# Patient Record
Sex: MALE | Race: WHITE | NOT HISPANIC OR LATINO | Employment: OTHER | URBAN - METROPOLITAN AREA
[De-identification: names, ages, dates, MRNs, and addresses within clinical notes are randomized per-mention and may not be internally consistent; named-entity substitution may affect disease eponyms.]

---

## 2023-12-07 ENCOUNTER — TRANSFERRED RECORDS (OUTPATIENT)
Dept: HEALTH INFORMATION MANAGEMENT | Facility: CLINIC | Age: 73
End: 2023-12-07

## 2024-04-24 ENCOUNTER — TELEPHONE (OUTPATIENT)
Dept: TRANSPLANT | Facility: CLINIC | Age: 74
End: 2024-04-24

## 2024-04-24 ENCOUNTER — TRANSCRIBE ORDERS (OUTPATIENT)
Dept: OTHER | Age: 74
End: 2024-04-24

## 2024-04-24 ENCOUNTER — PATIENT OUTREACH (OUTPATIENT)
Dept: ONCOLOGY | Facility: CLINIC | Age: 74
End: 2024-04-24
Payer: MEDICARE

## 2024-04-24 ENCOUNTER — MEDICAL CORRESPONDENCE (OUTPATIENT)
Dept: TRANSPLANT | Facility: CLINIC | Age: 74
End: 2024-04-24
Payer: MEDICARE

## 2024-04-24 ENCOUNTER — TELEPHONE (OUTPATIENT)
Dept: TRANSPLANT | Facility: CLINIC | Age: 74
End: 2024-04-24
Payer: MEDICARE

## 2024-04-24 DIAGNOSIS — D46.9 MDS (MYELODYSPLASTIC SYNDROME) (H): Primary | ICD-10-CM

## 2024-04-25 NOTE — PROGRESS NOTES
New Patient Oncology Nurse Navigator Note     Referring provider: Dr. Jean Claude Beckwith      Referring Clinic/Organization: [facility]      Referred to (specialty:) BMT      Requested provider (if applicable): NA     Date Referral Received: April 24, 2024     Evaluation for:  D46.9 (ICD-10-CM) - MDS (myelodysplastic syndrome) (H      April 25, 2024  Referral received and reviewed. Referral is for BMT. Message sent to BMT to schedule consultation.

## 2024-05-19 ENCOUNTER — HEALTH MAINTENANCE LETTER (OUTPATIENT)
Age: 74
End: 2024-05-19

## 2024-06-04 ENCOUNTER — CARE COORDINATION (OUTPATIENT)
Dept: TRANSPLANT | Facility: CLINIC | Age: 74
End: 2024-06-04
Payer: MEDICARE

## 2024-06-06 LAB
ABO/RH(D): NORMAL
ANTIBODY SCREEN: NEGATIVE
SPECIMEN EXPIRATION DATE: NORMAL

## 2024-06-06 NOTE — PROGRESS NOTES
BMT/Cell Therapy Note  Jun 7, 2024     Referring provider: Dr. 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  August 2023:  Collapsed in the bathroom while on a trip to [facility]. Had high fevers and rigors, treated with doxycycline.   11/30/2023: Seen by Rheumatology in the [place]. He had acute right sacroilitis. Ferritin was elevated to 1600, no hemachromatosis.   12/3/2023 to 12/9/2023: Hospitalized with high fevers and new onset mild pancytopenias. CT C/A/P did not show any lymphadenopathy or splenomegaly.  Infectious cultures were negative. 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 He was treated with anakinra, solumedrol and then started on oral prednisone.   12/7/2023: Bone marrow biopsy showed hypercellular marrow, no dysplasia, 34% ring sideroblasts, increased reticuloendothelial iron stores, no increase in blasts. Flow showed a CD5 positive B-cell infiltrate (5% of the marrow cellularity), consistent with monoclonal B-cell lymphocytosis. Cytogenetics showed a normal male karyotype. Myeloid malignancy panel did not show an SF3B1 mutation. However, there were mutations in SRSF2, DNMT3A, RUNX1, IDH2 p.[id number] , BCOR, BCORL1, and TET2. Diagnosed with CCUS.   1/31/2024 to 2/6/24: Hospitalized with fevers to 104 despite anti-IL1 therapy.  Anakinra was increased, then switched to canakinumab. PET-Ct on 2/9/24 did not show any malignancy. Diagnosed with subclinical hyperthyroidism treated with methimazole starting 2/14/2024.  3/15/2024 to 3/25/2024: Hospitalized for neutropenic fevers. He also had an episode of atrial fibrillation with rapid ventricular response placed on AC with LMWH. Methimazole was held due to risk of agranulocytosis.  3/18/2024: Bone marrow biopsy showed Myelodysplastic syndrome, with multilineage dysplasia. 2% bone marrow blasts, and 3% circulating blasts.18% ringed sideroblasts, slight reticulin fibrosis. Concurrent CBC showed WBC 1.7, ANC 0.5, Hb 8.7, platelet count 43. Normal cytogenetics. NGS could not be sent from the bone marrow. NGS peripheral blood (4/18/24) showed mutations in BCOR (56%), BCORL1 (11%), DNMT3A (34%), IDH2 (9%), SRSF2 (33%), STAG2 (9%), TET2 (2%), two RUNX1 mutations. IPSS-M: very high risk 1.98. Scattered non-caseating granulomas identified on the bone marrow biopsy and clot sections, negative for microorganisms. Also seen on flow was CD5-positive lambda monotypic B cells (4.3%).     3/21/2024: Cycle 1 decitabine 20 mg/m2 for 3 days only.  4/22/2024: Cycle 2 decitabine 20 mg/m2 for 5 days   5/21/2024: Cycle 3 decitabine   Will start cycle 4 on 6/19/24     History     History of Present Illness/ Summary  Arpit Forrest is a 73 year old male who presents for a new transplant evaluation. Here with his wife. He had an auto inflammatory syndrome around the time of diagnosis of MDS. He is currently on prednisone 8mg, with plan to taper by 1mg daily. No recurrent fever or rigors since starting decitabine.     Main complaint is feeling tired. Tolerating decitabine well except slight nausea, metallic taste, constipation. Goes to concerts, recitals. Cannot do the heavy yard work, gets winded after two flights of stairs. He is independent in ADLs and IADLs. Walks on the treadmill 20min, twice a week. Uses weights.  Feels better when counts are higher. He is very motivated to proceed with transplant.      Review of Systems: Negative except as mentioned above    Past Medical History    A.fib with RVR while admitted with febrile episode on 3/15/24 s/p cardioversion. He was on amiodarone and anticoagulation till 4/15/24, at which point it was discontinued.   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   Dyslipidemia  HTN  VINCENT  Gout   Exposure to agent orange     Past Surgical History    Hx of kidney donation to sister  Vasectomy     Family History    Mother  at age 99. She had a history of hypertension.    Father  at age 94, had some strokes after age 90.  He has 2 daughters, 1 who is 45 years old and 1 who is 47 years old.  His daughters are healthy.    He has 2 full sisters who were born in 9 and  who are currently doing well.  One of his sisters (Constance) history of complex kidney disease for many years and has undergone 2 kidney transplant.  No history of hematologic malignancies    Social History   He served actively for 12 years in the [employer], including deployments to [place] and [place] during the 1960s, where he reported extensive contact with Agent Orange due to the nature of his duties. Following this, he was stationed in [place] at a facility ([facility]) which was later closed due to the extensive dumping of hazardous substances by local industries      Medications   Current Outpatient Medications   Medication Sig Dispense Refill    allopurinol (ZYLOPRIM) 100 MG tablet Take 100 mg by mouth      amLODIPine (NORVASC) 10 MG tablet Take 1 tablet by mouth every morning      atorvastatin (LIPITOR) 10 MG tablet Take 1 tablet by mouth every morning      calcium carbonate (TUMS) 500 MG chewable tablet Take 1 chew tab by mouth 2 times daily 600 mg      coenzyme Q-10 capsule Take 1 capsule by mouth daily      fluticasone (FLONASE) 50 MCG/ACT nasal spray 1 spray       levofloxacin (LEVAQUIN) 500 MG tablet Take 500 mg by mouth      metFORMIN (GLUCOPHAGE XR) 500 MG 24 hr tablet Take 1,000 mg by mouth 2 times daily (with meals)      predniSONE (DELTASONE) 1 MG tablet Take 3 mg by mouth      RABEprazole (ACIPHEX) 20 MG EC tablet Take 1 tablet by mouth daily      Vitamin D3 (CHOLECALCIFEROL) 25 mcg (1000 units) tablet Take by mouth daily 3000 international unit(s)      acetaminophen (TYLENOL) 500 MG tablet Take 2 tablets by mouth      latanoprost (XALATAN) 0.005 % ophthalmic solution INSTILL 2 DROPS IN BOTH EYES AT BEDTIME      voriconazole (VFEND) 200 MG tablet Take 200 mg by mouth 2 times daily       Allergies    Allergies   Allergen Reactions    Gramineae Pollens Other (See Comments)     Seasonal sneezing, runny nose.          Assessment and Plan     MDS-IB1, very high risk   HCTCI score 1 (steroid induced hyperglycemia). Not frail.     We discussed the process of transplant in detail. We discussed that the first step in the transplant process is identification of a donor. We will type his daughters and proceed with an unrelated donor search. I outlined the steps of the transplant process including pre-transplant workup, conditioning therapy, engraftment, post-transplant monitoring. We discussed the risks including GVHD, relapse, infections, graft failure and severe organ toxicities. Also dicussed the requirements of staying close to hospital and having a dedicated caregiver for the first 100 days post transplant.     We discussed that allogenic stem cell transplant is the only modality that offers a chance at long-term survival and potential cure. The estimated one year overall survival according to the CIBMTR calculator is 67%. The risk of relapse is 40-50%, the incidence of grade III and IV acute GVHD and moderate/severe chronic GVHD is 10-15% and non relapse mortality is 10-20%. We discussed that we cannot individualize a statistic.      Recommendations  - Proceed with  "unrelated donor search. Also type daughters.   - Continue to taper steroids, would ideally like patient to be off steroids prior to transplant  - Cardiology evaluation during workup    Physical Exam     Vital Signs: /74 (BP Location: Right arm, Patient Position: Right side, Cuff Size: Adult Large)   Pulse 78   Temp 98.1  F (36.7  C) (Oral)   Resp 18   Ht 1.865 m (6' 1.43\")   Wt 114.1 kg (251 lb 8 oz)   SpO2 99%   BMI 32.80 kg/m    Wt Readings from Last 4 Encounters:   06/07/24 114.1 kg (251 lb 8 oz)     KPS: 80% Can perform normal activity with effort, some signs of disease    General: Alert, no distress  Eyes: Conjunctiva normal  Respiratory: Normal respiratory effort.  Cardiovascular: Normal perfusion, no significant edema.  Abdomen: No distention.    BMT Fried Frailty          6/7/2024    09:03   Fried Frailty   Lost>10 pounds unintenionally last year N   Exhaustion Score 0   Slowness Score 0   Weakness/ Strength Score 0   Low Activity Level Score 0   Final Score Not Frail   Final Score Number 0   Sit Stand Assessment   Patient able to perform 5 chair stands Y   Chair Stands in seconds 11   Patient is able to perform stand with Feet Side by Side? Y   First attempt (in seconds): 10   Patient is able to perform Semi-Tandem Stand? Y   First attemp (in seconds): 10   Patient is able to perform Tandem Stand? Y   First attemp (in seconds): 10        Labs, Pathology and Imaging     IMAGING  MRI Abdomen 11/30/2023  Normal hepatic morphology. Mild diffuse hepatic parenchymal signal loss on opposed phase images. No concerning focal hepatic lesion. No intra-extra hepatic biliary ductal dilatation.     Hepatic fat fraction measures 10.5% by DANTE and 12.0% by segmentation. This is consistent with mild hepatic steatosis.     R2* measures 50.0 1/s by DANTE and 60.4 1/s by segmentation. This is consistent with borderline iron deposition.   Left nephrectomy. Right renal cysts. No hydronephrosis. 2.2 cm left adrenal " nodule demonstrates homogeneous signal loss on opposed phase images and also contains internal macroscopic fat. Negative right adrenal gland. Normal appearance of the pancreas and gallbladder. Visualized bowel is normal in caliber. Colonic diverticulosis. No lymphadenopathy. No ascites. No concerning osseous lesion.     IMPRESSION:    1. Mild hepatic steatosis and borderline abnormal iron deposition. No concerning focal liver lesion.   2. Left adrenal myelolipoma.       I spent 90 minutes in the care of this patient today, which included time necessary for preparation for the visit, obtaining history, ordering medications/tests/procedures as medically indicated, review of pertinent medical literature, counseling of the patient, communication of recommendations to the care team, and documentation time.

## 2024-06-07 ENCOUNTER — LAB (OUTPATIENT)
Dept: LAB | Facility: CLINIC | Age: 74
End: 2024-06-07
Attending: STUDENT IN AN ORGANIZED HEALTH CARE EDUCATION/TRAINING PROGRAM
Payer: MEDICARE

## 2024-06-07 ENCOUNTER — ALLIED HEALTH/NURSE VISIT (OUTPATIENT)
Dept: TRANSPLANT | Facility: CLINIC | Age: 74
End: 2024-06-07
Attending: STUDENT IN AN ORGANIZED HEALTH CARE EDUCATION/TRAINING PROGRAM
Payer: MEDICARE

## 2024-06-07 ENCOUNTER — MEDICAL CORRESPONDENCE (OUTPATIENT)
Dept: TRANSPLANT | Facility: CLINIC | Age: 74
End: 2024-06-07

## 2024-06-07 VITALS
DIASTOLIC BLOOD PRESSURE: 74 MMHG | OXYGEN SATURATION: 99 % | TEMPERATURE: 98.1 F | SYSTOLIC BLOOD PRESSURE: 125 MMHG | HEIGHT: 73 IN | HEART RATE: 78 BPM | RESPIRATION RATE: 18 BRPM | WEIGHT: 251.5 LBS | BODY MASS INDEX: 33.33 KG/M2

## 2024-06-07 DIAGNOSIS — D46.9 MDS (MYELODYSPLASTIC SYNDROME) (H): ICD-10-CM

## 2024-06-07 DIAGNOSIS — D46.9 MDS (MYELODYSPLASTIC SYNDROME) (H): Primary | ICD-10-CM

## 2024-06-07 DIAGNOSIS — Z71.9 VISIT FOR COUNSELING: Primary | ICD-10-CM

## 2024-06-07 PROBLEM — Z90.5 HISTORY OF KIDNEY DONATION: Status: ACTIVE | Noted: 2023-12-03

## 2024-06-07 PROBLEM — M04.9 AUTOINFLAMMATORY SYNDROME (H): Status: ACTIVE | Noted: 2024-03-20

## 2024-06-07 PROBLEM — Z77.098 HISTORY OF AGENT ORANGE EXPOSURE: Status: ACTIVE | Noted: 2023-12-04

## 2024-06-07 PROBLEM — E11.9 TYPE 2 DIABETES MELLITUS WITHOUT COMPLICATION, WITHOUT LONG-TERM CURRENT USE OF INSULIN (H): Status: ACTIVE | Noted: 2023-10-16

## 2024-06-07 PROBLEM — E78.5 HYPERLIPIDEMIA: Status: ACTIVE | Noted: 2022-10-10

## 2024-06-07 PROBLEM — M46.1 BILATERAL SACROILIITIS (H): Status: ACTIVE | Noted: 2023-12-05

## 2024-06-07 PROBLEM — E66.9 OBESITY (BMI 30-39.9): Status: ACTIVE | Noted: 2024-02-01

## 2024-06-07 PROBLEM — G47.33 OBSTRUCTIVE SLEEP APNEA SYNDROME: Status: ACTIVE | Noted: 2022-10-10

## 2024-06-07 PROBLEM — K22.70 BARRETT'S ESOPHAGUS WITHOUT DYSPLASIA: Status: ACTIVE | Noted: 2022-10-10

## 2024-06-07 PROBLEM — Z87.39 H/O: GOUT: Status: ACTIVE | Noted: 2024-06-07

## 2024-06-07 PROBLEM — I10 ESSENTIAL HYPERTENSION: Status: ACTIVE | Noted: 2022-10-10

## 2024-06-07 PROBLEM — M45.6 ANKYLOSING SPONDYLITIS LUMBAR REGION (H): Status: ACTIVE | Noted: 2022-10-10

## 2024-06-07 LAB
CMV IGG SERPL IA-ACNC: <0.2 U/ML
CMV IGG SERPL IA-ACNC: NORMAL

## 2024-06-07 PROCEDURE — 36415 COLL VENOUS BLD VENIPUNCTURE: CPT

## 2024-06-07 PROCEDURE — 81378 HLA I & II TYPING HR: CPT

## 2024-06-07 PROCEDURE — 86828 HLA CLASS I&II ANTIBODY QUAL: CPT

## 2024-06-07 PROCEDURE — 86644 CMV ANTIBODY: CPT

## 2024-06-07 PROCEDURE — G0463 HOSPITAL OUTPT CLINIC VISIT: HCPCS | Performed by: STUDENT IN AN ORGANIZED HEALTH CARE EDUCATION/TRAINING PROGRAM

## 2024-06-07 PROCEDURE — 99205 OFFICE O/P NEW HI 60 MIN: CPT | Performed by: STUDENT IN AN ORGANIZED HEALTH CARE EDUCATION/TRAINING PROGRAM

## 2024-06-07 PROCEDURE — 99417 PROLNG OP E/M EACH 15 MIN: CPT | Performed by: STUDENT IN AN ORGANIZED HEALTH CARE EDUCATION/TRAINING PROGRAM

## 2024-06-07 PROCEDURE — 86900 BLOOD TYPING SEROLOGIC ABO: CPT

## 2024-06-07 RX ORDER — VORICONAZOLE 200 MG/1
200 TABLET, FILM COATED ORAL 2 TIMES DAILY
Status: ON HOLD | COMMUNITY
End: 2024-07-31

## 2024-06-07 RX ORDER — UBIDECARENONE 30 MG
1 CAPSULE ORAL DAILY
Status: ON HOLD | COMMUNITY
End: 2024-08-25

## 2024-06-07 RX ORDER — RABEPRAZOLE SODIUM 20 MG/1
1 TABLET, DELAYED RELEASE ORAL DAILY
Status: ON HOLD | COMMUNITY
Start: 2023-09-21 | End: 2024-08-24

## 2024-06-07 RX ORDER — PREDNISONE 1 MG/1
3 TABLET ORAL
COMMUNITY
Start: 2024-05-28 | End: 2024-07-23

## 2024-06-07 RX ORDER — VITAMIN B COMPLEX
3 TABLET ORAL DAILY
Status: ON HOLD | COMMUNITY
End: 2024-08-24

## 2024-06-07 RX ORDER — ACETAMINOPHEN 500 MG
1000 TABLET ORAL 2 TIMES DAILY
Status: ON HOLD | COMMUNITY
End: 2024-08-24

## 2024-06-07 RX ORDER — LATANOPROST 50 UG/ML
SOLUTION/ DROPS OPHTHALMIC
COMMUNITY

## 2024-06-07 RX ORDER — ATORVASTATIN CALCIUM 10 MG/1
1 TABLET, FILM COATED ORAL EVERY MORNING
Status: ON HOLD | COMMUNITY
Start: 2023-10-24 | End: 2024-08-24

## 2024-06-07 RX ORDER — ALLOPURINOL 100 MG/1
300 TABLET ORAL
Status: ON HOLD | COMMUNITY
Start: 2023-10-24 | End: 2024-08-24

## 2024-06-07 RX ORDER — FLUTICASONE PROPIONATE 50 MCG
1 SPRAY, SUSPENSION (ML) NASAL 2 TIMES DAILY
COMMUNITY
Start: 2023-12-15

## 2024-06-07 RX ORDER — LEVOFLOXACIN 500 MG/1
500 TABLET, FILM COATED ORAL DAILY
Status: ON HOLD | COMMUNITY
Start: 2024-03-26 | End: 2024-08-24

## 2024-06-07 RX ORDER — AMLODIPINE BESYLATE 10 MG/1
1 TABLET ORAL EVERY MORNING
Status: ON HOLD | COMMUNITY
Start: 2023-10-24 | End: 2024-08-24

## 2024-06-07 ASSESSMENT — PAIN SCALES - GENERAL: PAINLEVEL: NO PAIN (0)

## 2024-06-07 NOTE — PROGRESS NOTES
Blood and Marrow Transplant   New Transplant Visit with   Clinical     Assessment completed on 6/7/2024 in the BMT clinic. Information for this assessment was provided by pt and pt's spouse report, consultation with medical team, and medical chart review.     Present:  Patient: Arpit Forrest  Spouse: Meghna Forrest  : SHUN Almendarez, [facility]    Medical Team   Nurse Coordinator: Blanca Sanchez RN  BMT Physician: Saw Fowler MD  Referring Physician: Jean Claude Beckwith MD    Diagnosis: MDS  Diagnosis Date: 3/2024    Presenting Information:  Pt is a 73 year old male diagnosed with MDS. Pt was diagnosed on 3/2024. Pt presents for allogeneic stem cell transplant discussion.    Contact Information:  Cell Phone: [phone]  Home Phone: [phone]  Email: [email or web address]  Spouse's phone: [phone]    Special Needs:   No needs identified at this time.     Relocation Requirement:   Pt lives in [place] ([place] from [facility] which is within the required distance of the hospital. Pt does not need to relocate.     Living Situation:   At home with spouse    Family Information:   Spouse: Meghna  Siblings: two sisters: Casi ([place]), Constance ([place])  Children: two daughters, Anel (47), Cathy (45); 5 grandchildren     Education/Employment:  Currently employed: retired  Employer/Occupation: most recent employer  [employer] ([employer]), before that had various other professions.    Spouse/Partner Employed: retired  Occupation: previously worked as a professor and a school principle.     Insurance:   Pt has Medicare/BCBS. No insurance concerns identified at this time. SIXTO provided information regarding the insurance authorization process and the role of the [employer] . SW provided contact info for the [employer]  and referred pt to them for future insurance questions.     Finances:   Pt's source of income is social security  Contacted Esperanza Jimenez at [employer]  and informed that intake states he was told patient was already transferred, however patient is still awaiting transport to [facility]. Esperanza Jimenez states they were told the patient was at [facility]. Esperanza Jimenez states he has a crew that will be on the way from [facility] now.       Kathrin Barrera RN  09/09/23 [address]. No financial concerns identified at this time.    Caregiver:   SW discussed with the pt and spouse the caregiver role and expectation at length. Pt is agreeable to having a full time caregiver for the minimum of 100 days until cleared by the BMT Physician. Pt's identified caregivers are wife, daughter lives next door. Caregiver education and information provided. No caregiver concerns identified.     Healthcare Directive:  No. SW provided education and forms. SW encouraged pt to have discussions with their family regarding their health care wishes.  In the absence of a healthcare document, SW discussed the [place] Policy on who would make decisions on his behalf if he did not have the capacity to make healthcare decisions.    Resources Provided:  -BMT Information Book  -BMT Resources Packet  -Healthcare Directive  -Honoring Choices - Your Rights: Making Your Own Health Care Treatment Decisions  -Caregiver Contract/Description  -Transplant Unit Description and Information     Identified Concerns:  No concerns identified at this time.     Summary:  Pt presents to [facility] regarding an allogeneic stem cell transplant. Pt and pt's spouse asked good/appropriate questions regarding psychosocial factors related to BMT; all questions were addressed. Pt presented as calm and engaged. Pt's affect was appropriate. Patient indicated they are currently feeling positive. Family's affect was appropriate.     Plan:   SW provided contact information and encouraged pt to contact SW with any additional questions, concerns, resources and/or for support. SW will continue to follow pt to provide support and guidance with resources as needed.     Rosario HOFFMANN, [facility]  Specialty Float    Phone: [phone]

## 2024-06-07 NOTE — NURSING NOTE
Chief Complaint   Patient presents with    Labs Only     Labs collected from venipuncture by RN.      Labs collected from venipuncture by RN.     Areli Phillips RN

## 2024-06-07 NOTE — PROGRESS NOTES
Blood and Marrow Transplant - New Evaluation Appointment    Spoke with Arpit maylin Meghna, patient's spouse, following visit with Dr. Fowler. I explained the role of the nurse coordinator throughout the process, as well as general time line and expectations for next steps. We discussed the necessity of a caregiver and the program's proximity requirements. All questions were answered.     Plan: Allogeneic Transplant, pending Completion of Decitabine cycle.     Timeline Notes:PT will have next cycle of decitabine 6/19, plan to bring to work up 4 weeks after. Dr Fowler recommends that pt get BMBX prior to start of last cycle. We can do BMBX post this last cycle during the work up.     Contact information provided for :  yes    Allo:  HLA typing drawn: Yes    PRA typing drawn:  Yes    CMV-IgG and ABO-Rh drawn or in record: Yes    Contact information provided for : Yes    Will sibling typing kits need to be sent? No    Financial Release for URD search obtained:  Yes    Phase Status updated: yes

## 2024-06-07 NOTE — NURSING NOTE
"Oncology Rooming Note    June 7, 2024 7:26 AM   Arpit Forrest is a 73 year old male who presents for:    Chief Complaint   Patient presents with    Oncology Clinic Visit     New Eval for MDS     Initial Vitals: /74 (BP Location: Right arm, Patient Position: Right side, Cuff Size: Adult Large)   Pulse 78   Temp 98.1  F (36.7  C) (Oral)   Resp 18   Ht 1.865 m (6' 1.43\")   Wt 114.1 kg (251 lb 8 oz)   SpO2 99%   BMI 32.80 kg/m   Estimated body mass index is 32.8 kg/m  as calculated from the following:    Height as of this encounter: 1.865 m (6' 1.43\").    Weight as of this encounter: 114.1 kg (251 lb 8 oz). Body surface area is 2.43 meters squared.  No Pain (0) Comment: Data Unavailable   No LMP for male patient.  Allergies reviewed: Yes  Medications reviewed: Yes    Medications: Medication refills not needed today.  Pharmacy name entered into [employer]: [id number] - [place], [place] - [address] AT [facility] [address] [place] [address]    Frailty Screening:   Is the patient here for a new oncology consult visit in cancer care? 2. No      Clinical concerns: none       Lindsey Arriola MA             "

## 2024-06-07 NOTE — NURSING NOTE
[facility] GISSELL Frailty assessment completed with patient in clinic. Patient had no questions and showed understanding of what assessment was being done.     Maggy Franklin on 6/7/2024 at 9:13 AM

## 2024-06-07 NOTE — LETTER
6/7/2024      Arpit Forrest  [address] [place] [address]      Dear Colleague,    Thank you for referring your patient, Arpit Forrest, to the [facility] BLOOD AND MARROW TRANSPLANT PROGRAM [place]. Please see a copy of my visit note below.    BMT/Cell Therapy Note  Jun 7, 2024     Referring provider: Dr. 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  August 2023:  Collapsed in the bathroom while on a trip to [facility]. Had high fevers and rigors, treated with doxycycline.   11/30/2023: Seen by Rheumatology in the [facility]. He had acute right sacroilitis. Ferritin was elevated to 1600, no hemachromatosis.   12/3/2023 to 12/9/2023: Hospitalized with high fevers and new onset mild pancytopenias. CT C/A/P did not show any lymphadenopathy or splenomegaly.  Infectious cultures were negative. 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 He was treated with anakinra, solumedrol and then started on oral prednisone.   12/7/2023: Bone marrow biopsy showed hypercellular marrow, no dysplasia, 34% ring sideroblasts, increased reticuloendothelial iron stores, no increase in blasts. Flow showed a CD5 positive B-cell infiltrate (5% of the marrow cellularity), consistent with monoclonal B-cell lymphocytosis. Cytogenetics showed a normal male  karyotype. Myeloid malignancy panel did not show an SF3B1 mutation. However, there were mutations in SRSF2, DNMT3A, RUNX1, IDH2 p.[id number] , BCOR, BCORL1, and TET2. Diagnosed with CCUS.   1/31/2024 to 2/6/24: Hospitalized with fevers to 104 despite anti-IL1 therapy. Anakinra was increased, then switched to canakinumab. PET-Ct on 2/9/24 did not show any malignancy. Diagnosed with subclinical hyperthyroidism treated with methimazole starting 2/14/2024.  3/15/2024 to 3/25/2024: Hospitalized for neutropenic fevers. He also had an episode of atrial fibrillation with rapid ventricular response placed on AC with LMWH. Methimazole was held due to risk of agranulocytosis.  3/18/2024: Bone marrow biopsy showed Myelodysplastic syndrome, with multilineage dysplasia. 2% bone marrow blasts, and 3% circulating blasts.18% ringed sideroblasts, slight reticulin fibrosis. Concurrent CBC showed WBC 1.7, ANC 0.5, Hb 8.7, platelet count 43. Normal cytogenetics. NGS could not be sent from the bone marrow. NGS peripheral blood (4/18/24) showed mutations in BCOR (56%), BCORL1 (11%), DNMT3A (34%), IDH2 (9%), SRSF2 (33%), STAG2 (9%), TET2 (2%), two RUNX1 mutations. IPSS-M: very high risk 1.98. Scattered non-caseating granulomas identified on the bone marrow biopsy and clot sections, negative for microorganisms. Also seen on flow was CD5-positive lambda monotypic B cells (4.3%).     3/21/2024: Cycle 1 decitabine 20 mg/m2 for 3 days only.  4/22/2024: Cycle 2 decitabine 20 mg/m2 for 5 days   5/21/2024: Cycle 3 decitabine   Will start cycle 4 on 6/19/24     History     History of Present Illness/ Summary  Arpit Forrest is a 73 year old male who presents for a new transplant evaluation. Here with his wife. He had an auto inflammatory syndrome around the time of diagnosis of MDS. He is currently on prednisone 8mg, with plan to taper by 1mg daily. No recurrent fever or rigors since starting decitabine.     Main complaint is feeling tired.  Tolerating decitabine well except slight nausea, metallic taste, constipation. Goes to [employer], recitals. Cannot do the heavy yard work, gets winded after two flights of stairs. He is independent in ADLs and IADLs. Walks on the treadmill 20min, twice a week. Uses weights. Feels better when counts are higher. He is very motivated to proceed with transplant.      Review of Systems: Negative except as mentioned above    Past Medical History   A.fib with RVR while admitted with febrile episode on 3/15/24 s/p cardioversion. He was on amiodarone and anticoagulation till 4/15/24, at which point it was discontinued.   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   Dyslipidemia  HTN  VINCENT  Gout   Exposure to agent orange     Past Surgical History   Hx of kidney donation to sister  Vasectomy     Family History   Mother  at age 99. She had a history of hypertension.    Father  at age 94, had some strokes after age 90.  He has 2 daughters, 1 who is 45 years old and 1 who is 47 years old.  His daughters are healthy.    He has 2 full sisters who were born in 9 and  who are currently doing well.  One of his sisters (Constance) history of complex kidney disease for many years and has undergone 2 kidney transplant.  No history of hematologic malignancies    Social History  He served actively for 12 years in the [employer], including deployments to [place] and [place] during the 1960s, where he reported extensive contact with Agent Orange due to the nature of his duties. Following this, he was stationed in [place] at a facility ([facility]) which was later closed due to the extensive dumping of hazardous substances by local industries      Medications  Current Outpatient Medications   Medication Sig Dispense Refill    allopurinol (ZYLOPRIM) 100 MG tablet Take 100 mg by mouth      amLODIPine (NORVASC) 10 MG tablet Take 1 tablet by mouth every morning      atorvastatin (LIPITOR)  10 MG tablet Take 1 tablet by mouth every morning      calcium carbonate (TUMS) 500 MG chewable tablet Take 1 chew tab by mouth 2 times daily 600 mg      coenzyme Q-10 capsule Take 1 capsule by mouth daily      fluticasone (FLONASE) 50 MCG/ACT nasal spray 1 spray      levofloxacin (LEVAQUIN) 500 MG tablet Take 500 mg by mouth      metFORMIN (GLUCOPHAGE XR) 500 MG 24 hr tablet Take 1,000 mg by mouth 2 times daily (with meals)      predniSONE (DELTASONE) 1 MG tablet Take 3 mg by mouth      RABEprazole (ACIPHEX) 20 MG EC tablet Take 1 tablet by mouth daily      Vitamin D3 (CHOLECALCIFEROL) 25 mcg (1000 units) tablet Take by mouth daily 3000 international unit(s)      acetaminophen (TYLENOL) 500 MG tablet Take 2 tablets by mouth      latanoprost (XALATAN) 0.005 % ophthalmic solution INSTILL 2 DROPS IN BOTH EYES AT BEDTIME      voriconazole (VFEND) 200 MG tablet Take 200 mg by mouth 2 times daily       Allergies   Allergies   Allergen Reactions    Gramineae Pollens Other (See Comments)     Seasonal sneezing, runny nose.          Assessment and Plan     MDS-IB1, very high risk   HCTCI score 1 (steroid induced hyperglycemia). Not frail.     We discussed the process of transplant in detail. We discussed that the first step in the transplant process is identification of a donor. We will type his daughters and proceed with an unrelated donor search. I outlined the steps of the transplant process including pre-transplant workup, conditioning therapy, engraftment, post-transplant monitoring. We discussed the risks including GVHD, relapse, infections, graft failure and severe organ toxicities. Also dicussed the requirements of staying close to hospital and having a dedicated caregiver for the first 100 days post transplant.     We discussed that allogenic stem cell transplant is the only modality that offers a chance at long-term survival and potential cure. The estimated one year overall survival according to the CIBMTR  "calculator is 67%. The risk of relapse is 40-50%, the incidence of grade III and IV acute GVHD and moderate/severe chronic GVHD is 10-15% and non relapse mortality is 10-20%. We discussed that we cannot individualize a statistic.      Recommendations  - Proceed with unrelated donor search. Also type daughters.   - Continue to taper steroids, would ideally like patient to be off steroids prior to transplant  - Cardiology evaluation during workup    Physical Exam     Vital Signs: /74 (BP Location: Right arm, Patient Position: Right side, Cuff Size: Adult Large)   Pulse 78   Temp 98.1  F (36.7  C) (Oral)   Resp 18   Ht 1.865 m (6' 1.43\")   Wt 114.1 kg (251 lb 8 oz)   SpO2 99%   BMI 32.80 kg/m    Wt Readings from Last 4 Encounters:   06/07/24 114.1 kg (251 lb 8 oz)     KPS: 80% Can perform normal activity with effort, some signs of disease    General: Alert, no distress  Eyes: Conjunctiva normal  Respiratory: Normal respiratory effort.  Cardiovascular: Normal perfusion, no significant edema.  Abdomen: No distention.    BMT Fried Frailty          6/7/2024    09:03   Fried Frailty   Lost>10 pounds unintenionally last year N   Exhaustion Score 0   Slowness Score 0   Weakness/ Strength Score 0   Low Activity Level Score 0   Final Score Not Frail   Final Score Number 0   Sit Stand Assessment   Patient able to perform 5 chair stands Y   Chair Stands in seconds 11   Patient is able to perform stand with Feet Side by Side? Y   First attempt (in seconds): 10   Patient is able to perform Semi-Tandem Stand? Y   First attemp (in seconds): 10   Patient is able to perform Tandem Stand? Y   First attemp (in seconds): 10        Labs, Pathology and Imaging     IMAGING  MRI Abdomen 11/30/2023  Normal hepatic morphology. Mild diffuse hepatic parenchymal signal loss on opposed phase images. No concerning focal hepatic lesion. No intra-extra hepatic biliary ductal dilatation.     Hepatic fat fraction measures 10.5% by DANTE " and 12.0% by segmentation. This is consistent with mild hepatic steatosis.     R2* measures 50.0 1/s by DANTE and 60.4 1/s by segmentation. This is consistent with borderline iron deposition.   Left nephrectomy. Right renal cysts. No hydronephrosis. 2.2 cm left adrenal nodule demonstrates homogeneous signal loss on opposed phase images and also contains internal macroscopic fat. Negative right adrenal gland. Normal appearance of the pancreas and gallbladder. Visualized bowel is normal in caliber. Colonic diverticulosis. No lymphadenopathy. No ascites. No concerning osseous lesion.     IMPRESSION:    1. Mild hepatic steatosis and borderline abnormal iron deposition. No concerning focal liver lesion.   2. Left adrenal myelolipoma.       I spent 90 minutes in the care of this patient today, which included time necessary for preparation for the visit, obtaining history, ordering medications/tests/procedures as medically indicated, review of pertinent medical literature, counseling of the patient, communication of recommendations to the care team, and documentation time.     Sincerely,        GASPER Rios

## 2024-06-10 LAB
SCR 1 TEST METHOD: NORMAL
SCR1 CELL: NORMAL
SCR1 COMMENTS: NORMAL
SCR1 RESULT: NORMAL
SCR2 CELL: NORMAL
SCR2 COMMENTS: NORMAL
SCR2 RESULT: NORMAL
SCR2 TEST METHOD: NORMAL

## 2024-06-10 RX ORDER — METFORMIN HCL 500 MG
TABLET, EXTENDED RELEASE 24 HR ORAL
Status: ON HOLD | COMMUNITY
End: 2024-08-24

## 2024-06-12 NOTE — PROGRESS NOTES
[facility] BMT and Cell Therapy Program  RN Coordinator Pre-Visit Documentation      Arpit Forrest is a 73 year old male who has been referred to the [facility] BMT and Cell Therapy Program for hematopoietic cell transplant or immune effector cell therapy.      Referring MD Name: Dr Beckwith,         Reason for referral: MDS    Link to BMT & CT Program Algorithms      For allos only:    Previous HLA typing? Yes    Previous formal donor search? Yes    PRA needed? Yes    CMV IGG needed? Yes    and ABO needed? Yes    Potential family donors to type? No    Need URD consents? Yes      All relevant clinical notes, labs, imaging, and pathology may be reviewed in Epic Bookmarks under name: Blanca      Patient Care Team         Relationship Specialty Notifications Start End    Tanvir Max MD PCP - General Family Medicine  6/7/24     Phone: [phone] Fax: [id number] [place] [address]    Blanca Sanchez, RN BMT Nurse Coordinator   6/12/24               Blanca Sanchez RN

## 2024-06-13 LAB
A*LOCUS SEROLOGIC EQUIVALENT: 2
A*LOCUS: NORMAL
ABTEST METHOD: NORMAL
B*: NORMAL
B*LOCUS SEROLOGIC EQUIVALENT: 7
B*LOCUS: NORMAL
B*SEROLOGIC EQUIVALENT: 44
BW-1: NORMAL
BW-2: NORMAL
C*: NORMAL
C*LOCUS SEROLOGIC EQUIVALENT: 5
C*LOCUS: NORMAL
C*SEROLOGIC EQUIVALENT: 7
DPA1*: NORMAL
DPA1*LOCUS: NORMAL
DPB1*: NORMAL
DPB1*LOCUS NMDP: NORMAL
DPB1*LOCUS: NORMAL
DPB1*NMDP: NORMAL
DQA1*: NORMAL
DQA1*LOCUS: NORMAL
DQB1*: NORMAL
DQB1*LOCUS SEROLOGIC EQUIVALENT: 7
DQB1*LOCUS: NORMAL
DQB1*SEROLOGIC EQUIVALENT: 6
DRB1*: NORMAL
DRB1*LOCUS SEROLOGIC EQUIVALENT: 4
DRB1*LOCUS: NORMAL
DRB1*SEROLOGIC EQUIVALENT: 15
DRB4*LOCUS SEROLOGIC EQUIVALENT: 53
DRB4*LOCUS: NORMAL
DRB5*: NORMAL
DRB5*SEROLOGIC EQUIVALENT: 51
DRSSO TEST METHOD: NORMAL

## 2024-07-12 DIAGNOSIS — Z01.818 PREOP EXAMINATION: ICD-10-CM

## 2024-07-12 DIAGNOSIS — Z11.4 ENCOUNTER FOR SCREENING FOR HUMAN IMMUNODEFICIENCY VIRUS (HIV): ICD-10-CM

## 2024-07-12 DIAGNOSIS — Z86.2 PERSONAL HISTORY OF DISEASES OF BLOOD AND BLOOD-FORMING ORGANS: ICD-10-CM

## 2024-07-12 DIAGNOSIS — D46.9 MDS (MYELODYSPLASTIC SYNDROME) (H): Primary | ICD-10-CM

## 2024-07-12 DIAGNOSIS — Z11.59 SCREENING FOR VIRAL DISEASE: ICD-10-CM

## 2024-07-12 DIAGNOSIS — D64.9 ANEMIA, UNSPECIFIED: ICD-10-CM

## 2024-07-15 ENCOUNTER — TELEPHONE (OUTPATIENT)
Dept: CARDIOLOGY | Facility: CLINIC | Age: 74
End: 2024-07-15
Payer: MEDICARE

## 2024-07-15 ENCOUNTER — MEDICAL CORRESPONDENCE (OUTPATIENT)
Dept: TRANSPLANT | Facility: CLINIC | Age: 74
End: 2024-07-15
Payer: MEDICARE

## 2024-07-15 NOTE — TELEPHONE ENCOUNTER
7/15 Patient confirmed scheduled appointment:  Date: 7/29/2024  Time: 8:30 am   Visit type: New Cardiology   Provider: Makenzie  Location: [facility]  Testing/imaging: PFT PRIOR 7/23 AT 10:00 AM , LABS-10:00am ,EKG-10:30 am and ECHO -11:00 am AT [facility] 7/22 )  Additional notes: cardiac clearance

## 2024-07-17 ENCOUNTER — TELEPHONE (OUTPATIENT)
Dept: TRANSPLANT | Facility: CLINIC | Age: 74
End: 2024-07-17
Payer: MEDICARE

## 2024-07-17 ENCOUNTER — TELEPHONE (OUTPATIENT)
Dept: CARDIOLOGY | Facility: CLINIC | Age: 74
End: 2024-07-17
Payer: MEDICARE

## 2024-07-17 NOTE — TELEPHONE ENCOUNTER
7/17 Patient confirmed scheduled appointment:  Date: 7/22/2024  Time: 1:30 pm  Visit type: New Cardiology  Provider: Makenzie  [place] [facility]  Testing/imaging: N/A  Additional notes: pt will have his ecg done when he comes in clinic

## 2024-07-18 DIAGNOSIS — E78.5 HYPERLIPIDEMIA: Primary | ICD-10-CM

## 2024-07-18 PROBLEM — H40.9 GLAUCOMA: Status: ACTIVE | Noted: 2024-07-18

## 2024-07-21 LAB
ABO/RH(D): NORMAL
ANTIBODY SCREEN: NEGATIVE
SPECIMEN EXPIRATION DATE: NORMAL

## 2024-07-22 ENCOUNTER — OFFICE VISIT (OUTPATIENT)
Dept: CARDIOLOGY | Facility: CLINIC | Age: 74
End: 2024-07-22
Attending: STUDENT IN AN ORGANIZED HEALTH CARE EDUCATION/TRAINING PROGRAM
Payer: MEDICARE

## 2024-07-22 ENCOUNTER — LAB (OUTPATIENT)
Dept: LAB | Facility: CLINIC | Age: 74
End: 2024-07-22
Payer: MEDICARE

## 2024-07-22 ENCOUNTER — LAB (OUTPATIENT)
Dept: LAB | Facility: CLINIC | Age: 74
End: 2024-07-22
Attending: STUDENT IN AN ORGANIZED HEALTH CARE EDUCATION/TRAINING PROGRAM
Payer: MEDICARE

## 2024-07-22 ENCOUNTER — PRE VISIT (OUTPATIENT)
Dept: CARDIOLOGY | Facility: CLINIC | Age: 74
End: 2024-07-22

## 2024-07-22 ENCOUNTER — HOSPITAL ENCOUNTER (OUTPATIENT)
Dept: CARDIOLOGY | Facility: CLINIC | Age: 74
Discharge: HOME | End: 2024-07-22
Attending: STUDENT IN AN ORGANIZED HEALTH CARE EDUCATION/TRAINING PROGRAM
Payer: MEDICARE

## 2024-07-22 ENCOUNTER — OFFICE VISIT (OUTPATIENT)
Dept: TRANSPLANT | Facility: CLINIC | Age: 74
End: 2024-07-22
Attending: STUDENT IN AN ORGANIZED HEALTH CARE EDUCATION/TRAINING PROGRAM
Payer: MEDICARE

## 2024-07-22 VITALS
SYSTOLIC BLOOD PRESSURE: 137 MMHG | BODY MASS INDEX: 33.52 KG/M2 | WEIGHT: 257 LBS | OXYGEN SATURATION: 97 % | HEART RATE: 75 BPM | DIASTOLIC BLOOD PRESSURE: 77 MMHG

## 2024-07-22 DIAGNOSIS — Z86.2 PERSONAL HISTORY OF DISEASES OF BLOOD AND BLOOD-FORMING ORGANS: ICD-10-CM

## 2024-07-22 DIAGNOSIS — Z11.59 SCREENING FOR VIRAL DISEASE: ICD-10-CM

## 2024-07-22 DIAGNOSIS — Z01.818 PREOP EXAMINATION: ICD-10-CM

## 2024-07-22 DIAGNOSIS — I10 ESSENTIAL HYPERTENSION: Primary | ICD-10-CM

## 2024-07-22 DIAGNOSIS — D46.9 MDS (MYELODYSPLASTIC SYNDROME) (H): ICD-10-CM

## 2024-07-22 DIAGNOSIS — D46.9 MDS (MYELODYSPLASTIC SYNDROME) (H): Primary | ICD-10-CM

## 2024-07-22 DIAGNOSIS — Z11.4 ENCOUNTER FOR SCREENING FOR HUMAN IMMUNODEFICIENCY VIRUS (HIV): ICD-10-CM

## 2024-07-22 DIAGNOSIS — D64.9 ANEMIA, UNSPECIFIED: ICD-10-CM

## 2024-07-22 DIAGNOSIS — E78.5 HYPERLIPIDEMIA: ICD-10-CM

## 2024-07-22 DIAGNOSIS — E78.2 MIXED HYPERLIPIDEMIA: ICD-10-CM

## 2024-07-22 LAB
ALBUMIN SERPL BCG-MCNC: 4.5 G/DL (ref 3.5–5.2)
ALBUMIN UR-MCNC: 20 MG/DL
ALP SERPL-CCNC: 89 U/L (ref 40–150)
ALT SERPL W P-5'-P-CCNC: 14 U/L (ref 0–70)
ANION GAP SERPL CALCULATED.3IONS-SCNC: 12 MMOL/L (ref 7–15)
APPEARANCE UR: CLEAR
APTT PPP: 33 SECONDS (ref 22–38)
AST SERPL W P-5'-P-CCNC: 19 U/L (ref 0–45)
BASOPHILS # BLD AUTO: ABNORMAL 10*3/UL
BASOPHILS # BLD MANUAL: 0.3 10E3/UL (ref 0–0.2)
BASOPHILS NFR BLD AUTO: ABNORMAL %
BASOPHILS NFR BLD MANUAL: 7 %
BILIRUB SERPL-MCNC: <0.2 MG/DL
BILIRUB UR QL STRIP: NEGATIVE
BUN SERPL-MCNC: 22.7 MG/DL (ref 8–23)
CALCIUM SERPL-MCNC: 9.9 MG/DL (ref 8.8–10.4)
CHLORIDE SERPL-SCNC: 108 MMOL/L (ref 98–107)
CHOLEST SERPL-MCNC: 152 MG/DL
CMV IGG SERPL IA-ACNC: 0.27 U/ML
CMV IGG SERPL IA-ACNC: NORMAL
COLOR UR AUTO: YELLOW
CREAT SERPL-MCNC: 1.27 MG/DL (ref 0.67–1.17)
EBV VCA IGG SER IA-ACNC: >750 U/ML
EBV VCA IGG SER IA-ACNC: POSITIVE
EGFRCR SERPLBLD CKD-EPI 2021: 59 ML/MIN/1.73M2
EOSINOPHIL # BLD AUTO: ABNORMAL 10*3/UL
EOSINOPHIL # BLD MANUAL: 0.2 10E3/UL (ref 0–0.7)
EOSINOPHIL NFR BLD AUTO: ABNORMAL %
EOSINOPHIL NFR BLD MANUAL: 4 %
ERYTHROCYTE [DISTWIDTH] IN BLOOD BY AUTOMATED COUNT: 16.9 % (ref 10–15)
FASTING STATUS PATIENT QL REPORTED: NO
FASTING STATUS PATIENT QL REPORTED: NO
FERRITIN SERPL-MCNC: 295 NG/ML (ref 31–409)
GLUCOSE SERPL-MCNC: 127 MG/DL (ref 70–99)
GLUCOSE UR STRIP-MCNC: NEGATIVE MG/DL
HBV CORE AB SERPL QL IA: NONREACTIVE
HBV SURFACE AB SERPL IA-ACNC: <3.5 M[IU]/ML
HBV SURFACE AB SERPL IA-ACNC: NONREACTIVE M[IU]/ML
HBV SURFACE AG SERPL QL IA: NONREACTIVE
HCO3 SERPL-SCNC: 22 MMOL/L (ref 22–29)
HCT VFR BLD AUTO: 38.2 % (ref 40–53)
HCV AB SERPL QL IA: NONREACTIVE
HDLC SERPL-MCNC: 36 MG/DL
HGB BLD-MCNC: 11.9 G/DL (ref 13.3–17.7)
HGB UR QL STRIP: NEGATIVE
HIV 1+2 AB+HIV1 P24 AG SERPL QL IA: NONREACTIVE
HSV1 IGG SERPL QL IA: 0.14 INDEX
HSV1 IGG SERPL QL IA: NORMAL
HSV2 IGG SERPL QL IA: 0.1 INDEX
HSV2 IGG SERPL QL IA: NORMAL
IMM GRANULOCYTES # BLD: ABNORMAL 10*3/UL
IMM GRANULOCYTES NFR BLD: ABNORMAL %
INR PPP: 1.13 (ref 0.85–1.15)
KETONES UR STRIP-MCNC: ABNORMAL MG/DL
LAB DIRECTOR DISCLAIMER: NORMAL
LAB DIRECTOR METHODOLOGY: NORMAL
LAB DIRECTOR RESULTS: NORMAL
LDLC SERPL CALC-MCNC: 57 MG/DL
LEUKOCYTE ESTERASE UR QL STRIP: NEGATIVE
LVEF ECHO: NORMAL
LYMPHOCYTES # BLD AUTO: ABNORMAL 10*3/UL
LYMPHOCYTES # BLD MANUAL: 1 10E3/UL (ref 0.8–5.3)
LYMPHOCYTES NFR BLD AUTO: ABNORMAL %
LYMPHOCYTES NFR BLD MANUAL: 23 %
MCH RBC QN AUTO: 30.1 PG (ref 26.5–33)
MCHC RBC AUTO-ENTMCNC: 31.2 G/DL (ref 31.5–36.5)
MCV RBC AUTO: 97 FL (ref 78–100)
MONOCYTES # BLD AUTO: ABNORMAL 10*3/UL
MONOCYTES # BLD MANUAL: 0.2 10E3/UL (ref 0–1.3)
MONOCYTES NFR BLD AUTO: ABNORMAL %
MONOCYTES NFR BLD MANUAL: 5 %
MUCOUS THREADS #/AREA URNS LPF: PRESENT /LPF
NEUTROPHILS # BLD AUTO: ABNORMAL 10*3/UL
NEUTROPHILS # BLD MANUAL: 2.6 10E3/UL (ref 1.6–8.3)
NEUTROPHILS NFR BLD AUTO: ABNORMAL %
NEUTROPHILS NFR BLD MANUAL: 61 %
NITRATE UR QL: NEGATIVE
NONHDLC SERPL-MCNC: 116 MG/DL
NRBC # BLD AUTO: 0 10E3/UL
NRBC BLD AUTO-RTO: 0 /100
PH UR STRIP: 5 [PH] (ref 5–7)
PLAT MORPH BLD: ABNORMAL
PLATELET # BLD AUTO: 413 10E3/UL (ref 150–450)
POTASSIUM SERPL-SCNC: 4.1 MMOL/L (ref 3.4–5.3)
PROT SERPL-MCNC: 6.9 G/DL (ref 6.4–8.3)
RBC # BLD AUTO: 3.95 10E6/UL (ref 4.4–5.9)
RBC MORPH BLD: ABNORMAL
RBC URINE: 2 /HPF
SODIUM SERPL-SCNC: 142 MMOL/L (ref 135–145)
SP GR UR STRIP: 1.03 (ref 1–1.03)
SPECIMEN DESCRIPTION: NORMAL
T PALLIDUM AB SER QL: NONREACTIVE
TRIGL SERPL-MCNC: 293 MG/DL
URATE SERPL-MCNC: 5.3 MG/DL (ref 3.4–7)
UROBILINOGEN UR STRIP-MCNC: NORMAL MG/DL
WBC # BLD AUTO: 4.3 10E3/UL (ref 4–11)
WBC URINE: 2 /HPF

## 2024-07-22 PROCEDURE — 87389 HIV-1 AG W/HIV-1&-2 AB AG IA: CPT

## 2024-07-22 PROCEDURE — 82728 ASSAY OF FERRITIN: CPT

## 2024-07-22 PROCEDURE — 81375 HLA II TYPING AG EQUIV LR: CPT

## 2024-07-22 PROCEDURE — 86753 PROTOZOA ANTIBODY NOS: CPT

## 2024-07-22 PROCEDURE — 86696 HERPES SIMPLEX TYPE 2 TEST: CPT

## 2024-07-22 PROCEDURE — 84550 ASSAY OF BLOOD/URIC ACID: CPT

## 2024-07-22 PROCEDURE — 85048 AUTOMATED LEUKOCYTE COUNT: CPT

## 2024-07-22 PROCEDURE — 85610 PROTHROMBIN TIME: CPT

## 2024-07-22 PROCEDURE — 86704 HEP B CORE ANTIBODY TOTAL: CPT

## 2024-07-22 PROCEDURE — 81265 STR MARKERS SPECIMEN ANAL: CPT

## 2024-07-22 PROCEDURE — 87340 HEPATITIS B SURFACE AG IA: CPT

## 2024-07-22 PROCEDURE — 80061 LIPID PANEL: CPT

## 2024-07-22 PROCEDURE — 93005 ELECTROCARDIOGRAM TRACING: CPT

## 2024-07-22 PROCEDURE — 86644 CMV ANTIBODY: CPT

## 2024-07-22 PROCEDURE — 85730 THROMBOPLASTIN TIME PARTIAL: CPT

## 2024-07-22 PROCEDURE — 93010 ELECTROCARDIOGRAM REPORT: CPT | Performed by: INTERNAL MEDICINE

## 2024-07-22 PROCEDURE — 86828 HLA CLASS I&II ANTIBODY QUAL: CPT

## 2024-07-22 PROCEDURE — 85007 BL SMEAR W/DIFF WBC COUNT: CPT

## 2024-07-22 PROCEDURE — 81382 HLA II TYPING 1 LOC HR: CPT

## 2024-07-22 PROCEDURE — 86780 TREPONEMA PALLIDUM: CPT

## 2024-07-22 PROCEDURE — 86900 BLOOD TYPING SEROLOGIC ABO: CPT

## 2024-07-22 PROCEDURE — 87516 HEPATITIS B DNA AMP PROBE: CPT | Mod: XU

## 2024-07-22 PROCEDURE — 93306 TTE W/DOPPLER COMPLETE: CPT | Mod: 26 | Performed by: INTERNAL MEDICINE

## 2024-07-22 PROCEDURE — 86790 VIRUS ANTIBODY NOS: CPT

## 2024-07-22 PROCEDURE — 93306 TTE W/DOPPLER COMPLETE: CPT

## 2024-07-22 PROCEDURE — 99204 OFFICE O/P NEW MOD 45 MIN: CPT | Mod: 25 | Performed by: INTERNAL MEDICINE

## 2024-07-22 PROCEDURE — 86665 EPSTEIN-BARR CAPSID VCA: CPT

## 2024-07-22 PROCEDURE — 81001 URINALYSIS AUTO W/SCOPE: CPT

## 2024-07-22 PROCEDURE — G0463 HOSPITAL OUTPT CLINIC VISIT: HCPCS | Mod: 25 | Performed by: INTERNAL MEDICINE

## 2024-07-22 PROCEDURE — 86828 HLA CLASS I&II ANTIBODY QUAL: CPT | Mod: XU

## 2024-07-22 PROCEDURE — 86803 HEPATITIS C AB TEST: CPT

## 2024-07-22 PROCEDURE — 86777 TOXOPLASMA ANTIBODY: CPT

## 2024-07-22 PROCEDURE — 86706 HEP B SURFACE ANTIBODY: CPT

## 2024-07-22 PROCEDURE — 81372 HLA I TYPING COMPLETE LR: CPT

## 2024-07-22 PROCEDURE — 86695 HERPES SIMPLEX TYPE 1 TEST: CPT

## 2024-07-22 PROCEDURE — 80053 COMPREHEN METABOLIC PANEL: CPT

## 2024-07-22 PROCEDURE — 36415 COLL VENOUS BLD VENIPUNCTURE: CPT

## 2024-07-22 RX ORDER — ACYCLOVIR 400 MG/1
TABLET ORAL
Status: ON HOLD | COMMUNITY
Start: 2024-03-25 | End: 2024-08-24

## 2024-07-22 ASSESSMENT — PAIN SCALES - GENERAL: PAINLEVEL: NO PAIN (0)

## 2024-07-22 NOTE — NURSING NOTE
Chief Complaint   Patient presents with    Blood Draw     Labs drawn via VPT by lab RN     Venipuncture labs drawn by lab RN,    Tiffany Salgado RN

## 2024-07-22 NOTE — LETTER
7/22/2024      RE: Arpit Forrest  [address]  [place] [address]       Dear Colleague,    Thank you for the opportunity to participate in the care of your patient, Arpit Forrest, at the [facility] HEART CLINIC [place] [facility]. Please see a copy of my visit note below.    History:    Arpit Forrest is a very pleasant 73 year old man with MDS who is referred for cardiac evaluation.    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 He has coronary artery calcification He has no known MI or CHF. He is able to walk on treadmill around 3 mph for 30 minutes 5 times a week without any shortness of breath, lightheadedness or chest pain.  Can climb 2 flights of stairs without any difficulty.  Denies PND or orthopnea. He says his priority is his bone marrow transplant, and he does not want to proceed with any further testing that would possibly delay his bone marrow transplant.  BP controlled with amlodipine 10 mg daily, takes atorvastatin 10 mg daily for hyperlipidemia and metformin for diabetes.  His diabetes is well-controlled for the last 2 years per care everywhere    He was admitted in March 2024 due to neutropenic fever. He was noted to have atrial fibrillation with RVR he was started on Lovenox and amiodarone. Sinus rhythm was restored. He has not had anymore recurrence of the arrhythmia. He has stopped anticoagulation and amiodarone.       Current Outpatient Medications   Medication Sig Dispense Refill     acetaminophen (TYLENOL) 500 MG tablet Take 2 tablets by mouth       acyclovir (ZOVIRAX) 400 MG tablet TAKE 1 TABLET(400 MG) BY MOUTH TWICE DAILY FOR PROPHYLAXIS       allopurinol (ZYLOPRIM) 100 MG tablet Take 100 mg by mouth        amLODIPine (NORVASC) 10 MG tablet Take 1 tablet by mouth every morning       atorvastatin (LIPITOR) 10 MG tablet Take 1 tablet by mouth every morning       calcium carbonate (TUMS) 500 MG chewable tablet Take 1 chew tab by mouth 2 times daily 600 mg       coenzyme Q-10 capsule Take 1 capsule by mouth daily       fluticasone (FLONASE) 50 MCG/ACT nasal spray 1 spray       latanoprost (XALATAN) 0.005 % ophthalmic solution INSTILL 2 DROPS IN BOTH EYES AT BEDTIME       levofloxacin (LEVAQUIN) 500 MG tablet Take 500 mg by mouth       metFORMIN (GLUCOPHAGE XR) 500 MG 24 hr tablet Take 1,500 mg by mouth daily (with dinner)       RABEprazole (ACIPHEX) 20 MG EC tablet Take 1 tablet by mouth daily       Vitamin D3 (CHOLECALCIFEROL) 25 mcg (1000 units) tablet Take by mouth daily 3000 international unit(s)       voriconazole (VFEND) 200 MG tablet Take 200 mg by mouth 2 times daily       predniSONE (DELTASONE) 1 MG tablet Take 3 mg by mouth         Allergies - reviewed     Allergies   Allergen Reactions     Gramineae Pollens Other (See Comments)     Seasonal sneezing, runny nose.       Past history -reviewed  As above    Social history - reviewed  Social History     Tobacco Use     Smoking status: Former     Current packs/day: 0.00     Average packs/day: 0.5 packs/day for 4.0 years (2.0 ttl pk-yrs)     Types: Cigarettes     Start date:      Quit date:      Years since quittin.5     Smokeless tobacco: Never       Family history -reviewed  No premature CAD or SCD    ROS: non contributory on the 10-point review of system    Exam:     /77 (BP Location: Right arm, Patient Position: Chair, Cuff Size: Adult Regular)   Pulse 75   Wt 116.6 kg (257 lb)   SpO2 97%   BMI 33.52 kg/m    In general, the patient is in no apparent distress.      HEENT: Sclerae white, not injected.    Neck: No JVD. No thyromegaly  Heart: RRR. Normal S1, S2. 3/6 systolic murmur radiating to carotids to radiating to carotids  Lungs: Clear  bilaterally.  No rhonchi, wheezes, rales.   Extremities: Trace bilateral pretibial edema.  The pulses are 2+at the radial bilaterally.      I have independently reviewed this patient's relevant laboratory and cardiac data :    Recent Labs   Lab Test 07/22/24  0957   CHOL 152   HDL 36*   LDL 57   TRIG 293*      Recent Labs   Lab Test 07/22/24  0957   AST 19     Recent Labs   Lab Test 07/22/24  0957   ALT 14     Recent Labs   Lab Test 07/22/24  0957      POTASSIUM 4.1   CHLORIDE 108*   CO2 22   ANIONGAP 12   BUN 22.7   CR 1.27*     Recent Labs   Lab Test 07/22/24  0957   WBC 4.3   RBC 3.95*   HGB 11.9*   MCV 97          ECG 7/22/24 NSR, normal axis, q waves in III and AvF    7/22/2024 Echo  - normal biventricular function, Moderate aortic stenosis (PV 3.2m/s, MG 21mmHg). BRENT 1.5    CT Chest 2/2/2024: Mild to moderate CAC, three-vessel disease.     Assessment and Plan:  Arpit Forrest is a very pleasant 73 year old man with MDS who is referred for cardiac evaluation.    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 He does not endorse any angina symptoms at rest or on exertion when he exercises on treadmill with 3 mph for 30 minutes.  We discussed further testing for ischemia with him and I explained that if we find anything concerning for CAD he may need to undergo diagnostic coronary angiography +-PCI, which may require DAPT.  He has a planned admission to the hospital within the next 10 days for bone marrow transplant and his platelet counts will drop, due to which he will not be able to receive DAPT. Furthermore he does not want to do any further testing that but possibly delay the bone marrow transplant.     He has a history of paroxysmal atrial fibrillation,  and could have recurrence of the arrhythmia during bone marrow transplant. We may have to address this with a rate control strategy.     He has asymptomatic moderate aortic stenosis which does not need any interventions at this point.     Follow up advised after SCT.     Recommendations:   Continue current medications.  2.   Follow up in      Rafat Sarah MD  Cardiovascular Disease Fellow    ATTENDING ATTESTATION:  This patient has been seen and examined by me July 22, 2024 with Dr. Sarah, cardiology fellow. I have reviewed the vitals, laboratory and imaging data relevant to this patient's care. I have edited this note to reflect our joint assessment and plan, and discussed the plan with the patient.    With the anticipated SCT, we can expect to see some fluid shifts and changes to his medical regimen. Given patient's CV risk factor burden and coronary atherosclerosis, patient is at a higher risk for developing heart failure, arrhythmias, myocardial ischemia in the pato transplant phase. Would continue to hold antiplatelet agents and anticoagulation for platelet count <30K. Careful monitoring for hypervolemia and using diuretics as needed with maintaining SBP <140 mmHg and HR <100 would be advised during the hospitalization for SCT. Cardiology consult service can assist if acute issues arise during the hospitalization. Overall, I do not see any major contraindications from a cardiac standpoint that would preclude this patient from proceeding with SCT. I have not recommended any changes to this patient's current medical regimen.    Gem Banegas MD, MS  Professor of Medicine  Cardiovascular Medicine        Please do not hesitate to contact me if you have any questions/concerns.     Sincerely,     Gem Banegas MD

## 2024-07-22 NOTE — LETTER
7/22/2024      Arpit Forrest  [address] [facility]  [place] [address]      Dear Colleague,    Thank you for referring your patient, Arpit Forrest, to the [facility] BLOOD AND MARROW TRANSPLANT PROGRAM [place]. Please see a copy of my visit note below.    Reviewed BMT Work up calendar with patient.   Explained the location and instructions for scheduled BMT transplant work up.       Again, thank you for allowing me to participate in the care of your patient.        Sincerely,        Blanca Sanchez RN

## 2024-07-22 NOTE — PROGRESS NOTES
History:    Arpit Forrest is a very pleasant 73 year old man with MDS who is referred for cardiac evaluation.    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 He has coronary artery calcification He has no known MI or CHF. He is able to walk on treadmill around 3 mph for 30 minutes 5 times a week without any shortness of breath, lightheadedness or chest pain.  Can climb 2 flights of stairs without any difficulty.  Denies PND or orthopnea. He says his priority is his bone marrow transplant, and he does not want to proceed with any further testing that would possibly delay his bone marrow transplant.  BP controlled with amlodipine 10 mg daily, takes atorvastatin 10 mg daily for hyperlipidemia and metformin for diabetes.  His diabetes is well-controlled for the last 2 years per care everywhere    He was admitted in March 2024 due to neutropenic fever. He was noted to have atrial fibrillation with RVR he was started on Lovenox and amiodarone. Sinus rhythm was restored. He has not had anymore recurrence of the arrhythmia. He has stopped anticoagulation and amiodarone.       Current Outpatient Medications   Medication Sig Dispense Refill    acetaminophen (TYLENOL) 500 MG tablet Take 2 tablets by mouth      acyclovir (ZOVIRAX) 400 MG tablet TAKE 1 TABLET(400 MG) BY MOUTH TWICE DAILY FOR PROPHYLAXIS      allopurinol (ZYLOPRIM) 100 MG tablet Take 100 mg by mouth      amLODIPine (NORVASC) 10 MG tablet Take 1 tablet by mouth every morning      atorvastatin (LIPITOR) 10 MG tablet Take 1 tablet by mouth every morning      calcium carbonate (TUMS) 500 MG chewable tablet Take 1 chew tab by mouth 2 times daily 600 mg      coenzyme Q-10 capsule Take 1 capsule by mouth daily      fluticasone (FLONASE) 50 MCG/ACT  nasal spray 1 spray      latanoprost (XALATAN) 0.005 % ophthalmic solution INSTILL 2 DROPS IN BOTH EYES AT BEDTIME      levofloxacin (LEVAQUIN) 500 MG tablet Take 500 mg by mouth      metFORMIN (GLUCOPHAGE XR) 500 MG 24 hr tablet Take 1,500 mg by mouth daily (with dinner)      RABEprazole (ACIPHEX) 20 MG EC tablet Take 1 tablet by mouth daily      Vitamin D3 (CHOLECALCIFEROL) 25 mcg (1000 units) tablet Take by mouth daily 3000 international unit(s)      voriconazole (VFEND) 200 MG tablet Take 200 mg by mouth 2 times daily      predniSONE (DELTASONE) 1 MG tablet Take 3 mg by mouth         Allergies - reviewed     Allergies   Allergen Reactions    Gramineae Pollens Other (See Comments)     Seasonal sneezing, runny nose.       Past history -reviewed  As above    Social history - reviewed  Social History     Tobacco Use    Smoking status: Former     Current packs/day: 0.00     Average packs/day: 0.5 packs/day for 4.0 years (2.0 ttl pk-yrs)     Types: Cigarettes     Start date:      Quit date:      Years since quittin.5    Smokeless tobacco: Never       Family history -reviewed  No premature CAD or SCD    ROS: non contributory on the 10-point review of system    Exam:     /77 (BP Location: Right arm, Patient Position: Chair, Cuff Size: Adult Regular)   Pulse 75   Wt 116.6 kg (257 lb)   SpO2 97%   BMI 33.52 kg/m    In general, the patient is in no apparent distress.      HEENT: Sclerae white, not injected.    Neck: No JVD. No thyromegaly  Heart: RRR. Normal S1, S2. 3/6 systolic murmur radiating to carotids to radiating to carotids  Lungs: Clear bilaterally.  No rhonchi, wheezes, rales.   Extremities: Trace bilateral pretibial edema.  The pulses are 2+at the radial bilaterally.      I have independently reviewed this patient's relevant laboratory and cardiac data :    Recent Labs   Lab Test 24  0957   CHOL 152   HDL 36*   LDL 57   TRIG 293*      Recent Labs   Lab Test 24  0957   AST 19      Recent Labs   Lab Test 07/22/24  0957   ALT 14     Recent Labs   Lab Test 07/22/24  0957      POTASSIUM 4.1   CHLORIDE 108*   CO2 22   ANIONGAP 12   BUN 22.7   CR 1.27*     Recent Labs   Lab Test 07/22/24  0957   WBC 4.3   RBC 3.95*   HGB 11.9*   MCV 97          ECG 7/22/24 NSR, normal axis, q waves in III and AvF    7/22/2024 Echo  - normal biventricular function, Moderate aortic stenosis (PV 3.2m/s, MG 21mmHg). BRENT 1.5    CT Chest 2/2/2024: Mild to moderate CAC, three-vessel disease.     Assessment and Plan:  Arpit Forrest is a very pleasant 73 year old man with MDS who is referred for cardiac evaluation.    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 He does not endorse any angina symptoms at rest or on exertion when he exercises on treadmill with 3 mph for 30 minutes.  We discussed further testing for ischemia with him and I explained that if we find anything concerning for CAD he may need to undergo diagnostic coronary angiography +-PCI, which may require DAPT.  He has a planned admission to the hospital within the next 10 days for bone marrow transplant and his platelet counts will drop, due to which he will not be able to receive DAPT. Furthermore he does not want to do any further testing that but possibly delay the bone marrow transplant.     He has a history of paroxysmal atrial fibrillation, and could have recurrence of the arrhythmia during bone marrow transplant. We may have to address this with a rate control strategy.     He has asymptomatic moderate aortic stenosis which does not need any interventions at this point.     Follow up advised after SCT.     Recommendations:   Continue current medications.  2.   Follow up in 6m     Dor  MD Keara  Cardiovascular Disease Fellow    ATTENDING ATTESTATION:  This patient has been seen and examined by me July 22, 2024 with Dr. Sarah, cardiology fellow. I have reviewed the vitals, laboratory and imaging data relevant to this patient's care. I have edited this note to reflect our joint assessment and plan, and discussed the plan with the patient.    With the anticipated SCT, we can expect to see some fluid shifts and changes to his medical regimen. Given patient's CV risk factor burden and coronary atherosclerosis, patient is at a higher risk for developing heart failure, arrhythmias, myocardial ischemia in the pato transplant phase. Would continue to hold antiplatelet agents and anticoagulation for platelet count <30K. Careful monitoring for hypervolemia and using diuretics as needed with maintaining SBP <140 mmHg and HR <100 would be advised during the hospitalization for SCT. Cardiology consult service can assist if acute issues arise during the hospitalization. Overall, I do not see any major contraindications from a cardiac standpoint that would preclude this patient from proceeding with SCT. I have not recommended any changes to this patient's current medical regimen.    Gem Banegas MD, MS  Professor of Medicine  Cardiovascular Medicine

## 2024-07-22 NOTE — NURSING NOTE
Chief Complaint   Patient presents with    New Patient     Dr. Banegas: NEW cardio/onc referral, needs clearance for BMT         Vitals were taken, medications reconciled and EKG performed.    Luis Colin, Visit Facilitator  1:37 PM

## 2024-07-22 NOTE — PATIENT INSTRUCTIONS
You were seen in the Cardiology Clinic today by: Dr. Banegas    Plan:   Medication Changes:   None.     Follow up Visit:  With Dr. Banegas in 6 months or sooner if needed.       If you have further questions, please utilize [employer] to contact us.     Your Care Team:    Cardiology   Telephone Number     Nurse Line  Perry Gold RN    [phone]     For scheduling appointments:  [phone]           On-call cardiologist for after hours or on weekends:   [phone], option #4, and ask to speak to the on-call cardiologist.     Cardiovascular Clinic:   [address] [place] [address]. [place] [address]      As always, Thank you for trusting us with your health care needs!

## 2024-07-23 ENCOUNTER — OFFICE VISIT (OUTPATIENT)
Dept: TRANSPLANT | Facility: CLINIC | Age: 74
End: 2024-07-23
Attending: NURSE PRACTITIONER
Payer: MEDICARE

## 2024-07-23 ENCOUNTER — APPOINTMENT (OUTPATIENT)
Dept: LAB | Facility: CLINIC | Age: 74
End: 2024-07-23
Attending: NURSE PRACTITIONER
Payer: MEDICARE

## 2024-07-23 ENCOUNTER — OFFICE VISIT (OUTPATIENT)
Dept: PULMONOLOGY | Facility: CLINIC | Age: 74
End: 2024-07-23
Attending: STUDENT IN AN ORGANIZED HEALTH CARE EDUCATION/TRAINING PROGRAM
Payer: MEDICARE

## 2024-07-23 VITALS
BODY MASS INDEX: 33.14 KG/M2 | WEIGHT: 254.1 LBS | OXYGEN SATURATION: 99 % | TEMPERATURE: 97.8 F | SYSTOLIC BLOOD PRESSURE: 128 MMHG | HEART RATE: 90 BPM | RESPIRATION RATE: 17 BRPM | DIASTOLIC BLOOD PRESSURE: 74 MMHG

## 2024-07-23 DIAGNOSIS — D46.9 MDS (MYELODYSPLASTIC SYNDROME) (H): ICD-10-CM

## 2024-07-23 DIAGNOSIS — D46.9 MDS (MYELODYSPLASTIC SYNDROME) (H): Primary | ICD-10-CM

## 2024-07-23 DIAGNOSIS — Z11.59 SCREENING FOR VIRAL DISEASE: ICD-10-CM

## 2024-07-23 DIAGNOSIS — Z86.2 PERSONAL HISTORY OF DISEASES OF BLOOD AND BLOOD-FORMING ORGANS: ICD-10-CM

## 2024-07-23 DIAGNOSIS — Z01.818 PREOP EXAMINATION: ICD-10-CM

## 2024-07-23 LAB
ABSSP COMMENTS: NORMAL
ABSSPTEST METHOD: NORMAL
ATRIAL RATE - MUSE: 75 BPM
BASOPHILS # BLD AUTO: ABNORMAL 10*3/UL
BASOPHILS # BLD MANUAL: 0.1 10E3/UL (ref 0–0.2)
BASOPHILS NFR BLD AUTO: ABNORMAL %
BASOPHILS NFR BLD MANUAL: 3 %
BMT WORKUP IRRADIATED BLOOD REQUIRED: NORMAL
CREAT SERPL-MCNC: 1.19 MG/DL (ref 0.67–1.17)
CREAT SERPL-MCNC: 1.21 MG/DL (ref 0.67–1.17)
DIASTOLIC BLOOD PRESSURE - MUSE: NORMAL MMHG
DRSSP COMMENTS: NORMAL
DRSSPDPA1*: NORMAL
DRSSPDPA1*LOCUS: NORMAL
DRSSPDPB1*2 NMDP: NORMAL
DRSSPDPB1*2: NORMAL
DRSSPDPB1*LOCUS: NORMAL
DRSSPDPB1*LOCUSNMDP: NORMAL
DRSSPTEST METHOD: NORMAL
EGFRCR SERPLBLD CKD-EPI 2021: 64 ML/MIN/1.73M2
EOSINOPHIL # BLD AUTO: ABNORMAL 10*3/UL
EOSINOPHIL # BLD MANUAL: 0.2 10E3/UL (ref 0–0.7)
EOSINOPHIL NFR BLD AUTO: ABNORMAL %
EOSINOPHIL NFR BLD MANUAL: 4 %
ERYTHROCYTE [DISTWIDTH] IN BLOOD BY AUTOMATED COUNT: 16.7 % (ref 10–15)
FLOWPRA1 CELL: NORMAL
FLOWPRA1 COMMENTS: NORMAL
FLOWPRA1 RESULT: NORMAL
FLOWPRA1 TEST METHOD: NORMAL
FLOWPRA2 CELL: NORMAL
FLOWPRA2 COMMENTS: NORMAL
FLOWPRA2 RESULT: NORMAL
FLOWPRA2 TEST METHOD: NORMAL
HBV DNA SERPL QL NAA+PROBE: NORMAL
HCT VFR BLD AUTO: 38.9 % (ref 40–53)
HCV RNA SERPL QL NAA+PROBE: NORMAL
HGB BLD-MCNC: 12.2 G/DL (ref 13.3–17.7)
HIV1+2 RNA SERPL QL NAA+PROBE: NORMAL
HTLV I+II AB SER QL IA: NEGATIVE
IMM GRANULOCYTES # BLD: ABNORMAL 10*3/UL
IMM GRANULOCYTES NFR BLD: ABNORMAL %
INTERPRETATION ECG - MUSE: NORMAL
INTERPRETATION: NORMAL
LYMPHOCYTES # BLD AUTO: ABNORMAL 10*3/UL
LYMPHOCYTES # BLD MANUAL: 1.2 10E3/UL (ref 0.8–5.3)
LYMPHOCYTES NFR BLD AUTO: ABNORMAL %
LYMPHOCYTES NFR BLD MANUAL: 30 %
MCH RBC QN AUTO: 30.1 PG (ref 26.5–33)
MCHC RBC AUTO-ENTMCNC: 31.4 G/DL (ref 31.5–36.5)
MCV RBC AUTO: 96 FL (ref 78–100)
MONOCYTES # BLD AUTO: ABNORMAL 10*3/UL
MONOCYTES # BLD MANUAL: 0.1 10E3/UL (ref 0–1.3)
MONOCYTES NFR BLD AUTO: ABNORMAL %
MONOCYTES NFR BLD MANUAL: 2 %
NEUTROPHILS # BLD AUTO: ABNORMAL 10*3/UL
NEUTROPHILS # BLD MANUAL: 2.4 10E3/UL (ref 1.6–8.3)
NEUTROPHILS NFR BLD AUTO: ABNORMAL %
NEUTROPHILS NFR BLD MANUAL: 61 %
NRBC # BLD AUTO: 0 10E3/UL
NRBC BLD AUTO-RTO: 0 /100
P AXIS - MUSE: 3 DEGREES
PLAT MORPH BLD: NORMAL
PLATELET # BLD AUTO: 456 10E3/UL (ref 150–450)
PR INTERVAL - MUSE: 152 MS
QRS DURATION - MUSE: 100 MS
QT - MUSE: 412 MS
QTC - MUSE: 460 MS
R AXIS - MUSE: -18 DEGREES
RBC # BLD AUTO: 4.05 10E6/UL (ref 4.4–5.9)
RBC MORPH BLD: NORMAL
SIGNIFICANT RESULTS: NORMAL
SPECIMEN DESCRIPTION: NORMAL
SPECIMEN EXPIRATION DATE: NORMAL
SSP COMMENTS: NORMAL
SSPA* LOCUS: NORMAL
SSPB* LOCUS: NORMAL
SSPB*: NORMAL
SSPBW-1: NORMAL
SSPBW-2: NORMAL
SSPC* LOCUS: NORMAL
SSPC*: NORMAL
SSPDQA1*: NORMAL
SSPDQA1*LOCUS: NORMAL
SSPDQB1*: NORMAL
SSPDQB1*LOCUS: NORMAL
SSPDRB1* LOCUS: NORMAL
SSPDRB1*: NORMAL
SSPDRB4* LOCUS: NORMAL
SSPDRB5*: NORMAL
SSPTEST METHOD: NORMAL
SYSTOLIC BLOOD PRESSURE - MUSE: NORMAL MMHG
T AXIS - MUSE: 37 DEGREES
T GONDII IGG SER-ACNC: <3 IU/ML
T GONDII IGM SER-ACNC: <3 AU/ML
TEST DETAILS, MDL: NORMAL
TRYPANOSOMA CRUZI: NORMAL
VENTRICULAR RATE- MUSE: 75 BPM
WBC # BLD AUTO: 4 10E3/UL (ref 4–11)
WNV RNA SERPL DONR QL NAA+PROBE: NORMAL

## 2024-07-23 PROCEDURE — 88305 TISSUE EXAM BY PATHOLOGIST: CPT | Mod: 26 | Performed by: STUDENT IN AN ORGANIZED HEALTH CARE EDUCATION/TRAINING PROGRAM

## 2024-07-23 PROCEDURE — 88311 DECALCIFY TISSUE: CPT | Mod: TC

## 2024-07-23 PROCEDURE — 85007 BL SMEAR W/DIFF WBC COUNT: CPT | Performed by: NURSE PRACTITIONER

## 2024-07-23 PROCEDURE — G0463 HOSPITAL OUTPT CLINIC VISIT: HCPCS

## 2024-07-23 PROCEDURE — 88313 SPECIAL STAINS GROUP 2: CPT | Mod: 26 | Performed by: STUDENT IN AN ORGANIZED HEALTH CARE EDUCATION/TRAINING PROGRAM

## 2024-07-23 PROCEDURE — 36415 COLL VENOUS BLD VENIPUNCTURE: CPT | Performed by: PATHOLOGY

## 2024-07-23 PROCEDURE — 88291 CYTO/MOLECULAR REPORT: CPT | Performed by: MEDICAL GENETICS

## 2024-07-23 PROCEDURE — 81450 HL NEO GSAP 5-50DNA/DNA&RNA: CPT

## 2024-07-23 PROCEDURE — 999N001093 HLA VIRTUAL CROSSMATCH (VXM), LIVING DONOR

## 2024-07-23 PROCEDURE — G0452 MOLECULAR PATHOLOGY INTERPR: HCPCS | Mod: 26 | Performed by: PATHOLOGY

## 2024-07-23 PROCEDURE — 38222 DX BONE MARROW BX & ASPIR: CPT | Performed by: NURSE PRACTITIONER

## 2024-07-23 PROCEDURE — 88237 TISSUE CULTURE BONE MARROW: CPT

## 2024-07-23 PROCEDURE — 85027 COMPLETE CBC AUTOMATED: CPT | Performed by: NURSE PRACTITIONER

## 2024-07-23 PROCEDURE — 99215 OFFICE O/P EST HI 40 MIN: CPT | Mod: 25

## 2024-07-23 PROCEDURE — 85097 BONE MARROW INTERPRETATION: CPT | Performed by: STUDENT IN AN ORGANIZED HEALTH CARE EDUCATION/TRAINING PROGRAM

## 2024-07-23 PROCEDURE — 38222 DX BONE MARROW BX & ASPIR: CPT | Mod: LT | Performed by: NURSE PRACTITIONER

## 2024-07-23 PROCEDURE — 88311 DECALCIFY TISSUE: CPT | Mod: 26 | Performed by: STUDENT IN AN ORGANIZED HEALTH CARE EDUCATION/TRAINING PROGRAM

## 2024-07-23 PROCEDURE — 88342 IMHCHEM/IMCYTCHM 1ST ANTB: CPT | Mod: 26 | Performed by: STUDENT IN AN ORGANIZED HEALTH CARE EDUCATION/TRAINING PROGRAM

## 2024-07-23 PROCEDURE — 88341 IMHCHEM/IMCYTCHM EA ADD ANTB: CPT | Mod: 26 | Performed by: STUDENT IN AN ORGANIZED HEALTH CARE EDUCATION/TRAINING PROGRAM

## 2024-07-23 PROCEDURE — 96374 THER/PROPH/DIAG INJ IV PUSH: CPT

## 2024-07-23 PROCEDURE — 88189 FLOWCYTOMETRY/READ 16 & >: CPT | Mod: GC | Performed by: PATHOLOGY

## 2024-07-23 PROCEDURE — 88342 IMHCHEM/IMCYTCHM 1ST ANTB: CPT | Mod: TC

## 2024-07-23 PROCEDURE — 82565 ASSAY OF CREATININE: CPT | Performed by: PATHOLOGY

## 2024-07-23 PROCEDURE — 88185 FLOWCYTOMETRY/TC ADD-ON: CPT

## 2024-07-23 PROCEDURE — 88275 CYTOGENETICS 100-300: CPT | Mod: XU

## 2024-07-23 PROCEDURE — 94726 PLETHYSMOGRAPHY LUNG VOLUMES: CPT | Performed by: INTERNAL MEDICINE

## 2024-07-23 PROCEDURE — 82565 ASSAY OF CREATININE: CPT

## 2024-07-23 PROCEDURE — 94729 DIFFUSING CAPACITY: CPT | Performed by: INTERNAL MEDICINE

## 2024-07-23 PROCEDURE — 88271 CYTOGENETICS DNA PROBE: CPT

## 2024-07-23 PROCEDURE — 250N000011 HC RX IP 250 OP 636: Performed by: NURSE PRACTITIONER

## 2024-07-23 PROCEDURE — 94375 RESPIRATORY FLOW VOLUME LOOP: CPT | Performed by: INTERNAL MEDICINE

## 2024-07-23 RX ORDER — BRIMONIDINE TARTRATE 2 MG/ML
1 SOLUTION/ DROPS OPHTHALMIC 2 TIMES DAILY
COMMUNITY
Start: 2023-10-24

## 2024-07-23 RX ORDER — GLUCOSAMINE/CHONDR SU A SOD 750-600 MG
1 TABLET ORAL DAILY
COMMUNITY

## 2024-07-23 RX ADMIN — MIDAZOLAM 1 MG: 1 INJECTION INTRAMUSCULAR; INTRAVENOUS at 11:46

## 2024-07-23 ASSESSMENT — PAIN SCALES - GENERAL: PAINLEVEL: NO PAIN (0)

## 2024-07-23 NOTE — NURSING NOTE
BMBX Teaching and Assessment       Teaching concerns addressed: Bone marrow biopsy and infection prevention.     Person(s) involved in teaching: Patient  Motivation Level  Asks Questions: Yes  Eager to Learn: Yes  Cooperative: Yes  Receptive (willing/able to accept information): Yes    Patient demonstrates understanding of the following:     Reason for the appointment, diagnosis and treatment plan: Yes  Knowledge of proper use of medications and conditions for which they are ordered (with special attention to potential side effects or drug interactions): Yes  Which situations necessitate calling provider and whom to contact: Yes    Teaching concerns addressed:   Reviewed activity restrictions if received premeds, potential for bleeding and actions to take if develops any of the issues below    Pain management techniques: Yes  Patient instructed on hand hygiene: Yes  How and/when to access community resources: Yes    Infection Control:  Patient demonstrates understanding of the following:   Bone marrow procedure site care taught: Yes  Signs and symptoms of infection taught: Yes       Instructional Materials Used/Given: Pt instructed to keep bmbx site clean and dry for 24hrs. Pt educated to monitor site for signs of infection such as redness, rash, oozing, puss, bleeding, pain, and elevated temp. Pt instructed to go to call the [facility] [place] or go to the ER if any signs of infection should occur. Pt educated to not operate machinery if receiving versed. Pt and spouse verbalize understanding.     Pre-procedure labs drawn via piv in lab. Post procedure: Patient vital signs stable, ambulating, site is clean, dry and intact prior to discharge and line removed. Pt discharged with spouse as .

## 2024-07-23 NOTE — LETTER
2024      Arpit Forrest  [address]  [place] [address]      Dear Colleague,    Thank you for referring your patient, Arpit Forrest, to the [facility] BLOOD AND MARROW TRANSPLANT PROGRAM [place]. Please see a copy of my visit note below.      [facility]  BMTCT OPEN VISIT    2024      Arpit Forrest is a 73 year old male undergoing evaluation prior to hematopoietic cell transplant or immune effector cell therapy.    Reason for BMTCT: mds    Recent chemotherapy: decitabine, last in 2024    Recent infections: no    Blood thinner use? If yes, why? No    Treatment for diabetes? Yes    Today, the patient notes the following symptoms:  Review Of Systems  10pt ROS neg    Arpit Forrest's History    No past medical history on file.    Past Surgical History:   Procedure Laterality Date    IR LUMBAR PUNCTURE  2024   Nephrectomy:     No family history on file.    Social History     Tobacco Use    Smoking status: Former     Current packs/day: 0.00     Average packs/day: 0.5 packs/day for 4.0 years (2.0 ttl pk-yrs)     Types: Cigarettes     Start date:      Quit date: 1970     Years since quittin.5    Smokeless tobacco: Never   Substance Use Topics    Alcohol use: Not on file     No alcohol in 12mo, previously a recreational drinker  2 daughters, age 45 and 48, 5 grandchildren  Retired , ,       Arpit Forrest's Medications and Allergies    Current Outpatient Medications   Medication Sig Dispense Refill    acetaminophen (TYLENOL) 500 MG tablet Take 2 tablets by mouth      acyclovir (ZOVIRAX) 400 MG tablet TAKE 1 TABLET(400 MG) BY MOUTH TWICE DAILY FOR PROPHYLAXIS      allopurinol (ZYLOPRIM) 100 MG tablet Take 100 mg by mouth      amLODIPine (NORVASC) 10 MG tablet Take 1 tablet by mouth every morning      atorvastatin (LIPITOR) 10 MG tablet Take 1 tablet by mouth every morning      calcium carbonate (TUMS) 500 MG chewable tablet  Take 1 chew tab by mouth 2 times daily 600 mg      coenzyme Q-10 capsule Take 1 capsule by mouth daily      fluticasone (FLONASE) 50 MCG/ACT nasal spray 1 spray      latanoprost (XALATAN) 0.005 % ophthalmic solution INSTILL 2 DROPS IN BOTH EYES AT BEDTIME      levofloxacin (LEVAQUIN) 500 MG tablet Take 500 mg by mouth      metFORMIN (GLUCOPHAGE XR) 500 MG 24 hr tablet Take 1,500 mg by mouth daily (with dinner)      predniSONE (DELTASONE) 1 MG tablet Take 3 mg by mouth      RABEprazole (ACIPHEX) 20 MG EC tablet Take 1 tablet by mouth daily      Vitamin D3 (CHOLECALCIFEROL) 25 mcg (1000 units) tablet Take by mouth daily 3000 international unit(s)      voriconazole (VFEND) 200 MG tablet Take 200 mg by mouth 2 times daily       No current facility-administered medications for this visit.          Allergies   Allergen Reactions    Gramineae Pollens Other (See Comments)     Seasonal sneezing, runny nose.       Physical Examination    There were no vitals taken for this visit.    Exam:  Constitutional: healthy, alert, and no distress  Head: Normocephalic. No masses, lesions, tenderness or abnormalities  ENT: ENT exam normal, no neck nodes or sinus tenderness and neck without nodes  Cardiovascular: positive findings: murmur  Respiratory: negative  Gastrointestinal: Abdomen soft, non-tender. BS normal. No masses, organomegaly  : Deferred  Musculoskeletal: extremities normal- no gross deformities noted, gait normal, and normal muscle tone  Skin: no suspicious lesions or rashes  Neurologic: negative  Psychiatric: mentation appears normal and affect normal/bright  Hematologic/Lymphatic/Immunologic:        Frailty Screening  BMT Fried Frailty          7/23/2024    10:50 6/7/2024    09:03   Fried Frailty   Lost>10 pounds unintenionally last year N N   Exhaustion Score 0 0   Slowness Score 0 0   Weakness/ Strength Score 0 0   Low Activity Level Score 0 0   Final Score Not Frail Not Frail   Final Score Number 0 0   Sit Stand  Assessment   Patient able to perform 5 chair stands Y Y   Chair Stands in seconds 11 11   Patient is able to perform stand with Feet Side by Side? Y Y   First attempt (in seconds): 10 10   Patient is able to perform Semi-Tandem Stand? Y Y   First attemp (in seconds): 10 10   Patient is able to perform Tandem Stand? Y Y   First attemp (in seconds): 10 10             Overall Assessment      I have reviewed the diagnostic data, medications, frailty screening, and general processes prior to BMTCT.  I have notified the Primary BMT Physician/and or Attending Physician in the clinic of any issues. We also discussed in detail the database and biorepository research for which Arpit Forrest is eligible. We discussed the potential risks and potential benefits of each of these protocols individually. We explained potential alternatives to the protocols discussed. We explained to the patient that participation is voluntary and that consent may be withdrawn at any time.       Consents Signed:   Blood transfusion consent form  Ethnicity form  [facility] database  [facility]    [facility] BMT[facility] Database    Present during the discussion was pt. Copies of the signed consent forms will be provided to the patient on admission. No procedures specific to any studies were performed prior to the patient signing the consent form.    Arpit Forrest had the opportunity to ask questions, and I answered all of the questions to the best of my ability.    I spent 40 minutes in the care of this patient today, which included time necessary for preparation for the visit, obtaining history, ordering medications/tests/procedures as medically indicated, review of pertinent medical literature, counseling of the patient, communication of recommendations to the care team, and documentation time.        AURELIA Ferrer CNP

## 2024-07-23 NOTE — PROGRESS NOTES
BMT ONC Adult Bone Marrow Biopsy Procedure Note  July 23, 2024  BP (!) 149/79   Pulse 75   Temp 97.8  F (36.6  C) (Oral)   Resp 17   Wt 115.3 kg (254 lb 1.6 oz)   SpO2 99%   BMI 33.14 kg/m       Learning needs assessment complete within 12 months? NO    DIAGNOSIS: MDS     PROCEDURE: Unilateral Bone Marrow Biopsy and Unilateral Aspirate    [place] [facility] [place]    Patient s identification was positively verified by verbal identification and invasive procedure safety checklist was completed. Informed consent was obtained. Following the administration of Midazolam as pre-medication, patient was placed in the prone position and prepped and draped in a sterile manner. Approximately 10 cc of 1% Lidocaine was used over the left posterior iliac spine. Following this a 3 mm incision was made. Trephine bone marrow core(s) was (were) obtained from the [facility]. Bone marrow aspirates were obtained from the [facility]. Aspirates were sent for morphology, immunophenotyping, cytogenetics, and molecular diagnostics. A total of approximately 20 ml of marrow was aspirated. Following this procedure a sterile dressing was applied to the bone marrow biopsy site(s). The patient was placed in the supine position to maintain pressure on the biopsy site. Post-procedure wound care instructions were given.     Complications: NO       Interventions: NO    Length of procedure:21 minutes to 45 minutes    Procedure performed by: Mirna Johnston NP

## 2024-07-23 NOTE — PROGRESS NOTES
Reviewed BMT Work up calendar with patient.   Explained the location and instructions for scheduled BMT transplant work up.

## 2024-07-23 NOTE — NURSING NOTE
"Oncology Rooming Note    July 23, 2024 10:53 AM   Arpit Forrest is a 73 year old male who presents for:    Chief Complaint   Patient presents with    Oncology Clinic Visit     Rtn frailty hx MDS     Initial Vitals: There were no vitals taken for this visit. Estimated body mass index is 33.14 kg/m  as calculated from the following:    Height as of 6/7/24: 1.865 m (6' 1.43\").    Weight as of an earlier encounter on 7/23/24: 115.3 kg (254 lb 1.6 oz). There is no height or weight on file to calculate BSA.  Data Unavailable Comment: Data Unavailable   No LMP for male patient.  Allergies reviewed: Yes  Medications reviewed: Yes    Medications: Medication refills not needed today.  Pharmacy name entered into [employer]: [id number] - [place], [place] - [address] AT [facility] [place] [address]    Frailty Screening:   Is the patient here for a new oncology consult visit in cancer care? 2. No      Clinical concerns: none       Josey Dang RN             "

## 2024-07-23 NOTE — NURSING NOTE
Chief Complaint   Patient presents with    Blood Draw     Labs drawn with PIV start by RN. Pt tolerated well. Vitals taken. Patient checked into next appointment.       Lorna Lira RN

## 2024-07-23 NOTE — LETTER
7/23/2024      Arpit Forrest  [address] [place] [address]      Dear Colleague,    Thank you for referring your patient, Arpit Forrest, to the [facility] BLOOD AND MARROW TRANSPLANT PROGRAM [place]. Please see a copy of my visit note below.    BMT ONC Adult Bone Marrow Biopsy Procedure Note  July 23, 2024  BP (!) 149/79   Pulse 75   Temp 97.8  F (36.6  C) (Oral)   Resp 17   Wt 115.3 kg (254 lb 1.6 oz)   SpO2 99%   BMI 33.14 kg/m       Learning needs assessment complete within 12 months? NO    DIAGNOSIS: MDS     PROCEDURE: Unilateral Bone Marrow Biopsy and Unilateral Aspirate    [place] [facility] [place] [facility]    Patient s identification was positively verified by verbal identification and invasive procedure safety checklist was completed. Informed consent was obtained. Following the administration of Midazolam as pre-medication, patient was placed in the prone position and prepped and draped in a sterile manner. Approximately 10 cc of 1% Lidocaine was used over the left posterior iliac spine. Following this a 3 mm incision was made. Trephine bone marrow core(s) was (were) obtained from the [facility]. Bone marrow aspirates were obtained from the [facility]. Aspirates were sent for morphology, immunophenotyping, cytogenetics, and molecular diagnostics. A total of approximately 20 ml of marrow was aspirated. Following this procedure a sterile dressing was applied to the bone marrow biopsy site(s). The patient was placed in the supine position to maintain pressure on the biopsy site. Post-procedure wound care instructions were given.     Complications: NO       Interventions: NO    Length of procedure:21 minutes to 45 minutes    Procedure performed by: Mirna Johnston NP

## 2024-07-23 NOTE — PROGRESS NOTES
[facility]  BMTCT OPEN VISIT    2024      Arpit Forrest is a 73 year old male undergoing evaluation prior to hematopoietic cell transplant or immune effector cell therapy.    Reason for BMTCT: mds    Recent chemotherapy: decitabine, last in 2024    Recent infections: no    Blood thinner use? If yes, why? No    Treatment for diabetes? Yes    Today, the patient notes the following symptoms:  Review Of Systems  10pt ROS neg    Arpit Forrest's History    No past medical history on file.    Past Surgical History:   Procedure Laterality Date    IR LUMBAR PUNCTURE  2024   Nephrectomy:     No family history on file.    Social History     Tobacco Use    Smoking status: Former     Current packs/day: 0.00     Average packs/day: 0.5 packs/day for 4.0 years (2.0 ttl pk-yrs)     Types: Cigarettes     Start date:      Quit date: 1970     Years since quittin.5    Smokeless tobacco: Never   Substance Use Topics    Alcohol use: Not on file     No alcohol in 12mo, previously a recreational drinker  2 daughters, age 45 and 48, 5 grandchildren  Retired , ,       Arpit Forrest's Medications and Allergies    Current Outpatient Medications   Medication Sig Dispense Refill    acetaminophen (TYLENOL) 500 MG tablet Take 2 tablets by mouth      acyclovir (ZOVIRAX) 400 MG tablet TAKE 1 TABLET(400 MG) BY MOUTH TWICE DAILY FOR PROPHYLAXIS      allopurinol (ZYLOPRIM) 100 MG tablet Take 100 mg by mouth      amLODIPine (NORVASC) 10 MG tablet Take 1 tablet by mouth every morning      atorvastatin (LIPITOR) 10 MG tablet Take 1 tablet by mouth every morning      calcium carbonate (TUMS) 500 MG chewable tablet Take 1 chew tab by mouth 2 times daily 600 mg      coenzyme Q-10 capsule Take 1 capsule by mouth daily      fluticasone (FLONASE) 50 MCG/ACT nasal spray 1 spray      latanoprost (XALATAN) 0.005 % ophthalmic solution INSTILL 2 DROPS IN BOTH EYES AT BEDTIME       levofloxacin (LEVAQUIN) 500 MG tablet Take 500 mg by mouth      metFORMIN (GLUCOPHAGE XR) 500 MG 24 hr tablet Take 1,500 mg by mouth daily (with dinner)      predniSONE (DELTASONE) 1 MG tablet Take 3 mg by mouth      RABEprazole (ACIPHEX) 20 MG EC tablet Take 1 tablet by mouth daily      Vitamin D3 (CHOLECALCIFEROL) 25 mcg (1000 units) tablet Take by mouth daily 3000 international unit(s)      voriconazole (VFEND) 200 MG tablet Take 200 mg by mouth 2 times daily       No current facility-administered medications for this visit.          Allergies   Allergen Reactions    Gramineae Pollens Other (See Comments)     Seasonal sneezing, runny nose.       Physical Examination    There were no vitals taken for this visit.    Exam:  Constitutional: healthy, alert, and no distress  Head: Normocephalic. No masses, lesions, tenderness or abnormalities  ENT: ENT exam normal, no neck nodes or sinus tenderness and neck without nodes  Cardiovascular: positive findings: murmur  Respiratory: negative  Gastrointestinal: Abdomen soft, non-tender. BS normal. No masses, organomegaly  : Deferred  Musculoskeletal: extremities normal- no gross deformities noted, gait normal, and normal muscle tone  Skin: no suspicious lesions or rashes  Neurologic: negative  Psychiatric: mentation appears normal and affect normal/bright  Hematologic/Lymphatic/Immunologic:        Frailty Screening  BMT Fried Frailty          7/23/2024    10:50 6/7/2024    09:03   Fried Frailty   Lost>10 pounds unintenionally last year N N   Exhaustion Score 0 0   Slowness Score 0 0   Weakness/ Strength Score 0 0   Low Activity Level Score 0 0   Final Score Not Frail Not Frail   Final Score Number 0 0   Sit Stand Assessment   Patient able to perform 5 chair stands Y Y   Chair Stands in seconds 11 11   Patient is able to perform stand with Feet Side by Side? Y Y   First attempt (in seconds): 10 10   Patient is able to perform Semi-Tandem Stand? Y Y   First attemp (in  seconds): 10 10   Patient is able to perform Tandem Stand? Y Y   First attemp (in seconds): 10 10             Overall Assessment      I have reviewed the diagnostic data, medications, frailty screening, and general processes prior to BMTCT.  I have notified the Primary BMT Physician/and or Attending Physician in the clinic of any issues. We also discussed in detail the database and biorepository research for which Arpit Forrest is eligible. We discussed the potential risks and potential benefits of each of these protocols individually. We explained potential alternatives to the protocols discussed. We explained to the patient that participation is voluntary and that consent may be withdrawn at any time.       Consents Signed:   Blood transfusion consent form  Ethnicity form  CIBMTR database  NMDP biorepository    [facility] BMTCT Database    Present during the discussion was pt. Copies of the signed consent forms will be provided to the patient on admission. No procedures specific to any studies were performed prior to the patient signing the consent form.    Arpit Forrest had the opportunity to ask questions, and I answered all of the questions to the best of my ability.    I spent 40 minutes in the care of this patient today, which included time necessary for preparation for the visit, obtaining history, ordering medications/tests/procedures as medically indicated, review of pertinent medical literature, counseling of the patient, communication of recommendations to the care team, and documentation time.        AURELIA Ferrer CNP

## 2024-07-24 LAB
COMMENT VXMB1: NORMAL
COMMENT VXMT1: NORMAL
CROSSMATCHDATEVXM: NORMAL
DONOR VXM: NORMAL
PATH REPORT.COMMENTS IMP SPEC: ABNORMAL
PATH REPORT.COMMENTS IMP SPEC: YES
PATH REPORT.FINAL DX SPEC: ABNORMAL
PATH REPORT.MICROSCOPIC SPEC OTHER STN: ABNORMAL
PATH REPORT.RELEVANT HX SPEC: ABNORMAL
RESULT VXM B1: NORMAL
RESULT VXM T1: NORMAL
SERUM DATE VXM B1: NORMAL
SERUM DATE VXM T1: NORMAL

## 2024-07-25 ENCOUNTER — ALLIED HEALTH/NURSE VISIT (OUTPATIENT)
Dept: TRANSPLANT | Facility: CLINIC | Age: 74
End: 2024-07-25
Attending: STUDENT IN AN ORGANIZED HEALTH CARE EDUCATION/TRAINING PROGRAM
Payer: MEDICARE

## 2024-07-25 ENCOUNTER — HOSPITAL ENCOUNTER (OUTPATIENT)
Dept: CT IMAGING | Facility: CLINIC | Age: 74
Discharge: HOME | End: 2024-07-25
Attending: STUDENT IN AN ORGANIZED HEALTH CARE EDUCATION/TRAINING PROGRAM
Payer: MEDICARE

## 2024-07-25 ENCOUNTER — LAB (OUTPATIENT)
Dept: LAB | Facility: CLINIC | Age: 74
End: 2024-07-25
Attending: STUDENT IN AN ORGANIZED HEALTH CARE EDUCATION/TRAINING PROGRAM
Payer: MEDICARE

## 2024-07-25 ENCOUNTER — HOSPITAL ENCOUNTER (OUTPATIENT)
Dept: GENERAL RADIOLOGY | Facility: CLINIC | Age: 74
Discharge: HOME | End: 2024-07-25
Attending: STUDENT IN AN ORGANIZED HEALTH CARE EDUCATION/TRAINING PROGRAM
Payer: MEDICARE

## 2024-07-25 ENCOUNTER — LAB (OUTPATIENT)
Dept: LAB | Facility: CLINIC | Age: 74
End: 2024-07-25
Payer: MEDICARE

## 2024-07-25 DIAGNOSIS — D46.9 MDS (MYELODYSPLASTIC SYNDROME) (H): Primary | ICD-10-CM

## 2024-07-25 DIAGNOSIS — Z86.2 PERSONAL HISTORY OF DISEASES OF BLOOD AND BLOOD-FORMING ORGANS: ICD-10-CM

## 2024-07-25 DIAGNOSIS — Z11.59 SCREENING FOR VIRAL DISEASE: ICD-10-CM

## 2024-07-25 DIAGNOSIS — D46.9 MDS (MYELODYSPLASTIC SYNDROME) (H): ICD-10-CM

## 2024-07-25 DIAGNOSIS — Z01.818 PREOP EXAMINATION: ICD-10-CM

## 2024-07-25 DIAGNOSIS — Z71.9 VISIT FOR COUNSELING: Primary | ICD-10-CM

## 2024-07-25 DIAGNOSIS — C94.6 MYELODYSPLASTIC DISEASE (H): Primary | ICD-10-CM

## 2024-07-25 LAB
COLLECT DURATION TIME UR: 24 H
CREAT 24H UR-MRATE: 1.98 G/SPEC (ref 0.98–2.2)
CREAT CL 24H UR+SERPL-VRATE: 114 ML/MIN (ref 66–143)
CREAT CL/1.73 SQ M 24H UR+SERPL-ARVRAT: 80 ML/MIN/1.7M2 (ref 110–180)
CREAT SERPL-MCNC: 1.16 MG/DL (ref 0.67–1.17)
CREAT SERPL-MCNC: 1.21 MG/DL (ref 0.67–1.17)
CREAT UR-MCNC: 88.7 MG/DL
DLCOCOR-%PRED-PRE: 110 %
DLCOCOR-PRE: 30.86 ML/MIN/MMHG
DLCOUNC-%PRED-PRE: 101 %
DLCOUNC-PRE: 28.23 ML/MIN/MMHG
DLCOUNC-PRED: 27.9 ML/MIN/MMHG
EGFRCR SERPLBLD CKD-EPI 2021: 66 ML/MIN/1.73M2
ERV-%PRED-PRE: 55 %
ERV-PRE: 0.89 L
ERV-PRED: 1.61 L
EXPTIME-PRE: 6.6 SEC
FEF2575-%PRED-PRE: 152 %
FEF2575-PRE: 3.5 L/SEC
FEF2575-PRED: 2.29 L/SEC
FEFMAX-%PRED-PRE: 100 %
FEFMAX-PRE: 8.74 L/SEC
FEFMAX-PRED: 8.66 L/SEC
FEV1-%PRED-PRE: 115 %
FEV1-PRE: 3.67 L
FEV1FEV6-PRE: 80 %
FEV1FEV6-PRED: 77 %
FEV1FVC-PRE: 80 %
FEV1FVC-PRED: 75 %
FEV1SVC-PRE: 78 %
FEV1SVC-PRED: 71 %
FIFMAX-PRE: 7.6 L/SEC
FRCPLETH-%PRED-PRE: 89 %
FRCPLETH-PRE: 3.58 L
FRCPLETH-PRED: 3.98 L
FVC-%PRED-PRE: 108 %
FVC-PRE: 4.59 L
FVC-PRED: 4.24 L
IC-%PRED-PRE: 118 %
IC-PRE: 3.82 L
IC-PRED: 3.22 L
RVPLETH-%PRED-PRE: 92 %
RVPLETH-PRE: 2.68 L
RVPLETH-PRED: 2.89 L
SPECIMEN VOL UR: 2233 ML
TLCPLETH-%PRED-PRE: 93 %
TLCPLETH-PRE: 7.4 L
TLCPLETH-PRED: 7.92 L
VA-%PRED-PRE: 100 %
VA-PRE: 7.27 L
VC-%PRED-PRE: 105 %
VC-PRE: 4.72 L
VC-PRED: 4.46 L

## 2024-07-25 PROCEDURE — 36415 COLL VENOUS BLD VENIPUNCTURE: CPT | Performed by: PATHOLOGY

## 2024-07-25 PROCEDURE — G1010 CDSM STANSON: HCPCS | Performed by: RADIOLOGY

## 2024-07-25 PROCEDURE — 88321 CONSLTJ&REPRT SLD PREP ELSWR: CPT | Performed by: STUDENT IN AN ORGANIZED HEALTH CARE EDUCATION/TRAINING PROGRAM

## 2024-07-25 PROCEDURE — 82575 CREATININE CLEARANCE TEST: CPT | Performed by: PATHOLOGY

## 2024-07-25 PROCEDURE — 71046 X-RAY EXAM CHEST 2 VIEWS: CPT | Mod: 26 | Performed by: RADIOLOGY

## 2024-07-25 PROCEDURE — 71250 CT THORAX DX C-: CPT | Mod: MG

## 2024-07-25 PROCEDURE — 71046 X-RAY EXAM CHEST 2 VIEWS: CPT

## 2024-07-25 PROCEDURE — 82565 ASSAY OF CREATININE: CPT | Mod: 91 | Performed by: PATHOLOGY

## 2024-07-25 PROCEDURE — 71250 CT THORAX DX C-: CPT | Mod: 26 | Performed by: RADIOLOGY

## 2024-07-25 ASSESSMENT — ANXIETY QUESTIONNAIRES
2. NOT BEING ABLE TO STOP OR CONTROL WORRYING: NOT AT ALL
1. FEELING NERVOUS, ANXIOUS, OR ON EDGE: SEVERAL DAYS
6. BECOMING EASILY ANNOYED OR IRRITABLE: SEVERAL DAYS
3. WORRYING TOO MUCH ABOUT DIFFERENT THINGS: NOT AT ALL
7. FEELING AFRAID AS IF SOMETHING AWFUL MIGHT HAPPEN: NOT AT ALL
5. BEING SO RESTLESS THAT IT IS HARD TO SIT STILL: NOT AT ALL
GAD7 TOTAL SCORE: 2
GAD7 TOTAL SCORE: 2

## 2024-07-25 ASSESSMENT — PATIENT HEALTH QUESTIONNAIRE - PHQ9
5. POOR APPETITE OR OVEREATING: NOT AT ALL
SUM OF ALL RESPONSES TO PHQ QUESTIONS 1-9: 3

## 2024-07-25 NOTE — LETTER
"7/25/2024      Arpit Forrest  [id number] [facility] [address]      Dear Colleague,    Thank you for referring your patient, Arpit Forrest, to the [facility] BLOOD AND MARROW TRANSPLANT PROGRAM [place]. Please see a copy of my visit note below.    Pharmacy Assessment - Pre-Stem Cell Transplant    Assessments & Recommendations:  1) Hold voriconazole for 2 days prior to admission.  2) History of steroid induced hyperglycemia. Hold metformin on admission and start sliding scale insulin. Consider endocrine consult for insulin plan with steroids.  3) Completed 24 hour urine creatinine. Patient only has one kidney. Review results to determine fludarabine dosing.  4) Allopurinol for gout. Continue indefinitely.    If this patient is admitted under observation, the patient may bring in their own supply of the following medication for use in the hospital:  1) None  -Per \"Medications Not Supplied by Pharmacy\" policy (available on [employer])    History of Present Illness:  Arpit Forrest is a 73 year old year old male diagnosed with MDS.  he has been treated with decitabine.  he is now being work up for Allogeneic Stem Cell Transplant on protocol 2022-52, which utilizes fludarabine/cyclophosphamide/TBI as a conditioning regimen.    Pertinent labs/tests:  Viral Testing:  CMV(-) / HSV(-) / EBV(+)  Ejection Fraction: 60-65% (7/22/24)  QTc: 460 msec (7/22/24)    Weights:   Wt Readings from Last 3 Encounters:   07/23/24 115.3 kg (254 lb 1.6 oz)   07/22/24 116.6 kg (257 lb)   06/07/24 114.1 kg (251 lb 8 oz)   Ideal body weight: 80.9 kg (178 lb 4.8 oz)  Adjusted ideal body weight: 94.6 kg (208 lb 9.9 oz)  % IBW:  143  There is no height or weight on file to calculate BMI.    Primary BMT Physician: Dr. Fowler  BMT RN Coordinator:  Blanca Sanchez    Past Medical History:  No past medical history on file.    Medication Allergies:  Allergies   Allergen Reactions    Gramineae Pollens Other (See Comments)     Seasonal " sneezing, runny nose.       Current Medications (pre-admit):  Current Outpatient Medications   Medication Sig Dispense Refill    acetaminophen (TYLENOL) 500 MG tablet Take 1,000 mg by mouth 2 times daily      acyclovir (ZOVIRAX) 400 MG tablet TAKE 1 TABLET(400 MG) BY MOUTH TWICE DAILY FOR PROPHYLAXIS      allopurinol (ZYLOPRIM) 100 MG tablet Take 300 mg by mouth      amLODIPine (NORVASC) 10 MG tablet Take 1 tablet by mouth every morning      atorvastatin (LIPITOR) 10 MG tablet Take 1 tablet by mouth every morning      brimonidine (ALPHAGAN) 0.2 % ophthalmic solution Place 1 drop into both eyes 2 times daily      Calcium Carbonate Antacid 600 MG CHEW Take 1 chew tab by mouth 2 times daily 600 mg      coenzyme Q-10 capsule Take 1 capsule by mouth daily      fluticasone (FLONASE) 50 MCG/ACT nasal spray Spray 1 spray into both nostrils 2 times daily      latanoprost (XALATAN) 0.005 % ophthalmic solution INSTILL 2 DROPS IN BOTH EYES AT BEDTIME      levofloxacin (LEVAQUIN) 500 MG tablet Take 500 mg by mouth daily      Lutein-Zeaxanthin 25-5 MG CAPS Take 1 capsule by mouth daily      metFORMIN (GLUCOPHAGE XR) 500 MG 24 hr tablet Take 500 mg every morning and 1,000 mg every evening      RABEprazole (ACIPHEX) 20 MG EC tablet Take 1 tablet by mouth daily      Vitamin D3 (CHOLECALCIFEROL) 25 mcg (1000 units) tablet Take 3 tablets by mouth daily 3000 international unit(s)      voriconazole (VFEND) 200 MG tablet Take 200 mg by mouth 2 times daily         Herbal Medication/Nutritional Supplements:  Vitamin D, Co-Q10, Calcium, lutein-zeaxanthin    Smoking/Past Drug Use:  Former tobacco use - quit 1970    Nausea/Vomiting, Pain, or other issues:   Reports no issues with nausea with past chemotherapy.      Summary:  I met with Arpit Forrest for approximately 60 minutes.  We discussed allergies, home medications, preparative regimen (fludarabine, cyclophosphamide, TBI), GVHD prophylaxis (post tx cyclophosphamide, sirolimus,  mycophenolate), anti-infective prophylaxis (acyclovir, levofloxacin, micafungin, Bactrim), supportive medications (allopurinol, ursodiol, mucositis management, antiemetics, filgrastim), vaccines, med box/pharmacy expectations. I reviewed and updated home medication list and drug allergies.    SELVIN MENDOZA, [facility]

## 2024-07-25 NOTE — PROGRESS NOTES
"Pharmacy Assessment - Pre-Stem Cell Transplant    Assessments & Recommendations:  1) Hold voriconazole for 2 days prior to admission.  2) History of steroid induced hyperglycemia. Hold metformin on admission and start sliding scale insulin. Consider endocrine consult for insulin plan with steroids.  3) Completed 24 hour urine creatinine. Patient only has one kidney. Review results to determine fludarabine dosing.  4) Allopurinol for gout. Continue indefinitely.    If this patient is admitted under observation, the patient may bring in their own supply of the following medication for use in the hospital:  1) None  -Per \"Medications Not Supplied by Pharmacy\" policy (available on [employer])    History of Present Illness:  Arpit Forrest is a 73 year old year old male diagnosed with MDS.  he has been treated with decitabine.  he is now being work up for Allogeneic Stem Cell Transplant on protocol 2022-52, which utilizes fludarabine/cyclophosphamide/TBI as a conditioning regimen.    Pertinent labs/tests:  Viral Testing:  CMV(-) / HSV(-) / EBV(+)  Ejection Fraction: 60-65% (7/22/24)  QTc: 460 msec (7/22/24)    Weights:   Wt Readings from Last 3 Encounters:   07/23/24 115.3 kg (254 lb 1.6 oz)   07/22/24 116.6 kg (257 lb)   06/07/24 114.1 kg (251 lb 8 oz)   Ideal body weight: 80.9 kg (178 lb 4.8 oz)  Adjusted ideal body weight: 94.6 kg (208 lb 9.9 oz)  % IBW:  143  There is no height or weight on file to calculate BMI.    Primary BMT Physician: Dr. Fowler  BMT RN Coordinator:  Blanca Sanchez    Past Medical History:  No past medical history on file.    Medication Allergies:  Allergies   Allergen Reactions    Gramineae Pollens Other (See Comments)     Seasonal sneezing, runny nose.       Current Medications (pre-admit):  Current Outpatient Medications   Medication Sig Dispense Refill    acetaminophen (TYLENOL) 500 MG tablet Take 1,000 mg by mouth 2 times daily      acyclovir (ZOVIRAX) 400 MG tablet TAKE 1 TABLET(400 MG) BY " MOUTH TWICE DAILY FOR PROPHYLAXIS      allopurinol (ZYLOPRIM) 100 MG tablet Take 300 mg by mouth      amLODIPine (NORVASC) 10 MG tablet Take 1 tablet by mouth every morning      atorvastatin (LIPITOR) 10 MG tablet Take 1 tablet by mouth every morning      brimonidine (ALPHAGAN) 0.2 % ophthalmic solution Place 1 drop into both eyes 2 times daily      Calcium Carbonate Antacid 600 MG CHEW Take 1 chew tab by mouth 2 times daily 600 mg      coenzyme Q-10 capsule Take 1 capsule by mouth daily      fluticasone (FLONASE) 50 MCG/ACT nasal spray Spray 1 spray into both nostrils 2 times daily      latanoprost (XALATAN) 0.005 % ophthalmic solution INSTILL 2 DROPS IN BOTH EYES AT BEDTIME      levofloxacin (LEVAQUIN) 500 MG tablet Take 500 mg by mouth daily      Lutein-Zeaxanthin 25-5 MG CAPS Take 1 capsule by mouth daily      metFORMIN (GLUCOPHAGE XR) 500 MG 24 hr tablet Take 500 mg every morning and 1,000 mg every evening      RABEprazole (ACIPHEX) 20 MG EC tablet Take 1 tablet by mouth daily      Vitamin D3 (CHOLECALCIFEROL) 25 mcg (1000 units) tablet Take 3 tablets by mouth daily 3000 international unit(s)      voriconazole (VFEND) 200 MG tablet Take 200 mg by mouth 2 times daily         Herbal Medication/Nutritional Supplements:  Vitamin D, Co-Q10, Calcium, lutein-zeaxanthin    Smoking/Past Drug Use:  Former tobacco use - quit 1970    Nausea/Vomiting, Pain, or other issues:   Reports no issues with nausea with past chemotherapy.      Summary:  I met with Arpit Forrest for approximately 60 minutes.  We discussed allergies, home medications, preparative regimen (fludarabine, cyclophosphamide, TBI), GVHD prophylaxis (post tx cyclophosphamide, sirolimus, mycophenolate), anti-infective prophylaxis (acyclovir, levofloxacin, micafungin, Bactrim), supportive medications (allopurinol, ursodiol, mucositis management, antiemetics, filgrastim), vaccines, med box/pharmacy expectations. I reviewed and updated home medication list and  drug allergies.    SELVIN MENDOZA, [facility]

## 2024-07-25 NOTE — PROGRESS NOTES
CLINICAL SOCIAL WORK   PSYCHOSOCIAL ASSESSMENT  BLOOD AND MARROW TRANSPLANT SERVICE      Assessment completed on July 25, 2024 of living situation, support system, financial status, functional status, coping, stressors, need for resources and social work intervention provided as needed.  Information for this assessment was provided by Pt and spouse report in addition to medical chart review and consultation with medical team.     Present at assessment: Patient, Arpit Forrest and Meghna Forrest were present for this assessment conducted by LINDA Valdez .     Diagnosis: Myelodysplastic Syndrome (MDS)    Date of Diagnosis: 3/2024    Transplant type: Allogeneic    Donor: Unrelated  allogeneic donor stem cell transplant    Physician: Nena Fowler MD    Nurse Coordinator: Moreno Perrin RN    Work-up Nurse Coordinator: Blanca Sanchez RN    : SHUN Valdez, [facility]     Permanent Address:   [id number] [place] [id number]    Contact Information:  Pt Home Phone: [phone]  Pt Cell Phone: [phone]  Pt Email: [email or web address]  Pt's wife Meghna Phone: [phone]    Presenting Information:  Arpit is a 73 year old male diagnosed with MDS who presents for evaluation for an allogeneic transplant at the [facility] ([facility]  Pt was accompanied to today's visit by his wife Meghna.     Decision Making:   Self     Health Care Directive:   Provided education Arpit is uncertain if he has a HCD, but does desire to complete a new one. Agreed to bring the pt a blank document once admitted to be complete.    Relationship Status:    to his wife Meghna for 51 years. Both indicate their relationship as stable and supportive.    Special Lodging Needs: None identified at this time. Arpit lives in [place], [place] with his wife. They shared that their daughter, son-in-law and 5 grandchildren all live next door.    Family/Support System: Pt endorsed  a good support system including family and close friends who will be available to support Pt throughout transplant process.     Spouse: Meghna Forrest    Children: Anel and Cathy    Grandchildren: 5    Parents:     Siblings: 2- Casi and Constance    Friends: many close friends back in [place]    Caregiver: SIXTO discussed with pt the caregiver role and expectation at length. Pt is agreeable to having a full time caregiver for a minimum of 100 days until cleared by the BMT physician. Pt's identified caregivers are his wife and children. Pt signed the caregiver contract which will be scanned into the EMR. Caregiver education and resources provided. No caregiver concerns identified. Pt and Pt's wife confirmed understanding caregiving requirement, including driving restrictions, as discussed during psychosocial assessment.     Name & Numbers  Meghna Forrest  [phone]  Cathy Monsalve  [phone]  [facility] [phone]      Transportation Mode:  Private Car . Pt is aware of driving restrictions post-BMT and the need for the caregiver is to drive until cleared to drive by the  BMT physician. SW provided information on parking info and monthly parking pass options. Pt will utilize the [employer] for transportation to and from the [facility] and BMT Clinic/Hospital.    Insurance:  No Insurance issues identified.  Pt denied specific insurance concerns at this time. SW reiterated information about the BMT Financial  should specific insurance questions arise as Pt moves through transplant process.     Sources of Income:  No income concerns identified  Arpit is collecting residential and savings. He is not also getting a check from the [facility] for his Agent [place] exposure. Pt denied anticipation of financial hardship related to BMT at this time.  SW encouraged Pt to contact this  for additional potential resources should financial situation change.     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 He is 50% service connected with the [facility], but is currently getting evaluated for [place] exposure due to his MDS dx. If approved he noted he would likely be 100% service connected.    Mental Health: No mental health issues identified       PHQ-9 assessment, score was 3 ,which indicates no current signs of depression.  GAD7 assessment, score was 2, which indicates no current signs of anxiety.    Arpit shared he has not history of anxiety or depression. He shared that overall he has been able to keep a good mindset. He shared that getting to his diagnosis was much harder as it took doctors many months to figure out what his diagnosis was and after many wrong ideas and treatments, he eventually got his MDS diagnosis. He shared having to go in and out of the hospital was hard during this time, but better now that he has an official diagnosis. He shared he has not concerns with being admitted again for a longer period as he is prepared for it.      We talked about how some patients may see an increase in feelings of anxiety or depression while hospitalized for extended periods along with isolation. Encouraged Arpit and Meghna to let us know if they are noticing an increase in symptoms. We talked about the variety of modalities available to use as coping mechanisms (including but not limited to guided imagery, relaxation techniques, progressive muscle relaxation, counseling/talk therapy and medication).    Chemical Use: No issues identified.  Arpit noted no current use of tobacco, alcohol, marijuana or other drugs. Based on the information provided, there appear to be no specific risks or concerns identified at this time.     Trauma/Loss/Abuse History: Multiple losses  "associated with cancer diagnosis and treatment, including health, employment, changes to physical appearance, etc.     Spirituality:  Patient identifies with evelin community. Arpit shared that he is Orthodoxy and has a strong evelin.     Coping: Pt noted that he is currently feeling \"positive, hopeful, prepared, excited and ready to begin\".  Pt shared that his main coping mechanisms are talking with friends and family and prayer/spiritual practice.  Pt noted that he also kalyn by positive self-talk and taking naps. SW and Pt discussed additional positive coping mechanisms that Pt can utilize while in the hospital.     Caregiver Coping: Pt's wife Meghna noted that she is feeling \"positive, hopeful, prepared and ready to begin\" at this time.  Meghna noted that she kalyn by talking with friends and family, prayer/spiritual practice, reading books, and exercise.     Education Provided: Transplant process expectations, Caregiver requirements, Caregiver self-care, Financial issues related to transplant, Financial resources/grants available, Common psychosocial stressors pre/post transplant, Support group(s) available, Tour/layout of the inpatient unit/non-use of cell phones, Hospital resources available, Web site information, Resources for transplant patients and their families as well as the Clinical Social Work role.     Interventions Provided: Supportive counseling and education     Recreation/Leisure Activities:  Arpit shared he likes to read, stay active, and be with his grandchildren    Plans for Hospital Stay Leisure:  During his IP stay he plans to walk, watch TV, bike, and read    Assessment and Recommendations for Team:  Pt is a 73 year old male diagnosed with MDS who is here undergoing preparation for a planned allogeneic transplant.     Pt is a pleasant and articulate male who feels comfortable communicating with the medical team. Pt is pleasant, calm and able to articulate concerns/coping mechanisms in an " appropriate manner. Arpit was alert, interactive, and affect was full, they displayed appropriate eye contact, memory and thought processes. Pt has a strong supportive network of family and friends who are involved.     Pt will benefit from ongoing psychosocial support in regards to coping with the adjustment to the BMT process. CSW has discussed  psychosocial support options in regards to coping with the adjustment to the BMT process and support groups opportunities.      Pt has a good support system and a good caregiver plan. Pt verbalizes understanding of the transplant process and wanting to proceed. SW provided contact information and encouraged Pt to contact SW with questions, concerns, resources and for support. Per this assessment, I did not identify any barriers to this patient moving forward with transplant.        Important Information:   - Arpit would like to complete a HCD when admitted.   -Arpit would like a treadmill in his room.    Follow up Planned:   Psychosocial support  Spiritual Health referral    SHUN Valdez, [facility]  Phone: [phone]  Vocera- BMT SW 3

## 2024-07-25 NOTE — LETTER
"7/25/2024      Arpit Forrest  [address]  [place] [address]      Dear Colleague,    Thank you for referring your patient, Arpit Forrest, to the [facility] [place]. Please see a copy of my visit note below.    BMT Teaching Flowsheet    Arpit Forrest is a 73 year old male  There were no encounter diagnoses.    Teaching Topic: 2022-52      Person(s) involved in teaching: Patient and Spouse    Motivation Level  Asks Questions: Yes  Eager to Learn: Yes  Cooperative: Yes  Receptive (willing/able to accept information): Yes  Any cultural factors/Oriental orthodox beliefs that may influence understanding or compliance? No    Patient and Family demonstrates understanding of the following:   Reason for the appointment, diagnosis and treatment plan: Yes  Knowledge of proper use of medications and conditions for which they are ordered (with special attention to potential side effects or drug interactions): Yes  Which situations necessitate calling provider and whom to contact: Yes  Proper use and care of (medical equipment, care aids, etc.) Yes  Pain management techniques: Yes  How and/when to access community resources: Yes    Teaching/ learning concerns addressed: Discussed plan for patient admission, post transplant follow up and recovery.     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  Signs, symptoms, and treatment of GVHD was reviewed specifically.  Patient was encouraged to call with any additional " questions.    Patient was provided with handouts for caring for their central venous catheter (proper hand hygiene, changing end caps, flushing line, changing CVC dressing). Yes    Patient was encouraged to view step-by-step instructional videos for central venous catheter care and report any questions or concerns. Yes  .  Time spent with patient: 90 minutes.  Specific Concerns: NA      Again, thank you for allowing me to participate in the care of your patient.        Sincerely,        Blanca Sanchez RN

## 2024-07-26 ENCOUNTER — OFFICE VISIT (OUTPATIENT)
Dept: TRANSPLANT | Facility: CLINIC | Age: 74
End: 2024-07-26
Attending: STUDENT IN AN ORGANIZED HEALTH CARE EDUCATION/TRAINING PROGRAM
Payer: MEDICARE

## 2024-07-26 VITALS
RESPIRATION RATE: 14 BRPM | TEMPERATURE: 97.9 F | HEART RATE: 74 BPM | DIASTOLIC BLOOD PRESSURE: 86 MMHG | SYSTOLIC BLOOD PRESSURE: 136 MMHG | WEIGHT: 258 LBS | OXYGEN SATURATION: 99 % | BODY MASS INDEX: 33.65 KG/M2

## 2024-07-26 DIAGNOSIS — D46.9 MDS (MYELODYSPLASTIC SYNDROME) (H): Primary | ICD-10-CM

## 2024-07-26 LAB
PATH REPORT.COMMENTS IMP SPEC: ABNORMAL
PATH REPORT.COMMENTS IMP SPEC: ABNORMAL
PATH REPORT.COMMENTS IMP SPEC: YES
PATH REPORT.FINAL DX SPEC: ABNORMAL
PATH REPORT.GROSS SPEC: ABNORMAL
PATH REPORT.MICROSCOPIC SPEC OTHER STN: ABNORMAL
PATH REPORT.MICROSCOPIC SPEC OTHER STN: ABNORMAL
PATH REPORT.RELEVANT HX SPEC: ABNORMAL

## 2024-07-26 PROCEDURE — 99215 OFFICE O/P EST HI 40 MIN: CPT | Performed by: STUDENT IN AN ORGANIZED HEALTH CARE EDUCATION/TRAINING PROGRAM

## 2024-07-26 PROCEDURE — 99417 PROLNG OP E/M EACH 15 MIN: CPT | Performed by: STUDENT IN AN ORGANIZED HEALTH CARE EDUCATION/TRAINING PROGRAM

## 2024-07-26 PROCEDURE — G0463 HOSPITAL OUTPT CLINIC VISIT: HCPCS | Performed by: STUDENT IN AN ORGANIZED HEALTH CARE EDUCATION/TRAINING PROGRAM

## 2024-07-26 ASSESSMENT — PAIN SCALES - GENERAL: PAINLEVEL: NO PAIN (0)

## 2024-07-26 NOTE — PROGRESS NOTES
"BMT Teaching Flowsheet    Arpit Forrest is a 73 year old male  There were no encounter diagnoses.    Teaching Topic: 2022-52      Person(s) involved in teaching: Patient and Spouse    Motivation Level  Asks Questions: Yes  Eager to Learn: Yes  Cooperative: Yes  Receptive (willing/able to accept information): Yes  Any cultural factors/[employer]ybeterian beliefs that may influence understanding or compliance? No    Patient and Family demonstrates understanding of the following:   Reason for the appointment, diagnosis and treatment plan: Yes  Knowledge of proper use of medications and conditions for which they are ordered (with special attention to potential side effects or drug interactions): Yes  Which situations necessitate calling provider and whom to contact: Yes  Proper use and care of (medical equipment, care aids, etc.) Yes  Pain management techniques: Yes  How and/when to access community resources: Yes    Teaching/ learning concerns addressed: Discussed plan for patient admission, post transplant follow up and recovery.     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  Signs, symptoms, and treatment of GVHD was reviewed specifically.  Patient was encouraged to call with any additional questions.    Patient was provided with handouts for caring for their central venous catheter (proper hand hygiene, changing end caps, flushing line, changing CVC dressing). Yes    Patient was encouraged to view step-by-step instructional videos for central venous catheter care and " report any questions or concerns. Yes  .  Time spent with patient: 90 minutes.  Specific Concerns: NA

## 2024-07-26 NOTE — LETTER
7/26/2024      Arpit Forrest  [address]  [place] [address]      Dear Colleague,    Thank you for referring your patient, Arpit Forrest, to the [facility] BLOOD AND MARROW TRANSPLANT PROGRAM [place]. Please see a copy of my visit note below.    BMT/Cell Therapy Note  Jul 26, 2024     Referring provider: Dr. 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  August 2023:  Collapsed in the bathroom while on a trip to [facility]. Had high fevers and rigors, treated with doxycycline.   11/30/2023: Seen by Rheumatology in the [place]. He had acute right sacroilitis. Ferritin was elevated to 1600, no hemachromatosis.   12/3/2023 to 12/9/2023: Hospitalized with high fevers and new onset mild pancytopenias. CT C/A/P did not show any lymphadenopathy or splenomegaly.  Infectious cultures were negative. 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 He was treated with anakinra, solumedrol and then started on oral prednisone.   12/7/2023: Bone marrow biopsy showed hypercellular marrow, no dysplasia, 34% ring sideroblasts, increased reticuloendothelial iron stores, no increase in blasts. Flow showed a CD5 positive B-cell infiltrate (5% of the marrow cellularity), consistent with monoclonal B-cell lymphocytosis.  Cytogenetics showed a normal male karyotype. Myeloid malignancy panel did not show an SF3B1 mutation. However, there were mutations in SRSF2, DNMT3A, RUNX1, IDH2 p.[id number] , BCOR, BCORL1, and TET2. Diagnosed with CCUS.   1/31/2024 to 2/6/24: Hospitalized with fevers to 104 despite anti-IL1 therapy. Anakinra was increased, then switched to canakinumab. PET-Ct on 2/9/24 did not show any malignancy. Diagnosed with subclinical hyperthyroidism treated with methimazole starting 2/14/2024.  3/15/2024 to 3/25/2024: Hospitalized for neutropenic fevers. He also had an episode of atrial fibrillation with rapid ventricular response placed on AC with LMWH. Methimazole was held due to risk of agranulocytosis.  3/18/2024: Bone marrow biopsy showed Myelodysplastic syndrome, with multilineage dysplasia. 2% bone marrow blasts, and 3% circulating blasts.18% ringed sideroblasts, slight reticulin fibrosis. Concurrent CBC showed WBC 1.7, ANC 0.5, Hb 8.7, platelet count 43. Normal cytogenetics. NGS could not be sent from the bone marrow. NGS peripheral blood (4/18/24) showed mutations in BCOR (56%), BCORL1 (11%), DNMT3A (34%), IDH2 (9%), SRSF2 (33%), STAG2 (9%), TET2 (2%), two RUNX1 mutations. IPSS-M: very high risk 1.98. Scattered non-caseating granulomas identified on the bone marrow biopsy and clot sections, negative for microorganisms. Also seen on flow was CD5-positive lambda monotypic B cells (4.3%).     3/21/2024: Cycle 1 decitabine 20 mg/m2 for 3 days only.  4/22/2024: Cycle 2 decitabine 20 mg/m2 for 5 days   5/21/2024: Cycle 3 decitabine   6/19/24: Cycle 4    7/23/24: Pre transplant BMBx showed persistent MDS. Normocellular marrow (20-30%) with increase dysplastic megakaryocytes, no bone marrow blasts, and also rare circulating blasts. Compared to the previous bone marrow, this bone marrow is less cellular, only 1% ring sideroblasts and less circulating blasts (1% current vs 3% previous). Flow did not show any myeloid blasts,  CD5 positive lambda monotypic cells (10%). Cytogenetics and NGS pending.        History     History of Present Illness/ Summary  Arpit Forrest is a 73 year old male. Here with his wife. He has completed 4 cycles of decitabine and overall feels well. No recent infectious symptoms. He has been walking on the treadmill and will go to his grandson's recital today.     Review of Systems: Negative except as mentioned above    Past Medical History   A.fib with RVR while admitted with febrile episode on 3/15/24 s/p cardioversion. He was on amiodarone and anticoagulation till 4/15/24, at which point it was discontinued.   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   Dyslipidemia  HTN  VINCENT  Gout   Exposure to agent orange     Past Surgical History   Hx of kidney donation to sister  Vasectomy     Family History   Mother  at age 99. She had a history of hypertension.    Father  at age 94, had some strokes after age 90.  He has 2 daughters, 1 who is 45 years old and 1 who is 47 years old.  His daughters are healthy.    He has 2 full sisters who were born in  and  who are currently doing well.  One of his sisters (Constance) history of complex kidney disease for many years and has undergone 2 kidney transplant.  No history of hematologic malignancies    Social History  He served actively for 12 years in the [employer], including deployments to [place] and [place] during the , where he reported extensive contact with Agent Orange due to the nature of his duties. Following this, he was stationed in [place] at a facility ([facility]) which was later closed due to the extensive dumping of hazardous substances by local industries   Social History     Tobacco Use    Smoking status: Former     Current packs/day: 0.00     Average packs/day: 0.5 packs/day for 4.0 years (2.0 ttl pk-yrs)     Types: Cigarettes     Start date:      Quit  date:      Years since quittin.6    Smokeless tobacco: Never     Medications  Current Outpatient Medications   Medication Sig Dispense Refill    acetaminophen (TYLENOL) 500 MG tablet Take 1,000 mg by mouth 2 times daily      acyclovir (ZOVIRAX) 400 MG tablet TAKE 1 TABLET(400 MG) BY MOUTH TWICE DAILY FOR PROPHYLAXIS      allopurinol (ZYLOPRIM) 100 MG tablet Take 300 mg by mouth      amLODIPine (NORVASC) 10 MG tablet Take 1 tablet by mouth every morning      atorvastatin (LIPITOR) 10 MG tablet Take 1 tablet by mouth every morning      brimonidine (ALPHAGAN) 0.2 % ophthalmic solution Place 1 drop into both eyes 2 times daily      Calcium Carbonate Antacid 600 MG CHEW Take 1 chew tab by mouth 2 times daily 600 mg      coenzyme Q-10 capsule Take 1 capsule by mouth daily      fluticasone (FLONASE) 50 MCG/ACT nasal spray Spray 1 spray into both nostrils 2 times daily      latanoprost (XALATAN) 0.005 % ophthalmic solution INSTILL 2 DROPS IN BOTH EYES AT BEDTIME      levofloxacin (LEVAQUIN) 500 MG tablet Take 500 mg by mouth daily      Lutein-Zeaxanthin 25-5 MG CAPS Take 1 capsule by mouth daily      metFORMIN (GLUCOPHAGE XR) 500 MG 24 hr tablet Take 500 mg every morning and 1,000 mg every evening      RABEprazole (ACIPHEX) 20 MG EC tablet Take 1 tablet by mouth daily      Vitamin D3 (CHOLECALCIFEROL) 25 mcg (1000 units) tablet Take 3 tablets by mouth daily 3000 international unit(s)      voriconazole (VFEND) 200 MG tablet Take 200 mg by mouth 2 times daily       Allergies   Allergies   Allergen Reactions    Gramineae Pollens Other (See Comments)     Seasonal sneezing, runny nose.         Physical Exam     Vital Signs: /86 (BP Location: Right arm, Patient Position: Sitting, Cuff Size: Adult Large)   Pulse 74   Temp 97.9  F (36.6  C) (Oral)   Resp 14   Wt 117 kg (258 lb)   SpO2 99%   BMI 33.65 kg/m    Wt Readings from Last 4 Encounters:   24 117 kg (258 lb)   24 115.3 kg (254 lb 1.6 oz)    07/22/24 116.6 kg (257 lb)   06/07/24 114.1 kg (251 lb 8 oz)     KPS: 80% Can perform normal activity with effort, some signs of disease    General: Alert, no distress, obese   Eyes: Conjunctiva normal  Respiratory: Normal respiratory effort.  Cardiovascular: Normal perfusion, no significant edema.  Abdomen: No distention.    BMT Fried Frailty          7/23/2024    10:50 6/7/2024    09:03   Fried Frailty   Lost>10 pounds unintenionally last year N N   Exhaustion Score 0 0   Slowness Score 0 0   Weakness/ Strength Score 0 0   Low Activity Level Score 0 0   Final Score Not Frail Not Frail   Final Score Number 0 0   Sit Stand Assessment   Patient able to perform 5 chair stands Y Y   Chair Stands in seconds 11 11   Patient is able to perform stand with Feet Side by Side? Y Y   First attempt (in seconds): 10 10   Patient is able to perform Semi-Tandem Stand? Y Y   First attemp (in seconds): 10 10   Patient is able to perform Tandem Stand? Y Y   First attemp (in seconds): 10 10        Labs, Pathology and Imaging     IMAGING  MRI Abdomen 11/30/2023  Normal hepatic morphology. Mild diffuse hepatic parenchymal signal loss on opposed phase images. No concerning focal hepatic lesion. No intra-extra hepatic biliary ductal dilatation.     Hepatic fat fraction measures 10.5% by DANTE and 12.0% by segmentation. This is consistent with mild hepatic steatosis.     R2* measures 50.0 1/s by DANTE and 60.4 1/s by segmentation. This is consistent with borderline iron deposition.   Left nephrectomy. Right renal cysts. No hydronephrosis. 2.2 cm left adrenal nodule demonstrates homogeneous signal loss on opposed phase images and also contains internal macroscopic fat. Negative right adrenal gland. Normal appearance of the pancreas and gallbladder. Visualized bowel is normal in caliber. Colonic diverticulosis. No lymphadenopathy. No ascites. No concerning osseous lesion.     IMPRESSION:    1. Mild hepatic steatosis and borderline abnormal  iron deposition. No concerning focal liver lesion.   2. Left adrenal myelolipoma.     Assessment and Plan     Arpit Forrest is a 73 year old male with high risk MDS , currently -8     BMT/IEC PROTOCOL for MDS  - Chemo protocol: [id number]  D-6: Flu 30mg/m2, Cy 50mg/kg  D-5 to D-2: Flu  D-1: TBI  D0: stem cell infusion  - Peripheral blood stem cell graft from 8/8 donor, ABO matched, Cell dose: TBD  - Engraftment monitoring: Peripheral blood and bone marrow chimerisms on D28, D100, 6 months, 1 and 2 year  - Restaging plan: BMBx with  NGS on D28, D100, 6 months, 1 and 2 years    HEME/COAG  - GCSF starts day +5, continue until ANC > 1500 for 3 consecutive days.   - Transfusion parameters: hemoglobin <7, platelets <10    RISK OF GVHD  - Prophylaxis: PT Cy 50mg/kg on D+3 and D+4; MMF (D+5 to 35); Sirolimus (starting D+5, target level 5-10).    - Acute GVHD Treatment: None to date  - Chronic GVHD Treatment: None to date    ID  Multiple granulomas in th lung and bone marrow biopsy  - Please send fungal studies (fungitell, aspergillus galactomanan, urine histoplasma and fungal antibodies) on admission, ordered  - ID consult on admission to determine anti-fungal ppx during transplant (azole vs micafungin) given hx of travel and exposure to endemic fungi    Prophylaxis plan:   - Bacterial: Levaquin 250mg from D-1 till engraftment (cefpodoxime if allergic)   - Fungal: as above   - PJP: Bactrim or Atovaquone or Pentamidine from D28. Toxoplasma igG negative   - Viral: Acyclovir     Monitoring:   - CMV every week    - EBV every 2 weeks from D30 to D180    GI/NUTRITION  # Hepatic steatosis and boderline iron deposition in liver  - LFTs and synthetic function normal   - Monitor for VOD  - VOD prophy: Ursodiol 300mg TID     # Supportive   - Anti-emetics:  per protocol   - Ulcer prophy: PPI daily   - Dietician support to prevent malnutrition.    CARDIOVASCULAR  # Hx of A.fib with RVR   - A.fib with RVR while admitted with febrile  episode on 3/15/24. Noted on his fitbit, not very symptomatic. Cardioversion planned for new onset A.fib. Not achieved with amiodarone, DCCV on 3/21/24 restored normal sinus rhythm. Amiodarone and anticoagulation was stopped in April 2024 without any recurrence.   - He could have a recurrence during transplant which would need to be addressed with rate control strategy     # Asymptomatic moderate aortic stenosis     # CAD  - Chest CT in February 2024 with mild to moderate CAC, three-vessel disease. No anginal symptoms   - Continue atorvastatin 10mg     # HTN  - Continue amlodipine     - Risk of cardiomyopathy:  Baseline EF normal   - Risk of arrhythmia: Baseline EKG showed NSR, Qtc 460    RESPIRATORY  # VINCENT : CPAP  - Baseline PFTs: normal   - Risk of respiratory complications: Frequent ambulation and incentive spirometer.    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 Start sliding scale     MUSCULOSKELETAL/FRAILTY  # Gout: Continue allopurinol indefinitely     - Baseline Frailty Score: Not frail   - Daily PT/OT as needed while inpatient    SYMPTOM MANAGEMENT  - Pain management: none    SOCIAL DETERMINANTS  - Caregiver: Wife, Juduth, children   - Financial/insurance concerns: none    BMT and Cell Therapy Informed Consent Discussion     In today's visit, we discussed in detail the research for which Arpit Forrest is eligible. We discussed the potential risks and potential benefits of each protocol individually. We explained potential alternatives to the protocols discussed. We explained to the patient that participation is voluntary and that consent may be withdrawn at any time.     The patient completed the last round of treatment on 6/23/24.    HCT-CI score: 5 Arrhythmia:  Afib/flutter, sick sinus syndrome, or ventricular arrhythmias requiring treatment (+1), Valvular disease (except mitral valve prolapse) (+3), and Treated w/insulin or oral hypoglycemics (+1). We counseled the patient about the impact of this on the risk of treatment related and overall mortality. The score fit within treatment protocol eligibility criteria.    KPS: 80% Can perform normal activity with effort, some signs of disease  ECOG (required for CAR-T, see KPS to ECOG conversion chart):     Active infections:  none .    Prior infections that require additional special prophylaxis considerations: see above, granulomas in lung.  I reviewed and discussed infectious disease evaluation with the patient and the management plan during treatment.    Reproductive status: What methods of birth control does the patient plan to use during the treatment period beginning with conditioning and ending with the discontinuation of immune suppression (indicate with an X all that apply):  __ The patient is confirmed to be sterile or post-menopausal  _x_ Sexual abstinence  _x_ Condoms  __ Implants  __ Injectables  __ Oral contraceptives  __ Intrauterine devices (IUD)  __ Other (describe)    The patient received appropriate reproductive counseling and agreed with the need for effective contraception during the treatment procedures.    Dental health suitable to proceed: Yes    After our detailed discussion above, the patient signed the following consents for treatment and protocols:  MT 2022-52      Known issues that I take into account for medical decisions, with salient changes to the plan considering these complexities noted above.    Patient Active Problem List   Diagnosis    Ankylosing spondylitis lumbar region (H)    Autoinflammatory syndrome (H)    Macdonald's esophagus without dysplasia    Bilateral sacroiliitis (H24)    Essential hypertension    H/O: gout    History of agent Orange exposure    History of kidney donation     Hyperlipidemia    Myelodysplastic syndrome (H)    Obstructive sleep apnea syndrome    Obesity (BMI 30-39.9)    Type 2 diabetes mellitus without complication, without long-term current use of insulin (H)    Glaucoma         I spent 90 minutes in the care of this patient today, which included time necessary for preparation for the visit, obtaining history, ordering medications/tests/procedures as medically indicated, review of pertinent medical literature, counseling of the patient, communication of recommendations to the care team, and documentation time.      ____________________________________________________________________    BMT/Cell Therapy Workup Summary    Workup Nurse Coordinator: Blanca Sanchez   Primary BMT Physician: Nena Fowler  [facility] BMT Physician (if different):   Date of Summary:  07/26/2024    Patient Demographics     Patient ID:  Arpit BETH White   Age:  73 year old   Sex:  male  Reason for Transplant: MDS  Protocol: MT 2022-52    Donor Characteristics     Self or Related or Unrelated Donor: URD  Donor Match: 10/10, DP non permissive  Donor Age: 20  Donor Sex: M  Donor ABO/Rh: O- (matched)  Donor CMV Serostatus: Negative (matched)    Donor-Specific Antibodies:  No lab results found.    Virtual Crossmatch:    Recent Labs   Lab Test 07/23/24  0955   RESVXMT1 No Interp   RESVXMB1 No Interp       Blood Counts     Recent Labs   Lab Test 07/23/24  0955 07/22/24  0957   WBC 4.0 4.3   HGB 12.2* 11.9*   HCT 38.9* 38.2*   * 413   NRBCMAN 0.0 0.0       Recent Labs   Lab Test 07/22/24  0957   ABORH O NEG       Chemistries     Basic Panel  Recent Labs   Lab Test 07/25/24  1309 07/25/24  0715 07/23/24  0955 07/22/24  0957   NA  --   --   --  142   POTASSIUM  --   --   --  4.1   CHLORIDE  --   --   --  108*   CO2  --   --   --  22   BUN  --   --   --  22.7   CR 1.16 1.21* 1.21*  1.19* 1.27*   GLC  --   --   --  127*        Calcium, Magnesium, Phosphorus  Recent Labs   Lab Test 07/22/24  0957   MC 9.9         LFTs  Recent Labs   Lab Test 07/22/24  0957   BILITOTAL <0.2   ALKPHOS 89   AST 19   ALT 14   ALBUMIN 4.5     Urine Studies     Recent Labs   Lab Test 07/22/24  0957   COLOR Yellow   APPEARANCE Clear   URINEGLC Negative   URINEBILI Negative   URINEKETONE Trace*   SG 1.028   UBLD Negative   URINEPH 5.0   PROTEIN 20*   UUROI Normal   NITRITE Negative   LEUKEST Negative   MUCUS Present*   RBCU 2   WBCU 2       Creatinine Clearance    Recent Labs   Lab Test 07/25/24  0715      .0      STD 80*   VOL 2,233   DUR 24.0   UCRR 88.7   UCR24 1.98       Infectious Disease Markers     [facility] IDM  Recent Labs   Lab Test 07/22/24  0957   TCRUZI Non-reactive       CMV  Recent Labs   Lab Test 07/22/24  0957   CMVIGG No detectable antibody.       EBV  Recent Labs   Lab Test 07/22/24  0957   EBVCAG Positive*       HSV 1/2  Recent Labs   Lab Test 07/22/24  0957   M1XYCZV 0.14   H1IGG No HSV-1 IgG antibodies detected.   L2FHHLQ 0.10   H2IGG No HSV-2 IgG antibodies detected.       VZV  No lab results found.    HTLV  No lab results found.    Toxoplasma  Recent Labs   Lab Test 07/22/24  0957   TOXAM <3.0   TOXGONGIAB <3.0     Bone Marrow Biopsy     Results for orders placed or performed in visit on 07/23/24 (from the past 8760 hour(s))   Bone marrow biopsy   Result Value    Final Diagnosis      Bone marrow, posterior iliac crest, left decalcified trephine biopsy and touch imprint; left direct aspirate smear, and concentrated aspirate smear; and peripheral smear:  - Persistent myeloid neoplasm with the following features:  - Normocellular marrow (20-30%) with trilineage hematopoiesis, dysmegakaryopoiesis, slight dysgranulopoiesis, and no increase in blasts  - CD5-positive monoclonal B-cell population identified by flow cytometry, and morphology  - Peripheral blood showing slight normochromic, normocytic anemia; slight thrombocytosis, rare circulating blasts (1%)  - See comment      Comment       Concurrent flow cytometry ([id number]) shows CD5-positive lambda monotypic B cells (10%) and no definitive increase in myeloid blasts and no definitive abnormal myeloid blast population.  The bone marrow picture shows a normocellular marrow with increased dysplastic megakaryocytes and also rare circulating blasts which is consistent with persistent myeloid neoplasm. Compared to the previous bone marrow, this bone marrow is less cellular, only 1% ring sideroblasts and less circulating blasts (1% current vs 3% previous). Concurrent ancillary studies are in progress and will be reported separately. Correlation with the results of ancillary tests and clinical findings is recommended.          Clinical Information      From Epic electronic medical record; 73-year-old male has MDS status post 4 cycles of decitabine and CD5 positive monoclonal B lymphocytosis (MBL), CLL type.  He is being considered for transplant.      Peripheral Hematologic Data      CBC WITH MANUAL DIFFERENTIAL (07/23/2024 10:12 AM CDT):     RESULT VALUE REF. RANGE UNITS   WBC Count   Hemoglobin    Hematocrit    Platelet Count    RBC Count   MCV  MCH   MCHC    RDW  4.0 (NORMAL)     12.2  ( L )      38.9  ( L )   456  ( H )   4.05  ( L )       96 (NORMAL)     30.1 (NORMAL)      31.4  ( L )      16.7  ( H ) 4.0-11.0  13.3-17.7  40.0-53.0  150-450  4.40-5.90    26.5-33.0  31.5-36.5  10.0-15.0 10e3/uL  g/dL  %  10e3/uL  10e6/uL  fL  pg  g/dL  %   % Neutrophils  % Lymphocytes  % Monocytes  % Eosinophils  % Basophils  % Metamyelocytes  % Myelocytes  % Promyelocytes  % Blasts  % Plasma Cells  % Other Cells  Absolute Neutrophils  Absolute Lymphocytes  Absolute Monocytes  Absolute Eosinophils  Absolute Basophils  Absolute Metamyelocytes  Absolute Myelocytes  Absolute Promyelocytes  Absolute Blasts  Absolute Plasma Cells  Absolute Other Cells  NRBCs per 100 WBC  Absolute NRBCs 61  30  2  4  3               2.4 (NORMAL)     1.2 (NORMAL)     0.1 (NORMAL)      0.2 (NORMAL)     0.1 (NORMAL)   ()   ()   ()       ()   ()   ()   ()      () N/A  N/A  N/A  N/A  N/A  N/A  N/A  N/A  N/A  N/A  N/A  1.6-8.3  0.8-5.3  0.0-1.3  0.0-0.7  0.0-0.2  <=0.0  <=0.0  <=0.0  <=0.0  <=0.0  <=0.0  <=0  <=0.0 %  %  %  %  %  %  %  %  %  %  %  10e3/uL  10e3/uL  10e3/uL  10e3/uL  10e3/uL  10e3/uL  10e3/uL  10e3/uL  10e3/uL  10e3/uL  10e3/uL  %  10e3/uL         Microscopic Description      PERIPHERAL BLOOD SMEAR MORPHOLOGY:  The red blood cells appear normochromic.  Poikilocytosis is minimal.  Polychromasia is not increased.  Rouleaux formation is not increased. The morphology of the platelets is normal. Many platelet clumps are seen. Rare blasts are seen on scanning (1%).    Bone marrow aspirates and trephine core biopsy touch imprints are reviewed.    BONE MARROW DIFFERENTIAL (500 cells on touch imprints)  Percent (%) Cell Population Reference Range (%)   3.0 Blasts  (0 - 1)   0.2 Neutrophil promyelocytes (2 - 4)   32.0 Neutrophils and precursors (54 - 63)   39.6 Erythroid precursors (18 - 24)   1.0 Monocytes (1 - 1.5)   3.6 Eosinophils (1 - 3))   0.2 Basophils (0 - 1))   19.8 Lymphocytes (8 - 12)   0.6 Plasma cells (0 - 1.5)     The aspirate smears are possibly hemodilute.    Neutrophil maturation is complete, with a decreased myeloid to erythroid ratio. There is a small subset of neutrophils with hypogranular cytoplasm. Erythroid maturation is complete, with a relative erythroid hyperplasia. Megakaryocytes are present.  A subset of lymphocytes have a morphology consistent with hematogones.    IRON STAINS:   Dacie iron stain on concentrate smears:    Iron stains show 3% sideroblasts.  Ringed sideroblasts number 1% of erythroblasts.    Prussian blue stain on particle crush:    Marrow particles are rare to absent.    TREPHINE SECTIONS:  Hematoxylin and eosin stains are reviewed. The trephine core biopsy is subcortical, but evaluable.. The quality of the trephine core biopsy is adequate. The  marrow cellularity is estimated at 20-30%. The cellular composition reflects the touch imprints differential. The number of megakaryocytes appears markedly increased for the degree of marrow cellularity, with many dysplastic forms including small forms. There are no aggregates or sheets of blasts identified.    IMMUNOHISTOCHEMISTRY:  Immunohistochemical stains are performed on the paraffin-embedded trephine core with appropriate controls.  CD3 highlights T cells  CD20 highlights B-cells including aggregate of atypica  CD5 highlights T cells and aberrant staining of neoplastic B-cells  CD61 highlights increased megakaryocytes including many dysplastic forms  CD34 highlight delicate vasculature and scattered blasts.    Note: These immunohistochemical stains are deemed medically necessary. Some of the antigens may also be evaluated by flow cytometry. Concurrent evaluation by immunohistochemistry on clot and/or trephine sections is indicated in this case in order to correlate immunophenotype with cell morphology and determine extent of involvement, spatial pattern, and focality of potential disease distribution.         Gross Description      Procedure/Gross Description   Aspirate(s) and trephine(s) procured by PILRA Johnston NP    Specimen sent for Special Studies:         Flow Cytometry: left aspirate        Cytogenetics: left aspirate        Molecular Diagnostics: left aspirate        Biopsy aspiration site: left posterior iliac crest                                                      (Reference Range)          Amount of aspirate           3.4   mL        Fat and P.V. cell layer        2    %               (1 - 3)        Particles                             trace   %        Myeloid-erythroid layer    3    %               (5 - 8)          Clot Section: no    Trephine biopsy site: left posterior iliac crest    Designated left posterior iliac crest is 1 cylinder of gritty tissue, labeled with the patient's name and  hospital number, obtained with 11 gauge needle and a length of 16 mm; entirely submitted in 1 cassette; acetic zinc formalin fixed, decalcified, processed, and stained for hematoxylin and eosin per laboratory protocol.        MCRS Yes (A)    Performing Labs      The technical component of this testing was completed at [facility].     Stain controls for all stains resulted within this report have been reviewed and show appropriate reactivity.          Chest X-Ray - 2 view     Results for orders placed during the hospital encounter of 07/25/24    XR CHEST 2 VIEWS    Status: Normal 7/25/2024    Narrative  EXAM: XR CHEST 2 VIEWS  7/25/2024 2:02 PM    HISTORY: Preop examination; Personal history of diseases of blood and  blood-forming organs; Screening for viral disease; MDS  (myelodysplastic syndrome) (H)    COMPARISON: Chest radiograph 3/15/2024    FINDINGS: Two views of the chest. Trachea is midline. Cardiac  silhouette is not enlarged. The vasculature is within normal limits.  No focal airspace consolidations bilaterally. Costophrenic angles are  clear. No appreciable pneumothorax. Mild degenerative changes with  vertebral endplate spurring in the spine.    Impression  IMPRESSION: Negative chest.    I have personally reviewed the examination and initial interpretation  and I agree with the findings.    HALLIE PEREZ MD      SYSTEM ID:  [id number]      Chest CT without Contrast     Results for orders placed during the hospital encounter of 07/25/24    CT CHEST W/O CONTRAST    Status: Normal 7/25/2024    Narrative  CT chest without contrast    INDICATION: Preoperative. Myelodysplastic syndrome.    COMPARISON: PET/CT to/9/24    FINDINGS: No contrast. Calcified nodularity in both thyroid lobes.  Left common carotid artery originates off the base of the right  brachiocephalic artery as an anatomical variant. Heart size normal.  Thoracic aorta and  main pulmonary arteries are normal in caliber.  Coronary calcifications. Aortic valve leaflet calcifications, please  correlate for aortic valve stenosis, consider echocardiogram if not  recently performed. Mild mitral annular calcifications as well.  Diverticular disease of the large bowel in the upper abdomen. Mild  fatty atrophy of the pancreatic parenchyma. Left adrenal rim calcified  hypodense nodularity near the posterior margin of the adrenal (2/69)  unchanged from previous PET CT. This appears remote from the tortuous  splenic artery. Apparent left nephrectomy.  No enlarged lymph nodes in the chest.    Bone detail shows degenerative spurring throughout the thoracic spine.  No suspicious sclerotic or lytic/destructive lesion.    Detail of the lungs shows 2 mm right middle lobe medial noncalcified  nodule (4/177). Calcified right middle lobe granuloma and lower lobe  granuloma. Calcified left upper lobe granuloma. Major airways appear  patent. No consolidation.    IMPRESSION granulomatous disease. Incidental 2 mm right middle lobe  noncalcified nodule almost certainly benign. Follow-up in one year  could be considered if the patient's high risk for lung cancer.  Thyroid partially calcified nodules. Coronary artery calcifications  from atherosclerosis. Aortic valve leaflet calcifications and mitral  annular calcifications. Please correlate for respective valvular  disorders, consider echocardiogram. Diverticulosis coli. Rim calcified  mass in the region of the posterior aspect of the left adrenal gland  unchanged in patient with left nephrectomy, this likely represents  some dystrophic calcification.    HALLIE PEREZ MD      SYSTEM ID:  [id number]      PFTs     FVC%  Recent Labs   Lab Test 07/23/24  1001   [id number] 108       FEV1%  Recent Labs   Lab Test 07/23/24  1001   [id number] 115       DLCO%  Recent Labs   Lab Test 07/23/24  1001   [id number] 110       EKG     ECG results from 07/22/24   EKG 12-lead, tracing only  (Same Day)     Value    Systolic Blood Pressure     Diastolic Blood Pressure     Ventricular Rate 75    Atrial Rate 75    LA Interval 152    QRS Duration 100        QTc 460    P Axis 3    R AXIS -18    T Axis 37    Interpretation ECG      Sinus rhythm  Inferior infarct , age undetermined  Abnormal ECG  No previous ECGs available  Confirmed by MD VEDA, MARCOS ([id number]) on 2024 6:48:34 PM         ECHOCARDIOGRAM     Results for orders placed during the hospital encounter of 24    ECHOCARDIOGRAM COMPLETE    Status: Normal 2024    [facility]  [id number]  [id number]  [id number]  [id number]^[email or web address]^ELIESER    [facility],[place]  Echocardiography Laboratory  [address] [place] [address]    Name: VAMSI SOLANO  MRN: [id number]  : 1950  Study Date: 2024 10:49 AM  Age: 73 yrs  Gender: Male  Patient Location: [facility]  Reason For Study: Preop examination  Ordering Physician: ELIESER MACKENZIE  Referring Physician: ELIESER MACKENZIE  Performed By: Tito Ndiaye RDCS    BSA: 2.4 m2  Height: 73 in  Weight: 251 lb  HR: 70  ______________________________________________________________________________  Procedure  Echocardiogram with two-dimensional, color and spectral Doppler performed.  ______________________________________________________________________________  Interpretation Summary  Left ventricular size, wall motion and function are normal. The ejection  fraction is 60-65%.  Right ventricular function, chamber size, wall motion, and thickness are  normal.  Moderate aortic stenosis (PV 3.2m/s, MG 21mmHg).  The inferior vena cava is normal.  No pericardial effusion.  There is no prior study for direct comparison.  ______________________________________________________________________________  Left Ventricle  Left ventricular size, wall motion and function are normal. The ejection  fraction is 60-65%. Left ventricular diastolic function is indeterminate.  Diastolic  Doppler findings (E/E' ratio and/or other parameters) suggest left  ventricular filling pressures are normal.    Right Ventricle  Right ventricular function, chamber size, wall motion, and thickness are  normal.    Atria  The right atria appears normal. Moderate left atrial enlargement is present.    Mitral Valve  The mitral valve is normal.    Aortic Valve  Moderate aortic stenosis is present. The mean gradient across the aortic valve  is 21 mmHg. The peak aortic velocity is 3.2 m/sec. The aortic valve area is  1.5 cm^2, by the continuity equation.    Tricuspid Valve  The tricuspid valve is normal. The peak velocity of the tricuspid regurgitant  jet is not obtainable. Pulmonary artery systolic pressure cannot be assessed.    Pulmonic Valve  The pulmonic valve is normal.    Vessels  Normal diameter aortic root and proximal ascending aorta. The inferior vena  cava is normal.    Pericardium  No pericardial effusion is present.    Compared to Previous Study  There is no prior study for direct comparison.  ______________________________________________________________________________  MMode/2D Measurements & Calculations    IVSd: 1.1 cm  LVIDd: 5.1 cm  LVIDs: 3.2 cm  LVPWd: 0.96 cm  FS: 37.3 %  LV mass(C)d: 191.4 grams  LV mass(C)dI: 80.8 grams/m2  Ao root diam: 3.6 cm  asc Aorta Diam: 3.7 cm  LVOT diam: 2.4 cm  LVOT area: 4.6 cm2  Ao root diam index Ht(cm/m): 1.9  Ao root diam index BSA (cm/m2): 1.5  Asc Ao diam index BSA (cm/m2): 1.6  Asc Ao diam index Ht(cm/m): 2.0  LA Volume (BP): 101.0 ml    LA Volume Index (BP): 42.6 ml/m2  RWT: 0.38    Doppler Measurements & Calculations  MV E max harpreet: 90.1 cm/sec  MV A max harpreet: 127.3 cm/sec  MV E/A: 0.71  MV dec slope: 376.5 cm/sec2  MV dec time: 0.24 sec  Ao V2 max: 323.7 cm/sec  Ao max P.9 mmHg  Ao V2 mean: 214.1 cm/sec  Ao mean P.0 mmHg  Ao V2 VTI: 67.1 cm  BRENT(I,D): 1.5 cm2  BRENT(V,D): 1.4 cm2  LV V1 max PG: 3.8 mmHg  LV V1 max: 96.9 cm/sec  LV V1 VTI: 21.9  cm  SV(LVOT): 101.9 ml  SI(LVOT): 43.0 ml/m2  PA acc time: 0.12 sec  AV Lauri Ratio (DI): 0.30  BRENT Index (cm2/m2): 0.64  E/E' av.5  Lateral E/e': 11.8    Medial E/e': 13.1    ______________________________________________________________________________  Report approved by: Shanell Rosado 2024 01:32 PM

## 2024-07-26 NOTE — NURSING NOTE
"Oncology Rooming Note    July 26, 2024 3:10 PM   Arpit Forrest is a 73 year old male who presents for:    Chief Complaint   Patient presents with    Oncology Clinic Visit     Myelodysplastic syndrome       Initial Vitals: /86 (BP Location: Right arm, Patient Position: Sitting, Cuff Size: Adult Large)   Pulse 74   Temp 97.9  F (36.6  C) (Oral)   Resp 14   Wt 117 kg (258 lb)   SpO2 99%   BMI 33.65 kg/m   Estimated body mass index is 33.65 kg/m  as calculated from the following:    Height as of 6/7/24: 1.865 m (6' 1.43\").    Weight as of this encounter: 117 kg (258 lb). Body surface area is 2.46 meters squared.  No Pain (0) Comment: Data Unavailable   No LMP for male patient.  Allergies reviewed: Yes  Medications reviewed: Yes    Medications: Medication refills not needed today.  Pharmacy name entered into [employer]: [id number] [place] [facility] [address] AT [facility] [address]    Frailty Screening:   Is the patient here for a new oncology consult visit in cancer care? 2. No      Clinical concerns: Patient states no new concerns to discuss with provider.  Dr. Fowler was NOT notified.      Donald Quintero EMT            "

## 2024-07-26 NOTE — PROGRESS NOTES
BMT/Cell Therapy Note  Jul 26, 2024     Referring provider: Dr. 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  August 2023:  Collapsed in the bathroom while on a trip to [facility]. Had high fevers and rigors, treated with doxycycline.   11/30/2023: Seen by Rheumatology in the [place]. He had acute right sacroilitis. Ferritin was elevated to 1600, no hemachromatosis.   12/3/2023 to 12/9/2023: Hospitalized with high fevers and new onset mild pancytopenias. CT C/A/P did not show any lymphadenopathy or splenomegaly.  Infectious cultures were negative. 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 He was treated with anakinra, solumedrol and then started on oral prednisone.   12/7/2023: Bone marrow biopsy showed hypercellular marrow, no dysplasia, 34% ring sideroblasts, increased reticuloendothelial iron stores, no increase in blasts. Flow showed a CD5 positive B-cell infiltrate (5% of the marrow cellularity), consistent with monoclonal B-cell lymphocytosis. Cytogenetics showed a normal male karyotype. Myeloid malignancy panel did not show an SF3B1 mutation. However, there were mutations in SRSF2, DNMT3A, RUNX1, IDH2 p.[id number] , BCOR, BCORL1, and TET2. Diagnosed with CCUS.   1/31/2024 to 2/6/24: Hospitalized with fevers to  104 despite anti-IL1 therapy. Anakinra was increased, then switched to canakinumab. PET-Ct on 2/9/24 did not show any malignancy. Diagnosed with subclinical hyperthyroidism treated with methimazole starting 2/14/2024.  3/15/2024 to 3/25/2024: Hospitalized for neutropenic fevers. He also had an episode of atrial fibrillation with rapid ventricular response placed on AC with LMWH. Methimazole was held due to risk of agranulocytosis.  3/18/2024: Bone marrow biopsy showed Myelodysplastic syndrome, with multilineage dysplasia. 2% bone marrow blasts, and 3% circulating blasts.18% ringed sideroblasts, slight reticulin fibrosis. Concurrent CBC showed WBC 1.7, ANC 0.5, Hb 8.7, platelet count 43. Normal cytogenetics. NGS could not be sent from the bone marrow. NGS peripheral blood (4/18/24) showed mutations in BCOR (56%), BCORL1 (11%), DNMT3A (34%), IDH2 (9%), SRSF2 (33%), STAG2 (9%), TET2 (2%), two RUNX1 mutations. IPSS-M: very high risk 1.98. Scattered non-caseating granulomas identified on the bone marrow biopsy and clot sections, negative for microorganisms. Also seen on flow was CD5-positive lambda monotypic B cells (4.3%).     3/21/2024: Cycle 1 decitabine 20 mg/m2 for 3 days only.  4/22/2024: Cycle 2 decitabine 20 mg/m2 for 5 days   5/21/2024: Cycle 3 decitabine   6/19/24: Cycle 4    7/23/24: Pre transplant BMBx showed persistent MDS. Normocellular marrow (20-30%) with increase dysplastic megakaryocytes, no bone marrow blasts, and also rare circulating blasts. Compared to the previous bone marrow, this bone marrow is less cellular, only 1% ring sideroblasts and less circulating blasts (1% current vs 3% previous). Flow did not show any myeloid blasts, CD5 positive lambda monotypic cells (10%). Cytogenetics and NGS pending.        History     History of Present Illness/ Summary  Arpit Forrest is a 73 year old male. Here with his wife. He has completed 4 cycles of decitabine and overall feels well. No recent infectious  symptoms. He has been walking on the treadmill and will go to his grandson's recital today.     Review of Systems: Negative except as mentioned above    Past Medical History    A.fib with RVR while admitted with febrile episode on 3/15/24 s/p cardioversion. He was on amiodarone and anticoagulation till 4/15/24, at which point it was discontinued.   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   Dyslipidemia  HTN  VINCENT  Gout   Exposure to agent orange     Past Surgical History    Hx of kidney donation to sister  Vasectomy     Family History    Mother  at age 99. She had a history of hypertension.    Father  at age 94, had some strokes after age 90.  He has 2 daughters, 1 who is 45 years old and 1 who is 47 years old.  His daughters are healthy.    He has 2 full sisters who were born in  and  who are currently doing well.  One of his sisters (Constance) history of complex kidney disease for many years and has undergone 2 kidney transplant.  No history of hematologic malignancies    Social History   He served actively for 12 years in the [employer], including deployments to [place] and [place] during the , where he reported extensive contact with Agent Orange due to the nature of his duties. Following this, he was stationed in [place] at a facility ([facility]) which was later closed due to the extensive dumping of hazardous substances by local industries   Social History     Tobacco Use    Smoking status: Former     Current packs/day: 0.00     Average packs/day: 0.5 packs/day for 4.0 years (2.0 ttl pk-yrs)     Types: Cigarettes     Start date:      Quit date: 1970     Years since quittin.6    Smokeless tobacco: Never     Medications   Current Outpatient Medications   Medication Sig Dispense Refill    acetaminophen (TYLENOL) 500 MG tablet Take 1,000 mg by mouth 2 times daily      acyclovir (ZOVIRAX) 400 MG tablet  TAKE 1 TABLET(400 MG) BY MOUTH TWICE DAILY FOR PROPHYLAXIS      allopurinol (ZYLOPRIM) 100 MG tablet Take 300 mg by mouth      amLODIPine (NORVASC) 10 MG tablet Take 1 tablet by mouth every morning      atorvastatin (LIPITOR) 10 MG tablet Take 1 tablet by mouth every morning      brimonidine (ALPHAGAN) 0.2 % ophthalmic solution Place 1 drop into both eyes 2 times daily      Calcium Carbonate Antacid 600 MG CHEW Take 1 chew tab by mouth 2 times daily 600 mg      coenzyme Q-10 capsule Take 1 capsule by mouth daily      fluticasone (FLONASE) 50 MCG/ACT nasal spray Spray 1 spray into both nostrils 2 times daily      latanoprost (XALATAN) 0.005 % ophthalmic solution INSTILL 2 DROPS IN BOTH EYES AT BEDTIME      levofloxacin (LEVAQUIN) 500 MG tablet Take 500 mg by mouth daily      Lutein-Zeaxanthin 25-5 MG CAPS Take 1 capsule by mouth daily      metFORMIN (GLUCOPHAGE XR) 500 MG 24 hr tablet Take 500 mg every morning and 1,000 mg every evening      RABEprazole (ACIPHEX) 20 MG EC tablet Take 1 tablet by mouth daily      Vitamin D3 (CHOLECALCIFEROL) 25 mcg (1000 units) tablet Take 3 tablets by mouth daily 3000 international unit(s)      voriconazole (VFEND) 200 MG tablet Take 200 mg by mouth 2 times daily       Allergies    Allergies   Allergen Reactions    Gramineae Pollens Other (See Comments)     Seasonal sneezing, runny nose.         Physical Exam     Vital Signs: /86 (BP Location: Right arm, Patient Position: Sitting, Cuff Size: Adult Large)   Pulse 74   Temp 97.9  F (36.6  C) (Oral)   Resp 14   Wt 117 kg (258 lb)   SpO2 99%   BMI 33.65 kg/m    Wt Readings from Last 4 Encounters:   07/26/24 117 kg (258 lb)   07/23/24 115.3 kg (254 lb 1.6 oz)   07/22/24 116.6 kg (257 lb)   06/07/24 114.1 kg (251 lb 8 oz)     KPS: 80% Can perform normal activity with effort, some signs of disease    General: Alert, no distress, obese   Eyes: Conjunctiva normal  Respiratory: Normal respiratory effort.  Cardiovascular: Normal  perfusion, no significant edema.  Abdomen: No distention.    BMT Fried Frailty          7/23/2024    10:50 6/7/2024    09:03   Fried Frailty   Lost>10 pounds unintenionally last year N N   Exhaustion Score 0 0   Slowness Score 0 0   Weakness/ Strength Score 0 0   Low Activity Level Score 0 0   Final Score Not Frail Not Frail   Final Score Number 0 0   Sit Stand Assessment   Patient able to perform 5 chair stands Y Y   Chair Stands in seconds 11 11   Patient is able to perform stand with Feet Side by Side? Y Y   First attempt (in seconds): 10 10   Patient is able to perform Semi-Tandem Stand? Y Y   First attemp (in seconds): 10 10   Patient is able to perform Tandem Stand? Y Y   First attemp (in seconds): 10 10        Labs, Pathology and Imaging     IMAGING  MRI Abdomen 11/30/2023  Normal hepatic morphology. Mild diffuse hepatic parenchymal signal loss on opposed phase images. No concerning focal hepatic lesion. No intra-extra hepatic biliary ductal dilatation.     Hepatic fat fraction measures 10.5% by DANTE and 12.0% by segmentation. This is consistent with mild hepatic steatosis.     R2* measures 50.0 1/s by DANTE and 60.4 1/s by segmentation. This is consistent with borderline iron deposition.   Left nephrectomy. Right renal cysts. No hydronephrosis. 2.2 cm left adrenal nodule demonstrates homogeneous signal loss on opposed phase images and also contains internal macroscopic fat. Negative right adrenal gland. Normal appearance of the pancreas and gallbladder. Visualized bowel is normal in caliber. Colonic diverticulosis. No lymphadenopathy. No ascites. No concerning osseous lesion.     IMPRESSION:    1. Mild hepatic steatosis and borderline abnormal iron deposition. No concerning focal liver lesion.   2. Left adrenal myelolipoma.     Assessment and Plan     Arpit Forrest is a 73 year old male with high risk MDS , currently -8     BMT/IEC PROTOCOL for MDS  - Chemo protocol: [id number]  D-6: Flu 30mg/m2, Cy  50mg/kg  D-5 to D-2: Flu  D-1: TBI  D0: stem cell infusion  - Peripheral blood stem cell graft from 8/8 donor, ABO matched, Cell dose: TBD  - Engraftment monitoring: Peripheral blood and bone marrow chimerisms on D28, D100, 6 months, 1 and 2 year  - Restaging plan: BMBx with  NGS on D28, D100, 6 months, 1 and 2 years    HEME/COAG  - GCSF starts day +5, continue until ANC > 1500 for 3 consecutive days.   - Transfusion parameters: hemoglobin <7, platelets <10    RISK OF GVHD  - Prophylaxis: PT Cy 50mg/kg on D+3 and D+4; MMF (D+5 to 35); Sirolimus (starting D+5, target level 5-10).    - Acute GVHD Treatment: None to date  - Chronic GVHD Treatment: None to date    ID  Multiple granulomas in th lung and bone marrow biopsy  - Please send fungal studies (fungitell, aspergillus galactomanan, urine histoplasma and fungal antibodies) on admission, ordered  - ID consult on admission to determine anti-fungal ppx during transplant (azole vs micafungin) given hx of travel and exposure to endemic fungi    Prophylaxis plan:   - Bacterial: Levaquin 250mg from D-1 till engraftment (cefpodoxime if allergic)   - Fungal: as above   - PJP: Bactrim or Atovaquone or Pentamidine from D28. Toxoplasma igG negative   - Viral: Acyclovir     Monitoring:   - CMV every week    - EBV every 2 weeks from D30 to D180    GI/NUTRITION  # Hepatic steatosis and boderline iron deposition in liver  - LFTs and synthetic function normal   - Monitor for VOD  - VOD prophy: Ursodiol 300mg TID     # Supportive   - Anti-emetics:  per protocol   - Ulcer prophy: PPI daily   - Dietician support to prevent malnutrition.    CARDIOVASCULAR  # Hx of A.fib with RVR   - A.fib with RVR while admitted with febrile episode on 3/15/24. Noted on his fitbit, not very symptomatic. Cardioversion planned for new onset A.fib. Not achieved with amiodarone, DCCV on 3/21/24 restored normal sinus rhythm. Amiodarone and anticoagulation was stopped in April 2024 without any recurrence.    - He could have a recurrence during transplant which would need to be addressed with rate control strategy     # Asymptomatic moderate aortic stenosis     # CAD  - Chest CT in February 2024 with mild to moderate CAC, three-vessel disease. No anginal symptoms   - Continue atorvastatin 10mg     # HTN  - Continue amlodipine     - Risk of cardiomyopathy:  Baseline EF normal   - Risk of arrhythmia: Baseline EKG showed NSR, Qtc 460    RESPIRATORY  # VINCENT : CPAP  - Baseline PFTs: normal   - Risk of respiratory complications: Frequent ambulation and incentive spirometer.    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 Start sliding scale     MUSCULOSKELETAL/FRAILTY  # Gout: Continue allopurinol indefinitely     - Baseline Frailty Score: Not frail   - Daily PT/OT as needed while inpatient    SYMPTOM MANAGEMENT  - Pain management: none    SOCIAL DETERMINANTS  - Caregiver: Wife, Juduth, children   - Financial/insurance concerns: none    BMT and Cell Therapy Informed Consent Discussion     In today's visit, we discussed in detail the research for which Arpit Forrest is eligible. We discussed the potential risks and potential benefits of each protocol individually. We explained potential alternatives to the protocols discussed. We explained to the patient that participation is voluntary and that consent may be withdrawn at any time.     The patient completed the last round of treatment on 6/23/24.    HCT-CI score: 5 Arrhythmia: Afib/flutter, sick sinus syndrome, or ventricular arrhythmias requiring treatment (+1), Valvular disease (except mitral valve prolapse) (+3), and Treated w/insulin or oral hypoglycemics (+1). We counseled the patient about the impact of this on the risk of  treatment related and overall mortality. The score fit within treatment protocol eligibility criteria.    KPS: 80% Can perform normal activity with effort, some signs of disease  ECOG (required for CAR-T, see KPS to ECOG conversion chart):     Active infections:  none .    Prior infections that require additional special prophylaxis considerations: see above, granulomas in lung.  I reviewed and discussed infectious disease evaluation with the patient and the management plan during treatment.    Reproductive status: What methods of birth control does the patient plan to use during the treatment period beginning with conditioning and ending with the discontinuation of immune suppression (indicate with an X all that apply):  __ The patient is confirmed to be sterile or post-menopausal  _x_ Sexual abstinence  _x_ Condoms  __ Implants  __ Injectables  __ Oral contraceptives  __ Intrauterine devices (IUD)  __ Other (describe)    The patient received appropriate reproductive counseling and agreed with the need for effective contraception during the treatment procedures.    Dental health suitable to proceed: Yes    After our detailed discussion above, the patient signed the following consents for treatment and protocols:  MT 2022-52      Known issues that I take into account for medical decisions, with salient changes to the plan considering these complexities noted above.    Patient Active Problem List   Diagnosis    Ankylosing spondylitis lumbar region (H)    Autoinflammatory syndrome (H)    Macdonald's esophagus without dysplasia    Bilateral sacroiliitis (H24)    Essential hypertension    H/O: gout    History of agent Orange exposure    History of kidney donation    Hyperlipidemia    Myelodysplastic syndrome (H)    Obstructive sleep apnea syndrome    Obesity (BMI 30-39.9)    Type 2 diabetes mellitus without complication, without long-term current use of insulin (H)    Glaucoma         I spent 90 minutes in the care of  this patient today, which included time necessary for preparation for the visit, obtaining history, ordering medications/tests/procedures as medically indicated, review of pertinent medical literature, counseling of the patient, communication of recommendations to the care team, and documentation time.      ____________________________________________________________________    BMT/Cell Therapy Workup Summary    Workup Nurse Coordinator: Blanca Sanchez   Primary BMT Physician: Nena Fowler  Closing BMT Physician (if different):   Date of Summary:  07/26/2024    Patient Demographics     Patient ID:  Arpit BETH White   Age:  73 year old   Sex:  male  Reason for Transplant: MDS  Protocol: MT 2022-52    Donor Characteristics     Self or Related or Unrelated Donor: URD  Donor Match: 10/10, DP non permissive  Donor Age: 20  Donor Sex: M  Donor ABO/Rh: O- (matched)  Donor CMV Serostatus: Negative (matched)    Donor-Specific Antibodies:  No lab results found.    Virtual Crossmatch:    Recent Labs   Lab Test 07/23/24  0955   RESVXMT1 No Interp   RESVXMB1 No Interp       Blood Counts     Recent Labs   Lab Test 07/23/24  0955 07/22/24  0957   WBC 4.0 4.3   HGB 12.2* 11.9*   HCT 38.9* 38.2*   * 413   NRBCMAN 0.0 0.0       Recent Labs   Lab Test 07/22/24  0957   ABORH O NEG       Chemistries     Basic Panel  Recent Labs   Lab Test 07/25/24  1309 07/25/24  0715 07/23/24  0955 07/22/24  0957   NA  --   --   --  142   POTASSIUM  --   --   --  4.1   CHLORIDE  --   --   --  108*   CO2  --   --   --  22   BUN  --   --   --  22.7   CR 1.16 1.21* 1.21*  1.19* 1.27*   GLC  --   --   --  127*        Calcium, Magnesium, Phosphorus  Recent Labs   Lab Test 07/22/24  0957   MC 9.9        LFTs  Recent Labs   Lab Test 07/22/24 0957   BILITOTAL <0.2   ALKPHOS 89   AST 19   ALT 14   ALBUMIN 4.5     Urine Studies     Recent Labs   Lab Test 07/22/24  0957   COLOR Yellow   APPEARANCE Clear   URINEGLC Negative   URINEBILI Negative    URINEKETONE Trace*   SG 1.028   UBLD Negative   URINEPH 5.0   PROTEIN 20*   UUROI Normal   NITRITE Negative   LEUKEST Negative   MUCUS Present*   RBCU 2   WBCU 2       Creatinine Clearance    Recent Labs   Lab Test 07/25/24  0715      .0      STD 80*   VOL 2,233   DUR 24.0   UCRR 88.7   UCR24 1.98       Infectious Disease Markers     [facility] IDM  Recent Labs   Lab Test 07/22/24  0957   TCRUZI Non-reactive       CMV  Recent Labs   Lab Test 07/22/24  0957   CMVIGG No detectable antibody.       EBV  Recent Labs   Lab Test 07/22/24  0957   EBVCAG Positive*       HSV 1/2  Recent Labs   Lab Test 07/22/24  0957   R4OEIDP 0.14   H1IGG No HSV-1 IgG antibodies detected.   N3EBTKT 0.10   H2IGG No HSV-2 IgG antibodies detected.       VZV  No lab results found.    HTLV  No lab results found.    Toxoplasma  Recent Labs   Lab Test 07/22/24  0957   TOXAM <3.0   TOXGONGIAB <3.0     Bone Marrow Biopsy     Results for orders placed or performed in visit on 07/23/24 (from the past 8760 hour(s))   Bone marrow biopsy   Result Value    Final Diagnosis      Bone marrow, posterior iliac crest, left decalcified trephine biopsy and touch imprint; left direct aspirate smear, and concentrated aspirate smear; and peripheral smear:  - Persistent myeloid neoplasm with the following features:  - Normocellular marrow (20-30%) with trilineage hematopoiesis, dysmegakaryopoiesis, slight dysgranulopoiesis, and no increase in blasts  - CD5-positive monoclonal B-cell population identified by flow cytometry, and morphology  - Peripheral blood showing slight normochromic, normocytic anemia; slight thrombocytosis, rare circulating blasts (1%)  - See comment      Comment      Concurrent flow cytometry ([id number]) shows CD5-positive lambda monotypic B cells (10%) and no definitive increase in myeloid blasts and no definitive abnormal myeloid blast population.  The bone marrow picture shows a normocellular marrow with  increased dysplastic megakaryocytes and also rare circulating blasts which is consistent with persistent myeloid neoplasm. Compared to the previous bone marrow, this bone marrow is less cellular, only 1% ring sideroblasts and less circulating blasts (1% current vs 3% previous). Concurrent ancillary studies are in progress and will be reported separately. Correlation with the results of ancillary tests and clinical findings is recommended.          Clinical Information      From Epic electronic medical record; 73-year-old male has MDS status post 4 cycles of decitabine and CD5 positive monoclonal B lymphocytosis (MBL), CLL type.  He is being considered for transplant.      Peripheral Hematologic Data      CBC WITH MANUAL DIFFERENTIAL (07/23/2024 10:12 AM CDT):     RESULT VALUE REF. RANGE UNITS   WBC Count   Hemoglobin    Hematocrit    Platelet Count    RBC Count   MCV  MCH   MCHC    RDW  4.0 (NORMAL)     12.2  ( L )      38.9  ( L )   456  ( H )   4.05  ( L )       96 (NORMAL)     30.1 (NORMAL)      31.4  ( L )      16.7  ( H ) 4.0-11.0  13.3-17.7  40.0-53.0  150-450  4.40-5.90    26.5-33.0  31.5-36.5  10.0-15.0 10e3/uL  g/dL  %  10e3/uL  10e6/uL  fL  pg  g/dL  %   % Neutrophils  % Lymphocytes  % Monocytes  % Eosinophils  % Basophils  % Metamyelocytes  % Myelocytes  % Promyelocytes  % Blasts  % Plasma Cells  % Other Cells  Absolute Neutrophils  Absolute Lymphocytes  Absolute Monocytes  Absolute Eosinophils  Absolute Basophils  Absolute Metamyelocytes  Absolute Myelocytes  Absolute Promyelocytes  Absolute Blasts  Absolute Plasma Cells  Absolute Other Cells  NRBCs per 100 WBC  Absolute NRBCs 61  30  2  4  3               2.4 (NORMAL)     1.2 (NORMAL)     0.1 (NORMAL)     0.2 (NORMAL)     0.1 (NORMAL)   ()   ()   ()       ()   ()   ()   ()      () N/A  N/A  N/A  N/A  N/A  N/A  N/A  N/A  N/A  N/A  N/A  1.6-8.3  0.8-5.3  0.0-1.3  0.0-0.7  0.0-0.2  <=0.0  <=0.0  <=0.0  <=0.0  <=0.0  <=0.0  <=0  <=0.0  %  %  %  %  %  %  %  %  %  %  %  10e3/uL  10e3/uL  10e3/uL  10e3/uL  10e3/uL  10e3/uL  10e3/uL  10e3/uL  10e3/uL  10e3/uL  10e3/uL  %  10e3/uL         Microscopic Description      PERIPHERAL BLOOD SMEAR MORPHOLOGY:  The red blood cells appear normochromic.  Poikilocytosis is minimal.  Polychromasia is not increased.  Rouleaux formation is not increased. The morphology of the platelets is normal. Many platelet clumps are seen. Rare blasts are seen on scanning (1%).    Bone marrow aspirates and trephine core biopsy touch imprints are reviewed.    BONE MARROW DIFFERENTIAL (500 cells on touch imprints)  Percent (%) Cell Population Reference Range (%)   3.0 Blasts  (0 - 1)   0.2 Neutrophil promyelocytes (2 - 4)   32.0 Neutrophils and precursors (54 - 63)   39.6 Erythroid precursors (18 - 24)   1.0 Monocytes (1 - 1.5)   3.6 Eosinophils (1 - 3))   0.2 Basophils (0 - 1))   19.8 Lymphocytes (8 - 12)   0.6 Plasma cells (0 - 1.5)     The aspirate smears are possibly hemodilute.    Neutrophil maturation is complete, with a decreased myeloid to erythroid ratio. There is a small subset of neutrophils with hypogranular cytoplasm. Erythroid maturation is complete, with a relative erythroid hyperplasia. Megakaryocytes are present.  A subset of lymphocytes have a morphology consistent with hematogones.    IRON STAINS:   Dacie iron stain on concentrate smears:    Iron stains show 3% sideroblasts.  Ringed sideroblasts number 1% of erythroblasts.    Prussian blue stain on particle crush:    Marrow particles are rare to absent.    TREPHINE SECTIONS:  Hematoxylin and eosin stains are reviewed. The trephine core biopsy is subcortical, but evaluable.. The quality of the trephine core biopsy is adequate. The marrow cellularity is estimated at 20-30%. The cellular composition reflects the touch imprints differential. The number of megakaryocytes appears markedly increased for the degree of marrow cellularity, with many dysplastic forms  including small forms. There are no aggregates or sheets of blasts identified.    IMMUNOHISTOCHEMISTRY:  Immunohistochemical stains are performed on the paraffin-embedded trephine core with appropriate controls.  CD3 highlights T cells  CD20 highlights B-cells including aggregate of atypica  CD5 highlights T cells and aberrant staining of neoplastic B-cells  CD61 highlights increased megakaryocytes including many dysplastic forms  CD34 highlight delicate vasculature and scattered blasts.    Note: These immunohistochemical stains are deemed medically necessary. Some of the antigens may also be evaluated by flow cytometry. Concurrent evaluation by immunohistochemistry on clot and/or trephine sections is indicated in this case in order to correlate immunophenotype with cell morphology and determine extent of involvement, spatial pattern, and focality of potential disease distribution.         Gross Description      Procedure/Gross Description   Aspirate(s) and trephine(s) procured by PILAR Johnston NP    Specimen sent for Special Studies:         Flow Cytometry: left aspirate        Cytogenetics: left aspirate        Molecular Diagnostics: left aspirate        Biopsy aspiration site: left posterior iliac crest                                                      (Reference Range)          Amount of aspirate           3.4   mL        Fat and P.V. cell layer        2    %               (1 - 3)        Particles                             trace   %        Myeloid-erythroid layer    3    %               (5 - 8)          Clot Section: no    Trephine biopsy site: left posterior iliac crest    Designated left posterior iliac crest is 1 cylinder of gritty tissue, labeled with the patient's name and hospital number, obtained with 11 gauge needle and a length of 16 mm; entirely submitted in 1 cassette; acetic zinc formalin fixed, decalcified, processed, and stained for hematoxylin and eosin per laboratory protocol.        MCRS Yes  (A)    Performing Labs      The technical component of this testing was completed at [facility].     Stain controls for all stains resulted within this report have been reviewed and show appropriate reactivity.          Chest X-Ray - 2 view     Results for orders placed during the hospital encounter of 07/25/24    XR CHEST 2 VIEWS    Status: Normal 7/25/2024    Narrative  EXAM: XR CHEST 2 VIEWS  7/25/2024 2:02 PM    HISTORY: Preop examination; Personal history of diseases of blood and  blood-forming organs; Screening for viral disease; MDS  (myelodysplastic syndrome) (H)    COMPARISON: Chest radiograph 3/15/2024    FINDINGS: Two views of the chest. Trachea is midline. Cardiac  silhouette is not enlarged. The vasculature is within normal limits.  No focal airspace consolidations bilaterally. Costophrenic angles are  clear. No appreciable pneumothorax. Mild degenerative changes with  vertebral endplate spurring in the spine.    Impression  IMPRESSION: Negative chest.    I have personally reviewed the examination and initial interpretation  and I agree with the findings.    HALLIE PEREZ MD      SYSTEM ID:  [id number]      Chest CT without Contrast     Results for orders placed during the hospital encounter of 07/25/24    CT CHEST W/O CONTRAST    Status: Normal 7/25/2024    Narrative  CT chest without contrast    INDICATION: Preoperative. Myelodysplastic syndrome.    COMPARISON: PET/CT to/9/24    FINDINGS: No contrast. Calcified nodularity in both thyroid lobes.  Left common carotid artery originates off the base of the right  brachiocephalic artery as an anatomical variant. Heart size normal.  Thoracic aorta and main pulmonary arteries are normal in caliber.  Coronary calcifications. Aortic valve leaflet calcifications, please  correlate for aortic valve stenosis, consider echocardiogram if not  recently performed. Mild mitral annular  calcifications as well.  Diverticular disease of the large bowel in the upper abdomen. Mild  fatty atrophy of the pancreatic parenchyma. Left adrenal rim calcified  hypodense nodularity near the posterior margin of the adrenal (2/69)  unchanged from previous PET CT. This appears remote from the tortuous  splenic artery. Apparent left nephrectomy.  No enlarged lymph nodes in the chest.    Bone detail shows degenerative spurring throughout the thoracic spine.  No suspicious sclerotic or lytic/destructive lesion.    Detail of the lungs shows 2 mm right middle lobe medial noncalcified  nodule (4/177). Calcified right middle lobe granuloma and lower lobe  granuloma. Calcified left upper lobe granuloma. Major airways appear  patent. No consolidation.    IMPRESSION granulomatous disease. Incidental 2 mm right middle lobe  noncalcified nodule almost certainly benign. Follow-up in one year  could be considered if the patient's high risk for lung cancer.  Thyroid partially calcified nodules. Coronary artery calcifications  from atherosclerosis. Aortic valve leaflet calcifications and mitral  annular calcifications. Please correlate for respective valvular  disorders, consider echocardiogram. Diverticulosis coli. Rim calcified  mass in the region of the posterior aspect of the left adrenal gland  unchanged in patient with left nephrectomy, this likely represents  some dystrophic calcification.    HALLIE PEREZ MD      SYSTEM ID:  [id number]      PFTs     FVC%  Recent Labs   Lab Test 07/23/24  1001   20003 108       FEV1%  Recent Labs   Lab Test 07/23/24  1001   20016 115       DLCO%  Recent Labs   Lab Test 07/23/24  1001   63677 110       EKG     ECG results from 07/22/24   EKG 12-lead, tracing only (Same Day)     Value    Systolic Blood Pressure     Diastolic Blood Pressure     Ventricular Rate 75    Atrial Rate 75    NJ Interval 152    QRS Duration 100        QTc 460    P Axis 3    R AXIS -18    T Axis 37     Interpretation ECG      Sinus rhythm  Inferior infarct , age undetermined  Abnormal ECG  No previous ECGs available  Confirmed by MD VEDA, MARCOS ([id number]) on 2024 6:48:34 PM         ECHOCARDIOGRAM     Results for orders placed during the hospital encounter of 24    ECHOCARDIOGRAM COMPLETE    Status: Normal 2024    [facility]  [id number]  [id number]  [id number]  [id number]^[email or web address]^ELIESER    [facility],[place]  Echocardiography Laboratory  [address] [place] [id number]    Name: VAMSI SOLANO  MRN: [id number]  : 1950  Study Date: 2024 10:49 AM  Age: 73 yrs  Gender: Male  Patient Location: [facility]  Reason For Study: Preop examination  Ordering Physician: ELIESER MACKENZIE  Referring Physician: ELIESER MACKENZIE  Performed By: Tito Ndiaye RDCS    BSA: 2.4 m2  Height: 73 in  Weight: 251 lb  HR: 70  ______________________________________________________________________________  Procedure  Echocardiogram with two-dimensional, color and spectral Doppler performed.  ______________________________________________________________________________  Interpretation Summary  Left ventricular size, wall motion and function are normal. The ejection  fraction is 60-65%.  Right ventricular function, chamber size, wall motion, and thickness are  normal.  Moderate aortic stenosis (PV 3.2m/s, MG 21mmHg).  The inferior vena cava is normal.  No pericardial effusion.  There is no prior study for direct comparison.  ______________________________________________________________________________  Left Ventricle  Left ventricular size, wall motion and function are normal. The ejection  fraction is 60-65%. Left ventricular diastolic function is indeterminate.  Diastolic Doppler findings (E/E' ratio and/or other parameters) suggest left  ventricular filling pressures are normal.    Right Ventricle  Right ventricular function, chamber size, wall motion, and thickness  are  normal.    Atria  The right atria appears normal. Moderate left atrial enlargement is present.    Mitral Valve  The mitral valve is normal.    Aortic Valve  Moderate aortic stenosis is present. The mean gradient across the aortic valve  is 21 mmHg. The peak aortic velocity is 3.2 m/sec. The aortic valve area is  1.5 cm^2, by the continuity equation.    Tricuspid Valve  The tricuspid valve is normal. The peak velocity of the tricuspid regurgitant  jet is not obtainable. Pulmonary artery systolic pressure cannot be assessed.    Pulmonic Valve  The pulmonic valve is normal.    Vessels  Normal diameter aortic root and proximal ascending aorta. The inferior vena  cava is normal.    Pericardium  No pericardial effusion is present.    Compared to Previous Study  There is no prior study for direct comparison.  ______________________________________________________________________________  MMode/2D Measurements & Calculations    IVSd: 1.1 cm  LVIDd: 5.1 cm  LVIDs: 3.2 cm  LVPWd: 0.96 cm  FS: 37.3 %  LV mass(C)d: 191.4 grams  LV mass(C)dI: 80.8 grams/m2  Ao root diam: 3.6 cm  asc Aorta Diam: 3.7 cm  LVOT diam: 2.4 cm  LVOT area: 4.6 cm2  Ao root diam index Ht(cm/m): 1.9  Ao root diam index BSA (cm/m2): 1.5  Asc Ao diam index BSA (cm/m2): 1.6  Asc Ao diam index Ht(cm/m): 2.0  LA Volume (BP): 101.0 ml    LA Volume Index (BP): 42.6 ml/m2  RWT: 0.38    Doppler Measurements & Calculations  MV E max lauri: 90.1 cm/sec  MV A max lauri: 127.3 cm/sec  MV E/A: 0.71  MV dec slope: 376.5 cm/sec2  MV dec time: 0.24 sec  Ao V2 max: 323.7 cm/sec  Ao max P.9 mmHg  Ao V2 mean: 214.1 cm/sec  Ao mean P.0 mmHg  Ao V2 VTI: 67.1 cm  BRENT(I,D): 1.5 cm2  BRENT(V,D): 1.4 cm2  LV V1 max PG: 3.8 mmHg  LV V1 max: 96.9 cm/sec  LV V1 VTI: 21.9 cm  SV(LVOT): 101.9 ml  SI(LVOT): 43.0 ml/m2  PA acc time: 0.12 sec  AV Lauri Ratio (DI): 0.30  BRENT Index (cm2/m2): 0.64  E/E' av.5  Lateral E/e': 11.8    Medial E/e':  13.1    ______________________________________________________________________________  Report approved by: Shanell Rosado 07/22/2024 01:32 PM

## 2024-07-28 ENCOUNTER — HEALTH MAINTENANCE LETTER (OUTPATIENT)
Age: 74
End: 2024-07-28

## 2024-07-29 ENCOUNTER — MEDICAL CORRESPONDENCE (OUTPATIENT)
Dept: TRANSPLANT | Facility: CLINIC | Age: 74
End: 2024-07-29
Payer: MEDICARE

## 2024-07-29 DIAGNOSIS — D46.9 MDS (MYELODYSPLASTIC SYNDROME) (H): Primary | ICD-10-CM

## 2024-07-29 NOTE — PROGRESS NOTES
Department of Radiation Oncology  [place] [address]  [address] [place] [address]  Office: [phone]  Fax: [phone]    Radiation Oncology Clinic  [address] [place] [address]  Phone: [phone]  Fax: [phone]    Patient:  Arpit Forrest  MRN:   [id number]  :  1950    Radiation Oncology Consult Note  Date:  2024    Chief Complaint:    TBI for MDS    History of Present Illness:    Mr. Forrest is a 74yo man with history of gout who we area seeing for MDS in preparation for TBI.    His oncologic diagnosis came to light in  when he developed generalized malaise and gout. He was started on steroids. A month later while he was vacationing in [facility], he was acutely ill with fever up to 106, rigors and weakness. He was treated empirically for bacterial infection with doxycycline.    He was diagnosed with adult onset Still's disease. He followed up with Rheumatology () and was diagnosed with acute right sacroiliitis.     He was found to be pancytopenic and went on to develop recurrent fevers. Thus he was admitted (12-3-23). His cultures were negative and CT showed no abnormality. He was started on Prednisone and anti-IL1 therapy for autoimmune disease. Bone marrow biopsy (23) was significant for hypercellular marrow, no blasts, no dysplasia. Flow cytometry was significant for a monoclonal B cell infiltrate. He was able to be discharged home on 23.    He again developed fevers, so Anakinra (anti-IL1) was increased. He was subsequently switched to Canakinumab. He required multiple hospital admissions.    PET (24) did not show malignancy.    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 CBC showed persistent pancytopenia. 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 Patient has attributed his MDS to Agent Orange Exposure.    She was started on Decitabine (3-21-24). She has received 4 cycles, most recently in .  He has had some hair loss with chemotherapy.    He met with Dr. Fowler (7-26-24) who recommended allogenic stem cell transplant for long term survival benefit.     Today he reports that he is doing well overall. He naps about 45 min to an hour a day. He walks the treadmill 2-3 days per week.    REVIEW OF SYSTEMS:    General:  No fever/chills, no weight loss, + fatigue, no anorexia  HEENT:  No sore throat, no earache, no rhinitis, no vision changes  Cardiac:  No chest pain, no edema  Pulmonary:  No shortness of breath, no cough, no hemoptysis  Gastrointestinal:  No nausea, no vomiting, no diarrhea, no constipation, no melena, no dysphagia, no odynophagia, no fecal retention or incontinence  Genitourinary:  No hematuria, no dysuria, no urinary retention or incontinence  Skin:  No rash, no itch, no jaundice  Hematologic:  No palpable lymph nodes, no bruising or bleeding tendencies  Neurologic:  No headache, no dizziness, no numbness or tingling, no weakness, no saddle anesthesia  Musculoskeletal:  No arthralgias, no myalgias  Psychiatric:  No anxiety, no depression      Past Medical History:    A.fib with RVR while admitted with febrile episode on 3/15/24 s/p cardioversion. He was on amiodarone and anticoagulation till 4/15/24, at which point it was discontinued.   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  Vasectomy      History of Radiation: no      ICD: no  Crohn's disease or ulcerative colitis:no  Scleroderma:no        Medications:    Current Outpatient Medications   Medication Sig Dispense Refill    acetaminophen (TYLENOL) 500 MG tablet Take 1,000 mg by mouth 2 times daily      acyclovir  (ZOVIRAX) 400 MG tablet TAKE 1 TABLET(400 MG) BY MOUTH TWICE DAILY FOR PROPHYLAXIS      allopurinol (ZYLOPRIM) 100 MG tablet Take 300 mg by mouth      amLODIPine (NORVASC) 10 MG tablet Take 1 tablet by mouth every morning      atorvastatin (LIPITOR) 10 MG tablet Take 1 tablet by mouth every morning      brimonidine (ALPHAGAN) 0.2 % ophthalmic solution Place 1 drop into both eyes 2 times daily      Calcium Carbonate Antacid 600 MG CHEW Take 1 chew tab by mouth 2 times daily 600 mg      coenzyme Q-10 capsule Take 1 capsule by mouth daily      fluticasone (FLONASE) 50 MCG/ACT nasal spray Spray 1 spray into both nostrils 2 times daily      latanoprost (XALATAN) 0.005 % ophthalmic solution INSTILL 2 DROPS IN BOTH EYES AT BEDTIME      levofloxacin (LEVAQUIN) 500 MG tablet Take 500 mg by mouth daily      Lutein-Zeaxanthin 25-5 MG CAPS Take 1 capsule by mouth daily      metFORMIN (GLUCOPHAGE XR) 500 MG 24 hr tablet Take 500 mg every morning and 1,000 mg every evening      RABEprazole (ACIPHEX) 20 MG EC tablet Take 1 tablet by mouth daily      Vitamin D3 (CHOLECALCIFEROL) 25 mcg (1000 units) tablet Take 3 tablets by mouth daily 3000 international unit(s)      voriconazole (VFEND) 200 MG tablet Take 200 mg by mouth 2 times daily           Allergies:    Allergies   Allergen Reactions    Gramineae Pollens Other (See Comments)     Seasonal sneezing, runny nose.         Family History:    No malignancies      Social History:    Social History     Tobacco Use    Smoking status: Former     Current packs/day: 0.00     Average packs/day: 0.5 packs/day for 4.0 years (2.0 ttl pk-yrs)     Types: Cigarettes     Start date:      Quit date: 1970     Years since quittin.6    Smokeless tobacco: Never   Substance Use Topics    Alcohol use: Not Currently     Comment: 24 Quit 1 year ago   [employer] , was active in [place] and [place]      ECOG Performance Status: 1    PHYSICAL EXAMINATION:    BP (!) 148/72   Pulse 73   Wt 117.5  kg (259 lb)   SpO2 96%   BMI 33.78 kg/m    General:  Well-developed, well-appearing, NAD  HEENT:  NCAT, anicteric sclera, moist oral mucosa  Cardiac:  Strong peripheral pulses, no JVD, no peripheral edema  Pulmonary:  Breathing comfortably on room air, no stridor, no audible wheeze  Abdomen:  Nontender, nondistended, no palpable hepatosplenomegaly  Genitourinary:  No CVA tenderness, no hernandez catheter, KELTON/speculum exam deferred  Skin:  Pink, warm, no rash  Lymph Nodes:  No palpable lymphadenopathy  Neurologic:    CN:     grossly intact  Motor: Strength 5/5 in upper and lower extremities  Alert and oriented x 3  MSK:  normal muscular bulk and tone, no tender bones or joints  Psychiatric:  normal affect, normal attention      DATA REVIEWED:  IMAGING:          IMPRESSION:  Mr. Forrest is a 74yo [employer] who presents for discussion of TBI prior to BMT for treatment of very high risk MDS.     RECOMMENDATIONS:  Protocol MT 2022-52 was recommended by the BMT team. This regimen calls for Fludarabine Days -6 through -2 and TBI 200cGy in one fraction on day -1. The dose rate is 10-19-cGy/minute prescribed to the midplane of the patient at the level of the umbilicus via right and let lateral fields in the semirecumbent, semi-fetal position with arms at sides.    Aluminum compensators will be used to ensure the dose homogeneity across the fields is withi 10% of the prescribed dose. Usually, head, neck, leg and lung compensators are used. The lowest energy that gives >90% prescription to the isocenter will be used. A beam spoiler will be used to ensure full skin dose.    We discussed acute and late effects of radiation. Acute effects include dermatitis, hair loss, nausea, vomiting, low blood counts and fatigue. Late effects can include cataracts and secondary malignancy. Mr. Forrest understands the risks and would like to proceed.     Thank you for allowing us to participate in this patient's care.  Please feel free to call with  any questions or concerns.    The patient was seen by and discussed with attending physician Dr. Edwards who agrees with the above history, physical exam, assessment and plan.      Raine Figueroa MD PGY-5  Radiation Oncology, [facility]  [phone] [facility]    Physician Attestation   I, Belle Edwards, saw this patient and agree with the findings and plan of care as documented in the note.   I was present for key portions of the history and physical exam. Briefly, this is a 73-year-old man seen in consultation regarding total body irradiation as part of planned bone marrow transplant for high risk MDS.  We described to the 200 cGy x 1 regimen as part of protocol 2022-52.  Informed consent was obtained.  He proceeded with simulation for radiation planning.    We appreciate the opportunity to participate in Mr. Forrest's care. He was encouraged to contact me with questions or concerns should they arise.    Laboratory data and pathology as noted above was reviewed. I reviewed previous medical records, which are summarized in the HPI. A total of 60 minutes were spent (including face to face time) during this visit.     Belle Edwards MD MPH PhD    Department of Radiation Oncology

## 2024-07-30 ENCOUNTER — OFFICE VISIT (OUTPATIENT)
Dept: RADIATION ONCOLOGY | Facility: CLINIC | Age: 74
End: 2024-07-30
Attending: RADIOLOGY
Payer: MEDICARE

## 2024-07-30 ENCOUNTER — APPOINTMENT (OUTPATIENT)
Dept: RADIATION ONCOLOGY | Facility: CLINIC | Age: 74
DRG: 014 | End: 2024-07-30
Attending: RADIOLOGY
Payer: MEDICARE

## 2024-07-30 VITALS
DIASTOLIC BLOOD PRESSURE: 72 MMHG | OXYGEN SATURATION: 96 % | BODY MASS INDEX: 33.78 KG/M2 | HEART RATE: 73 BPM | WEIGHT: 259 LBS | SYSTOLIC BLOOD PRESSURE: 148 MMHG

## 2024-07-30 DIAGNOSIS — Z86.2 PERSONAL HISTORY OF DISEASES OF BLOOD AND BLOOD-FORMING ORGANS: ICD-10-CM

## 2024-07-30 DIAGNOSIS — D46.9 MDS (MYELODYSPLASTIC SYNDROME) (H): ICD-10-CM

## 2024-07-30 DIAGNOSIS — Z11.59 SCREENING FOR VIRAL DISEASE: ICD-10-CM

## 2024-07-30 DIAGNOSIS — Z01.818 PREOP EXAMINATION: ICD-10-CM

## 2024-07-30 PROCEDURE — 77290 THER RAD SIMULAJ FIELD CPLX: CPT | Mod: 26 | Performed by: RADIOLOGY

## 2024-07-30 PROCEDURE — 99205 OFFICE O/P NEW HI 60 MIN: CPT | Mod: 25 | Performed by: RADIOLOGY

## 2024-07-30 PROCEDURE — 77263 THER RADIOLOGY TX PLNG CPLX: CPT | Performed by: RADIOLOGY

## 2024-07-30 PROCEDURE — 77334 RADIATION TREATMENT AID(S): CPT | Mod: 26 | Performed by: RADIOLOGY

## 2024-07-30 PROCEDURE — 77290 THER RAD SIMULAJ FIELD CPLX: CPT | Performed by: RADIOLOGY

## 2024-07-30 PROCEDURE — 77321 SPECIAL TELETX PORT PLAN: CPT | Performed by: RADIOLOGY

## 2024-07-30 PROCEDURE — 77321 SPECIAL TELETX PORT PLAN: CPT | Mod: 26 | Performed by: RADIOLOGY

## 2024-07-30 PROCEDURE — 77334 RADIATION TREATMENT AID(S): CPT | Performed by: RADIOLOGY

## 2024-07-30 PROCEDURE — G0463 HOSPITAL OUTPT CLINIC VISIT: HCPCS | Mod: 25 | Performed by: RADIOLOGY

## 2024-07-30 NOTE — LETTER
2024         RE: Arpit Forrest  [address] [facility]  [place] [address]      Department of Radiation Oncology  [address] [place] [address]  Office: [phone]  Fax: [phone]    Radiation Oncology Clinic  [address] [place] [address]  Phone: [phone]  Fax: [phone]    Patient:  Arpit Forrest  MRN:   [id number]  :  1950    Radiation Oncology Consult Note  Date:  2024    Chief Complaint:    TBI for MDS    History of Present Illness:    Mr. Forrest is a 72yo man with history of gout who we area seeing for MDS in preparation for TBI.    His oncologic diagnosis came to light in  when he developed generalized malaise and gout. He was started on steroids. A month later while he was vacationing in [facility], he was acutely ill with fever up to 106, rigors and weakness. He was treated empirically for bacterial infection with doxycycline.    He was diagnosed with adult onset Still's disease. He followed up with Rheumatology () and was diagnosed with acute right sacroiliitis.     He was found to be pancytopenic and went on to develop recurrent fevers. Thus he was admitted (12-3-23). His cultures were negative and CT showed no abnormality. He was started on Prednisone and anti-IL1 therapy for autoimmune disease. Bone marrow biopsy (23) was significant for hypercellular marrow, no blasts, no dysplasia. Flow cytometry was significant for a monoclonal B cell infiltrate. He was able to be discharged home on 23.    He again developed fevers, so Anakinra (anti-IL1) was increased. He was subsequently switched to Canakinumab. He required multiple hospital admissions.    PET (24) did not show malignancy.    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 CBC showed persistent pancytopenia. 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 Patient has attributed his MDS to Agent Orange Exposure.    She  was started on Decitabine (3-21-24). She has received 4 cycles, most recently in June. He has had some hair loss with chemotherapy.    He met with Dr. Fowler (7-26-24) who recommended allogenic stem cell transplant for long term survival benefit.     Today he reports that he is doing well overall. He naps about 45 min to an hour a day. He walks the treadmill 2-3 days per week.    REVIEW OF SYSTEMS:    General:  No fever/chills, no weight loss, + fatigue, no anorexia  HEENT:  No sore throat, no earache, no rhinitis, no vision changes  Cardiac:  No chest pain, no edema  Pulmonary:  No shortness of breath, no cough, no hemoptysis  Gastrointestinal:  No nausea, no vomiting, no diarrhea, no constipation, no melena, no dysphagia, no odynophagia, no fecal retention or incontinence  Genitourinary:  No hematuria, no dysuria, no urinary retention or incontinence  Skin:  No rash, no itch, no jaundice  Hematologic:  No palpable lymph nodes, no bruising or bleeding tendencies  Neurologic:  No headache, no dizziness, no numbness or tingling, no weakness, no saddle anesthesia  Musculoskeletal:  No arthralgias, no myalgias  Psychiatric:  No anxiety, no depression      Past Medical History:    A.fib with RVR while admitted with febrile episode on 3/15/24 s/p cardioversion. He was on amiodarone and anticoagulation till 4/15/24, at which point it was discontinued.   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  Vasectomy      History of Radiation: no      ICD: no  Crohn's disease or ulcerative colitis:no  Scleroderma:no        Medications:    Current Outpatient Medications   Medication Sig Dispense Refill      acetaminophen (TYLENOL) 500 MG tablet Take 1,000 mg by mouth 2 times daily       acyclovir (ZOVIRAX) 400 MG tablet TAKE 1 TABLET(400 MG) BY MOUTH TWICE DAILY FOR PROPHYLAXIS       allopurinol (ZYLOPRIM) 100 MG tablet Take 300 mg by mouth       amLODIPine (NORVASC) 10 MG tablet Take 1 tablet by mouth every morning       atorvastatin (LIPITOR) 10 MG tablet Take 1 tablet by mouth every morning       brimonidine (ALPHAGAN) 0.2 % ophthalmic solution Place 1 drop into both eyes 2 times daily       Calcium Carbonate Antacid 600 MG CHEW Take 1 chew tab by mouth 2 times daily 600 mg       coenzyme Q-10 capsule Take 1 capsule by mouth daily       fluticasone (FLONASE) 50 MCG/ACT nasal spray Spray 1 spray into both nostrils 2 times daily       latanoprost (XALATAN) 0.005 % ophthalmic solution INSTILL 2 DROPS IN BOTH EYES AT BEDTIME       levofloxacin (LEVAQUIN) 500 MG tablet Take 500 mg by mouth daily       Lutein-Zeaxanthin 25-5 MG CAPS Take 1 capsule by mouth daily       metFORMIN (GLUCOPHAGE XR) 500 MG 24 hr tablet Take 500 mg every morning and 1,000 mg every evening       RABEprazole (ACIPHEX) 20 MG EC tablet Take 1 tablet by mouth daily       Vitamin D3 (CHOLECALCIFEROL) 25 mcg (1000 units) tablet Take 3 tablets by mouth daily 3000 international unit(s)       voriconazole (VFEND) 200 MG tablet Take 200 mg by mouth 2 times daily           Allergies:    Allergies   Allergen Reactions     Gramineae Pollens Other (See Comments)     Seasonal sneezing, runny nose.         Family History:    No malignancies      Social History:    Social History     Tobacco Use     Smoking status: Former     Current packs/day: 0.00     Average packs/day: 0.5 packs/day for 4.0 years (2.0 ttl pk-yrs)     Types: Cigarettes     Start date:      Quit date: 1970     Years since quittin.6     Smokeless tobacco: Never   Substance Use Topics     Alcohol use: Not Currently     Comment: 24 Quit 1 year ago   [employer] , was active in [place]  and [place]      ECOG Performance Status: 1    PHYSICAL EXAMINATION:    BP (!) 148/72   Pulse 73   Wt 117.5 kg (259 lb)   SpO2 96%   BMI 33.78 kg/m    General:  Well-developed, well-appearing, NAD  HEENT:  NCAT, anicteric sclera, moist oral mucosa  Cardiac:  Strong peripheral pulses, no JVD, no peripheral edema  Pulmonary:  Breathing comfortably on room air, no stridor, no audible wheeze  Abdomen:  Nontender, nondistended, no palpable hepatosplenomegaly  Genitourinary:  No CVA tenderness, no hernandez catheter, KELTON/speculum exam deferred  Skin:  Pink, warm, no rash  Lymph Nodes:  No palpable lymphadenopathy  Neurologic:    CN:     grossly intact  Motor: Strength 5/5 in upper and lower extremities  Alert and oriented x 3  MSK:  normal muscular bulk and tone, no tender bones or joints  Psychiatric:  normal affect, normal attention      DATA REVIEWED:  IMAGING:          IMPRESSION:  Mr. Forrest is a 72yo  who presents for discussion of TBI prior to BMT for treatment of very high risk MDS.     RECOMMENDATIONS:  Protocol MT 2022-52 was recommended by the BMT team. This regimen calls for Fludarabine Days -6 through -2 and TBI 200cGy in one fraction on day -1. The dose rate is 10-19-cGy/minute prescribed to the midplane of the patient at the level of the umbilicus via right and let lateral fields in the semirecumbent, semi-fetal position with arms at sides.    Aluminum compensators will be used to ensure the dose homogeneity across the fields is withi 10% of the prescribed dose. Usually, head, neck, leg and lung compensators are used. The lowest energy that gives >90% prescription to the isocenter will be used. A beam spoiler will be used to ensure full skin dose.    We discussed acute and late effects of radiation. Acute effects include dermatitis, hair loss, nausea, vomiting, low blood counts and fatigue. Late effects can include cataracts and secondary malignancy. Mr. Forrest understands the risks and would like to  "proceed.     Thank you for allowing us to participate in this patient's care.  Please feel free to call with any questions or concerns.    The patient was seen by and discussed with attending physician Dr. Edwards who agrees with the above history, physical exam, assessment and plan.      Raine Figueroa MD PGY-5  Radiation Oncology, [facility]  [phone] clinic    Physician Attestation   I, Belle Edwards, saw this patient and agree with the findings and plan of care as documented in the note.   I was present for key portions of the history and physical exam. Briefly, this is a 73-year-old man seen in consultation regarding total body irradiation as part of planned bone marrow transplant for high risk MDS.  We described to the 200 cGy x 1 regimen as part of protocol 2022-52.  Informed consent was obtained.  He proceeded with simulation for radiation planning.    We appreciate the opportunity to participate in Mr. Forrest's care. He was encouraged to contact me with questions or concerns should they arise.    Laboratory data and pathology as noted above was reviewed. I reviewed previous medical records, which are summarized in the HPI. A total of 60 minutes were spent (including face to face time) during this visit.     Belle Edawrds MD MPH PhD    Department of Radiation Oncology       Oncology Rooming Note    July 30, 2024 8:50 AM   Arpit Forrest is a 73 year old male who presents for:    Chief Complaint   Patient presents with     Cancer     Radiation consult     Initial Vitals: BP (!) 148/72   Pulse 73   Wt 117.5 kg (259 lb)   SpO2 96%   BMI 33.78 kg/m   Estimated body mass index is 33.78 kg/m  as calculated from the following:    Height as of 6/7/24: 1.865 m (6' 1.43\").    Weight as of this encounter: 117.5 kg (259 lb). Body surface area is 2.47 meters squared.  No Pain (0) Comment: Data Unavailable   No LMP for male patient.  Allergies reviewed: Yes  Medications " reviewed: Yes    Medications: Medication refills not needed today.  Pharmacy name entered into [employer]: [facility] #[id number] - [place], [place] - [address] AT [facility] OF [place] [address]    Frailty Screening:   Is the patient here for a new oncology consult visit in cancer care? 2. No      Clinical concerns:   Here for consultation  was notified.    Considerations for radiation treatment   Pregnancy status: Male   Implanted Cardiac Devices: No   Any previous radiation therapy: No      Elvira Koch RN                  Belle Edwards

## 2024-07-30 NOTE — PROGRESS NOTES
"Oncology Rooming Note    July 30, 2024 8:50 AM   Arpit Forrest is a 73 year old male who presents for:    Chief Complaint   Patient presents with    Cancer     Radiation consult     Initial Vitals: BP (!) 148/72   Pulse 73   Wt 117.5 kg (259 lb)   SpO2 96%   BMI 33.78 kg/m   Estimated body mass index is 33.78 kg/m  as calculated from the following:    Height as of 6/7/24: 1.865 m (6' 1.43\").    Weight as of this encounter: 117.5 kg (259 lb). Body surface area is 2.47 meters squared.  No Pain (0) Comment: Data Unavailable   No LMP for male patient.  Allergies reviewed: Yes  Medications reviewed: Yes    Medications: Medication refills not needed today.  Pharmacy name entered into [employer]: [id number] - [place] [facility], [place] - [address] AT [facility] [address] [place] [address]    Frailty Screening:   Is the patient here for a new oncology consult visit in cancer care? 2. No      Clinical concerns:   Here for consultation  was notified.    Considerations for radiation treatment   Pregnancy status: Male   Implanted Cardiac Devices: No   Any previous radiation therapy: No      Elvira Koch RN              "

## 2024-07-30 NOTE — LETTER
2024      Arpit Forrest  [address] [address]      Dear Colleague,    Thank you for referring your patient, Arpit Forrest, to the [facility] RADIATION ONCOLOGY. Please see a copy of my visit note below.    Department of Radiation Oncology  [address]  [address] [place] [address]  Office: [phone]  Fax: [phone]    Radiation Oncology Clinic  [address] [place] [address]  Phone: [phone]  Fax: [phone]    Patient:  Arpit Forrest  MRN:   [id number]  :  1950    Radiation Oncology Consult Note  Date:  2024    Chief Complaint:    TBI for MDS    History of Present Illness:    Mr. Forrest is a 74yo man with history of gout who we area seeing for MDS in preparation for TBI.    His oncologic diagnosis came to light in  when he developed generalized malaise and gout. He was started on steroids. A month later while he was vacationing in [facility], he was acutely ill with fever up to 106, rigors and weakness. He was treated empirically for bacterial infection with doxycycline.    He was diagnosed with adult onset Still's disease. He followed up with Rheumatology () and was diagnosed with acute right sacroiliitis.     He was found to be pancytopenic and went on to develop recurrent fevers. Thus he was admitted (12-3-23). His cultures were negative and CT showed no abnormality. He was started on Prednisone and anti-IL1 therapy for autoimmune disease. Bone marrow biopsy (23) was significant for hypercellular marrow, no blasts, no dysplasia. Flow cytometry was significant for a monoclonal B cell infiltrate. He was able to be discharged home on 23.    He again developed fevers, so Anakinra (anti-IL1) was increased. He was subsequently switched to Canakinumab. He required multiple hospital admissions.    PET (24) did not show malignancy.    BMB (3-18-24) showed MDS with multilineage dysplasia and 2%  blasts. CBC showed persistent pancytopenia. 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 Patient has attributed his MDS to Agent Orange Exposure.    She was started on Decitabine (3-21-24). She has received 4 cycles, most recently in June. He has had some hair loss with chemotherapy.    He met with Dr. Fowler (7-26-24) who recommended allogenic stem cell transplant for long term survival benefit.     Today he reports that he is doing well overall. He naps about 45 min to an hour a day. He walks the treadmill 2-3 days per week.    REVIEW OF SYSTEMS:    General:  No fever/chills, no weight loss, + fatigue, no anorexia  HEENT:  No sore throat, no earache, no rhinitis, no vision changes  Cardiac:  No chest pain, no edema  Pulmonary:  No shortness of breath, no cough, no hemoptysis  Gastrointestinal:  No nausea, no vomiting, no diarrhea, no constipation, no melena, no dysphagia, no odynophagia, no fecal retention or incontinence  Genitourinary:  No hematuria, no dysuria, no urinary retention or incontinence  Skin:  No rash, no itch, no jaundice  Hematologic:  No palpable lymph nodes, no bruising or bleeding tendencies  Neurologic:  No headache, no dizziness, no numbness or tingling, no weakness, no saddle anesthesia  Musculoskeletal:  No arthralgias, no myalgias  Psychiatric:  No anxiety, no depression      Past Medical History:    A.fib with RVR while admitted with febrile episode on 3/15/24 s/p cardioversion. He was on amiodarone and anticoagulation till 4/15/24, at which point it was discontinued.   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  Vasectomy      History of Radiation: no      ICD:  no  Crohn's disease or ulcerative colitis:no  Scleroderma:no        Medications:    Current Outpatient Medications   Medication Sig Dispense Refill     acetaminophen (TYLENOL) 500 MG tablet Take 1,000 mg by mouth 2 times daily       acyclovir (ZOVIRAX) 400 MG tablet TAKE 1 TABLET(400 MG) BY MOUTH TWICE DAILY FOR PROPHYLAXIS       allopurinol (ZYLOPRIM) 100 MG tablet Take 300 mg by mouth       amLODIPine (NORVASC) 10 MG tablet Take 1 tablet by mouth every morning       atorvastatin (LIPITOR) 10 MG tablet Take 1 tablet by mouth every morning       brimonidine (ALPHAGAN) 0.2 % ophthalmic solution Place 1 drop into both eyes 2 times daily       Calcium Carbonate Antacid 600 MG CHEW Take 1 chew tab by mouth 2 times daily 600 mg       coenzyme Q-10 capsule Take 1 capsule by mouth daily       fluticasone (FLONASE) 50 MCG/ACT nasal spray Spray 1 spray into both nostrils 2 times daily       latanoprost (XALATAN) 0.005 % ophthalmic solution INSTILL 2 DROPS IN BOTH EYES AT BEDTIME       levofloxacin (LEVAQUIN) 500 MG tablet Take 500 mg by mouth daily       Lutein-Zeaxanthin 25-5 MG CAPS Take 1 capsule by mouth daily       metFORMIN (GLUCOPHAGE XR) 500 MG 24 hr tablet Take 500 mg every morning and 1,000 mg every evening       RABEprazole (ACIPHEX) 20 MG EC tablet Take 1 tablet by mouth daily       Vitamin D3 (CHOLECALCIFEROL) 25 mcg (1000 units) tablet Take 3 tablets by mouth daily 3000 international unit(s)       voriconazole (VFEND) 200 MG tablet Take 200 mg by mouth 2 times daily           Allergies:    Allergies   Allergen Reactions     Gramineae Pollens Other (See Comments)     Seasonal sneezing, runny nose.         Family History:    No malignancies      Social History:    Social History     Tobacco Use     Smoking status: Former     Current packs/day: 0.00     Average packs/day: 0.5 packs/day for 4.0 years (2.0 ttl pk-yrs)     Types: Cigarettes     Start date:      Quit date: 1970     Years since quittin.6      Smokeless tobacco: Never   Substance Use Topics     Alcohol use: Not Currently     Comment: 7/30/24 Quit 1 year ago   [employer] , was active in [place]      ECOG Performance Status: 1    PHYSICAL EXAMINATION:    BP (!) 148/72   Pulse 73   Wt 117.5 kg (259 lb)   SpO2 96%   BMI 33.78 kg/m    General:  Well-developed, well-appearing, NAD  HEENT:  NCAT, anicteric sclera, moist oral mucosa  Cardiac:  Strong peripheral pulses, no JVD, no peripheral edema  Pulmonary:  Breathing comfortably on room air, no stridor, no audible wheeze  Abdomen:  Nontender, nondistended, no palpable hepatosplenomegaly  Genitourinary:  No CVA tenderness, no hernandez catheter, KELTON/speculum exam deferred  Skin:  Pink, warm, no rash  Lymph Nodes:  No palpable lymphadenopathy  Neurologic:    CN:     grossly intact  Motor: Strength 5/5 in upper and lower extremities  Alert and oriented x 3  MSK:  normal muscular bulk and tone, no tender bones or joints  Psychiatric:  normal affect, normal attention      DATA REVIEWED:  IMAGING:          IMPRESSION:  Mr. Forrest is a 72yo  who presents for discussion of TBI prior to BMT for treatment of very high risk MDS.     RECOMMENDATIONS:  Protocol MT 2022-52 was recommended by the BMT team. This regimen calls for Fludarabine Days -6 through -2 and TBI 200cGy in one fraction on day -1. The dose rate is 10-19-cGy/minute prescribed to the midplane of the patient at the level of the umbilicus via right and let lateral fields in the semirecumbent, semi-fetal position with arms at sides.    Aluminum compensators will be used to ensure the dose homogeneity across the fields is withi 10% of the prescribed dose. Usually, head, neck, leg and lung compensators are used. The lowest energy that gives >90% prescription to the isocenter will be used. A beam spoiler will be used to ensure full skin dose.    We discussed acute and late effects of radiation. Acute effects include dermatitis, hair loss, nausea,  "vomiting, low blood counts and fatigue. Late effects can include cataracts and secondary malignancy. Mr. Forrest understands the risks and would like to proceed.     Thank you for allowing us to participate in this patient's care.  Please feel free to call with any questions or concerns.    The patient was seen by and discussed with attending physician Dr. Edwards who agrees with the above history, physical exam, assessment and plan.      Raine Figueroa MD PGY-5  Radiation Oncology, [facility]  [phone] clinic    Physician Attestation   I, Belle Edwards, saw this patient and agree with the findings and plan of care as documented in the note.   I was present for key portions of the history and physical exam. Briefly, this is a 73-year-old man seen in consultation regarding total body irradiation as part of planned bone marrow transplant for high risk MDS.  We described to the 200 cGy x 1 regimen as part of protocol 2022-52.  Informed consent was obtained.  He proceeded with simulation for radiation planning.    We appreciate the opportunity to participate in Mr. Forrest's care. He was encouraged to contact me with questions or concerns should they arise.    Laboratory data and pathology as noted above was reviewed. I reviewed previous medical records, which are summarized in the HPI. A total of 60 minutes were spent (including face to face time) during this visit.     Belle Edwards MD MPH PhD    Department of Radiation Oncology       Oncology Rooming Note    July 30, 2024 8:50 AM   Arpit Forrest is a 73 year old male who presents for:    Chief Complaint   Patient presents with     Cancer     Radiation consult     Initial Vitals: BP (!) 148/72   Pulse 73   Wt 117.5 kg (259 lb)   SpO2 96%   BMI 33.78 kg/m   Estimated body mass index is 33.78 kg/m  as calculated from the following:    Height as of 6/7/24: 1.865 m (6' 1.43\").    Weight as of this encounter: 117.5 kg (259 " lb). Body surface area is 2.47 meters squared.  No Pain (0) Comment: Data Unavailable   No LMP for male patient.  Allergies reviewed: Yes  Medications reviewed: Yes    Medications: Medication refills not needed today.  Pharmacy name entered into [employer]: [id number] - [place], [place] - [address] AT [facility] OF [place] [address]    Frailty Screening:   Is the patient here for a new oncology consult visit in cancer care? 2. No      Clinical concerns:   Here for consultation  was notified.    Considerations for radiation treatment   Pregnancy status: Male   Implanted Cardiac Devices: No   Any previous radiation therapy: No      Elivra Koch RN                Again, thank you for allowing me to participate in the care of your patient.        Sincerely,        Belle Edwards

## 2024-07-31 ENCOUNTER — HOSPITAL ENCOUNTER (INPATIENT)
Facility: CLINIC | Age: 74
LOS: 25 days | Discharge: HOME | DRG: 014 | End: 2024-08-25
Attending: INTERNAL MEDICINE | Admitting: INTERNAL MEDICINE
Payer: MEDICARE

## 2024-07-31 ENCOUNTER — CARE COORDINATION (OUTPATIENT)
Dept: TRANSPLANT | Facility: CLINIC | Age: 74
End: 2024-07-31

## 2024-07-31 ENCOUNTER — APPOINTMENT (OUTPATIENT)
Dept: MEDSURG UNIT | Facility: CLINIC | Age: 74
DRG: 014 | End: 2024-07-31
Attending: INTERNAL MEDICINE
Payer: MEDICARE

## 2024-07-31 ENCOUNTER — APPOINTMENT (OUTPATIENT)
Dept: INTERVENTIONAL RADIOLOGY/VASCULAR | Facility: CLINIC | Age: 74
DRG: 014 | End: 2024-07-31
Attending: STUDENT IN AN ORGANIZED HEALTH CARE EDUCATION/TRAINING PROGRAM
Payer: MEDICARE

## 2024-07-31 DIAGNOSIS — T45.1X5A CINV (CHEMOTHERAPY-INDUCED NAUSEA AND VOMITING): ICD-10-CM

## 2024-07-31 DIAGNOSIS — I48.91 ATRIAL FIBRILLATION, UNSPECIFIED TYPE (H): ICD-10-CM

## 2024-07-31 DIAGNOSIS — E11.9 TYPE 2 DIABETES MELLITUS WITHOUT COMPLICATION, WITHOUT LONG-TERM CURRENT USE OF INSULIN (H): ICD-10-CM

## 2024-07-31 DIAGNOSIS — D46.9 MDS (MYELODYSPLASTIC SYNDROME) (H): ICD-10-CM

## 2024-07-31 DIAGNOSIS — I10 HYPERTENSION, UNSPECIFIED TYPE: ICD-10-CM

## 2024-07-31 DIAGNOSIS — R11.2 CINV (CHEMOTHERAPY-INDUCED NAUSEA AND VOMITING): ICD-10-CM

## 2024-07-31 DIAGNOSIS — D46.9 MYELODYSPLASTIC SYNDROME (H): Primary | ICD-10-CM

## 2024-07-31 LAB
ABO/RH(D): NORMAL
ALBUMIN SERPL BCG-MCNC: 4.4 G/DL (ref 3.5–5.2)
ALP SERPL-CCNC: 94 U/L (ref 40–150)
ALT SERPL W P-5'-P-CCNC: 25 U/L (ref 0–70)
ANION GAP SERPL CALCULATED.3IONS-SCNC: 12 MMOL/L (ref 7–15)
ANTIBODY SCREEN: NEGATIVE
APTT PPP: 34 SECONDS (ref 22–38)
AST SERPL W P-5'-P-CCNC: 32 U/L (ref 0–45)
BASOPHILS # BLD AUTO: 0.1 10E3/UL (ref 0–0.2)
BASOPHILS NFR BLD AUTO: 2 %
BILIRUB SERPL-MCNC: <0.2 MG/DL
BUN SERPL-MCNC: 18.9 MG/DL (ref 8–23)
CALCIUM SERPL-MCNC: 9.9 MG/DL (ref 8.8–10.4)
CHLORIDE SERPL-SCNC: 105 MMOL/L (ref 98–107)
CREAT SERPL-MCNC: 1.11 MG/DL (ref 0.67–1.17)
EGFRCR SERPLBLD CKD-EPI 2021: 69 ML/MIN/1.73M2
EOSINOPHIL # BLD AUTO: 0.4 10E3/UL (ref 0–0.7)
EOSINOPHIL NFR BLD AUTO: 7 %
ERYTHROCYTE [DISTWIDTH] IN BLOOD BY AUTOMATED COUNT: 15.9 % (ref 10–15)
GLUCOSE BLDC GLUCOMTR-MCNC: 101 MG/DL (ref 70–99)
GLUCOSE BLDC GLUCOMTR-MCNC: 114 MG/DL (ref 70–99)
GLUCOSE BLDC GLUCOMTR-MCNC: 94 MG/DL (ref 70–99)
GLUCOSE SERPL-MCNC: 111 MG/DL (ref 70–99)
HBA1C MFR BLD: 5 %
HCO3 SERPL-SCNC: 24 MMOL/L (ref 22–29)
HCT VFR BLD AUTO: 40.4 % (ref 40–53)
HGB BLD-MCNC: 12.9 G/DL (ref 13.3–17.7)
IMM GRANULOCYTES # BLD: 0.1 10E3/UL
IMM GRANULOCYTES NFR BLD: 1 %
INR PPP: 1.11 (ref 0.85–1.15)
LYMPHOCYTES # BLD AUTO: 1.6 10E3/UL (ref 0.8–5.3)
LYMPHOCYTES NFR BLD AUTO: 27 %
MAGNESIUM SERPL-MCNC: 1.9 MG/DL (ref 1.7–2.3)
MCH RBC QN AUTO: 30.7 PG (ref 26.5–33)
MCHC RBC AUTO-ENTMCNC: 31.9 G/DL (ref 31.5–36.5)
MCV RBC AUTO: 96 FL (ref 78–100)
MONOCYTES # BLD AUTO: 0.6 10E3/UL (ref 0–1.3)
MONOCYTES NFR BLD AUTO: 10 %
NEUTROPHILS # BLD AUTO: 3.1 10E3/UL (ref 1.6–8.3)
NEUTROPHILS NFR BLD AUTO: 53 %
NRBC # BLD AUTO: 0 10E3/UL
NRBC BLD AUTO-RTO: 0 /100
PLATELET # BLD AUTO: 402 10E3/UL (ref 150–450)
POTASSIUM SERPL-SCNC: 4 MMOL/L (ref 3.4–5.3)
PROT SERPL-MCNC: 6.8 G/DL (ref 6.4–8.3)
RBC # BLD AUTO: 4.2 10E6/UL (ref 4.4–5.9)
SODIUM SERPL-SCNC: 141 MMOL/L (ref 135–145)
SPECIMEN EXPIRATION DATE: NORMAL
SPECIMEN STATUS: NORMAL
URATE SERPL-MCNC: 4.6 MG/DL (ref 3.4–7)
WBC # BLD AUTO: 6 10E3/UL (ref 4–11)

## 2024-07-31 PROCEDURE — 250N000009 HC RX 250: Performed by: STUDENT IN AN ORGANIZED HEALTH CARE EDUCATION/TRAINING PROGRAM

## 2024-07-31 PROCEDURE — 02H633Z INSERTION OF INFUSION DEVICE INTO RIGHT ATRIUM, PERCUTANEOUS APPROACH: ICD-10-PCS | Performed by: PHYSICIAN ASSISTANT

## 2024-07-31 PROCEDURE — 99223 1ST HOSP IP/OBS HIGH 75: CPT | Performed by: STUDENT IN AN ORGANIZED HEALTH CARE EDUCATION/TRAINING PROGRAM

## 2024-07-31 PROCEDURE — 99223 1ST HOSP IP/OBS HIGH 75: CPT | Mod: 25 | Performed by: PHYSICIAN ASSISTANT

## 2024-07-31 PROCEDURE — 83036 HEMOGLOBIN GLYCOSYLATED A1C: CPT | Performed by: STUDENT IN AN ORGANIZED HEALTH CARE EDUCATION/TRAINING PROGRAM

## 2024-07-31 PROCEDURE — 250N000011 HC RX IP 250 OP 636: Performed by: PHYSICIAN ASSISTANT

## 2024-07-31 PROCEDURE — 999N000142 HC STATISTIC PROCEDURE PREP ONLY

## 2024-07-31 PROCEDURE — 87385 HISTOPLASMA CAPSUL AG IA: CPT | Performed by: STUDENT IN AN ORGANIZED HEALTH CARE EDUCATION/TRAINING PROGRAM

## 2024-07-31 PROCEDURE — 76937 US GUIDE VASCULAR ACCESS: CPT

## 2024-07-31 PROCEDURE — 87305 ASPERGILLUS AG IA: CPT | Performed by: STUDENT IN AN ORGANIZED HEALTH CARE EDUCATION/TRAINING PROGRAM

## 2024-07-31 PROCEDURE — 83735 ASSAY OF MAGNESIUM: CPT | Performed by: STUDENT IN AN ORGANIZED HEALTH CARE EDUCATION/TRAINING PROGRAM

## 2024-07-31 PROCEDURE — 250N000011 HC RX IP 250 OP 636: Mod: JZ | Performed by: NURSE PRACTITIONER

## 2024-07-31 PROCEDURE — 84550 ASSAY OF BLOOD/URIC ACID: CPT | Performed by: STUDENT IN AN ORGANIZED HEALTH CARE EDUCATION/TRAINING PROGRAM

## 2024-07-31 PROCEDURE — 258N000003 HC RX IP 258 OP 636: Performed by: NURSE PRACTITIONER

## 2024-07-31 PROCEDURE — 999N000128 HC STATISTIC PERIPHERAL IV START W/O US GUIDANCE

## 2024-07-31 PROCEDURE — 36558 INSERT TUNNELED CV CATH: CPT | Mod: RT | Performed by: PHYSICIAN ASSISTANT

## 2024-07-31 PROCEDURE — C1751 CATH, INF, PER/CENT/MIDLINE: HCPCS

## 2024-07-31 PROCEDURE — 86612 BLASTOMYCES ANTIBODY: CPT | Performed by: STUDENT IN AN ORGANIZED HEALTH CARE EDUCATION/TRAINING PROGRAM

## 2024-07-31 PROCEDURE — 85610 PROTHROMBIN TIME: CPT | Performed by: STUDENT IN AN ORGANIZED HEALTH CARE EDUCATION/TRAINING PROGRAM

## 2024-07-31 PROCEDURE — 3E04305 INTRODUCTION OF OTHER ANTINEOPLASTIC INTO CENTRAL VEIN, PERCUTANEOUS APPROACH: ICD-10-PCS | Performed by: INTERNAL MEDICINE

## 2024-07-31 PROCEDURE — 206N000001 HC R&B BMT UMMC

## 2024-07-31 PROCEDURE — 0JH63XZ INSERTION OF TUNNELED VASCULAR ACCESS DEVICE INTO CHEST SUBCUTANEOUS TISSUE AND FASCIA, PERCUTANEOUS APPROACH: ICD-10-PCS | Performed by: PHYSICIAN ASSISTANT

## 2024-07-31 PROCEDURE — 99152 MOD SED SAME PHYS/QHP 5/>YRS: CPT | Performed by: PHYSICIAN ASSISTANT

## 2024-07-31 PROCEDURE — C1769 GUIDE WIRE: HCPCS

## 2024-07-31 PROCEDURE — 99223 1ST HOSP IP/OBS HIGH 75: CPT | Mod: 25 | Performed by: STUDENT IN AN ORGANIZED HEALTH CARE EDUCATION/TRAINING PROGRAM

## 2024-07-31 PROCEDURE — 250N000011 HC RX IP 250 OP 636: Performed by: STUDENT IN AN ORGANIZED HEALTH CARE EDUCATION/TRAINING PROGRAM

## 2024-07-31 PROCEDURE — 87449 NOS EACH ORGANISM AG IA: CPT | Performed by: STUDENT IN AN ORGANIZED HEALTH CARE EDUCATION/TRAINING PROGRAM

## 2024-07-31 PROCEDURE — 85730 THROMBOPLASTIN TIME PARTIAL: CPT | Performed by: STUDENT IN AN ORGANIZED HEALTH CARE EDUCATION/TRAINING PROGRAM

## 2024-07-31 PROCEDURE — 272N000504 HC NEEDLE CR4

## 2024-07-31 PROCEDURE — 76937 US GUIDE VASCULAR ACCESS: CPT | Mod: 26 | Performed by: PHYSICIAN ASSISTANT

## 2024-07-31 PROCEDURE — 77001 FLUOROGUIDE FOR VEIN DEVICE: CPT | Mod: 26 | Performed by: PHYSICIAN ASSISTANT

## 2024-07-31 PROCEDURE — 85025 COMPLETE CBC W/AUTO DIFF WBC: CPT | Performed by: STUDENT IN AN ORGANIZED HEALTH CARE EDUCATION/TRAINING PROGRAM

## 2024-07-31 PROCEDURE — 99418 PROLNG IP/OBS E/M EA 15 MIN: CPT | Performed by: PHYSICIAN ASSISTANT

## 2024-07-31 PROCEDURE — 87081 CULTURE SCREEN ONLY: CPT | Performed by: STUDENT IN AN ORGANIZED HEALTH CARE EDUCATION/TRAINING PROGRAM

## 2024-07-31 PROCEDURE — 80053 COMPREHEN METABOLIC PANEL: CPT | Performed by: STUDENT IN AN ORGANIZED HEALTH CARE EDUCATION/TRAINING PROGRAM

## 2024-07-31 PROCEDURE — 86901 BLOOD TYPING SEROLOGIC RH(D): CPT | Performed by: STUDENT IN AN ORGANIZED HEALTH CARE EDUCATION/TRAINING PROGRAM

## 2024-07-31 PROCEDURE — 272N000192 HC ACCESSORY CR2

## 2024-07-31 PROCEDURE — 250N000013 HC RX MED GY IP 250 OP 250 PS 637: Performed by: STUDENT IN AN ORGANIZED HEALTH CARE EDUCATION/TRAINING PROGRAM

## 2024-07-31 RX ORDER — ALLOPURINOL 300 MG/1
300 TABLET ORAL DAILY
Status: DISCONTINUED | OUTPATIENT
Start: 2024-07-31 | End: 2024-07-31

## 2024-07-31 RX ORDER — POLYETHYLENE GLYCOL 3350 17 G/17G
17 POWDER, FOR SOLUTION ORAL DAILY PRN
Status: DISCONTINUED | OUTPATIENT
Start: 2024-07-31 | End: 2024-08-25 | Stop reason: HOSPADM

## 2024-07-31 RX ORDER — ACETAMINOPHEN 325 MG/1
650 TABLET ORAL ONCE
Status: COMPLETED | OUTPATIENT
Start: 2024-08-07 | End: 2024-08-07

## 2024-07-31 RX ORDER — NALOXONE HYDROCHLORIDE 0.4 MG/ML
0.4 INJECTION, SOLUTION INTRAMUSCULAR; INTRAVENOUS; SUBCUTANEOUS
Status: DISCONTINUED | OUTPATIENT
Start: 2024-07-31 | End: 2024-07-31 | Stop reason: HOSPADM

## 2024-07-31 RX ORDER — SULFAMETHOXAZOLE/TRIMETHOPRIM 800-160 MG
1 TABLET ORAL
Status: DISCONTINUED | OUTPATIENT
Start: 2024-09-09 | End: 2024-08-25 | Stop reason: HOSPADM

## 2024-07-31 RX ORDER — SIROLIMUS 2 MG/1
5 TABLET, FILM COATED ORAL ONCE
Status: COMPLETED | OUTPATIENT
Start: 2024-08-12 | End: 2024-08-12

## 2024-07-31 RX ORDER — PROCHLORPERAZINE MALEATE 5 MG
5 TABLET ORAL EVERY 6 HOURS PRN
Status: DISCONTINUED | OUTPATIENT
Start: 2024-07-31 | End: 2024-08-25 | Stop reason: HOSPADM

## 2024-07-31 RX ORDER — FUROSEMIDE 10 MG/ML
10 INJECTION INTRAMUSCULAR; INTRAVENOUS
Status: DISCONTINUED | OUTPATIENT
Start: 2024-08-10 | End: 2024-08-08

## 2024-07-31 RX ORDER — BRIMONIDINE TARTRATE 2 MG/ML
1 SOLUTION/ DROPS OPHTHALMIC 2 TIMES DAILY
Status: DISCONTINUED | OUTPATIENT
Start: 2024-07-31 | End: 2024-08-25 | Stop reason: HOSPADM

## 2024-07-31 RX ORDER — HEPARIN SODIUM,PORCINE 10 UNIT/ML
5 VIAL (ML) INTRAVENOUS EVERY 24 HOURS
Status: CANCELLED | OUTPATIENT
Start: 2024-07-31

## 2024-07-31 RX ORDER — ALLOPURINOL 300 MG/1
300 TABLET ORAL DAILY
Status: DISCONTINUED | OUTPATIENT
Start: 2024-07-31 | End: 2024-08-25 | Stop reason: HOSPADM

## 2024-07-31 RX ORDER — LIDOCAINE 40 MG/G
CREAM TOPICAL
Status: DISCONTINUED | OUTPATIENT
Start: 2024-07-31 | End: 2024-07-31 | Stop reason: HOSPADM

## 2024-07-31 RX ORDER — ONDANSETRON 8 MG/1
8 TABLET, FILM COATED ORAL EVERY 8 HOURS
Status: COMPLETED | OUTPATIENT
Start: 2024-08-01 | End: 2024-08-08

## 2024-07-31 RX ORDER — ACYCLOVIR 800 MG/1
800 TABLET ORAL 2 TIMES DAILY
Status: DISCONTINUED | OUTPATIENT
Start: 2024-08-03 | End: 2024-08-19

## 2024-07-31 RX ORDER — DEXAMETHASONE 4 MG/1
8 TABLET ORAL ONCE
Status: COMPLETED | OUTPATIENT
Start: 2024-08-05 | End: 2024-08-05

## 2024-07-31 RX ORDER — ATORVASTATIN CALCIUM 10 MG/1
10 TABLET, FILM COATED ORAL EVERY EVENING
Status: DISCONTINUED | OUTPATIENT
Start: 2024-07-31 | End: 2024-08-25 | Stop reason: HOSPADM

## 2024-07-31 RX ORDER — LORAZEPAM 2 MG/ML
.5-1 INJECTION INTRAMUSCULAR EVERY 4 HOURS PRN
Status: DISCONTINUED | OUTPATIENT
Start: 2024-07-31 | End: 2024-08-25 | Stop reason: HOSPADM

## 2024-07-31 RX ORDER — SODIUM CHLORIDE AND POTASSIUM CHLORIDE 150; 450 MG/100ML; MG/100ML
INJECTION, SOLUTION INTRAVENOUS CONTINUOUS
Status: DISPENSED | OUTPATIENT
Start: 2024-07-31 | End: 2024-08-02

## 2024-07-31 RX ORDER — LATANOPROST 50 UG/ML
2 SOLUTION/ DROPS OPHTHALMIC AT BEDTIME
Status: DISCONTINUED | OUTPATIENT
Start: 2024-07-31 | End: 2024-08-25 | Stop reason: HOSPADM

## 2024-07-31 RX ORDER — DEXTROSE MONOHYDRATE 25 G/50ML
25-50 INJECTION, SOLUTION INTRAVENOUS
Status: DISCONTINUED | OUTPATIENT
Start: 2024-07-31 | End: 2024-07-31 | Stop reason: HOSPADM

## 2024-07-31 RX ORDER — NALOXONE HYDROCHLORIDE 0.4 MG/ML
0.2 INJECTION, SOLUTION INTRAMUSCULAR; INTRAVENOUS; SUBCUTANEOUS
Status: DISCONTINUED | OUTPATIENT
Start: 2024-07-31 | End: 2024-07-31 | Stop reason: HOSPADM

## 2024-07-31 RX ORDER — SODIUM CHLORIDE 9 MG/ML
INJECTION, SOLUTION INTRAVENOUS CONTINUOUS
Status: DISCONTINUED | OUTPATIENT
Start: 2024-07-31 | End: 2024-07-31 | Stop reason: HOSPADM

## 2024-07-31 RX ORDER — NICOTINE POLACRILEX 4 MG
15-30 LOZENGE BUCCAL
Status: DISCONTINUED | OUTPATIENT
Start: 2024-07-31 | End: 2024-07-31 | Stop reason: HOSPADM

## 2024-07-31 RX ORDER — LEVOFLOXACIN 250 MG/1
250 TABLET, FILM COATED ORAL
Status: DISCONTINUED | OUTPATIENT
Start: 2024-08-06 | End: 2024-08-22

## 2024-07-31 RX ORDER — HEPARIN SODIUM,PORCINE 10 UNIT/ML
5 VIAL (ML) INTRAVENOUS
Status: COMPLETED | OUTPATIENT
Start: 2024-07-31 | End: 2024-07-31

## 2024-07-31 RX ORDER — AMOXICILLIN 250 MG
1 CAPSULE ORAL 2 TIMES DAILY PRN
Status: DISCONTINUED | OUTPATIENT
Start: 2024-07-31 | End: 2024-08-25 | Stop reason: HOSPADM

## 2024-07-31 RX ORDER — SIROLIMUS 2 MG/1
2.5 TABLET, FILM COATED ORAL DAILY
Status: CANCELLED | OUTPATIENT
Start: 2024-08-13

## 2024-07-31 RX ORDER — URSODIOL 300 MG/1
300 CAPSULE ORAL 3 TIMES DAILY
Status: DISCONTINUED | OUTPATIENT
Start: 2024-07-31 | End: 2024-08-19

## 2024-07-31 RX ORDER — MEPERIDINE HYDROCHLORIDE 25 MG/ML
25-50 INJECTION INTRAMUSCULAR; INTRAVENOUS; SUBCUTANEOUS
Status: ACTIVE | OUTPATIENT
Start: 2024-08-07 | End: 2024-08-08

## 2024-07-31 RX ORDER — HEPARIN SODIUM,PORCINE 10 UNIT/ML
5 VIAL (ML) INTRAVENOUS
Status: CANCELLED | OUTPATIENT
Start: 2024-07-31

## 2024-07-31 RX ORDER — FENTANYL CITRATE 50 UG/ML
25-50 INJECTION, SOLUTION INTRAMUSCULAR; INTRAVENOUS EVERY 5 MIN PRN
Status: DISCONTINUED | OUTPATIENT
Start: 2024-07-31 | End: 2024-07-31 | Stop reason: HOSPADM

## 2024-07-31 RX ORDER — FUROSEMIDE 10 MG/ML
20 INJECTION INTRAMUSCULAR; INTRAVENOUS EVERY 8 HOURS PRN
Status: DISCONTINUED | OUTPATIENT
Start: 2024-08-10 | End: 2024-08-08

## 2024-07-31 RX ORDER — FUROSEMIDE 10 MG/ML
20 INJECTION INTRAMUSCULAR; INTRAVENOUS EVERY 8 HOURS PRN
Status: DISPENSED | OUTPATIENT
Start: 2024-08-01 | End: 2024-08-02

## 2024-07-31 RX ORDER — SIROLIMUS 2 MG/1
4 TABLET, FILM COATED ORAL DAILY
Status: DISCONTINUED | OUTPATIENT
Start: 2024-08-13 | End: 2024-08-25 | Stop reason: HOSPADM

## 2024-07-31 RX ORDER — CALCIUM CARBONATE/VITAMIN D3 600 MG-10
1 TABLET ORAL DAILY
Status: DISCONTINUED | OUTPATIENT
Start: 2024-08-01 | End: 2024-08-25 | Stop reason: HOSPADM

## 2024-07-31 RX ORDER — AMLODIPINE BESYLATE 10 MG/1
10 TABLET ORAL EVERY MORNING
Status: DISCONTINUED | OUTPATIENT
Start: 2024-07-31 | End: 2024-08-25 | Stop reason: HOSPADM

## 2024-07-31 RX ORDER — CEFAZOLIN SODIUM 2 G/100ML
2 INJECTION, SOLUTION INTRAVENOUS
Status: COMPLETED | OUTPATIENT
Start: 2024-07-31 | End: 2024-07-31

## 2024-07-31 RX ORDER — NICOTINE POLACRILEX 4 MG
15-30 LOZENGE BUCCAL
Status: DISCONTINUED | OUTPATIENT
Start: 2024-07-31 | End: 2024-08-25 | Stop reason: HOSPADM

## 2024-07-31 RX ORDER — DIPHENHYDRAMINE HCL 25 MG
25 CAPSULE ORAL ONCE
Status: COMPLETED | OUTPATIENT
Start: 2024-08-07 | End: 2024-08-07

## 2024-07-31 RX ORDER — FUROSEMIDE 10 MG/ML
10 INJECTION INTRAMUSCULAR; INTRAVENOUS
Status: DISPENSED | OUTPATIENT
Start: 2024-08-01 | End: 2024-08-02

## 2024-07-31 RX ORDER — SODIUM CHLORIDE AND POTASSIUM CHLORIDE 150; 450 MG/100ML; MG/100ML
INJECTION, SOLUTION INTRAVENOUS CONTINUOUS
Status: DISCONTINUED | OUTPATIENT
Start: 2024-08-10 | End: 2024-08-09

## 2024-07-31 RX ORDER — ACETAMINOPHEN 325 MG/1
325-650 TABLET ORAL EVERY 4 HOURS PRN
Status: DISCONTINUED | OUTPATIENT
Start: 2024-07-31 | End: 2024-08-25 | Stop reason: HOSPADM

## 2024-07-31 RX ORDER — PANTOPRAZOLE SODIUM 40 MG/1
40 TABLET, DELAYED RELEASE ORAL DAILY
Status: DISCONTINUED | OUTPATIENT
Start: 2024-07-31 | End: 2024-07-31

## 2024-07-31 RX ORDER — ONDANSETRON 8 MG/1
8 TABLET, FILM COATED ORAL EVERY 8 HOURS
Status: COMPLETED | OUTPATIENT
Start: 2024-08-10 | End: 2024-08-13

## 2024-07-31 RX ORDER — CEFEPIME HYDROCHLORIDE 2 G/1
2 INJECTION, POWDER, FOR SOLUTION INTRAVENOUS
Status: COMPLETED | OUTPATIENT
Start: 2024-07-31 | End: 2024-08-10

## 2024-07-31 RX ORDER — DEXTROSE MONOHYDRATE 25 G/50ML
25-50 INJECTION, SOLUTION INTRAVENOUS
Status: DISCONTINUED | OUTPATIENT
Start: 2024-07-31 | End: 2024-08-25 | Stop reason: HOSPADM

## 2024-07-31 RX ORDER — PANTOPRAZOLE SODIUM 40 MG/1
40 TABLET, DELAYED RELEASE ORAL DAILY
Status: DISCONTINUED | OUTPATIENT
Start: 2024-07-31 | End: 2024-08-19

## 2024-07-31 RX ORDER — FLUTICASONE PROPIONATE 50 MCG
1 SPRAY, SUSPENSION (ML) NASAL 2 TIMES DAILY
Status: DISCONTINUED | OUTPATIENT
Start: 2024-07-31 | End: 2024-08-25 | Stop reason: HOSPADM

## 2024-07-31 RX ORDER — FLUMAZENIL 0.1 MG/ML
0.2 INJECTION, SOLUTION INTRAVENOUS
Status: DISCONTINUED | OUTPATIENT
Start: 2024-07-31 | End: 2024-07-31 | Stop reason: HOSPADM

## 2024-07-31 RX ORDER — LORAZEPAM 0.5 MG/1
.5-1 TABLET ORAL EVERY 4 HOURS PRN
Status: DISCONTINUED | OUTPATIENT
Start: 2024-07-31 | End: 2024-08-25 | Stop reason: HOSPADM

## 2024-07-31 RX ORDER — ALLOPURINOL 300 MG/1
300 TABLET ORAL DAILY
Status: CANCELLED
Start: 2024-07-31

## 2024-07-31 RX ADMIN — BRIMONIDINE TARTRATE 1 DROP: 2 SOLUTION/ DROPS OPHTHALMIC at 20:04

## 2024-07-31 RX ADMIN — URSODIOL 300 MG: 300 CAPSULE ORAL at 14:23

## 2024-07-31 RX ADMIN — FENTANYL CITRATE 50 MCG: 50 INJECTION, SOLUTION INTRAMUSCULAR; INTRAVENOUS at 08:36

## 2024-07-31 RX ADMIN — Medication 2 ML: at 08:54

## 2024-07-31 RX ADMIN — Medication 2 ML: at 08:53

## 2024-07-31 RX ADMIN — SODIUM CHLORIDE: 9 INJECTION, SOLUTION INTRAVENOUS at 08:04

## 2024-07-31 RX ADMIN — CEFAZOLIN SODIUM 2 G: 2 INJECTION, SOLUTION INTRAVENOUS at 08:04

## 2024-07-31 RX ADMIN — FENTANYL CITRATE 50 MCG: 50 INJECTION, SOLUTION INTRAMUSCULAR; INTRAVENOUS at 08:46

## 2024-07-31 RX ADMIN — LATANOPROST 2 DROP: 50 SOLUTION OPHTHALMIC at 21:58

## 2024-07-31 RX ADMIN — MIDAZOLAM 1 MG: 1 INJECTION INTRAMUSCULAR; INTRAVENOUS at 08:42

## 2024-07-31 RX ADMIN — POTASSIUM CHLORIDE AND SODIUM CHLORIDE: 450; 150 INJECTION, SOLUTION INTRAVENOUS at 21:57

## 2024-07-31 RX ADMIN — URSODIOL 300 MG: 300 CAPSULE ORAL at 20:03

## 2024-07-31 RX ADMIN — ATORVASTATIN CALCIUM 10 MG: 10 TABLET, FILM COATED ORAL at 20:04

## 2024-07-31 RX ADMIN — MIDAZOLAM 1 MG: 1 INJECTION INTRAMUSCULAR; INTRAVENOUS at 08:36

## 2024-07-31 RX ADMIN — AMLODIPINE BESYLATE 10 MG: 10 TABLET ORAL at 20:03

## 2024-07-31 RX ADMIN — FLUTICASONE PROPIONATE 1 SPRAY: 50 SPRAY, METERED NASAL at 20:04

## 2024-07-31 RX ADMIN — LIDOCAINE HYDROCHLORIDE 5 ML: 10 INJECTION, SOLUTION EPIDURAL; INFILTRATION; INTRACAUDAL; PERINEURAL at 08:55

## 2024-07-31 ASSESSMENT — ACTIVITIES OF DAILY LIVING (ADL)
ADLS_ACUITY_SCORE: 20
WALKING_OR_CLIMBING_STAIRS_DIFFICULTY: NO
DRESSING/BATHING_DIFFICULTY: NO
ADLS_ACUITY_SCORE: 35
ADLS_ACUITY_SCORE: 20
DIFFICULTY_COMMUNICATING: NO
CONCENTRATING,_REMEMBERING_OR_MAKING_DECISIONS_DIFFICULTY: NO
ADLS_ACUITY_SCORE: 20
WEAR_GLASSES_OR_BLIND: YES
ADLS_ACUITY_SCORE: 35
ADLS_ACUITY_SCORE: 20
CHANGE_IN_FUNCTIONAL_STATUS_SINCE_ONSET_OF_CURRENT_ILLNESS/INJURY: NO
ADLS_ACUITY_SCORE: 20
ADLS_ACUITY_SCORE: 20
ADLS_ACUITY_SCORE: 35
DIFFICULTY_EATING/SWALLOWING: NO
TOILETING_ISSUES: NO
ADLS_ACUITY_SCORE: 20
HEARING_DIFFICULTY_OR_DEAF: NO
FALL_HISTORY_WITHIN_LAST_SIX_MONTHS: NO
ADLS_ACUITY_SCORE: 20
DOING_ERRANDS_INDEPENDENTLY_DIFFICULTY: NO

## 2024-07-31 NOTE — PROGRESS NOTES
Inpatient Admission Information:      Admit Date:  7/31   Diagnosis:  MDS   Transplant Type: URD ALLO   Protocol:  [id number]   Sedated bmbx needed?  No   NMDP lab (lab 7033) needed?  Yes  **If YES, VINNY to please order with admission labs.  **If YES, this lab MUST BE DRAWN PRIOR TO ANY BMT PREP (chemo/TBI).   Notes:  Pt only has one kidney because he donated his other to his sister.        New Eval Work-Up   MD Malcolm Rios         Consult Type Date   1 Cards 7/22   2     3           Long Term Follow-Up   MD Malcolm Mayer      EOC updated? Yes  Care team updated? Yes

## 2024-07-31 NOTE — PROGRESS NOTES
"Pre-admission Nursing Assessment     Patient is contacted via telephone to review health status in preparation for planned admission on: 7/31.    Does patient endorse any of the following (If yes to any question MUST provide characteristics including: location, description, duration, onset as appropriate):    New or worsening pain? No  Recent fever or other infectious symptoms, including but not limited to: runny nose, cough, localized swelling, redness, abnormal discharge, swollen or tender lymph nodes, increased fatigue. No  New rash? No  New onset N/V/D? No  New bleeding from any site? No  New injury? No  Has the patient had any known contact in the past 3 days with a sick contact? No    Has your health changed in any ways since you were last seen in our facility by BMT provider/VINNY? No    Patient is reminded to contact the BMT office with ANY changes in their health, prior to their planned admission.    Patient has had or will have CBC/CMP draw within 7 days of admission on date: 7/23.     If \"YES\" to any of the above questions please contact provider and document in addendum.    Blanca Sanchez RN, July 31, 2024    " Patient notified that we were unable to locar=te form that she had dropped off. Informed that we did go to the DMV web site and we were able to print the form and Dr. Muse did complete. Informed that form would be upfront for her to . Questioned if she wanted us to fax and patient stated she will complete form and fax it to the DMV. Form was also scanned into patients chart for future reference. Stephanie Guallpa LPN

## 2024-07-31 NOTE — CONSULTS
Transplant & Immunocompromised   Infectious Disease     New Inpatient Consult   Patient: Arpit Forrest, Date of birth 1950, Medical record number [id number].     Assessment and Recommendations     Impression: Inactive granulomas on PET scan 2/9/2024    Recommendations  No need to modify the standard operating protocol for fungal prophylaxis [okay for micafungin]  Ordered screening strongyloidiasis and Schistosoma antibodies, will treat if positive  Ordered screening fungal antibodies, will help interpretation if lung granulomas expand or patient develops pneumonia.    ID will continue to follow, please contact (page if time sensitive, vocera message if non urgent) with questions or if any situation arises where we may be of addional assistance.    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  The nodules prompted ID,      ID Problem List and Discussion:     # Immunocompromised  # Allogeneic HSCT recipietn (planned)  # Pulmonary granulomas- Longstanding per patient.    Seen on cross-sectional imaging at [facility] 12/2023.    PET scan 2/9/2024 without activity.      No Coccidioides exposure.  TB QuantiFERON negative.  Had agent orange and other exposure in [place].    Likely dormant/sterile granuloma secondary to remote injury.  Lack of activity on PET or any history of pneumonia very reassuring.  Low risk.    To better understand these we can complete a workup with fungal antibodies.  Coccidioides would be the only organism that may necessitate some alterations to treatment although doubtful if that even may be required.    #History of OM of Knee in  2008 , Bacterial  Per patient a rare organism that occurs mainly in [place].  Treated with numerous antibiotics including Levaquin by ID in [facility].  Perhaps melioidosis ?  No hardware in the knee, remote history and complete treatment- so low risk of recurrence.    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  Ordered screening strongyloidiasis and Schistosoma antibodies, will treat if positive  TB Quantiferon is negative    Transplant Checklist  - Bacterial prophylaxis: None, see elsewhere for treatment Abx  - Pneumocystis prophylaxis:  start day +28 with bactrim or equivalent  - Viral serostatus & prophylaxis: CMV D-/R-, EBV + , HSV 1-2- Acyclovir  - Fungal prophylaxis:  Micafungin then Azole  - Additional endemic disease testing/Serology:  Toxo IgG-ve, Schisto Strongy ordered     - Immunization status: to be addressed once current illness resolved & in the outpatient setting   - Gamma globulin status:No lab results found.  - Isolation status: Good hand hygiene.  - Antibiotic Allergy - None      History of Presenting Illness:     Arpit Forrest is a 73 year old patient who presented on 7/31/2024 for allogenic HSCT.    Patient was in the [employer] in [place] in [facility], he dug bunkers in the soil.  He was exposed to agent orange.  Previously healthy then began to develop low energy and numerous symptoms.  On a trip to [facility] in late 2023 had fevers up to 106 and was diagnosed with adult onset stills disease.  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    He has had several episodes of neutropenic fever for which the microbiologic workup has reportedly been entirely negative.  I was able to review the records from [facility].  He had a PET scan in February that did not show any activity of the lung granulomas.    Of note:  Patient had osteomyelitis of the knee that  occurred spontaneously and with an organism that is apparently common in [place] and treated with Levaquin in 2008 by an ID doctor in [facility].    Review of Symptoms:  A comprehensive 14 system review of symptoms was conducted and was otherwise negative (unless mentioned above)       Other Medical History:     An attempt was made to collect past medical surgical, family and social history during this encounter,  this information was reviewed with the patient and updated, documented where most relevant in the HPI       Physical Exam:     Temp: 97.6  F (36.4  C) Temp src: Oral BP: (!) 140/78 Pulse: 66   Resp: 16 SpO2: 100 % O2 Device: None (Room air) Oxygen Delivery: 4 LPM     GENERAL APPEARANCE: Not in acute distress    PHYSICAL EXAM:  Eyes:     Extraocular eye movements grossly intact, no ptosis, no discharge, no scleral icterus.  Mouth, Throat:     Mucous membranes moist  Cardiovascular:    S1, S2 normal, regular rate and rhythm.  Respiratory:     Inspection: Not in respiratory distress, chest expansion symmetrical.   Auscultation: 4 point auscultation done clear to auscultation bilaterally  Gastrointestinal:  Soft, non-tender; bowel sounds normal; no palpable masses.  Musculoskeletal:     No major deformity  Neurologic:     Higher Mental Function: Conversant, AOx4   Motor: Moving all 4 limbs  Psychiatric: Appropriate  Skin:  Anicteric      MDM   I reviewed recent lab data, notes from referring physicians, as well as other available imaging and diagnostic testing and incorporated these into my discussion as above.  The specific values when most notable are documented above but are otherwise available in the medical record for review.      Medical Decision Making  I saw and evaluated this patient on todays date as part of an E&M Encounter  I spent a total of > 65 minutes of time on the date of service face-to-face or coordinating care of Arpit Forrest. Over 50% of my time was spent with the patient evaluating  and managing issues described above in my note

## 2024-07-31 NOTE — PLAN OF CARE
Patient arrived to floor this morning after line placement. Patient and wife oriented to the floor and routines. Patient offers no complaints.  Line care kit given to wife to start to study. Patient will start Cytoxan flush tonight at 10 pm. C Diff culture still needs collection.  Problem: Adult Inpatient Plan of Care  Goal: Plan of Care Review  Description: The Plan of Care Review/Shift note should be completed every shift.  The Outcome Evaluation is a brief statement about your assessment that the patient is improving, declining, or no change.  This information will be displayed automatically on your shift  note.  Outcome: Progressing   Goal Outcome Evaluation:

## 2024-07-31 NOTE — PROGRESS NOTES
Pt arrived for tunneled line. VSS on RA, denies pain. Vascular for PIV access. No labs ordered. Last CBC resulted on 7/23/24. Consent signed. H&P current. Wife Meghna at the bedside. Plan to be admitted to .

## 2024-07-31 NOTE — PHARMACY-ADMISSION MEDICATION HISTORY
Pharmacist Admission Medication History    Admission medication history is complete. The information provided in this note is only as accurate as the sources available at the time of the update.    Information Source(s): Patient, CareEverywhere/SureScripts, and pharmacist-completed medication history in clinic 7/25/24 and by nursing on admission  via phone    Pertinent Information: Patient last took voriconazole 2 days ago. Patient completed course of steroids about a week ago. Medications has already been removed from [id number] medication list.    Changes made to PTA medication list:  Added: None  Deleted: None  Changed: None    Allergies reviewed with patient and updates made in EHR: yes    Medication History Completed By: Phil Ramos, [place] 7/31/2024 5:17 PM    PTA Med List   Medication Sig Last Dose    acetaminophen (TYLENOL) 500 MG tablet Take 1,000 mg by mouth 2 times daily 7/31/2024 at 0345    acyclovir (ZOVIRAX) 400 MG tablet TAKE 1 TABLET(400 MG) BY MOUTH TWICE DAILY FOR PROPHYLAXIS 7/31/2024 at 0345    allopurinol (ZYLOPRIM) 100 MG tablet Take 300 mg by mouth 7/31/2024 at 0345    amLODIPine (NORVASC) 10 MG tablet Take 1 tablet by mouth every morning 7/30/2024 at 1900    atorvastatin (LIPITOR) 10 MG tablet Take 1 tablet by mouth every morning 7/30/2024 at 1900    brimonidine (ALPHAGAN) 0.2 % ophthalmic solution Place 1 drop into both eyes 2 times daily 7/31/2024 at 0345    Calcium Carbonate Antacid 600 MG CHEW Take 1 chew tab by mouth 2 times daily 600 mg 7/31/2024 at 0345    coenzyme Q-10 capsule Take 1 capsule by mouth daily 7/31/2024 at 0345    fluticasone (FLONASE) 50 MCG/ACT nasal spray Spray 1 spray into both nostrils 2 times daily 7/31/2024 at 0345    latanoprost (XALATAN) 0.005 % ophthalmic solution INSTILL 2 DROPS IN BOTH EYES AT BEDTIME 7/31/2024 at 0345    levofloxacin (LEVAQUIN) 500 MG tablet Take 500 mg by mouth daily 7/31/2024 at 0345    Lutein-Zeaxanthin 25-5 MG CAPS Take 1  capsule by mouth daily 7/31/2024 at 0345    metFORMIN (GLUCOPHAGE XR) 500 MG 24 hr tablet Take 500 mg every morning and 1,000 mg every evening 7/30/2024 at 1900    RABEprazole (ACIPHEX) 20 MG EC tablet Take 1 tablet by mouth daily 7/31/2024 at 0345    Vitamin D3 (CHOLECALCIFEROL) 25 mcg (1000 units) tablet Take 3 tablets by mouth daily 3000 international unit(s) 7/30/2024 at 1900

## 2024-07-31 NOTE — IR NOTE
Patient Name: Arpit Forrest  Medical Record Number: [id number]  Today's Date: 7/31/2024    Procedure: Tunneled central venous catheter placement  Proceduralist: Nicolle LEVY.  Pathology present: n/a    Procedure Start: 0837  Procedure end: 0857  Sedation medications administered: 100 mcg fentanyl 2 mg versed         Report given to:  5C RN  : n/a    Other Notes: Pt arrived to IR room 2 from 2A. Consent reviewed. Pt denies any questions or concerns regarding procedure. Pt positioned supine and monitored per protocol. Pt tolerated procedure without any noted complications. Pt transferred back to .

## 2024-07-31 NOTE — PRE-PROCEDURE
GENERAL PRE-PROCEDURE:   Procedure:  Tunneled central venous catheter placement    Written consent obtained?: Yes    Risks and benefits: Risks, benefits and alternatives were discussed    Consent given by:  Patient  Patient states understanding of procedure being performed: Yes    Patient's understanding of procedure matches consent: Yes    Procedure consent matches procedure scheduled: Yes    Expected level of sedation:  Moderate  Appropriately NPO:  Yes  ASA Class:  2  Mallampati  :  Grade 1- soft palate, uvula, tonsillar pillars, and posterior pharyngeal wall visible  Lungs:  Lungs clear with good breath sounds bilaterally  Heart:  Normal heart sounds and rate  History & Physical reviewed:  History and physical reviewed and no updates needed  Statement of review:  I have reviewed the lab findings, diagnostic data, medications, and the plan for sedation

## 2024-07-31 NOTE — H&P
BMT History & Physical       Patient Demographics   Patient ID:  Arpit Forrest   Age:  73 year old   Sex:  male  Reason for Admission/CC: MDS  Date:  7/31/2024  Service: BMT   Informant:  Patient and Chart  Resuscitation Status: Full Code    Patient ID:  Arpit Forrest is a 73 year old male, currently day -7 prep for ELIEZER MUD PBSCT for MDS.    Transplant Essential Data:   Diagnosis MDS    BMTCT Type allo    Prep Regimen ELIEZER,     Donor Match and  Source Unrelated 17 yo male  8/8 DP Non permissive    GVHD Prophylaxis Siro/MMF/Post transplant cytoxan    Primary BMT MD Dr Nena Fowler           HPI: Mr. Forrest is admitting today to begin conditioning for reduced intensity matched unrelated donor PBSCT for MDS. He is feeling well, no recent fevers or infectious symptoms.       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  August 2023:  Collapsed in the bathroom while on a trip to [facility]. Had high fevers and rigors, treated with doxycycline.   11/30/2023: Seen by Rheumatology in the [place]. He had acute right sacroilitis. Ferritin was elevated to 1600, no hemachromatosis.   12/3/2023 to 12/9/2023: Hospitalized with high fevers and new onset mild pancytopenias. CT C/A/P did not show any lymphadenopathy or splenomegaly.  Infectious cultures were negative. 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 He was treated with anakinra, solumedrol and then started on oral prednisone.   12/7/2023: Bone marrow biopsy showed hypercellular marrow, no dysplasia, 34% ring sideroblasts, increased reticuloendothelial iron stores, no increase in blasts. Flow showed a CD5 positive B-cell infiltrate (5% of the marrow cellularity), consistent with monoclonal B-cell lymphocytosis. Cytogenetics showed a normal male karyotype. Myeloid malignancy panel did not show an SF3B1 mutation. However, there were mutations in SRSF2, DNMT3A, RUNX1, IDH2 [id number] , BCOR, BCORL1, and TET2. Diagnosed with CCUS.   1/31/2024 to 2/6/24: Hospitalized with fevers to 104 despite anti-IL1 therapy. Anakinra was increased, then switched to canakinumab. PET-Ct on 2/9/24 did not show any malignancy. Diagnosed with subclinical hyperthyroidism treated with methimazole starting 2/14/2024.  3/15/2024 to 3/25/2024: Hospitalized for neutropenic fevers. He also had an episode of atrial fibrillation with rapid ventricular response placed on AC with LMWH. Methimazole was held due to risk of agranulocytosis.  3/18/2024: Bone marrow biopsy showed Myelodysplastic syndrome, with multilineage dysplasia. 2% bone marrow blasts, and 3% circulating blasts.18% ringed sideroblasts, slight reticulin fibrosis. Concurrent CBC showed WBC 1.7, ANC 0.5, Hb 8.7, platelet count 43. Normal cytogenetics. NGS could not be sent from the bone marrow. NGS peripheral blood (4/18/24) showed mutations in BCOR (56%), BCORL1 (11%), DNMT3A (34%), IDH2 (9%), SRSF2 (33%), STAG2 (9%), TET2 (2%), two RUNX1 mutations. IPSS-M: very high risk 1.98. Scattered non-caseating granulomas identified on the bone marrow biopsy and clot sections, negative for microorganisms. Also seen on flow was CD5-positive lambda monotypic B cells (4.3%).      3/21/2024: Cycle 1 decitabine 20 mg/m2 for 3 days only.  4/22/2024: Cycle 2 decitabine 20 mg/m2  for 5 days   5/21/2024: Cycle 3 decitabine   6/19/24: Cycle 4     7/23/24: Pre transplant BMBx showed persistent MDS. Normocellular marrow (20-30%) with increase dysplastic megakaryocytes, no bone marrow blasts, and also rare circulating blasts. Compared to the previous bone marrow, this bone marrow is less cellular, only 1% ring sideroblasts and less circulating blasts (1% current vs 3% previous). Flow did not show any myeloid blasts, CD5 positive lambda monotypic cells (10%). Cytogenetics and NGS pending.         Donor Characteristics       Self or Related or Unrelated Donor: unrelated  Donor Match: 8/8  Donor Age: 18  Donor Sex: male  Donor ABO/Rh: O negative (recipient O negative)  Donor CMV Serostatus: negative CMV    Donor-Specific Antibodies:  No lab results found.      Virtual Crossmatch:    Recent Labs   Lab Test 07/23/24  0955   RESVXMT1 No Interp   RESVXMB1 No Interp         Blood Counts       Recent Labs   Lab Test 07/23/24  0955 07/22/24  0957   HGB 12.2* 11.9*   HCT 38.9* 38.2*   WBC 4.0 4.3   NRBCMAN 0.0 0.0   * 413         Recent Labs   Lab Test 07/22/24  0957   ABORH O NEG         No lab results found.      Chemistries     Basic Panel  Recent Labs   Lab Test 07/25/24  1309 07/25/24  0715 07/23/24  0955 07/22/24  0957   NA  --   --   --  142   POTASSIUM  --   --   --  4.1   CHLORIDE  --   --   --  108*   CO2  --   --   --  22   BUN  --   --   --  22.7   CR 1.16 1.21* 1.21*  1.19* 1.27*   GLC  --   --   --  127*        Calcium, Magnesium, Phosphorus  Recent Labs   Lab Test 07/22/24  0957   MC 9.9        LFTs  Recent Labs   Lab Test 07/22/24  0957   BILITOTAL <0.2   ALKPHOS 89   AST 19   ALT 14   ALBUMIN 4.5       Urine Studies       Recent Labs   Lab Test 07/22/24  0957   COLOR Yellow   APPEARANCE Clear   URINEGLC Negative   URINEBILI Negative   URINEKETONE Trace*   SG 1.028   UBLD Negative   URINEPH 5.0   PROTEIN 20*   UUROI Normal   NITRITE Negative   LEUKEST Negative   MUCUS Present*    RBCU 2   WBCU 2       Creatinine Clearance    Recent Labs   Lab Test 07/25/24  0715      .0      STD 80*   VOL 2,233   DUR 24.0   UCRR 88.7   UCR24 1.98         Infectious Disease Markers     [facility] Blood Royal IDM    Recent Labs   Lab Test 07/22/24  0957   TCRUZI Non-reactive       CMV  Recent Labs   Lab Test 07/22/24  0957   CMVIGG No detectable antibody.         EBV    Recent Labs   Lab Test 07/22/24  0957   EBVCAG Positive*       HSV 1/2    Recent Labs   Lab Test 07/22/24  0957   H4FSFXC 0.14   H1IGG No HSV-1 IgG antibodies detected.   G8ELBBZ 0.10   H2IGG No HSV-2 IgG antibodies detected.             Bone Marrow Biopsy     Bone marrow, posterior iliac crest, left decalcified trephine biopsy and touch imprint; left direct aspirate smear, and concentrated aspirate smear; and peripheral smear:  - Persistent myeloid neoplasm with the following features:  - Normocellular marrow (20-30%) with trilineage hematopoiesis, dysmegakaryopoiesis, slight dysgranulopoiesis, and no increase in blasts  - CD5-positive monoclonal B-cell population identified by flow cytometry, and morphology  - Peripheral blood showing slight normochromic, normocytic anemia; slight thrombocytosis, rare circulating blasts (1%)  - See comment      Flow Interpretation   A. Iliac Crest, Bone Marrow Aspirate, Left:  -CD5-positive lambda monotypic B cells (10%)  -No definitive increase in myeloid blasts and no definitive abnormal myeloid blast population  See comment               Chest X-Ray - 2 view     Results for orders placed during the hospital encounter of 07/25/24    XR CHEST 2 VIEWS    Status: Normal 7/25/2024    Narrative  EXAM: XR CHEST 2 VIEWS  7/25/2024 2:02 PM    HISTORY: Preop examination; Personal history of diseases of blood and  blood-forming organs; Screening for viral disease; MDS  (myelodysplastic syndrome) (H)    COMPARISON: Chest radiograph 3/15/2024    FINDINGS: Two views of the chest. Trachea is midline.  Cardiac  silhouette is not enlarged. The vasculature is within normal limits.  No focal airspace consolidations bilaterally. Costophrenic angles are  clear. No appreciable pneumothorax. Mild degenerative changes with  vertebral endplate spurring in the spine.    Impression  IMPRESSION: Negative chest.    I have personally reviewed the examination and initial interpretation  and I agree with the findings.    HALLIE PEREZ MD      SYSTEM ID:  [id number]        Chest CT without Contrast       Results for orders placed during the hospital encounter of 07/25/24    CT CHEST W/O CONTRAST    Status: Normal 7/25/2024    Narrative  CT chest without contrast    INDICATION: Preoperative. Myelodysplastic syndrome.    COMPARISON: PET/CT to/9/24    FINDINGS: No contrast. Calcified nodularity in both thyroid lobes.  Left common carotid artery originates off the base of the right  brachiocephalic artery as an anatomical variant. Heart size normal.  Thoracic aorta and main pulmonary arteries are normal in caliber.  Coronary calcifications. Aortic valve leaflet calcifications, please  correlate for aortic valve stenosis, consider echocardiogram if not  recently performed. Mild mitral annular calcifications as well.  Diverticular disease of the large bowel in the upper abdomen. Mild  fatty atrophy of the pancreatic parenchyma. Left adrenal rim calcified  hypodense nodularity near the posterior margin of the adrenal (2/69)  unchanged from previous PET CT. This appears remote from the tortuous  splenic artery. Apparent left nephrectomy.  No enlarged lymph nodes in the chest.    Bone detail shows degenerative spurring throughout the thoracic spine.  No suspicious sclerotic or lytic/destructive lesion.    Detail of the lungs shows 2 mm right middle lobe medial noncalcified  nodule (4/177). Calcified right middle lobe granuloma and lower lobe  granuloma. Calcified left upper lobe granuloma. Major airways appear  patent. No  consolidation.    IMPRESSION granulomatous disease. Incidental 2 mm right middle lobe  noncalcified nodule almost certainly benign. Follow-up in one year  could be considered if the patient's high risk for lung cancer.  Thyroid partially calcified nodules. Coronary artery calcifications  from atherosclerosis. Aortic valve leaflet calcifications and mitral  annular calcifications. Please correlate for respective valvular  disorders, consider echocardiogram. Diverticulosis coli. Rim calcified  mass in the region of the posterior aspect of the left adrenal gland  unchanged in patient with left nephrectomy, this likely represents  some dystrophic calcification.    HALLIE PEREZ MD      SYSTEM ID:  [id number]        PFTs     FVC%  Recent Labs   Lab Test 24  1001   [id number]       FEV1%  Recent Labs   Lab Test 24  1001    115       DLCO%  Recent Labs   Lab Test 24  1001    110         EKG       ECG results from 24   EKG 12-lead, tracing only (Same Day)     Value    Systolic Blood Pressure     Diastolic Blood Pressure     Ventricular Rate 75    Atrial Rate 75    TX Interval 152    QRS Duration 100        QTc 460    P Axis 3    R AXIS -18    T Axis 37    Interpretation ECG      Sinus rhythm  Inferior infarct , age undetermined  Abnormal ECG  No previous ECGs available  Confirmed by MD VEDA, MARCOS ([id number]) on 2024 6:48:34 PM           ECHOCARDIOGRAM       Results for orders placed during the hospital encounter of 24    ECHOCARDIOGRAM COMPLETE    Status: Normal 2024    Narrative  [id number]  [id number]  [id number]  [id number]^AVRIL^ELIESER    [facility],[place]  Echocardiography Laboratory  [address] [place] [id number]    Name: VAMSI SOLANO  MRN: [id number]  : 1950  Study Date: 2024 10:49 AM  Age: 73 yrs  Gender: Male  Patient Location: [facility]  Reason For Study: Preop examination  Ordering Physician: AVRIL  ELIESER  Referring Physician: ELIESER MACKENZIE  Performed By: Tito Ndiaye RDCS    BSA: 2.4 m2  Height: 73 in  Weight: 251 lb  HR: 70  ______________________________________________________________________________  Procedure  Echocardiogram with two-dimensional, color and spectral Doppler performed.  ______________________________________________________________________________  Interpretation Summary  Left ventricular size, wall motion and function are normal. The ejection  fraction is 60-65%.  Right ventricular function, chamber size, wall motion, and thickness are  normal.  Moderate aortic stenosis (PV 3.2m/s, MG 21mmHg).  The inferior vena cava is normal.  No pericardial effusion.  There is no prior study for direct comparison.  ______________________________________________________________________________  Left Ventricle  Left ventricular size, wall motion and function are normal. The ejection  fraction is 60-65%. Left ventricular diastolic function is indeterminate.  Diastolic Doppler findings (E/E' ratio and/or other parameters) suggest left  ventricular filling pressures are normal.    Right Ventricle  Right ventricular function, chamber size, wall motion, and thickness are  normal.    Atria  The right atria appears normal. Moderate left atrial enlargement is present.    Mitral Valve  The mitral valve is normal.    Aortic Valve  Moderate aortic stenosis is present. The mean gradient across the aortic valve  is 21 mmHg. The peak aortic velocity is 3.2 m/sec. The aortic valve area is  1.5 cm^2, by the continuity equation.    Tricuspid Valve  The tricuspid valve is normal. The peak velocity of the tricuspid regurgitant  jet is not obtainable. Pulmonary artery systolic pressure cannot be assessed.    Pulmonic Valve  The pulmonic valve is normal.    Vessels  Normal diameter aortic root and proximal ascending aorta. The inferior vena  cava is normal.    Pericardium  No pericardial effusion is  present.    Compared to Previous Study  There is no prior study for direct comparison.  ______________________________________________________________________________  MMode/2D Measurements & Calculations    IVSd: 1.1 cm  LVIDd: 5.1 cm  LVIDs: 3.2 cm  LVPWd: 0.96 cm  FS: 37.3 %  LV mass(C)d: 191.4 grams  LV mass(C)dI: 80.8 grams/m2  Ao root diam: 3.6 cm  asc Aorta Diam: 3.7 cm  LVOT diam: 2.4 cm  LVOT area: 4.6 cm2  Ao root diam index Ht(cm/m): 1.9  Ao root diam index BSA (cm/m2): 1.5  Asc Ao diam index BSA (cm/m2): 1.6  Asc Ao diam index Ht(cm/m): 2.0  LA Volume (BP): 101.0 ml    LA Volume Index (BP): 42.6 ml/m2  RWT: 0.38    Doppler Measurements & Calculations  MV E max lauri: 90.1 cm/sec  MV A max lauri: 127.3 cm/sec  MV E/A: 0.71  MV dec slope: 376.5 cm/sec2  MV dec time: 0.24 sec  Ao V2 max: 323.7 cm/sec  Ao max P.9 mmHg  Ao V2 mean: 214.1 cm/sec  Ao mean P.0 mmHg  Ao V2 VTI: 67.1 cm  BRENT(I,D): 1.5 cm2  BRENT(V,D): 1.4 cm2  LV V1 max PG: 3.8 mmHg  LV V1 max: 96.9 cm/sec  LV V1 VTI: 21.9 cm  SV(LVOT): 101.9 ml  SI(LVOT): 43.0 ml/m2  PA acc time: 0.12 sec  AV Lauri Ratio (DI): 0.30  BRENT Index (cm2/m2): 0.64  E/E' av.5  Lateral E/e': 11.8    Medial E/e': 13.1    ______________________________________________________________________________  Report approved by: Shanell Rosado 2024 01:32 PM        I have assessed all abnormal lab values for their clinical significance and any values considered clinically significant have been addressed in the assessment and plan    Family History:   Mother  at age 99. She had a history of hypertension.    Father  at age 94, had some strokes after age 90.  He has 2 daughters, 1 who is 45 years old and 1 who is 47 years old.  His daughters are healthy.    He has 2 full sisters who were born in 1959 and  who are currently doing well.  One of his sisters (Constance) history of complex kidney disease for many years and has undergone 2 kidney transplant.  No  history of hematologic malignancies       Social History:   Social History     Socioeconomic History    Marital status:      Spouse name: Meghna    Number of children: 2    Years of education: Not on file    Highest education level: Not on file   Occupational History    Not on file   Tobacco Use    Smoking status: Former     Current packs/day: 0.00     Average packs/day: 0.5 packs/day for 4.0 years (2.0 ttl pk-yrs)     Types: Cigarettes     Start date:      Quit date: 1970     Years since quittin.6    Smokeless tobacco: Never   Vaping Use    Vaping status: Never Used   Substance and Sexual Activity    Alcohol use: Not Currently     Comment: 24 Quit 1 year ago    Drug use: Never    Sexual activity: Not on file   Other Topics Concern    Not on file   Social History Narrative    Not on file     Social Determinants of Health     Financial Resource Strain: Not on file   Food Insecurity: No Food Insecurity (2024)    Received from [facility]    Hunger Vital Sign     Worried About Running Out of Food in the Last Year: Never true     Ran Out of Food in the Last Year: Never true   Transportation Needs: No Transportation Needs (2024)    Received from [facility]    PRAPARE - Transportation     Lack of Transportation (Medical): No     Lack of Transportation (Non-Medical): No   Physical Activity: Insufficiently Active (4/3/2024)    Received from [facility]    Exercise Vital Sign     Days of Exercise per Week: 3 days     Minutes of Exercise per Session: 20 min   Stress: Not on file   Social Connections: Not on file   Interpersonal Safety: Unknown (3/15/2024)    Received from [facility]    Humiliation, Afraid, Rape, and Kick questionnaire     Fear of Current or Ex-Partner: Not on file     Emotionally Abused: Not on file     Physically Abused: No     Sexually Abused: No   Housing Stability: Low Risk  (2024)    Received from [facility]    Housing Stability     What is your living situation  today?: I have a steady place to live       Past Medical History: History reviewed. No pertinent past medical history.     Past Surgical History:   Past Surgical History:   Procedure Laterality Date    ARTHROSCOPY KNEE RT/LT Left     IR CVC TUNNEL PLACEMENT > 5 YRS OF AGE  7/31/2024    IR LUMBAR PUNCTURE  01/02/2024    NEPHRECTOMY      donated a kidney   A.fib with RVR while admitted with febrile episode on 3/15/24 s/p cardioversion. He was on amiodarone and anticoagulation till 4/15/24, at which point it was discontinued.   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   Dyslipidemia  HTN  VINCENT  Gout   Exposure to agent orange            Past Surgical History  Hx of kidney donation to sister  Vasectomy        Allergies:   Allergies   Allergen Reactions    Gramineae Pollens Other (See Comments)     Seasonal sneezing, runny nose.       Home Medications      Prior to Admission medications    Medication Sig Start Date End Date Taking? Authorizing Provider   acetaminophen (TYLENOL) 500 MG tablet Take 1,000 mg by mouth 2 times daily   Yes Reported, Patient   acyclovir (ZOVIRAX) 400 MG tablet TAKE 1 TABLET(400 MG) BY MOUTH TWICE DAILY FOR PROPHYLAXIS 3/25/24  Yes Reported, Patient   allopurinol (ZYLOPRIM) 100 MG tablet Take 300 mg by mouth 10/24/23  Yes Reported, Patient   amLODIPine (NORVASC) 10 MG tablet Take 1 tablet by mouth every morning 10/24/23  Yes Reported, Patient   atorvastatin (LIPITOR) 10 MG tablet Take 1 tablet by mouth every morning 10/24/23  Yes Reported, Patient   brimonidine (ALPHAGAN) 0.2 % ophthalmic solution Place 1 drop into both eyes 2 times daily 10/24/23  Yes Reported, Patient   Calcium Carbonate Antacid 600 MG CHEW Take 1 chew tab by mouth 2 times daily 600 mg   Yes Reported, Patient   coenzyme Q-10 capsule Take 1 capsule by mouth daily   Yes Reported, Patient   fluticasone (FLONASE) 50 MCG/ACT nasal spray Spray 1 spray into both nostrils 2 times  "daily 12/15/23  Yes Reported, Patient   latanoprost (XALATAN) 0.005 % ophthalmic solution INSTILL 2 DROPS IN BOTH EYES AT BEDTIME   Yes Reported, Patient   levofloxacin (LEVAQUIN) 500 MG tablet Take 500 mg by mouth daily 3/26/24  Yes Reported, Patient   Lutein-Zeaxanthin 25-5 MG CAPS Take 1 capsule by mouth daily   Yes Reported, Patient   metFORMIN (GLUCOPHAGE XR) 500 MG 24 hr tablet Take 500 mg every morning and 1,000 mg every evening   Yes Reported, Patient   RABEprazole (ACIPHEX) 20 MG EC tablet Take 1 tablet by mouth daily 9/21/23  Yes Reported, Patient   Vitamin D3 (CHOLECALCIFEROL) 25 mcg (1000 units) tablet Take 3 tablets by mouth daily 3000 international unit(s)   Yes Reported, Patient       Review of Systems    10 point ROS neg other than the symptoms noted above in the HPI.    PHYSICAL EXAM      Weight     Wt Readings from Last 3 Encounters:   07/31/24 117.3 kg (258 lb 11.2 oz)   07/30/24 117.5 kg (259 lb)   07/26/24 117 kg (258 lb)        KPS: 80    /73   Pulse 66   Temp 97.7  F (36.5  C) (Oral)   Resp 14   Ht 1.854 m (6' 1\")   Wt 117.3 kg (258 lb 11.2 oz)   SpO2 95%   BMI 34.13 kg/m       General: NAD   Eyes: MICHAEL, sclera anicteric   Nose/Mouth/Throat: OP clear, buccal mucosa moist, no ulcerations   Lungs: CTA bilaterally  Cardiovascular: RRR, no M/R/G   Abdominal/Rectal: +BS, soft, NT, ND, No HSM   Lymphatics: trace -1+ LE edema bilaterally  Skin: No rashes or petechaie  Neuro: A&O   Access: Andrews site NT, no drainage.    Current aGVHD staging:  start after engraftment      LABS AND IMAGING: I have assessed all abnormal lab values for their clinical significance and any values considered clinically significant have been addressed in the assessment and plan.      Lab Results   Component Value Date    WBC 6.0 07/31/2024    ANEU 2.4 07/23/2024    HGB 12.9 (L) 07/31/2024    HCT 40.4 07/31/2024     07/31/2024     07/31/2024    POTASSIUM 4.0 07/31/2024    CHLORIDE 105 07/31/2024    " CO2 24 07/31/2024    GLC 94 07/31/2024    BUN 18.9 07/31/2024    CR 1.11 07/31/2024    MAG 1.9 07/31/2024    INR 1.11 07/31/2024    BILITOTAL <0.2 07/31/2024    AST 32 07/31/2024    ALT 25 07/31/2024    ALKPHOS 94 07/31/2024    PROTTOTAL 6.8 07/31/2024    ALBUMIN 4.4 07/31/2024         ASSESSMENT AND PLAN      Arpit Forrest is a 73 year old male, currently day -7 prep for ELIEZER MUD PBSCT for MDS.    BMT/IEC PROTOCOL for MDS  - Chemo protocol: [id number]  D-6: Flu 30mg/m2, Cy 50mg/kg  D-5 to D-2: Flu  D-1: TBI  D0: stem cell infusion  - Peripheral blood stem cell graft from 8/8 donor, ABO matched (Oneg), Cell dose: TBD  - Engraftment monitoring: Peripheral blood and bone marrow chimerisms on D28, D100, 6 months, 1 and 2 year  - Restaging plan: BMBx with NGS on D28, D100, 6 months, 1 and 2 years    HEME/COAG  - GCSF starts day +5, continue until ANC > 1500 for 3 consecutive days.   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      ID  Multiple granulomas in th lung and bone marrow biopsy  - Per MD, send fungal studies (fungitell, aspergillus galactomanan, urine histoplasma and fungal antibodies) on admission, ordered  - ID consult on admission to determine anti-fungal ppx during transplant (azole vs micafungin) given hx of travel and exposure to endemic fungi     Prophylaxis plan:   - Bacterial: Levaquin 250mg from D-1 till engraftment    - Fungal: as above   - PJP: Bactrim or Atovaquone or Pentamidine from D28. Toxoplasma IgG negative   - Viral: Acyclovir 800mg bid (Pt/donor CMV IgG neg)     Monitoring:   - CMV every week    - EBV every 2 weeks from D30 to D180     GI/NUTRITION  # Hepatic steatosis and boderline iron deposition in liver  - LFTs and synthetic function normal ; Check LFTs qM/Th  - Monitor for VOD  - VOD prophy: Ursodiol 300mg TID      # Supportive   - Anti-emetics:  per  protocol   - Ulcer prophy: PPI daily   - Dietician support to prevent malnutrition.     CARDIOVASCULAR  # Hx of A.fib with RVR   - A.fib with RVR while admitted with febrile episode on 3/15/24. Noted on his fitbit, not very symptomatic. Cardioversion planned for new onset A.fib. Not achieved with amiodarone, DCCV on 3/21/24 restored normal sinus rhythm. Amiodarone and anticoagulation was stopped in April 2024 without any recurrence.   - He could have a recurrence during transplant which would need to be addressed with rate control strategy      # Asymptomatic moderate aortic stenosis      # CAD  - Chest CT in February 2024 with mild to moderate CAC, three-vessel disease. No anginal symptoms   - Continue atorvastatin 10mg   - EKG Sinus rhythm Inferior infarct , age undetermined. Abnormal ECG (known cardiovascular disease). QTc =460     # HTN  - Continue amlodipine   - Risk of cardiomyopathy:  Baseline EF normal 60-65&  - Risk of arrhythmia: Baseline EKG showed NSR, Qtc 460     RESPIRATORY  # VINCENT : CPAP  - Baseline PFTs: normal   - Risk of respiratory complications: Frequent ambulation and incentive spirometer.     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 Consult endocrine.     MUSCULOSKELETAL/FRAILTY  # Gout: Continue allopurinol indefinitely   - Baseline Frailty Score: Not frail   - Daily PT/OT as needed while inpatient     SOCIAL DETERMINANTS  - Caregiver: Wife, Meghna, children   - Financial/insurance concerns: no    Known issues that I take into account for medical decisions, with salient changes to the plan considering these complexities noted above.    Patient Active Problem List   Diagnosis     "Ankylosing spondylitis lumbar region (H)    Autoinflammatory syndrome (H)    Macdonald's esophagus without dysplasia    Bilateral sacroiliitis (H24)    Essential hypertension    H/O: gout    History of agent Orange exposure    History of kidney donation    Hyperlipidemia    Myelodysplastic syndrome (H)    Obstructive sleep apnea syndrome    Obesity (BMI 30-39.9)    Type 2 diabetes mellitus without complication, without long-term current use of insulin (H)    Glaucoma    MDS (myelodysplastic syndrome) (H)     Clinically Significant Risk Factors Present on Admission                  # Hypertension: Noted on problem list         # Obesity: Estimated body mass index is 34.13 kg/m  as calculated from the following:    Height as of this encounter: 1.854 m (6' 1\").    Weight as of this encounter: 117.3 kg (258 lb 11.2 oz).               Medically Ready for Discharge: Anticipated in 5+ Days         I spent 50 minutes in the care of this patient today, which included time necessary for preparation for the visit, obtaining history, ordering medications/tests/procedures as medically indicated, review of pertinent medical literature, counseling of the patient, communication of recommendations to the care team, and documentation time.      ELLE Sanders PA-C      "

## 2024-07-31 NOTE — PROCEDURES
[facility]    Procedure: IR Procedure Note    Date/Time: 7/31/2024 9:02 AM    Performed by: Joel Knott Chi, PA-C  Authorized by: Joel Knott Chi, PA-C      UNIVERSAL PROTOCOL   Site Marked: NA  Prior Images Obtained and Reviewed:  Yes  Required items: Required blood products, implants, devices and special equipment available    Patient identity confirmed:  Verbally with patient, arm band, provided demographic data and hospital-assigned identification number  Patient was reevaluated immediately before administering moderate or deep sedation or anesthesia  Confirmation Checklist:  Patient's identity using two indicators, relevant allergies, procedure was appropriate and matched the consent or emergent situation and correct equipment/implants were available  Time out: Immediately prior to the procedure a time out was called    Universal Protocol: the Joint Commission Universal Protocol was followed    Preparation: Patient was prepped and draped in usual sterile fashion       ANESTHESIA    Anesthesia:  Local infiltration  Local Anesthetic:  Lidocaine 1% without epinephrine      SEDATION  Patient Sedated: Yes    Sedation:  Fentanyl and midazolam  Vital signs: Vital signs monitored during sedation    See dictated procedure note for full details.  Findings: Procedure Start: 0837  Procedure end: 0857  Sedation medications administered: 100 mcg fentanyl 2 mg versed           Specimens: none    Complications: None    Condition: Stable    Plan: Transport to inpatient bed for recovery       PROCEDURE  Describe Procedure:  Completed placement of 9.5 Tajik, 24 cm double lumen tunneled central venous catheter via right internal jugular vein with tip in high RA. Aspirates and flushes freely, heparin locked and ready for immediate use.    Patient Tolerance:  Patient tolerated the procedure well with no immediate complications  Length of time physician/provider present for 1:1  monitoring during sedation: 20

## 2024-07-31 NOTE — CONSULTS
Below is the pt's psychosocial assessment for the staff to review as needed.    CLINICAL SOCIAL WORK   PSYCHOSOCIAL ASSESSMENT  BLOOD AND MARROW TRANSPLANT SERVICE        Assessment completed on July 25, 2024 of living situation, support system, financial status, functional status, coping, stressors, need for resources and social work intervention provided as needed.  Information for this assessment was provided by Pt and spouse report in addition to medical chart review and consultation with medical team.      Present at assessment: Patient, Arpit Forrest and Meghna Forrest were present for this assessment conducted by LINDA Valdez .      Diagnosis: Myelodysplastic Syndrome (MDS)     Date of Diagnosis: 3/2024     Transplant type: Allogeneic     Donor: Unrelated  allogeneic donor stem cell transplant     Physician: Nena Fowler MD     Nurse Coordinator: Moreno Perrin RN     Work-up Nurse Coordinator: Blanca Sanchez RN     : SHUN Valdez, [facility]      Permanent Address:   [address] [place] [id number]     Contact Information:  Pt Home Phone: [phone]  Pt Cell Phone: [phone]  Pt Email: [email or web address]  Pt's wife Meghna Phone: [phone]     Presenting Information:  Arpit is a 73 year old male diagnosed with MDS who presents for evaluation for an allogeneic transplant at the [facility] ([facility]).  Pt was accompanied to today's visit by his wife Meghna.      Decision Making:   Self      Health Care Directive:   Provided education Arpit is uncertain if he has a HCD, but does desire to complete a new one. Agreed to bring the pt a blank document once admitted to be complete.     Relationship Status:    to his wife Meghna for 51 years. Both indicate their relationship as stable and supportive.     Special Lodging Needs: None identified at this time. Arpit lives in [place], [place] with his wife. They shared that their  daughter, son-in-law and 5 grandchildren all live next door.     Family/Support System: Pt endorsed a good support system including family and close friends who will be available to support Pt throughout transplant process.      Spouse: Meghna Forrest     Children: Enmanuel     Grandchildren: 5     Parents:      Siblings: 2- Casi and Constance     Friends: many close friends back in [place]     Caregiver: SIXTO discussed with pt the caregiver role and expectation at length. Pt is agreeable to having a full time caregiver for a minimum of 100 days until cleared by the BMT physician. Pt's identified caregivers are his wife and children. Pt signed the caregiver contract which will be scanned into the EMR. Caregiver education and resources provided. No caregiver concerns identified. Pt and Pt's wife confirmed understanding caregiving requirement, including driving restrictions, as discussed during psychosocial assessment.      Name & Numbers  Meghna Forrest  [phone]  Cathy Finchapple  [phone]  Frandy Jackson [facility] [phone]        Transportation Mode:  Private Car . Pt is aware of driving restrictions post-BMT and the need for the caregiver is to drive until cleared to drive by the  BMT physician. SW provided information on parking info and monthly parking pass options. Pt will utilize the hospital security shuttle for transportation to and from the [facility] and BMT Clinic/Hospital.     Insurance:  No Insurance issues identified.  Pt denied specific insurance concerns at this time. SIXTO reiterated information about the BMT Financial  should specific insurance questions arise as Pt moves through transplant process.      Sources of Income:  No income concerns identified  Arpit is collecting snf and savings. He is not also getting a check from the [facility] for his [place] exposure. Pt denied anticipation of financial hardship related to BMT at this time.  SIXTO encouraged  Pt to contact this SW for additional potential resources should financial situation change.      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 He is 50% service connected with the [facility], but is currently getting evaluated for Agent Orange exposure due to his MDS dx. If approved he noted he would likely be 100% service connected.     Mental Health: No mental health issues identified        PHQ-9 assessment, score was 3 ,which indicates no current signs of depression.  GAD7 assessment, score was 2, which indicates no current signs of anxiety.     Arpit shared he has not history of anxiety or depression. He shared that overall he has been able to keep a good mindset. He shared that getting to his diagnosis was much harder as it took doctors many months to figure out what his diagnosis was and after many wrong ideas and treatments, he eventually got his MDS diagnosis. He shared having to go in and out of the hospital was hard during this time, but better now that he has an official diagnosis. He shared he has not concerns with being admitted again for a longer period as he is prepared for it.       We talked about how some patients may see an increase in feelings of anxiety or depression while hospitalized for extended periods along with isolation. Encouraged Arpit and Meghna to let us know if they are noticing an increase in symptoms. We talked about the variety of modalities available to use as coping mechanisms (including but not limited to guided imagery, relaxation techniques, progressive muscle relaxation, counseling/talk therapy and medication).     Chemical Use: No issues identified.  Arpit noted no current use of tobacco, alcohol, marijuana or other drugs. Based on the information provided, there  "appear to be no specific risks or concerns identified at this time.      Trauma/Loss/Abuse History: Multiple losses associated with cancer diagnosis and treatment, including health, employment, changes to physical appearance, etc.      Spirituality:  Patient identifies with evelin community. Arpit shared that he is Anabaptism and has a strong evelin.      Coping: Pt noted that he is currently feeling \"positive, hopeful, prepared, excited and ready to begin\".  Pt shared that his main coping mechanisms are talking with friends and family and prayer/spiritual practice.  Pt noted that he also kalyn by positive self-talk and taking naps. SW and Pt discussed additional positive coping mechanisms that Pt can utilize while in the hospital.      Caregiver Coping: Pt's wife Meghna noted that she is feeling \"positive, hopeful, prepared and ready to begin\" at this time.  Meghna noted that she kalyn by talking with friends and family, prayer/spiritual practice, reading books, and exercise.      Education Provided: Transplant process expectations, Caregiver requirements, Caregiver self-care, Financial issues related to transplant, Financial resources/grants available, Common psychosocial stressors pre/post transplant, Support group(s) available, Tour/layout of the inpatient unit/non-use of cell phones, Hospital resources available, Web site information, Resources for transplant patients and their families as well as the Clinical Social Work role.      Interventions Provided: Supportive counseling and education      Recreation/Leisure Activities:  Arpit shared he likes to read, stay active, and be with his grandchildren     Plans for Hospital Stay Leisure:  During his IP stay he plans to walk, watch TV, bike, and read     Assessment and Recommendations for Team:  Pt is a 73 year old male diagnosed with MDS who is here undergoing preparation for a planned allogeneic transplant.      Pt is a pleasant and articulate male who feels " comfortable communicating with the medical team. Pt is pleasant, calm and able to articulate concerns/coping mechanisms in an appropriate manner. Arpit was alert, interactive, and affect was full, they displayed appropriate eye contact, memory and thought processes. Pt has a strong supportive network of family and friends who are involved.      Pt will benefit from ongoing psychosocial support in regards to coping with the adjustment to the BMT process. CSW has discussed  psychosocial support options in regards to coping with the adjustment to the BMT process and support groups opportunities.       Pt has a good support system and a good caregiver plan. Pt verbalizes understanding of the transplant process and wanting to proceed. SW provided contact information and encouraged Pt to contact SW with questions, concerns, resources and for support. Per this assessment, I did not identify any barriers to this patient moving forward with transplant.          Important Information:   - Arpit would like to complete a HCD when admitted.   -Arpit would like a treadmill in his room.     Follow up Planned:   Psychosocial support  Spiritual Health referral     SHUN Valdez, [facility]  [facility]  Phone: [phone]  Vocera- BMT  3

## 2024-07-31 NOTE — CONSULTS
"  Inpatient Diabetes Management Service : New Consult Note     Patient: Arpit Forrest   YOB: 1950    MRN: [id number]     Date of Consult : 07/31/2024   Date of Admission: 7/31/2024     Reason for Consult: \"dmII, holding metformin, giving dex. need insulin plan for next 7-21 days\"   Consult Requestor: Yi Martinez PA-C            History of Present Illness:   Arpit Forrest is a 73 year old with Type 2 Diabetes Mellitus and complicated by peripheral neuropathy and MDS, who was admitted on 7/31/2024 for [facility]CT for myelodysplastic syndrome.     History obtained via the patient, chart review and discussion with primary team.       Additional Historian:  patient's wife    Patient is not known to the Inpatient Diabetes Service from past admission(s).    Last dose insulin PTA: N/A  Current inpatient regimen:  none    BG at time of consult: 111mg/dL  (lab)    Other Active/Contributory Medical Problems: Was taking daily prednisone for the past year up until 1 week PTA. Was taking prednisone for presumed rheumatologic disorder prior to finding diagnosis of MDS.     GAD65 antibody, zinc transporter 8 antibody, islet antibody, insulin antibody, and c-peptide  not available on epic search     Inpatient Glucose Trends:  Lab 111 mg/dL this morning and prior to lunch 94 mg/dL         Diabetes History:   Diabetes Type and Duration: Per patient, had \"borderline diabetes for many years\". Per chart review, dx with prediabetes in May 2023 with A1C 6.2. A1C 6.5 noted in August 2023  (T2DM), shortly after starting prednisone for gout and has remained on prednisone daily up until 1 week ago when he was tapered off (highest dose of prednisone 60 mg, lowest dose when ending taper was 1mg)    PTA Medication Regimen:    Metformin  500mg AM, 1000 mg PM --> increased 7/12/24 from 1,000 mg daily based on fasting glucoses in the 120-130s per patient.    Historical Diabetes Medications: none. Has only ever used insulin " during previous admissions at OSH over the past year,    Glucose monitoring device and frequency: glucometer checks daily fasting AM and BG always  mg/dL    Outpatient Diabetes Provider: PCP - [facility] (last  saw 7/12/2024)     Formal Diabetes Education/Educator: yes     ------------------------  Complications:  + peripheral neuropathy, no retinopathy (last eye exam: within the past few months, recently treated for cataracts), no nephropathy, no gastroparesis, no macrovascular disease    Last A1c:  5.6% (3/16/2024) - at [facility]   Hemoglobin at time of last A1c: unknown  RBC transfusion in past 3 months:  none      History of DKA: no    Hypoglycemia PTA: no  Safety Kit:   - Glucagon: no   - Ketone Strips: no    Diet/Lifestyle:  3 meals a day plus snacks. Walks on treadmill for exercise.   Ability to [place] Prescribed Regimen:  good   Food/Housing Insecurity: no         Medications Impacting Glycemia:    Steroids:    8/1 - 8/4: Dexamethasone 10 mg daily   8/5: Dexamethasone 8  mg x 1 dose   8/6:  Dexamethasone  6 mg x 1 dose  D5W containing solutions/medications: none  Other medications impacting glucose: none         Diet/Nutrition:    Orders Placed This Encounter      High Kcal/High Protein Diet, ADULT  Supplements:  prn between  meals  TF: none  TPN: none          Review of Systems:    Constitutional:  No fever or chills. Weight stable.  Eyes: No vision changes.   Cardiac:  No chest pain or palpitations.   Respiratory:  No shortness of breath or cough.     GI:  No abdominal pain, nausea, emesis, diarrhea, constipation.  Neuro: + bilateral numbness, tingling, or burning pain in feet.  MSK/Integumentary: Mild bilateral lower extremity swelling/edema. No rash, non-healing sores.  Endocrine: No polyuria or polydipsia.          Past Medical History:     History reviewed. No pertinent past medical history.          Past Surgical History:      Past Surgical History:  "  Procedure Laterality Date    ARTHROSCOPY KNEE RT/LT Left     IR CVC TUNNEL PLACEMENT > 5 YRS OF AGE  2024    IR LUMBAR PUNCTURE  2024    NEPHRECTOMY      donated a kidney             Social History:      Social History     Tobacco Use    Smoking status: Former     Current packs/day: 0.00     Average packs/day: 0.5 packs/day for 4.0 years (2.0 ttl pk-yrs)     Types: Cigarettes     Start date:      Quit date: 1970     Years since quittin.6    Smokeless tobacco: Never   Substance Use Topics    Alcohol use: Not Currently     Comment: 24 Quit 1 year ago        History   Sexual Activity    Sexual activity: Not on file        Tobacco: denies    Etoh: denies    Marital Status: , lives with wife.            Family History:    Family History of Diabetes: None    History reviewed. No pertinent family history.          Physical Exam:    BP (!) 140/78 (BP Location: Right arm)   Pulse 66   Temp 97.6  F (36.4  C) (Oral)   Resp 16   Ht 1.825 m (5' 11.85\")   Wt 115.6 kg (254 lb 12.8 oz)   SpO2 100%   BMI 34.70 kg/m     General:  well appearing, NAD, resting comfortably in bed.Wife at bedside.  Lungs: unlabored breathing on RA.  ABD:  rounded, soft, non-tender  Skin:  warm and dry, no obvious lesions  MSK:   moving all extremities  Lymp:   mild bilateral LE edema  Feet: warm and dry. no open sores/wounds. normal gross sensation.   Mental Status:  Alert and oriented x3  Psych:   Cooperative, good eye contact, full/appropriate affect         Laboratory Data:    No results found for: \"A1C\"  Recent Labs   Lab Test 24  0934   WBC 6.0   RBC 4.20*   HGB 12.9*   HCT 40.4   MCV 96   MCH 30.7   MCHC 31.9   RDW 15.9*        Recent Labs   Lab Test 24  0934 24  1309 24  0955 24  0957     --   --  142   POTASSIUM 4.0  --   --  4.1   CHLORIDE 105  --   --  108*   CO2 24  --   --  22   ANIONGAP 12  --   --  12   *  --   --  127*   BUN 18.9  --   --  22.7   CR " 1.11 1.16   < > 1.27*   MC 9.9  --   --  9.9    < > = values in this interval not displayed.     Recent Labs   Lab Test 07/31/24  0934   PROTTOTAL 6.8   ALBUMIN 4.4   BILITOTAL <0.2   ALKPHOS 94   AST 32   ALT 25            Assessment and Recommendations:       Assessment:   Type 2 Diabetes Mellitus, complicated by peripheral neuropathy. Good control, 5.6% (3/16/2024) - updated A1c pending.   MDS  Anemia    Plan/Recommendations:    - No basal insulin at this time, fasting glucose at goal.   - Start 8/1 (with start of Dexamethasone): Novolog Meal Coverage: 1 units per 15 g CHO, TID AC and PRN with snacks/supplements --> primary team to intensify/reduce as needed pending glucose trends.  - Start Novolog Correction Scale: medium resistance 1/50 >140 TID AC and 1/50 >200 HS.   - BG monitoring: TID AC, HS  - Ordered A1c 7/31/24, results pending   - Hypoglycemia protocol    - Carb counting protocol     Discussion:   T2DM managed outpatient on metformin only. Has been on daily prednisone for the past year for presumed rheumatoid arthritis, highest dose taken was 60mg and tapered down to 1mg until discontinued 1 week PTA. Patient has never needed insulin while taking prednisone PTA. Last A1c outdated and suspect patient had anemia at the time, updated A1c pending. Would recommend starting Novolog correction scale and light carbohydrate coverage which can be increased/decreased as needed while patient taking steroids over the next 6 days. Spoke with primary team, we will sign off on patient and can be re-consulted if needed.     Inpatient Diabetes Service Signing off 07/31/2024  Please call back with any questions, labile blood glucose readings, changes to steroid plan, changes to nutrition plan, or need up updated glycemic control recommendations (please allow for 24-48 hours for this).     Discharge Planning: (tentative)    Medications: TBD. Likely resume PTA metformin.   Test Claims: none needed.   Education: Needs to be  assessed closer to discharge.    Outpatient Follow-up: likely PCP     Thank you for this consult. IDS will continue to follow.      Please notify Inpatient Diabetes Service if changes are planned to steroids, nutrition, TPN/TF and anticipated procedures requiring prolonged NPO status.     Susan Yan PA-C  Inpatient Diabetes Service  Pager: [phone]  Available on [employer]    To contact Inpatient Diabetes Service:     7 AM - 5 PM: Page the IDS VINNY following the patient that day (see filed or incomplete progress notes/consult notes under Endocrinology)    OR if uncertain of provider assignment: page job code [phone]    5 PM - 7 AM: First call after hours is to primary service.    For urgent after-hours questions, page job code for on call fellow: [phone]     I spent a total of 75 minutes on the date of the encounter doing prep/post-work, chart review, history and exam, documentation and further activities per the note including lab review, multidisciplinary communication, counseling the patient and/or coordinating care regarding acute hyper/hypoglycemic management, as well as discharge management and planning/communication.  See note for details.

## 2024-07-31 NOTE — PLAN OF CARE
Goal Outcome Evaluation:      Plan of Care Reviewed With: patient    Overall Patient Progress: no changeOverall Patient Progress: no change    Outcome Evaluation: IDT will continue to follow to support for d/c planning and emotional support.

## 2024-08-01 ENCOUNTER — APPOINTMENT (OUTPATIENT)
Dept: PHYSICAL THERAPY | Facility: CLINIC | Age: 74
DRG: 014 | End: 2024-08-01
Attending: STUDENT IN AN ORGANIZED HEALTH CARE EDUCATION/TRAINING PROGRAM
Payer: MEDICARE

## 2024-08-01 LAB
1,3 BETA GLUCAN SER-MCNC: <31 PG/ML
ANION GAP SERPL CALCULATED.3IONS-SCNC: 11 MMOL/L (ref 7–15)
BASOPHILS # BLD AUTO: 0.1 10E3/UL (ref 0–0.2)
BASOPHILS NFR BLD AUTO: 2 %
BUN SERPL-MCNC: 17.2 MG/DL (ref 8–23)
C DIFF TOX B STL QL: NEGATIVE
CALCIUM SERPL-MCNC: 9.3 MG/DL (ref 8.8–10.4)
CHLORIDE SERPL-SCNC: 105 MMOL/L (ref 98–107)
CMV DNA SPEC NAA+PROBE-ACNC: NOT DETECTED IU/ML
CREAT SERPL-MCNC: 1.04 MG/DL (ref 0.67–1.17)
EGFRCR SERPLBLD CKD-EPI 2021: 75 ML/MIN/1.73M2
EOSINOPHIL # BLD AUTO: 0.5 10E3/UL (ref 0–0.7)
EOSINOPHIL NFR BLD AUTO: 7 %
ERYTHROCYTE [DISTWIDTH] IN BLOOD BY AUTOMATED COUNT: 15.9 % (ref 10–15)
GLUCOSE BLDC GLUCOMTR-MCNC: 123 MG/DL (ref 70–99)
GLUCOSE BLDC GLUCOMTR-MCNC: 169 MG/DL (ref 70–99)
GLUCOSE BLDC GLUCOMTR-MCNC: 180 MG/DL (ref 70–99)
GLUCOSE BLDC GLUCOMTR-MCNC: 233 MG/DL (ref 70–99)
GLUCOSE SERPL-MCNC: 112 MG/DL (ref 70–99)
HCO3 SERPL-SCNC: 24 MMOL/L (ref 22–29)
HCT VFR BLD AUTO: 36.9 % (ref 40–53)
HGB BLD-MCNC: 11.5 G/DL (ref 13.3–17.7)
IMM GRANULOCYTES # BLD: 0.1 10E3/UL
IMM GRANULOCYTES NFR BLD: 1 %
LYMPHOCYTES # BLD AUTO: 1.7 10E3/UL (ref 0.8–5.3)
LYMPHOCYTES NFR BLD AUTO: 27 %
MCH RBC QN AUTO: 30 PG (ref 26.5–33)
MCHC RBC AUTO-ENTMCNC: 31.2 G/DL (ref 31.5–36.5)
MCV RBC AUTO: 96 FL (ref 78–100)
MONOCYTES # BLD AUTO: 0.7 10E3/UL (ref 0–1.3)
MONOCYTES NFR BLD AUTO: 11 %
NEUTROPHILS # BLD AUTO: 3.2 10E3/UL (ref 1.6–8.3)
NEUTROPHILS NFR BLD AUTO: 52 %
NRBC # BLD AUTO: 0 10E3/UL
NRBC BLD AUTO-RTO: 0 /100
OBSERVATION IMP: NEGATIVE
PATH REPORT.COMMENTS IMP SPEC: ABNORMAL
PATH REPORT.COMMENTS IMP SPEC: YES
PATH REPORT.FINAL DX SPEC: ABNORMAL
PATH REPORT.GROSS SPEC: ABNORMAL
PATH REPORT.MICROSCOPIC SPEC OTHER STN: ABNORMAL
PATH REPORT.RELEVANT HX SPEC: ABNORMAL
PATH REPORT.RELEVANT HX SPEC: ABNORMAL
PATH REPORT.SITE OF ORIGIN SPEC: ABNORMAL
PHOSPHATE SERPL-MCNC: 3.9 MG/DL (ref 2.5–4.5)
PLATELET # BLD AUTO: 372 10E3/UL (ref 150–450)
POTASSIUM SERPL-SCNC: 4 MMOL/L (ref 3.4–5.3)
POTASSIUM SERPL-SCNC: 4.3 MMOL/L (ref 3.4–5.3)
RBC # BLD AUTO: 3.83 10E6/UL (ref 4.4–5.9)
SODIUM SERPL-SCNC: 136 MMOL/L (ref 135–145)
SODIUM SERPL-SCNC: 140 MMOL/L (ref 135–145)
WBC # BLD AUTO: 6.2 10E3/UL (ref 4–11)

## 2024-08-01 PROCEDURE — 250N000011 HC RX IP 250 OP 636: Performed by: PHYSICIAN ASSISTANT

## 2024-08-01 PROCEDURE — 258N000003 HC RX IP 258 OP 636: Performed by: PHYSICIAN ASSISTANT

## 2024-08-01 PROCEDURE — 84295 ASSAY OF SERUM SODIUM: CPT | Performed by: STUDENT IN AN ORGANIZED HEALTH CARE EDUCATION/TRAINING PROGRAM

## 2024-08-01 PROCEDURE — 97530 THERAPEUTIC ACTIVITIES: CPT | Mod: GP

## 2024-08-01 PROCEDURE — 87493 C DIFF AMPLIFIED PROBE: CPT | Performed by: STUDENT IN AN ORGANIZED HEALTH CARE EDUCATION/TRAINING PROGRAM

## 2024-08-01 PROCEDURE — 99418 PROLNG IP/OBS E/M EA 15 MIN: CPT | Performed by: PHYSICIAN ASSISTANT

## 2024-08-01 PROCEDURE — 206N000001 HC R&B BMT UMMC

## 2024-08-01 PROCEDURE — 250N000012 HC RX MED GY IP 250 OP 636 PS 637: Performed by: STUDENT IN AN ORGANIZED HEALTH CARE EDUCATION/TRAINING PROGRAM

## 2024-08-01 PROCEDURE — 85025 COMPLETE CBC W/AUTO DIFF WBC: CPT | Performed by: STUDENT IN AN ORGANIZED HEALTH CARE EDUCATION/TRAINING PROGRAM

## 2024-08-01 PROCEDURE — 80048 BASIC METABOLIC PNL TOTAL CA: CPT | Performed by: STUDENT IN AN ORGANIZED HEALTH CARE EDUCATION/TRAINING PROGRAM

## 2024-08-01 PROCEDURE — 84132 ASSAY OF SERUM POTASSIUM: CPT | Performed by: STUDENT IN AN ORGANIZED HEALTH CARE EDUCATION/TRAINING PROGRAM

## 2024-08-01 PROCEDURE — 250N000011 HC RX IP 250 OP 636: Performed by: INTERNAL MEDICINE

## 2024-08-01 PROCEDURE — 97116 GAIT TRAINING THERAPY: CPT | Mod: GP

## 2024-08-01 PROCEDURE — 86682 HELMINTH ANTIBODY: CPT | Performed by: STUDENT IN AN ORGANIZED HEALTH CARE EDUCATION/TRAINING PROGRAM

## 2024-08-01 PROCEDURE — 250N000013 HC RX MED GY IP 250 OP 250 PS 637: Performed by: STUDENT IN AN ORGANIZED HEALTH CARE EDUCATION/TRAINING PROGRAM

## 2024-08-01 PROCEDURE — 250N000013 HC RX MED GY IP 250 OP 250 PS 637: Performed by: PHYSICIAN ASSISTANT

## 2024-08-01 PROCEDURE — 97161 PT EVAL LOW COMPLEX 20 MIN: CPT | Mod: GP

## 2024-08-01 PROCEDURE — 84100 ASSAY OF PHOSPHORUS: CPT | Performed by: INTERNAL MEDICINE

## 2024-08-01 PROCEDURE — 250N000011 HC RX IP 250 OP 636: Performed by: STUDENT IN AN ORGANIZED HEALTH CARE EDUCATION/TRAINING PROGRAM

## 2024-08-01 PROCEDURE — 258N000003 HC RX IP 258 OP 636: Performed by: INTERNAL MEDICINE

## 2024-08-01 PROCEDURE — 99233 SBSQ HOSP IP/OBS HIGH 50: CPT | Mod: 24 | Performed by: PHYSICIAN ASSISTANT

## 2024-08-01 RX ORDER — HEPARIN SODIUM,PORCINE 10 UNIT/ML
5-20 VIAL (ML) INTRAVENOUS
Status: DISCONTINUED | OUTPATIENT
Start: 2024-08-01 | End: 2024-08-25 | Stop reason: HOSPADM

## 2024-08-01 RX ORDER — HEPARIN SODIUM,PORCINE 10 UNIT/ML
5-20 VIAL (ML) INTRAVENOUS EVERY 24 HOURS
Status: DISCONTINUED | OUTPATIENT
Start: 2024-08-01 | End: 2024-08-25 | Stop reason: HOSPADM

## 2024-08-01 RX ADMIN — ATORVASTATIN CALCIUM 10 MG: 10 TABLET, FILM COATED ORAL at 19:52

## 2024-08-01 RX ADMIN — FLUTICASONE PROPIONATE 1 SPRAY: 50 SPRAY, METERED NASAL at 07:45

## 2024-08-01 RX ADMIN — FUROSEMIDE 20 MG: 10 INJECTION, SOLUTION INTRAMUSCULAR; INTRAVENOUS at 14:19

## 2024-08-01 RX ADMIN — URSODIOL 300 MG: 300 CAPSULE ORAL at 14:19

## 2024-08-01 RX ADMIN — URSODIOL 300 MG: 300 CAPSULE ORAL at 07:31

## 2024-08-01 RX ADMIN — CYCLOPHOSPHAMIDE 4825 MG: 2 INJECTION, POWDER, FOR SOLUTION INTRAVENOUS; ORAL at 09:54

## 2024-08-01 RX ADMIN — POTASSIUM CHLORIDE AND SODIUM CHLORIDE: 450; 150 INJECTION, SOLUTION INTRAVENOUS at 02:41

## 2024-08-01 RX ADMIN — AMLODIPINE BESYLATE 10 MG: 10 TABLET ORAL at 07:31

## 2024-08-01 RX ADMIN — INSULIN ASPART 1 UNITS: 100 INJECTION, SOLUTION INTRAVENOUS; SUBCUTANEOUS at 12:15

## 2024-08-01 RX ADMIN — ONDANSETRON HYDROCHLORIDE 8 MG: 8 TABLET, FILM COATED ORAL at 15:50

## 2024-08-01 RX ADMIN — ONDANSETRON HYDROCHLORIDE 8 MG: 8 TABLET, FILM COATED ORAL at 07:31

## 2024-08-01 RX ADMIN — POTASSIUM CHLORIDE AND SODIUM CHLORIDE: 450; 150 INJECTION, SOLUTION INTRAVENOUS at 06:39

## 2024-08-01 RX ADMIN — POTASSIUM CHLORIDE AND SODIUM CHLORIDE: 450; 150 INJECTION, SOLUTION INTRAVENOUS at 22:56

## 2024-08-01 RX ADMIN — DEXAMETHASONE 10 MG: 2 TABLET ORAL at 07:31

## 2024-08-01 RX ADMIN — LATANOPROST 2 DROP: 50 SOLUTION OPHTHALMIC at 22:05

## 2024-08-01 RX ADMIN — URSODIOL 300 MG: 300 CAPSULE ORAL at 19:52

## 2024-08-01 RX ADMIN — FUROSEMIDE 20 MG: 10 INJECTION, SOLUTION INTRAVENOUS at 14:20

## 2024-08-01 RX ADMIN — ONDANSETRON HYDROCHLORIDE 8 MG: 8 TABLET, FILM COATED ORAL at 23:35

## 2024-08-01 RX ADMIN — MESNA 4830 MG: 100 INJECTION, SOLUTION INTRAVENOUS at 07:34

## 2024-08-01 RX ADMIN — ALLOPURINOL 300 MG: 300 TABLET ORAL at 07:30

## 2024-08-01 RX ADMIN — MICAFUNGIN SODIUM 150 MG: 50 INJECTION, POWDER, LYOPHILIZED, FOR SOLUTION INTRAVENOUS at 07:32

## 2024-08-01 RX ADMIN — FLUDARABINE PHOSPHATE 73 MG: 25 INJECTION, SOLUTION INTRAVENOUS at 08:42

## 2024-08-01 RX ADMIN — PANTOPRAZOLE SODIUM 40 MG: 40 TABLET, DELAYED RELEASE ORAL at 07:30

## 2024-08-01 RX ADMIN — FLUTICASONE PROPIONATE 1 SPRAY: 50 SPRAY, METERED NASAL at 19:52

## 2024-08-01 RX ADMIN — POTASSIUM CHLORIDE AND SODIUM CHLORIDE: 450; 150 INJECTION, SOLUTION INTRAVENOUS at 17:05

## 2024-08-01 RX ADMIN — FUROSEMIDE 10 MG: 10 INJECTION, SOLUTION INTRAMUSCULAR; INTRAVENOUS at 22:57

## 2024-08-01 RX ADMIN — CALCIUM CARBONATE 600 MG (1,500 MG)-VITAMIN D3 400 UNIT TABLET 1 TABLET: at 07:31

## 2024-08-01 RX ADMIN — BRIMONIDINE TARTRATE 1 DROP: 2 SOLUTION/ DROPS OPHTHALMIC at 07:43

## 2024-08-01 RX ADMIN — INSULIN ASPART 2 UNITS: 100 INJECTION, SOLUTION INTRAVENOUS; SUBCUTANEOUS at 17:02

## 2024-08-01 RX ADMIN — BRIMONIDINE TARTRATE 1 DROP: 2 SOLUTION/ DROPS OPHTHALMIC at 19:52

## 2024-08-01 ASSESSMENT — ACTIVITIES OF DAILY LIVING (ADL)
ADLS_ACUITY_SCORE: 20

## 2024-08-01 NOTE — PROGRESS NOTES
08/01/24 1100   Appointment Info   Signing Clinician's Name / Credentials (PT) ZAIDA Mann   Student Supervision On-site supervision provided;Therapy services provided with the co-signing licensed therapist guiding and directing the services, and providing the skilled judgement and assessment throughout the session   Living Environment   People in Home spouse   Current Living Arrangements house   Home Accessibility stairs within home   Number of Stairs, Within Home, Primary greater than 10 stairs   Stair Railings, Within Home, Primary railing on right side (ascending)   Transportation Anticipated family or friend will provide   Living Environment Comments Pt lives in a 3 story house with his wife with no stairs to enter. pt has bedroom and bathroom on second floor up 12 steps to a landing and then up an additional 3. pt spends most of his timein basement down 6 steps to a landing and then down an additonal 8 steps with a railing   Self-Care   Usual Activity Tolerance excellent   Current Activity Tolerance good   Regular Exercise Yes   Activity/Exercise Type walking   Exercise Amount/Frequency 3-5 times/wk;30 mins   Equipment Currently Used at Home none   Fall history within last six months no   Activity/Exercise/Self-Care Comment Pt walks 2-3 times per week for 30 minutes prior to his admission to the hospital.   General Information   Onset of Illness/Injury or Date of Surgery 07/31/24   Referring Physician Yi Martinez, ELLE   Patient/Family Therapy Goals Statement (PT) Return home IND   Pertinent History of Current Problem (include personal factors and/or comorbidities that impact the POC) Per chart: Arpit Forrest is a 73 year old with Type 2 Diabetes Mellitus and complicated by peripheral neuropathy and MDS, who was admitted on 7/31/2024 for ELIEZER MUD PBSCT for myelodysplastic syndrome.   Cognition   Affect/Mental Status (Cognition) WFL   Orientation Status (Cognition) oriented x 4   Follows  Commands (Cognition) WFL   Pain Assessment   Patient Currently in Pain No   Integumentary/Edema   Integumentary/Edema no deficits were identifed   Posture    Posture Forward head position   Range of Motion (ROM)   Range of Motion ROM is WFL   Strength (Manual Muscle Testing)   Strength (Manual Muscle Testing) strength is WFL   Bed Mobility   Bed Mobility no deficits identified   Transfers   Transfers no deficits identified   Gait/Stairs (Locomotion)   [place] Level (Gait) independent   Distance in Feet (Gait) 350 feet   Negotiation (Stairs) stairs independence   Comment, (Gait/Stairs) Pt was IND with gait and stairs will be evaluated at later date but it is assumed he will be IND based on functional mobility.   Balance   Balance no deficits were identified   Sensory Examination   Sensory Perception WFL   Coordination   Coordination no deficits were identified   Muscle Tone   Muscle Tone no deficits were identified   Clinical Impression   Criteria for Skilled Therapeutic Intervention Yes, treatment indicated   PT Diagnosis (PT) Risk for deconditioning due to prolonged hospital stay.   Influenced by the following impairments Weakness   Functional limitations due to impairments activity tolerance   Clinical Presentation (PT Evaluation Complexity) stable   Clinical Presentation Rationale Clinical judgement, patient presentation   Clinical Decision Making (Complexity) low complexity   Planned Therapy Interventions (PT) gait training;home exercise program;stair training;strengthening;progressive activity/exercise   Risk & Benefits of therapy have been explained evaluation/treatment results reviewed;care plan/treatment goals reviewed;risks/benefits reviewed;current/potential barriers reviewed;participants voiced agreement with care plan;participants included;patient;spouse/significant other   PT Total Evaluation Time   PT Eval, Low Complexity Minutes (75751) 8   Physical Therapy Goals   PT Frequency 3x/week   PT  Predicted Duration/Target Date for Goal Attainment 08/31/24   PT Goals Gait;Stairs;Aerobic Activity   PT: Gait Independent;Greater than 200 feet;Goal Met   PT: Stairs Independent;Greater than 10 stairs;Rail on right   PT: Perform aerobic activity with stable cardiovascular response continuous activity;25 minutes;ambulation;NuStep   PT Discharge Planning   PT Plan Gait, stairs, nustep, LE exercises, UE exercises   PT Discharge Recommendation (DC Rec) home with assist   PT Rationale for DC Rec Pt presents with IND in bed mobility, transfers, and gait. pt is at risk for deconditioning due to a prolonged stay in the hospital combined with the BMT treatment. pt lives with wife who is able to support him as needed. Pt will be able to manage activity upon discharge in order to stay functional when at home with assist.   PT Brief overview of current status IND   Total Session Time   Total Session Time (sum of timed and untimed services) 8

## 2024-08-01 NOTE — PROGRESS NOTES
"  BMT Progress note  Chief complaint:  Arpit Forrest is a 73 year old male, currently day -6  prep for ELIEZER MUD PBSCT for MDS.  INTERIM HISTORY: He is doing well.  He is up and has finished breakfast.  He has no new medical complaints.  REVIEW OF SYSTEMS: Otherwise unremarkable other than what is noted in the Interim History.   PHYSICAL EXAM:   Vitals:  /83 (BP Location: Right arm)   Pulse 66   Temp 97.8  F (36.6  C) (Oral)   Resp 16   Ht 1.825 m (5' 11.85\")   Wt 117.8 kg (259 lb 12.8 oz)   SpO2 96%   BMI 35.38 kg/m    Wt Readings from Last 4 Encounters:   08/01/24 117.8 kg (259 lb 12.8 oz)   07/30/24 117.5 kg (259 lb)   07/26/24 117 kg (258 lb)   07/23/24 115.3 kg (254 lb 1.6 oz)     General Appearance: NAD; face has maye appearance--stable per pt  HEENT: sclera anicteric. Moist mucus membranes, no ulcerations.  CV: RRR. no murmur or rub.   RESP: CTA bilaterally; no rales or wheezes.   GI: +BS, soft, nontender, nondistended.   EXT: No edema. No cyanosis/clubbing.   SKIN: no lesions or rash  NEURO: A&O x3   PSYCH: Appropriate affect   VASCULAR ACCESS: dressing CDI.     ROUTINE LABS:    Lab Results   Component Value Date    WBC 6.2 08/01/2024    ANEU 2.4 07/23/2024    HGB 11.5 (L) 08/01/2024    HCT 36.9 (L) 08/01/2024     08/01/2024     08/01/2024    POTASSIUM 4.0 08/01/2024    CHLORIDE 105 08/01/2024    CO2 24 08/01/2024     (H) 08/01/2024    BUN 17.2 08/01/2024    CR 1.04 08/01/2024    MAG 1.9 07/31/2024    INR 1.11 07/31/2024          ASSESSMENT AND PLAN:    Arpit Forrest is a 73 year old male, currently day -6 prep for ELIEZER MUD PBSCT for MDS.     BMT/IEC PROTOCOL for MDS  - Chemo protocol: [id number]  D-6: Flu 30mg/m2, Cy 50mg/kg  D-5 to D-2: Flu  D-1: TBI  D0: stem cell infusion  - Peripheral blood stem cell graft from 8/8 donor, ABO matched (Oneg), Cell dose: TBD  - Engraftment monitoring: Peripheral blood and bone marrow chimerisms on D28, D100, 6 months, 1 and 2 year  - " Restaging plan: BMBx with NGS on D28, D100, 6 months, 1 and 2 years     HEME/COAG  - GCSF starts day +5, continue until ANC > 1500 for 3 consecutive days.   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      ID  Multiple granulomas in th lung and bone marrow biopsy  - fungal studies (fungitell, aspergillus galactomanan, urine histoplasma and fungal antibodies), strongyloides, schistosoma Pending  - ID consult on admission--see note; ok for aftab; labs as above     Prophylaxis plan:   - Bacterial: Levaquin 250mg from D-1 till engraftment    - Fungal: Aftab  - PJP: Bactrim or Atovaquone or Pentamidine from D28. Toxoplasma IgG negative   - Viral: Acyclovir 800mg bid (Pt/donor CMV IgG neg)     Monitoring:   - CMV every week    - EBV every 2 weeks from D30 to D180     GI/NUTRITION  # Hepatic steatosis and boderline iron deposition in liver  - LFTs and synthetic function normal on admission; Check LFTs qM/Th  - Monitor for VOD  - VOD prophy: Ursodiol 300mg TID      # Supportive   - Anti-emetics:  per protocol   - Ulcer prophy: PPI daily   - Dietician support to prevent malnutrition.     CARDIOVASCULAR  # Hx of A.fib with RVR   - A.fib with RVR while admitted with febrile episode on 3/15/24. Noted on his fitbit, not very symptomatic. Cardioversion planned for new onset A.fib. Not achieved with amiodarone, DCCV on 3/21/24 restored normal sinus rhythm. Amiodarone and anticoagulation was stopped in April 2024 without any recurrence.   - He could have a recurrence during transplant which would need to be addressed with rate control strategy      # Asymptomatic moderate aortic stenosis      # CAD  - Chest CT in February 2024 with mild to moderate CAC, three-vessel disease. No anginal symptoms   - Continue atorvastatin 10mg   - EKG Sinus rhythm Inferior infarct , age undetermined. Abnormal ECG  "(known cardiovascular disease). QTc =460     # HTN  - Continue amlodipine   - Risk of cardiomyopathy:  Baseline EF normal 60-65&  - Risk of arrhythmia: Baseline EKG showed NSR, Qtc 460     RESPIRATORY  # VINCENT : CPAP  - Baseline PFTs: normal   - Risk of respiratory complications: Frequent ambulation and incentive spirometer.     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 Consult endocrine.     MUSCULOSKELETAL/FRAILTY  # Gout: Continue allopurinol indefinitely   - Baseline Frailty Score: Not frail   - Daily PT/OT as needed while inpatient     SOCIAL DETERMINANTS  - Caregiver: Wife, Meghna, children   - Financial/insurance concerns: no    Medically Ready for Discharge: Anticipated in 5+ Days  Clinically Significant Risk Factors                  # Hypertension: Noted on problem list            # Obesity: Estimated body mass index is 35.38 kg/m  as calculated from the following:    Height as of this encounter: 1.825 m (5' 11.85\").    Weight as of this encounter: 117.8 kg (259 lb 12.8 oz)., PRESENT ON ADMISSION          I spent 35 minutes in the care of this patient today, which included time necessary for preparation for the visit, obtaining history, ordering medications/tests/procedures as medically indicated, review of pertinent medical literature, counseling of the patient, communication of recommendations to the care team, and documentation time.      Divine Goldberg PA-c  Pager [phone]  8/1/2024  7:20 AM    "

## 2024-08-01 NOTE — PLAN OF CARE
"Afebrile, OVSS. Pt denies pain, nausea and vomiting. No PRNs given. No replacements needed. Pt up independently, no BMs during shift , CPAP on while sleeping. Pt has been flushing since 2200, [place] scheduled at 0800, Fludarabine scheduled at 0900, and Cytoxan to start at 1000.      Problem: Adult Inpatient Plan of Care  Goal: Plan of Care Review  Description: The Plan of Care Review/Shift note should be completed every shift.  The Outcome Evaluation is a brief statement about your assessment that the patient is improving, declining, or no change.  This information will be displayed automatically on your shift  note.  Outcome: Progressing  Goal: Patient-Specific Goal (Individualized)  Description: You can add care plan individualizations to a care plan. Examples of Individualization might be:  \"Parent requests to be called daily at 9am for status\", \"I have a hard time hearing out of my right ear\", or \"Do not touch me to wake me up as it startles  me\".  Outcome: Progressing  Goal: Absence of Hospital-Acquired Illness or Injury  Outcome: Progressing  Intervention: Identify and Manage Fall Risk  Recent Flowsheet Documentation  Taken 8/1/2024 0228 by Marika Jean, RN  Safety Promotion/Fall Prevention:   safety round/check completed   activity supervised   clutter free environment maintained   lighting adjusted   nonskid shoes/slippers when out of bed  Taken 8/1/2024 0008 by Marika Jean, RN  Safety Promotion/Fall Prevention:   safety round/check completed   activity supervised   clutter free environment maintained   lighting adjusted   nonskid shoes/slippers when out of bed  Taken 7/31/2024 2201 by Marika Jean, RN  Safety Promotion/Fall Prevention:   safety round/check completed   activity supervised   clutter free environment maintained   lighting adjusted   nonskid shoes/slippers when out of bed  Taken 7/31/2024 2006 by Marika Jean, RN  Safety Promotion/Fall Prevention:   safety round/check " completed   activity supervised   clutter free environment maintained   lighting adjusted   nonskid shoes/slippers when out of bed  Intervention: Prevent Skin Injury  Recent Flowsheet Documentation  Taken 8/1/2024 0008 by Marika Jean RN  Body Position:   position changed independently   supine  Intervention: Prevent and Manage VTE (Venous Thromboembolism) Risk  Recent Flowsheet Documentation  Taken 7/31/2024 2006 by Marika Jean RN  VTE Prevention/Management: compression stockings off  Intervention: Prevent Infection  Recent Flowsheet Documentation  Taken 8/1/2024 0228 by Marika Jean RN  Infection Prevention:   cohorting utilized   environmental surveillance performed   equipment surfaces disinfected   hand hygiene promoted   personal protective equipment utilized   rest/sleep promoted  Taken 8/1/2024 0008 by Marika Jean RN  Infection Prevention:   cohorting utilized   environmental surveillance performed   equipment surfaces disinfected   hand hygiene promoted   personal protective equipment utilized   rest/sleep promoted  Taken 7/31/2024 2006 by Marika Jean RN  Infection Prevention:   cohorting utilized   environmental surveillance performed   equipment surfaces disinfected   hand hygiene promoted   personal protective equipment utilized   rest/sleep promoted  Goal: Optimal Comfort and Wellbeing  Outcome: Progressing  Goal: Readiness for Transition of Care  Outcome: Progressing     Problem: Risk for Delirium  Goal: Optimal Coping  Outcome: Progressing  Goal: Improved Behavioral Control  Outcome: Progressing  Intervention: Minimize Safety Risk  Recent Flowsheet Documentation  Taken 8/1/2024 0228 by Marika Jean RN  Enhanced Safety Measures: review medications for side effects with activity  Taken 8/1/2024 0008 by Marika Jean RN  Enhanced Safety Measures: review medications for side effects with activity  Taken 7/31/2024 2006 by Marika Jean RN  Enhanced Safety  Measures: review medications for side effects with activity  Goal: Improved Attention and Thought Clarity  Outcome: Progressing  Goal: Improved Sleep  Outcome: Progressing     Problem: Stem Cell/Bone Marrow Transplant  Goal: Optimal Coping with Transplant  Outcome: Progressing  Goal: Symptom-Free Urinary Elimination  Outcome: Progressing  Goal: Diarrhea Symptom Control  Outcome: Progressing  Goal: Improved Activity Tolerance  Outcome: Progressing  Intervention: Promote Improved Energy  Recent Flowsheet Documentation  Taken 7/31/2024 2001 by Markia Jean RN  Activity Management: up ad soo  Goal: Blood Counts Within Acceptable Range  Outcome: Progressing  Intervention: Monitor and Manage Hematologic Symptoms  Recent Flowsheet Documentation  Taken 8/1/2024 0228 by Marika Jean RN  Bleeding Precautions: blood pressure closely monitored  Medication Review/Management: medications reviewed  Taken 8/1/2024 0008 by Marika Jean RN  Bleeding Precautions: blood pressure closely monitored  Medication Review/Management: medications reviewed  Taken 7/31/2024 2006 by Marika Jean RN  Bleeding Precautions: blood pressure closely monitored  Medication Review/Management: medications reviewed  Goal: Absence of Hypersensitivity Reaction  Outcome: Progressing  Goal: Absence of Infection  Outcome: Progressing  Intervention: Prevent and Manage Infection  Recent Flowsheet Documentation  Taken 8/1/2024 0228 by Marika Jean RN  Infection Prevention:   cohorting utilized   environmental surveillance performed   equipment surfaces disinfected   hand hygiene promoted   personal protective equipment utilized   rest/sleep promoted  Infection Management: aseptic technique maintained  Isolation Precautions:   enteric precautions initiated   enteric precautions maintained  Taken 8/1/2024 0008 by Marika Jean RN  Infection Prevention:   cohorting utilized   environmental surveillance performed   equipment surfaces  disinfected   hand hygiene promoted   personal protective equipment utilized   rest/sleep promoted  Infection Management: aseptic technique maintained  Isolation Precautions:   enteric precautions initiated   enteric precautions maintained  Taken 7/31/2024 2006 by Marika Jean RN  Infection Prevention:   cohorting utilized   environmental surveillance performed   equipment surfaces disinfected   hand hygiene promoted   personal protective equipment utilized   rest/sleep promoted  Infection Management: aseptic technique maintained  Isolation Precautions:   enteric precautions initiated   enteric precautions maintained  Goal: Improved Oral Mucous Membrane Health and Integrity  Outcome: Progressing  Goal: Nausea and Vomiting Symptom Relief  Outcome: Progressing  Goal: Optimal Nutrition Intake  Outcome: Progressing     Problem: Infection  Goal: Absence of Infection Signs and Symptoms  Outcome: Progressing  Intervention: Prevent or Manage Infection  Recent Flowsheet Documentation  Taken 8/1/2024 0228 by Marika Jean RN  Infection Management: aseptic technique maintained  Isolation Precautions:   enteric precautions initiated   enteric precautions maintained  Taken 8/1/2024 0008 by Marika Jean RN  Infection Management: aseptic technique maintained  Isolation Precautions:   enteric precautions initiated   enteric precautions maintained  Taken 7/31/2024 2006 by Marika Jean, RN  Infection Management: aseptic technique maintained  Isolation Precautions:   enteric precautions initiated   enteric precautions maintained

## 2024-08-01 NOTE — PROGRESS NOTES
BMT 5C Admission Care Coordination Note    Arpit Forrest is a 73 year old male being admitted today for 2022-52.     History reviewed. No pertinent past medical history..    Last bmbx in clinic    Diagnosis: MDS    Protocol: 2022-52    Donor Source: Allogeneic - Unrelated Donor     Clinical Notes:   Pt only has one kidney because he donated his other to his sister.     Caregiver:   Meghna Forrest  [phone]  Cathy Finchorestes  [phone]  Frandy Gricel [phone]    Long-term BMT MD/NC/SW: Malcolm/Moreno/She    Discharge location: Home    [  ] Home Infusion Company: will send I referral    [  ] Pharmacy needs: [facility] (home vs clinic)     Sirolimus: $0     Tacrolimus: $0     MMF: $0     Prevymis: CMV neg    [ x ] Can fill meds at [facility] pharmacy (BMT benefits soft check): Yes   If not, preferred pharmacy at discharge: n/a    [  ] PT/OT recommendations: PT/OT consult placed    [ x ] 1st time discharge? Yes    [  ] Discharge teach    [  ] [facility] teach    [  ] Weekly Lab Day Preference?    [  ] D0 BAN scheduling notification    [ x ] clonoSEQ testing needed? no    [ x ] [employer] wallet card- n/a    I will continue to follow patient for discharge planning.    Aranza Escoto  BMT Nurse Coordinator  Phone: [phone]  Pager: [phone]

## 2024-08-01 NOTE — PROGRESS NOTES
"CLINICAL NUTRITION SERVICES - ASSESSMENT NOTE     Nutrition Prescription    RECOMMENDATIONS FOR MDs/PROVIDERS TO ORDER:  None at this time     Malnutrition Status:    Patient does not meet two of the established criteria necessary for diagnosing malnutrition    Recommendations already ordered by Registered Dietitian (RD):  None at this time     Future/Additional Recommendations:  -Monitor PO intake  -Monitor wt trends  -Monitor labs     REASON FOR ASSESSMENT  Arpit Forrest is a/an 73 year old male assessed by the dietitian for Nutrition Risk Monitoring        CLINICAL HISTORY  Per H&P, \"73 year old male, currently day -7 prep for ELIEZER MUD PBSCT for MDS.\" PMH also significant for: Macdonald's esophagus, gout, Hx kidney donation, hyperlipidemia, T2DM.       NUTRITION HISTORY  Please see 7/26 outpatient BMT RD note for additional information. Met with pt and his wife at bedside. Pt reports appetite continues to be good (he has 3 meals + snacks daily). Pt notes he has been having [employer] protein powder daily PTA to increase his protein (goal of 135 g per his tracker, pt notes he often had difficulty reaching this goal). Pt denies any nausea or taste changes at this time.       CURRENT NUTRITION ORDERS  Diet: High Kcal/High Protein, PRN snacks/supplements   Intake/Tolerance: 100% at dinner last night and at breakfast this morning.     LABS  Labs reviewed. Notable for:  Glucose 112   (7/22)    MEDICATIONS  Medications reviewed. Notable for:  Acyclovir  Caltrate  Chemo-[place]  Cytoxan  Insulin- Endo is following pt   Zofran  Sirolimus  Ursodiol  0.45% NaCl and Kcl @ 225 mL/hr  PRN Lasix, Miralax, Compazine, Senokot    ANTHROPOMETRICS  Height: 182.5 cm (5' 11.85\")  Most Recent Weight: 115.6 kg (254 lb 12.8 oz)- pt is currently 142.9% of IBW  IBW: 80.9 kg  BMI: Obesity Grade II BMI 35-39.9  Weight History: From 7/26 outpatient BMT RD note, \"Pt reports UBW of 240-250#, no recent changes to wt noted.\"  Dosing Weight: " 89.5 kg (adjusted wt based off of current wt and IBW)    ASSESSED NUTRITION NEEDS  Estimated Energy Needs: 8146-4991 kcals/day (25 - 30 kcals/kg)  Justification: Increased needs  Estimated Protein Needs: 107-134 grams protein/day (1.2 - 1.5 grams of pro/kg)  Justification: Increased needs  Estimated Fluid Needs: 0547-5673 mL/day (25 - 30 mL/kg)   Justification: Maintenance    PHYSICAL FINDINGS  See malnutrition section below.       MALNUTRITION  % Intake: No decreased intake noted  % Weight Loss: None noted  Subcutaneous Fat Loss: None observed  Muscle Loss: None observed  Fluid Accumulation/Edema: Moderate (+3 edema noted on B/L legs and feet per RN flowsheets)  Malnutrition Diagnosis: Patient does not meet two of the established criteria necessary for diagnosing malnutrition    NUTRITION DIAGNOSIS  Predicted inadequate nutrient intake (kcal/protein) related to upcoming BMT with potential for side effects to affect ability to take adequate PO.       INTERVENTIONS  Implementation  Nutrition Education: Provided education on BMT nutrition, food safety, menu hacks, ONS options at [facility]   Medical food supplement therapy- continue PRN orders     Goals  Patient to consume % of nutritionally adequate meal trays TID, or the equivalent with supplements/snacks.     Monitoring/Evaluation  Progress toward goals will be monitored and evaluated per protocol.  Susan Rogers RD (Maggie), LD- 5C Clinical Dietitian   Available on [employer]  No longer available by paging

## 2024-08-01 NOTE — PLAN OF CARE
Patient vital signs stable, no complaints offered today. He tolerated his first dose of Cytoxan and Fludarabine this morning. His weight was up this morning by 5 pounds from admission and remains the same this evening he was given Lasix for low urine output and the weight gain his response > liter, 16:00 NAK good. He will flush until noon tomorrow. Continue to monitor  Problem: Adult Inpatient Plan of Care  Goal: Plan of Care Review  Description: The Plan of Care Review/Shift note should be completed every shift.  The Outcome Evaluation is a brief statement about your assessment that the patient is improving, declining, or no change.  This information will be displayed automatically on your shift  note.  Outcome: Progressing   Goal Outcome Evaluation:

## 2024-08-02 ENCOUNTER — APPOINTMENT (OUTPATIENT)
Dept: PHYSICAL THERAPY | Facility: CLINIC | Age: 74
DRG: 014 | End: 2024-08-02
Attending: INTERNAL MEDICINE
Payer: MEDICARE

## 2024-08-02 LAB
ANION GAP SERPL CALCULATED.3IONS-SCNC: 13 MMOL/L (ref 7–15)
BACTERIA SPEC CULT: NORMAL
BASOPHILS # BLD AUTO: 0 10E3/UL (ref 0–0.2)
BASOPHILS NFR BLD AUTO: 0 %
BUN SERPL-MCNC: 19.5 MG/DL (ref 8–23)
CALCIUM SERPL-MCNC: 9.4 MG/DL (ref 8.8–10.4)
CHLORIDE SERPL-SCNC: 102 MMOL/L (ref 98–107)
CREAT SERPL-MCNC: 1.04 MG/DL (ref 0.67–1.17)
EGFRCR SERPLBLD CKD-EPI 2021: 75 ML/MIN/1.73M2
EOSINOPHIL # BLD AUTO: 0 10E3/UL (ref 0–0.7)
EOSINOPHIL NFR BLD AUTO: 0 %
ERYTHROCYTE [DISTWIDTH] IN BLOOD BY AUTOMATED COUNT: 15.6 % (ref 10–15)
GALACTOMANNAN AG SERPL QL IA: NEGATIVE
GALACTOMANNAN AG SPEC IA-ACNC: 0.04
GLUCOSE BLDC GLUCOMTR-MCNC: 141 MG/DL (ref 70–99)
GLUCOSE BLDC GLUCOMTR-MCNC: 148 MG/DL (ref 70–99)
GLUCOSE BLDC GLUCOMTR-MCNC: 170 MG/DL (ref 70–99)
GLUCOSE BLDC GLUCOMTR-MCNC: 187 MG/DL (ref 70–99)
GLUCOSE SERPL-MCNC: 182 MG/DL (ref 70–99)
HCO3 SERPL-SCNC: 20 MMOL/L (ref 22–29)
HCT VFR BLD AUTO: 36.6 % (ref 40–53)
HGB BLD-MCNC: 11.5 G/DL (ref 13.3–17.7)
IMM GRANULOCYTES # BLD: 0.1 10E3/UL
IMM GRANULOCYTES NFR BLD: 1 %
LYMPHOCYTES # BLD AUTO: 0.8 10E3/UL (ref 0.8–5.3)
LYMPHOCYTES NFR BLD AUTO: 6 %
MAGNESIUM SERPL-MCNC: 1.7 MG/DL (ref 1.7–2.3)
MCH RBC QN AUTO: 29.7 PG (ref 26.5–33)
MCHC RBC AUTO-ENTMCNC: 31.4 G/DL (ref 31.5–36.5)
MCV RBC AUTO: 95 FL (ref 78–100)
MONOCYTES # BLD AUTO: 0.6 10E3/UL (ref 0–1.3)
MONOCYTES NFR BLD AUTO: 4 %
NEUTROPHILS # BLD AUTO: 11.6 10E3/UL (ref 1.6–8.3)
NEUTROPHILS NFR BLD AUTO: 89 %
NRBC # BLD AUTO: 0 10E3/UL
NRBC BLD AUTO-RTO: 0 /100
PHOSPHATE SERPL-MCNC: 3.8 MG/DL (ref 2.5–4.5)
PLATELET # BLD AUTO: 385 10E3/UL (ref 150–450)
POTASSIUM SERPL-SCNC: 4.2 MMOL/L (ref 3.4–5.3)
POTASSIUM SERPL-SCNC: 4.2 MMOL/L (ref 3.4–5.3)
POTASSIUM SERPL-SCNC: 4.5 MMOL/L (ref 3.4–5.3)
RBC # BLD AUTO: 3.87 10E6/UL (ref 4.4–5.9)
SODIUM SERPL-SCNC: 135 MMOL/L (ref 135–145)
SODIUM SERPL-SCNC: 135 MMOL/L (ref 135–145)
SODIUM SERPL-SCNC: 136 MMOL/L (ref 135–145)
WBC # BLD AUTO: 13 10E3/UL (ref 4–11)

## 2024-08-02 PROCEDURE — 84100 ASSAY OF PHOSPHORUS: CPT | Performed by: INTERNAL MEDICINE

## 2024-08-02 PROCEDURE — 83735 ASSAY OF MAGNESIUM: CPT | Performed by: INTERNAL MEDICINE

## 2024-08-02 PROCEDURE — 99418 PROLNG IP/OBS E/M EA 15 MIN: CPT | Performed by: PHYSICIAN ASSISTANT

## 2024-08-02 PROCEDURE — 82565 ASSAY OF CREATININE: CPT | Performed by: STUDENT IN AN ORGANIZED HEALTH CARE EDUCATION/TRAINING PROGRAM

## 2024-08-02 PROCEDURE — 250N000011 HC RX IP 250 OP 636: Performed by: STUDENT IN AN ORGANIZED HEALTH CARE EDUCATION/TRAINING PROGRAM

## 2024-08-02 PROCEDURE — 86635 COCCIDIOIDES ANTIBODY: CPT | Performed by: STUDENT IN AN ORGANIZED HEALTH CARE EDUCATION/TRAINING PROGRAM

## 2024-08-02 PROCEDURE — 250N000013 HC RX MED GY IP 250 OP 250 PS 637: Performed by: PHYSICIAN ASSISTANT

## 2024-08-02 PROCEDURE — 250N000011 HC RX IP 250 OP 636: Performed by: PHYSICIAN ASSISTANT

## 2024-08-02 PROCEDURE — 250N000013 HC RX MED GY IP 250 OP 250 PS 637: Performed by: STUDENT IN AN ORGANIZED HEALTH CARE EDUCATION/TRAINING PROGRAM

## 2024-08-02 PROCEDURE — 85025 COMPLETE CBC W/AUTO DIFF WBC: CPT | Performed by: STUDENT IN AN ORGANIZED HEALTH CARE EDUCATION/TRAINING PROGRAM

## 2024-08-02 PROCEDURE — 250N000012 HC RX MED GY IP 250 OP 636 PS 637: Performed by: STUDENT IN AN ORGANIZED HEALTH CARE EDUCATION/TRAINING PROGRAM

## 2024-08-02 PROCEDURE — 84295 ASSAY OF SERUM SODIUM: CPT | Performed by: STUDENT IN AN ORGANIZED HEALTH CARE EDUCATION/TRAINING PROGRAM

## 2024-08-02 PROCEDURE — 99233 SBSQ HOSP IP/OBS HIGH 50: CPT | Mod: 24 | Performed by: PHYSICIAN ASSISTANT

## 2024-08-02 PROCEDURE — 206N000001 HC R&B BMT UMMC

## 2024-08-02 PROCEDURE — 250N000011 HC RX IP 250 OP 636: Performed by: INTERNAL MEDICINE

## 2024-08-02 PROCEDURE — 84132 ASSAY OF SERUM POTASSIUM: CPT | Performed by: STUDENT IN AN ORGANIZED HEALTH CARE EDUCATION/TRAINING PROGRAM

## 2024-08-02 PROCEDURE — 258N000003 HC RX IP 258 OP 636: Performed by: PHYSICIAN ASSISTANT

## 2024-08-02 PROCEDURE — 97530 THERAPEUTIC ACTIVITIES: CPT | Mod: GP

## 2024-08-02 PROCEDURE — 97110 THERAPEUTIC EXERCISES: CPT | Mod: GP

## 2024-08-02 PROCEDURE — 258N000003 HC RX IP 258 OP 636: Performed by: INTERNAL MEDICINE

## 2024-08-02 PROCEDURE — 99232 SBSQ HOSP IP/OBS MODERATE 35: CPT | Mod: 24 | Performed by: STUDENT IN AN ORGANIZED HEALTH CARE EDUCATION/TRAINING PROGRAM

## 2024-08-02 RX ADMIN — Medication 5 ML: at 12:18

## 2024-08-02 RX ADMIN — POTASSIUM CHLORIDE AND SODIUM CHLORIDE: 450; 150 INJECTION, SOLUTION INTRAVENOUS at 05:11

## 2024-08-02 RX ADMIN — POLYETHYLENE GLYCOL 3350 17 G: 17 POWDER, FOR SOLUTION ORAL at 08:41

## 2024-08-02 RX ADMIN — SENNOSIDES AND DOCUSATE SODIUM 1 TABLET: 8.6; 5 TABLET ORAL at 08:41

## 2024-08-02 RX ADMIN — ONDANSETRON HYDROCHLORIDE 8 MG: 8 TABLET, FILM COATED ORAL at 16:24

## 2024-08-02 RX ADMIN — URSODIOL 300 MG: 300 CAPSULE ORAL at 14:56

## 2024-08-02 RX ADMIN — POTASSIUM CHLORIDE AND SODIUM CHLORIDE: 450; 150 INJECTION, SOLUTION INTRAVENOUS at 08:43

## 2024-08-02 RX ADMIN — INSULIN ASPART 1 UNITS: 100 INJECTION, SOLUTION INTRAVENOUS; SUBCUTANEOUS at 08:20

## 2024-08-02 RX ADMIN — PANTOPRAZOLE SODIUM 40 MG: 40 TABLET, DELAYED RELEASE ORAL at 08:22

## 2024-08-02 RX ADMIN — BRIMONIDINE TARTRATE 1 DROP: 2 SOLUTION/ DROPS OPHTHALMIC at 20:22

## 2024-08-02 RX ADMIN — FLUTICASONE PROPIONATE 1 SPRAY: 50 SPRAY, METERED NASAL at 20:23

## 2024-08-02 RX ADMIN — INSULIN ASPART 1 UNITS: 100 INJECTION, SOLUTION INTRAVENOUS; SUBCUTANEOUS at 17:27

## 2024-08-02 RX ADMIN — FLUDARABINE PHOSPHATE 73 MG: 25 INJECTION, SOLUTION INTRAVENOUS at 09:47

## 2024-08-02 RX ADMIN — ONDANSETRON HYDROCHLORIDE 8 MG: 8 TABLET, FILM COATED ORAL at 08:22

## 2024-08-02 RX ADMIN — CALCIUM CARBONATE 600 MG (1,500 MG)-VITAMIN D3 400 UNIT TABLET 1 TABLET: at 08:22

## 2024-08-02 RX ADMIN — DEXAMETHASONE 10 MG: 2 TABLET ORAL at 08:22

## 2024-08-02 RX ADMIN — AMLODIPINE BESYLATE 10 MG: 10 TABLET ORAL at 08:22

## 2024-08-02 RX ADMIN — URSODIOL 300 MG: 300 CAPSULE ORAL at 20:22

## 2024-08-02 RX ADMIN — ATORVASTATIN CALCIUM 10 MG: 10 TABLET, FILM COATED ORAL at 20:22

## 2024-08-02 RX ADMIN — URSODIOL 300 MG: 300 CAPSULE ORAL at 08:22

## 2024-08-02 RX ADMIN — ALLOPURINOL 300 MG: 300 TABLET ORAL at 08:22

## 2024-08-02 RX ADMIN — ONDANSETRON HYDROCHLORIDE 8 MG: 8 TABLET, FILM COATED ORAL at 23:31

## 2024-08-02 RX ADMIN — BRIMONIDINE TARTRATE 1 DROP: 2 SOLUTION/ DROPS OPHTHALMIC at 08:23

## 2024-08-02 RX ADMIN — MICAFUNGIN SODIUM 100 MG: 50 INJECTION, POWDER, LYOPHILIZED, FOR SOLUTION INTRAVENOUS at 10:10

## 2024-08-02 RX ADMIN — Medication 5 ML: at 16:24

## 2024-08-02 RX ADMIN — INSULIN ASPART 1 UNITS: 100 INJECTION, SOLUTION INTRAVENOUS; SUBCUTANEOUS at 12:14

## 2024-08-02 RX ADMIN — FLUTICASONE PROPIONATE 1 SPRAY: 50 SPRAY, METERED NASAL at 08:20

## 2024-08-02 ASSESSMENT — ACTIVITIES OF DAILY LIVING (ADL)
ADLS_ACUITY_SCORE: 20

## 2024-08-02 NOTE — PROGRESS NOTES
"  BMT Progress note  Chief complaint:  Arpit Forrest is a 73 year old male, currently day -5  prep for ELIEZER MUD PBSCT for MDS.  INTERIM HISTORY: He is doing well.  He remains active and PO intake is good.  He is tolerating chemo well.  He was up much of the night urinating w/ cytoxan flush.  No other issues.  REVIEW OF SYSTEMS: Otherwise unremarkable other than what is noted in the Interim History.   PHYSICAL EXAM:   Vitals:  /87 (BP Location: Right arm)   Pulse 69   Temp 97.6  F (36.4  C) (Oral)   Resp 16   Ht 1.825 m (5' 11.85\")   Wt 119.7 kg (264 lb)   SpO2 100%   BMI 35.95 kg/m    Wt Readings from Last 4 Encounters:   08/02/24 119.7 kg (264 lb)   07/30/24 117.5 kg (259 lb)   07/26/24 117 kg (258 lb)   07/23/24 115.3 kg (254 lb 1.6 oz)     General Appearance: NAD; face has maye appearance--stable per pt  HEENT: sclera anicteric. Moist mucus membranes, no ulcerations.  CV: RRR. no murmur or rub.   RESP: CTA bilaterally; no rales or wheezes.   GI: +BS, soft, nontender, nondistended.   EXT: No edema. No cyanosis/clubbing.   SKIN: no lesions or rash  NEURO: A&O x3   PSYCH: Appropriate affect   VASCULAR ACCESS: dressing CDI.     ROUTINE LABS:    Lab Results   Component Value Date    WBC 13.0 (H) 08/02/2024    ANEU 2.4 07/23/2024    HGB 11.5 (L) 08/02/2024    HCT 36.6 (L) 08/02/2024     08/02/2024     08/02/2024     08/02/2024    POTASSIUM 4.2 08/02/2024    POTASSIUM 4.2 08/02/2024    CHLORIDE 102 08/02/2024    CO2 20 (L) 08/02/2024     (H) 08/02/2024    BUN 19.5 08/02/2024    CR 1.04 08/02/2024    MAG 1.7 08/02/2024    INR 1.11 07/31/2024          ASSESSMENT AND PLAN:    Arpit Forrest is a 73 year old male, currently day -5 prep for ELIEZER MUD PBSCT for MDS.     BMT/IEC PROTOCOL for MDS  - Chemo protocol: [id number]  D-6: Flu 30mg/m2, Cy 50mg/kg  D-5 to D-2: Flu  D-1: TBI  D0: stem cell infusion  - Peripheral blood stem cell graft from 8/8 donor, ABO matched (Oneg), Cell dose: " TBD  - Engraftment monitoring: Peripheral blood and bone marrow chimerisms on D28, D100, 6 months, 1 and 2 year  - Restaging plan: BMBx with NGS on D28, D100, 6 months, 1 and 2 years     HEME/COAG  - GCSF starts day +5, continue until ANC > 1500 for 3 consecutive days.   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      ID  Multiple granulomas in th lung and bone marrow biopsy  - fungitell, aspergillus galactomanan neg  - urine histoplasma and fungal antibodies, strongyloides, schistosoma Pending  - ID consult on admission--see note; ok for aftab; labs as above     Prophylaxis plan:   - Bacterial: Levaquin 250mg from D-1 till engraftment    - Fungal: Aftab  - PJP: Bactrim or Atovaquone or Pentamidine from D28. Toxoplasma IgG negative   - Viral: Acyclovir 800mg bid (Pt/donor CMV IgG neg)     Monitoring:   - CMV every week    - EBV every 2 weeks from D30 to D180     GI/NUTRITION  # Hepatic steatosis and boderline iron deposition in liver  - LFTs and synthetic function normal on admission; Check LFTs qM/Th  - Monitor for VOD  - VOD prophy: Ursodiol 300mg TID      # Supportive   - Anti-emetics:  per protocol   - Ulcer prophy: PPI daily   - Dietician support to prevent malnutrition.     CARDIOVASCULAR  # Hx of A.fib with RVR   - A.fib with RVR while admitted with febrile episode on 3/15/24. Noted on his fitbit, not very symptomatic. Cardioversion planned for new onset A.fib. Not achieved with amiodarone, DCCV on 3/21/24 restored normal sinus rhythm. Amiodarone and anticoagulation was stopped in April 2024 without any recurrence.   - He could have a recurrence during transplant which would need to be addressed with rate control strategy      # Asymptomatic moderate aortic stenosis      # CAD  - Chest CT in February 2024 with mild to moderate CAC, three-vessel disease. No anginal symptoms   -  "Continue atorvastatin 10mg   - EKG Sinus rhythm Inferior infarct , age undetermined. Abnormal ECG (known cardiovascular disease). QTc =460     # HTN  - Continue amlodipine   - Risk of cardiomyopathy:  Baseline EF normal 60-65&  - Risk of arrhythmia: Baseline EKG showed NSR, Qtc 460     RESPIRATORY  # VINCENT : CPAP  - Baseline PFTs: normal   - Risk of respiratory complications: Frequent ambulation and incentive spirometer.     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 Consult endocrine.     MUSCULOSKELETAL/FRAILTY  # Gout: Continue allopurinol indefinitely   - Baseline Frailty Score: Not frail   - Daily PT/OT as needed while inpatient     SOCIAL DETERMINANTS  - Caregiver: Wife, Meghna, children     Medically Ready for Discharge: Anticipated in 5+ Days  Clinically Significant Risk Factors                  # Hypertension: Noted on problem list            # Obesity: Estimated body mass index is 35.95 kg/m  as calculated from the following:    Height as of this encounter: 1.825 m (5' 11.85\").    Weight as of this encounter: 119.7 kg (264 lb)., PRESENT ON ADMISSION          I spent 35 minutes in the care of this patient today, which included time necessary for preparation for the visit, obtaining history, ordering medications/tests/procedures as medically indicated, review of pertinent medical literature, counseling of the patient, communication of recommendations to the care team, and documentation time.      Divine Goldberg PA-c  Pager [phone]  8/1/2024  7:20 AM    "

## 2024-08-02 NOTE — PROGRESS NOTES
..Stop time on MAR & chart I & O  Chemo drug fludarabine   Tolerated well   Intervention decadron and zofran premeds. Positive blood return.  Response no n/v.  Plan check wt at 4 pm and labs   Lab: Na, K, UpH drug level  Wt/intervention 119.7 kg   Shower/drsg/linen    Cytoxan Primer  Cytoxan infused at  [place] infusing at __ and ends__  Flush infusing at __ and ends __

## 2024-08-02 NOTE — PROGRESS NOTES
Transplant & Immunocompromised   Infectious Disease     Follow up and Sign off Note   Patient: Arpit Forrest, Date of birth 1950, Medical record number [id number].     Assessment and Recommendations     Impression: Inactive granulomas on PET scan 2/9/2024    Recommendations  No need to modify the standard operating protocol for fungal prophylaxis [okay for micafungin]  Await screening strongyloidiasis and Schistosoma antibodies, if strongyloides is positive give ivermetcin, if schistosoma then praziquantel  Await screening fungal antibodies- Positive test will NOT require treatment unless concern for expanding pulmonary lesions or new infection.    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  The nodules prompted ID,      ID Problem List and Discussion:     # Immunocompromised  # Allogeneic HSCT recipietn (planned)  # Pulmonary granulomas- Longstanding per patient.    Seen on cross-sectional imaging at [facility] 12/2023.    PET scan 2/9/2024 without activity.      No Coccidioides exposure.  TB QuantiFERON negative.  Had agent orange and other exposure in [place].    Likely dormant/sterile granuloma secondary to remote injury.  Lack of activity on PET or any history of pneumonia very reassuring.  Low risk.    To better understand these we can complete a workup with fungal antibodies.  Coccidioides would be the only organism that may necessitate some alterations to treatment although doubtful if  that even may be required.    #History of OM of Knee in 2008 , Bacterial  Per patient a rare organism that occurs mainly in [place].  Treated with numerous antibiotics including Levaquin by ID in [facility].  Perhaps melioidosis ?  No hardware in the knee, remote history and complete treatment- so low risk of recurrence.    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  Ordered screening strongyloidiasis and Schistosoma antibodies, will treat if positive  TB Quantiferon is negative    # History of Malaria 1970s [facility]    Transplant Checklist  - Bacterial prophylaxis: None, see elsewhere for treatment Abx  - Pneumocystis prophylaxis:  start day +28 with bactrim or equivalent  - Viral serostatus & prophylaxis: CMV D-/R-, EBV + , HSV 1-2- Acyclovir  - Fungal prophylaxis:  Micafungin then Azole  - Additional endemic disease testing/Serology:  Toxo IgG-ve, Schisto Strongy ordered     - Immunization status: to be addressed once current illness resolved & in the outpatient setting   - Gamma globulin status:No lab results found.  - Isolation status: Good hand hygiene.  - Antibiotic Allergy - None    Interval/Subjective     Doing well, afebrile, on cytoxan flush. WBC up today but no change to symptoms. Reviewed testing in progress and implications of testing.    Walking on treadmill, made it 1/2 a mile slightly winded but not too bad. Wants to get marrow-thon t shirt, yery motivated.     Discussed that febrile neutropenia is relatively common, many cases are non infectious (T cell expansion and engraftment). Our service may see him again if febrile.    Review of Symptoms.  A comprehensive 14 system review of symptoms was conducted and was otherwise negative (unless mentioned above)       History of Presenting Illness 7/31/24     Arpit Forrest is a 73 year old patient who presented on 7/31/2024 for allogenic HSCT.    Patient was in the [employer] in [place], he dug bunkers in the soil.  He  was exposed to agent orange.  Previously healthy then began to develop low energy and numerous symptoms.  On a trip to [facility] in late 2023 had fevers up to 106 and was diagnosed with adult onset stills disease.  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    He has had several episodes of neutropenic fever for which the microbiologic workup has reportedly been entirely negative.  I was able to review the records from [facility].  He had a PET scan in February that did not show any activity of the lung granulomas.    Of note:  Patient had osteomyelitis of the knee that occurred spontaneously and with an organism that is apparently common in [place] and treated with Levaquin in 2008 by an ID doctor in [facility].    Review of Symptoms:  A comprehensive 14 system review of symptoms was conducted and was otherwise negative (unless mentioned above)       Other Medical History:     An attempt was made to collect past medical surgical, family and social history during this encounter,  this information was reviewed with the patient and updated, documented where most relevant in the HPI       Physical Exam:     Temp: 97.6  F (36.4  C) Temp src: Oral BP: 137/87 Pulse: 69   Resp: 16 SpO2: 100 % O2 Device: None (Room air)       GENERAL APPEARANCE: Not in acute distress    PHYSICAL EXAM:  Eyes:     Extraocular eye movements grossly intact, no ptosis, no discharge, no scleral icterus.  Mouth, Throat:     Mucous membranes moist  Cardiovascular:    Good cardiopulmonary fitness, seen on treadmill  Respiratory:     Inspection: Not in respiratory distress, chest expansion symmetrical.  Musculoskeletal:     No major deformity  Neurologic:     Higher Mental Function: Conversant, AOx4   Motor: Moving all 4 limbs, ambulatory  Psychiatric: Appropriate  Skin:  Anicteric      MDM   I reviewed recent lab data, notes from  referring physicians, as well as other available imaging and diagnostic testing and incorporated these into my discussion as above.  The specific values when most notable are documented above but are otherwise available in the medical record for review.      Medical Decision Making  I saw and evaluated this patient on todays date as part of an E&M Encounter    1- Number and Complexity of Problems Addressed as part the Encounter Moderate    I addressed 1 or more chronic illnesses with exacerbation, progression, or side effects of treatment immunocompromised due to chemo- worsening   2- Amount and/or Complexity of Data to Be Reviewed and Analyzed Moderate   I reviewed the medical records for notes from other providers, and recent lab and imaging results   3- Risk of Complications and/or Morbidity or Mortality of Patient Management:  was moderate due to prescription drug management

## 2024-08-02 NOTE — PLAN OF CARE
"  Problem: Adult Inpatient Plan of Care  Goal: Patient-Specific Goal (Individualized)  Description: You can add care plan individualizations to a care plan. Examples of Individualization might be:  \"Parent requests to be called daily at 9am for status\", \"I have a hard time hearing out of my right ear\", or \"Do not touch me to wake me up as it startles  me\".  Outcome: Progressing   Goal Outcome Evaluation:  Vss. No pain. Cvc site is tender. No nausea. Received fludarabine chemotherapy. Cytoxan flush ended at noon. Heparin locked. Ate well. 71 and 85 grams of carbs. Blood sugar 148 and 141. Constipated and received miralax and senna. One bm. Independent.                       "

## 2024-08-02 NOTE — PLAN OF CARE
"Afebrile, slight HTN (140-150s/80s), OVSS. Pt denies pain, nausea and vomiting. PRN lasix 10mg given x1 for unmet UOP parameters. Flush to be completed at 1200 today. No replacements needed. Fludarabine scheduled for 0900. Continue with plan of care.     Problem: Adult Inpatient Plan of Care  Goal: Plan of Care Review  Description: The Plan of Care Review/Shift note should be completed every shift.  The Outcome Evaluation is a brief statement about your assessment that the patient is improving, declining, or no change.  This information will be displayed automatically on your shift  note.  Outcome: Progressing  Goal: Patient-Specific Goal (Individualized)  Description: You can add care plan individualizations to a care plan. Examples of Individualization might be:  \"Parent requests to be called daily at 9am for status\", \"I have a hard time hearing out of my right ear\", or \"Do not touch me to wake me up as it startles  me\".  Outcome: Progressing  Goal: Absence of Hospital-Acquired Illness or Injury  Outcome: Progressing  Intervention: Identify and Manage Fall Risk  Recent Flowsheet Documentation  Taken 8/2/2024 0233 by Marika Jean, RN  Safety Promotion/Fall Prevention:   safety round/check completed   activity supervised   clutter free environment maintained   assistive device/personal items within reach   nonskid shoes/slippers when out of bed   room organization consistent  Taken 8/2/2024 0153 by Marika Jean, RN  Safety Promotion/Fall Prevention: safety round/check completed  Taken 8/1/2024 2336 by Marika Jean, RN  Safety Promotion/Fall Prevention:   safety round/check completed   activity supervised   clutter free environment maintained   assistive device/personal items within reach   nonskid shoes/slippers when out of bed   room organization consistent  Taken 8/1/2024 2206 by Marika Jean, RN  Safety Promotion/Fall Prevention: safety round/check completed  Taken 8/1/2024 2138 by Miranda" Marika, RN  Safety Promotion/Fall Prevention: safety round/check completed  Taken 8/1/2024 1948 by Marika Jean, RN  Safety Promotion/Fall Prevention:   safety round/check completed   activity supervised   clutter free environment maintained   assistive device/personal items within reach   nonskid shoes/slippers when out of bed   room organization consistent  Intervention: Prevent and Manage VTE (Venous Thromboembolism) Risk  Recent Flowsheet Documentation  Taken 8/1/2024 1948 by Marika Jean, RN  VTE Prevention/Management: compression stockings off  Intervention: Prevent Infection  Recent Flowsheet Documentation  Taken 8/2/2024 0233 by Marika Jean, RN  Infection Prevention:   cohorting utilized   environmental surveillance performed   equipment surfaces disinfected   hand hygiene promoted   personal protective equipment utilized   rest/sleep promoted  Taken 8/1/2024 2336 by Marika Jean, RN  Infection Prevention:   cohorting utilized   environmental surveillance performed   equipment surfaces disinfected   hand hygiene promoted   personal protective equipment utilized   rest/sleep promoted  Taken 8/1/2024 1948 by Marika Jean RN  Infection Prevention:   cohorting utilized   environmental surveillance performed   equipment surfaces disinfected   hand hygiene promoted   personal protective equipment utilized   rest/sleep promoted  Goal: Optimal Comfort and Wellbeing  Outcome: Progressing  Goal: Readiness for Transition of Care  Outcome: Progressing     Problem: Risk for Delirium  Goal: Optimal Coping  Outcome: Progressing  Goal: Improved Behavioral Control  Outcome: Progressing  Intervention: Minimize Safety Risk  Recent Flowsheet Documentation  Taken 8/2/2024 0233 by Marika Jean, RN  Enhanced Safety Measures: review medications for side effects with activity  Taken 8/1/2024 2336 by Marika Jean, RN  Enhanced Safety Measures: review medications for side effects with  activity  Taken 8/1/2024 1948 by Marika Jean RN  Enhanced Safety Measures: review medications for side effects with activity  Goal: Improved Attention and Thought Clarity  Outcome: Progressing  Goal: Improved Sleep  Outcome: Progressing     Problem: Stem Cell/Bone Marrow Transplant  Goal: Optimal Coping with Transplant  Outcome: Progressing  Goal: Symptom-Free Urinary Elimination  Outcome: Progressing  Goal: Diarrhea Symptom Control  Outcome: Progressing  Goal: Improved Activity Tolerance  Outcome: Progressing  Intervention: Promote Improved Energy  Recent Flowsheet Documentation  Taken 8/1/2024 1948 by Marika Jean RN  Activity Management: up ad soo  Goal: Blood Counts Within Acceptable Range  Outcome: Progressing  Intervention: Monitor and Manage Hematologic Symptoms  Recent Flowsheet Documentation  Taken 8/2/2024 0233 by Marika Jean RN  Bleeding Precautions: blood pressure closely monitored  Medication Review/Management: medications reviewed  Taken 8/1/2024 2336 by Marika Jean RN  Bleeding Precautions: blood pressure closely monitored  Medication Review/Management: medications reviewed  Taken 8/1/2024 1948 by Marika Jean RN  Bleeding Precautions: blood pressure closely monitored  Medication Review/Management: medications reviewed  Goal: Absence of Hypersensitivity Reaction  Outcome: Progressing  Goal: Absence of Infection  Outcome: Progressing  Intervention: Prevent and Manage Infection  Recent Flowsheet Documentation  Taken 8/2/2024 0233 by Marika Jean RN  Infection Prevention:   cohorting utilized   environmental surveillance performed   equipment surfaces disinfected   hand hygiene promoted   personal protective equipment utilized   rest/sleep promoted  Infection Management: aseptic technique maintained  Isolation Precautions:   enteric precautions discontinued   protective environment maintained   cytotoxic precautions maintained  Taken 8/1/2024 2336 by Miranda  Marika, RN  Infection Prevention:   cohorting utilized   environmental surveillance performed   equipment surfaces disinfected   hand hygiene promoted   personal protective equipment utilized   rest/sleep promoted  Infection Management: aseptic technique maintained  Isolation Precautions:   enteric precautions discontinued   protective environment maintained   cytotoxic precautions maintained  Taken 8/1/2024 1948 by Marika Jean, RN  Infection Prevention:   cohorting utilized   environmental surveillance performed   equipment surfaces disinfected   hand hygiene promoted   personal protective equipment utilized   rest/sleep promoted  Infection Management: aseptic technique maintained  Isolation Precautions:   enteric precautions discontinued   protective environment maintained   cytotoxic precautions maintained  Goal: Improved Oral Mucous Membrane Health and Integrity  Outcome: Progressing  Goal: Nausea and Vomiting Symptom Relief  Outcome: Progressing  Goal: Optimal Nutrition Intake  Outcome: Progressing     Problem: Infection  Goal: Absence of Infection Signs and Symptoms  Outcome: Progressing  Intervention: Prevent or Manage Infection  Recent Flowsheet Documentation  Taken 8/2/2024 0233 by Marika Jean RN  Infection Management: aseptic technique maintained  Isolation Precautions:   enteric precautions discontinued   protective environment maintained   cytotoxic precautions maintained  Taken 8/1/2024 2336 by Marika Jean, RN  Infection Management: aseptic technique maintained  Isolation Precautions:   enteric precautions discontinued   protective environment maintained   cytotoxic precautions maintained  Taken 8/1/2024 1948 by Marika Jean RN  Infection Management: aseptic technique maintained  Isolation Precautions:   enteric precautions discontinued   protective environment maintained   cytotoxic precautions maintained     Problem: Comorbidity Management  Goal: Blood Glucose Levels Within  Targeted Range  Outcome: Progressing  Intervention: Monitor and Manage Glycemia  Recent Flowsheet Documentation  Taken 8/2/2024 0233 by Marika Jean, RN  Medication Review/Management: medications reviewed  Taken 8/1/2024 2336 by Marika Jean RN  Medication Review/Management: medications reviewed  Taken 8/1/2024 1948 by Marika Jean, RN  Medication Review/Management: medications reviewed     Problem: Obstructive Sleep Apnea Risk or Actual  Goal: Unobstructed Breathing During Sleep  Outcome: Progressing  Intervention: Monitor and Manage Obstructive Sleep Apnea  Recent Flowsheet Documentation  Taken 8/2/2024 0233 by Marika Jean, RN  Medication Review/Management: medications reviewed  Taken 8/1/2024 2336 by Marika Jean, RN  Medication Review/Management: medications reviewed  Taken 8/1/2024 1948 by Marika Jean, RN  Medication Review/Management: medications reviewed

## 2024-08-03 LAB
ABO/RH(D): NORMAL
ANION GAP SERPL CALCULATED.3IONS-SCNC: 11 MMOL/L (ref 7–15)
ANTIBODY SCREEN: NEGATIVE
BASOPHILS # BLD AUTO: 0 10E3/UL (ref 0–0.2)
BASOPHILS NFR BLD AUTO: 0 %
BUN SERPL-MCNC: 22 MG/DL (ref 8–23)
CALCIUM SERPL-MCNC: 9.7 MG/DL (ref 8.8–10.4)
CHLORIDE SERPL-SCNC: 103 MMOL/L (ref 98–107)
CREAT SERPL-MCNC: 1 MG/DL (ref 0.67–1.17)
EGFRCR SERPLBLD CKD-EPI 2021: 78 ML/MIN/1.73M2
EOSINOPHIL # BLD AUTO: 0 10E3/UL (ref 0–0.7)
EOSINOPHIL NFR BLD AUTO: 0 %
ERYTHROCYTE [DISTWIDTH] IN BLOOD BY AUTOMATED COUNT: 15.9 % (ref 10–15)
GLUCOSE BLDC GLUCOMTR-MCNC: 128 MG/DL (ref 70–99)
GLUCOSE BLDC GLUCOMTR-MCNC: 133 MG/DL (ref 70–99)
GLUCOSE BLDC GLUCOMTR-MCNC: 178 MG/DL (ref 70–99)
GLUCOSE BLDC GLUCOMTR-MCNC: 186 MG/DL (ref 70–99)
GLUCOSE SERPL-MCNC: 146 MG/DL (ref 70–99)
H CAPSUL AG UR QL IA: NOT DETECTED
H CAPSUL AG UR-MCNC: NOT DETECTED NG/ML
HCO3 SERPL-SCNC: 24 MMOL/L (ref 22–29)
HCT VFR BLD AUTO: 36 % (ref 40–53)
HGB BLD-MCNC: 11.4 G/DL (ref 13.3–17.7)
IMM GRANULOCYTES # BLD: 0.1 10E3/UL
IMM GRANULOCYTES NFR BLD: 1 %
LYMPHOCYTES # BLD AUTO: 0.2 10E3/UL (ref 0.8–5.3)
LYMPHOCYTES NFR BLD AUTO: 2 %
MAGNESIUM SERPL-MCNC: 2.2 MG/DL (ref 1.7–2.3)
MCH RBC QN AUTO: 30.4 PG (ref 26.5–33)
MCHC RBC AUTO-ENTMCNC: 31.7 G/DL (ref 31.5–36.5)
MCV RBC AUTO: 96 FL (ref 78–100)
MONOCYTES # BLD AUTO: 0.2 10E3/UL (ref 0–1.3)
MONOCYTES NFR BLD AUTO: 2 %
NEUTROPHILS # BLD AUTO: 8.2 10E3/UL (ref 1.6–8.3)
NEUTROPHILS NFR BLD AUTO: 95 %
NRBC # BLD AUTO: 0 10E3/UL
NRBC BLD AUTO-RTO: 0 /100
PHOSPHATE SERPL-MCNC: 4.6 MG/DL (ref 2.5–4.5)
PLATELET # BLD AUTO: 343 10E3/UL (ref 150–450)
POTASSIUM SERPL-SCNC: 4.4 MMOL/L (ref 3.4–5.3)
POTASSIUM SERPL-SCNC: 4.4 MMOL/L (ref 3.4–5.3)
RBC # BLD AUTO: 3.75 10E6/UL (ref 4.4–5.9)
SODIUM SERPL-SCNC: 138 MMOL/L (ref 135–145)
SODIUM SERPL-SCNC: 138 MMOL/L (ref 135–145)
SPECIMEN EXPIRATION DATE: NORMAL
STRONGYLOIDES IGG SER IA-ACNC: 0.6 IV
WBC # BLD AUTO: 8.6 10E3/UL (ref 4–11)

## 2024-08-03 PROCEDURE — 82435 ASSAY OF BLOOD CHLORIDE: CPT | Performed by: STUDENT IN AN ORGANIZED HEALTH CARE EDUCATION/TRAINING PROGRAM

## 2024-08-03 PROCEDURE — 250N000011 HC RX IP 250 OP 636: Performed by: INTERNAL MEDICINE

## 2024-08-03 PROCEDURE — 258N000003 HC RX IP 258 OP 636: Performed by: PHYSICIAN ASSISTANT

## 2024-08-03 PROCEDURE — 250N000013 HC RX MED GY IP 250 OP 250 PS 637: Performed by: STUDENT IN AN ORGANIZED HEALTH CARE EDUCATION/TRAINING PROGRAM

## 2024-08-03 PROCEDURE — 258N000003 HC RX IP 258 OP 636: Performed by: INTERNAL MEDICINE

## 2024-08-03 PROCEDURE — 83735 ASSAY OF MAGNESIUM: CPT | Performed by: INTERNAL MEDICINE

## 2024-08-03 PROCEDURE — 85025 COMPLETE CBC W/AUTO DIFF WBC: CPT | Performed by: STUDENT IN AN ORGANIZED HEALTH CARE EDUCATION/TRAINING PROGRAM

## 2024-08-03 PROCEDURE — 250N000012 HC RX MED GY IP 250 OP 636 PS 637: Performed by: STUDENT IN AN ORGANIZED HEALTH CARE EDUCATION/TRAINING PROGRAM

## 2024-08-03 PROCEDURE — 84295 ASSAY OF SERUM SODIUM: CPT | Performed by: STUDENT IN AN ORGANIZED HEALTH CARE EDUCATION/TRAINING PROGRAM

## 2024-08-03 PROCEDURE — 84132 ASSAY OF SERUM POTASSIUM: CPT | Performed by: STUDENT IN AN ORGANIZED HEALTH CARE EDUCATION/TRAINING PROGRAM

## 2024-08-03 PROCEDURE — 99418 PROLNG IP/OBS E/M EA 15 MIN: CPT | Performed by: STUDENT IN AN ORGANIZED HEALTH CARE EDUCATION/TRAINING PROGRAM

## 2024-08-03 PROCEDURE — 99233 SBSQ HOSP IP/OBS HIGH 50: CPT | Mod: 24 | Performed by: STUDENT IN AN ORGANIZED HEALTH CARE EDUCATION/TRAINING PROGRAM

## 2024-08-03 PROCEDURE — 86901 BLOOD TYPING SEROLOGIC RH(D): CPT | Performed by: STUDENT IN AN ORGANIZED HEALTH CARE EDUCATION/TRAINING PROGRAM

## 2024-08-03 PROCEDURE — 206N000001 HC R&B BMT UMMC

## 2024-08-03 PROCEDURE — 250N000013 HC RX MED GY IP 250 OP 250 PS 637: Performed by: PHYSICIAN ASSISTANT

## 2024-08-03 PROCEDURE — 250N000011 HC RX IP 250 OP 636: Performed by: STUDENT IN AN ORGANIZED HEALTH CARE EDUCATION/TRAINING PROGRAM

## 2024-08-03 PROCEDURE — 84100 ASSAY OF PHOSPHORUS: CPT | Performed by: INTERNAL MEDICINE

## 2024-08-03 PROCEDURE — 250N000011 HC RX IP 250 OP 636: Performed by: PHYSICIAN ASSISTANT

## 2024-08-03 RX ORDER — FUROSEMIDE 10 MG/ML
20 INJECTION INTRAMUSCULAR; INTRAVENOUS ONCE
Status: COMPLETED | OUTPATIENT
Start: 2024-08-03 | End: 2024-08-03

## 2024-08-03 RX ADMIN — BRIMONIDINE TARTRATE 1 DROP: 2 SOLUTION/ DROPS OPHTHALMIC at 08:13

## 2024-08-03 RX ADMIN — MICAFUNGIN SODIUM 100 MG: 50 INJECTION, POWDER, LYOPHILIZED, FOR SOLUTION INTRAVENOUS at 09:52

## 2024-08-03 RX ADMIN — ONDANSETRON HYDROCHLORIDE 8 MG: 8 TABLET, FILM COATED ORAL at 16:49

## 2024-08-03 RX ADMIN — ONDANSETRON HYDROCHLORIDE 8 MG: 8 TABLET, FILM COATED ORAL at 08:13

## 2024-08-03 RX ADMIN — Medication 5 ML: at 04:26

## 2024-08-03 RX ADMIN — BRIMONIDINE TARTRATE 1 DROP: 2 SOLUTION/ DROPS OPHTHALMIC at 20:11

## 2024-08-03 RX ADMIN — URSODIOL 300 MG: 300 CAPSULE ORAL at 13:48

## 2024-08-03 RX ADMIN — ONDANSETRON HYDROCHLORIDE 8 MG: 8 TABLET, FILM COATED ORAL at 23:49

## 2024-08-03 RX ADMIN — LATANOPROST 2 DROP: 50 SOLUTION OPHTHALMIC at 23:19

## 2024-08-03 RX ADMIN — PANTOPRAZOLE SODIUM 40 MG: 40 TABLET, DELAYED RELEASE ORAL at 08:12

## 2024-08-03 RX ADMIN — FLUTICASONE PROPIONATE 1 SPRAY: 50 SPRAY, METERED NASAL at 08:13

## 2024-08-03 RX ADMIN — DEXAMETHASONE 10 MG: 2 TABLET ORAL at 08:13

## 2024-08-03 RX ADMIN — ACYCLOVIR 800 MG: 800 TABLET ORAL at 20:11

## 2024-08-03 RX ADMIN — FLUDARABINE PHOSPHATE 73 MG: 25 INJECTION, SOLUTION INTRAVENOUS at 09:32

## 2024-08-03 RX ADMIN — CALCIUM CARBONATE 600 MG (1,500 MG)-VITAMIN D3 400 UNIT TABLET 1 TABLET: at 08:12

## 2024-08-03 RX ADMIN — Medication 10 ML: at 11:17

## 2024-08-03 RX ADMIN — ATORVASTATIN CALCIUM 10 MG: 10 TABLET, FILM COATED ORAL at 20:11

## 2024-08-03 RX ADMIN — ALLOPURINOL 300 MG: 300 TABLET ORAL at 08:12

## 2024-08-03 RX ADMIN — INSULIN ASPART 1 UNITS: 100 INJECTION, SOLUTION INTRAVENOUS; SUBCUTANEOUS at 16:49

## 2024-08-03 RX ADMIN — URSODIOL 300 MG: 300 CAPSULE ORAL at 08:13

## 2024-08-03 RX ADMIN — ACYCLOVIR 800 MG: 800 TABLET ORAL at 08:12

## 2024-08-03 RX ADMIN — FUROSEMIDE 20 MG: 10 INJECTION, SOLUTION INTRAMUSCULAR; INTRAVENOUS at 10:44

## 2024-08-03 RX ADMIN — FLUTICASONE PROPIONATE 1 SPRAY: 50 SPRAY, METERED NASAL at 20:11

## 2024-08-03 RX ADMIN — URSODIOL 300 MG: 300 CAPSULE ORAL at 20:11

## 2024-08-03 RX ADMIN — SENNOSIDES AND DOCUSATE SODIUM 1 TABLET: 8.6; 5 TABLET ORAL at 17:33

## 2024-08-03 RX ADMIN — AMLODIPINE BESYLATE 10 MG: 10 TABLET ORAL at 08:12

## 2024-08-03 ASSESSMENT — ACTIVITIES OF DAILY LIVING (ADL)
ADLS_ACUITY_SCORE: 20

## 2024-08-03 NOTE — PLAN OF CARE
[id number]: Hypertensive within parameters. OVSS on RA. Denies pain, nausea, and SOB.  and 146. Face red/ flushed. Pt states this is not new but he is unsure if the team knows about it. No lab replacements needed. Voiding well. LBM 8/2. UAL.

## 2024-08-03 NOTE — PROGRESS NOTES
"  BMT Progress note  Chief complaint:  Arpit Forrest is a 73 year old male, currently day -4  prep for ELIEZER MUD PBSCT for MDS.  INTERIM HISTORY: He is doing well.  He remains active and PO intake is good.  He is tolerating chemo well.  He was up much of the night urinating w/ cytoxan flush.  No other issues.  REVIEW OF SYSTEMS: Otherwise unremarkable other than what is noted in the Interim History.   PHYSICAL EXAM:   Vitals:  BP (!) 151/77 (BP Location: Right arm)   Pulse 72   Temp 97.7  F (36.5  C) (Oral)   Resp 12   Ht 1.825 m (5' 11.85\")   Wt 118.6 kg (261 lb 6.4 oz)   SpO2 97%   BMI 35.60 kg/m    Wt Readings from Last 4 Encounters:   08/03/24 118.6 kg (261 lb 6.4 oz)   07/30/24 117.5 kg (259 lb)   07/26/24 117 kg (258 lb)   07/23/24 115.3 kg (254 lb 1.6 oz)     General Appearance: NAD; face has maye appearance--stable per pt  HEENT: sclera anicteric. Moist mucus membranes, no ulcerations.  CV: RRR. no murmur or rub.   RESP: CTA bilaterally; no rales or wheezes.   GI: +BS, soft, nontender, nondistended.   EXT: No edema. No cyanosis/clubbing.   SKIN: no lesions or rash  NEURO: A&O x3   PSYCH: Appropriate affect   VASCULAR ACCESS: dressing CDI.     ROUTINE LABS:    Lab Results   Component Value Date    WBC 8.6 08/03/2024    ANEU 2.4 07/23/2024    HGB 11.4 (L) 08/03/2024    HCT 36.0 (L) 08/03/2024     08/03/2024     08/03/2024     08/03/2024    POTASSIUM 4.4 08/03/2024    POTASSIUM 4.4 08/03/2024    CHLORIDE 103 08/03/2024    CO2 24 08/03/2024     (H) 08/03/2024    BUN 22.0 08/03/2024    CR 1.00 08/03/2024    MAG 2.2 08/03/2024    INR 1.11 07/31/2024          ASSESSMENT AND PLAN:    Arpit Forrest is a 73 year old male, currently day -4 prep for ELIEZER MUD PBSCT for MDS.     BMT/IEC PROTOCOL for MDS  - Chemo protocol: [id number]  D-6: Flu 30mg/m2, Cy 50mg/kg  D-5 to D-2: Flu  D-1: TBI  D0: stem cell infusion  - Peripheral blood stem cell graft from 8/8 donor, ABO matched (Oneg), Cell " dose: TBD  - Engraftment monitoring: Peripheral blood and bone marrow chimerisms on D28, D100, 6 months, 1 and 2 year  - Restaging plan: BMBx with NGS on D28, D100, 6 months, 1 and 2 years     HEME/COAG  - GCSF starts day +5, continue until ANC > 1500 for 3 consecutive days.   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      ID  Multiple granulomas in th lung and bone marrow biopsy  - fungitell, aspergillus galactomanan neg  - Urine histoplasma and fungal antibodies, strongyloides, schistosoma NEG  - ID consult on admission--see note; ok for aftab; labs as above     Prophylaxis plan:   - Bacterial: Levaquin 250mg from D-1 till engraftment    - Fungal: Aftab  - PJP: Bactrim or Atovaquone or Pentamidine from D28. Toxoplasma IgG negative   - Viral: Acyclovir 800mg bid (Pt/donor CMV IgG neg)     Monitoring:   - CMV every week    - EBV every 2 weeks from D30 to D180     GI/NUTRITION  # Hepatic steatosis and boderline iron deposition in liver  - LFTs and synthetic function normal on admission; Check LFTs qM/Th  - Monitor for VOD  - VOD prophy: Ursodiol 300mg TID      # Supportive   - Anti-emetics:  per protocol   - Ulcer prophy: PPI daily   - Dietician support to prevent malnutrition.     CARDIOVASCULAR  # Hx of A.fib with RVR   - A.fib with RVR while admitted with febrile episode on 3/15/24. Noted on his fitbit, not very symptomatic. Cardioversion planned for new onset A.fib. Not achieved with amiodarone, DCCV on 3/21/24 restored normal sinus rhythm. Amiodarone and anticoagulation was stopped in April 2024 without any recurrence.   - He could have a recurrence during transplant which would need to be addressed with rate control strategy      # Asymptomatic moderate aortic stenosis      # CAD  - Chest CT in February 2024 with mild to moderate CAC, three-vessel disease. No anginal symptoms  "  - Continue atorvastatin 10mg   - EKG Sinus rhythm Inferior infarct , age undetermined. Abnormal ECG (known cardiovascular disease). QTc =460     # HTN  - Continue amlodipine   - Risk of cardiomyopathy:  Baseline EF normal 60-65&  - Risk of arrhythmia: Baseline EKG showed NSR, Qtc 460     RESPIRATORY  # VINCENT : CPAP  - Baseline PFTs: normal   - Risk of respiratory complications: Frequent ambulation and incentive spirometer.     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 Consult endocrine.     MUSCULOSKELETAL/FRAILTY  # Gout: Continue allopurinol indefinitely   - Baseline Frailty Score: Not frail   - Daily PT/OT as needed while inpatient     SOCIAL DETERMINANTS  - Caregiver: Wife, Meghna, children     Medically Ready for Discharge: Anticipated in 5+ Days  Clinically Significant Risk Factors                  # Hypertension: Noted on problem list            # Obesity: Estimated body mass index is 35.6 kg/m  as calculated from the following:    Height as of this encounter: 1.825 m (5' 11.85\").    Weight as of this encounter: 118.6 kg (261 lb 6.4 oz)., PRESENT ON ADMISSION          I spent 40 minutes in the care of this patient today, which included time necessary for preparation for the visit, obtaining history, ordering medications/tests/procedures as medically indicated, review of pertinent medical literature, counseling of the patient, communication of recommendations to the care team, and documentation time.    Yi Martinez PA-C on 8/3/2024 at 10:22 AM      "

## 2024-08-03 NOTE — PLAN OF CARE
"  Problem: Adult Inpatient Plan of Care  Goal: Patient-Specific Goal (Individualized)  Description: You can add care plan individualizations to a care plan. Examples of Individualization might be:  \"Parent requests to be called daily at 9am for status\", \"I have a hard time hearing out of my right ear\", or \"Do not touch me to wake me up as it startles  me\".  Outcome: Progressing   Goal Outcome Evaluation:  Blood pressure 151/77 and recheck 127/73. Heart rate 56. No pain. No nausea. Received fludarabine. Ate well. Blood sugar 128,133 and 186. 65,76 and 153 grams of carbs. Weight 118.6 kg. Walked two laps around the unit and walked 1/2 mile on the treadmill. Received 20 mg of lasix and voided 2745 ml. Used chg wipes. No stool and received a senna. Independent.                       "

## 2024-08-03 NOTE — PROGRESS NOTES
..Stop time on MAR & chart I & O  Chemo drug fludarabine   Tolerated well   Intervention received premeds   Response no n/v   Plan continue with the poc   Lab: Na, K, UpH drug level  Wt/intervention  Shower/drsg/linen    Cytoxan Primer  Cytoxan infused at  [place] infusing at __ and ends__  Flush infusing at __ and ends __

## 2024-08-04 LAB
ANION GAP SERPL CALCULATED.3IONS-SCNC: 12 MMOL/L (ref 7–15)
ASPERGILLUS AB TITR SER CF: NORMAL {TITER}
B DERMAT AB SER-ACNC: 0.5 IV
BASOPHILS # BLD AUTO: 0 10E3/UL (ref 0–0.2)
BASOPHILS NFR BLD AUTO: 0 %
BUN SERPL-MCNC: 26.6 MG/DL (ref 8–23)
CALCIUM SERPL-MCNC: 9.6 MG/DL (ref 8.8–10.4)
CHLORIDE SERPL-SCNC: 102 MMOL/L (ref 98–107)
COCCIDIOIDES AB TITR SER CF: NORMAL {TITER}
COCCIDIOIDES AB TITR SER CF: NORMAL {TITER}
CREAT SERPL-MCNC: 0.98 MG/DL (ref 0.67–1.17)
EGFRCR SERPLBLD CKD-EPI 2021: 80 ML/MIN/1.73M2
EOSINOPHIL # BLD AUTO: 0 10E3/UL (ref 0–0.7)
EOSINOPHIL NFR BLD AUTO: 0 %
ERYTHROCYTE [DISTWIDTH] IN BLOOD BY AUTOMATED COUNT: 16 % (ref 10–15)
GLUCOSE BLDC GLUCOMTR-MCNC: 124 MG/DL (ref 70–99)
GLUCOSE BLDC GLUCOMTR-MCNC: 138 MG/DL (ref 70–99)
GLUCOSE BLDC GLUCOMTR-MCNC: 188 MG/DL (ref 70–99)
GLUCOSE BLDC GLUCOMTR-MCNC: 197 MG/DL (ref 70–99)
GLUCOSE SERPL-MCNC: 142 MG/DL (ref 70–99)
H CAPSUL MYC AB TITR SER CF: NORMAL {TITER}
H CAPSUL YST AB TITR SER CF: NORMAL {TITER}
HCO3 SERPL-SCNC: 25 MMOL/L (ref 22–29)
HCT VFR BLD AUTO: 35.8 % (ref 40–53)
HGB BLD-MCNC: 11.3 G/DL (ref 13.3–17.7)
IMM GRANULOCYTES # BLD: 0 10E3/UL
IMM GRANULOCYTES NFR BLD: 1 %
LYMPHOCYTES # BLD AUTO: 0.1 10E3/UL (ref 0.8–5.3)
LYMPHOCYTES NFR BLD AUTO: 1 %
MAGNESIUM SERPL-MCNC: 2.2 MG/DL (ref 1.7–2.3)
MCH RBC QN AUTO: 30.1 PG (ref 26.5–33)
MCHC RBC AUTO-ENTMCNC: 31.6 G/DL (ref 31.5–36.5)
MCV RBC AUTO: 95 FL (ref 78–100)
MONOCYTES # BLD AUTO: 0.1 10E3/UL (ref 0–1.3)
MONOCYTES NFR BLD AUTO: 2 %
NEUTROPHILS # BLD AUTO: 5.9 10E3/UL (ref 1.6–8.3)
NEUTROPHILS NFR BLD AUTO: 96 %
NRBC # BLD AUTO: 0 10E3/UL
NRBC BLD AUTO-RTO: 0 /100
PHOSPHATE SERPL-MCNC: 4.2 MG/DL (ref 2.5–4.5)
PLATELET # BLD AUTO: 274 10E3/UL (ref 150–450)
POTASSIUM SERPL-SCNC: 4.2 MMOL/L (ref 3.4–5.3)
RBC # BLD AUTO: 3.76 10E6/UL (ref 4.4–5.9)
SODIUM SERPL-SCNC: 139 MMOL/L (ref 135–145)
WBC # BLD AUTO: 6.1 10E3/UL (ref 4–11)

## 2024-08-04 PROCEDURE — 83735 ASSAY OF MAGNESIUM: CPT | Performed by: STUDENT IN AN ORGANIZED HEALTH CARE EDUCATION/TRAINING PROGRAM

## 2024-08-04 PROCEDURE — 250N000011 HC RX IP 250 OP 636: Performed by: PHYSICIAN ASSISTANT

## 2024-08-04 PROCEDURE — 80048 BASIC METABOLIC PNL TOTAL CA: CPT | Performed by: STUDENT IN AN ORGANIZED HEALTH CARE EDUCATION/TRAINING PROGRAM

## 2024-08-04 PROCEDURE — 84100 ASSAY OF PHOSPHORUS: CPT | Performed by: STUDENT IN AN ORGANIZED HEALTH CARE EDUCATION/TRAINING PROGRAM

## 2024-08-04 PROCEDURE — 258N000003 HC RX IP 258 OP 636: Performed by: PHYSICIAN ASSISTANT

## 2024-08-04 PROCEDURE — 250N000012 HC RX MED GY IP 250 OP 636 PS 637: Performed by: STUDENT IN AN ORGANIZED HEALTH CARE EDUCATION/TRAINING PROGRAM

## 2024-08-04 PROCEDURE — 250N000013 HC RX MED GY IP 250 OP 250 PS 637: Performed by: PHYSICIAN ASSISTANT

## 2024-08-04 PROCEDURE — 250N000011 HC RX IP 250 OP 636: Performed by: STUDENT IN AN ORGANIZED HEALTH CARE EDUCATION/TRAINING PROGRAM

## 2024-08-04 PROCEDURE — 258N000003 HC RX IP 258 OP 636: Performed by: INTERNAL MEDICINE

## 2024-08-04 PROCEDURE — 250N000013 HC RX MED GY IP 250 OP 250 PS 637: Performed by: STUDENT IN AN ORGANIZED HEALTH CARE EDUCATION/TRAINING PROGRAM

## 2024-08-04 PROCEDURE — 85025 COMPLETE CBC W/AUTO DIFF WBC: CPT | Performed by: STUDENT IN AN ORGANIZED HEALTH CARE EDUCATION/TRAINING PROGRAM

## 2024-08-04 PROCEDURE — 206N000001 HC R&B BMT UMMC

## 2024-08-04 PROCEDURE — 250N000011 HC RX IP 250 OP 636: Performed by: INTERNAL MEDICINE

## 2024-08-04 RX ORDER — FUROSEMIDE 10 MG/ML
40 INJECTION INTRAMUSCULAR; INTRAVENOUS ONCE
Status: COMPLETED | OUTPATIENT
Start: 2024-08-04 | End: 2024-08-04

## 2024-08-04 RX ORDER — LISINOPRIL 5 MG/1
5 TABLET ORAL DAILY
Status: DISCONTINUED | OUTPATIENT
Start: 2024-08-04 | End: 2024-08-25 | Stop reason: HOSPADM

## 2024-08-04 RX ADMIN — BRIMONIDINE TARTRATE 1 DROP: 2 SOLUTION/ DROPS OPHTHALMIC at 08:02

## 2024-08-04 RX ADMIN — INSULIN ASPART 1 UNITS: 100 INJECTION, SOLUTION INTRAVENOUS; SUBCUTANEOUS at 17:21

## 2024-08-04 RX ADMIN — ATORVASTATIN CALCIUM 10 MG: 10 TABLET, FILM COATED ORAL at 19:42

## 2024-08-04 RX ADMIN — ACYCLOVIR 800 MG: 800 TABLET ORAL at 19:41

## 2024-08-04 RX ADMIN — Medication 5 ML: at 03:59

## 2024-08-04 RX ADMIN — FLUTICASONE PROPIONATE 1 SPRAY: 50 SPRAY, METERED NASAL at 19:42

## 2024-08-04 RX ADMIN — LATANOPROST 2 DROP: 50 SOLUTION OPHTHALMIC at 22:30

## 2024-08-04 RX ADMIN — FUROSEMIDE 40 MG: 10 INJECTION, SOLUTION INTRAVENOUS at 10:40

## 2024-08-04 RX ADMIN — CALCIUM CARBONATE 600 MG (1,500 MG)-VITAMIN D3 400 UNIT TABLET 1 TABLET: at 07:55

## 2024-08-04 RX ADMIN — AMLODIPINE BESYLATE 10 MG: 10 TABLET ORAL at 07:54

## 2024-08-04 RX ADMIN — MICAFUNGIN SODIUM 100 MG: 50 INJECTION, POWDER, LYOPHILIZED, FOR SOLUTION INTRAVENOUS at 07:55

## 2024-08-04 RX ADMIN — ACYCLOVIR 800 MG: 800 TABLET ORAL at 07:55

## 2024-08-04 RX ADMIN — Medication 5 ML: at 11:01

## 2024-08-04 RX ADMIN — DEXAMETHASONE 10 MG: 2 TABLET ORAL at 07:55

## 2024-08-04 RX ADMIN — FLUDARABINE PHOSPHATE 73 MG: 25 INJECTION, SOLUTION INTRAVENOUS at 09:17

## 2024-08-04 RX ADMIN — BRIMONIDINE TARTRATE 1 DROP: 2 SOLUTION/ DROPS OPHTHALMIC at 19:42

## 2024-08-04 RX ADMIN — FLUTICASONE PROPIONATE 1 SPRAY: 50 SPRAY, METERED NASAL at 08:02

## 2024-08-04 RX ADMIN — ONDANSETRON HYDROCHLORIDE 8 MG: 8 TABLET, FILM COATED ORAL at 07:54

## 2024-08-04 RX ADMIN — URSODIOL 300 MG: 300 CAPSULE ORAL at 19:41

## 2024-08-04 RX ADMIN — ONDANSETRON HYDROCHLORIDE 8 MG: 8 TABLET, FILM COATED ORAL at 16:29

## 2024-08-04 RX ADMIN — URSODIOL 300 MG: 300 CAPSULE ORAL at 07:54

## 2024-08-04 RX ADMIN — PANTOPRAZOLE SODIUM 40 MG: 40 TABLET, DELAYED RELEASE ORAL at 07:55

## 2024-08-04 RX ADMIN — URSODIOL 300 MG: 300 CAPSULE ORAL at 14:41

## 2024-08-04 RX ADMIN — LISINOPRIL 5 MG: 5 TABLET ORAL at 10:39

## 2024-08-04 RX ADMIN — ALLOPURINOL 300 MG: 300 TABLET ORAL at 07:55

## 2024-08-04 ASSESSMENT — ACTIVITIES OF DAILY LIVING (ADL)
ADLS_ACUITY_SCORE: 20

## 2024-08-04 NOTE — PROGRESS NOTES
..Stop time on MAR & chart I & O  Chemo drug fludarabine   Tolerated well  Intervention zofran and decadron premeds. Positive blood return   Response no n/v  Plan continue with the poc and Fludarabine tomorrow morning   Lab: Na, K, UpH drug level  Wt/intervention  Shower/drsg/linen showered and chg wipes done.     Cytoxan Primer  Cytoxan infused at  [place] infusing at __ and ends__  Flush infusing at __ and ends __

## 2024-08-04 NOTE — PLAN OF CARE
1930-0730: HR 59. /81. Provider notified. No new orders. OVSS on RA. Denies pain and nausea.  and 142. No lab replacements this morning. LBM 8/3.

## 2024-08-04 NOTE — PROGRESS NOTES
"BMT Progress Note  Chief complaint:  Arpit Forrest is a 73 year old male, currently day -3  prep for ELIEZER MUD PBSCT for MDS.    INTERIM HISTORY: Mr Forrest is feeling well, up early to eat breakfast. Walking the halls without issue. No n/v/d. No cough or congestion. Notes his left ankle is swollen almost like when he has gout flares, however no pain whatsoever.  REVIEW OF SYSTEMS: Otherwise unremarkable other than what is noted in the Interim History.   PHYSICAL EXAM:   Vitals:  BP (!) 166/83 (BP Location: Left arm)   Pulse 62   Temp 97.8  F (36.6  C) (Oral)   Resp 16   Ht 1.825 m (5' 11.85\")   Wt 118.6 kg (261 lb 6.4 oz)   SpO2 97%   BMI 35.60 kg/m    Wt Readings from Last 4 Encounters:   08/03/24 118.6 kg (261 lb 6.4 oz)   07/30/24 117.5 kg (259 lb)   07/26/24 117 kg (258 lb)   07/23/24 115.3 kg (254 lb 1.6 oz)     General Appearance: NAD; face has maye appearance--stable per pt  HEENT: sclera anicteric. Moist mucus membranes, no ulcerations.  CV: RRR. Pan systolic murmur (known to pt)    RESP: CTA bilaterally; no rales or wheezes.   GI: +BS, soft, nontender, nondistended.   EXT: Trace to 1+ left ankle edema. No cyanosis/clubbing.   SKIN: no lesions or rash. Left ankle without erythema, non tender.  NEURO: A&O x3   PSYCH: Appropriate affect   VASCULAR ACCESS: dressing CDI.     ROUTINE LABS:    Lab Results   Component Value Date    WBC 6.1 08/04/2024    ANEU 2.4 07/23/2024    HGB 11.3 (L) 08/04/2024    HCT 35.8 (L) 08/04/2024     08/04/2024     08/04/2024    POTASSIUM 4.2 08/04/2024    CHLORIDE 102 08/04/2024    CO2 25 08/04/2024     (H) 08/04/2024    BUN 26.6 (H) 08/04/2024    CR 0.98 08/04/2024    MAG 2.2 08/04/2024    INR 1.11 07/31/2024          ASSESSMENT AND PLAN:    Arpit Forrest is a 73 year old male, currently day -3 prep for ELIEZER MUD PBSCT for MDS.     BMT/IEC PROTOCOL for MDS  - Chemo protocol: [id number]  D-6: Flu 30mg/m2, Cy 50mg/kg  D-5 to D-2: Flu  D-1: TBI  D0: stem cell " infusion  - Peripheral blood stem cell graft from 8/8 donor, ABO matched (Oneg), Cell dose: TBD  - Engraftment monitoring: Peripheral blood and bone marrow chimerisms on D28, D100, 6 months, 1 and 2 year  - Restaging plan: BMBx with NGS on D28, D100, 6 months, 1 and 2 years     HEME/COAG  - GCSF starts day +5, continue until ANC > 1500 for 3 consecutive days.   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      ID  Multiple granulomas in the lung and bone marrow biopsy  - fungitell, aspergillus galactomanan neg. 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 Toxoplasma IgG negative   - Viral: Acyclovir 800mg bid (Pt/donor CMV IgG neg)  Monitoring:   - CMV every week    - EBV every 2 weeks from D30 to D180     GI/NUTRITION  # Hepatic steatosis and boderline iron deposition in liver  - LFTs and synthetic function normal on admission; Check LFTs qM/Th  - VOD prophy: Ursodiol 300mg TID    # Supportive   - Anti-emetics:  per protocol   - Ulcer prophy: PPI daily   - Dietician support to prevent malnutrition.     CARDIOVASCULAR  # Hx of A.fib with RVR   - A.fib with RVR while admitted with febrile episode on 3/15/24. Noted on his fitbit, not very symptomatic. Cardioversion planned for new onset A.fib. Not achieved with amiodarone, DCCV on 3/21/24 restored normal sinus rhythm. Amiodarone and anticoagulation was stopped in April 2024 without any recurrence.   - He could have a recurrence during transplant which would need to be addressed with rate control strategy   # Asymptomatic moderate aortic stenosis   # CAD  - Chest CT in February 2024 with mild to  "moderate CAC, three-vessel disease. No anginal symptoms   - Continue atorvastatin 10mg   - EKG Sinus rhythm Inferior infarct , age undetermined. Abnormal ECG (known cardiovascular disease). QTc =460  # HTN  - Continue amlodipine 10mg, adding lisinopril 5mg 8/4.   - Risk of cardiomyopathy:  Baseline EF normal 60-65&  - Risk of arrhythmia: Baseline EKG showed NSR, Qtc 460     RESPIRATORY  # VINCENT : CPAP  - Baseline PFTs: normal   - Risk of respiratory complications: Frequent ambulation and incentive spirometer.     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 Trying 40mg lasix 8/4 and compression stockings.  # Electrolyte management: replace per sliding scale  - cont Ca w/vit D  - hold eye vitamin and Coq10 peritransplant     DIABETES/ENDOCRINE  - hx steroid-induced hyperglycemia  - holding PTA metformin on admission. Consult endocrine.     MUSCULOSKELETAL/FRAILTY  # Gout: Continue allopurinol indefinitely   - Baseline Frailty Score: Not frail   - Daily PT/OT as needed while inpatient     SOCIAL DETERMINANTS  - Caregiver: Wife, Meghna, children     Medically Ready for Discharge: Anticipated in 5+ Days  Clinically Significant Risk Factors                  # Hypertension: Noted on problem list            # Obesity: Estimated body mass index is 35.6 kg/m  as calculated from the following:    Height as of this encounter: 1.825 m (5' 11.85\").    Weight as of this encounter: 118.6 kg (261 lb 6.4 oz)., PRESENT ON ADMISSION          I spent 40 minutes in the care of this patient today, which included time necessary for preparation for the visit, obtaining history, ordering medications/tests/procedures as medically indicated, review of pertinent medical literature, counseling of the patient, communication of recommendations to " the care team, and documentation time.    Yi Martinez PA-C on 8/4/2024 at 9:40 AM  Bone Marrow Transplant (5C) Attending Progress Note      I saw and evaluated this patient as part of a shared APRN/PA visit.      I spent 75 minutes in the care of this patient today, which included time necessary for preparation for the visit, obtaining history, ordering medications/tests/procedures as medically indicated, reviewing vital signs and flow charts, review of pertinent medical literature, counseling of the patient, communication of recommendations to the care team, and documentation time. Counseling and/or coordination of care were performed by me today including a face-to-face patient visit and exam.     The patient was discussed on morning rounds with the nurses, and mid-level provider. All labs and imaging were reviewed. I reviewed events over the last 24 hours including vitals and flow sheets. I agree with the VINNY note from today and have been responsible for the care plan and interpretation of progress. Overall, the patient is a 73-year-old man with high risk MDS and increased percentage of CD5 positive B cells who is admitted for a matched unrelated donor transplant.  He is now on day -3 prior to transplant.  He has a history of hypertension, gout, atrial fibrillation and sleep apnea.  He has otherwise been fairly healthy.  He denies any shortness of breath or cough.  He does have a history of donating a kidney to his sister.  He has no GI or  symptoms.  He has no headache, visual disturbance or other neurologic symptoms.  The rest a 10 point review of systems is unremarkable.  He will receive micafungin prophylaxis for his pulmonary nodules.  He continues to tolerate his chemotherapy well.  He will get a dose of Lasix today for ankle swelling and we we will restart lisinopril for his elevated blood pressures..     Physical Exam and Lab Summary: There is no evidence of mucositis. There is no adenopathy on  exam and lungs are clear. There is 1+ LE edema (mainly at the ankles) and no rash. Labs show a creatinine of 0.98, hemoglobin of 11.3, platelet count of 274,000 and a white count of 6100 with 5900 absolute neutrophils.      It is my overall impression that Arpit tolerating his conditioning without complications.  He has no signs of active infection. We will get better control of his blood pressure.  The patient has been informed on progress and all questions have been answered. We discussed expectations for the next several days.     Josh Vaca MD  Date of Service (when I saw the patient and coordinated care):  August 4, 2024

## 2024-08-04 NOTE — PLAN OF CARE
"  Problem: Adult Inpatient Plan of Care  Goal: Patient-Specific Goal (Individualized)  Description: You can add care plan individualizations to a care plan. Examples of Individualization might be:  \"Parent requests to be called daily at 9am for status\", \"I have a hard time hearing out of my right ear\", or \"Do not touch me to wake me up as it startles  me\".  Outcome: Progressing   Goal Outcome Evaluation:  Temp max 98.9. Blood pressures 120's-140's/70's and started on lisinopril. Received fludarabine. No pain. No nausea. Blood sugar 124,138 and 188. 93,75 and 92 grams of carbs. Worked on the treadmill. Weight 117.5 kg and received 40 mg of lasix. Voided 2850 ml. Stool x 1. Left ankle swelling. Showered. Independent. Spouse was here and is very supportive.                       "

## 2024-08-05 ENCOUNTER — APPOINTMENT (OUTPATIENT)
Dept: PHYSICAL THERAPY | Facility: CLINIC | Age: 74
DRG: 014 | End: 2024-08-05
Attending: INTERNAL MEDICINE
Payer: MEDICARE

## 2024-08-05 LAB
ALBUMIN SERPL BCG-MCNC: 4.2 G/DL (ref 3.5–5.2)
ALP SERPL-CCNC: 79 U/L (ref 40–150)
ALT SERPL W P-5'-P-CCNC: 28 U/L (ref 0–70)
ANION GAP SERPL CALCULATED.3IONS-SCNC: 13 MMOL/L (ref 7–15)
AST SERPL W P-5'-P-CCNC: 18 U/L (ref 0–45)
BASOPHILS # BLD AUTO: 0 10E3/UL (ref 0–0.2)
BASOPHILS NFR BLD AUTO: 0 %
BILIRUB DIRECT SERPL-MCNC: <0.2 MG/DL (ref 0–0.3)
BILIRUB SERPL-MCNC: 0.3 MG/DL
BUN SERPL-MCNC: 27.5 MG/DL (ref 8–23)
CALCIUM SERPL-MCNC: 9.5 MG/DL (ref 8.8–10.4)
CHLORIDE SERPL-SCNC: 100 MMOL/L (ref 98–107)
CREAT SERPL-MCNC: 1.02 MG/DL (ref 0.67–1.17)
EGFRCR SERPLBLD CKD-EPI 2021: 77 ML/MIN/1.73M2
EOSINOPHIL # BLD AUTO: 0 10E3/UL (ref 0–0.7)
EOSINOPHIL NFR BLD AUTO: 0 %
ERYTHROCYTE [DISTWIDTH] IN BLOOD BY AUTOMATED COUNT: 15.9 % (ref 10–15)
GLUCOSE BLDC GLUCOMTR-MCNC: 137 MG/DL (ref 70–99)
GLUCOSE BLDC GLUCOMTR-MCNC: 144 MG/DL (ref 70–99)
GLUCOSE BLDC GLUCOMTR-MCNC: 150 MG/DL (ref 70–99)
GLUCOSE BLDC GLUCOMTR-MCNC: 180 MG/DL (ref 70–99)
GLUCOSE SERPL-MCNC: 175 MG/DL (ref 70–99)
HCO3 SERPL-SCNC: 25 MMOL/L (ref 22–29)
HCT VFR BLD AUTO: 35.2 % (ref 40–53)
HGB BLD-MCNC: 11 G/DL (ref 13.3–17.7)
IMM GRANULOCYTES # BLD: 0.1 10E3/UL
IMM GRANULOCYTES NFR BLD: 1 %
INR PPP: 1.19 (ref 0.85–1.15)
LYMPHOCYTES # BLD AUTO: 0 10E3/UL (ref 0.8–5.3)
LYMPHOCYTES NFR BLD AUTO: 0 %
MAGNESIUM SERPL-MCNC: 2.2 MG/DL (ref 1.7–2.3)
MCH RBC QN AUTO: 29.6 PG (ref 26.5–33)
MCHC RBC AUTO-ENTMCNC: 31.3 G/DL (ref 31.5–36.5)
MCV RBC AUTO: 95 FL (ref 78–100)
MONOCYTES # BLD AUTO: 0 10E3/UL (ref 0–1.3)
MONOCYTES NFR BLD AUTO: 0 %
NEUTROPHILS # BLD AUTO: 4.4 10E3/UL (ref 1.6–8.3)
NEUTROPHILS NFR BLD AUTO: 99 %
NRBC # BLD AUTO: 0 10E3/UL
NRBC BLD AUTO-RTO: 0 /100
PHOSPHATE SERPL-MCNC: 4 MG/DL (ref 2.5–4.5)
PLATELET # BLD AUTO: 241 10E3/UL (ref 150–450)
POTASSIUM SERPL-SCNC: 4 MMOL/L (ref 3.4–5.3)
PROT SERPL-MCNC: 6.2 G/DL (ref 6.4–8.3)
RBC # BLD AUTO: 3.71 10E6/UL (ref 4.4–5.9)
SODIUM SERPL-SCNC: 138 MMOL/L (ref 135–145)
WBC # BLD AUTO: 4.5 10E3/UL (ref 4–11)

## 2024-08-05 PROCEDURE — 99418 PROLNG IP/OBS E/M EA 15 MIN: CPT | Performed by: PHYSICIAN ASSISTANT

## 2024-08-05 PROCEDURE — 97530 THERAPEUTIC ACTIVITIES: CPT | Mod: GP

## 2024-08-05 PROCEDURE — 250N000012 HC RX MED GY IP 250 OP 636 PS 637: Performed by: STUDENT IN AN ORGANIZED HEALTH CARE EDUCATION/TRAINING PROGRAM

## 2024-08-05 PROCEDURE — 85025 COMPLETE CBC W/AUTO DIFF WBC: CPT | Performed by: STUDENT IN AN ORGANIZED HEALTH CARE EDUCATION/TRAINING PROGRAM

## 2024-08-05 PROCEDURE — 250N000011 HC RX IP 250 OP 636: Performed by: STUDENT IN AN ORGANIZED HEALTH CARE EDUCATION/TRAINING PROGRAM

## 2024-08-05 PROCEDURE — 83735 ASSAY OF MAGNESIUM: CPT | Performed by: STUDENT IN AN ORGANIZED HEALTH CARE EDUCATION/TRAINING PROGRAM

## 2024-08-05 PROCEDURE — 250N000013 HC RX MED GY IP 250 OP 250 PS 637: Performed by: STUDENT IN AN ORGANIZED HEALTH CARE EDUCATION/TRAINING PROGRAM

## 2024-08-05 PROCEDURE — 99233 SBSQ HOSP IP/OBS HIGH 50: CPT | Mod: 24 | Performed by: PHYSICIAN ASSISTANT

## 2024-08-05 PROCEDURE — 250N000013 HC RX MED GY IP 250 OP 250 PS 637: Performed by: PHYSICIAN ASSISTANT

## 2024-08-05 PROCEDURE — 97110 THERAPEUTIC EXERCISES: CPT | Mod: GP

## 2024-08-05 PROCEDURE — 258N000003 HC RX IP 258 OP 636: Performed by: PHYSICIAN ASSISTANT

## 2024-08-05 PROCEDURE — 258N000003 HC RX IP 258 OP 636: Performed by: INTERNAL MEDICINE

## 2024-08-05 PROCEDURE — 80069 RENAL FUNCTION PANEL: CPT | Performed by: STUDENT IN AN ORGANIZED HEALTH CARE EDUCATION/TRAINING PROGRAM

## 2024-08-05 PROCEDURE — 250N000011 HC RX IP 250 OP 636: Performed by: INTERNAL MEDICINE

## 2024-08-05 PROCEDURE — 206N000001 HC R&B BMT UMMC

## 2024-08-05 PROCEDURE — 250N000011 HC RX IP 250 OP 636: Performed by: PHYSICIAN ASSISTANT

## 2024-08-05 PROCEDURE — 85610 PROTHROMBIN TIME: CPT | Performed by: STUDENT IN AN ORGANIZED HEALTH CARE EDUCATION/TRAINING PROGRAM

## 2024-08-05 PROCEDURE — 84100 ASSAY OF PHOSPHORUS: CPT | Performed by: STUDENT IN AN ORGANIZED HEALTH CARE EDUCATION/TRAINING PROGRAM

## 2024-08-05 RX ADMIN — DEXAMETHASONE 8 MG: 4 TABLET ORAL at 07:46

## 2024-08-05 RX ADMIN — URSODIOL 300 MG: 300 CAPSULE ORAL at 14:37

## 2024-08-05 RX ADMIN — ACYCLOVIR 800 MG: 800 TABLET ORAL at 07:46

## 2024-08-05 RX ADMIN — MICAFUNGIN SODIUM 100 MG: 50 INJECTION, POWDER, LYOPHILIZED, FOR SOLUTION INTRAVENOUS at 07:46

## 2024-08-05 RX ADMIN — URSODIOL 300 MG: 300 CAPSULE ORAL at 20:20

## 2024-08-05 RX ADMIN — FLUDARABINE PHOSPHATE 73 MG: 25 INJECTION, SOLUTION INTRAVENOUS at 09:15

## 2024-08-05 RX ADMIN — INSULIN ASPART 1 UNITS: 100 INJECTION, SOLUTION INTRAVENOUS; SUBCUTANEOUS at 17:17

## 2024-08-05 RX ADMIN — Medication 5 ML: at 10:55

## 2024-08-05 RX ADMIN — BRIMONIDINE TARTRATE 1 DROP: 2 SOLUTION/ DROPS OPHTHALMIC at 20:21

## 2024-08-05 RX ADMIN — ACYCLOVIR 800 MG: 800 TABLET ORAL at 20:20

## 2024-08-05 RX ADMIN — FLUTICASONE PROPIONATE 1 SPRAY: 50 SPRAY, METERED NASAL at 07:47

## 2024-08-05 RX ADMIN — LISINOPRIL 5 MG: 5 TABLET ORAL at 07:46

## 2024-08-05 RX ADMIN — Medication 5 ML: at 02:16

## 2024-08-05 RX ADMIN — INSULIN ASPART 1 UNITS: 100 INJECTION, SOLUTION INTRAVENOUS; SUBCUTANEOUS at 07:47

## 2024-08-05 RX ADMIN — FLUTICASONE PROPIONATE 1 SPRAY: 50 SPRAY, METERED NASAL at 20:21

## 2024-08-05 RX ADMIN — PANTOPRAZOLE SODIUM 40 MG: 40 TABLET, DELAYED RELEASE ORAL at 07:46

## 2024-08-05 RX ADMIN — ATORVASTATIN CALCIUM 10 MG: 10 TABLET, FILM COATED ORAL at 20:20

## 2024-08-05 RX ADMIN — ONDANSETRON HYDROCHLORIDE 8 MG: 8 TABLET, FILM COATED ORAL at 10:16

## 2024-08-05 RX ADMIN — AMLODIPINE BESYLATE 10 MG: 10 TABLET ORAL at 07:46

## 2024-08-05 RX ADMIN — ONDANSETRON HYDROCHLORIDE 8 MG: 8 TABLET, FILM COATED ORAL at 02:15

## 2024-08-05 RX ADMIN — ONDANSETRON HYDROCHLORIDE 8 MG: 8 TABLET, FILM COATED ORAL at 18:41

## 2024-08-05 RX ADMIN — URSODIOL 300 MG: 300 CAPSULE ORAL at 07:46

## 2024-08-05 RX ADMIN — BRIMONIDINE TARTRATE 1 DROP: 2 SOLUTION/ DROPS OPHTHALMIC at 07:47

## 2024-08-05 RX ADMIN — CALCIUM CARBONATE 600 MG (1,500 MG)-VITAMIN D3 400 UNIT TABLET 1 TABLET: at 07:46

## 2024-08-05 RX ADMIN — LATANOPROST 2 DROP: 50 SOLUTION OPHTHALMIC at 23:16

## 2024-08-05 RX ADMIN — ALLOPURINOL 300 MG: 300 TABLET ORAL at 07:46

## 2024-08-05 ASSESSMENT — ACTIVITIES OF DAILY LIVING (ADL)
ADLS_ACUITY_SCORE: 20

## 2024-08-05 NOTE — PLAN OF CARE
"  Problem: Adult Inpatient Plan of Care  Goal: Patient-Specific Goal (Individualized)  Description: You can add care plan individualizations to a care plan. Examples of Individualization might be:  \"Parent requests to be called daily at 9am for status\", \"I have a hard time hearing out of my right ear\", or \"Do not touch me to wake me up as it startles  me\".  Outcome: Progressing   Goal Outcome Evaluation:  Temp max 98.8. Heart rate 51,59. Received fludarabine. No nausea or vomiting. Ate well. Blood sugar 144,137 and 180. 100,72 and 116 grams of carbs. Heparin locked. Worked with pt. Walked one mile. .25 miles on the treadmill and .75 miles on the bike. Chg wipes done. Had one stool. Independent. Plan is TBI tomorrow.                          "

## 2024-08-05 NOTE — PLAN OF CARE
1930-0730: HR 54. Hypertensive within parameters. OVSS on RA. Denies pain and nausea.  and 175. Slept with legs elevated on pillows to help with swelling in legs. Still needs compression stockings. Had a small BM. UAL.

## 2024-08-05 NOTE — PROGRESS NOTES
"BMT Progress Note  Chief complaint:  Arpit Forrest is a 73 year old male, currently day -2  prep for ELIEZER MUD PBSCT for MDS.    INTERIM HISTORY: Mr Forrest feels good today.  He think his ankle swelling is better and there is no pain.  He is eating well w/ no n/v/d. No fever or bleeding.  REVIEW OF SYSTEMS: Otherwise unremarkable other than what is noted in the Interim History.   PHYSICAL EXAM:   Vitals:  /81 (BP Location: Right arm)   Pulse 50   Temp 98.4  F (36.9  C) (Oral)   Resp 16   Ht 1.825 m (5' 11.85\")   Wt 115.6 kg (254 lb 14.4 oz)   SpO2 99%   BMI 34.72 kg/m    Wt Readings from Last 4 Encounters:   08/05/24 115.6 kg (254 lb 14.4 oz)   07/30/24 117.5 kg (259 lb)   07/26/24 117 kg (258 lb)   07/23/24 115.3 kg (254 lb 1.6 oz)     General Appearance: NAD; face has maye appearance--stable per pt  HEENT: sclera anicteric. Moist mucus membranes, no ulcerations.  CV: RRR. Pan systolic murmur (known to pt)    RESP: CTA bilaterally; no rales or wheezes.   GI: +BS, soft, nontender, nondistended.   EXT: Trace ankle edema, R>L. No cyanosis/clubbing.  Non-tender distal extremities b/l  SKIN: no lesions or rash.   NEURO: A&O x3   PSYCH: Appropriate affect   VASCULAR ACCESS: dressing CDI.     ROUTINE LABS:    Lab Results   Component Value Date    WBC 4.5 08/05/2024    ANEU 2.4 07/23/2024    HGB 11.0 (L) 08/05/2024    HCT 35.2 (L) 08/05/2024     08/05/2024     08/05/2024    POTASSIUM 4.0 08/05/2024    CHLORIDE 100 08/05/2024    CO2 25 08/05/2024     (H) 08/05/2024    BUN 27.5 (H) 08/05/2024    CR 1.02 08/05/2024    MAG 2.2 08/05/2024    INR 1.19 (H) 08/05/2024          ASSESSMENT AND PLAN:    Arpit Forrest is a 73 year old male, currently day -2 prep for ELIEZER MUD PBSCT for MDS.     BMT/IEC PROTOCOL for MDS  - Chemo protocol: [id number]  D-6: Flu 30mg/m2, Cy 50mg/kg  D-5 to D-2: Flu  D-1: TBI  D0: stem cell infusion  - Peripheral blood stem cell graft from 8/8 donor, ABO matched (Oneg), Cell " dose: TBD  - continue allopurinol (hx of gout)  - Engraftment monitoring: Peripheral blood and bone marrow chimerisms on D28, D100, 6 months, 1 and 2 year  - Restaging plan: BMBx with NGS on D28, D100, 6 months, 1 and 2 years     HEME/COAG  - GCSF starts day +5, continue until ANC > 1500 for 3 consecutive days.   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      ID  Multiple granulomas in the lung and bone marrow biopsy  - fungitell, aspergillus galactomanan neg. 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 Toxoplasma IgG negative   - Viral: Acyclovir 800mg bid (Pt/donor CMV IgG neg)  Monitoring:   - CMV every week    - EBV every 2 weeks from D30 to D180     GI/NUTRITION  # Hepatic steatosis and boderline iron deposition in liver  - LFTs and synthetic function normal on admission; Check LFTs qM/Th  - VOD prophy: Ursodiol 300mg TID    # Supportive   - Anti-emetics:  per protocol   - Ulcer prophy: PPI daily   - Dietician support to prevent malnutrition.     CARDIOVASCULAR  # Hx of A.fib with RVR   - A.fib with RVR while admitted with febrile episode on 3/15/24. Noted on his fitbit, not very symptomatic. Cardioversion planned for new onset A.fib. Not achieved with amiodarone, DCCV on 3/21/24 restored normal sinus rhythm. Amiodarone and anticoagulation was stopped in April 2024 without any recurrence.   - He could have a recurrence during transplant which would need to be addressed with rate control strategy   # Asymptomatic moderate aortic stenosis   # CAD  - Chest CT in February 2024 with mild to moderate CAC, three-vessel disease. No anginal  "symptoms   - Continue atorvastatin 10mg   - EKG Sinus rhythm Inferior infarct , age undetermined. Abnormal ECG (known cardiovascular disease). QTc =460  # HTN  - Continue amlodipine 10mg, adding lisinopril 5mg 8/4.   - Risk of cardiomyopathy:  Baseline EF normal 60-65&  - Risk of arrhythmia: Baseline EKG showed NSR, Qtc 460     RESPIRATORY  # VINCENT : CPAP  - Baseline PFTs: normal   - Risk of respiratory complications: Frequent ambulation and incentive spirometer.     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  Trial compression stockings today.  # Electrolyte management: replace per sliding scale  - cont Ca w/vit D  - hold eye vitamin and Coq10 peritransplant     DIABETES/ENDOCRINE  - hx steroid-induced hyperglycemia  - holding PTA metformin on admission. Consult endocrine.     MUSCULOSKELETAL/FRAILTY  # Gout: Continue allopurinol indefinitely   - Baseline Frailty Score: Not frail   - Daily PT/OT as needed while inpatient     SOCIAL DETERMINANTS  - Caregiver: Wife, Meghna, children     Medically Ready for Discharge: Anticipated in 5+ Days  Clinically Significant Risk Factors               # Coagulation Defect: INR = 1.19 (Ref range: 0.85 - 1.15) and/or PTT = 34 Seconds (Ref range: 22 - 38 Seconds), will monitor for bleeding    # Hypertension: Noted on problem list            # Obesity: Estimated body mass index is 34.72 kg/m  as calculated from the following:    Height as of this encounter: 1.825 m (5' 11.85\").    Weight as of this encounter: 115.6 kg (254 lb 14.4 oz).           I spent 40 minutes in the care of this patient today, which included time necessary for preparation for the visit, obtaining history, ordering medications/tests/procedures as medically indicated, review of pertinent medical literature, counseling of the patient, communication of recommendations to the care " team, and documentation time.    Divine Goldberg PA-C on 8/4/2024 at 9:40 AM  Bone Marrow Transplant (5C) Attending Progress Note      I saw and evaluated this patient as part of a shared APRN/PA visit.      I spent 75 minutes in the care of this patient today, which included time necessary for preparation for the visit, obtaining history, ordering medications/tests/procedures as medically indicated, reviewing vital signs and flow charts, review of pertinent medical literature, counseling of the patient, communication of recommendations to the care team, and documentation time. Counseling and/or coordination of care were performed by me today including a face-to-face patient visit and exam.     The patient was discussed on morning rounds with the nurses, and mid-level provider. All labs and imaging were reviewed. I reviewed events over the last 24 hours including vitals and flow sheets. I agree with the VINNY note from today and have been responsible for the care plan and interpretation of progress. Overall, the patient is a 73-year-old man with high risk MDS and increased percentage of CD5 positive B cells who is admitted for a matched unrelated donor transplant.  He is now on day -3 prior to transplant.  He has a history of hypertension, gout, atrial fibrillation and sleep apnea.  He has otherwise been fairly healthy.  He denies any shortness of breath or cough.  He does have a history of donating a kidney to his sister.  He has no GI or  symptoms.  He has no headache, visual disturbance or other neurologic symptoms.  The rest a 10 point review of systems is unremarkable.  He will receive micafungin prophylaxis for his pulmonary nodules.  He continues to tolerate his chemotherapy well.  He will get a dose of Lasix today for ankle swelling and we we will restart lisinopril for his elevated blood pressures..     Physical Exam and Lab Summary: There is no evidence of mucositis. There is no adenopathy on exam  and lungs are clear. There is 1+ LE edema (mainly at the ankles) and no rash. Labs show a creatinine of 0.98, hemoglobin of 11.3, platelet count of 274,000 and a white count of 6100 with 5900 absolute neutrophils.      It is my overall impression that Arpit tolerating his conditioning without complications.  He has no signs of active infection. We will get better control of his blood pressure.  The patient has been informed on progress and all questions have been answered. We discussed expectations for the next several days.     Josh Vaca MD  Date of Service (when I saw the patient and coordinated care):  August 4, 2024

## 2024-08-06 ENCOUNTER — APPOINTMENT (OUTPATIENT)
Dept: RADIATION ONCOLOGY | Facility: CLINIC | Age: 74
End: 2024-08-06
Attending: RADIOLOGY
Payer: MEDICARE

## 2024-08-06 LAB
ABO/RH(D): NORMAL
ANION GAP SERPL CALCULATED.3IONS-SCNC: 11 MMOL/L (ref 7–15)
ANTIBODY SCREEN: NEGATIVE
ASPERGILLUS AB TITR SER CF: ABNORMAL {TITER}
B DERMAT AB SER-ACNC: 0.5 IV
BASOPHILS # BLD AUTO: 0 10E3/UL (ref 0–0.2)
BASOPHILS NFR BLD AUTO: 0 %
BUN SERPL-MCNC: 28 MG/DL (ref 8–23)
CALCIUM SERPL-MCNC: 9.4 MG/DL (ref 8.8–10.4)
CHLORIDE SERPL-SCNC: 102 MMOL/L (ref 98–107)
COCCIDIOIDES AB TITR SER CF: ABNORMAL {TITER}
CREAT SERPL-MCNC: 1 MG/DL (ref 0.67–1.17)
EGFRCR SERPLBLD CKD-EPI 2021: 78 ML/MIN/1.73M2
EOSINOPHIL # BLD AUTO: 0 10E3/UL (ref 0–0.7)
EOSINOPHIL NFR BLD AUTO: 0 %
ERYTHROCYTE [DISTWIDTH] IN BLOOD BY AUTOMATED COUNT: 15.5 % (ref 10–15)
GLUCOSE BLDC GLUCOMTR-MCNC: 111 MG/DL (ref 70–99)
GLUCOSE BLDC GLUCOMTR-MCNC: 120 MG/DL (ref 70–99)
GLUCOSE BLDC GLUCOMTR-MCNC: 181 MG/DL (ref 70–99)
GLUCOSE BLDC GLUCOMTR-MCNC: 203 MG/DL (ref 70–99)
GLUCOSE BLDC GLUCOMTR-MCNC: 212 MG/DL (ref 70–99)
GLUCOSE SERPL-MCNC: 140 MG/DL (ref 70–99)
H CAPSUL MYC AB TITR SER CF: ABNORMAL {TITER}
H CAPSUL YST AB TITR SER CF: ABNORMAL {TITER}
HCO3 SERPL-SCNC: 25 MMOL/L (ref 22–29)
HCT VFR BLD AUTO: 34.6 % (ref 40–53)
HGB BLD-MCNC: 11.2 G/DL (ref 13.3–17.7)
IMM GRANULOCYTES # BLD: 0 10E3/UL
IMM GRANULOCYTES NFR BLD: 1 %
LYMPHOCYTES # BLD AUTO: 0 10E3/UL (ref 0.8–5.3)
LYMPHOCYTES NFR BLD AUTO: 0 %
MAGNESIUM SERPL-MCNC: 2.2 MG/DL (ref 1.7–2.3)
MCH RBC QN AUTO: 30.3 PG (ref 26.5–33)
MCHC RBC AUTO-ENTMCNC: 32.4 G/DL (ref 31.5–36.5)
MCV RBC AUTO: 94 FL (ref 78–100)
MONOCYTES # BLD AUTO: 0 10E3/UL (ref 0–1.3)
MONOCYTES NFR BLD AUTO: 0 %
NEUTROPHILS # BLD AUTO: 4 10E3/UL (ref 1.6–8.3)
NEUTROPHILS NFR BLD AUTO: 99 %
NRBC # BLD AUTO: 0 10E3/UL
NRBC BLD AUTO-RTO: 0 /100
PHOSPHATE SERPL-MCNC: 3.9 MG/DL (ref 2.5–4.5)
PLATELET # BLD AUTO: 231 10E3/UL (ref 150–450)
POTASSIUM SERPL-SCNC: 4.2 MMOL/L (ref 3.4–5.3)
RBC # BLD AUTO: 3.7 10E6/UL (ref 4.4–5.9)
SCHISTOSOMA IGG SER IA-ACNC: 7 U
SODIUM SERPL-SCNC: 138 MMOL/L (ref 135–145)
SPECIMEN EXPIRATION DATE: NORMAL
WBC # BLD AUTO: 4.1 10E3/UL (ref 4–11)

## 2024-08-06 PROCEDURE — 77332 RADIATION TREATMENT AID(S): CPT | Performed by: RADIOLOGY

## 2024-08-06 PROCEDURE — 86901 BLOOD TYPING SEROLOGIC RH(D): CPT | Performed by: STUDENT IN AN ORGANIZED HEALTH CARE EDUCATION/TRAINING PROGRAM

## 2024-08-06 PROCEDURE — 250N000011 HC RX IP 250 OP 636: Performed by: INTERNAL MEDICINE

## 2024-08-06 PROCEDURE — 250N000011 HC RX IP 250 OP 636: Performed by: STUDENT IN AN ORGANIZED HEALTH CARE EDUCATION/TRAINING PROGRAM

## 2024-08-06 PROCEDURE — 77331 SPECIAL RADIATION DOSIMETRY: CPT | Performed by: RADIOLOGY

## 2024-08-06 PROCEDURE — 99418 PROLNG IP/OBS E/M EA 15 MIN: CPT | Performed by: PHYSICIAN ASSISTANT

## 2024-08-06 PROCEDURE — 99233 SBSQ HOSP IP/OBS HIGH 50: CPT | Mod: 24 | Performed by: PHYSICIAN ASSISTANT

## 2024-08-06 PROCEDURE — 206N000001 HC R&B BMT UMMC

## 2024-08-06 PROCEDURE — 84100 ASSAY OF PHOSPHORUS: CPT | Performed by: INTERNAL MEDICINE

## 2024-08-06 PROCEDURE — 77332 RADIATION TREATMENT AID(S): CPT | Mod: 26 | Performed by: RADIOLOGY

## 2024-08-06 PROCEDURE — 77412 RADIATION TX DELIVERY LVL 3: CPT | Performed by: RADIOLOGY

## 2024-08-06 PROCEDURE — 77331 SPECIAL RADIATION DOSIMETRY: CPT | Mod: 26 | Performed by: RADIOLOGY

## 2024-08-06 PROCEDURE — 83735 ASSAY OF MAGNESIUM: CPT | Performed by: INTERNAL MEDICINE

## 2024-08-06 PROCEDURE — 250N000011 HC RX IP 250 OP 636: Performed by: PHYSICIAN ASSISTANT

## 2024-08-06 PROCEDURE — 80048 BASIC METABOLIC PNL TOTAL CA: CPT | Performed by: STUDENT IN AN ORGANIZED HEALTH CARE EDUCATION/TRAINING PROGRAM

## 2024-08-06 PROCEDURE — 250N000013 HC RX MED GY IP 250 OP 250 PS 637: Performed by: STUDENT IN AN ORGANIZED HEALTH CARE EDUCATION/TRAINING PROGRAM

## 2024-08-06 PROCEDURE — 85025 COMPLETE CBC W/AUTO DIFF WBC: CPT | Performed by: STUDENT IN AN ORGANIZED HEALTH CARE EDUCATION/TRAINING PROGRAM

## 2024-08-06 PROCEDURE — 250N000013 HC RX MED GY IP 250 OP 250 PS 637: Performed by: PHYSICIAN ASSISTANT

## 2024-08-06 PROCEDURE — 250N000012 HC RX MED GY IP 250 OP 636 PS 637: Performed by: STUDENT IN AN ORGANIZED HEALTH CARE EDUCATION/TRAINING PROGRAM

## 2024-08-06 PROCEDURE — 77336 RADIATION PHYSICS CONSULT: CPT | Performed by: RADIOLOGY

## 2024-08-06 PROCEDURE — 258N000003 HC RX IP 258 OP 636: Performed by: PHYSICIAN ASSISTANT

## 2024-08-06 RX ADMIN — MICAFUNGIN SODIUM 100 MG: 50 INJECTION, POWDER, LYOPHILIZED, FOR SOLUTION INTRAVENOUS at 08:25

## 2024-08-06 RX ADMIN — ONDANSETRON HYDROCHLORIDE 8 MG: 8 TABLET, FILM COATED ORAL at 10:32

## 2024-08-06 RX ADMIN — URSODIOL 300 MG: 300 CAPSULE ORAL at 08:20

## 2024-08-06 RX ADMIN — FLUTICASONE PROPIONATE 1 SPRAY: 50 SPRAY, METERED NASAL at 19:48

## 2024-08-06 RX ADMIN — LISINOPRIL 5 MG: 5 TABLET ORAL at 08:20

## 2024-08-06 RX ADMIN — Medication 5 ML: at 03:44

## 2024-08-06 RX ADMIN — ONDANSETRON HYDROCHLORIDE 8 MG: 8 TABLET, FILM COATED ORAL at 03:44

## 2024-08-06 RX ADMIN — DEXAMETHASONE 6 MG: 2 TABLET ORAL at 08:20

## 2024-08-06 RX ADMIN — ONDANSETRON HYDROCHLORIDE 8 MG: 8 TABLET, FILM COATED ORAL at 18:51

## 2024-08-06 RX ADMIN — ATORVASTATIN CALCIUM 10 MG: 10 TABLET, FILM COATED ORAL at 19:48

## 2024-08-06 RX ADMIN — ACYCLOVIR 800 MG: 800 TABLET ORAL at 08:20

## 2024-08-06 RX ADMIN — URSODIOL 300 MG: 300 CAPSULE ORAL at 19:48

## 2024-08-06 RX ADMIN — CALCIUM CARBONATE 600 MG (1,500 MG)-VITAMIN D3 400 UNIT TABLET 1 TABLET: at 08:20

## 2024-08-06 RX ADMIN — INSULIN ASPART 2 UNITS: 100 INJECTION, SOLUTION INTRAVENOUS; SUBCUTANEOUS at 17:23

## 2024-08-06 RX ADMIN — BRIMONIDINE TARTRATE 1 DROP: 2 SOLUTION/ DROPS OPHTHALMIC at 08:24

## 2024-08-06 RX ADMIN — URSODIOL 300 MG: 300 CAPSULE ORAL at 14:43

## 2024-08-06 RX ADMIN — FLUTICASONE PROPIONATE 1 SPRAY: 50 SPRAY, METERED NASAL at 08:24

## 2024-08-06 RX ADMIN — AMLODIPINE BESYLATE 10 MG: 10 TABLET ORAL at 08:20

## 2024-08-06 RX ADMIN — ALLOPURINOL 300 MG: 300 TABLET ORAL at 08:20

## 2024-08-06 RX ADMIN — ACYCLOVIR 800 MG: 800 TABLET ORAL at 19:48

## 2024-08-06 RX ADMIN — LEVOFLOXACIN 250 MG: 250 TABLET, FILM COATED ORAL at 10:32

## 2024-08-06 RX ADMIN — LATANOPROST 2 DROP: 50 SOLUTION OPHTHALMIC at 22:04

## 2024-08-06 RX ADMIN — PANTOPRAZOLE SODIUM 40 MG: 40 TABLET, DELAYED RELEASE ORAL at 08:20

## 2024-08-06 RX ADMIN — BRIMONIDINE TARTRATE 1 DROP: 2 SOLUTION/ DROPS OPHTHALMIC at 19:48

## 2024-08-06 ASSESSMENT — ACTIVITIES OF DAILY LIVING (ADL)
ADLS_ACUITY_SCORE: 20

## 2024-08-06 NOTE — PROGRESS NOTES
"BMT Progress Note  Chief complaint:  Arpit Forrest is a 73 year old male, currently day -1  prep for ELIEZER MUD PBSCT for MDS.    INTERIM HISTORY: Mr Forrest feels good again today.  Ankle swelling is better and there is no pain.  He is eating well w/ no n/v/d. No fever or bleeding.  Ready for TBI.  No new complaints today.  REVIEW OF SYSTEMS: Otherwise unremarkable other than what is noted in the Interim History.   PHYSICAL EXAM:   Vitals:  /74 (BP Location: Right arm)   Pulse 58   Temp 98.3  F (36.8  C) (Oral)   Resp 16   Ht 1.825 m (5' 11.85\")   Wt 113.7 kg (250 lb 11.2 oz)   SpO2 100%   BMI 34.14 kg/m    Wt Readings from Last 4 Encounters:   08/06/24 113.7 kg (250 lb 11.2 oz)   07/30/24 117.5 kg (259 lb)   07/26/24 117 kg (258 lb)   07/23/24 115.3 kg (254 lb 1.6 oz)     General Appearance: NAD; face has maye appearance--stable per pt  HEENT: sclera anicteric. Moist mucus membranes, no ulcerations.  CV: RRR. Pan systolic murmur (known to pt)    RESP: CTA bilaterally; no rales or wheezes.   GI: +BS, soft, nontender, nondistended.   EXT: Trace ankle edema, R>L; stable. No cyanosis/clubbing.  Non-tender distal extremities b/l  SKIN: no lesions or rash.   NEURO: A&O x 3   PSYCH: Appropriate affect   VASCULAR ACCESS: dressing CDI.     ROUTINE LABS:    Lab Results   Component Value Date    WBC 4.1 08/06/2024    ANEU 2.4 07/23/2024    HGB 11.2 (L) 08/06/2024    HCT 34.6 (L) 08/06/2024     08/06/2024     08/06/2024    POTASSIUM 4.2 08/06/2024    CHLORIDE 102 08/06/2024    CO2 25 08/06/2024     (H) 08/06/2024    BUN 28.0 (H) 08/06/2024    CR 1.00 08/06/2024    MAG 2.2 08/06/2024    INR 1.19 (H) 08/05/2024          ASSESSMENT AND PLAN:    Arpit Forrest is a 73 year old male, currently day -1 prep for ELIEZER MUD PBSCT for MDS.     BMT/IEC PROTOCOL for MDS  - Chemo protocol: [id number]  D-6: Flu 30mg/m2, Cy 50mg/kg  D-5 to D-2: Flu  D-1: TBI  D0: stem cell infusion  - Peripheral blood stem cell " graft from 8/8 donor, ABO matched (Oneg), Cell dose: TBD  - continue allopurinol (hx of gout)  - Engraftment monitoring: Peripheral blood and bone marrow chimerisms on D28, D100, 6 months, 1 and 2 year  - Restaging plan: BMBx with NGS on D28, D100, 6 months, 1 and 2 years     HEME/COAG  - GCSF starts day +5, continue until ANC > 1500 for 3 consecutive days.   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      ID  Multiple granulomas in the lung and bone marrow biopsy  - fungitell, aspergillus galactomanan neg. 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 Toxoplasma IgG negative   - Viral: Acyclovir 800mg bid (Pt/donor CMV IgG neg)  Monitoring:   - CMV every week    - EBV every 2 weeks from D30 to D180     GI/NUTRITION  # Hepatic steatosis and boderline iron deposition in liver  - LFTs and synthetic function normal on admission; Check LFTs qM/Th  - VOD prophy: Ursodiol 300mg TID    # Supportive   - Anti-emetics:  per protocol   - Ulcer prophy: PPI daily   - Dietician support to prevent malnutrition.     CARDIOVASCULAR  # Hx of A.fib with RVR   - A.fib with RVR while admitted with febrile episode on 3/15/24. Noted on his fitbit, not very symptomatic. Cardioversion planned for new onset A.fib. Not achieved with amiodarone, DCCV on 3/21/24 restored normal sinus rhythm. Amiodarone and anticoagulation was stopped in April 2024 without any recurrence.   - He could have a recurrence during transplant which would need to be addressed with rate control strategy   # Asymptomatic moderate aortic stenosis   # CAD  - Chest CT in February 2024 with mild to  "moderate CAC, three-vessel disease. No anginal symptoms   - Continue atorvastatin 10mg   - EKG Sinus rhythm Inferior infarct , age undetermined. Abnormal ECG (known cardiovascular disease). QTc =460  # HTN  - Continue amlodipine 10mg, adding lisinopril 5mg 8/4 (PTA dose of lisinopril was 10mg)  - Risk of cardiomyopathy:  Baseline EF normal 60-65&  - Risk of arrhythmia: Baseline EKG showed NSR, Qtc 460     RESPIRATORY  # VINCENT : CPAP  - Baseline PFTs: normal   - Risk of respiratory complications: Frequent ambulation and incentive spirometer.     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  Trial compression stockings today.  # Electrolyte management: replace per sliding scale  - cont Ca w/vit D  - hold eye vitamin and Coq10 peritransplant     DIABETES/ENDOCRINE  - hx steroid-induced hyperglycemia  - holding PTA metformin on admission. Consult endocrine.     MUSCULOSKELETAL/FRAILTY  # Gout: Continue allopurinol indefinitely   - Baseline Frailty Score: Not frail   - Daily PT/OT as needed while inpatient     SOCIAL DETERMINANTS  - Caregiver: Wife, Meghna, children     Medically Ready for Discharge: Anticipated in 5+ Days  Clinically Significant Risk Factors               # Coagulation Defect: INR = 1.19 (Ref range: 0.85 - 1.15) and/or PTT = 34 Seconds (Ref range: 22 - 38 Seconds), will monitor for bleeding    # Hypertension: Noted on problem list            # Obesity: Estimated body mass index is 34.14 kg/m  as calculated from the following:    Height as of this encounter: 1.825 m (5' 11.85\").    Weight as of this encounter: 113.7 kg (250 lb 11.2 oz).           I spent 30 minutes in the care of this patient today, which included time necessary for preparation for the visit, obtaining history, ordering medications/tests/procedures as medically indicated, review of pertinent medical " literature, counseling of the patient, communication of recommendations to the care team, and documentation time.    Divine Goldberg PA-C on 8/4/2024 at 9:40 AM  Bone Marrow Transplant (5C) Attending Progress Note      I saw and evaluated this patient as part of a shared APRN/PA visit.      I spent 75 minutes in the care of this patient today, which included time necessary for preparation for the visit, obtaining history, ordering medications/tests/procedures as medically indicated, reviewing vital signs and flow charts, review of pertinent medical literature, counseling of the patient, communication of recommendations to the care team, and documentation time. Counseling and/or coordination of care were performed by me today including a face-to-face patient visit and exam.     The patient was discussed on morning rounds with the nurses, and mid-level provider. All labs and imaging were reviewed. I reviewed events over the last 24 hours including vitals and flow sheets. I agree with the VINNY note from today and have been responsible for the care plan and interpretation of progress. Overall, the patient is a 73-year-old man with high risk MDS and increased percentage of CD5 positive B cells who is admitted for a matched unrelated donor transplant.  He is now on day -3 prior to transplant.  He has a history of hypertension, gout, atrial fibrillation and sleep apnea.  He has otherwise been fairly healthy.  He denies any shortness of breath or cough.  He does have a history of donating a kidney to his sister.  He has no GI or  symptoms.  He has no headache, visual disturbance or other neurologic symptoms.  The rest a 10 point review of systems is unremarkable.  He will receive micafungin prophylaxis for his pulmonary nodules.  He continues to tolerate his chemotherapy well.  He will get a dose of Lasix today for ankle swelling and we we will restart lisinopril for his elevated blood pressures..     Physical Exam  and Lab Summary: There is no evidence of mucositis. There is no adenopathy on exam and lungs are clear. There is 1+ LE edema (mainly at the ankles) and no rash. Labs show a creatinine of 0.98, hemoglobin of 11.3, platelet count of 274,000 and a white count of 6100 with 5900 absolute neutrophils.      It is my overall impression that Arpit tolerating his conditioning without complications.  He has no signs of active infection. We will get better control of his blood pressure.  The patient has been informed on progress and all questions have been answered. We discussed expectations for the next several days.     Josh Vaca MD  Date of Service (when I saw the patient and coordinated care):  August 4, 2024

## 2024-08-06 NOTE — PLAN OF CARE
Patient vital signs stable, no complaints offered today. He is active and has a healthy appetite. He had TBI today no CHG wipes for the next two days 86/ and 8/7.  He will have his transplant tomorrow evening, time to be determined, he is aware. Continue to monitor.  Problem: Adult Inpatient Plan of Care  Goal: Plan of Care Review  Description: The Plan of Care Review/Shift note should be completed every shift.  The Outcome Evaluation is a brief statement about your assessment that the patient is improving, declining, or no change.  This information will be displayed automatically on your shift  note.  Outcome: Progressing   Goal Outcome Evaluation:

## 2024-08-06 NOTE — PLAN OF CARE
"Goal Outcome Evaluation:      Plan of Care Reviewed With: patient    Overall Patient Progress: no changeOverall Patient Progress: no change    .BP (!) 154/86 (BP Location: Right arm)   Pulse 54   Temp 97.8  F (36.6  C) (Oral)   Resp 16   Ht 1.825 m (5' 11.85\")   Wt 115.6 kg (254 lb 14.4 oz)   SpO2 99%   BMI 34.72 kg/m      Pt alert and oriented. Afebrile. Bradycardia HR 50s. HTN. Ovss on room air. Denies pain or nausea. Voiding good UOP. HS blood glucose 150. Up independent in room . Cont with poc.      Problem: Adult Inpatient Plan of Care  Goal: Plan of Care Review  Description: The Plan of Care Review/Shift note should be completed every shift.  The Outcome Evaluation is a brief statement about your assessment that the patient is improving, declining, or no change.  This information will be displayed automatically on your shift  note.  8/6/2024 0527 by Artis Gonzalez RN  Outcome: Progressing     "

## 2024-08-07 ENCOUNTER — APPOINTMENT (OUTPATIENT)
Dept: PHYSICAL THERAPY | Facility: CLINIC | Age: 74
DRG: 014 | End: 2024-08-07
Attending: INTERNAL MEDICINE
Payer: MEDICARE

## 2024-08-07 LAB
ANION GAP SERPL CALCULATED.3IONS-SCNC: 11 MMOL/L (ref 7–15)
BASOPHILS # BLD AUTO: 0 10E3/UL (ref 0–0.2)
BASOPHILS NFR BLD AUTO: 0 %
BUN SERPL-MCNC: 25.2 MG/DL (ref 8–23)
CALCIUM SERPL-MCNC: 9.2 MG/DL (ref 8.8–10.4)
CHLORIDE SERPL-SCNC: 104 MMOL/L (ref 98–107)
CMV DNA SPEC NAA+PROBE-ACNC: NOT DETECTED IU/ML
CREAT SERPL-MCNC: 0.94 MG/DL (ref 0.67–1.17)
EGFRCR SERPLBLD CKD-EPI 2021: 85 ML/MIN/1.73M2
EOSINOPHIL # BLD AUTO: 0 10E3/UL (ref 0–0.7)
EOSINOPHIL NFR BLD AUTO: 0 %
ERYTHROCYTE [DISTWIDTH] IN BLOOD BY AUTOMATED COUNT: 15.6 % (ref 10–15)
GLUCOSE BLDC GLUCOMTR-MCNC: 112 MG/DL (ref 70–99)
GLUCOSE BLDC GLUCOMTR-MCNC: 123 MG/DL (ref 70–99)
GLUCOSE BLDC GLUCOMTR-MCNC: 140 MG/DL (ref 70–99)
GLUCOSE BLDC GLUCOMTR-MCNC: 96 MG/DL (ref 70–99)
GLUCOSE SERPL-MCNC: 132 MG/DL (ref 70–99)
HCO3 SERPL-SCNC: 25 MMOL/L (ref 22–29)
HCT VFR BLD AUTO: 33.7 % (ref 40–53)
HGB BLD-MCNC: 10.8 G/DL (ref 13.3–17.7)
IMM GRANULOCYTES # BLD: 0.1 10E3/UL
IMM GRANULOCYTES NFR BLD: 2 %
LYMPHOCYTES # BLD AUTO: 0 10E3/UL (ref 0.8–5.3)
LYMPHOCYTES NFR BLD AUTO: 0 %
MAGNESIUM SERPL-MCNC: 2.1 MG/DL (ref 1.7–2.3)
MCH RBC QN AUTO: 30.3 PG (ref 26.5–33)
MCHC RBC AUTO-ENTMCNC: 32 G/DL (ref 31.5–36.5)
MCV RBC AUTO: 94 FL (ref 78–100)
MONOCYTES # BLD AUTO: 0 10E3/UL (ref 0–1.3)
MONOCYTES NFR BLD AUTO: 0 %
NEUTROPHILS # BLD AUTO: 2.7 10E3/UL (ref 1.6–8.3)
NEUTROPHILS NFR BLD AUTO: 98 %
NRBC # BLD AUTO: 0 10E3/UL
NRBC BLD AUTO-RTO: 0 /100
PHOSPHATE SERPL-MCNC: 4.1 MG/DL (ref 2.5–4.5)
PLATELET # BLD AUTO: 206 10E3/UL (ref 150–450)
POTASSIUM SERPL-SCNC: 3.8 MMOL/L (ref 3.4–5.3)
RBC # BLD AUTO: 3.57 10E6/UL (ref 4.4–5.9)
SODIUM SERPL-SCNC: 140 MMOL/L (ref 135–145)
WBC # BLD AUTO: 2.8 10E3/UL (ref 4–11)

## 2024-08-07 PROCEDURE — 815N000004 HC ACQUISITION BONE MARROW NMDP

## 2024-08-07 PROCEDURE — 206N000001 HC R&B BMT UMMC

## 2024-08-07 PROCEDURE — 258N000003 HC RX IP 258 OP 636: Performed by: PHYSICIAN ASSISTANT

## 2024-08-07 PROCEDURE — 250N000013 HC RX MED GY IP 250 OP 250 PS 637: Performed by: PHYSICIAN ASSISTANT

## 2024-08-07 PROCEDURE — 250N000013 HC RX MED GY IP 250 OP 250 PS 637: Performed by: STUDENT IN AN ORGANIZED HEALTH CARE EDUCATION/TRAINING PROGRAM

## 2024-08-07 PROCEDURE — 97110 THERAPEUTIC EXERCISES: CPT | Mod: GP

## 2024-08-07 PROCEDURE — 250N000011 HC RX IP 250 OP 636: Performed by: PHYSICIAN ASSISTANT

## 2024-08-07 PROCEDURE — 38214 VOLUME DEPLETE OF HARVEST: CPT | Performed by: INTERNAL MEDICINE

## 2024-08-07 PROCEDURE — 82310 ASSAY OF CALCIUM: CPT | Performed by: STUDENT IN AN ORGANIZED HEALTH CARE EDUCATION/TRAINING PROGRAM

## 2024-08-07 PROCEDURE — 250N000011 HC RX IP 250 OP 636: Performed by: STUDENT IN AN ORGANIZED HEALTH CARE EDUCATION/TRAINING PROGRAM

## 2024-08-07 PROCEDURE — 85025 COMPLETE CBC W/AUTO DIFF WBC: CPT | Performed by: STUDENT IN AN ORGANIZED HEALTH CARE EDUCATION/TRAINING PROGRAM

## 2024-08-07 PROCEDURE — 30243Y3 TRANSFUSION OF ALLOGENEIC UNRELATED HEMATOPOIETIC STEM CELLS INTO CENTRAL VEIN, PERCUTANEOUS APPROACH: ICD-10-PCS

## 2024-08-07 PROCEDURE — 83735 ASSAY OF MAGNESIUM: CPT | Performed by: STUDENT IN AN ORGANIZED HEALTH CARE EDUCATION/TRAINING PROGRAM

## 2024-08-07 PROCEDURE — 250N000011 HC RX IP 250 OP 636: Performed by: INTERNAL MEDICINE

## 2024-08-07 PROCEDURE — 84100 ASSAY OF PHOSPHORUS: CPT | Performed by: HOSPITALIST

## 2024-08-07 RX ADMIN — ATORVASTATIN CALCIUM 10 MG: 10 TABLET, FILM COATED ORAL at 20:13

## 2024-08-07 RX ADMIN — DIPHENHYDRAMINE HYDROCHLORIDE 25 MG: 25 CAPSULE ORAL at 22:13

## 2024-08-07 RX ADMIN — ONDANSETRON HYDROCHLORIDE 8 MG: 8 TABLET, FILM COATED ORAL at 10:55

## 2024-08-07 RX ADMIN — ONDANSETRON HYDROCHLORIDE 8 MG: 8 TABLET, FILM COATED ORAL at 18:44

## 2024-08-07 RX ADMIN — CALCIUM CARBONATE 600 MG (1,500 MG)-VITAMIN D3 400 UNIT TABLET 1 TABLET: at 08:00

## 2024-08-07 RX ADMIN — INSULIN ASPART 1 UNITS: 100 INJECTION, SOLUTION INTRAVENOUS; SUBCUTANEOUS at 17:02

## 2024-08-07 RX ADMIN — URSODIOL 300 MG: 300 CAPSULE ORAL at 07:59

## 2024-08-07 RX ADMIN — ALLOPURINOL 300 MG: 300 TABLET ORAL at 08:00

## 2024-08-07 RX ADMIN — BRIMONIDINE TARTRATE 1 DROP: 2 SOLUTION/ DROPS OPHTHALMIC at 20:13

## 2024-08-07 RX ADMIN — AMLODIPINE BESYLATE 10 MG: 10 TABLET ORAL at 08:00

## 2024-08-07 RX ADMIN — ACETAMINOPHEN 650 MG: 325 TABLET, FILM COATED ORAL at 22:13

## 2024-08-07 RX ADMIN — FLUTICASONE PROPIONATE 1 SPRAY: 50 SPRAY, METERED NASAL at 08:00

## 2024-08-07 RX ADMIN — Medication 5 ML: at 04:02

## 2024-08-07 RX ADMIN — LEVOFLOXACIN 250 MG: 250 TABLET, FILM COATED ORAL at 10:55

## 2024-08-07 RX ADMIN — ACYCLOVIR 800 MG: 800 TABLET ORAL at 08:00

## 2024-08-07 RX ADMIN — Medication 5 ML: at 04:03

## 2024-08-07 RX ADMIN — BRIMONIDINE TARTRATE 1 DROP: 2 SOLUTION/ DROPS OPHTHALMIC at 08:00

## 2024-08-07 RX ADMIN — ACYCLOVIR 800 MG: 800 TABLET ORAL at 20:13

## 2024-08-07 RX ADMIN — ONDANSETRON HYDROCHLORIDE 8 MG: 8 TABLET, FILM COATED ORAL at 03:45

## 2024-08-07 RX ADMIN — LISINOPRIL 5 MG: 5 TABLET ORAL at 08:00

## 2024-08-07 RX ADMIN — PANTOPRAZOLE SODIUM 40 MG: 40 TABLET, DELAYED RELEASE ORAL at 08:00

## 2024-08-07 RX ADMIN — FLUTICASONE PROPIONATE 1 SPRAY: 50 SPRAY, METERED NASAL at 20:13

## 2024-08-07 RX ADMIN — Medication 5 ML: at 23:33

## 2024-08-07 RX ADMIN — URSODIOL 300 MG: 300 CAPSULE ORAL at 20:13

## 2024-08-07 RX ADMIN — LATANOPROST 2 DROP: 50 SOLUTION OPHTHALMIC at 22:15

## 2024-08-07 RX ADMIN — URSODIOL 300 MG: 300 CAPSULE ORAL at 14:34

## 2024-08-07 RX ADMIN — MICAFUNGIN SODIUM 100 MG: 50 INJECTION, POWDER, LYOPHILIZED, FOR SOLUTION INTRAVENOUS at 08:00

## 2024-08-07 ASSESSMENT — ACTIVITIES OF DAILY LIVING (ADL)
ADLS_ACUITY_SCORE: 20

## 2024-08-07 NOTE — PROGRESS NOTES
BMT/Cellular Allogeneic Product Infusion       Patient Vitals for the past 24 hrs:   Temp Temp src Pulse Resp BP   08/07/24 1146 98.1  F (36.7  C) Oral 57 18 126/72   08/07/24 1628 97.8  F (36.6  C) Oral 59 18 (!) 140/72   08/07/24 1940 98.8  F (37.1  C) Oral 63 18 --   08/07/24 2251 98.3  F (36.8  C) Oral -- 20 (!) 143/74   08/07/24 2305 98  F (36.7  C) Oral -- 18 136/75   08/07/24 2316 98  F (36.7  C) Oral -- 19 137/72   08/07/24 2324 98  F (36.7  C) Oral -- 20 --   08/08/24 0350 98.5  F (36.9  C) Oral -- 16 134/88   08/08/24 0804 99.2  F (37.3  C) Oral 57 18 (!) 142/76      BMT INFUSION DOCUMENTATION (Last 48 Hours)       BMT/Cellular Product Infusion       Row Name 08/07/24 2000                [REMOVED] Product 08/07/24 HPC, Apheresis    Product Details Product Release Date: 08/07/24  -EV Product Type: HPC, Apheresis  -EV DIN: [id number]  -EV Product Description Code: [id number]  -EV Volume Dispensed (mL): 254 mL  -EV Completion Date (RN to complete): 08/07/24  -YM Completion Time (RN to complete): 2324  -YM    Checked by (Patient RN) Maxi Henry  -YM       Checked by (Witness) Josey Moscoso  -FAVIOLA       Product Volume Infused (mL) 254 mL  -LC       Flush Volume (mL) 50 mL  -YM       Volume Dispensed (mL) --                 User Key  (r) = Recorded By, (t) = Taken By, (c) = Cosigned By      Initials Name Effective Dates    Josey De Paz RN 08/16/22 -     Maxi Marquez RN 01/08/24 -     EV Ankita Winn 11/14/17 -                                   Baseline Pre-Infusion Evaluation (to be completed by Provider):   Dyspnea: Grade 0 - none  Hypoxia: Grade 0 - not present  Fever: Grade 0 - afebrile  Chills: Grade 0 - none  Febrile Neutropenia: Grade 0 - not present  Sinus Bradycardia: Grade 0 - none  Hypertension: Grade 3 - stage 2 hypertension (systolic BP >/ 160 mm Hg or diastolic BP >/ 100 mm Hg); medical intervention indicated; more than one drug or more intensive therapy than previously used  indicated  Hypotension: Grade 0 - none  Chest Pain: Grade 0 - none  Bronchospasm: Grade 0 - none  Pain: Grade 0 - none  Rash: Grade 0 - None  Neurologic Specify: none    If adverse reactions, events or complications occur (fever greater than 2 degrees fahrenheit increase, and severe reactions of the following types: chills, dyspnea, bronchospasm, hyper/hypotension, hypoxia, bradycardia, chest pain, back/flank pain, hypoxia, and any other reaction deemed severe or life threatening; any instance of product bag breakage or unusual product appearance)    Any other events that are >= grade 3, then immediately contact the BMT Attending physician, the [facility] Medical Director (pager [phone]) and the [facility] ([phone]).  After midnight, holidays & weekends contact the [facility] on the appropriate campus ([facility] [place]: [phone]; [facility] [place]: [phone]).    Destinee Sanches PA-C

## 2024-08-07 NOTE — PLAN OF CARE
VSS   Afebrile.  Up ad soo, steady gait and balance.  Alert and oriented x4, using call light appropriately.  Pt received TBI on Tuesday 08/06/2024, No CHG for 48 hours post TBI.  Orthostatic BP from day -1 to day +5 (08/06/2024 to 08/12/2024).  Pt using home CPAP    No blood products needed this AM.  No electrolytes needed this AM.    Problem: Adult Inpatient Plan of Care  Goal: Absence of Hospital-Acquired Illness or Injury  Intervention: Identify and Manage Fall Risk  Recent Flowsheet Documentation  Taken 8/7/2024 0000 by Maxi Henry, RN  Safety Promotion/Fall Prevention:   activity supervised   assistive device/personal items within reach   check orthostatic blood pressure   chemotherapeutic precautions   clutter free environment maintained   increased rounding and observation   lighting adjusted   mobility aid in reach   nonskid shoes/slippers when out of bed   room near nurse's station   room organization consistent   safety round/check completed  Taken 8/6/2024 1950 by Maxi Henry RN  Safety Promotion/Fall Prevention:   activity supervised   assistive device/personal items within reach   check orthostatic blood pressure   chemotherapeutic precautions   clutter free environment maintained   increased rounding and observation   lighting adjusted   mobility aid in reach   nonskid shoes/slippers when out of bed   room near nurse's station   room organization consistent   safety round/check completed     Problem: Adult Inpatient Plan of Care  Goal: Absence of Hospital-Acquired Illness or Injury  Intervention: Prevent Infection  Recent Flowsheet Documentation  Taken 8/7/2024 0000 by Maxi Henry, RN  Infection Prevention:   cohorting utilized   environmental surveillance performed   equipment surfaces disinfected   hand hygiene promoted   personal protective equipment utilized   rest/sleep promoted   single patient room provided   visitors restricted/screened  Taken 8/6/2024 1950 by Maxi Henry,  RN  Infection Prevention:   cohorting utilized   environmental surveillance performed   equipment surfaces disinfected   hand hygiene promoted   personal protective equipment utilized   rest/sleep promoted   single patient room provided   visitors restricted/screened     Problem: Risk for Delirium  Goal: Improved Sleep  Intervention: Promote Sleep  Recent Flowsheet Documentation  Taken 8/6/2024 1950 by Maxi Henry RN  Sleep/Rest Enhancement:   awakenings minimized   consistent schedule promoted   natural light exposure provided   noise level reduced   regular sleep/rest pattern promoted   room darkened

## 2024-08-07 NOTE — PROGRESS NOTES
"BMT Progress Note  Chief complaint:  Arpit Forrest is a 73 year old male, currently day 0  prep for ELIEZER MUD PBSCT for MDS.    INTERIM HISTORY:   Doing well. No N/V/D. Walking. Eating well. Transplant today!  REVIEW OF SYSTEMS: Otherwise unremarkable other than what is noted in the Interim History.   PHYSICAL EXAM:   Vitals:  /72 (BP Location: Right arm)   Pulse 57   Temp 98.1  F (36.7  C) (Oral)   Resp 18   Ht 1.825 m (5' 11.85\")   Wt 115.5 kg (254 lb 9.6 oz)   SpO2 99%   BMI 34.67 kg/m    Wt Readings from Last 4 Encounters:   08/07/24 115.5 kg (254 lb 9.6 oz)   07/30/24 117.5 kg (259 lb)   07/26/24 117 kg (258 lb)   07/23/24 115.3 kg (254 lb 1.6 oz)     General Appearance: NAD; face has maye appearance--stable per pt  HEENT: sclera anicteric. Moist mucus membranes, no ulcerations.  CV: RRR. Pan systolic murmur (known to pt)    RESP: CTA bilaterally; no rales or wheezes.   EXT: Trace ankle edema, R>L; stable. No cyanosis/clubbing.   SKIN: no lesions or rash.   NEURO: A&O x 3   PSYCH: Appropriate affect   VASCULAR ACCESS: dressing CDI.     ROUTINE LABS:    Lab Results   Component Value Date    WBC 2.8 (L) 08/07/2024    ANEU 2.4 07/23/2024    HGB 10.8 (L) 08/07/2024    HCT 33.7 (L) 08/07/2024     08/07/2024     08/07/2024    POTASSIUM 3.8 08/07/2024    CHLORIDE 104 08/07/2024    CO2 25 08/07/2024    GLC 96 08/07/2024    BUN 25.2 (H) 08/07/2024    CR 0.94 08/07/2024    MAG 2.1 08/07/2024    INR 1.19 (H) 08/05/2024          ASSESSMENT AND PLAN:    Arpit Forrest is a 73 year old male, currently day 0 prep for ELIEZER MUD PBSCT for MDS.     BMT/IEC PROTOCOL for MDS  - Chemo protocol: [id number]  D-6: Flu 30mg/m2, Cy 50mg/kg  D-5 to D-2: Flu  D-1: TBI  D0: stem cell infusion  - Peripheral blood stem cell graft from 8/8 donor, ABO matched (Oneg), Cell dose: TBD  - continue allopurinol (hx of gout)  - Engraftment monitoring: Peripheral blood and bone marrow chimerisms on D28, D100, 6 months, 1 and 2 " year  - Restaging plan: BMBx with NGS on D28, D100, 6 months, 1 and 2 years     HEME/COAG  - GCSF starts day +5, continue until ANC > 1500 for 3 consecutive days.   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      ID  Recent Hx: Multiple granulomas in the lung and bone marrow biopsy- fungitell, aspergillus galactomanan neg. 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     CARDIOVASCULAR  # Hx of A.fib with RVR   - A.fib with RVR while admitted with febrile episode on 3/15/24. Noted on his fitbit, not very symptomatic. Cardioversion planned for new onset A.fib. Not achieved with amiodarone, DCCV on 3/21/24 restored normal sinus rhythm. Amiodarone and anticoagulation was stopped in April 2024 without any recurrence.   - He could have a recurrence during transplant which would need to be addressed with rate control strategy   # Asymptomatic moderate aortic stenosis   # CAD  - Chest CT in February 2024 with mild to moderate CAC, three-vessel disease. No anginal symptoms   - Continue atorvastatin 10mg   - EKG Sinus rhythm Inferior infarct , age undetermined. Abnormal ECG (known cardiovascular disease). QTc =460  # HTN- Continue amlodipine 10mg, adding lisinopril 5mg 8/4 (PTA dose of lisinopril was 10mg)  - Risk of cardiomyopathy:  Baseline EF normal 60-65&  - Risk of  "arrhythmia: Baseline EKG showed NSR, Qtc 460     RESPIRATORY  # VINCENT : CPAP  - Baseline PFTs: normal   - Risk of respiratory complications: Frequent ambulation and incentive spirometer.     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  Trial compression stockings today.  # Electrolyte management: replace per sliding scale  - cont Ca w/vit D  - hold eye vitamin and Coq10 peritransplant     DIABETES/ENDOCRINE  #DMII: hx steroid-induced hyperglycemia: Carb coverage and Novolog sliding scale. Endo initially consulted, signed off with stable sugars.   *holding PTA metformin on admission.      MUSCULOSKELETAL/FRAILTY  # Gout: Continue allopurinol indefinitely   - Baseline Frailty Score: Not frail   - Daily PT/OT as needed while inpatient     SOCIAL DETERMINANTS  - Caregiver: Wife, Meghna, children     Medically Ready for Discharge: Anticipated in 5+ Days  Clinically Significant Risk Factors               # Coagulation Defect: INR = 1.19 (Ref range: 0.85 - 1.15) and/or PTT = 34 Seconds (Ref range: 22 - 38 Seconds), will monitor for bleeding    # Hypertension: Noted on problem list            # Obesity: Estimated body mass index is 34.67 kg/m  as calculated from the following:    Height as of this encounter: 1.825 m (5' 11.85\").    Weight as of this encounter: 115.5 kg (254 lb 9.6 oz).           I spent 30 minutes in the care of this patient today, which included time necessary for preparation for the visit, obtaining history, ordering medications/tests/procedures as medically indicated, review of pertinent medical literature, counseling of the patient, communication of recommendations to the care team, and documentation time.    Destinee Sanches PA-C on 8/4/2024 at 9:40 AM  [id number], josias      "

## 2024-08-08 LAB
ALBUMIN SERPL BCG-MCNC: 3.8 G/DL (ref 3.5–5.2)
ALP SERPL-CCNC: 76 U/L (ref 40–150)
ALT SERPL W P-5'-P-CCNC: 26 U/L (ref 0–70)
ANION GAP SERPL CALCULATED.3IONS-SCNC: 8 MMOL/L (ref 7–15)
AST SERPL W P-5'-P-CCNC: 18 U/L (ref 0–45)
BASOPHILS # BLD AUTO: ABNORMAL 10*3/UL
BASOPHILS # BLD MANUAL: 0 10E3/UL (ref 0–0.2)
BASOPHILS NFR BLD AUTO: ABNORMAL %
BASOPHILS NFR BLD MANUAL: 0 %
BILIRUB SERPL-MCNC: 0.4 MG/DL
BUN SERPL-MCNC: 20.2 MG/DL (ref 8–23)
CALCIUM SERPL-MCNC: 9 MG/DL (ref 8.8–10.4)
CHLORIDE SERPL-SCNC: 105 MMOL/L (ref 98–107)
CREAT SERPL-MCNC: 0.94 MG/DL (ref 0.67–1.17)
EGFRCR SERPLBLD CKD-EPI 2021: 85 ML/MIN/1.73M2
EOSINOPHIL # BLD AUTO: ABNORMAL 10*3/UL
EOSINOPHIL # BLD MANUAL: 0 10E3/UL (ref 0–0.7)
EOSINOPHIL NFR BLD AUTO: ABNORMAL %
EOSINOPHIL NFR BLD MANUAL: 1 %
ERYTHROCYTE [DISTWIDTH] IN BLOOD BY AUTOMATED COUNT: 15.7 % (ref 10–15)
GLUCOSE BLDC GLUCOMTR-MCNC: 120 MG/DL (ref 70–99)
GLUCOSE BLDC GLUCOMTR-MCNC: 163 MG/DL (ref 70–99)
GLUCOSE BLDC GLUCOMTR-MCNC: 90 MG/DL (ref 70–99)
GLUCOSE BLDC GLUCOMTR-MCNC: 95 MG/DL (ref 70–99)
GLUCOSE SERPL-MCNC: 103 MG/DL (ref 70–99)
HCO3 SERPL-SCNC: 27 MMOL/L (ref 22–29)
HCT VFR BLD AUTO: 32.6 % (ref 40–53)
HGB BLD-MCNC: 10.2 G/DL (ref 13.3–17.7)
IMM GRANULOCYTES # BLD: ABNORMAL 10*3/UL
IMM GRANULOCYTES NFR BLD: ABNORMAL %
LYMPHOCYTES # BLD AUTO: ABNORMAL 10*3/UL
LYMPHOCYTES # BLD MANUAL: 0 10E3/UL (ref 0.8–5.3)
LYMPHOCYTES NFR BLD AUTO: ABNORMAL %
LYMPHOCYTES NFR BLD MANUAL: 3 %
MAGNESIUM SERPL-MCNC: 2 MG/DL (ref 1.7–2.3)
MCH RBC QN AUTO: 29.9 PG (ref 26.5–33)
MCHC RBC AUTO-ENTMCNC: 31.3 G/DL (ref 31.5–36.5)
MCV RBC AUTO: 96 FL (ref 78–100)
MONOCYTES # BLD AUTO: ABNORMAL 10*3/UL
MONOCYTES # BLD MANUAL: 0 10E3/UL (ref 0–1.3)
MONOCYTES NFR BLD AUTO: ABNORMAL %
MONOCYTES NFR BLD MANUAL: 4 %
NEUTROPHILS # BLD AUTO: ABNORMAL 10*3/UL
NEUTROPHILS # BLD MANUAL: 0.8 10E3/UL (ref 1.6–8.3)
NEUTROPHILS NFR BLD AUTO: ABNORMAL %
NEUTROPHILS NFR BLD MANUAL: 92 %
NEUTS HYPERSEG BLD QL SMEAR: PRESENT
NRBC # BLD AUTO: 0 10E3/UL
NRBC BLD AUTO-RTO: 0 /100
PHOSPHATE SERPL-MCNC: 3.6 MG/DL (ref 2.5–4.5)
PLAT MORPH BLD: ABNORMAL
PLATELET # BLD AUTO: 173 10E3/UL (ref 150–450)
POTASSIUM SERPL-SCNC: 3.8 MMOL/L (ref 3.4–5.3)
PROT SERPL-MCNC: 5.7 G/DL (ref 6.4–8.3)
RBC # BLD AUTO: 3.41 10E6/UL (ref 4.4–5.9)
RBC MORPH BLD: ABNORMAL
SODIUM SERPL-SCNC: 140 MMOL/L (ref 135–145)
WBC # BLD AUTO: 0.9 10E3/UL (ref 4–11)

## 2024-08-08 PROCEDURE — 250N000013 HC RX MED GY IP 250 OP 250 PS 637: Performed by: STUDENT IN AN ORGANIZED HEALTH CARE EDUCATION/TRAINING PROGRAM

## 2024-08-08 PROCEDURE — 82040 ASSAY OF SERUM ALBUMIN: CPT | Performed by: STUDENT IN AN ORGANIZED HEALTH CARE EDUCATION/TRAINING PROGRAM

## 2024-08-08 PROCEDURE — 84100 ASSAY OF PHOSPHORUS: CPT | Performed by: HOSPITALIST

## 2024-08-08 PROCEDURE — 250N000013 HC RX MED GY IP 250 OP 250 PS 637: Performed by: PHYSICIAN ASSISTANT

## 2024-08-08 PROCEDURE — 83735 ASSAY OF MAGNESIUM: CPT | Performed by: HOSPITALIST

## 2024-08-08 PROCEDURE — 85007 BL SMEAR W/DIFF WBC COUNT: CPT | Performed by: STUDENT IN AN ORGANIZED HEALTH CARE EDUCATION/TRAINING PROGRAM

## 2024-08-08 PROCEDURE — 250N000011 HC RX IP 250 OP 636: Performed by: PHYSICIAN ASSISTANT

## 2024-08-08 PROCEDURE — 85027 COMPLETE CBC AUTOMATED: CPT | Performed by: STUDENT IN AN ORGANIZED HEALTH CARE EDUCATION/TRAINING PROGRAM

## 2024-08-08 PROCEDURE — 250N000011 HC RX IP 250 OP 636: Performed by: STUDENT IN AN ORGANIZED HEALTH CARE EDUCATION/TRAINING PROGRAM

## 2024-08-08 PROCEDURE — 258N000003 HC RX IP 258 OP 636: Performed by: PHYSICIAN ASSISTANT

## 2024-08-08 PROCEDURE — 250N000011 HC RX IP 250 OP 636: Performed by: INTERNAL MEDICINE

## 2024-08-08 PROCEDURE — 206N000001 HC R&B BMT UMMC

## 2024-08-08 RX ORDER — FUROSEMIDE 10 MG/ML
20 INJECTION INTRAMUSCULAR; INTRAVENOUS EVERY 8 HOURS PRN
Status: DISPENSED | OUTPATIENT
Start: 2024-08-10 | End: 2024-08-12

## 2024-08-08 RX ORDER — FUROSEMIDE 10 MG/ML
10 INJECTION INTRAMUSCULAR; INTRAVENOUS
Status: DISPENSED | OUTPATIENT
Start: 2024-08-10 | End: 2024-08-12

## 2024-08-08 RX ADMIN — INSULIN ASPART 1 UNITS: 100 INJECTION, SOLUTION INTRAVENOUS; SUBCUTANEOUS at 17:36

## 2024-08-08 RX ADMIN — BRIMONIDINE TARTRATE 1 DROP: 2 SOLUTION/ DROPS OPHTHALMIC at 19:35

## 2024-08-08 RX ADMIN — URSODIOL 300 MG: 300 CAPSULE ORAL at 19:35

## 2024-08-08 RX ADMIN — ACYCLOVIR 800 MG: 800 TABLET ORAL at 19:35

## 2024-08-08 RX ADMIN — MICAFUNGIN SODIUM 100 MG: 50 INJECTION, POWDER, LYOPHILIZED, FOR SOLUTION INTRAVENOUS at 08:52

## 2024-08-08 RX ADMIN — Medication 5 ML: at 04:03

## 2024-08-08 RX ADMIN — LEVOFLOXACIN 250 MG: 250 TABLET, FILM COATED ORAL at 10:23

## 2024-08-08 RX ADMIN — AMLODIPINE BESYLATE 10 MG: 10 TABLET ORAL at 08:50

## 2024-08-08 RX ADMIN — FLUTICASONE PROPIONATE 1 SPRAY: 50 SPRAY, METERED NASAL at 19:35

## 2024-08-08 RX ADMIN — ONDANSETRON HYDROCHLORIDE 8 MG: 8 TABLET, FILM COATED ORAL at 03:52

## 2024-08-08 RX ADMIN — Medication 5 ML: at 10:23

## 2024-08-08 RX ADMIN — URSODIOL 300 MG: 300 CAPSULE ORAL at 08:50

## 2024-08-08 RX ADMIN — PANTOPRAZOLE SODIUM 40 MG: 40 TABLET, DELAYED RELEASE ORAL at 08:51

## 2024-08-08 RX ADMIN — LATANOPROST 2 DROP: 50 SOLUTION OPHTHALMIC at 22:59

## 2024-08-08 RX ADMIN — LISINOPRIL 5 MG: 5 TABLET ORAL at 08:51

## 2024-08-08 RX ADMIN — CALCIUM CARBONATE 600 MG (1,500 MG)-VITAMIN D3 400 UNIT TABLET 1 TABLET: at 08:50

## 2024-08-08 RX ADMIN — ALLOPURINOL 300 MG: 300 TABLET ORAL at 08:50

## 2024-08-08 RX ADMIN — BRIMONIDINE TARTRATE 1 DROP: 2 SOLUTION/ DROPS OPHTHALMIC at 08:47

## 2024-08-08 RX ADMIN — ACYCLOVIR 800 MG: 800 TABLET ORAL at 08:50

## 2024-08-08 RX ADMIN — URSODIOL 300 MG: 300 CAPSULE ORAL at 15:28

## 2024-08-08 RX ADMIN — ATORVASTATIN CALCIUM 10 MG: 10 TABLET, FILM COATED ORAL at 19:35

## 2024-08-08 RX ADMIN — FLUTICASONE PROPIONATE 1 SPRAY: 50 SPRAY, METERED NASAL at 08:47

## 2024-08-08 ASSESSMENT — ACTIVITIES OF DAILY LIVING (ADL)
ADLS_ACUITY_SCORE: 20

## 2024-08-08 NOTE — PROGRESS NOTES
CLINICAL NUTRITION SERVICES - REASSESSMENT NOTE     Nutrition Prescription    RECOMMENDATIONS FOR MDs/PROVIDERS TO ORDER:  None at this time     Malnutrition Status:    Patient does not meet two of the established criteria necessary for diagnosing malnutrition    Recommendations already ordered by Registered Dietitian (RD):  None at this time     Future/Additional Recommendations:  -Monitor PO intake  -Monitor wt trends  -Monitor labs     EVALUATION OF THE PROGRESS TOWARD GOALS   Diet: High Kcal/High Protein, PRN snacks/supplements     Intake:  % documented at meals on RN flowsheets. Pt reports he continues to eat 3 meals daily.      NEW FINDINGS   Pt is day 1 s/p Allo BMT. Met with pt at bedside. He reports appetite has been great, denies any nausea and believes his taste is improving. Pt inquired about his current protein intake- per his Lose It yoselin, he has gotten in ~60g of protein daily over the last 4 days. Discussed how tracking apps might be slightly off with nutritionals, reviewed other high protein foods as well as ONS options. Pt voiced desire for plant based protein shakes BID- will schedule [employer].       GI:  Pt reports he had 3 stools yesterday, denies C/D at this time. LBM was this morning.     SKIN:  No concerns noted.     LABS:  Reviewed. Notable for:  Glucose 103    MEDICATIONS:  Reviewed. Notable for:  Acyclovir  Caltrate  Chemo-Mesna  Cytoxan  Insulin  Zofran  Sirolimus  Ursodiol  NaCl and KCl @ 225 mL/hr  PRN Lasix, Insulin, Miralax, Compazine, Senokot     WEIGHTS:  Wt's have been stable during admission  Vitals:    08/04/24 0900 08/05/24 0800 08/06/24 0837 08/07/24 0813   Weight: 117.5 kg (259 lb) 115.6 kg (254 lb 14.4 oz) 113.7 kg (250 lb 11.2 oz) 115.5 kg (254 lb 9.6 oz)    08/08/24 0804   Weight: 116.1 kg (256 lb)         MALNUTRITION  % Intake: No decreased intake noted  % Weight Loss: None noted  Subcutaneous Fat Loss: None observed  Muscle Loss: None observed  Fluid  Accumulation/Edema: Does not meet criteria (trace edema noted on B/L legs and B/L feet)  Malnutrition Diagnosis: Patient does not meet two of the established criteria necessary for diagnosing malnutrition    Previous Goals   Patient to consume % of nutritionally adequate meal trays TID, or the equivalent with supplements/snacks.   Evaluation: Met    Previous Nutrition Diagnosis  Predicted inadequate nutrient intake (kcal/protein) related to upcoming BMT with potential for side effects to affect ability to take adequate PO.   Evaluation: ongoing    CURRENT NUTRITION DIAGNOSIS  Predicted inadequate nutrient intake (kcal/protein) related to recent BMT with potential for side effects to affect ability to take adequate PO.     INTERVENTIONS  Implementation  Medical food supplement therapy- continue PRN orders, scheduled [employer] 1.4 BID per pt preference to increase protein intake.     Goals  Patient to consume % of nutritionally adequate meal trays TID, or the equivalent with supplements/snacks.    Monitoring/Evaluation  Progress toward goals will be monitored and evaluated per protocol.    Susan Rogers RD (Maggie), LD- 5C Clinical Dietitian   Available on [employer]  No longer available by paging

## 2024-08-08 NOTE — PROGRESS NOTES
Paged by Charge RN,needs order for BMT cell product infusion and cells are on floor.     From chart review, today is D0: stem cell infusion.     Order placed    Sariah Mejia MD

## 2024-08-08 NOTE — PLAN OF CARE
VSS   Afebrile  Up ad soo, steady gait and balance.  Alert and oriented x4, using call light  appropriately.  Pt tolerated transplant well.  Using home CPAP    No blood products needed this AM.  No electrolytes needed this AM.    Problem: Adult Inpatient Plan of Care  Goal: Absence of Hospital-Acquired Illness or Injury  Intervention: Prevent Infection  Recent Flowsheet Documentation  Taken 8/8/2024 0000 by Maxi Henry RN  Infection Prevention:   cohorting utilized   environmental surveillance performed   equipment surfaces disinfected   hand hygiene promoted   personal protective equipment utilized   rest/sleep promoted   single patient room provided   visitors restricted/screened  Taken 8/7/2024 2010 by Maxi Henry RN  Infection Prevention:   cohorting utilized   environmental surveillance performed   equipment surfaces disinfected   hand hygiene promoted   personal protective equipment utilized   rest/sleep promoted   single patient room provided   visitors restricted/screened     Problem: Risk for Delirium  Goal: Improved Sleep  Intervention: Promote Sleep  Recent Flowsheet Documentation  Taken 8/7/2024 2010 by Maxi Henry RN  Sleep/Rest Enhancement:   awakenings minimized   consistent schedule promoted   natural light exposure provided   noise level reduced   regular sleep/rest pattern promoted   room darkened     Problem: Stem Cell/Bone Marrow Transplant  Goal: Optimal Coping with Transplant  Intervention: Optimize Patient/Family Adjustment to Transplant  Recent Flowsheet Documentation  Taken 8/7/2024 2010 by Maxi Henry RN  Supportive Measures:   active listening utilized   decision-making supported   positive reinforcement provided   self-care encouraged   verbalization of feelings encouraged

## 2024-08-08 NOTE — PROGRESS NOTES
"BMT Progress Note  Chief complaint:  Arpit Forrest is a 73 year old male, currently day 1  prep for ELIEZER MUD PBSCT for MDS.    INTERIM HISTORY:   Doing well. Sleepy. No complaints otherwise.  REVIEW OF SYSTEMS: Otherwise unremarkable other than what is noted in the Interim History.   PHYSICAL EXAM:   Vitals:  BP (!) 142/76 (BP Location: Right arm)   Pulse 57   Temp 99.2  F (37.3  C) (Oral)   Resp 18   Ht 1.825 m (5' 11.85\")   Wt 116.1 kg (256 lb)   SpO2 99%   BMI 34.86 kg/m    Wt Readings from Last 4 Encounters:   08/08/24 116.1 kg (256 lb)   07/30/24 117.5 kg (259 lb)   07/26/24 117 kg (258 lb)   07/23/24 115.3 kg (254 lb 1.6 oz)     General Appearance: NAD; face has maye appearance--stable per pt  HEENT: sclera anicteric. Moist mucus membranes, no ulcerations.  CV: RRR. Pan systolic murmur (known to pt)    RESP: CTA bilaterally; no rales or wheezes.   EXT: Trace ankle edema, R>L; stable. No cyanosis/clubbing.   SKIN: no lesions or rash.   NEURO: A&O x 3   PSYCH: Appropriate affect   VASCULAR ACCESS: dressing CDI.     ROUTINE LABS:    Lab Results   Component Value Date    WBC 0.9 (LL) 08/08/2024    ANEU 0.8 (L) 08/08/2024    HGB 10.2 (L) 08/08/2024    HCT 32.6 (L) 08/08/2024     08/08/2024     08/08/2024    POTASSIUM 3.8 08/08/2024    CHLORIDE 105 08/08/2024    CO2 27 08/08/2024    GLC 90 08/08/2024    BUN 20.2 08/08/2024    CR 0.94 08/08/2024    MAG 2.0 08/08/2024    INR 1.19 (H) 08/05/2024          ASSESSMENT AND PLAN:    Arpit Forrest is a 73 year old male, currently day 1 prep for ELIEZER MUD PBSCT for MDS.     BMT/IEC PROTOCOL for MDS  - Chemo protocol: [id number]  D-6: Flu 30mg/m2, Cy 50mg/kg  D-5 to D-2: Flu  D-1: TBI  D0: stem cell infusion  - Peripheral blood stem cell graft from 8/8 donor, ABO matched (Oneg), Cell dose: TBD  - continue allopurinol (hx of gout)  - Engraftment monitoring: Peripheral blood and bone marrow chimerisms on D28, D100, 6 months, 1 and 2 year  - Restaging plan: " BMBx with NGS on D28, D100, 6 months, 1 and 2 years     HEME/COAG  - GCSF starts day +5, continue until ANC > 1500 for 3 consecutive days.   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      ID  Recent Hx: Multiple granulomas in the lung and bone marrow biopsy- fungitell, aspergillus galactomanan neg. 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     CARDIOVASCULAR  # Hx of A.fib with RVR   - A.fib with RVR while admitted with febrile episode on 3/15/24. Noted on his fitbit, not very symptomatic. Cardioversion planned for new onset A.fib. Not achieved with amiodarone, DCCV on 3/21/24 restored normal sinus rhythm. Amiodarone and anticoagulation was stopped in April 2024 without any recurrence.   - He could have a recurrence during transplant which would need to be addressed with rate control strategy   # Asymptomatic moderate aortic stenosis   # CAD  - Chest CT in February 2024 with mild to moderate CAC, three-vessel disease. No anginal symptoms   - Continue atorvastatin 10mg   - EKG Sinus rhythm Inferior infarct , age undetermined. Abnormal ECG (known cardiovascular disease). QTc =460  # HTN- Continue amlodipine 10mg, adding lisinopril 5mg 8/4 (PTA dose of lisinopril was 10mg)  - Risk of cardiomyopathy:   "Baseline EF normal 60-65&  - Risk of arrhythmia: Baseline EKG showed NSR, Qtc 460     RESPIRATORY  # VINCENT : CPAP  - Baseline PFTs: normal   - Risk of respiratory complications: Frequent ambulation and incentive spirometer.     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  Trial compression stockings today.  # Electrolyte management: replace per sliding scale  - cont Ca w/vit D  - hold eye vitamin and Coq10 peritransplant     DIABETES/ENDOCRINE  #DMII: hx steroid-induced hyperglycemia: Carb coverage and Novolog sliding scale. Endo initially consulted, signed off with stable sugars.   *holding PTA metformin on admission.      MUSCULOSKELETAL/FRAILTY  # Gout: Continue allopurinol indefinitely   - Baseline Frailty Score: Not frail   - Daily PT/OT as needed while inpatient     SOCIAL DETERMINANTS  - Caregiver: Wife, Meghna, children     Medically Ready for Discharge: Anticipated in 5+ Days  Clinically Significant Risk Factors                  # Hypertension: Noted on problem list            # Obesity: Estimated body mass index is 34.86 kg/m  as calculated from the following:    Height as of this encounter: 1.825 m (5' 11.85\").    Weight as of this encounter: 116.1 kg (256 lb).           I spent 30 minutes in the care of this patient today, which included time necessary for preparation for the visit, obtaining history, ordering medications/tests/procedures as medically indicated, review of pertinent medical literature, counseling of the patient, communication of recommendations to the care team, and documentation time.    Destinee Sanches PA-C on 8/4/2024 at 9:40 AM  , josias      "

## 2024-08-08 NOTE — PLAN OF CARE
[id number]: Tmax 99.2. HR 57. Hypertensive within parameters. Denies pain and nausea. BG 90, 95, and 163. Endorses feeling more worn out today. Showered. Ambulated in the halls. Had a BM. Wife at bedside this afternoon. Eating well. Had a supplement drink to increase protein intake. Provider notified of weight being up 6 lbs in 2 days. No new orders.     Plan: Okay to resume CHG tomorrow.

## 2024-08-08 NOTE — PROGRESS NOTES
BMT Post Infusion Documentation    Data   Patient Vitals for the past 72 hrs:   Temp Temp src Pulse Resp BP   08/05/24 1648 98.8  F (37.1  C) Oral 60 16 129/73   08/05/24 1905 98.3  F (36.8  C) Oral 60 16 (!) 146/73   08/05/24 2317 98.3  F (36.8  C) Oral 53 16 (!) 155/75   08/06/24 0345 97.8  F (36.6  C) Oral 54 16 (!) 154/86   08/06/24 0819 97.9  F (36.6  C) Oral -- 16 (!) 141/67   08/06/24 1152 98.3  F (36.8  C) Oral 58 16 139/74   08/06/24 1556 98.6  F (37  C) Oral 68 16 133/70   08/06/24 1940 99  F (37.2  C) Oral -- 18 129/64   08/06/24 1942 -- -- -- -- 136/67   08/06/24 1945 -- -- -- -- 134/73   08/06/24 2327 97.9  F (36.6  C) Oral -- 18 (!) 148/71   08/07/24 0350 98.2  F (36.8  C) Oral -- 20 --   08/07/24 0404 -- -- -- -- (!) 163/86   08/07/24 0651 -- -- -- -- (!) 155/77   08/07/24 0813 98.6  F (37  C) Oral -- -- --   08/07/24 1146 98.1  F (36.7  C) Oral 57 18 126/72   08/07/24 1628 97.8  F (36.6  C) Oral 59 18 (!) 140/72   08/07/24 1940 98.8  F (37.1  C) Oral 63 18 --   08/07/24 2251 98.3  F (36.8  C) Oral -- 20 (!) 143/74   08/07/24 2305 98  F (36.7  C) Oral -- 18 136/75   08/07/24 2316 98  F (36.7  C) Oral -- 19 137/72   08/07/24 2324 98  F (36.7  C) Oral -- 20 --   08/08/24 0350 98.5  F (36.9  C) Oral -- 16 134/88   08/08/24 0804 99.2  F (37.3  C) Oral 57 18 (!) 142/76     BMT INFUSION DOCUMENTATION (Last 24 Hours)       BMT/Cellular Product Infusion       Row Name 08/07/24 2000                Cell Therapy Documentation    Product Release Date 08/07/24  -EV       Recipient Study ID N/A  -EV       Donor Allogeneic - Unrelated  -EV       Donor MRN/ID [id number]  -EV       Donor ABO/Rh O neg  -EV       Allogeneic Donor Eligibility Determination and Summary of Records Ineligible  -EV       Special release form completed yes  -EV       Comment special release signed by Dr. Nena Fowler on 7/26/24  -EV       Type of Infusion Allogeneic  -EV       Total Volume Dispensed (mL) 254  -EV       Total NC Dose  5.01E+08  -EV       Total CD34 Dose 6.12E+06  -EV       Total CD3 dose 2.49E+08  -EV       Total NC Dose Left in Storage NONE  -EV       Comments for Product Issues product in transit to [facility], notified Josey Jansen on 5C at 2144 EJV  -EV       Donor ABO/Rh --       Product Types --       Product Numbers --       Product Types and Numbers --       Volume --       ABO Mismatch --       ZZTotal NC Dose --       ZZTotal CD34 Dose --       ZZTotal NC Dose Left in Storage --          [REMOVED] Product 08/07/24 HPC, Apheresis    Product Details Product Release Date: 08/07/24  -EV Product Type: HPC, Apheresis  -EV DIN: [id number]  -EV Product Description Code: [id number]  -EV Volume Dispensed (mL): 254 mL  -EV Completion Date (RN to complete): 08/07/24  -YM Completion Time (RN to complete): 2324  -YM    Checked by (Patient RN) Maxi Henry  -YM       Checked by (Witness) Josey Moscoso  -LC       Product Volume Infused (mL) 254 mL  -LC       Flush Volume (mL) 50 mL  -YM       Volume Dispensed (mL) --          RN Documentation    Patient was premedicated as ordered yes  -YM       Line Type central line, right  -YM       Patient Stable Prior to Infusion yes  -YM       Time Infusion Started 2259  -YM       Checked by (Patient RN) --       Checked by (RN 2) --       Broken Bag? --       Immediate suspected transfusion reaction to the product --       Time Infusion Stopped --       Total Flush Volume (mL) --       Checked by (Witness) --       Date Infusion Started --       Date Infusion Stopped --       Volume Infused (mL) --       Total Volume Infused (cc) --          Patient tolerance of product infusion    Immediate suspected transfusion reaction to the product none  -YM       Did patient have prior history of similar signs/symptoms during this hospitalization? NA  -YM       Symptoms during/after infusion other (comment)  No symptoms noted or reported.  -YM       Did the patient tolerate the infusion  well yes  -YM       Medications and treatment for symptoms N/A  -YM       Did the symptoms resolve? --  N/A  -YM       Enter comments if clots, leaks, broken bag, infusion delays, other issues with bag/infusion N/A  -YM       Describe symptoms --                 User Key  (r) = Recorded By, (t) = Taken By, (c) = Cosigned By      Initials Name Effective Dates    Josey De Paz RN 08/16/22 -     Maxi Marquez RN 01/08/24 -     Ankita Fuentes 11/14[birth year]17 -                       Post-Infusion Evaluation:   Infusion Related Reaction: Grade 0 - none  Dyspnea: Grade 0 - none  Hypoxia: Grade 0 - not present  Fever: Grade 0 - afebrile  Chills: Grade 0 - none  Febrile Neutropenia: Grade 0 - not present  Sinus Bradycardia: Grade 0 - none  Hypertension: Grade 3 - stage 2 hypertension (systolic BP >/ 160 mm Hg or diastolic BP >/ 100 mm Hg); medical intervention indicated; more than one drug or more intensive therapy than previously used indicated  Hypotension: Grade 0 - none  Chest Pain: Grade 0 - none  Bronchospasm: Grade 0 - none  Pain: Grade 0 - none  Rash: Grade 0 - None  Neurologic Specify: none      Destinee Sanches PA-C

## 2024-08-08 NOTE — PLAN OF CARE
Patient vital signs stable, no complaints offered today . Patient has a healthy appetite,  patient active in room and walking the hallways. He will get his transplant  tonight at some point tonight. The flight bringing his marrow was 1.5 hours late. Report given to oncoming nurse.  Problem: Adult Inpatient Plan of Care  Goal: Plan of Care Review  Description: The Plan of Care Review/Shift note should be completed every shift.  The Outcome Evaluation is a brief statement about your assessment that the patient is improving, declining, or no change.  This information will be displayed automatically on your shift  note.  Outcome: Progressing   Goal Outcome Evaluation:

## 2024-08-08 NOTE — PROGRESS NOTES
Type of transplant: Donor: Allogeneic - Unrelated  Product:   BMT INFUSION DOCUMENTATION (Last 48 Hours)       BMT/Cellular Product Infusion       Row Name 08/07/24 2000                [REMOVED] Product 08/07/24 HPC, Apheresis    Product Details Product Release Date: 08/07/24  -EV Product Type: HPC, Apheresis  -EV DIN: [id number]  -EV Product Description Code: [id number]  -EV Volume Dispensed (mL): 254 mL  -EV Completion Date (RN to complete): 08/07/24  -YM Completion Time (RN to complete): 2324  -YM    Checked by (Patient RN) Maxi Henry  -YM       Checked by (Witness) Josey Moscoso  -FAVIOLA       Product Volume Infused (mL) 254 mL  -LC       Flush Volume (mL) 50 mL  -YM       Volume Dispensed (mL) --                 User Key  (r) = Recorded By, (t) = Taken By, (c) = Cosigned By      Initials Name Effective Dates    Josey De Paz RN 08/16/22 -     Maxi Marquez RN 01/08/24 -     EV Ankita Winn 11/14/17 -                   Preparation: RN Documentation  Patient was premedicated as ordered: yes  Line Type: central line, right  Patient Stable Prior to Infusion: yes  Time Infusion Started: 2259  Teaching: side effects/monitoring  Tolerated/Reaction: Patient tolerance of product infusion  Immediate suspected transfusion reaction to the product: none  Did patient have prior history of similar signs/symptoms during this hospitalization?: NA  Symptoms during/after infusion: other (comment) (No symptoms noted or reported.)  Did the patient tolerate the infusion well: yes  Medications and treatment for symptoms: N/A  Did the symptoms resolve?:  (N/A)  Enter comments if clots, leaks, broken bag, infusion delays, other issues with bag/infusion: N/A  Flush until: No Flush  Plan: Continue to monitor during shift, will report any changes to on call provider.

## 2024-08-09 ENCOUNTER — APPOINTMENT (OUTPATIENT)
Dept: PHYSICAL THERAPY | Facility: CLINIC | Age: 74
DRG: 014 | End: 2024-08-09
Attending: INTERNAL MEDICINE
Payer: MEDICARE

## 2024-08-09 LAB
ABO/RH(D): NORMAL
ACANTHOCYTES BLD QL SMEAR: NORMAL
ALBUMIN UR-MCNC: NEGATIVE MG/DL
ANION GAP SERPL CALCULATED.3IONS-SCNC: 9 MMOL/L (ref 7–15)
ANTIBODY SCREEN: NEGATIVE
APPEARANCE UR: CLEAR
AUER BODIES BLD QL SMEAR: NORMAL
BASO STIPL BLD QL SMEAR: NORMAL
BASOPHILS # BLD AUTO: ABNORMAL 10*3/UL
BASOPHILS NFR BLD AUTO: ABNORMAL %
BILIRUB UR QL STRIP: NEGATIVE
BITE CELLS BLD QL SMEAR: NORMAL
BLISTER CELLS BLD QL SMEAR: NORMAL
BUN SERPL-MCNC: 16.3 MG/DL (ref 8–23)
BURR CELLS BLD QL SMEAR: NORMAL
CALCIUM SERPL-MCNC: 9.1 MG/DL (ref 8.8–10.4)
CHLORIDE SERPL-SCNC: 105 MMOL/L (ref 98–107)
COLOR UR AUTO: ABNORMAL
CREAT SERPL-MCNC: 0.95 MG/DL (ref 0.67–1.17)
DACRYOCYTES BLD QL SMEAR: NORMAL
EGFRCR SERPLBLD CKD-EPI 2021: 83 ML/MIN/1.73M2
ELLIPTOCYTES BLD QL SMEAR: NORMAL
EOSINOPHIL # BLD AUTO: ABNORMAL 10*3/UL
EOSINOPHIL NFR BLD AUTO: ABNORMAL %
ERYTHROCYTE [DISTWIDTH] IN BLOOD BY AUTOMATED COUNT: 15.8 % (ref 10–15)
FRAGMENTS BLD QL SMEAR: NORMAL
GLUCOSE BLDC GLUCOMTR-MCNC: 116 MG/DL (ref 70–99)
GLUCOSE BLDC GLUCOMTR-MCNC: 131 MG/DL (ref 70–99)
GLUCOSE BLDC GLUCOMTR-MCNC: 131 MG/DL (ref 70–99)
GLUCOSE BLDC GLUCOMTR-MCNC: 139 MG/DL (ref 70–99)
GLUCOSE BLDC GLUCOMTR-MCNC: 89 MG/DL (ref 70–99)
GLUCOSE SERPL-MCNC: 121 MG/DL (ref 70–99)
GLUCOSE UR STRIP-MCNC: NEGATIVE MG/DL
HCO3 SERPL-SCNC: 27 MMOL/L (ref 22–29)
HCT VFR BLD AUTO: 32.2 % (ref 40–53)
HGB BLD-MCNC: 10.2 G/DL (ref 13.3–17.7)
HGB C CRYSTALS: NORMAL
HGB UR QL STRIP: NEGATIVE
HOWELL-JOLLY BOD BLD QL SMEAR: NORMAL
IMM GRANULOCYTES # BLD: ABNORMAL 10*3/UL
IMM GRANULOCYTES NFR BLD: ABNORMAL %
KETONES UR STRIP-MCNC: NEGATIVE MG/DL
LEUKOCYTE ESTERASE UR QL STRIP: NEGATIVE
LYMPHOCYTES # BLD AUTO: ABNORMAL 10*3/UL
LYMPHOCYTES NFR BLD AUTO: ABNORMAL %
MAGNESIUM SERPL-MCNC: 1.9 MG/DL (ref 1.7–2.3)
MCH RBC QN AUTO: 30 PG (ref 26.5–33)
MCHC RBC AUTO-ENTMCNC: 31.7 G/DL (ref 31.5–36.5)
MCV RBC AUTO: 95 FL (ref 78–100)
MONOCYTES # BLD AUTO: ABNORMAL 10*3/UL
MONOCYTES NFR BLD AUTO: ABNORMAL %
NEUTROPHILS # BLD AUTO: ABNORMAL 10*3/UL
NEUTROPHILS NFR BLD AUTO: ABNORMAL %
NEUTS HYPERSEG BLD QL SMEAR: NORMAL
NITRATE UR QL: NEGATIVE
PH UR STRIP: 7.5 [PH] (ref 5–7)
PHOSPHATE SERPL-MCNC: 3.7 MG/DL (ref 2.5–4.5)
PLAT MORPH BLD: NORMAL
PLATELET # BLD AUTO: 128 10E3/UL (ref 150–450)
POLYCHROMASIA BLD QL SMEAR: NORMAL
POTASSIUM SERPL-SCNC: 3.9 MMOL/L (ref 3.4–5.3)
RBC # BLD AUTO: 3.4 10E6/UL (ref 4.4–5.9)
RBC AGGLUT BLD QL: NORMAL
RBC MORPH BLD: NORMAL
RBC URINE: 1 /HPF
ROULEAUX BLD QL SMEAR: NORMAL
SICKLE CELLS BLD QL SMEAR: NORMAL
SMUDGE CELLS BLD QL SMEAR: NORMAL
SODIUM SERPL-SCNC: 141 MMOL/L (ref 135–145)
SP GR UR STRIP: 1.02 (ref 1–1.03)
SPECIMEN EXPIRATION DATE: NORMAL
SPHEROCYTES BLD QL SMEAR: NORMAL
STOMATOCYTES BLD QL SMEAR: NORMAL
TARGETS BLD QL SMEAR: NORMAL
TOXIC GRANULES BLD QL SMEAR: NORMAL
TRANSITIONAL EPI: <1 /HPF
UROBILINOGEN UR STRIP-MCNC: NORMAL MG/DL
VARIANT LYMPHS BLD QL SMEAR: NORMAL
WBC # BLD AUTO: 0.4 10E3/UL (ref 4–11)
WBC URINE: 1 /HPF

## 2024-08-09 PROCEDURE — 83735 ASSAY OF MAGNESIUM: CPT | Performed by: HOSPITALIST

## 2024-08-09 PROCEDURE — 87086 URINE CULTURE/COLONY COUNT: CPT

## 2024-08-09 PROCEDURE — 85027 COMPLETE CBC AUTOMATED: CPT | Performed by: STUDENT IN AN ORGANIZED HEALTH CARE EDUCATION/TRAINING PROGRAM

## 2024-08-09 PROCEDURE — 97116 GAIT TRAINING THERAPY: CPT | Mod: GP

## 2024-08-09 PROCEDURE — 81001 URINALYSIS AUTO W/SCOPE: CPT

## 2024-08-09 PROCEDURE — 86901 BLOOD TYPING SEROLOGIC RH(D): CPT | Performed by: STUDENT IN AN ORGANIZED HEALTH CARE EDUCATION/TRAINING PROGRAM

## 2024-08-09 PROCEDURE — 84100 ASSAY OF PHOSPHORUS: CPT | Performed by: HOSPITALIST

## 2024-08-09 PROCEDURE — 206N000001 HC R&B BMT UMMC

## 2024-08-09 PROCEDURE — 250N000011 HC RX IP 250 OP 636: Performed by: INTERNAL MEDICINE

## 2024-08-09 PROCEDURE — 250N000013 HC RX MED GY IP 250 OP 250 PS 637: Performed by: STUDENT IN AN ORGANIZED HEALTH CARE EDUCATION/TRAINING PROGRAM

## 2024-08-09 PROCEDURE — 97110 THERAPEUTIC EXERCISES: CPT | Mod: GP

## 2024-08-09 PROCEDURE — 250N000011 HC RX IP 250 OP 636: Performed by: PHYSICIAN ASSISTANT

## 2024-08-09 PROCEDURE — 250N000013 HC RX MED GY IP 250 OP 250 PS 637: Performed by: PHYSICIAN ASSISTANT

## 2024-08-09 PROCEDURE — 258N000003 HC RX IP 258 OP 636: Performed by: PHYSICIAN ASSISTANT

## 2024-08-09 PROCEDURE — 80048 BASIC METABOLIC PNL TOTAL CA: CPT | Performed by: STUDENT IN AN ORGANIZED HEALTH CARE EDUCATION/TRAINING PROGRAM

## 2024-08-09 RX ORDER — SODIUM CHLORIDE 9 MG/ML
INJECTION, SOLUTION INTRAVENOUS CONTINUOUS
Status: ACTIVE | OUTPATIENT
Start: 2024-08-10 | End: 2024-08-12

## 2024-08-09 RX ADMIN — ACYCLOVIR 800 MG: 800 TABLET ORAL at 20:52

## 2024-08-09 RX ADMIN — Medication 5 ML: at 11:24

## 2024-08-09 RX ADMIN — FLUTICASONE PROPIONATE 1 SPRAY: 50 SPRAY, METERED NASAL at 20:52

## 2024-08-09 RX ADMIN — MICAFUNGIN SODIUM 100 MG: 50 INJECTION, POWDER, LYOPHILIZED, FOR SOLUTION INTRAVENOUS at 09:49

## 2024-08-09 RX ADMIN — URSODIOL 300 MG: 300 CAPSULE ORAL at 09:39

## 2024-08-09 RX ADMIN — URSODIOL 300 MG: 300 CAPSULE ORAL at 20:52

## 2024-08-09 RX ADMIN — ACYCLOVIR 800 MG: 800 TABLET ORAL at 09:39

## 2024-08-09 RX ADMIN — CALCIUM CARBONATE 600 MG (1,500 MG)-VITAMIN D3 400 UNIT TABLET 1 TABLET: at 09:38

## 2024-08-09 RX ADMIN — Medication 5 ML: at 03:27

## 2024-08-09 RX ADMIN — PANTOPRAZOLE SODIUM 40 MG: 40 TABLET, DELAYED RELEASE ORAL at 09:39

## 2024-08-09 RX ADMIN — LISINOPRIL 5 MG: 5 TABLET ORAL at 09:39

## 2024-08-09 RX ADMIN — URSODIOL 300 MG: 300 CAPSULE ORAL at 15:07

## 2024-08-09 RX ADMIN — LATANOPROST 2 DROP: 50 SOLUTION OPHTHALMIC at 23:27

## 2024-08-09 RX ADMIN — BRIMONIDINE TARTRATE 1 DROP: 2 SOLUTION/ DROPS OPHTHALMIC at 20:52

## 2024-08-09 RX ADMIN — ALLOPURINOL 300 MG: 300 TABLET ORAL at 09:39

## 2024-08-09 RX ADMIN — BRIMONIDINE TARTRATE 1 DROP: 2 SOLUTION/ DROPS OPHTHALMIC at 09:18

## 2024-08-09 RX ADMIN — ATORVASTATIN CALCIUM 10 MG: 10 TABLET, FILM COATED ORAL at 20:52

## 2024-08-09 RX ADMIN — FLUTICASONE PROPIONATE 1 SPRAY: 50 SPRAY, METERED NASAL at 09:18

## 2024-08-09 RX ADMIN — LEVOFLOXACIN 250 MG: 250 TABLET, FILM COATED ORAL at 11:24

## 2024-08-09 RX ADMIN — AMLODIPINE BESYLATE 10 MG: 10 TABLET ORAL at 09:39

## 2024-08-09 ASSESSMENT — ACTIVITIES OF DAILY LIVING (ADL)
ADLS_ACUITY_SCORE: 20

## 2024-08-09 NOTE — PROGRESS NOTES
"BMT Progress Note  Chief complaint:  Arpit Forrest is a 73 year old male, currently day 2  prep for ELIEZER MUD PBSCT for MDS.    INTERIM HISTORY:   Doing ok. Not sleeping well 2/2 to urinary frequency. No dysuria. No retention symptoms, hesitation, good output. No N/V/D. Eating well and active.   REVIEW OF SYSTEMS: Otherwise unremarkable other than what is noted in the Interim History.   PHYSICAL EXAM:   Vitals:  /61   Pulse 66   Temp 99.1  F (37.3  C) (Oral)   Resp 16   Ht 1.825 m (5' 11.85\")   Wt 115.4 kg (254 lb 8 oz)   SpO2 98%   BMI 34.66 kg/m    Wt Readings from Last 4 Encounters:   08/09/24 115.4 kg (254 lb 8 oz)   07/30/24 117.5 kg (259 lb)   07/26/24 117 kg (258 lb)   07/23/24 115.3 kg (254 lb 1.6 oz)     General Appearance: NAD; face has maye appearance--stable per pt  HEENT: sclera anicteric. Moist mucus membranes, no ulcerations.  CV: RRR. Pan systolic murmur (known to pt)    RESP: CTA bilaterally; no rales or wheezes.   EXT: Trace ankle edema, stable  SKIN: no lesions or rash.   NEURO: A&O x 3   PSYCH: Appropriate affect   VASCULAR ACCESS: dressing CDI.     ROUTINE LABS:    Lab Results   Component Value Date    WBC 0.4 (LL) 08/09/2024    ANEU 0.8 (L) 08/08/2024    HGB 10.2 (L) 08/09/2024    HCT 32.2 (L) 08/09/2024     (L) 08/09/2024     08/09/2024    POTASSIUM 3.9 08/09/2024    CHLORIDE 105 08/09/2024    CO2 27 08/09/2024     (H) 08/09/2024    BUN 16.3 08/09/2024    CR 0.95 08/09/2024    MAG 1.9 08/09/2024    INR 1.19 (H) 08/05/2024          ASSESSMENT AND PLAN:    Arpit Forrest is a 73 year old male, currently day 2 prep for ELIEZER MUD PBSCT for MDS.     BMT/IEC PROTOCOL for MDS  - Chemo protocol: [id number]  D-6: Flu 30mg/m2, Cy 50mg/kg  D-5 to D-2: Flu  D-1: TBI  D0: stem cell infusion  - Peripheral blood stem cell graft from 8/8 donor, ABO matched (Oneg), Cell dose: TBD  - continue allopurinol (hx of gout)  - Engraftment monitoring: Peripheral blood and bone marrow " chimerisms on D28, D100, 6 months, 1 and 2 year  - Restaging plan: BMBx with NGS on D28, D100, 6 months, 1 and 2 years     HEME/COAG  - GCSF starts day +5, continue until ANC > 1500 for 3 consecutive days.   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      ID  Recent Hx: Multiple granulomas in the lung and bone marrow biopsy- fungitell, aspergillus galactomanan neg. 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     CARDIOVASCULAR  # Hx of A.fib with RVR   - A.fib with RVR while admitted with febrile episode on 3/15/24. Noted on his fitbit, not very symptomatic. Cardioversion planned for new onset A.fib. Not achieved with amiodarone, DCCV on 3/21/24 restored normal sinus rhythm. Amiodarone and anticoagulation was stopped in April 2024 without any recurrence.   - He could have a recurrence during transplant which would need to be addressed with rate control strategy   # Asymptomatic moderate aortic stenosis   # CAD  - Chest CT in February 2024 with mild to moderate CAC, three-vessel disease. No anginal symptoms   - Continue atorvastatin 10mg   - EKG Sinus rhythm Inferior infarct , age undetermined. Abnormal ECG (known cardiovascular disease). QTc =460  # HTN- Continue amlodipine 10mg, adding lisinopril 5mg 8/4  "(PTA dose of lisinopril was 10mg)  - Risk of cardiomyopathy:  Baseline EF normal 60-65&  - Risk of arrhythmia: Baseline EKG showed NSR, Qtc 460     RESPIRATORY  # VINCENT : CPAP  - Baseline PFTs: normal   - Risk of respiratory complications: Frequent ambulation and incentive spirometer.     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    # Electrolyte management: replace per sliding scale  - cont Ca w/vit D  - hold eye vitamin and Coq10 peritransplant     DIABETES/ENDOCRINE  #DMII: hx steroid-induced hyperglycemia: Carb coverage and Novolog sliding scale. Endo initially consulted, signed off with stable sugars.   *holding PTA metformin on admission.      MUSCULOSKELETAL/FRAILTY  # Gout: Continue allopurinol indefinitely   - Baseline Frailty Score: Not frail   - Daily PT/OT as needed while inpatient     SOCIAL DETERMINANTS  - Caregiver: Wife, Meghna, children     Medically Ready for Discharge: Anticipated in 5+ Days  Clinically Significant Risk Factors                # Thrombocytopenia: Lowest platelets = 128 in last 2 days, will monitor for bleeding   # Hypertension: Noted on problem list            # Obesity: Estimated body mass index is 34.66 kg/m  as calculated from the following:    Height as of this encounter: 1.825 m (5' 11.85\").    Weight as of this encounter: 115.4 kg (254 lb 8 oz).           I spent 30 minutes in the care of this patient today, which included time necessary for preparation for the visit, obtaining history, ordering medications/tests/procedures as medically indicated, review of pertinent medical literature, counseling of the patient, communication of recommendations to the care team, and documentation time.    Destinee Sanches PA-C on 8/4/2024 at 9:40 AM  [id number], josias      "

## 2024-08-09 NOTE — PLAN OF CARE
"Afebrile, OVSS. Pt denies pain, nausea and vomiting. No PRNs given. Pt reports feeling more tired today, otherwise he poses no complaints. No replacements needed. Pt resting between cares.        Problem: Adult Inpatient Plan of Care  Goal: Plan of Care Review  Description: The Plan of Care Review/Shift note should be completed every shift.  The Outcome Evaluation is a brief statement about your assessment that the patient is improving, declining, or no change.  This information will be displayed automatically on your shift  note.  Outcome: Progressing  Goal: Patient-Specific Goal (Individualized)  Description: You can add care plan individualizations to a care plan. Examples of Individualization might be:  \"Parent requests to be called daily at 9am for status\", \"I have a hard time hearing out of my right ear\", or \"Do not touch me to wake me up as it startles  me\".  Outcome: Progressing  Goal: Absence of Hospital-Acquired Illness or Injury  Outcome: Progressing  Intervention: Identify and Manage Fall Risk  Recent Flowsheet Documentation  Taken 8/9/2024 0334 by Marika Jean RN  Safety Promotion/Fall Prevention:   safety round/check completed   activity supervised   clutter free environment maintained   assistive device/personal items within reach   room organization consistent  Taken 8/9/2024 0200 by Marika Jean RN  Safety Promotion/Fall Prevention: safety round/check completed  Taken 8/8/2024 2314 by Marika Jean, RN  Safety Promotion/Fall Prevention:   safety round/check completed   activity supervised   clutter free environment maintained   assistive device/personal items within reach   room organization consistent  Taken 8/8/2024 2200 by Marika Jean, RN  Safety Promotion/Fall Prevention: safety round/check completed  Taken 8/8/2024 1931 by Marika Jean, RN  Safety Promotion/Fall Prevention:   safety round/check completed   activity supervised   clutter free environment maintained   " assistive device/personal items within reach   room organization consistent  Intervention: Prevent Skin Injury  Recent Flowsheet Documentation  Taken 8/8/2024 1933 by Marika Jean RN  Body Position: position changed independently  Intervention: Prevent and Manage VTE (Venous Thromboembolism) Risk  Recent Flowsheet Documentation  Taken 8/8/2024 2200 by Marika Jean RN  VTE Prevention/Management: compression stockings on  Intervention: Prevent Infection  Recent Flowsheet Documentation  Taken 8/9/2024 0334 by Marika Jean RN  Infection Prevention:   cohorting utilized   environmental surveillance performed   equipment surfaces disinfected   hand hygiene promoted   personal protective equipment utilized   rest/sleep promoted  Taken 8/8/2024 2314 by Marika Jean RN  Infection Prevention:   cohorting utilized   environmental surveillance performed   equipment surfaces disinfected   hand hygiene promoted   personal protective equipment utilized   rest/sleep promoted  Taken 8/8/2024 1931 by Marika Jean RN  Infection Prevention:   cohorting utilized   environmental surveillance performed   equipment surfaces disinfected   hand hygiene promoted   personal protective equipment utilized   rest/sleep promoted  Goal: Optimal Comfort and Wellbeing  Outcome: Progressing  Goal: Readiness for Transition of Care  Outcome: Progressing     Problem: Risk for Delirium  Goal: Optimal Coping  Outcome: Progressing  Goal: Improved Behavioral Control  Outcome: Progressing  Intervention: Minimize Safety Risk  Recent Flowsheet Documentation  Taken 8/9/2024 0334 by Marika Jean RN  Enhanced Safety Measures: review medications for side effects with activity  Taken 8/8/2024 2314 by Marika Jean RN  Enhanced Safety Measures: review medications for side effects with activity  Taken 8/8/2024 1931 by Marika Jean RN  Enhanced Safety Measures: review medications for side effects with activity  Goal:  Improved Attention and Thought Clarity  Outcome: Progressing  Goal: Improved Sleep  Outcome: Progressing     Problem: Stem Cell/Bone Marrow Transplant  Goal: Optimal Coping with Transplant  Outcome: Progressing  Goal: Symptom-Free Urinary Elimination  Outcome: Progressing  Goal: Diarrhea Symptom Control  Outcome: Progressing  Goal: Improved Activity Tolerance  Outcome: Progressing  Intervention: Promote Improved Energy  Recent Flowsheet Documentation  Taken 8/8/2024 1933 by Marika Jean RN  Activity Management: up ad soo  Goal: Blood Counts Within Acceptable Range  Outcome: Progressing  Intervention: Monitor and Manage Hematologic Symptoms  Recent Flowsheet Documentation  Taken 8/9/2024 0334 by Marika Jean RN  Bleeding Precautions:   monitored for signs of bleeding   gentle oral care promoted  Medication Review/Management: medications reviewed  Taken 8/8/2024 2314 by Marika Jean RN  Bleeding Precautions:   monitored for signs of bleeding   gentle oral care promoted  Medication Review/Management: medications reviewed  Taken 8/8/2024 1931 by Marika Jean RN  Bleeding Precautions:   monitored for signs of bleeding   gentle oral care promoted  Medication Review/Management: medications reviewed  Goal: Absence of Hypersensitivity Reaction  Outcome: Progressing  Goal: Absence of Infection  Outcome: Progressing  Intervention: Prevent and Manage Infection  Recent Flowsheet Documentation  Taken 8/9/2024 0334 by Marika Jean RN  Infection Prevention:   cohorting utilized   environmental surveillance performed   equipment surfaces disinfected   hand hygiene promoted   personal protective equipment utilized   rest/sleep promoted  Infection Management: aseptic technique maintained  Isolation Precautions: protective environment maintained  Taken 8/8/2024 2314 by Marika Jean RN  Infection Prevention:   cohorting utilized   environmental surveillance performed   equipment surfaces disinfected    hand hygiene promoted   personal protective equipment utilized   rest/sleep promoted  Infection Management: aseptic technique maintained  Isolation Precautions: protective environment maintained  Taken 8/8/2024 1931 by Marika Jean RN  Infection Prevention:   cohorting utilized   environmental surveillance performed   equipment surfaces disinfected   hand hygiene promoted   personal protective equipment utilized   rest/sleep promoted  Infection Management: aseptic technique maintained  Isolation Precautions: protective environment maintained  Goal: Improved Oral Mucous Membrane Health and Integrity  Outcome: Progressing  Goal: Nausea and Vomiting Symptom Relief  Outcome: Progressing  Intervention: Prevent and Manage Nausea and Vomiting  Recent Flowsheet Documentation  Taken 8/8/2024 2200 by Marika Jean RN  Nausea/Vomiting Interventions: (denies) other (see comments)  Goal: Optimal Nutrition Intake  Outcome: Progressing     Problem: Infection  Goal: Absence of Infection Signs and Symptoms  Outcome: Progressing  Intervention: Prevent or Manage Infection  Recent Flowsheet Documentation  Taken 8/9/2024 0334 by Marika Jean RN  Infection Management: aseptic technique maintained  Isolation Precautions: protective environment maintained  Taken 8/8/2024 2314 by Marika Jean RN  Infection Management: aseptic technique maintained  Isolation Precautions: protective environment maintained  Taken 8/8/2024 1931 by Marika Jean RN  Infection Management: aseptic technique maintained  Isolation Precautions: protective environment maintained     Problem: Comorbidity Management  Goal: Blood Glucose Levels Within Targeted Range  Outcome: Progressing  Intervention: Monitor and Manage Glycemia  Recent Flowsheet Documentation  Taken 8/9/2024 0334 by Marika Jean RN  Medication Review/Management: medications reviewed  Taken 8/8/2024 2314 by Marika Jean RN  Medication Review/Management:  medications reviewed  Taken 8/8/2024 1931 by Marika Jean, RN  Medication Review/Management: medications reviewed     Problem: Obstructive Sleep Apnea Risk or Actual  Goal: Unobstructed Breathing During Sleep  Outcome: Progressing  Intervention: Monitor and Manage Obstructive Sleep Apnea  Recent Flowsheet Documentation  Taken 8/9/2024 0334 by Marika Jean, RN  Medication Review/Management: medications reviewed  Taken 8/8/2024 2314 by Marika Jean, RN  Medication Review/Management: medications reviewed  Taken 8/8/2024 1931 by Marika Jean, RN  Medication Review/Management: medications reviewed

## 2024-08-09 NOTE — PLAN OF CARE
"BP (!) 141/71   Pulse 65   Temp 99.3  F (37.4  C) (Oral)   Resp 16   Ht 1.825 m (5' 11.85\")   Wt 115.4 kg (254 lb 8 oz)   SpO2 97%   BMI 34.66 kg/m      VSS on room air except for some intermittent hypertension SBP up to 140s which does not meet paging parameters. Orthostatic BPs are negative. Patient denies N,V,D. BG AC/HS. Patient has a good appetite. Patient denies pain. Patient reports feeling tired over the past few days. IND. Patient walked the halls. Patient refused shower. CHG wipes completed. Continue with plan of care.     Goal Outcome Evaluation:      Plan of Care Reviewed With: patient    Overall Patient Progress: no changeOverall Patient Progress: no change           Problem: Adult Inpatient Plan of Care  Goal: Plan of Care Review  Description: The Plan of Care Review/Shift note should be completed every shift.  The Outcome Evaluation is a brief statement about your assessment that the patient is improving, declining, or no change.  This information will be displayed automatically on your shift  note.  Outcome: Progressing  Flowsheets (Taken 8/9/2024 1524)  Plan of Care Reviewed With: patient  Overall Patient Progress: no change  Goal: Patient-Specific Goal (Individualized)  Description: You can add care plan individualizations to a care plan. Examples of Individualization might be:  \"Parent requests to be called daily at 9am for status\", \"I have a hard time hearing out of my right ear\", or \"Do not touch me to wake me up as it startles  me\".  Outcome: Progressing  Goal: Absence of Hospital-Acquired Illness or Injury  Outcome: Progressing  Intervention: Identify and Manage Fall Risk  Recent Flowsheet Documentation  Taken 8/9/2024 1530 by Tram Ramirez RN  Safety Promotion/Fall Prevention:   safety round/check completed   room organization consistent   patient and family education   nonskid shoes/slippers when out of bed   clutter free environment maintained   chemotherapeutic precautions   " assistive device/personal items within reach   lighting adjusted  Taken 8/9/2024 1300 by Tram Ramirez RN  Safety Promotion/Fall Prevention: safety round/check completed  Taken 8/9/2024 1230 by Tram Ramirez RN  Safety Promotion/Fall Prevention:   safety round/check completed   room organization consistent   patient and family education   nonskid shoes/slippers when out of bed   clutter free environment maintained   chemotherapeutic precautions   assistive device/personal items within reach   lighting adjusted  Taken 8/9/2024 1000 by Tram Ramirez RN  Safety Promotion/Fall Prevention: safety round/check completed  Taken 8/9/2024 0900 by Tram Ramirez RN  Safety Promotion/Fall Prevention:   safety round/check completed   room organization consistent   patient and family education   nonskid shoes/slippers when out of bed   clutter free environment maintained   chemotherapeutic precautions   assistive device/personal items within reach   lighting adjusted  Intervention: Prevent Skin Injury  Recent Flowsheet Documentation  Taken 8/9/2024 0900 by Tram Ramirez RN  Body Position: position changed independently  Skin Protection: adhesive use limited  Device Skin Pressure Protection: adhesive use limited  Intervention: Prevent and Manage VTE (Venous Thromboembolism) Risk  Recent Flowsheet Documentation  Taken 8/9/2024 0900 by Tram Ramirez RN  VTE Prevention/Management: other (see comments)  Intervention: Prevent Infection  Recent Flowsheet Documentation  Taken 8/9/2024 1530 by Tram Ramirez RN  Infection Prevention:   single patient room provided   rest/sleep promoted   personal protective equipment utilized   hand hygiene promoted   equipment surfaces disinfected   environmental surveillance performed  Taken 8/9/2024 1230 by Tram Ramirez RN  Infection Prevention:   single patient room provided   rest/sleep promoted   personal protective equipment utilized   hand  hygiene promoted   equipment surfaces disinfected   environmental surveillance performed  Taken 8/9/2024 0900 by Tram Ramirez RN  Infection Prevention:   single patient room provided   rest/sleep promoted   personal protective equipment utilized   hand hygiene promoted   equipment surfaces disinfected   environmental surveillance performed  Goal: Optimal Comfort and Wellbeing  Outcome: Progressing  Goal: Readiness for Transition of Care  Outcome: Progressing     Problem: Risk for Delirium  Goal: Optimal Coping  Outcome: Progressing  Intervention: Optimize Psychosocial Adjustment to Delirium  Recent Flowsheet Documentation  Taken 8/9/2024 0900 by Tram Ramirez RN  Supportive Measures: active listening utilized  Goal: Improved Behavioral Control  Outcome: Progressing  Intervention: Prevent and Manage Agitation  Recent Flowsheet Documentation  Taken 8/9/2024 0900 by Tram Ramirez RN  Environment Familiarity/Consistency: daily routine followed  Intervention: Minimize Safety Risk  Recent Flowsheet Documentation  Taken 8/9/2024 1530 by Tram Ramirez RN  Enhanced Safety Measures: patient/family teach back on injury risk  Taken 8/9/2024 1230 by Tram Ramirez RN  Enhanced Safety Measures: patient/family teach back on injury risk  Taken 8/9/2024 0900 by Tram Ramirez RN  Communication Enhancement Strategies:   call light answered in person   family involved in communication plan  Enhanced Safety Measures: patient/family teach back on injury risk  Goal: Improved Attention and Thought Clarity  Outcome: Progressing  Intervention: Maximize Cognitive Function  Recent Flowsheet Documentation  Taken 8/9/2024 0900 by Tram Ramirez RN  Sensory Stimulation Regulation: auditory stimulation minimized  Reorientation Measures:   calendar in view   clock in view   familiar social contact encouraged  Goal: Improved Sleep  Outcome: Progressing  Intervention: Promote Sleep  Recent Flowsheet  Documentation  Taken 8/9/2024 0900 by Tram Ramirez RN  Sleep/Rest Enhancement:   consistent schedule promoted   family presence promoted   natural light exposure provided   regular sleep/rest pattern promoted   relaxation techniques promoted     Problem: Stem Cell/Bone Marrow Transplant  Goal: Optimal Coping with Transplant  Outcome: Progressing  Intervention: Optimize Patient/Family Adjustment to Transplant  Recent Flowsheet Documentation  Taken 8/9/2024 0900 by Tram Ramirez RN  Supportive Measures: active listening utilized  Goal: Symptom-Free Urinary Elimination  Outcome: Progressing  Intervention: Prevent or Manage Bladder Irritation  Recent Flowsheet Documentation  Taken 8/9/2024 0900 by Tram Ramirez RN  Urinary Elimination Promotion: frequent voiding encouraged  Hyperhydration Management: fluids provided  Goal: Diarrhea Symptom Control  Outcome: Progressing  Intervention: Manage Diarrhea  Recent Flowsheet Documentation  Taken 8/9/2024 0900 by Tram Ramirez RN  Skin Protection: adhesive use limited  Fluid/Electrolyte Management: fluids provided  Goal: Improved Activity Tolerance  Outcome: Progressing  Intervention: Promote Improved Energy  Recent Flowsheet Documentation  Taken 8/9/2024 0900 by Tram Ramirez RN  Fatigue Management: paced activity encouraged  Sleep/Rest Enhancement:   consistent schedule promoted   family presence promoted   natural light exposure provided   regular sleep/rest pattern promoted   relaxation techniques promoted  Activity Management: activity adjusted per tolerance  Environmental Support: calm environment promoted  Goal: Blood Counts Within Acceptable Range  Outcome: Progressing  Intervention: Monitor and Manage Hematologic Symptoms  Recent Flowsheet Documentation  Taken 8/9/2024 1530 by Tram Ramirez RN  Bleeding Precautions:   blood pressure closely monitored   gentle oral care promoted   monitored for signs of bleeding  Medication  Review/Management: medications reviewed  Taken 8/9/2024 1230 by Tram Ramirez RN  Bleeding Precautions:   blood pressure closely monitored   gentle oral care promoted   monitored for signs of bleeding  Medication Review/Management: medications reviewed  Taken 8/9/2024 0900 by Tram Ramirez RN  Sleep/Rest Enhancement:   consistent schedule promoted   family presence promoted   natural light exposure provided   regular sleep/rest pattern promoted   relaxation techniques promoted  Bleeding Precautions:   blood pressure closely monitored   gentle oral care promoted   monitored for signs of bleeding  Medication Review/Management: medications reviewed  Goal: Absence of Hypersensitivity Reaction  Outcome: Progressing  Goal: Absence of Infection  Outcome: Progressing  Intervention: Prevent and Manage Infection  Recent Flowsheet Documentation  Taken 8/9/2024 1530 by Tram Ramirez RN  Infection Prevention:   single patient room provided   rest/sleep promoted   personal protective equipment utilized   hand hygiene promoted   equipment surfaces disinfected   environmental surveillance performed  Infection Management: aseptic technique maintained  Isolation Precautions:   cytotoxic precautions maintained   protective environment maintained  Taken 8/9/2024 1230 by Tram Ramirez RN  Infection Prevention:   single patient room provided   rest/sleep promoted   personal protective equipment utilized   hand hygiene promoted   equipment surfaces disinfected   environmental surveillance performed  Infection Management: aseptic technique maintained  Isolation Precautions:   cytotoxic precautions maintained   protective environment maintained  Taken 8/9/2024 0900 by Tram Ramirez RN  Infection Prevention:   single patient room provided   rest/sleep promoted   personal protective equipment utilized   hand hygiene promoted   equipment surfaces disinfected   environmental surveillance performed  Infection  Management: aseptic technique maintained  Isolation Precautions:   cytotoxic precautions maintained   protective environment maintained  Goal: Improved Oral Mucous Membrane Health and Integrity  Outcome: Progressing  Intervention: Promote Oral Comfort and Health  Recent Flowsheet Documentation  Taken 8/9/2024 1500 by Tram Ramirez RN  Oral Care:   oral rinse provided   teeth brushed  Taken 8/9/2024 0900 by Tram Ramirez RN  Oral Mucous Membrane Protection:   nonirritating oral fluids promoted   nonirritating oral foods promoted  Goal: Nausea and Vomiting Symptom Relief  Outcome: Progressing  Goal: Optimal Nutrition Intake  Outcome: Progressing  Intervention: Minimize and Manage Barriers to Oral Intake  Recent Flowsheet Documentation  Taken 8/9/2024 0900 by Tram Ramirez RN  Oral Nutrition Promotion:   rest periods promoted   physical activity promoted     Problem: Infection  Goal: Absence of Infection Signs and Symptoms  Outcome: Progressing  Intervention: Prevent or Manage Infection  Recent Flowsheet Documentation  Taken 8/9/2024 1530 by Tram Ramirez RN  Infection Management: aseptic technique maintained  Isolation Precautions:   cytotoxic precautions maintained   protective environment maintained  Taken 8/9/2024 1230 by Tram Ramirez RN  Infection Management: aseptic technique maintained  Isolation Precautions:   cytotoxic precautions maintained   protective environment maintained  Taken 8/9/2024 0900 by Tram Ramirez RN  Infection Management: aseptic technique maintained  Isolation Precautions:   cytotoxic precautions maintained   protective environment maintained     Problem: Comorbidity Management  Goal: Blood Glucose Levels Within Targeted Range  Outcome: Progressing  Intervention: Monitor and Manage Glycemia  Recent Flowsheet Documentation  Taken 8/9/2024 1530 by Tram Ramirez RN  Medication Review/Management: medications reviewed  Taken 8/9/2024 1230 by Demarco  Tram Dan, RN  Medication Review/Management: medications reviewed  Taken 8/9/2024 0900 by Tram Ramirez RN  Medication Review/Management: medications reviewed     Problem: Obstructive Sleep Apnea Risk or Actual  Goal: Unobstructed Breathing During Sleep  Outcome: Progressing  Intervention: Monitor and Manage Obstructive Sleep Apnea  Recent Flowsheet Documentation  Taken 8/9/2024 1530 by Tram Ramirez, RN  Medication Review/Management: medications reviewed  Taken 8/9/2024 1230 by Tram Ramirez RN  Medication Review/Management: medications reviewed  Taken 8/9/2024 0900 by Tram Ramirez, RN  Medication Review/Management: medications reviewed

## 2024-08-10 ENCOUNTER — APPOINTMENT (OUTPATIENT)
Dept: GENERAL RADIOLOGY | Facility: CLINIC | Age: 74
DRG: 014 | End: 2024-08-10
Attending: STUDENT IN AN ORGANIZED HEALTH CARE EDUCATION/TRAINING PROGRAM
Payer: MEDICARE

## 2024-08-10 LAB
ACANTHOCYTES BLD QL SMEAR: NORMAL
ALBUMIN UR-MCNC: NEGATIVE MG/DL
ANION GAP SERPL CALCULATED.3IONS-SCNC: 9 MMOL/L (ref 7–15)
APPEARANCE UR: CLEAR
AUER BODIES BLD QL SMEAR: NORMAL
BACTERIA UR CULT: NO GROWTH
BASO STIPL BLD QL SMEAR: NORMAL
BASOPHILS # BLD AUTO: ABNORMAL 10*3/UL
BASOPHILS NFR BLD AUTO: ABNORMAL %
BILIRUB UR QL STRIP: NEGATIVE
BITE CELLS BLD QL SMEAR: NORMAL
BLISTER CELLS BLD QL SMEAR: NORMAL
BUN SERPL-MCNC: 16.3 MG/DL (ref 8–23)
BURR CELLS BLD QL SMEAR: NORMAL
CALCIUM SERPL-MCNC: 8.8 MG/DL (ref 8.8–10.4)
CHLORIDE SERPL-SCNC: 105 MMOL/L (ref 98–107)
COLOR UR AUTO: ABNORMAL
CREAT SERPL-MCNC: 0.89 MG/DL (ref 0.67–1.17)
DACRYOCYTES BLD QL SMEAR: NORMAL
EGFRCR SERPLBLD CKD-EPI 2021: 89 ML/MIN/1.73M2
ELLIPTOCYTES BLD QL SMEAR: NORMAL
EOSINOPHIL # BLD AUTO: ABNORMAL 10*3/UL
EOSINOPHIL NFR BLD AUTO: ABNORMAL %
ERYTHROCYTE [DISTWIDTH] IN BLOOD BY AUTOMATED COUNT: 15.8 % (ref 10–15)
FRAGMENTS BLD QL SMEAR: NORMAL
GLUCOSE BLDC GLUCOMTR-MCNC: 120 MG/DL (ref 70–99)
GLUCOSE BLDC GLUCOMTR-MCNC: 132 MG/DL (ref 70–99)
GLUCOSE BLDC GLUCOMTR-MCNC: 165 MG/DL (ref 70–99)
GLUCOSE BLDC GLUCOMTR-MCNC: 91 MG/DL (ref 70–99)
GLUCOSE SERPL-MCNC: 121 MG/DL (ref 70–99)
GLUCOSE UR STRIP-MCNC: NEGATIVE MG/DL
HCO3 SERPL-SCNC: 25 MMOL/L (ref 22–29)
HCT VFR BLD AUTO: 31.6 % (ref 40–53)
HGB BLD-MCNC: 10.1 G/DL (ref 13.3–17.7)
HGB C CRYSTALS: NORMAL
HGB UR QL STRIP: NEGATIVE
HOWELL-JOLLY BOD BLD QL SMEAR: NORMAL
IMM GRANULOCYTES # BLD: ABNORMAL 10*3/UL
IMM GRANULOCYTES NFR BLD: ABNORMAL %
KETONES UR STRIP-MCNC: 40 MG/DL
LACTATE SERPL-SCNC: 1.5 MMOL/L (ref 0.7–2)
LEUKOCYTE ESTERASE UR QL STRIP: NEGATIVE
LYMPHOCYTES # BLD AUTO: ABNORMAL 10*3/UL
LYMPHOCYTES NFR BLD AUTO: ABNORMAL %
MAGNESIUM SERPL-MCNC: 1.9 MG/DL (ref 1.7–2.3)
MCH RBC QN AUTO: 29.8 PG (ref 26.5–33)
MCHC RBC AUTO-ENTMCNC: 32 G/DL (ref 31.5–36.5)
MCV RBC AUTO: 93 FL (ref 78–100)
MONOCYTES # BLD AUTO: ABNORMAL 10*3/UL
MONOCYTES NFR BLD AUTO: ABNORMAL %
MUCOUS THREADS #/AREA URNS LPF: PRESENT /LPF
NEUTROPHILS # BLD AUTO: ABNORMAL 10*3/UL
NEUTROPHILS NFR BLD AUTO: ABNORMAL %
NEUTS HYPERSEG BLD QL SMEAR: NORMAL
NITRATE UR QL: NEGATIVE
PH UR STRIP: 5.5 [PH] (ref 5–7)
PHOSPHATE SERPL-MCNC: 3.6 MG/DL (ref 2.5–4.5)
PLAT MORPH BLD: NORMAL
PLATELET # BLD AUTO: 104 10E3/UL (ref 150–450)
POLYCHROMASIA BLD QL SMEAR: NORMAL
POTASSIUM SERPL-SCNC: 3.7 MMOL/L (ref 3.4–5.3)
POTASSIUM SERPL-SCNC: 3.8 MMOL/L (ref 3.4–5.3)
RBC # BLD AUTO: 3.39 10E6/UL (ref 4.4–5.9)
RBC AGGLUT BLD QL: NORMAL
RBC MORPH BLD: NORMAL
RBC URINE: <1 /HPF
ROULEAUX BLD QL SMEAR: NORMAL
SICKLE CELLS BLD QL SMEAR: NORMAL
SMUDGE CELLS BLD QL SMEAR: NORMAL
SODIUM SERPL-SCNC: 138 MMOL/L (ref 135–145)
SODIUM SERPL-SCNC: 139 MMOL/L (ref 135–145)
SP GR UR STRIP: 1.01 (ref 1–1.03)
SPHEROCYTES BLD QL SMEAR: NORMAL
STOMATOCYTES BLD QL SMEAR: NORMAL
TARGETS BLD QL SMEAR: NORMAL
TOXIC GRANULES BLD QL SMEAR: NORMAL
UROBILINOGEN UR STRIP-MCNC: NORMAL MG/DL
VARIANT LYMPHS BLD QL SMEAR: NORMAL
WBC # BLD AUTO: 0.3 10E3/UL (ref 4–11)
WBC URINE: <1 /HPF

## 2024-08-10 PROCEDURE — 250N000011 HC RX IP 250 OP 636: Performed by: INTERNAL MEDICINE

## 2024-08-10 PROCEDURE — 250N000013 HC RX MED GY IP 250 OP 250 PS 637: Performed by: STUDENT IN AN ORGANIZED HEALTH CARE EDUCATION/TRAINING PROGRAM

## 2024-08-10 PROCEDURE — 250N000011 HC RX IP 250 OP 636: Performed by: STUDENT IN AN ORGANIZED HEALTH CARE EDUCATION/TRAINING PROGRAM

## 2024-08-10 PROCEDURE — 87086 URINE CULTURE/COLONY COUNT: CPT | Performed by: STUDENT IN AN ORGANIZED HEALTH CARE EDUCATION/TRAINING PROGRAM

## 2024-08-10 PROCEDURE — 80048 BASIC METABOLIC PNL TOTAL CA: CPT | Performed by: STUDENT IN AN ORGANIZED HEALTH CARE EDUCATION/TRAINING PROGRAM

## 2024-08-10 PROCEDURE — 250N000011 HC RX IP 250 OP 636: Performed by: PHYSICIAN ASSISTANT

## 2024-08-10 PROCEDURE — 206N000001 HC R&B BMT UMMC

## 2024-08-10 PROCEDURE — 84295 ASSAY OF SERUM SODIUM: CPT | Performed by: STUDENT IN AN ORGANIZED HEALTH CARE EDUCATION/TRAINING PROGRAM

## 2024-08-10 PROCEDURE — 36415 COLL VENOUS BLD VENIPUNCTURE: CPT | Performed by: STUDENT IN AN ORGANIZED HEALTH CARE EDUCATION/TRAINING PROGRAM

## 2024-08-10 PROCEDURE — 83735 ASSAY OF MAGNESIUM: CPT | Performed by: STUDENT IN AN ORGANIZED HEALTH CARE EDUCATION/TRAINING PROGRAM

## 2024-08-10 PROCEDURE — 84100 ASSAY OF PHOSPHORUS: CPT | Performed by: STUDENT IN AN ORGANIZED HEALTH CARE EDUCATION/TRAINING PROGRAM

## 2024-08-10 PROCEDURE — 85014 HEMATOCRIT: CPT | Performed by: STUDENT IN AN ORGANIZED HEALTH CARE EDUCATION/TRAINING PROGRAM

## 2024-08-10 PROCEDURE — 250N000013 HC RX MED GY IP 250 OP 250 PS 637: Performed by: PHYSICIAN ASSISTANT

## 2024-08-10 PROCEDURE — 258N000003 HC RX IP 258 OP 636: Performed by: PHYSICIAN ASSISTANT

## 2024-08-10 PROCEDURE — 99233 SBSQ HOSP IP/OBS HIGH 50: CPT | Mod: FS | Performed by: STUDENT IN AN ORGANIZED HEALTH CARE EDUCATION/TRAINING PROGRAM

## 2024-08-10 PROCEDURE — 83605 ASSAY OF LACTIC ACID: CPT | Performed by: STUDENT IN AN ORGANIZED HEALTH CARE EDUCATION/TRAINING PROGRAM

## 2024-08-10 PROCEDURE — 71045 X-RAY EXAM CHEST 1 VIEW: CPT

## 2024-08-10 PROCEDURE — 81001 URINALYSIS AUTO W/SCOPE: CPT | Performed by: STUDENT IN AN ORGANIZED HEALTH CARE EDUCATION/TRAINING PROGRAM

## 2024-08-10 PROCEDURE — 258N000003 HC RX IP 258 OP 636

## 2024-08-10 PROCEDURE — 258N000003 HC RX IP 258 OP 636: Performed by: INTERNAL MEDICINE

## 2024-08-10 PROCEDURE — 87103 BLOOD FUNGUS CULTURE: CPT | Performed by: STUDENT IN AN ORGANIZED HEALTH CARE EDUCATION/TRAINING PROGRAM

## 2024-08-10 PROCEDURE — 71045 X-RAY EXAM CHEST 1 VIEW: CPT | Mod: 26 | Performed by: RADIOLOGY

## 2024-08-10 PROCEDURE — 84132 ASSAY OF SERUM POTASSIUM: CPT | Performed by: STUDENT IN AN ORGANIZED HEALTH CARE EDUCATION/TRAINING PROGRAM

## 2024-08-10 PROCEDURE — 250N000012 HC RX MED GY IP 250 OP 636 PS 637: Performed by: STUDENT IN AN ORGANIZED HEALTH CARE EDUCATION/TRAINING PROGRAM

## 2024-08-10 PROCEDURE — 250N000013 HC RX MED GY IP 250 OP 250 PS 637

## 2024-08-10 RX ORDER — CEFEPIME HYDROCHLORIDE 2 G/1
2 INJECTION, POWDER, FOR SOLUTION INTRAVENOUS EVERY 8 HOURS
Status: DISCONTINUED | OUTPATIENT
Start: 2024-08-10 | End: 2024-08-14

## 2024-08-10 RX ORDER — TAMSULOSIN HYDROCHLORIDE 0.4 MG/1
0.4 CAPSULE ORAL DAILY
Status: DISCONTINUED | OUTPATIENT
Start: 2024-08-10 | End: 2024-08-25 | Stop reason: HOSPADM

## 2024-08-10 RX ADMIN — ACYCLOVIR 800 MG: 800 TABLET ORAL at 20:23

## 2024-08-10 RX ADMIN — FUROSEMIDE 20 MG: 10 INJECTION, SOLUTION INTRAMUSCULAR; INTRAVENOUS at 15:28

## 2024-08-10 RX ADMIN — CEFEPIME HYDROCHLORIDE 2 G: 2 INJECTION, POWDER, FOR SOLUTION INTRAVENOUS at 20:24

## 2024-08-10 RX ADMIN — LEVOFLOXACIN 250 MG: 250 TABLET, FILM COATED ORAL at 10:11

## 2024-08-10 RX ADMIN — PANTOPRAZOLE SODIUM 40 MG: 40 TABLET, DELAYED RELEASE ORAL at 07:56

## 2024-08-10 RX ADMIN — ACYCLOVIR 800 MG: 800 TABLET ORAL at 07:56

## 2024-08-10 RX ADMIN — URSODIOL 300 MG: 300 CAPSULE ORAL at 07:56

## 2024-08-10 RX ADMIN — FLUTICASONE PROPIONATE 1 SPRAY: 50 SPRAY, METERED NASAL at 07:58

## 2024-08-10 RX ADMIN — URSODIOL 300 MG: 300 CAPSULE ORAL at 20:23

## 2024-08-10 RX ADMIN — URSODIOL 300 MG: 300 CAPSULE ORAL at 14:37

## 2024-08-10 RX ADMIN — LATANOPROST 2 DROP: 50 SOLUTION OPHTHALMIC at 22:39

## 2024-08-10 RX ADMIN — FLUTICASONE PROPIONATE 1 SPRAY: 50 SPRAY, METERED NASAL at 20:24

## 2024-08-10 RX ADMIN — CALCIUM CARBONATE 600 MG (1,500 MG)-VITAMIN D3 400 UNIT TABLET 1 TABLET: at 07:56

## 2024-08-10 RX ADMIN — SODIUM CHLORIDE: 9 INJECTION, SOLUTION INTRAVENOUS at 02:11

## 2024-08-10 RX ADMIN — ATORVASTATIN CALCIUM 10 MG: 10 TABLET, FILM COATED ORAL at 20:23

## 2024-08-10 RX ADMIN — ONDANSETRON HYDROCHLORIDE 8 MG: 8 TABLET, FILM COATED ORAL at 22:39

## 2024-08-10 RX ADMIN — MESNA 3870 MG: 100 INJECTION, SOLUTION INTRAVENOUS at 11:58

## 2024-08-10 RX ADMIN — BRIMONIDINE TARTRATE 1 DROP: 2 SOLUTION/ DROPS OPHTHALMIC at 20:24

## 2024-08-10 RX ADMIN — BRIMONIDINE TARTRATE 1 DROP: 2 SOLUTION/ DROPS OPHTHALMIC at 07:58

## 2024-08-10 RX ADMIN — LISINOPRIL 5 MG: 5 TABLET ORAL at 07:56

## 2024-08-10 RX ADMIN — AMLODIPINE BESYLATE 10 MG: 10 TABLET ORAL at 07:56

## 2024-08-10 RX ADMIN — SODIUM CHLORIDE: 9 INJECTION, SOLUTION INTRAVENOUS at 12:08

## 2024-08-10 RX ADMIN — Medication 5 ML: at 04:14

## 2024-08-10 RX ADMIN — ALLOPURINOL 300 MG: 300 TABLET ORAL at 07:57

## 2024-08-10 RX ADMIN — MICAFUNGIN SODIUM 100 MG: 50 INJECTION, POWDER, LYOPHILIZED, FOR SOLUTION INTRAVENOUS at 07:58

## 2024-08-10 RX ADMIN — ONDANSETRON HYDROCHLORIDE 8 MG: 8 TABLET, FILM COATED ORAL at 13:39

## 2024-08-10 RX ADMIN — TAMSULOSIN HYDROCHLORIDE 0.4 MG: 0.4 CAPSULE ORAL at 12:06

## 2024-08-10 RX ADMIN — CYCLOPHOSPHAMIDE 3865 MG: 2 INJECTION, POWDER, FOR SOLUTION INTRAVENOUS; ORAL at 14:08

## 2024-08-10 RX ADMIN — ACETAMINOPHEN 650 MG: 325 TABLET, FILM COATED ORAL at 20:23

## 2024-08-10 RX ADMIN — INSULIN ASPART 1 UNITS: 100 INJECTION, SOLUTION INTRAVENOUS; SUBCUTANEOUS at 17:45

## 2024-08-10 ASSESSMENT — ACTIVITIES OF DAILY LIVING (ADL)
ADLS_ACUITY_SCORE: 20

## 2024-08-10 NOTE — PLAN OF CARE
"Goal Outcome Evaluation:  BP (!) 145/75 (BP Location: Right arm)   Pulse 63   Temp 98  F (36.7  C) (Oral)   Resp 18   Ht 1.825 m (5' 11.85\")   Wt 115.4 kg (254 lb 8 oz)   SpO2 98%   BMI 34.66 kg/m         Pt afebrile, VSS on room air. Pt alert and oriented x4, denies pain. Pt up ad soo. Pt started on NS flushes @ 200 ml/hr at 0200. Pt did not need electrolyte replacements with AM Labs. UOP adequate. Pt denies any concerns.      Problem: Adult Inpatient Plan of Care  Goal: Plan of Care Review  Description: The Plan of Care Review/Shift note should be completed every shift.  The Outcome Evaluation is a brief statement about your assessment that the patient is improving, declining, or no change.  This information will be displayed automatically on your shift  note.  Outcome: Progressing  Goal: Patient-Specific Goal (Individualized)  Description: You can add care plan individualizations to a care plan. Examples of Individualization might be:  \"Parent requests to be called daily at 9am for status\", \"I have a hard time hearing out of my right ear\", or \"Do not touch me to wake me up as it startles  me\".  Outcome: Progressing  Goal: Absence of Hospital-Acquired Illness or Injury  Outcome: Progressing  Intervention: Identify and Manage Fall Risk  Recent Flowsheet Documentation  Taken 8/10/2024 0356 by Hannah Gomez RN  Safety Promotion/Fall Prevention: safety round/check completed  Taken 8/10/2024 0225 by Hannah Gomez RN  Safety Promotion/Fall Prevention: safety round/check completed  Taken 8/9/2024 2327 by Hannah Gomez RN  Safety Promotion/Fall Prevention: safety round/check completed  Taken 8/9/2024 2200 by Hannah Gomez RN  Safety Promotion/Fall Prevention: safety round/check completed  Taken 8/9/2024 2053 by Hannah Gomez RN  Safety Promotion/Fall Prevention:   clutter free environment maintained   nonskid shoes/slippers when out of bed   patient and family education   safety round/check " completed  Intervention: Prevent Skin Injury  Recent Flowsheet Documentation  Taken 8/9/2024 2053 by Hannah Gomez RN  Body Position: position changed independently  Skin Protection: adhesive use limited  Device Skin Pressure Protection: adhesive use limited  Intervention: Prevent Infection  Recent Flowsheet Documentation  Taken 8/10/2024 0356 by Hannah Gomez RN  Infection Prevention:   single patient room provided   rest/sleep promoted   hand hygiene promoted   visitors restricted/screened  Taken 8/9/2024 2327 by Hannah Gomez RN  Infection Prevention:   single patient room provided   rest/sleep promoted   hand hygiene promoted   visitors restricted/screened  Taken 8/9/2024 2053 by Hannah Gomez RN  Infection Prevention:   single patient room provided   rest/sleep promoted   hand hygiene promoted   visitors restricted/screened  Goal: Optimal Comfort and Wellbeing  Outcome: Progressing  Goal: Readiness for Transition of Care  Outcome: Progressing     Problem: Risk for Delirium  Goal: Optimal Coping  Outcome: Progressing  Intervention: Optimize Psychosocial Adjustment to Delirium  Recent Flowsheet Documentation  Taken 8/9/2024 2053 by Hannah Gomez RN  Supportive Measures: active listening utilized  Goal: Improved Behavioral Control  Outcome: Progressing  Intervention: Minimize Safety Risk  Recent Flowsheet Documentation  Taken 8/10/2024 0356 by Hannah Gomez RN  Enhanced Safety Measures: patient/family teach back on injury risk  Taken 8/9/2024 2327 by Hannah Gomez RN  Enhanced Safety Measures: patient/family teach back on injury risk  Taken 8/9/2024 2053 by Hannah Gomez RN  Communication Enhancement Strategies: call light answered in person  Enhanced Safety Measures: patient/family teach back on injury risk  Goal: Improved Attention and Thought Clarity  Outcome: Progressing  Goal: Improved Sleep  Outcome: Progressing  Intervention: Promote Sleep  Recent Flowsheet Documentation  Taken 8/9/2024 2053 by  Moturi, Hnanah, RN  Sleep/Rest Enhancement:   consistent schedule promoted   family presence promoted   natural light exposure provided   regular sleep/rest pattern promoted   relaxation techniques promoted     Problem: Stem Cell/Bone Marrow Transplant  Goal: Optimal Coping with Transplant  Outcome: Progressing  Intervention: Optimize Patient/Family Adjustment to Transplant  Recent Flowsheet Documentation  Taken 8/9/2024 2053 by Hannah Gomez RN  Supportive Measures: active listening utilized  Goal: Symptom-Free Urinary Elimination  Outcome: Progressing  Intervention: Prevent or Manage Bladder Irritation  Recent Flowsheet Documentation  Taken 8/9/2024 2053 by Hannah Gomez RN  Hyperhydration Management: fluids provided  Goal: Diarrhea Symptom Control  Outcome: Progressing  Intervention: Manage Diarrhea  Recent Flowsheet Documentation  Taken 8/9/2024 2053 by Hannah Gomez RN  Skin Protection: adhesive use limited  Fluid/Electrolyte Management: fluids provided  Goal: Improved Activity Tolerance  Outcome: Progressing  Intervention: Promote Improved Energy  Recent Flowsheet Documentation  Taken 8/9/2024 2053 by Hannah Gomez RN  Fatigue Management: paced activity encouraged  Sleep/Rest Enhancement:   consistent schedule promoted   family presence promoted   natural light exposure provided   regular sleep/rest pattern promoted   relaxation techniques promoted  Activity Management: activity adjusted per tolerance  Environmental Support: calm environment promoted  Goal: Blood Counts Within Acceptable Range  Outcome: Progressing  Intervention: Monitor and Manage Hematologic Symptoms  Recent Flowsheet Documentation  Taken 8/10/2024 0356 by Hannah Gomez RN  Bleeding Precautions:   blood pressure closely monitored   gentle oral care promoted   monitored for signs of bleeding  Medication Review/Management: medications reviewed  Taken 8/9/2024 2327 by Hannah Gomez RN  Bleeding Precautions:   blood pressure closely  monitored   gentle oral care promoted   monitored for signs of bleeding  Medication Review/Management: medications reviewed  Taken 8/9/2024 2053 by Hannah Gomez RN  Sleep/Rest Enhancement:   consistent schedule promoted   family presence promoted   natural light exposure provided   regular sleep/rest pattern promoted   relaxation techniques promoted  Bleeding Precautions:   blood pressure closely monitored   gentle oral care promoted   monitored for signs of bleeding  Medication Review/Management: medications reviewed  Goal: Absence of Hypersensitivity Reaction  Outcome: Progressing  Goal: Absence of Infection  Outcome: Progressing  Intervention: Prevent and Manage Infection  Recent Flowsheet Documentation  Taken 8/10/2024 0356 by Hannah Gomez RN  Infection Prevention:   single patient room provided   rest/sleep promoted   hand hygiene promoted   visitors restricted/screened  Infection Management: aseptic technique maintained  Isolation Precautions:   cytotoxic precautions maintained   protective environment maintained  Taken 8/9/2024 2327 by Hannah Gomez RN  Infection Prevention:   single patient room provided   rest/sleep promoted   hand hygiene promoted   visitors restricted/screened  Infection Management: aseptic technique maintained  Isolation Precautions:   cytotoxic precautions maintained   protective environment maintained  Taken 8/9/2024 2053 by Hannah Gomez RN  Infection Prevention:   single patient room provided   rest/sleep promoted   hand hygiene promoted   visitors restricted/screened  Infection Management: aseptic technique maintained  Isolation Precautions:   cytotoxic precautions maintained   protective environment maintained  Goal: Improved Oral Mucous Membrane Health and Integrity  Outcome: Progressing  Intervention: Promote Oral Comfort and Health  Recent Flowsheet Documentation  Taken 8/9/2024 2053 by Hannah Gomez RN  Oral Care: oral rinse provided  Goal: Nausea and Vomiting Symptom  Relief  Outcome: Progressing  Intervention: Prevent and Manage Nausea and Vomiting  Recent Flowsheet Documentation  Taken 8/9/2024 2053 by Hannah Gomez RN  Nausea/Vomiting Interventions: (denies) other (see comments)  Goal: Optimal Nutrition Intake  Outcome: Progressing  Intervention: Minimize and Manage Barriers to Oral Intake  Recent Flowsheet Documentation  Taken 8/9/2024 2053 by Hannah Gomez RN  Oral Nutrition Promotion:   rest periods promoted   physical activity promoted     Problem: Infection  Goal: Absence of Infection Signs and Symptoms  Outcome: Progressing  Intervention: Prevent or Manage Infection  Recent Flowsheet Documentation  Taken 8/10/2024 0356 by Hannah Gomez RN  Infection Management: aseptic technique maintained  Isolation Precautions:   cytotoxic precautions maintained   protective environment maintained  Taken 8/9/2024 2327 by Hannah Gomez RN  Infection Management: aseptic technique maintained  Isolation Precautions:   cytotoxic precautions maintained   protective environment maintained  Taken 8/9/2024 2053 by Hannah Gomez RN  Infection Management: aseptic technique maintained  Isolation Precautions:   cytotoxic precautions maintained   protective environment maintained     Problem: Comorbidity Management  Goal: Blood Glucose Levels Within Targeted Range  Outcome: Progressing  Intervention: Monitor and Manage Glycemia  Recent Flowsheet Documentation  Taken 8/10/2024 0356 by Hannah Gomez RN  Medication Review/Management: medications reviewed  Taken 8/9/2024 2327 by Hannah Gomez RN  Medication Review/Management: medications reviewed  Taken 8/9/2024 2053 by Hannah Gomez RN  Glycemic Management: blood glucose monitored  Medication Review/Management: medications reviewed     Problem: Obstructive Sleep Apnea Risk or Actual  Goal: Unobstructed Breathing During Sleep  Outcome: Progressing  Intervention: Monitor and Manage Obstructive Sleep Apnea  Recent Flowsheet Documentation  Taken  8/10/2024 0356 by Hannah Gomez, RN  Medication Review/Management: medications reviewed  Taken 8/9/2024 2327 by Hannah Gomez, RN  Medication Review/Management: medications reviewed  Taken 8/9/2024 2053 by Hannah Gomez, RN  Medication Review/Management: medications reviewed       Hannah BERT Gomez on 8/10/2024 at 6:57 AM

## 2024-08-10 NOTE — PROGRESS NOTES
Stop time on MAR & chart I & O  Chemo drug - Cytoxan  Tolerated - Patient tolerated without adverse symptoms.   Intervention - Patient up 1.8L for shift total. Prn shift total lasix given x1. Will continue to monitor patient's Intake and output.  Response - Patient had adequate void post lasix.   Plan - Patient will receive cytoxan dose again tomorrow and will continue with flush.  Lab: Na, K - Sodium and potassium check in normal range this evening.   Wt/intervention - Patient's weight up 1.7kg. No intervention needed. Will continue to monitor.     Cytoxan Primer  Cytoxan infused at 250 ml/hr over 2 hours.  [place] infusing at 45.4 ml/hr. Current bag ends at 12pm tomorrow.   Flush infusing at 154.6ml/hr and ends 24 hours post last cytoxan dose.

## 2024-08-10 NOTE — PROGRESS NOTES
"BMT Progress Note  Chief complaint:  Arpit Forrest is a 73 year old male, currently day 3  prep for ELIEZER MUD PBSCT for MDS.    INTERIM HISTORY:   Doing well. Walking the halls. No N/V/D. Eating.  REVIEW OF SYSTEMS: Otherwise unremarkable other than what is noted in the Interim History.   PHYSICAL EXAM:   Vitals:  /70 (BP Location: Right arm)   Pulse 71   Temp 99.7  F (37.6  C) (Oral)   Resp 18   Ht 1.825 m (5' 11.85\")   Wt 115.4 kg (254 lb 8 oz)   SpO2 98%   BMI 34.66 kg/m    Wt Readings from Last 4 Encounters:   08/09/24 115.4 kg (254 lb 8 oz)   07/30/24 117.5 kg (259 lb)   07/26/24 117 kg (258 lb)   07/23/24 115.3 kg (254 lb 1.6 oz)       General Appearance: NAD; face has maye appearance--stable per pt  CV: RRR no MRG  RESP: CTAB  EXT: Trace ankle edema, stable  SKIN: no lesions or rash.   NEURO: A&O x 3   PSYCH: Appropriate affect   VASCULAR ACCESS: dressing CDI.     ROUTINE LABS:    Lab Results   Component Value Date    WBC 0.3 (LL) 08/10/2024    ANEU 0.8 (L) 08/08/2024    HGB 10.1 (L) 08/10/2024    HCT 31.6 (L) 08/10/2024     (L) 08/10/2024     08/10/2024    POTASSIUM 3.8 08/10/2024    CHLORIDE 105 08/10/2024    CO2 25 08/10/2024     (H) 08/10/2024    BUN 16.3 08/10/2024    CR 0.89 08/10/2024    MAG 1.9 08/10/2024    INR 1.19 (H) 08/05/2024          ASSESSMENT AND PLAN:    Arpit Forrest is a 73 year old male, currently day 3 prep for ELIEZER MUD PBSCT for MDS.     BMT/IEC PROTOCOL for MDS  - Chemo protocol: [id number]  D-6: Flu 30mg/m2, Cy 50mg/kg  D-5 to D-2: Flu  D-1: TBI  D0: stem cell infusion  - Peripheral blood stem cell graft from 8/8 donor, ABO matched (Oneg), Cell dose: TBD  - continue allopurinol (hx of gout)  - Engraftment monitoring: Peripheral blood and bone marrow chimerisms on D28, D100, 6 months, 1 and 2 year  - Restaging plan: BMBx with NGS on D28, D100, 6 months, 1 and 2 years     HEME/COAG  - GCSF starts day +5, continue until ANC > 1500 for 3 consecutive days. 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      ID  Recent Hx: Multiple granulomas in the lung and bone marrow biopsy- fungitell, aspergillus galactomanan neg. 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     CARDIOVASCULAR  # Hx of A.fib with RVR   - A.fib with RVR while admitted with febrile episode on 3/15/24. Noted on his fitbit, not very symptomatic. Cardioversion planned for new onset A.fib. Not achieved with amiodarone, DCCV on 3/21/24 restored normal sinus rhythm. Amiodarone and anticoagulation was stopped in April 2024 without any recurrence.   - He could have a recurrence during transplant which would need to be addressed with rate control strategy   # Asymptomatic moderate aortic stenosis   # CAD  - Chest CT in February 2024 with mild to moderate CAC, three-vessel disease. No anginal symptoms   - Continue atorvastatin 10mg   - EKG Sinus rhythm Inferior infarct , age undetermined. Abnormal ECG (known cardiovascular disease). QTc =460  # HTN- Continue amlodipine 10mg, adding lisinopril 5mg 8/4 (PTA dose of lisinopril was 10mg)  - Risk of cardiomyopathy:  Baseline EF normal 60-65&  - Risk of arrhythmia: Baseline EKG showed NSR, Qtc 460     RESPIRATORY  # VINCENT : CPAP  - Baseline PFTs: normal   -  "Risk of respiratory complications: Frequent ambulation and incentive spirometer.     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 8/10 lasix per protocol Cytoxan flush  # Electrolyte management: replace per sliding scale  - cont Ca w/vit D  - hold eye vitamin and Coq10 peritransplant     DIABETES/ENDOCRINE  #DMII: hx steroid-induced hyperglycemia: Carb coverage and Novolog sliding scale. Endo initially consulted, signed off with stable sugars.   *holding PTA metformin on admission.      MUSCULOSKELETAL/FRAILTY  # Gout: Continue allopurinol indefinitely   - Baseline Frailty Score: Not frail   - Daily PT/OT as needed while inpatient     SOCIAL DETERMINANTS  - Caregiver: Wife, Meghna, children     Medically Ready for Discharge: Anticipated in 5+ Days  Clinically Significant Risk Factors                # Thrombocytopenia: Lowest platelets = 104 in last 2 days, will monitor for bleeding   # Hypertension: Noted on problem list            # Obesity: Estimated body mass index is 34.66 kg/m  as calculated from the following:    Height as of this encounter: 1.825 m (5' 11.85\").    Weight as of this encounter: 115.4 kg (254 lb 8 oz).           I spent 30 minutes in the care of this patient today, which included time necessary for preparation for the visit, obtaining history, ordering medications/tests/procedures as medically indicated, review of pertinent medical literature, counseling of the patient, communication of recommendations to the care team, and documentation time.    Destinee Sanches PA-C on 8/4/2024 at 9:40 AM  , [facility]    Physician Attestation  Arpittarun Forrest is a 73 year old male with high risk MDS who has been admitted for ELIEZER allogenic transplant with 8/8 donor, ABO matched, GVHD ppx with PTCy, siro, MMF.      BMT: D3 . Receiving fluids and diuresis with cytoxan " flush.   Continues to report increased frequency of urination, which is further increased today as expected with fluid and lasix. Will start flomax 0.4mg in case there is a component of BPH related irritative symptoms. Watch for orthostatic symptoms.      I saw and evaluated Arpit Forrest as part of a shared APRN/PA visit. On the date of service, I spent 45 minutes on the patient unit personally reviewing medical records and medications, reviewing vital signs, labs, and imaging results as summarized above, discussing the patient's case on rounds with the VINNY, obtaining a history from the patient, performing a physical exam, intensively monitoring treatments with high risk of toxicity, coordinating care, and documenting in the electronic medical record.     Nena Fowler  Department of Hematology, Oncology and Transplantation  Pager 1300/Text via [employer]

## 2024-08-10 NOTE — PLAN OF CARE
"BP (!) 148/67 (BP Location: Left arm)   Pulse 72   Temp 98.2  F (36.8  C) (Oral)   Resp 18   Ht 1.825 m (5' 11.85\")   Wt 117.1 kg (258 lb 2.5 oz)   SpO2 99%   BMI 35.16 kg/m      VSS on room air. Alert and oriented x4. Patient received cytoxan today and has NS running at 154.5ml/hr and mesna running at 45.4ml/hr for a total of 200ml/hr for his flush. Independent. Orthos negative in AM. Patient denies pain or nausea. Continue with plan of care.     Goal Outcome Evaluation:      Plan of Care Reviewed With: patient    Overall Patient Progress: improving    Problem: Adult Inpatient Plan of Care  Goal: Plan of Care Review  Description: The Plan of Care Review/Shift note should be completed every shift.  The Outcome Evaluation is a brief statement about your assessment that the patient is improving, declining, or no change.  This information will be displayed automatically on your shift  note.  Outcome: Progressing  Flowsheets (Taken 8/10/2024 1448)  Plan of Care Reviewed With: patient  Overall Patient Progress: improving  Goal: Patient-Specific Goal (Individualized)  Description: You can add care plan individualizations to a care plan. Examples of Individualization might be:  \"Parent requests to be called daily at 9am for status\", \"I have a hard time hearing out of my right ear\", or \"Do not touch me to wake me up as it startles  me\".  Outcome: Progressing  Goal: Absence of Hospital-Acquired Illness or Injury  Outcome: Progressing  Intervention: Identify and Manage Fall Risk  Recent Flowsheet Documentation  Taken 8/10/2024 1500 by Tram Ramirez, RN  Safety Promotion/Fall Prevention:   safety round/check completed   room organization consistent   patient and family education   nonskid shoes/slippers when out of bed   clutter free environment maintained   chemotherapeutic precautions   assistive device/personal items within reach   lighting adjusted  Taken 8/10/2024 1230 by Tram Ramirez, RN  Safety " Promotion/Fall Prevention:   safety round/check completed   room organization consistent   patient and family education   nonskid shoes/slippers when out of bed   clutter free environment maintained   chemotherapeutic precautions   assistive device/personal items within reach   lighting adjusted  Taken 8/10/2024 0830 by Tram Ramirez RN  Safety Promotion/Fall Prevention:   safety round/check completed   room organization consistent   patient and family education   nonskid shoes/slippers when out of bed   clutter free environment maintained   chemotherapeutic precautions   assistive device/personal items within reach   lighting adjusted  Intervention: Prevent Skin Injury  Recent Flowsheet Documentation  Taken 8/10/2024 0830 by Tram Ramirez RN  Body Position: position changed independently  Skin Protection: adhesive use limited  Device Skin Pressure Protection: adhesive use limited  Intervention: Prevent and Manage VTE (Venous Thromboembolism) Risk  Recent Flowsheet Documentation  Taken 8/10/2024 0830 by Tram Ramirez RN  VTE Prevention/Management: (ambulation promoted) other (see comments)  Intervention: Prevent Infection  Recent Flowsheet Documentation  Taken 8/10/2024 1500 by Tram Ramirez RN  Infection Prevention:   single patient room provided   rest/sleep promoted   personal protective equipment utilized   hand hygiene promoted   equipment surfaces disinfected   environmental surveillance performed  Taken 8/10/2024 1230 by Tram Ramirez RN  Infection Prevention:   single patient room provided   rest/sleep promoted   personal protective equipment utilized   hand hygiene promoted   equipment surfaces disinfected   environmental surveillance performed  Taken 8/10/2024 0830 by Tram Ramirez RN  Infection Prevention:   single patient room provided   rest/sleep promoted   personal protective equipment utilized   hand hygiene promoted   equipment surfaces disinfected    environmental surveillance performed  Goal: Optimal Comfort and Wellbeing  Outcome: Progressing  Goal: Readiness for Transition of Care  Outcome: Progressing     Problem: Risk for Delirium  Goal: Optimal Coping  Outcome: Progressing  Intervention: Optimize Psychosocial Adjustment to Delirium  Recent Flowsheet Documentation  Taken 8/10/2024 0830 by Tram Ramirez RN  Supportive Measures: active listening utilized  Goal: Improved Behavioral Control  Outcome: Progressing  Intervention: Prevent and Manage Agitation  Recent Flowsheet Documentation  Taken 8/10/2024 0830 by Tram Ramirez RN  Environment Familiarity/Consistency: daily routine followed  Intervention: Minimize Safety Risk  Recent Flowsheet Documentation  Taken 8/10/2024 1500 by Tram Ramirez RN  Enhanced Safety Measures: patient/family teach back on injury risk  Taken 8/10/2024 1230 by Tram Ramirez RN  Enhanced Safety Measures: patient/family teach back on injury risk  Taken 8/10/2024 0830 by Tram Ramirez RN  Communication Enhancement Strategies:   call light answered in person   family involved in communication plan  Enhanced Safety Measures: patient/family teach back on injury risk  Goal: Improved Attention and Thought Clarity  Outcome: Progressing  Intervention: Maximize Cognitive Function  Recent Flowsheet Documentation  Taken 8/10/2024 0830 by Tram Ramirez RN  Sensory Stimulation Regulation: auditory stimulation minimized  Reorientation Measures:   calendar in view   clock in view   familiar social contact encouraged  Goal: Improved Sleep  Outcome: Progressing  Intervention: Promote Sleep  Recent Flowsheet Documentation  Taken 8/10/2024 0830 by Tram Ramirez RN  Sleep/Rest Enhancement:   consistent schedule promoted   family presence promoted   natural light exposure provided   regular sleep/rest pattern promoted   relaxation techniques promoted     Problem: Stem Cell/Bone Marrow Transplant  Goal:  Optimal Coping with Transplant  Outcome: Progressing  Intervention: Optimize Patient/Family Adjustment to Transplant  Recent Flowsheet Documentation  Taken 8/10/2024 0830 by Tram Ramirez RN  Supportive Measures: active listening utilized  Goal: Symptom-Free Urinary Elimination  Outcome: Progressing  Intervention: Prevent or Manage Bladder Irritation  Recent Flowsheet Documentation  Taken 8/10/2024 0830 by Tram Ramirez RN  Urinary Elimination Promotion: frequent voiding encouraged  Hyperhydration Management: fluids provided  Goal: Diarrhea Symptom Control  Outcome: Progressing  Intervention: Manage Diarrhea  Recent Flowsheet Documentation  Taken 8/10/2024 0830 by Tram Ramirez RN  Skin Protection: adhesive use limited  Fluid/Electrolyte Management: fluids provided  Goal: Improved Activity Tolerance  Outcome: Progressing  Intervention: Promote Improved Energy  Recent Flowsheet Documentation  Taken 8/10/2024 0830 by Tram Ramirez RN  Fatigue Management: paced activity encouraged  Sleep/Rest Enhancement:   consistent schedule promoted   family presence promoted   natural light exposure provided   regular sleep/rest pattern promoted   relaxation techniques promoted  Activity Management: activity adjusted per tolerance  Environmental Support: calm environment promoted  Goal: Blood Counts Within Acceptable Range  Outcome: Progressing  Intervention: Monitor and Manage Hematologic Symptoms  Recent Flowsheet Documentation  Taken 8/10/2024 1500 by Tram Ramirez RN  Bleeding Precautions:   blood pressure closely monitored   gentle oral care promoted   monitored for signs of bleeding  Medication Review/Management: medications reviewed  Taken 8/10/2024 1230 by Tram Ramirez RN  Bleeding Precautions:   blood pressure closely monitored   gentle oral care promoted   monitored for signs of bleeding  Medication Review/Management: medications reviewed  Taken 8/10/2024 0830 by Demarco Dan  Tram, RN  Sleep/Rest Enhancement:   consistent schedule promoted   family presence promoted   natural light exposure provided   regular sleep/rest pattern promoted   relaxation techniques promoted  Bleeding Precautions:   blood pressure closely monitored   gentle oral care promoted   monitored for signs of bleeding  Medication Review/Management: medications reviewed  Goal: Absence of Hypersensitivity Reaction  Outcome: Progressing  Goal: Absence of Infection  Outcome: Progressing  Intervention: Prevent and Manage Infection  Recent Flowsheet Documentation  Taken 8/10/2024 1500 by Tram Ramirez RN  Infection Prevention:   single patient room provided   rest/sleep promoted   personal protective equipment utilized   hand hygiene promoted   equipment surfaces disinfected   environmental surveillance performed  Infection Management: aseptic technique maintained  Isolation Precautions:   cytotoxic precautions maintained   protective environment maintained  Taken 8/10/2024 1230 by Tram Ramirez RN  Infection Prevention:   single patient room provided   rest/sleep promoted   personal protective equipment utilized   hand hygiene promoted   equipment surfaces disinfected   environmental surveillance performed  Infection Management: aseptic technique maintained  Isolation Precautions:   cytotoxic precautions maintained   protective environment maintained  Taken 8/10/2024 0830 by Tram Ramirez RN  Infection Prevention:   single patient room provided   rest/sleep promoted   personal protective equipment utilized   hand hygiene promoted   equipment surfaces disinfected   environmental surveillance performed  Infection Management: aseptic technique maintained  Isolation Precautions:   cytotoxic precautions maintained   protective environment maintained  Goal: Improved Oral Mucous Membrane Health and Integrity  Outcome: Progressing  Intervention: Promote Oral Comfort and Health  Recent Flowsheet  Documentation  Taken 8/10/2024 0830 by Tram Ramirez RN  Oral Mucous Membrane Protection:   nonirritating oral fluids promoted   nonirritating oral foods promoted  Goal: Nausea and Vomiting Symptom Relief  Outcome: Progressing  Goal: Optimal Nutrition Intake  Outcome: Progressing  Intervention: Minimize and Manage Barriers to Oral Intake  Recent Flowsheet Documentation  Taken 8/10/2024 0830 by Tram Ramirez RN  Oral Nutrition Promotion:   rest periods promoted   physical activity promoted     Problem: Infection  Goal: Absence of Infection Signs and Symptoms  Outcome: Progressing  Intervention: Prevent or Manage Infection  Recent Flowsheet Documentation  Taken 8/10/2024 1500 by Tram Ramirez RN  Infection Management: aseptic technique maintained  Isolation Precautions:   cytotoxic precautions maintained   protective environment maintained  Taken 8/10/2024 1230 by Tram Ramirez RN  Infection Management: aseptic technique maintained  Isolation Precautions:   cytotoxic precautions maintained   protective environment maintained  Taken 8/10/2024 0830 by Tram Ramirez RN  Infection Management: aseptic technique maintained  Isolation Precautions:   cytotoxic precautions maintained   protective environment maintained     Problem: Comorbidity Management  Goal: Blood Glucose Levels Within Targeted Range  Outcome: Progressing  Intervention: Monitor and Manage Glycemia  Recent Flowsheet Documentation  Taken 8/10/2024 1500 by Tram Ramirez RN  Medication Review/Management: medications reviewed  Taken 8/10/2024 1230 by Tram Ramirez RN  Medication Review/Management: medications reviewed  Taken 8/10/2024 0830 by Tram Ramirez RN  Medication Review/Management: medications reviewed     Problem: Obstructive Sleep Apnea Risk or Actual  Goal: Unobstructed Breathing During Sleep  Outcome: Progressing  Intervention: Monitor and Manage Obstructive Sleep Apnea  Recent Flowsheet  Documentation  Taken 8/10/2024 1500 by Tram Ramirez, RN  Medication Review/Management: medications reviewed  Taken 8/10/2024 1230 by Tram Ramirez, RN  Medication Review/Management: medications reviewed  Taken 8/10/2024 0830 by Tram Ramirez, RN  Medication Review/Management: medications reviewed

## 2024-08-11 LAB
ACANTHOCYTES BLD QL SMEAR: NORMAL
ANION GAP SERPL CALCULATED.3IONS-SCNC: 10 MMOL/L (ref 7–15)
AUER BODIES BLD QL SMEAR: NORMAL
BASO STIPL BLD QL SMEAR: NORMAL
BASOPHILS # BLD AUTO: ABNORMAL 10*3/UL
BASOPHILS NFR BLD AUTO: ABNORMAL %
BITE CELLS BLD QL SMEAR: NORMAL
BLISTER CELLS BLD QL SMEAR: NORMAL
BUN SERPL-MCNC: 14.4 MG/DL (ref 8–23)
BURR CELLS BLD QL SMEAR: NORMAL
C DIFF TOX B STL QL: NEGATIVE
CALCIUM SERPL-MCNC: 8.2 MG/DL (ref 8.8–10.4)
CHLORIDE SERPL-SCNC: 105 MMOL/L (ref 98–107)
CREAT SERPL-MCNC: 0.93 MG/DL (ref 0.67–1.17)
DACRYOCYTES BLD QL SMEAR: NORMAL
EGFRCR SERPLBLD CKD-EPI 2021: 86 ML/MIN/1.73M2
ELLIPTOCYTES BLD QL SMEAR: NORMAL
EOSINOPHIL # BLD AUTO: ABNORMAL 10*3/UL
EOSINOPHIL NFR BLD AUTO: ABNORMAL %
ERYTHROCYTE [DISTWIDTH] IN BLOOD BY AUTOMATED COUNT: 15.9 % (ref 10–15)
FRAGMENTS BLD QL SMEAR: NORMAL
GLUCOSE BLDC GLUCOMTR-MCNC: 115 MG/DL (ref 70–99)
GLUCOSE BLDC GLUCOMTR-MCNC: 132 MG/DL (ref 70–99)
GLUCOSE BLDC GLUCOMTR-MCNC: 135 MG/DL (ref 70–99)
GLUCOSE BLDC GLUCOMTR-MCNC: 150 MG/DL (ref 70–99)
GLUCOSE SERPL-MCNC: 141 MG/DL (ref 70–99)
HCO3 SERPL-SCNC: 22 MMOL/L (ref 22–29)
HCT VFR BLD AUTO: 29 % (ref 40–53)
HGB BLD-MCNC: 9.3 G/DL (ref 13.3–17.7)
HGB C CRYSTALS: NORMAL
HOWELL-JOLLY BOD BLD QL SMEAR: NORMAL
IMM GRANULOCYTES # BLD: ABNORMAL 10*3/UL
IMM GRANULOCYTES NFR BLD: ABNORMAL %
LACTATE SERPL-SCNC: 1.1 MMOL/L (ref 0.7–2)
LYMPHOCYTES # BLD AUTO: ABNORMAL 10*3/UL
LYMPHOCYTES NFR BLD AUTO: ABNORMAL %
MAGNESIUM SERPL-MCNC: 1.6 MG/DL (ref 1.7–2.3)
MCH RBC QN AUTO: 30.2 PG (ref 26.5–33)
MCHC RBC AUTO-ENTMCNC: 32.1 G/DL (ref 31.5–36.5)
MCV RBC AUTO: 94 FL (ref 78–100)
MONOCYTES # BLD AUTO: ABNORMAL 10*3/UL
MONOCYTES NFR BLD AUTO: ABNORMAL %
NEUTROPHILS # BLD AUTO: ABNORMAL 10*3/UL
NEUTROPHILS NFR BLD AUTO: ABNORMAL %
NEUTS HYPERSEG BLD QL SMEAR: NORMAL
PHOSPHATE SERPL-MCNC: 2.9 MG/DL (ref 2.5–4.5)
PLAT MORPH BLD: NORMAL
PLATELET # BLD AUTO: 70 10E3/UL (ref 150–450)
POLYCHROMASIA BLD QL SMEAR: NORMAL
POTASSIUM SERPL-SCNC: 3.5 MMOL/L (ref 3.4–5.3)
RBC # BLD AUTO: 3.08 10E6/UL (ref 4.4–5.9)
RBC AGGLUT BLD QL: NORMAL
RBC MORPH BLD: NORMAL
ROULEAUX BLD QL SMEAR: NORMAL
SICKLE CELLS BLD QL SMEAR: NORMAL
SMUDGE CELLS BLD QL SMEAR: NORMAL
SODIUM SERPL-SCNC: 136 MMOL/L (ref 135–145)
SODIUM SERPL-SCNC: 137 MMOL/L (ref 135–145)
SODIUM SERPL-SCNC: 137 MMOL/L (ref 135–145)
SPHEROCYTES BLD QL SMEAR: NORMAL
STOMATOCYTES BLD QL SMEAR: NORMAL
TARGETS BLD QL SMEAR: NORMAL
TOXIC GRANULES BLD QL SMEAR: NORMAL
VARIANT LYMPHS BLD QL SMEAR: NORMAL
WBC # BLD AUTO: 0.2 10E3/UL (ref 4–11)

## 2024-08-11 PROCEDURE — 250N000013 HC RX MED GY IP 250 OP 250 PS 637: Performed by: STUDENT IN AN ORGANIZED HEALTH CARE EDUCATION/TRAINING PROGRAM

## 2024-08-11 PROCEDURE — 250N000011 HC RX IP 250 OP 636: Performed by: STUDENT IN AN ORGANIZED HEALTH CARE EDUCATION/TRAINING PROGRAM

## 2024-08-11 PROCEDURE — 84295 ASSAY OF SERUM SODIUM: CPT | Performed by: STUDENT IN AN ORGANIZED HEALTH CARE EDUCATION/TRAINING PROGRAM

## 2024-08-11 PROCEDURE — 84100 ASSAY OF PHOSPHORUS: CPT | Performed by: STUDENT IN AN ORGANIZED HEALTH CARE EDUCATION/TRAINING PROGRAM

## 2024-08-11 PROCEDURE — 99233 SBSQ HOSP IP/OBS HIGH 50: CPT | Mod: FS

## 2024-08-11 PROCEDURE — 83735 ASSAY OF MAGNESIUM: CPT | Performed by: STUDENT IN AN ORGANIZED HEALTH CARE EDUCATION/TRAINING PROGRAM

## 2024-08-11 PROCEDURE — 250N000011 HC RX IP 250 OP 636: Performed by: INTERNAL MEDICINE

## 2024-08-11 PROCEDURE — 258N000003 HC RX IP 258 OP 636: Performed by: PHYSICIAN ASSISTANT

## 2024-08-11 PROCEDURE — 83605 ASSAY OF LACTIC ACID: CPT | Performed by: STUDENT IN AN ORGANIZED HEALTH CARE EDUCATION/TRAINING PROGRAM

## 2024-08-11 PROCEDURE — 87493 C DIFF AMPLIFIED PROBE: CPT | Performed by: HOSPITALIST

## 2024-08-11 PROCEDURE — 258N000003 HC RX IP 258 OP 636

## 2024-08-11 PROCEDURE — 85027 COMPLETE CBC AUTOMATED: CPT | Performed by: STUDENT IN AN ORGANIZED HEALTH CARE EDUCATION/TRAINING PROGRAM

## 2024-08-11 PROCEDURE — 250N000013 HC RX MED GY IP 250 OP 250 PS 637: Performed by: PHYSICIAN ASSISTANT

## 2024-08-11 PROCEDURE — 250N000011 HC RX IP 250 OP 636: Performed by: PHYSICIAN ASSISTANT

## 2024-08-11 PROCEDURE — 250N000011 HC RX IP 250 OP 636

## 2024-08-11 PROCEDURE — 206N000001 HC R&B BMT UMMC

## 2024-08-11 PROCEDURE — 99418 PROLNG IP/OBS E/M EA 15 MIN: CPT

## 2024-08-11 PROCEDURE — 258N000003 HC RX IP 258 OP 636: Performed by: INTERNAL MEDICINE

## 2024-08-11 PROCEDURE — 250N000013 HC RX MED GY IP 250 OP 250 PS 637

## 2024-08-11 PROCEDURE — 84132 ASSAY OF SERUM POTASSIUM: CPT | Performed by: STUDENT IN AN ORGANIZED HEALTH CARE EDUCATION/TRAINING PROGRAM

## 2024-08-11 RX ORDER — LOPERAMIDE HCL 2 MG
2 CAPSULE ORAL 4 TIMES DAILY PRN
Status: DISCONTINUED | OUTPATIENT
Start: 2024-08-11 | End: 2024-08-11

## 2024-08-11 RX ORDER — FUROSEMIDE 10 MG/ML
20 INJECTION INTRAMUSCULAR; INTRAVENOUS ONCE
Status: COMPLETED | OUTPATIENT
Start: 2024-08-11 | End: 2024-08-11

## 2024-08-11 RX ORDER — LOPERAMIDE HCL 2 MG
4 CAPSULE ORAL 2 TIMES DAILY
Status: DISCONTINUED | OUTPATIENT
Start: 2024-08-11 | End: 2024-08-11

## 2024-08-11 RX ORDER — LOPERAMIDE HCL 2 MG
4 CAPSULE ORAL 4 TIMES DAILY PRN
Status: DISCONTINUED | OUTPATIENT
Start: 2024-08-11 | End: 2024-08-25 | Stop reason: HOSPADM

## 2024-08-11 RX ADMIN — FUROSEMIDE 20 MG: 10 INJECTION, SOLUTION INTRAMUSCULAR; INTRAVENOUS at 07:37

## 2024-08-11 RX ADMIN — LATANOPROST 2 DROP: 50 SOLUTION OPHTHALMIC at 21:57

## 2024-08-11 RX ADMIN — ONDANSETRON HYDROCHLORIDE 8 MG: 8 TABLET, FILM COATED ORAL at 04:30

## 2024-08-11 RX ADMIN — ONDANSETRON HYDROCHLORIDE 8 MG: 8 TABLET, FILM COATED ORAL at 13:05

## 2024-08-11 RX ADMIN — CEFEPIME HYDROCHLORIDE 2 G: 2 INJECTION, POWDER, FOR SOLUTION INTRAVENOUS at 13:04

## 2024-08-11 RX ADMIN — Medication 3 CAPSULE: at 08:02

## 2024-08-11 RX ADMIN — ATORVASTATIN CALCIUM 10 MG: 10 TABLET, FILM COATED ORAL at 20:06

## 2024-08-11 RX ADMIN — ACETAMINOPHEN 650 MG: 325 TABLET, FILM COATED ORAL at 17:46

## 2024-08-11 RX ADMIN — ONDANSETRON HYDROCHLORIDE 8 MG: 8 TABLET, FILM COATED ORAL at 21:13

## 2024-08-11 RX ADMIN — SODIUM CHLORIDE: 9 INJECTION, SOLUTION INTRAVENOUS at 07:45

## 2024-08-11 RX ADMIN — URSODIOL 300 MG: 300 CAPSULE ORAL at 14:18

## 2024-08-11 RX ADMIN — URSODIOL 300 MG: 300 CAPSULE ORAL at 08:02

## 2024-08-11 RX ADMIN — LISINOPRIL 5 MG: 5 TABLET ORAL at 08:02

## 2024-08-11 RX ADMIN — PANTOPRAZOLE SODIUM 40 MG: 40 TABLET, DELAYED RELEASE ORAL at 08:02

## 2024-08-11 RX ADMIN — ACYCLOVIR 800 MG: 800 TABLET ORAL at 08:14

## 2024-08-11 RX ADMIN — URSODIOL 300 MG: 300 CAPSULE ORAL at 20:06

## 2024-08-11 RX ADMIN — MICAFUNGIN SODIUM 100 MG: 50 INJECTION, POWDER, LYOPHILIZED, FOR SOLUTION INTRAVENOUS at 08:02

## 2024-08-11 RX ADMIN — MESNA 3870 MG: 100 INJECTION, SOLUTION INTRAVENOUS at 10:37

## 2024-08-11 RX ADMIN — CYCLOPHOSPHAMIDE 3865 MG: 2 INJECTION, POWDER, FOR SOLUTION INTRAVENOUS; ORAL at 14:11

## 2024-08-11 RX ADMIN — FUROSEMIDE 20 MG: 10 INJECTION, SOLUTION INTRAMUSCULAR; INTRAVENOUS at 13:13

## 2024-08-11 RX ADMIN — CEFEPIME HYDROCHLORIDE 2 G: 2 INJECTION, POWDER, FOR SOLUTION INTRAVENOUS at 21:13

## 2024-08-11 RX ADMIN — BRIMONIDINE TARTRATE 1 DROP: 2 SOLUTION/ DROPS OPHTHALMIC at 20:23

## 2024-08-11 RX ADMIN — FLUTICASONE PROPIONATE 1 SPRAY: 50 SPRAY, METERED NASAL at 08:28

## 2024-08-11 RX ADMIN — ALLOPURINOL 300 MG: 300 TABLET ORAL at 08:02

## 2024-08-11 RX ADMIN — FUROSEMIDE 10 MG: 10 INJECTION, SOLUTION INTRAMUSCULAR; INTRAVENOUS at 20:25

## 2024-08-11 RX ADMIN — CEFEPIME HYDROCHLORIDE 2 G: 2 INJECTION, POWDER, FOR SOLUTION INTRAVENOUS at 04:30

## 2024-08-11 RX ADMIN — TAMSULOSIN HYDROCHLORIDE 0.4 MG: 0.4 CAPSULE ORAL at 08:02

## 2024-08-11 RX ADMIN — CALCIUM CARBONATE 600 MG (1,500 MG)-VITAMIN D3 400 UNIT TABLET 1 TABLET: at 08:02

## 2024-08-11 RX ADMIN — BRIMONIDINE TARTRATE 1 DROP: 2 SOLUTION/ DROPS OPHTHALMIC at 08:28

## 2024-08-11 RX ADMIN — ACYCLOVIR 800 MG: 800 TABLET ORAL at 20:06

## 2024-08-11 RX ADMIN — AMLODIPINE BESYLATE 10 MG: 10 TABLET ORAL at 08:02

## 2024-08-11 RX ADMIN — FLUTICASONE PROPIONATE 1 SPRAY: 50 SPRAY, METERED NASAL at 20:23

## 2024-08-11 ASSESSMENT — ACTIVITIES OF DAILY LIVING (ADL)
ADLS_ACUITY_SCORE: 20
ADLS_ACUITY_SCORE: 20
ADLS_ACUITY_SCORE: 23
ADLS_ACUITY_SCORE: 20
ADLS_ACUITY_SCORE: 23
ADLS_ACUITY_SCORE: 20
ADLS_ACUITY_SCORE: 23
ADLS_ACUITY_SCORE: 23
ADLS_ACUITY_SCORE: 20
ADLS_ACUITY_SCORE: 23
ADLS_ACUITY_SCORE: 20
ADLS_ACUITY_SCORE: 20

## 2024-08-11 NOTE — PLAN OF CARE
"  0705- Pt requiring 20mg of lasix, pt requesting to wait until after breakfast. On coming nurse will be notified.               Goal Outcome Evaluation:  Pt had a 100.9 F Fever at 1932. Per protocol. Blood cultures, urine culture, lactic and X ray were released and done. Tylenol and cefepime were administered. Provider was notified, cefepime ordered q8h. Pt reported some chills but otherwise VSS on room air.Orthos negative.  Pt's met voiding protocol parameter but needed 20 mg lasix for shift total I&O of 950 ml Pt reported fatigue and loss of balance, bed alarm turned on and pt assisted x1 SBA with mobility. Pt had loose BM x2 and soft mushy x1, Dr harrison, c-diff ordered and sent to lab. Pt's AM labs were stable and did not need electrolyte replacement. Cytotaxin flush running with mesna for a total of 200ml/hr. Pt had poor sleep, no other issues reported. Continue with care plan.   Hannah Gomez, RN on 8/11/2024 at 6:38 AM    Problem: Adult Inpatient Plan of Care  Goal: Plan of Care Review  Description: The Plan of Care Review/Shift note should be completed every shift.  The Outcome Evaluation is a brief statement about your assessment that the patient is improving, declining, or no change.  This information will be displayed automatically on your shift  note.  Outcome: Progressing  Goal: Patient-Specific Goal (Individualized)  Description: You can add care plan individualizations to a care plan. Examples of Individualization might be:  \"Parent requests to be called daily at 9am for status\", \"I have a hard time hearing out of my right ear\", or \"Do not touch me to wake me up as it startles  me\".  Outcome: Progressing  Goal: Absence of Hospital-Acquired Illness or Injury  Outcome: Progressing  Intervention: Identify and Manage Fall Risk  Recent Flowsheet Documentation  Taken 8/11/2024 043 by Hannah Gomez, RN  Safety Promotion/Fall Prevention:   safety round/check completed   nonskid shoes/slippers when out of bed   " chemotherapeutic precautions   lighting adjusted  Taken 8/11/2024 0252 by Hannah Gomez RN  Safety Promotion/Fall Prevention:   safety round/check completed   nonskid shoes/slippers when out of bed   chemotherapeutic precautions   lighting adjusted  Taken 8/11/2024 0200 by Hannah Gomez RN  Safety Promotion/Fall Prevention:   safety round/check completed   nonskid shoes/slippers when out of bed   chemotherapeutic precautions   lighting adjusted  Taken 8/10/2024 2345 by Hannah Gomez RN  Safety Promotion/Fall Prevention:   safety round/check completed   nonskid shoes/slippers when out of bed   chemotherapeutic precautions   lighting adjusted  Taken 8/10/2024 2115 by Hannah Gomez RN  Safety Promotion/Fall Prevention: safety round/check completed  Taken 8/10/2024 1940 by Hannah Gomez RN  Safety Promotion/Fall Prevention:   safety round/check completed   nonskid shoes/slippers when out of bed   chemotherapeutic precautions   lighting adjusted  Intervention: Prevent Skin Injury  Recent Flowsheet Documentation  Taken 8/10/2024 1940 by Hannah Goemz RN  Body Position: position changed independently  Device Skin Pressure Protection: adhesive use limited  Intervention: Prevent Infection  Recent Flowsheet Documentation  Taken 8/11/2024 0438 by Hannah Gomez RN  Infection Prevention:   single patient room provided   rest/sleep promoted   personal protective equipment utilized   hand hygiene promoted   equipment surfaces disinfected   environmental surveillance performed  Taken 8/10/2024 2345 by Hannah Gomez RN  Infection Prevention:   single patient room provided   rest/sleep promoted   personal protective equipment utilized   hand hygiene promoted   equipment surfaces disinfected   environmental surveillance performed  Taken 8/10/2024 1940 by Hannah Gomez RN  Infection Prevention:   single patient room provided   rest/sleep promoted   personal protective equipment utilized   hand hygiene promoted   equipment  surfaces disinfected   environmental surveillance performed  Goal: Optimal Comfort and Wellbeing  Outcome: Progressing  Goal: Readiness for Transition of Care  Outcome: Progressing     Problem: Risk for Delirium  Goal: Optimal Coping  Outcome: Progressing  Intervention: Optimize Psychosocial Adjustment to Delirium  Recent Flowsheet Documentation  Taken 8/10/2024 1940 by Hannah Gomez RN  Supportive Measures: active listening utilized  Goal: Improved Behavioral Control  Outcome: Progressing  Intervention: Prevent and Manage Agitation  Recent Flowsheet Documentation  Taken 8/10/2024 1940 by Hannah Gomez RN  Environment Familiarity/Consistency: daily routine followed  Intervention: Minimize Safety Risk  Recent Flowsheet Documentation  Taken 8/11/2024 0438 by Hannah Gomez RN  Enhanced Safety Measures: patient/family teach back on injury risk  Taken 8/10/2024 2345 by Hannah Gomez RN  Enhanced Safety Measures: patient/family teach back on injury risk  Taken 8/10/2024 1940 by Hannah Gomez RN  Communication Enhancement Strategies:   call light answered in person   family involved in communication plan  Enhanced Safety Measures: patient/family teach back on injury risk  Goal: Improved Attention and Thought Clarity  Outcome: Progressing  Goal: Improved Sleep  Outcome: Progressing  Intervention: Promote Sleep  Recent Flowsheet Documentation  Taken 8/10/2024 1940 by Hannah Gomez RN  Sleep/Rest Enhancement:   awakenings minimized   noise level reduced   room darkened   consistent schedule promoted     Problem: Stem Cell/Bone Marrow Transplant  Goal: Optimal Coping with Transplant  Outcome: Progressing  Intervention: Optimize Patient/Family Adjustment to Transplant  Recent Flowsheet Documentation  Taken 8/10/2024 1940 by Hannah Gomez RN  Supportive Measures: active listening utilized  Goal: Symptom-Free Urinary Elimination  Outcome: Progressing  Intervention: Prevent or Manage Bladder Irritation  Recent Flowsheet  Documentation  Taken 8/10/2024 1940 by Hannah Gomez RN  Hyperhydration Management:   fluids adjusted   fluids provided  Goal: Diarrhea Symptom Control  Outcome: Progressing  Goal: Improved Activity Tolerance  Outcome: Progressing  Intervention: Promote Improved Energy  Recent Flowsheet Documentation  Taken 8/10/2024 1940 by Hannah Goemz RN  Sleep/Rest Enhancement:   awakenings minimized   noise level reduced   room darkened   consistent schedule promoted  Activity Management: activity adjusted per tolerance  Environmental Support: calm environment promoted  Goal: Blood Counts Within Acceptable Range  Outcome: Progressing  Intervention: Monitor and Manage Hematologic Symptoms  Recent Flowsheet Documentation  Taken 8/11/2024 0438 by Hannah Gomez RN  Medication Review/Management: medications reviewed  Taken 8/10/2024 2345 by Hannah Gomez RN  Bleeding Precautions:   blood pressure closely monitored   gentle oral care promoted   monitored for signs of bleeding  Medication Review/Management: medications reviewed  Taken 8/10/2024 1940 by Hannah Gomez RN  Sleep/Rest Enhancement:   awakenings minimized   noise level reduced   room darkened   consistent schedule promoted  Bleeding Precautions:   blood pressure closely monitored   gentle oral care promoted   monitored for signs of bleeding  Medication Review/Management: medications reviewed  Goal: Absence of Hypersensitivity Reaction  Outcome: Progressing  Goal: Absence of Infection  Outcome: Progressing  Intervention: Prevent and Manage Infection  Recent Flowsheet Documentation  Taken 8/11/2024 0438 by Hannah Gomez RN  Infection Prevention:   single patient room provided   rest/sleep promoted   personal protective equipment utilized   hand hygiene promoted   equipment surfaces disinfected   environmental surveillance performed  Isolation Precautions:   cytotoxic precautions maintained   protective environment maintained  Taken 8/10/2024 2345 by Hannah Gomez  RN  Infection Prevention:   single patient room provided   rest/sleep promoted   personal protective equipment utilized   hand hygiene promoted   equipment surfaces disinfected   environmental surveillance performed  Infection Management: aseptic technique maintained  Isolation Precautions:   cytotoxic precautions maintained   protective environment maintained  Taken 8/10/2024 2115 by Hannah Gomez RN  Fever Reduction/Comfort Measures: (meds) fluid intake increased  Taken 8/10/2024 1940 by Hannah Gomez RN  Infection Prevention:   single patient room provided   rest/sleep promoted   personal protective equipment utilized   hand hygiene promoted   equipment surfaces disinfected   environmental surveillance performed  Infection Management: aseptic technique maintained  Isolation Precautions:   cytotoxic precautions maintained   protective environment maintained  Goal: Improved Oral Mucous Membrane Health and Integrity  Outcome: Progressing  Intervention: Promote Oral Comfort and Health  Recent Flowsheet Documentation  Taken 8/10/2024 1940 by Hannah Gomez RN  Oral Mucous Membrane Protection:   nonirritating oral fluids promoted   nonirritating oral foods promoted  Oral Care: oral rinse provided  Goal: Nausea and Vomiting Symptom Relief  Outcome: Progressing  Intervention: Prevent and Manage Nausea and Vomiting  Recent Flowsheet Documentation  Taken 8/10/2024 1940 by Hannah Gomez RN  Nausea/Vomiting Interventions: (denies) other (see comments)  Goal: Optimal Nutrition Intake  Outcome: Progressing     Problem: Infection  Goal: Absence of Infection Signs and Symptoms  Outcome: Progressing  Intervention: Prevent or Manage Infection  Recent Flowsheet Documentation  Taken 8/11/2024 0438 by Hannah Gomez RN  Isolation Precautions:   cytotoxic precautions maintained   protective environment maintained  Taken 8/10/2024 2345 by Hannah Gomez RN  Infection Management: aseptic technique maintained  Isolation Precautions:    cytotoxic precautions maintained   protective environment maintained  Taken 8/10/2024 2115 by Hannah Gomez RN  Fever Reduction/Comfort Measures: (meds) fluid intake increased  Taken 8/10/2024 1940 by Hannah Gomez RN  Infection Management: aseptic technique maintained  Isolation Precautions:   cytotoxic precautions maintained   protective environment maintained     Problem: Comorbidity Management  Goal: Blood Glucose Levels Within Targeted Range  Outcome: Progressing  Intervention: Monitor and Manage Glycemia  Recent Flowsheet Documentation  Taken 8/11/2024 0438 by Hannah Gomez RN  Medication Review/Management: medications reviewed  Taken 8/10/2024 2345 by Hannah Gomez RN  Medication Review/Management: medications reviewed  Taken 8/10/2024 1940 by Hannah Gomez RN  Medication Review/Management: medications reviewed     Problem: Obstructive Sleep Apnea Risk or Actual  Goal: Unobstructed Breathing During Sleep  Outcome: Progressing  Intervention: Monitor and Manage Obstructive Sleep Apnea  Recent Flowsheet Documentation  Taken 8/11/2024 0438 by Hannah Gomez RN  Medication Review/Management: medications reviewed  Taken 8/10/2024 2345 by Hannah Gomez RN  Medication Review/Management: medications reviewed  Taken 8/10/2024 1940 by Hannah Gomez RN  Medication Review/Management: medications reviewed

## 2024-08-11 NOTE — PROGRESS NOTES
"BMT Progress Note  Chief complaint:  Arpit Forrest is a 73 year old male, currently day 4  prep for ELIEZER MUD PBSCT for MDS.    INTERIM HISTORY:   Urinary frequency, no dysuria, hematuria or clots. Exacerbated by cytoxan flush and requiring lasix.  Stools formed, but very frequent. No abdominal pain.   Fever to 100.9, very run down today. No URI symptoms. So far work up unremarkable.    REVIEW OF SYSTEMS: Otherwise unremarkable other than what is noted in the Interim History.   PHYSICAL EXAM:   Vitals:  BP (!) 146/67 (BP Location: Right arm)   Pulse 92   Temp 100.2  F (37.9  C) (Oral)   Resp 16   Ht 1.825 m (5' 11.85\")   Wt 117.1 kg (258 lb 2.5 oz)   SpO2 96%   BMI 35.16 kg/m    Wt Readings from Last 4 Encounters:   08/10/24 117.1 kg (258 lb 2.5 oz)   07/30/24 117.5 kg (259 lb)   07/26/24 117 kg (258 lb)   07/23/24 115.3 kg (254 lb 1.6 oz)       General Appearance: NAD; face has maye appearance--stable per pt  CV: RRR no MRG  RESP: CTAB  EXT: 1+ ankle edema  ABDOMINAL: no tenderness to palpation, BS+  SKIN: no lesions or rash.   NEURO: A&O x 3   PSYCH: Appropriate affect   VASCULAR ACCESS: dressing CDI.     ROUTINE LABS:    Lab Results   Component Value Date    WBC 0.2 (LL) 08/11/2024    ANEU 0.8 (L) 08/08/2024    HGB 9.3 (L) 08/11/2024    HCT 29.0 (L) 08/11/2024    PLT 70 (L) 08/11/2024     08/11/2024     08/11/2024    POTASSIUM 3.5 08/11/2024    POTASSIUM 3.5 08/11/2024    CHLORIDE 105 08/11/2024    CO2 22 08/11/2024     (H) 08/11/2024    BUN 14.4 08/11/2024    CR 0.93 08/11/2024    MAG 1.6 (L) 08/11/2024    INR 1.19 (H) 08/05/2024          ASSESSMENT AND PLAN:    Arpit A White is a 73 year old male, currently day 4 prep for ELIEZER MUD PBSCT for MDS.     BMT/IEC PROTOCOL for MDS  - Chemo protocol: [id number]  D-6: Flu 30mg/m2, Cy 50mg/kg  D-5 to D-2: Flu  D-1: TBI  D0: stem cell infusion  - Peripheral blood stem cell graft from 8/8 donor, ABO matched (Oneg), Cell dose: TBD  - continue " allopurinol (hx of gout)  - Engraftment monitoring: Peripheral blood and bone marrow chimerisms on D28, D100, 6 months, 1 and 2 year  - Restaging plan: BMBx with NGS on D28, D100, 6 months, 1 and 2 years     HEME/COAG  - GCSF starts day +5, continue until ANC > 1500 for 3 consecutive days.   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 Imodium PRN.  # Hepatic steatosis and boderline iron deposition in liver  - LFTs and synthetic function normal on admission; Check LFTs qM/Th  - VOD prophy: Ursodiol 300mg TID    # Supportive   - Anti-emetics:  per protocol   - Ulcer prophy: PPI daily   - Dietician support to prevent malnutrition.     CARDIOVASCULAR  # Hx of A.fib with RVR   - A.fib with RVR while admitted with febrile episode on 3/15/24. Noted on his fitbit, not very symptomatic. Cardioversion planned for new onset A.fib. Not achieved with amiodarone, DCCV on 3/21/24 restored normal sinus rhythm. Amiodarone and anticoagulation was stopped in April 2024 without any recurrence.   - He could have a recurrence during transplant which would need to be addressed with rate control strategy   # Asymptomatic moderate aortic stenosis   # CAD  - Chest CT in February 2024 with mild to moderate CAC,  "three-vessel disease. No anginal symptoms   - Continue atorvastatin 10mg   - EKG Sinus rhythm Inferior infarct , age undetermined. Abnormal ECG (known cardiovascular disease). QTc =460  # HTN- Continue amlodipine 10mg, adding lisinopril 5mg 8/4 (PTA dose of lisinopril was 10mg)  - Risk of cardiomyopathy:  Baseline EF normal 60-65&  - Risk of arrhythmia: Baseline EKG showed NSR, Qtc 460     RESPIRATORY  # VINCENT : CPAP  - Baseline PFTs: normal   - Risk of respiratory complications: Frequent ambulation and incentive spirometer.     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 8/19-11   lasix per protocol Cytoxan flush  # Electrolyte management: replace per sliding scale  - cont Ca w/vit D  - hold eye vitamin and Coq10 peritransplant     DIABETES/ENDOCRINE  #DMII: hx steroid-induced hyperglycemia: Carb coverage and Novolog sliding scale. Endo initially consulted, signed off with stable sugars.   *holding PTA metformin on admission.      MUSCULOSKELETAL/FRAILTY  # Gout: Continue allopurinol indefinitely   - Baseline Frailty Score: Not frail   - Daily PT/OT as needed while inpatient     SOCIAL DETERMINANTS  - Caregiver: Wife, Meghna, children     Medically Ready for Discharge: Anticipated in 5+ Days  Clinically Significant Risk Factors            # Hypomagnesemia: Lowest Mg = 1.6 mg/dL in last 2 days, will replace as needed     # Thrombocytopenia: Lowest platelets = 70 in last 2 days, will monitor for bleeding   # Hypertension: Noted on problem list            # Obesity: Estimated body mass index is 35.16 kg/m  as calculated from the following:    Height as of this encounter: 1.825 m (5' 11.85\").    " Weight as of this encounter: 117.1 kg (258 lb 2.5 oz).           I spent 30 minutes in the care of this patient today, which included time necessary for preparation for the visit, obtaining history, ordering medications/tests/procedures as medically indicated, review of pertinent medical literature, counseling of the patient, communication of recommendations to the care team, and documentation time.    Destinee Sanches PA-C on 8/4/2024 at 9:40 AM  , james  Physician Attestation     Arpit Forrest is a 73 year old male with high risk MDS who has been admitted for ELIEZER allogenic transplant with 8/8 donor, ABO matched, GVHD ppx with PTCy, siro, MMF.     BMT: D4 . Receiving fluids and diuresis with cytoxan flush.   Febrile with Tmax 100.9 on 8/10/24. Has been started on cefepime. Blood culture, urine cx and c.diff negative  He has started having frequent formed stools. Also complains of frequent urination.     I saw and evaluated Arpit Forrest as part of a shared APRN/PA visit. On the date of service, I spent 45 minutes on the patient unit personally reviewing medical records and medications, reviewing vital signs, labs, and imaging results as summarized above, discussing the patient's case on rounds with the V[facility], obtaining a history from the patient, performing a physical exam, intensively monitoring treatments with high risk of toxicity, coordinating care, and documenting in the electronic medical record.    Nena Fowler  Department of Hematology, Oncology and Transplantation  Pager [phone]/Text via James

## 2024-08-11 NOTE — PROGRESS NOTES
Stop time on MAR & chart I & O  Chemo drug: Cytoxan  Tolerated: Patient tolerated without adverse symptoms.   Intervention: N/A  Response: N/A  Plan: Flush will end tomorrow at 4pm  Lab: Na and K WNL  Wt: Weight is down nearly 1kg this evening compared this morning.    Cytoxan Primer  Cytoxan infused at 250ml/hr   Mesna infusing at 45.4ml/hr and ends tomorrow at 12pm.   Flush infusing at 154.6ml/hr and ends tomorrow at 4pm.

## 2024-08-11 NOTE — PROVIDER NOTIFICATION
Pt had 2 large soft loose BMs, requesting lomotil. Dr Gibson paged, awaiting orders.  Hannah Gomez RN on 8/11/2024 at 1:35 AM

## 2024-08-11 NOTE — PROVIDER NOTIFICATION
"Pt had a 100.9 F Fever at 1932. Per protocol. Blood cultures, urine culture, lactic and X ray were released and done. Tylenol and cefepime were administered. Provider was notified, cefepime ordered q8h. Pt reported some chills but otherwise VSS. Continue with care plan.   Current vitals,  /70 (BP Location: Right arm)   Pulse 72   Temp 99.1  F (37.3  C) (Oral)   Resp 16   Ht 1.825 m (5' 11.85\")   Wt 117.1 kg (258 lb 2.5 oz)   SpO2 100%   BMI 35.16 kg/m      Hannah Gomez RN on 8/10/2024 at 9:28 PM    "

## 2024-08-12 ENCOUNTER — APPOINTMENT (OUTPATIENT)
Dept: PHYSICAL THERAPY | Facility: CLINIC | Age: 74
DRG: 014 | End: 2024-08-12
Attending: INTERNAL MEDICINE
Payer: MEDICARE

## 2024-08-12 LAB
ABO/RH(D): NORMAL
ALBUMIN SERPL BCG-MCNC: 3.5 G/DL (ref 3.5–5.2)
ALP SERPL-CCNC: 53 U/L (ref 40–150)
ALT SERPL W P-5'-P-CCNC: 19 U/L (ref 0–70)
ANION GAP SERPL CALCULATED.3IONS-SCNC: 9 MMOL/L (ref 7–15)
ANTIBODY SCREEN: NEGATIVE
AST SERPL W P-5'-P-CCNC: 15 U/L (ref 0–45)
BACTERIA UR CULT: NORMAL
BASOPHILS # BLD AUTO: ABNORMAL 10*3/UL
BASOPHILS NFR BLD AUTO: ABNORMAL %
BILIRUB DIRECT SERPL-MCNC: 0.22 MG/DL (ref 0–0.3)
BILIRUB SERPL-MCNC: 0.6 MG/DL
BUN SERPL-MCNC: 11.8 MG/DL (ref 8–23)
C PNEUM DNA SPEC QL NAA+PROBE: NOT DETECTED
CALCIUM SERPL-MCNC: 8.7 MG/DL (ref 8.8–10.4)
CHLORIDE SERPL-SCNC: 105 MMOL/L (ref 98–107)
CREAT SERPL-MCNC: 0.92 MG/DL (ref 0.67–1.17)
EGFRCR SERPLBLD CKD-EPI 2021: 87 ML/MIN/1.73M2
EOSINOPHIL # BLD AUTO: ABNORMAL 10*3/UL
EOSINOPHIL NFR BLD AUTO: ABNORMAL %
ERYTHROCYTE [DISTWIDTH] IN BLOOD BY AUTOMATED COUNT: 16.1 % (ref 10–15)
FLUAV H1 2009 PAND RNA SPEC QL NAA+PROBE: NOT DETECTED
FLUAV H1 RNA SPEC QL NAA+PROBE: NOT DETECTED
FLUAV H3 RNA SPEC QL NAA+PROBE: NOT DETECTED
FLUAV RNA SPEC QL NAA+PROBE: NOT DETECTED
FLUBV RNA SPEC QL NAA+PROBE: NOT DETECTED
GLUCOSE BLDC GLUCOMTR-MCNC: 121 MG/DL (ref 70–99)
GLUCOSE BLDC GLUCOMTR-MCNC: 129 MG/DL (ref 70–99)
GLUCOSE BLDC GLUCOMTR-MCNC: 152 MG/DL (ref 70–99)
GLUCOSE BLDC GLUCOMTR-MCNC: 155 MG/DL (ref 70–99)
GLUCOSE SERPL-MCNC: 142 MG/DL (ref 70–99)
HADV DNA SPEC QL NAA+PROBE: NOT DETECTED
HCO3 SERPL-SCNC: 23 MMOL/L (ref 22–29)
HCOV PNL SPEC NAA+PROBE: NOT DETECTED
HCT VFR BLD AUTO: 29.6 % (ref 40–53)
HGB BLD-MCNC: 9.4 G/DL (ref 13.3–17.7)
HMPV RNA SPEC QL NAA+PROBE: NOT DETECTED
HPIV1 RNA SPEC QL NAA+PROBE: NOT DETECTED
HPIV2 RNA SPEC QL NAA+PROBE: NOT DETECTED
HPIV3 RNA SPEC QL NAA+PROBE: NOT DETECTED
HPIV4 RNA SPEC QL NAA+PROBE: NOT DETECTED
IMM GRANULOCYTES # BLD: ABNORMAL 10*3/UL
IMM GRANULOCYTES NFR BLD: ABNORMAL %
INR PPP: 1.26 (ref 0.85–1.15)
LACTATE SERPL-SCNC: 0.7 MMOL/L (ref 0.7–2)
LYMPHOCYTES # BLD AUTO: ABNORMAL 10*3/UL
LYMPHOCYTES NFR BLD AUTO: ABNORMAL %
M PNEUMO DNA SPEC QL NAA+PROBE: NOT DETECTED
MAGNESIUM SERPL-MCNC: 1.7 MG/DL (ref 1.7–2.3)
MCH RBC QN AUTO: 30.1 PG (ref 26.5–33)
MCHC RBC AUTO-ENTMCNC: 31.8 G/DL (ref 31.5–36.5)
MCV RBC AUTO: 95 FL (ref 78–100)
MONOCYTES # BLD AUTO: ABNORMAL 10*3/UL
MONOCYTES NFR BLD AUTO: ABNORMAL %
NEUTROPHILS # BLD AUTO: ABNORMAL 10*3/UL
NEUTROPHILS NFR BLD AUTO: ABNORMAL %
PHOSPHATE SERPL-MCNC: 3.2 MG/DL (ref 2.5–4.5)
PLATELET # BLD AUTO: 57 10E3/UL (ref 150–450)
POTASSIUM SERPL-SCNC: 3.3 MMOL/L (ref 3.4–5.3)
POTASSIUM SERPL-SCNC: 3.3 MMOL/L (ref 3.4–5.3)
POTASSIUM SERPL-SCNC: 3.7 MMOL/L (ref 3.4–5.3)
PROT SERPL-MCNC: 5.7 G/DL (ref 6.4–8.3)
RBC # BLD AUTO: 3.12 10E6/UL (ref 4.4–5.9)
RSV RNA SPEC QL NAA+PROBE: NOT DETECTED
RSV RNA SPEC QL NAA+PROBE: NOT DETECTED
RV+EV RNA SPEC QL NAA+PROBE: NOT DETECTED
SARS-COV-2 RNA RESP QL NAA+PROBE: NEGATIVE
SODIUM SERPL-SCNC: 137 MMOL/L (ref 135–145)
SODIUM SERPL-SCNC: 137 MMOL/L (ref 135–145)
SODIUM SERPL-SCNC: 139 MMOL/L (ref 135–145)
SPECIMEN EXPIRATION DATE: NORMAL
WBC # BLD AUTO: 0.1 10E3/UL (ref 4–11)

## 2024-08-12 PROCEDURE — 250N000011 HC RX IP 250 OP 636: Performed by: PHYSICIAN ASSISTANT

## 2024-08-12 PROCEDURE — 250N000013 HC RX MED GY IP 250 OP 250 PS 637: Performed by: PHYSICIAN ASSISTANT

## 2024-08-12 PROCEDURE — 250N000013 HC RX MED GY IP 250 OP 250 PS 637

## 2024-08-12 PROCEDURE — 97530 THERAPEUTIC ACTIVITIES: CPT | Mod: GP

## 2024-08-12 PROCEDURE — 84295 ASSAY OF SERUM SODIUM: CPT | Performed by: STUDENT IN AN ORGANIZED HEALTH CARE EDUCATION/TRAINING PROGRAM

## 2024-08-12 PROCEDURE — 250N000011 HC RX IP 250 OP 636: Performed by: INTERNAL MEDICINE

## 2024-08-12 PROCEDURE — 82248 BILIRUBIN DIRECT: CPT | Performed by: STUDENT IN AN ORGANIZED HEALTH CARE EDUCATION/TRAINING PROGRAM

## 2024-08-12 PROCEDURE — 85027 COMPLETE CBC AUTOMATED: CPT | Performed by: STUDENT IN AN ORGANIZED HEALTH CARE EDUCATION/TRAINING PROGRAM

## 2024-08-12 PROCEDURE — 83735 ASSAY OF MAGNESIUM: CPT | Performed by: STUDENT IN AN ORGANIZED HEALTH CARE EDUCATION/TRAINING PROGRAM

## 2024-08-12 PROCEDURE — 85610 PROTHROMBIN TIME: CPT | Performed by: STUDENT IN AN ORGANIZED HEALTH CARE EDUCATION/TRAINING PROGRAM

## 2024-08-12 PROCEDURE — 258N000003 HC RX IP 258 OP 636: Performed by: PHYSICIAN ASSISTANT

## 2024-08-12 PROCEDURE — 84100 ASSAY OF PHOSPHORUS: CPT | Performed by: STUDENT IN AN ORGANIZED HEALTH CARE EDUCATION/TRAINING PROGRAM

## 2024-08-12 PROCEDURE — 99233 SBSQ HOSP IP/OBS HIGH 50: CPT | Mod: FS | Performed by: STUDENT IN AN ORGANIZED HEALTH CARE EDUCATION/TRAINING PROGRAM

## 2024-08-12 PROCEDURE — 84132 ASSAY OF SERUM POTASSIUM: CPT | Performed by: STUDENT IN AN ORGANIZED HEALTH CARE EDUCATION/TRAINING PROGRAM

## 2024-08-12 PROCEDURE — 250N000012 HC RX MED GY IP 250 OP 636 PS 637: Performed by: STUDENT IN AN ORGANIZED HEALTH CARE EDUCATION/TRAINING PROGRAM

## 2024-08-12 PROCEDURE — 83605 ASSAY OF LACTIC ACID: CPT | Performed by: STUDENT IN AN ORGANIZED HEALTH CARE EDUCATION/TRAINING PROGRAM

## 2024-08-12 PROCEDURE — 206N000001 HC R&B BMT UMMC

## 2024-08-12 PROCEDURE — 86901 BLOOD TYPING SEROLOGIC RH(D): CPT | Performed by: STUDENT IN AN ORGANIZED HEALTH CARE EDUCATION/TRAINING PROGRAM

## 2024-08-12 PROCEDURE — 87633 RESP VIRUS 12-25 TARGETS: CPT | Performed by: PHYSICIAN ASSISTANT

## 2024-08-12 PROCEDURE — 87635 SARS-COV-2 COVID-19 AMP PRB: CPT | Performed by: PHYSICIAN ASSISTANT

## 2024-08-12 PROCEDURE — 250N000011 HC RX IP 250 OP 636: Performed by: STUDENT IN AN ORGANIZED HEALTH CARE EDUCATION/TRAINING PROGRAM

## 2024-08-12 PROCEDURE — 250N000013 HC RX MED GY IP 250 OP 250 PS 637: Performed by: STUDENT IN AN ORGANIZED HEALTH CARE EDUCATION/TRAINING PROGRAM

## 2024-08-12 RX ORDER — FUROSEMIDE 10 MG/ML
20 INJECTION INTRAMUSCULAR; INTRAVENOUS ONCE
Status: COMPLETED | OUTPATIENT
Start: 2024-08-12 | End: 2024-08-12

## 2024-08-12 RX ORDER — POTASSIUM CHLORIDE 29.8 MG/ML
20 INJECTION INTRAVENOUS
Status: COMPLETED | OUTPATIENT
Start: 2024-08-12 | End: 2024-08-12

## 2024-08-12 RX ADMIN — SIROLIMUS 12 MG: 2 TABLET ORAL at 08:03

## 2024-08-12 RX ADMIN — ALLOPURINOL 300 MG: 300 TABLET ORAL at 08:04

## 2024-08-12 RX ADMIN — FILGRASTIM-AAFI 480 MCG: 480 INJECTION, SOLUTION INTRAVENOUS; SUBCUTANEOUS at 19:44

## 2024-08-12 RX ADMIN — CEFEPIME HYDROCHLORIDE 2 G: 2 INJECTION, POWDER, FOR SOLUTION INTRAVENOUS at 13:16

## 2024-08-12 RX ADMIN — URSODIOL 300 MG: 300 CAPSULE ORAL at 08:05

## 2024-08-12 RX ADMIN — TAMSULOSIN HYDROCHLORIDE 0.4 MG: 0.4 CAPSULE ORAL at 08:05

## 2024-08-12 RX ADMIN — MYCOPHENOLATE MOFETIL 1500 MG: 500 INJECTION, POWDER, LYOPHILIZED, FOR SOLUTION INTRAVENOUS at 21:07

## 2024-08-12 RX ADMIN — URSODIOL 300 MG: 300 CAPSULE ORAL at 19:44

## 2024-08-12 RX ADMIN — ONDANSETRON HYDROCHLORIDE 8 MG: 8 TABLET, FILM COATED ORAL at 21:06

## 2024-08-12 RX ADMIN — LISINOPRIL 5 MG: 5 TABLET ORAL at 08:04

## 2024-08-12 RX ADMIN — CEFEPIME HYDROCHLORIDE 2 G: 2 INJECTION, POWDER, FOR SOLUTION INTRAVENOUS at 05:36

## 2024-08-12 RX ADMIN — ONDANSETRON HYDROCHLORIDE 8 MG: 8 TABLET, FILM COATED ORAL at 13:16

## 2024-08-12 RX ADMIN — FUROSEMIDE 20 MG: 10 INJECTION, SOLUTION INTRAMUSCULAR; INTRAVENOUS at 17:34

## 2024-08-12 RX ADMIN — Medication 5 ML: at 16:17

## 2024-08-12 RX ADMIN — ONDANSETRON HYDROCHLORIDE 8 MG: 8 TABLET, FILM COATED ORAL at 05:36

## 2024-08-12 RX ADMIN — MYCOPHENOLATE MOFETIL 1500 MG: 500 INJECTION, POWDER, LYOPHILIZED, FOR SOLUTION INTRAVENOUS at 10:02

## 2024-08-12 RX ADMIN — MICAFUNGIN SODIUM 100 MG: 50 INJECTION, POWDER, LYOPHILIZED, FOR SOLUTION INTRAVENOUS at 08:54

## 2024-08-12 RX ADMIN — FLUTICASONE PROPIONATE 1 SPRAY: 50 SPRAY, METERED NASAL at 19:47

## 2024-08-12 RX ADMIN — POTASSIUM CHLORIDE 20 MEQ: 29.8 INJECTION, SOLUTION INTRAVENOUS at 07:42

## 2024-08-12 RX ADMIN — POTASSIUM CHLORIDE 20 MEQ: 29.8 INJECTION, SOLUTION INTRAVENOUS at 06:32

## 2024-08-12 RX ADMIN — BRIMONIDINE TARTRATE 1 DROP: 2 SOLUTION/ DROPS OPHTHALMIC at 19:46

## 2024-08-12 RX ADMIN — URSODIOL 300 MG: 300 CAPSULE ORAL at 13:16

## 2024-08-12 RX ADMIN — LATANOPROST 2 DROP: 50 SOLUTION OPHTHALMIC at 21:24

## 2024-08-12 RX ADMIN — BRIMONIDINE TARTRATE 1 DROP: 2 SOLUTION/ DROPS OPHTHALMIC at 08:02

## 2024-08-12 RX ADMIN — FLUTICASONE PROPIONATE 1 SPRAY: 50 SPRAY, METERED NASAL at 08:02

## 2024-08-12 RX ADMIN — Medication 3 CAPSULE: at 08:03

## 2024-08-12 RX ADMIN — LOPERAMIDE HYDROCHLORIDE 4 MG: 2 CAPSULE ORAL at 10:01

## 2024-08-12 RX ADMIN — Medication 5 ML: at 17:34

## 2024-08-12 RX ADMIN — PANTOPRAZOLE SODIUM 40 MG: 40 TABLET, DELAYED RELEASE ORAL at 08:04

## 2024-08-12 RX ADMIN — CEFEPIME HYDROCHLORIDE 2 G: 2 INJECTION, POWDER, FOR SOLUTION INTRAVENOUS at 21:06

## 2024-08-12 RX ADMIN — AMLODIPINE BESYLATE 10 MG: 10 TABLET ORAL at 08:04

## 2024-08-12 RX ADMIN — INSULIN ASPART 1 UNITS: 100 INJECTION, SOLUTION INTRAVENOUS; SUBCUTANEOUS at 13:15

## 2024-08-12 RX ADMIN — ATORVASTATIN CALCIUM 10 MG: 10 TABLET, FILM COATED ORAL at 19:44

## 2024-08-12 RX ADMIN — ACYCLOVIR 800 MG: 800 TABLET ORAL at 08:04

## 2024-08-12 RX ADMIN — CALCIUM CARBONATE 600 MG (1,500 MG)-VITAMIN D3 400 UNIT TABLET 1 TABLET: at 08:04

## 2024-08-12 RX ADMIN — FUROSEMIDE 10 MG: 10 INJECTION, SOLUTION INTRAMUSCULAR; INTRAVENOUS at 10:01

## 2024-08-12 RX ADMIN — ACYCLOVIR 800 MG: 800 TABLET ORAL at 19:44

## 2024-08-12 ASSESSMENT — ACTIVITIES OF DAILY LIVING (ADL)
ADLS_ACUITY_SCORE: 25
ADLS_ACUITY_SCORE: 23
ADLS_ACUITY_SCORE: 23
ADLS_ACUITY_SCORE: 25
ADLS_ACUITY_SCORE: 24
ADLS_ACUITY_SCORE: 23
ADLS_ACUITY_SCORE: 24
ADLS_ACUITY_SCORE: 23
ADLS_ACUITY_SCORE: 25
ADLS_ACUITY_SCORE: 25
ADLS_ACUITY_SCORE: 24
ADLS_ACUITY_SCORE: 23
ADLS_ACUITY_SCORE: 23
ADLS_ACUITY_SCORE: 25
ADLS_ACUITY_SCORE: 25
ADLS_ACUITY_SCORE: 24
ADLS_ACUITY_SCORE: 25
ADLS_ACUITY_SCORE: 23
ADLS_ACUITY_SCORE: 23
ADLS_ACUITY_SCORE: 25
ADLS_ACUITY_SCORE: 23

## 2024-08-12 NOTE — PROVIDER NOTIFICATION
"Provider Constance Romeo MD text paged via [employer]. \"Patient has 102.1 axillary temp. Patient spiked fever last night. Patient is on Cefepime. UA/UC, chest x-ray and blood cultures completed last night.\" \"Tylenol will be given to patient.\"    Tylenol given x1 for fever and back pain.     Will continue to monitor patient and notify provider with any changes.     "

## 2024-08-12 NOTE — PROGRESS NOTES
Brief Cross-Cover Note:    Paged by RN that patient is up 2lbs since receiving 10mg IV Lasix this morning. Patient received 50mg IV Lasix yesterday with 6.8L UOP. Today has had 3.6L of UOP.    Electrolytes stable. Ordered one time 20mg IV Lasix.    Pari Patino MD

## 2024-08-12 NOTE — PROGRESS NOTES
"BMT Progress Note  Chief complaint:  Arpit Forrest is a 73 year old male, currently day+5 s/p ELIEZER MUD PBSCT for MDS.  INTERIM HISTORY: Febrile last evening again. Fatigued. Very little sleep due to Cytoxan flush. No n/v. Stools are loose. Reports some rhinorrhea but no congestion or cough. No mouth sores or throat pain.  REVIEW OF SYSTEMS: Otherwise unremarkable other than what is noted in the Interim History.   PHYSICAL EXAM:   Vitals:  BP (!) 151/75 (BP Location: Right arm)   Pulse 70   Temp 99.4  F (37.4  C) (Oral)   Resp 18   Ht 1.825 m (5' 11.85\")   Wt 117.9 kg (260 lb)   SpO2 100%   BMI 35.41 kg/m    General Appearance: NAD; face has maye appearance--stable per pt  HEENT: sclera anicteric. OP moist without lesions.  CV: RRR , 3/6 systolic murmur  RESP: CTAB  EXT: 1+ LE edema  ABDOMINAL: no tenderness to palpation, BS+  SKIN: facial erythema. Some blotchy skin on forearms, not raised.    NEURO: A&O x 3   PSYCH: Appropriate affect   VASCULAR ACCESS: dressing CDI.     LABS:  Lab Results   Component Value Date    WBC 0.1 (LL) 08/12/2024    ANEU 0.8 (L) 08/08/2024    HGB 9.4 (L) 08/12/2024    HCT 29.6 (L) 08/12/2024    PLT 57 (L) 08/12/2024     08/12/2024     08/12/2024    POTASSIUM 3.3 (L) 08/12/2024    POTASSIUM 3.3 (L) 08/12/2024    CHLORIDE 105 08/12/2024    CO2 23 08/12/2024     (H) 08/12/2024    BUN 11.8 08/12/2024    CR 0.92 08/12/2024    MAG 1.7 08/12/2024    INR 1.26 (H) 08/12/2024    BILITOTAL 0.6 08/12/2024    AST 15 08/12/2024    ALT 19 08/12/2024    ALKPHOS 53 08/12/2024    PROTTOTAL 5.7 (L) 08/12/2024    ALBUMIN 3.5 08/12/2024        ASSESSMENT AND PLAN: Arpit Forrest is a 73 year old male, currently day +5 s/p ELIEZER MUD PBSCT for MDS.    BMT/IEC PROTOCOL for MDS  - Chemo protocol: [id number]; CY/flu/TBI  - Peripheral blood stem cell graft from 8/8 donor, ABO matched (Oneg), Cell dose: TBD  - continue allopurinol (hx of gout)  - Engraftment monitoring: Peripheral blood and " bone marrow chimerisms on D28, D100, 6 months, 1 and 2 year  - Restaging plan: BMBx with NGS on D28, D100, 6 months, 1 and 2 years     HEME/COAG  - GCSF starts day +5, continue until ANC > 1500 for 3 consecutive days.   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      ID  #N/F- Cefepime (8/11-x)   *work up NTD   *c.diff negative (8/11)   *8/12 check RVP, Covid (+rhinorrhea)  Recent Hx: Multiple granulomas in the lung and bone marrow biopsy- fungitell, aspergillus galactomanan neg. 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 Imodium PRN.  # Hepatic steatosis and boderline iron deposition in liver  - LFTs and synthetic function normal on admission; Check LFTs qM/Th  - VOD prophy: Ursodiol 300mg TID    # Supportive   - Anti-emetics:  per protocol   - Ulcer prophy: PPI daily   - Dietician support to prevent malnutrition.     CARDIOVASCULAR  # Hx of A.fib with RVR   - A.fib with RVR while admitted with febrile episode on 3/15/24. Noted on his fitbit, not very symptomatic. Cardioversion planned for new onset A.fib. Not achieved with amiodarone, DCCV on 3/21/24 restored normal sinus rhythm. Amiodarone and anticoagulation was stopped in April 2024 without any recurrence.   - He could have a recurrence during transplant which would need to be addressed with rate control strategy   # Asymptomatic moderate aortic stenosis   # CAD  - Chest CT in February 2024 with mild to moderate CAC, three-vessel disease. No  anginal symptoms   - Continue atorvastatin 10mg   - EKG Sinus rhythm Inferior infarct , age undetermined. Abnormal ECG (known cardiovascular disease). QTc =460  # HTN- Continue amlodipine 10mg, added lisinopril 5mg 8/4 (PTA dose of lisinopril was 10mg)  - Risk of cardiomyopathy:  Baseline EF normal 60-65&  - Risk of arrhythmia: Baseline EKG showed NSR, Qtc 460     RESPIRATORY  # VINCENT : CPAP  - Baseline PFTs: normal   - Risk of respiratory complications: Frequent ambulation and incentive spirometer.     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 8/19-11 lasix per protocol Cytoxan flush  # Electrolyte management: replace per sliding scale  - cont Ca w/vit D  - hold eye vitamin and Coq10 peritransplant     DIABETES/ENDOCRINE  #DMII: hx steroid-induced hyperglycemia: Carb coverage and Novolog sliding scale. Endo initially consulted, signed off with stable sugars.   *held PTA metformin on admission.      MUSCULOSKELETAL/FRAILTY  # Gout: Continue allopurinol indefinitely   - Baseline Frailty Score: Not frail   - Daily PT/OT as needed while inpatient     SOCIAL DETERMINANTS  - Caregiver: Wife, Meghna, children     Medically Ready for Discharge: Anticipated in 5+ Days    Clinically Significant Risk Factors        # Hypokalemia: Lowest K = 3.3 mmol/L in last 2 days, will replace as needed   # Hypocalcemia: Lowest Ca = 8.2 mg/dL in last 2 days, will monitor and replace as appropriate   # Hypomagnesemia: Lowest Mg = 1.6 mg/dL in last 2 days, will replace as needed    # Coagulation Defect: INR = 1.26 (Ref range: 0.85 - 1.15) and/or PTT = 34 Seconds (Ref range: 22 - 38 Seconds), will monitor for  "bleeding  # Thrombocytopenia: Lowest platelets = 57 in last 2 days, will monitor for bleeding   # Hypertension: Noted on problem list            # Obesity: Estimated body mass index is 35.41 kg/m  as calculated from the following:    Height as of this encounter: 1.825 m (5' 11.85\").    Weight as of this encounter: 117.9 kg (260 lb).           I spent 30 minutes in the care of this patient today, which included time necessary for preparation for the visit, obtaining history, ordering medications/tests/procedures as medically indicated, review of pertinent medical literature, counseling of the patient, communication of recommendations to the care team, and documentation time.  Maria Alejandra Fisher PA-C       Physician Attestation     Arpit Forrest is a 73 year old male with high risk MDS who has been admitted for ELIEZER allogenic transplant with 8/8 donor, ABO matched, GVHD ppx with PTCy, siro, MMF.     BMT: D5 . Receiving fluids and diuresis post cytoxan.   Febrile with Tmax 102.4 on 8/11/24 (D4). Has been started on cefepime. Blood culture, urine cx and c.diff negative.  Resp viral panel (8/12) negative   Diarrhea, due to regimen related toxicities. Supportive care as above.     I saw and evaluated Arpit Forrest as part of a shared APRN/PA visit. On the date of service, I spent 45 minutes on the patient unit personally reviewing medical records and medications, reviewing vital signs, labs, and imaging results as summarized above, discussing the patient's case on rounds with the VINNY, obtaining a history from the patient, performing a physical exam, intensively monitoring treatments with high risk of toxicity, coordinating care, and documenting in the electronic medical record.    Nena Fowler  Department of Hematology, Oncology and Transplantation  Pager [phone]/Text via [email or web address]           "

## 2024-08-12 NOTE — PLAN OF CARE
"BP (!) 141/68 (BP Location: Right arm)   Pulse 79   Temp 99.5  F (37.5  C) (Oral)   Resp 18   Ht 1.825 m (5' 11.85\")   Wt 116.8 kg (257 lb 6.4 oz)   SpO2 97%   BMI 35.06 kg/m    Pt's stable with BP slight elevated all shift. Pt's maintaining sat's in the upper 90's while on RA, denied pain, and nausea all shift but did have x2 soft loose stool. Pt cytoxan flush and lasix x1 was given for low output. Pt's slightly unsteady on his feet, and up with SBA all night. Pt does use call light appropriately all night.  K+ is 3.3 and replacement infusing and pt need x1 more and recheck. Other lab stable with Mag 1.7, WBC 0.1, Hgb 9.4, Plt 57, Hgb 9.4, and lactic 0.7. Pt did not sleep much and up frquenelty voiding. Keep monitoring pt as ordered and notify MD with any new changes.   Problem: Adult Inpatient Plan of Care  Goal: Plan of Care Review  Description: The Plan of Care Review/Shift note should be completed every shift.  The Outcome Evaluation is a brief statement about your assessment that the patient is improving, declining, or no change.  This information will be displayed automatically on your shift  note.  Outcome: Progressing  Goal: Patient-Specific Goal (Individualized)  Description: You can add care plan individualizations to a care plan. Examples of Individualization might be:  \"Parent requests to be called daily at 9am for status\", \"I have a hard time hearing out of my right ear\", or \"Do not touch me to wake me up as it startles  me\".  Outcome: Progressing  Goal: Absence of Hospital-Acquired Illness or Injury  Outcome: Progressing  Intervention: Identify and Manage Fall Risk  Recent Flowsheet Documentation  Taken 8/12/2024 0000 by Isidro Vazquez RN  Safety Promotion/Fall Prevention:   activity supervised   clutter free environment maintained  Taken 8/11/2024 2000 by Isidro Vazquez RN  Safety Promotion/Fall Prevention:   activity supervised   clutter free environment maintained  Goal: Optimal " Comfort and Wellbeing  Outcome: Progressing  Goal: Readiness for Transition of Care  Outcome: Progressing     Problem: Risk for Delirium  Goal: Optimal Coping  Outcome: Progressing  Intervention: Optimize Psychosocial Adjustment to Delirium  Recent Flowsheet Documentation  Taken 8/12/2024 0000 by Isidro Vazquez RN  Supportive Measures:   active listening utilized   self-care encouraged   verbalization of feelings encouraged  Taken 8/11/2024 2000 by Isidro Vazquez RN  Supportive Measures:   active listening utilized   self-care encouraged   verbalization of feelings encouraged  Goal: Improved Behavioral Control  Outcome: Progressing  Goal: Improved Attention and Thought Clarity  Outcome: Progressing  Goal: Improved Sleep  Outcome: Progressing     Problem: Stem Cell/Bone Marrow Transplant  Goal: Optimal Coping with Transplant  Outcome: Progressing  Intervention: Optimize Patient/Family Adjustment to Transplant  Recent Flowsheet Documentation  Taken 8/12/2024 0000 by Isidro Vazquez RN  Supportive Measures:   active listening utilized   self-care encouraged   verbalization of feelings encouraged  Taken 8/11/2024 2000 by Isidro Vazquez RN  Supportive Measures:   active listening utilized   self-care encouraged   verbalization of feelings encouraged  Goal: Symptom-Free Urinary Elimination  Outcome: Progressing  Goal: Diarrhea Symptom Control  Outcome: Progressing  Goal: Improved Activity Tolerance  Outcome: Progressing  Intervention: Promote Improved Energy  Recent Flowsheet Documentation  Taken 8/12/2024 0000 by Isidro Vazquez RN  Environmental Support: calm environment promoted  Taken 8/11/2024 2000 by Isidro Vazquez RN  Environmental Support: calm environment promoted  Goal: Blood Counts Within Acceptable Range  Outcome: Progressing  Goal: Absence of Hypersensitivity Reaction  Outcome: Progressing  Goal: Absence of Infection  Outcome: Progressing  Goal: Improved Oral Mucous Membrane Health  and Integrity  Outcome: Progressing  Goal: Nausea and Vomiting Symptom Relief  Outcome: Progressing  Goal: Optimal Nutrition Intake  Outcome: Progressing     Problem: Infection  Goal: Absence of Infection Signs and Symptoms  Outcome: Progressing     Problem: Comorbidity Management  Goal: Blood Glucose Levels Within Targeted Range  Outcome: Progressing     Problem: Obstructive Sleep Apnea Risk or Actual  Goal: Unobstructed Breathing During Sleep  Outcome: Progressing     Problem: Comorbidity Management  Goal: Blood Glucose Levels Within Targeted Range  Outcome: Progressing     Problem: Obstructive Sleep Apnea Risk or Actual  Goal: Unobstructed Breathing During Sleep  Outcome: Progressing   Goal Outcome Evaluation:

## 2024-08-12 NOTE — PLAN OF CARE
"BP (!) 150/65 (BP Location: Right arm)   Pulse 81   Temp 100.2  F (37.9  C) (Oral)   Resp 18   Ht 1.825 m (5' 11.85\")   Wt 116.8 kg (257 lb 6.4 oz)   SpO2 97%   BMI 35.06 kg/m      Tmax 102.1 axillary. Tylenol given x1. Alert and oriented x4. Patient has cytoxan flush with NS and mesna at 200ml/hr. SBA with bed alarm. Orthos negative. Patient reports back pain. Continue with plan of care.     Goal Outcome Evaluation:      Plan of Care Reviewed With: patient    Overall Patient Progress: improving      Problem: Adult Inpatient Plan of Care  Goal: Plan of Care Review  Description: The Plan of Care Review/Shift note should be completed every shift.  The Outcome Evaluation is a brief statement about your assessment that the patient is improving, declining, or no change.  This information will be displayed automatically on your shift  note.  Outcome: Progressing  Flowsheets (Taken 8/11/2024 1927)  Plan of Care Reviewed With: patient  Overall Patient Progress: improving  Goal: Patient-Specific Goal (Individualized)  Description: You can add care plan individualizations to a care plan. Examples of Individualization might be:  \"Parent requests to be called daily at 9am for status\", \"I have a hard time hearing out of my right ear\", or \"Do not touch me to wake me up as it startles  me\".  Outcome: Progressing  Goal: Absence of Hospital-Acquired Illness or Injury  Outcome: Progressing  Intervention: Identify and Manage Fall Risk  Recent Flowsheet Documentation  Taken 8/11/2024 1530 by Tram Ramirez, RN  Safety Promotion/Fall Prevention:   safety round/check completed   room organization consistent   patient and family education   nonskid shoes/slippers when out of bed   clutter free environment maintained   chemotherapeutic precautions   assistive device/personal items within reach   lighting adjusted  Taken 8/11/2024 1230 by Tram Ramirez, RN  Safety Promotion/Fall Prevention:   safety round/check completed   " room organization consistent   patient and family education   nonskid shoes/slippers when out of bed   clutter free environment maintained   chemotherapeutic precautions   assistive device/personal items within reach   lighting adjusted  Taken 8/11/2024 0830 by Tram Ramirez RN  Safety Promotion/Fall Prevention:   safety round/check completed   room organization consistent   patient and family education   nonskid shoes/slippers when out of bed   clutter free environment maintained   chemotherapeutic precautions   assistive device/personal items within reach   lighting adjusted  Intervention: Prevent Skin Injury  Recent Flowsheet Documentation  Taken 8/11/2024 0830 by Tram Ramirez RN  Body Position: position changed independently  Skin Protection: adhesive use limited  Device Skin Pressure Protection: adhesive use limited  Intervention: Prevent and Manage VTE (Venous Thromboembolism) Risk  Recent Flowsheet Documentation  Taken 8/11/2024 0830 by Tram Ramirez RN  VTE Prevention/Management: (ambulation promoted) other (see comments)  Intervention: Prevent Infection  Recent Flowsheet Documentation  Taken 8/11/2024 1530 by Tram Ramirez RN  Infection Prevention:   single patient room provided   rest/sleep promoted   personal protective equipment utilized   hand hygiene promoted   equipment surfaces disinfected   environmental surveillance performed  Taken 8/11/2024 1230 by Tram Ramirez RN  Infection Prevention:   single patient room provided   rest/sleep promoted   personal protective equipment utilized   hand hygiene promoted   equipment surfaces disinfected   environmental surveillance performed  Taken 8/11/2024 0830 by Tram Ramirez RN  Infection Prevention:   single patient room provided   rest/sleep promoted   personal protective equipment utilized   hand hygiene promoted   equipment surfaces disinfected   environmental surveillance performed  Goal: Optimal Comfort and  Wellbeing  Outcome: Progressing  Goal: Readiness for Transition of Care  Outcome: Progressing     Problem: Risk for Delirium  Goal: Optimal Coping  Outcome: Progressing  Intervention: Optimize Psychosocial Adjustment to Delirium  Recent Flowsheet Documentation  Taken 8/11/2024 0830 by Tram Ramirez RN  Supportive Measures: active listening utilized  Goal: Improved Behavioral Control  Outcome: Progressing  Intervention: Prevent and Manage Agitation  Recent Flowsheet Documentation  Taken 8/11/2024 0830 by Tram Ramirez RN  Environment Familiarity/Consistency: daily routine followed  Intervention: Minimize Safety Risk  Recent Flowsheet Documentation  Taken 8/11/2024 1530 by rTam Ramirez RN  Enhanced Safety Measures: patient/family teach back on injury risk  Taken 8/11/2024 1230 by Tram Ramirez RN  Enhanced Safety Measures: patient/family teach back on injury risk  Taken 8/11/2024 0830 by Tram Ramirez RN  Communication Enhancement Strategies:   call light answered in person   family involved in communication plan  Enhanced Safety Measures: patient/family teach back on injury risk  Goal: Improved Attention and Thought Clarity  Outcome: Progressing  Intervention: Maximize Cognitive Function  Recent Flowsheet Documentation  Taken 8/11/2024 0830 by Tram Ramirez RN  Sensory Stimulation Regulation: auditory stimulation minimized  Reorientation Measures:   calendar in view   clock in view   familiar social contact encouraged  Goal: Improved Sleep  Outcome: Progressing  Intervention: Promote Sleep  Recent Flowsheet Documentation  Taken 8/11/2024 0830 by Tram Ramirez RN  Sleep/Rest Enhancement:   consistent schedule promoted   family presence promoted   natural light exposure provided   regular sleep/rest pattern promoted   relaxation techniques promoted     Problem: Stem Cell/Bone Marrow Transplant  Goal: Optimal Coping with Transplant  Outcome: Progressing  Intervention:  Optimize Patient/Family Adjustment to Transplant  Recent Flowsheet Documentation  Taken 8/11/2024 0830 by Tram Ramirez RN  Supportive Measures: active listening utilized  Goal: Symptom-Free Urinary Elimination  Outcome: Progressing  Intervention: Prevent or Manage Bladder Irritation  Recent Flowsheet Documentation  Taken 8/11/2024 0830 by Tram Ramirez RN  Urinary Elimination Promotion: frequent voiding encouraged  Hyperhydration Management: fluids provided  Goal: Diarrhea Symptom Control  Outcome: Progressing  Intervention: Manage Diarrhea  Recent Flowsheet Documentation  Taken 8/11/2024 0830 by Tram Ramirez RN  Skin Protection: adhesive use limited  Fluid/Electrolyte Management: fluids provided  Goal: Improved Activity Tolerance  Outcome: Progressing  Intervention: Promote Improved Energy  Recent Flowsheet Documentation  Taken 8/11/2024 0830 by Tram Ramirez RN  Fatigue Management: paced activity encouraged  Sleep/Rest Enhancement:   consistent schedule promoted   family presence promoted   natural light exposure provided   regular sleep/rest pattern promoted   relaxation techniques promoted  Activity Management: activity adjusted per tolerance  Environmental Support: calm environment promoted  Goal: Blood Counts Within Acceptable Range  Outcome: Progressing  Intervention: Monitor and Manage Hematologic Symptoms  Recent Flowsheet Documentation  Taken 8/11/2024 1530 by Tram Ramirez RN  Bleeding Precautions:   blood pressure closely monitored   gentle oral care promoted   monitored for signs of bleeding  Medication Review/Management: medications reviewed  Taken 8/11/2024 1230 by Tram Ramirez RN  Bleeding Precautions:   blood pressure closely monitored   gentle oral care promoted   monitored for signs of bleeding  Medication Review/Management: medications reviewed  Taken 8/11/2024 0830 by Tram Ramirez RN  Sleep/Rest Enhancement:   consistent schedule promoted    family presence promoted   natural light exposure provided   regular sleep/rest pattern promoted   relaxation techniques promoted  Bleeding Precautions:   blood pressure closely monitored   gentle oral care promoted   monitored for signs of bleeding  Medication Review/Management: medications reviewed  Goal: Absence of Hypersensitivity Reaction  Outcome: Progressing  Goal: Absence of Infection  Outcome: Progressing  Intervention: Prevent and Manage Infection  Recent Flowsheet Documentation  Taken 8/11/2024 1530 by Tram Ramirez RN  Infection Prevention:   single patient room provided   rest/sleep promoted   personal protective equipment utilized   hand hygiene promoted   equipment surfaces disinfected   environmental surveillance performed  Infection Management: aseptic technique maintained  Isolation Precautions:   cytotoxic precautions maintained   protective environment maintained  Taken 8/11/2024 1230 by Tram Ramirez RN  Infection Prevention:   single patient room provided   rest/sleep promoted   personal protective equipment utilized   hand hygiene promoted   equipment surfaces disinfected   environmental surveillance performed  Infection Management: aseptic technique maintained  Isolation Precautions:   cytotoxic precautions maintained   protective environment maintained  Taken 8/11/2024 0830 by Tram Ramirez RN  Infection Prevention:   single patient room provided   rest/sleep promoted   personal protective equipment utilized   hand hygiene promoted   equipment surfaces disinfected   environmental surveillance performed  Infection Management: aseptic technique maintained  Isolation Precautions:   cytotoxic precautions maintained   protective environment maintained  Goal: Improved Oral Mucous Membrane Health and Integrity  Outcome: Progressing  Intervention: Promote Oral Comfort and Health  Recent Flowsheet Documentation  Taken 8/11/2024 0830 by Tram Rmairez RN  Oral Mucous  Membrane Protection:   nonirritating oral fluids promoted   nonirritating oral foods promoted  Goal: Nausea and Vomiting Symptom Relief  Outcome: Progressing  Goal: Optimal Nutrition Intake  Outcome: Progressing  Intervention: Minimize and Manage Barriers to Oral Intake  Recent Flowsheet Documentation  Taken 8/11/2024 0830 by Tram Ramirez RN  Oral Nutrition Promotion:   rest periods promoted   physical activity promoted     Problem: Infection  Goal: Absence of Infection Signs and Symptoms  Outcome: Progressing  Intervention: Prevent or Manage Infection  Recent Flowsheet Documentation  Taken 8/11/2024 1530 by Tram Ramirez RN  Infection Management: aseptic technique maintained  Isolation Precautions:   cytotoxic precautions maintained   protective environment maintained  Taken 8/11/2024 1230 by Tram Ramirez RN  Infection Management: aseptic technique maintained  Isolation Precautions:   cytotoxic precautions maintained   protective environment maintained  Taken 8/11/2024 0830 by Tram Ramirez RN  Infection Management: aseptic technique maintained  Isolation Precautions:   cytotoxic precautions maintained   protective environment maintained     Problem: Comorbidity Management  Goal: Blood Glucose Levels Within Targeted Range  Outcome: Progressing  Intervention: Monitor and Manage Glycemia  Recent Flowsheet Documentation  Taken 8/11/2024 1530 by Tram Ramirez RN  Medication Review/Management: medications reviewed  Taken 8/11/2024 1230 by Tram Ramirez RN  Medication Review/Management: medications reviewed  Taken 8/11/2024 0830 by Tram Ramirez RN  Medication Review/Management: medications reviewed     Problem: Obstructive Sleep Apnea Risk or Actual  Goal: Unobstructed Breathing During Sleep  Outcome: Progressing  Intervention: Monitor and Manage Obstructive Sleep Apnea  Recent Flowsheet Documentation  Taken 8/11/2024 1530 by Tram Ramirez RN  Medication  Review/Management: medications reviewed  Taken 8/11/2024 1230 by Tram Ramirez, RN  Medication Review/Management: medications reviewed  Taken 8/11/2024 0830 by Tram Ramirez, RN  Medication Review/Management: medications reviewed

## 2024-08-12 NOTE — PLAN OF CARE
0730-1930: D+5 Allo. Tmax 99.6. Hypertensive within parameters. OVSS on RA. Denies pain and nausea. Cytoxan flush ended at 1600. 10mg IV lasix given X1 d/t not meeting void parameters. Watery incontinent stool X1. Imodium given. Pt with new runny nose. RVP and Covid negative. Weight up again this evening. Provider notified and 1X 20mg IV lasix given. Continues with good appetite. Showered and used CHG wipes. Using saline rinses often. Denies mouth sores. Wife at bedside and attentive.

## 2024-08-13 ENCOUNTER — APPOINTMENT (OUTPATIENT)
Dept: PHYSICAL THERAPY | Facility: CLINIC | Age: 74
DRG: 014 | End: 2024-08-13
Attending: INTERNAL MEDICINE
Payer: MEDICARE

## 2024-08-13 LAB
ACANTHOCYTES BLD QL SMEAR: ABNORMAL
ANION GAP SERPL CALCULATED.3IONS-SCNC: 10 MMOL/L (ref 7–15)
AUER BODIES BLD QL SMEAR: ABNORMAL
BASO STIPL BLD QL SMEAR: ABNORMAL
BASOPHILS # BLD AUTO: ABNORMAL 10*3/UL
BASOPHILS NFR BLD AUTO: ABNORMAL %
BITE CELLS BLD QL SMEAR: ABNORMAL
BLISTER CELLS BLD QL SMEAR: ABNORMAL
BUN SERPL-MCNC: 14.8 MG/DL (ref 8–23)
BURR CELLS BLD QL SMEAR: ABNORMAL
CALCIUM SERPL-MCNC: 8.9 MG/DL (ref 8.8–10.4)
CHLORIDE SERPL-SCNC: 105 MMOL/L (ref 98–107)
CREAT SERPL-MCNC: 0.99 MG/DL (ref 0.67–1.17)
DACRYOCYTES BLD QL SMEAR: ABNORMAL
EGFRCR SERPLBLD CKD-EPI 2021: 79 ML/MIN/1.73M2
ELLIPTOCYTES BLD QL SMEAR: SLIGHT
EOSINOPHIL # BLD AUTO: ABNORMAL 10*3/UL
EOSINOPHIL NFR BLD AUTO: ABNORMAL %
ERYTHROCYTE [DISTWIDTH] IN BLOOD BY AUTOMATED COUNT: 15.9 % (ref 10–15)
FRAGMENTS BLD QL SMEAR: ABNORMAL
GLUCOSE BLDC GLUCOMTR-MCNC: 122 MG/DL (ref 70–99)
GLUCOSE BLDC GLUCOMTR-MCNC: 124 MG/DL (ref 70–99)
GLUCOSE BLDC GLUCOMTR-MCNC: 129 MG/DL (ref 70–99)
GLUCOSE BLDC GLUCOMTR-MCNC: 133 MG/DL (ref 70–99)
GLUCOSE SERPL-MCNC: 129 MG/DL (ref 70–99)
HCO3 SERPL-SCNC: 23 MMOL/L (ref 22–29)
HCT VFR BLD AUTO: 27.8 % (ref 40–53)
HGB BLD-MCNC: 9.3 G/DL (ref 13.3–17.7)
HGB C CRYSTALS: ABNORMAL
HOWELL-JOLLY BOD BLD QL SMEAR: ABNORMAL
IMM GRANULOCYTES # BLD: ABNORMAL 10*3/UL
IMM GRANULOCYTES NFR BLD: ABNORMAL %
LYMPHOCYTES # BLD AUTO: ABNORMAL 10*3/UL
LYMPHOCYTES NFR BLD AUTO: ABNORMAL %
MAGNESIUM SERPL-MCNC: 1.9 MG/DL (ref 1.7–2.3)
MCH RBC QN AUTO: 31.4 PG (ref 26.5–33)
MCHC RBC AUTO-ENTMCNC: 33.5 G/DL (ref 31.5–36.5)
MCV RBC AUTO: 94 FL (ref 78–100)
MONOCYTES # BLD AUTO: ABNORMAL 10*3/UL
MONOCYTES NFR BLD AUTO: ABNORMAL %
NEUTROPHILS # BLD AUTO: ABNORMAL 10*3/UL
NEUTROPHILS NFR BLD AUTO: ABNORMAL %
NEUTS HYPERSEG BLD QL SMEAR: ABNORMAL
PHOSPHATE SERPL-MCNC: 3.4 MG/DL (ref 2.5–4.5)
PLAT MORPH BLD: ABNORMAL
PLATELET # BLD AUTO: 51 10E3/UL (ref 150–450)
POLYCHROMASIA BLD QL SMEAR: ABNORMAL
POTASSIUM SERPL-SCNC: 3.4 MMOL/L (ref 3.4–5.3)
POTASSIUM SERPL-SCNC: 3.4 MMOL/L (ref 3.4–5.3)
POTASSIUM SERPL-SCNC: 3.5 MMOL/L (ref 3.4–5.3)
RBC # BLD AUTO: 2.96 10E6/UL (ref 4.4–5.9)
RBC AGGLUT BLD QL: ABNORMAL
RBC MORPH BLD: ABNORMAL
ROULEAUX BLD QL SMEAR: ABNORMAL
SICKLE CELLS BLD QL SMEAR: ABNORMAL
SMUDGE CELLS BLD QL SMEAR: ABNORMAL
SODIUM SERPL-SCNC: 138 MMOL/L (ref 135–145)
SODIUM SERPL-SCNC: 138 MMOL/L (ref 135–145)
SPHEROCYTES BLD QL SMEAR: ABNORMAL
STOMATOCYTES BLD QL SMEAR: ABNORMAL
TARGETS BLD QL SMEAR: SLIGHT
TOXIC GRANULES BLD QL SMEAR: ABNORMAL
VARIANT LYMPHS BLD QL SMEAR: ABNORMAL
WBC # BLD AUTO: 0.1 10E3/UL (ref 4–11)

## 2024-08-13 PROCEDURE — 250N000011 HC RX IP 250 OP 636: Performed by: PHYSICIAN ASSISTANT

## 2024-08-13 PROCEDURE — 84295 ASSAY OF SERUM SODIUM: CPT | Performed by: STUDENT IN AN ORGANIZED HEALTH CARE EDUCATION/TRAINING PROGRAM

## 2024-08-13 PROCEDURE — 97110 THERAPEUTIC EXERCISES: CPT | Mod: GP

## 2024-08-13 PROCEDURE — 250N000013 HC RX MED GY IP 250 OP 250 PS 637

## 2024-08-13 PROCEDURE — 258N000003 HC RX IP 258 OP 636: Performed by: PHYSICIAN ASSISTANT

## 2024-08-13 PROCEDURE — 206N000001 HC R&B BMT UMMC

## 2024-08-13 PROCEDURE — 250N000011 HC RX IP 250 OP 636: Performed by: INTERNAL MEDICINE

## 2024-08-13 PROCEDURE — 85027 COMPLETE CBC AUTOMATED: CPT | Performed by: STUDENT IN AN ORGANIZED HEALTH CARE EDUCATION/TRAINING PROGRAM

## 2024-08-13 PROCEDURE — 84132 ASSAY OF SERUM POTASSIUM: CPT | Performed by: STUDENT IN AN ORGANIZED HEALTH CARE EDUCATION/TRAINING PROGRAM

## 2024-08-13 PROCEDURE — 250N000011 HC RX IP 250 OP 636: Mod: JZ | Performed by: STUDENT IN AN ORGANIZED HEALTH CARE EDUCATION/TRAINING PROGRAM

## 2024-08-13 PROCEDURE — 250N000013 HC RX MED GY IP 250 OP 250 PS 637: Performed by: PHYSICIAN ASSISTANT

## 2024-08-13 PROCEDURE — 250N000013 HC RX MED GY IP 250 OP 250 PS 637: Performed by: STUDENT IN AN ORGANIZED HEALTH CARE EDUCATION/TRAINING PROGRAM

## 2024-08-13 PROCEDURE — 84100 ASSAY OF PHOSPHORUS: CPT | Performed by: STUDENT IN AN ORGANIZED HEALTH CARE EDUCATION/TRAINING PROGRAM

## 2024-08-13 PROCEDURE — 97116 GAIT TRAINING THERAPY: CPT | Mod: GP

## 2024-08-13 PROCEDURE — 99232 SBSQ HOSP IP/OBS MODERATE 35: CPT | Mod: GC | Performed by: STUDENT IN AN ORGANIZED HEALTH CARE EDUCATION/TRAINING PROGRAM

## 2024-08-13 PROCEDURE — 83735 ASSAY OF MAGNESIUM: CPT | Performed by: STUDENT IN AN ORGANIZED HEALTH CARE EDUCATION/TRAINING PROGRAM

## 2024-08-13 PROCEDURE — 250N000012 HC RX MED GY IP 250 OP 636 PS 637: Performed by: PHYSICIAN ASSISTANT

## 2024-08-13 PROCEDURE — 250N000013 HC RX MED GY IP 250 OP 250 PS 637: Performed by: INTERNAL MEDICINE

## 2024-08-13 PROCEDURE — 250N000011 HC RX IP 250 OP 636: Performed by: STUDENT IN AN ORGANIZED HEALTH CARE EDUCATION/TRAINING PROGRAM

## 2024-08-13 RX ORDER — FUROSEMIDE 10 MG/ML
20 INJECTION INTRAMUSCULAR; INTRAVENOUS ONCE
Status: COMPLETED | OUTPATIENT
Start: 2024-08-13 | End: 2024-08-13

## 2024-08-13 RX ORDER — POTASSIUM CHLORIDE 29.8 MG/ML
20 INJECTION INTRAVENOUS
Status: COMPLETED | OUTPATIENT
Start: 2024-08-13 | End: 2024-08-13

## 2024-08-13 RX ORDER — SIMETHICONE 80 MG
80 TABLET,CHEWABLE ORAL EVERY 6 HOURS PRN
Status: DISCONTINUED | OUTPATIENT
Start: 2024-08-13 | End: 2024-08-25 | Stop reason: HOSPADM

## 2024-08-13 RX ADMIN — URSODIOL 300 MG: 300 CAPSULE ORAL at 19:52

## 2024-08-13 RX ADMIN — POTASSIUM CHLORIDE 20 MEQ: 29.8 INJECTION, SOLUTION INTRAVENOUS at 10:03

## 2024-08-13 RX ADMIN — SIMETHICONE 80 MG: 80 TABLET, CHEWABLE ORAL at 12:22

## 2024-08-13 RX ADMIN — ACYCLOVIR 800 MG: 800 TABLET ORAL at 19:52

## 2024-08-13 RX ADMIN — FUROSEMIDE 20 MG: 10 INJECTION, SOLUTION INTRAMUSCULAR; INTRAVENOUS at 13:30

## 2024-08-13 RX ADMIN — ATORVASTATIN CALCIUM 10 MG: 10 TABLET, FILM COATED ORAL at 19:52

## 2024-08-13 RX ADMIN — FLUTICASONE PROPIONATE 1 SPRAY: 50 SPRAY, METERED NASAL at 19:52

## 2024-08-13 RX ADMIN — ACYCLOVIR 800 MG: 800 TABLET ORAL at 08:30

## 2024-08-13 RX ADMIN — CEFEPIME HYDROCHLORIDE 2 G: 2 INJECTION, POWDER, FOR SOLUTION INTRAVENOUS at 12:22

## 2024-08-13 RX ADMIN — ALLOPURINOL 300 MG: 300 TABLET ORAL at 08:31

## 2024-08-13 RX ADMIN — ONDANSETRON HYDROCHLORIDE 8 MG: 8 TABLET, FILM COATED ORAL at 04:45

## 2024-08-13 RX ADMIN — AMLODIPINE BESYLATE 10 MG: 10 TABLET ORAL at 08:31

## 2024-08-13 RX ADMIN — TAMSULOSIN HYDROCHLORIDE 0.4 MG: 0.4 CAPSULE ORAL at 08:31

## 2024-08-13 RX ADMIN — URSODIOL 300 MG: 300 CAPSULE ORAL at 08:31

## 2024-08-13 RX ADMIN — LOPERAMIDE HYDROCHLORIDE 4 MG: 2 CAPSULE ORAL at 12:22

## 2024-08-13 RX ADMIN — CEFEPIME HYDROCHLORIDE 2 G: 2 INJECTION, POWDER, FOR SOLUTION INTRAVENOUS at 04:46

## 2024-08-13 RX ADMIN — MYCOPHENOLATE MOFETIL 1500 MG: 500 INJECTION, POWDER, LYOPHILIZED, FOR SOLUTION INTRAVENOUS at 10:02

## 2024-08-13 RX ADMIN — FILGRASTIM-AAFI 480 MCG: 480 INJECTION, SOLUTION INTRAVENOUS; SUBCUTANEOUS at 19:52

## 2024-08-13 RX ADMIN — LISINOPRIL 5 MG: 5 TABLET ORAL at 08:31

## 2024-08-13 RX ADMIN — MYCOPHENOLATE MOFETIL 1500 MG: 500 INJECTION, POWDER, LYOPHILIZED, FOR SOLUTION INTRAVENOUS at 21:05

## 2024-08-13 RX ADMIN — BRIMONIDINE TARTRATE 1 DROP: 2 SOLUTION/ DROPS OPHTHALMIC at 08:32

## 2024-08-13 RX ADMIN — CEFEPIME HYDROCHLORIDE 2 G: 2 INJECTION, POWDER, FOR SOLUTION INTRAVENOUS at 21:05

## 2024-08-13 RX ADMIN — BRIMONIDINE TARTRATE 1 DROP: 2 SOLUTION/ DROPS OPHTHALMIC at 19:52

## 2024-08-13 RX ADMIN — POTASSIUM CHLORIDE 20 MEQ: 29.8 INJECTION, SOLUTION INTRAVENOUS at 08:23

## 2024-08-13 RX ADMIN — URSODIOL 300 MG: 300 CAPSULE ORAL at 13:34

## 2024-08-13 RX ADMIN — CALCIUM CARBONATE 600 MG (1,500 MG)-VITAMIN D3 400 UNIT TABLET 1 TABLET: at 08:31

## 2024-08-13 RX ADMIN — MICAFUNGIN SODIUM 100 MG: 50 INJECTION, POWDER, LYOPHILIZED, FOR SOLUTION INTRAVENOUS at 08:23

## 2024-08-13 RX ADMIN — Medication 10 ML: at 04:46

## 2024-08-13 RX ADMIN — Medication 3 CAPSULE: at 08:31

## 2024-08-13 RX ADMIN — SIMETHICONE 80 MG: 80 TABLET, CHEWABLE ORAL at 19:57

## 2024-08-13 RX ADMIN — PANTOPRAZOLE SODIUM 40 MG: 40 TABLET, DELAYED RELEASE ORAL at 08:31

## 2024-08-13 RX ADMIN — LATANOPROST 2 DROP: 50 SOLUTION OPHTHALMIC at 21:05

## 2024-08-13 RX ADMIN — SIROLIMUS 4 MG: 2 TABLET ORAL at 08:31

## 2024-08-13 RX ADMIN — FLUTICASONE PROPIONATE 1 SPRAY: 50 SPRAY, METERED NASAL at 08:32

## 2024-08-13 ASSESSMENT — ACTIVITIES OF DAILY LIVING (ADL)
ADLS_ACUITY_SCORE: 25
ADLS_ACUITY_SCORE: 24
ADLS_ACUITY_SCORE: 25
ADLS_ACUITY_SCORE: 24
ADLS_ACUITY_SCORE: 25
ADLS_ACUITY_SCORE: 24
ADLS_ACUITY_SCORE: 25
ADLS_ACUITY_SCORE: 25
ADLS_ACUITY_SCORE: 24
ADLS_ACUITY_SCORE: 25
ADLS_ACUITY_SCORE: 25
ADLS_ACUITY_SCORE: 24
ADLS_ACUITY_SCORE: 25
ADLS_ACUITY_SCORE: 24
ADLS_ACUITY_SCORE: 24

## 2024-08-13 NOTE — PLAN OF CARE
0730-1930: D+6 Allo. Tmax 99.1. OVSS on RA. Denies pain and nausea. C/o of loose stool and gas discomfort. PRN Imodium and simethicone given. Lasix given with good output. New skin tear on LUE. Mepilex applied and provider notified. Eating well. , 133, and 122. K recheck was 3.5. Still eating well but c/o metallic taste with food. UAL. Walked in halls. In good spirits.

## 2024-08-13 NOTE — PROGRESS NOTES
CLINICAL NUTRITION SERVICES - BRIEF NOTE     Nutrition Prescription      Recommendations already ordered by Registered Dietitian (RD):  Ensure Max daily    Future/Additional Recommendations:  -Continue to monitor PO intake  -Continue to monitor supplement tolerance/preference      EVALUATION OF THE PROGRESS TOWARD GOALS   Diet: High Kcal/High Protein, PRN snacks/supplements, [employer] 1.4 daily    NEW FINDINGS   Met with pt and his wife at bedside per request of Dr. Fowler to review ONS. Pt notes he strongly dislikes [employer] and is interested in a lower calorie, non-plant based ONS. Reviewed options and pt agreeable to trying Ensure Max. Pt notes he continues to track his protein intake daily in the [employer] yoselin, plan to have 1 ONS daily to help increase his protein intake.        INTERVENTIONS  Medical food supplement therapy - discontinued [employer], started Ensure Max     Monitoring/Evaluation  Progress toward goals will be monitored and evaluated per protocol.     Susan Rogers (Maggie), OCTAVIA, LD- 5C Clinical Dietitian   Available on [employer]  No longer available by paging

## 2024-08-13 NOTE — PROGRESS NOTES
"BMT Progress Note  Chief complaint:  Arpit Forrest is a 73 year old male, currently day+6 s/p ELIEZER MUD PBSCT for MDS.  INTERIM HISTORY:   Finished cytoxan flush yesterday, he received 60mg IV lasix and urinated frequently, but was able to sleep later in the evening. Endorsed some abdominal bloating/gas but otherwise feels well this morning. Eating and drinking okay, 2 BM yesterday. Weight decreased 4lb from yesterday, though continues to have b/l LE edema.   Denies fever, chills, SOB, chest pain, abdominal pain, rash.    REVIEW OF SYSTEMS: Otherwise unremarkable other than what is noted in the Interim History.   PHYSICAL EXAM:   Vitals:  /78 (BP Location: Right arm)   Pulse 80   Temp 99.1  F (37.3  C) (Oral)   Resp 16   Ht 1.825 m (5' 11.85\")   Wt 117.4 kg (258 lb 12.8 oz)   SpO2 97%   BMI 35.25 kg/m      General: alert and cooperative, NAD, face with maye appearance--stable per pt  HEENT: NC/AT, sclera anicteric, MMM, no oral lesions   CV: RRR, systolic murmur present  Resp: CTAB, normal work of breathing on room air   GI: soft, non-tender, non-distended, normal bowel sounds   MSK: warm and well-perfused, normal tone  Skin: no rashes on limited exam,   Neuro: Alert and interactive, moves all extremities equally, 1+ b/l lower extremity edema   Psych: Mood and affect normal  Vascular: Right CVC   LABS:  Lab Results   Component Value Date    WBC 0.1 (LL) 08/13/2024    ANEU 0.8 (L) 08/08/2024    HGB 9.3 (L) 08/13/2024    HCT 27.8 (L) 08/13/2024    PLT 51 (L) 08/13/2024     08/13/2024     08/13/2024    POTASSIUM 3.4 08/13/2024    POTASSIUM 3.4 08/13/2024    CHLORIDE 105 08/13/2024    CO2 23 08/13/2024     (H) 08/13/2024    BUN 14.8 08/13/2024    CR 0.99 08/13/2024    MAG 1.9 08/13/2024    INR 1.26 (H) 08/12/2024    BILITOTAL 0.6 08/12/2024    AST 15 08/12/2024    ALT 19 08/12/2024    ALKPHOS 53 08/12/2024    PROTTOTAL 5.7 (L) 08/12/2024    ALBUMIN 3.5 08/12/2024        ASSESSMENT AND " PLAN: Arpit Forrest is a 73 year old male, currently day +6 s/p ELIEZER MUD PBSCT for MDS.  BMT/IEC PROTOCOL for MDS  - Chemo protocol: [id number]; CY/flu/TBI  - Peripheral blood stem cell graft from 8/8 donor, ABO matched (Oneg), Cell dose: TBD  - continue allopurinol (hx of gout)  - Engraftment monitoring: Peripheral blood and bone marrow chimerisms on D28, D100, 6 months, 1 and 2 year  - Restaging plan: BMBx with NGS on D28, D100, 6 months, 1 and 2 years  HEME/COAG  - GCSF starts day +5, continue until ANC > 1500 for 3 consecutive days.   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    -Next siro level 8/14    ID  #Neutropenic fever- Cefepime (8/11-x)   *work up NTD. BCX (8/10 NGTD)   *c.diff negative (8/11)   *RVP, Covid (8/12) negative  Recent Hx: Multiple granulomas in the lung and bone marrow biopsy- fungitell, aspergillus galactomanan neg. 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 Imodium PRN.  # Hepatic steatosis and boderline iron deposition in liver  - LFTs and synthetic function normal on admission; Check LFTs qM/Th  - VOD prophy: Ursodiol 300mg TID    # Supportive   - Anti-emetics:  per protocol   - Ulcer prophy: PPI daily   - Dietician support to prevent malnutrition.     CARDIOVASCULAR  # Hx of A.fib with RVR   - A.fib with RVR while admitted with febrile episode on 3/15/24. Noted on his fitbit, not very symptomatic. Cardioversion planned for new onset A.fib. Not achieved  with amiodarone, DCCV on 3/21/24 restored normal sinus rhythm. Amiodarone and anticoagulation was stopped in April 2024 without any recurrence.   - He could have a recurrence during transplant which would need to be addressed with rate control strategy   # Asymptomatic moderate aortic stenosis   # CAD  - Chest CT in February 2024 with mild to moderate CAC, three-vessel disease. No anginal symptoms   - Continue atorvastatin 10mg   - EKG Sinus rhythm Inferior infarct , age undetermined. Abnormal ECG (known cardiovascular disease). QTc =460  # HTN- Continue amlodipine 10mg, added lisinopril 5mg 8/4 (PTA dose of lisinopril was 10mg)  - Risk of cardiomyopathy:  Baseline EF normal 60-65&  - Risk of arrhythmia: Baseline EKG showed NSR, Qtc 460     RESPIRATORY  # VINCENT : CPAP  - Baseline PFTs: normal   - Risk of respiratory complications: Frequent ambulation and incentive spirometer.     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 8/19-11 lasix per protocol Cytoxan flush   -Finished cytoxan flush D+5, but continues to have b/l LE edema. Will continue 20 mg IV lasix x1 (8/13), reassess weight daily   # Electrolyte management: replace per sliding scale  - cont Ca w/vit D  - hold eye vitamin and Coq10 peritransplant     DIABETES/ENDOCRINE  #DMII: hx steroid-induced hyperglycemia: Carb coverage and Novolog sliding scale. Endo initially consulted, signed off with stable sugars.   *held PTA metformin on admission.      MUSCULOSKELETAL/FRAILTY  # Gout: Continue allopurinol indefinitely   - Baseline Frailty Score: Not frail   - Daily PT/OT as needed while inpatient     SOCIAL DETERMINANTS  - Caregiver: Wife,  "Meghna, children     Medically Ready for Discharge: Anticipated in 5+ Days    Clinically Significant Risk Factors        # Hypokalemia: Lowest K = 3.3 mmol/L in last 2 days, will replace as needed        # Coagulation Defect: INR = 1.26 (Ref range: 0.85 - 1.15) and/or PTT = 34 Seconds (Ref range: 22 - 38 Seconds), will monitor for bleeding  # Thrombocytopenia: Lowest platelets = 51 in last 2 days, will monitor for bleeding   # Hypertension: Noted on problem list            # Obesity: Estimated body mass index is 35.25 kg/m  as calculated from the following:    Height as of this encounter: 1.825 m (5' 11.85\").    Weight as of this encounter: 117.4 kg (258 lb 12.8 oz).           Pt was staffed with Dr. Malcolm Rivera MD   Hematology/Oncology Fellow PGY6  Pager: [phone]           "

## 2024-08-13 NOTE — PLAN OF CARE
"BP (!) 144/76   Pulse 70   Temp 98.7  F (37.1  C) (Oral)   Resp 18   Ht 1.825 m (5' 11.85\")   Wt 118.9 kg (262 lb 1.6 oz)   SpO2 97%   BMI 35.70 kg/m      Mr. Forrest had a more restful night than yesterday since the cytoxan flush and its related lasix were completed. He endorsed almost to no sleep since the flush stated, and requested that cares were clustered in order to allow him as much rest as possible. BLE edema and urinary urgency in the evening relate to fluid and lasix administration. Pt able to sleep 4-6 hours, which is a marked improvement in his eyes. Doyle fall score of 45 so education provided on importance of fall prevention, bed alarm off due to patient refusal related to urinary urgency. Voiding spontaneously, endorses some urgency and frequency that can become incontinence if he goes too far from the restroom. Potassium ordered per protocol, patient requested that administration not begin until after he finishes breakfast.    Problem: Adult Inpatient Plan of Care  Goal: Plan of Care Review  Description: The Plan of Care Review/Shift note should be completed every shift.  The Outcome Evaluation is a brief statement about your assessment that the patient is improving, declining, or no change.  This information will be displayed automatically on your shift  note.  Outcome: Progressing  Flowsheets (Taken 8/13/2024 0022)  Plan of Care Reviewed With: patient  Overall Patient Progress: no change  Goal: Patient-Specific Goal (Individualized)  Description: You can add care plan individualizations to a care plan. Examples of Individualization might be:  \"Parent requests to be called daily at 9am for status\", \"I have a hard time hearing out of my right ear\", or \"Do not touch me to wake me up as it startles  me\".  Outcome: Progressing  Flowsheets (Taken 8/13/2024 0022)  Patient/Family-Specific Goals (Include Timeframe): Nursing will cluster cares to maximize uninterrupted sleep for patient  Goal: Absence " of Hospital-Acquired Illness or Injury  Outcome: Progressing  Intervention: Identify and Manage Fall Risk  Recent Flowsheet Documentation  Taken 8/12/2024 2330 by Charli Luu RN  Safety Promotion/Fall Prevention:   activity supervised   chemotherapeutic precautions   clutter free environment maintained   nonskid shoes/slippers when out of bed   patient and family education   safety round/check completed   supervised activity  Taken 8/12/2024 1945 by Jus, Charli K., RN  Safety Promotion/Fall Prevention:   activity supervised   chemotherapeutic precautions   clutter free environment maintained   nonskid shoes/slippers when out of bed   safety round/check completed  Intervention: Prevent Skin Injury  Recent Flowsheet Documentation  Taken 8/12/2024 1945 by Charli Luu RN  Body Position: position changed independently  Skin Protection: adhesive use limited  Device Skin Pressure Protection: adhesive use limited  Intervention: Prevent Infection  Recent Flowsheet Documentation  Taken 8/12/2024 2330 by Charli Luu RN  Infection Prevention:   environmental surveillance performed   hand hygiene promoted   personal protective equipment utilized   rest/sleep promoted   single patient room provided  Taken 8/12/2024 1945 by Charli Luu RN  Infection Prevention:   environmental surveillance performed   hand hygiene promoted   personal protective equipment utilized   single patient room provided   rest/sleep promoted  Goal: Readiness for Transition of Care  Outcome: Progressing     Problem: Risk for Delirium  Goal: Optimal Coping  Outcome: Progressing  Intervention: Optimize Psychosocial Adjustment to Delirium  Recent Flowsheet Documentation  Taken 8/12/2024 1945 by Charli Luu RN  Supportive Measures:   active listening utilized   goal-setting facilitated   self-care encouraged  Goal: Improved Behavioral Control  Outcome: Progressing  Goal: Improved Attention and Thought  Clarity  Outcome: Progressing  Goal: Improved Sleep  Outcome: Progressing     Problem: Stem Cell/Bone Marrow Transplant  Goal: Optimal Coping with Transplant  Outcome: Progressing  Intervention: Optimize Patient/Family Adjustment to Transplant  Recent Flowsheet Documentation  Taken 8/12/2024 1945 by Charli Luu RN  Supportive Measures:   active listening utilized   goal-setting facilitated   self-care encouraged  Goal: Diarrhea Symptom Control  Outcome: Progressing  Intervention: Manage Diarrhea  Recent Flowsheet Documentation  Taken 8/12/2024 1945 by Charli Luu RN  Skin Protection: adhesive use limited  Goal: Improved Activity Tolerance  Outcome: Progressing  Intervention: Promote Improved Energy  Recent Flowsheet Documentation  Taken 8/12/2024 2330 by Charli Luu RN  Activity Management:   standing at bedside   back to bed  Taken 8/12/2024 1945 by Charli Luu RN  Activity Management:   standing at bedside   back to bed  Goal: Absence of Hypersensitivity Reaction  Outcome: Progressing  Goal: Absence of Infection  Outcome: Progressing  Intervention: Prevent and Manage Infection  Recent Flowsheet Documentation  Taken 8/12/2024 2330 by Charli Luu RN  Infection Prevention:   environmental surveillance performed   hand hygiene promoted   personal protective equipment utilized   rest/sleep promoted   single patient room provided  Infection Management: aseptic technique maintained  Isolation Precautions:   cytotoxic precautions maintained   protective environment maintained  Taken 8/12/2024 1945 by Charli Luu RN  Infection Prevention:   environmental surveillance performed   hand hygiene promoted   personal protective equipment utilized   single patient room provided   rest/sleep promoted  Infection Management: aseptic technique maintained  Isolation Precautions:   cytotoxic precautions maintained   protective environment maintained  Goal: Improved Oral Mucous  Membrane Health and Integrity  Outcome: Progressing  Intervention: Promote Oral Comfort and Health  Recent Flowsheet Documentation  Taken 8/12/2024 2330 by Charli Luu, RN  Oral Care: oral rinse provided  Taken 8/12/2024 1945 by Charli Luu RN  Oral Care: oral rinse provided  Goal: Nausea and Vomiting Symptom Relief  Outcome: Progressing  Goal: Optimal Nutrition Intake  Outcome: Progressing     Problem: Infection  Goal: Absence of Infection Signs and Symptoms  Outcome: Progressing  Intervention: Prevent or Manage Infection  Recent Flowsheet Documentation  Taken 8/12/2024 2330 by Charli Luu RN  Infection Management: aseptic technique maintained  Isolation Precautions:   cytotoxic precautions maintained   protective environment maintained  Taken 8/12/2024 1945 by Charli Luu RN  Infection Management: aseptic technique maintained  Isolation Precautions:   cytotoxic precautions maintained   protective environment maintained     Problem: Comorbidity Management  Goal: Blood Glucose Levels Within Targeted Range  Outcome: Progressing  Intervention: Monitor and Manage Glycemia  Recent Flowsheet Documentation  Taken 8/12/2024 2330 by Charli Luu, RN  Medication Review/Management:   medications reviewed   high-risk medications identified     Problem: Obstructive Sleep Apnea Risk or Actual  Goal: Unobstructed Breathing During Sleep  Outcome: Progressing  Intervention: Monitor and Manage Obstructive Sleep Apnea  Recent Flowsheet Documentation  Taken 8/12/2024 2330 by Charli Luu, RN  Medication Review/Management:   medications reviewed   high-risk medications identified     Problem: Adult Inpatient Plan of Care  Goal: Optimal Comfort and Wellbeing  Outcome: Not Progressing     Problem: Stem Cell/Bone Marrow Transplant  Goal: Symptom-Free Urinary Elimination  Outcome: Not Progressing  Goal: Blood Counts Within Acceptable Range  Outcome: Not Progressing  Intervention: Monitor and  Manage Hematologic Symptoms  Recent Flowsheet Documentation  Taken 8/12/2024 2330 by Charli Luu, RN  Bleeding Precautions:   blood pressure closely monitored   coagulation study results reviewed   gentle oral care promoted   monitored for signs of bleeding  Medication Review/Management:   medications reviewed   high-risk medications identified  Taken 8/12/2024 1945 by Charli Luu, RN  Bleeding Precautions:   blood pressure closely monitored   coagulation study results reviewed   gentle oral care promoted   monitored for signs of bleeding   Goal Outcome Evaluation:      Plan of Care Reviewed With: patient    Overall Patient Progress: no change

## 2024-08-14 LAB
ACANTHOCYTES BLD QL SMEAR: NORMAL
ANION GAP SERPL CALCULATED.3IONS-SCNC: 12 MMOL/L (ref 7–15)
AUER BODIES BLD QL SMEAR: NORMAL
BASO STIPL BLD QL SMEAR: NORMAL
BASOPHILS # BLD AUTO: ABNORMAL 10*3/UL
BASOPHILS NFR BLD AUTO: ABNORMAL %
BITE CELLS BLD QL SMEAR: NORMAL
BLISTER CELLS BLD QL SMEAR: NORMAL
BUN SERPL-MCNC: 13.6 MG/DL (ref 8–23)
BURR CELLS BLD QL SMEAR: NORMAL
CALCIUM SERPL-MCNC: 9.1 MG/DL (ref 8.8–10.4)
CHLORIDE SERPL-SCNC: 104 MMOL/L (ref 98–107)
CMV DNA SPEC NAA+PROBE-ACNC: NOT DETECTED IU/ML
CREAT SERPL-MCNC: 0.95 MG/DL (ref 0.67–1.17)
DACRYOCYTES BLD QL SMEAR: NORMAL
EGFRCR SERPLBLD CKD-EPI 2021: 83 ML/MIN/1.73M2
ELLIPTOCYTES BLD QL SMEAR: NORMAL
EOSINOPHIL # BLD AUTO: ABNORMAL 10*3/UL
EOSINOPHIL NFR BLD AUTO: ABNORMAL %
ERYTHROCYTE [DISTWIDTH] IN BLOOD BY AUTOMATED COUNT: 15.5 % (ref 10–15)
FRAGMENTS BLD QL SMEAR: NORMAL
GLUCOSE BLDC GLUCOMTR-MCNC: 118 MG/DL (ref 70–99)
GLUCOSE BLDC GLUCOMTR-MCNC: 129 MG/DL (ref 70–99)
GLUCOSE BLDC GLUCOMTR-MCNC: 148 MG/DL (ref 70–99)
GLUCOSE BLDC GLUCOMTR-MCNC: 159 MG/DL (ref 70–99)
GLUCOSE SERPL-MCNC: 134 MG/DL (ref 70–99)
HCO3 SERPL-SCNC: 24 MMOL/L (ref 22–29)
HCT VFR BLD AUTO: 27 % (ref 40–53)
HGB BLD-MCNC: 8.6 G/DL (ref 13.3–17.7)
HGB C CRYSTALS: NORMAL
HOWELL-JOLLY BOD BLD QL SMEAR: NORMAL
IMM GRANULOCYTES # BLD: ABNORMAL 10*3/UL
IMM GRANULOCYTES NFR BLD: ABNORMAL %
LYMPHOCYTES # BLD AUTO: ABNORMAL 10*3/UL
LYMPHOCYTES NFR BLD AUTO: ABNORMAL %
MAGNESIUM SERPL-MCNC: 1.9 MG/DL (ref 1.7–2.3)
MCH RBC QN AUTO: 29.6 PG (ref 26.5–33)
MCHC RBC AUTO-ENTMCNC: 31.9 G/DL (ref 31.5–36.5)
MCV RBC AUTO: 93 FL (ref 78–100)
MONOCYTES # BLD AUTO: ABNORMAL 10*3/UL
MONOCYTES NFR BLD AUTO: ABNORMAL %
NEUTROPHILS # BLD AUTO: ABNORMAL 10*3/UL
NEUTROPHILS NFR BLD AUTO: ABNORMAL %
NEUTS HYPERSEG BLD QL SMEAR: NORMAL
PHOSPHATE SERPL-MCNC: 3.4 MG/DL (ref 2.5–4.5)
PLAT MORPH BLD: NORMAL
PLATELET # BLD AUTO: 19 10E3/UL (ref 150–450)
POLYCHROMASIA BLD QL SMEAR: NORMAL
POTASSIUM SERPL-SCNC: 3.4 MMOL/L (ref 3.4–5.3)
POTASSIUM SERPL-SCNC: 3.8 MMOL/L (ref 3.4–5.3)
RBC # BLD AUTO: 2.91 10E6/UL (ref 4.4–5.9)
RBC AGGLUT BLD QL: NORMAL
RBC MORPH BLD: NORMAL
ROULEAUX BLD QL SMEAR: NORMAL
SICKLE CELLS BLD QL SMEAR: NORMAL
SIROLIMUS BLD-MCNC: 4.3 UG/L (ref 5–15)
SMUDGE CELLS BLD QL SMEAR: NORMAL
SODIUM SERPL-SCNC: 140 MMOL/L (ref 135–145)
SPHEROCYTES BLD QL SMEAR: NORMAL
STOMATOCYTES BLD QL SMEAR: NORMAL
TARGETS BLD QL SMEAR: NORMAL
TME LAST DOSE: ABNORMAL H
TME LAST DOSE: ABNORMAL H
TOXIC GRANULES BLD QL SMEAR: NORMAL
VARIANT LYMPHS BLD QL SMEAR: NORMAL
WBC # BLD AUTO: 0.1 10E3/UL (ref 4–11)

## 2024-08-14 PROCEDURE — 80195 ASSAY OF SIROLIMUS: CPT | Performed by: STUDENT IN AN ORGANIZED HEALTH CARE EDUCATION/TRAINING PROGRAM

## 2024-08-14 PROCEDURE — 250N000012 HC RX MED GY IP 250 OP 636 PS 637: Performed by: PHYSICIAN ASSISTANT

## 2024-08-14 PROCEDURE — 250N000011 HC RX IP 250 OP 636: Performed by: PHYSICIAN ASSISTANT

## 2024-08-14 PROCEDURE — 258N000003 HC RX IP 258 OP 636: Performed by: PHYSICIAN ASSISTANT

## 2024-08-14 PROCEDURE — 84100 ASSAY OF PHOSPHORUS: CPT | Performed by: STUDENT IN AN ORGANIZED HEALTH CARE EDUCATION/TRAINING PROGRAM

## 2024-08-14 PROCEDURE — 250N000013 HC RX MED GY IP 250 OP 250 PS 637: Performed by: INTERNAL MEDICINE

## 2024-08-14 PROCEDURE — 206N000001 HC R&B BMT UMMC

## 2024-08-14 PROCEDURE — 250N000011 HC RX IP 250 OP 636: Performed by: INTERNAL MEDICINE

## 2024-08-14 PROCEDURE — 250N000011 HC RX IP 250 OP 636: Performed by: STUDENT IN AN ORGANIZED HEALTH CARE EDUCATION/TRAINING PROGRAM

## 2024-08-14 PROCEDURE — 250N000011 HC RX IP 250 OP 636: Mod: JZ | Performed by: STUDENT IN AN ORGANIZED HEALTH CARE EDUCATION/TRAINING PROGRAM

## 2024-08-14 PROCEDURE — 84132 ASSAY OF SERUM POTASSIUM: CPT | Performed by: STUDENT IN AN ORGANIZED HEALTH CARE EDUCATION/TRAINING PROGRAM

## 2024-08-14 PROCEDURE — 999N000111 HC STATISTIC OT IP EVAL DEFER

## 2024-08-14 PROCEDURE — 250N000013 HC RX MED GY IP 250 OP 250 PS 637: Performed by: PHYSICIAN ASSISTANT

## 2024-08-14 PROCEDURE — 83735 ASSAY OF MAGNESIUM: CPT | Performed by: STUDENT IN AN ORGANIZED HEALTH CARE EDUCATION/TRAINING PROGRAM

## 2024-08-14 PROCEDURE — 250N000012 HC RX MED GY IP 250 OP 636 PS 637

## 2024-08-14 PROCEDURE — 258N000003 HC RX IP 258 OP 636: Performed by: INTERNAL MEDICINE

## 2024-08-14 PROCEDURE — 85027 COMPLETE CBC AUTOMATED: CPT | Performed by: STUDENT IN AN ORGANIZED HEALTH CARE EDUCATION/TRAINING PROGRAM

## 2024-08-14 PROCEDURE — 80048 BASIC METABOLIC PNL TOTAL CA: CPT | Performed by: STUDENT IN AN ORGANIZED HEALTH CARE EDUCATION/TRAINING PROGRAM

## 2024-08-14 PROCEDURE — 250N000013 HC RX MED GY IP 250 OP 250 PS 637

## 2024-08-14 PROCEDURE — 250N000013 HC RX MED GY IP 250 OP 250 PS 637: Performed by: STUDENT IN AN ORGANIZED HEALTH CARE EDUCATION/TRAINING PROGRAM

## 2024-08-14 RX ORDER — POTASSIUM CHLORIDE 29.8 MG/ML
20 INJECTION INTRAVENOUS
Status: COMPLETED | OUTPATIENT
Start: 2024-08-14 | End: 2024-08-14

## 2024-08-14 RX ORDER — SIROLIMUS 2 MG/1
4 TABLET, FILM COATED ORAL ONCE
Status: COMPLETED | OUTPATIENT
Start: 2024-08-14 | End: 2024-08-14

## 2024-08-14 RX ADMIN — AMLODIPINE BESYLATE 10 MG: 10 TABLET ORAL at 08:12

## 2024-08-14 RX ADMIN — BRIMONIDINE TARTRATE 1 DROP: 2 SOLUTION/ DROPS OPHTHALMIC at 08:15

## 2024-08-14 RX ADMIN — ALLOPURINOL 300 MG: 300 TABLET ORAL at 08:13

## 2024-08-14 RX ADMIN — ACYCLOVIR 800 MG: 800 TABLET ORAL at 08:14

## 2024-08-14 RX ADMIN — LISINOPRIL 5 MG: 5 TABLET ORAL at 08:12

## 2024-08-14 RX ADMIN — INSULIN ASPART 1 UNITS: 100 INJECTION, SOLUTION INTRAVENOUS; SUBCUTANEOUS at 12:49

## 2024-08-14 RX ADMIN — ACYCLOVIR 800 MG: 800 TABLET ORAL at 20:21

## 2024-08-14 RX ADMIN — MYCOPHENOLATE MOFETIL 1500 MG: 500 INJECTION, POWDER, LYOPHILIZED, FOR SOLUTION INTRAVENOUS at 09:58

## 2024-08-14 RX ADMIN — Medication 3 CAPSULE: at 08:12

## 2024-08-14 RX ADMIN — URSODIOL 300 MG: 300 CAPSULE ORAL at 20:21

## 2024-08-14 RX ADMIN — PANTOPRAZOLE SODIUM 40 MG: 40 TABLET, DELAYED RELEASE ORAL at 08:13

## 2024-08-14 RX ADMIN — MICAFUNGIN SODIUM 150 MG: 50 INJECTION, POWDER, LYOPHILIZED, FOR SOLUTION INTRAVENOUS at 08:10

## 2024-08-14 RX ADMIN — CEFEPIME HYDROCHLORIDE 2 G: 2 INJECTION, POWDER, FOR SOLUTION INTRAVENOUS at 03:52

## 2024-08-14 RX ADMIN — FILGRASTIM-AAFI 480 MCG: 480 INJECTION, SOLUTION INTRAVENOUS; SUBCUTANEOUS at 20:21

## 2024-08-14 RX ADMIN — LATANOPROST 2 DROP: 50 SOLUTION OPHTHALMIC at 21:44

## 2024-08-14 RX ADMIN — FLUTICASONE PROPIONATE 1 SPRAY: 50 SPRAY, METERED NASAL at 20:22

## 2024-08-14 RX ADMIN — FLUTICASONE PROPIONATE 1 SPRAY: 50 SPRAY, METERED NASAL at 08:15

## 2024-08-14 RX ADMIN — SIROLIMUS 4 MG: 2 TABLET ORAL at 08:10

## 2024-08-14 RX ADMIN — SIMETHICONE 80 MG: 80 TABLET, CHEWABLE ORAL at 21:44

## 2024-08-14 RX ADMIN — SIMETHICONE 80 MG: 80 TABLET, CHEWABLE ORAL at 06:34

## 2024-08-14 RX ADMIN — URSODIOL 300 MG: 300 CAPSULE ORAL at 14:27

## 2024-08-14 RX ADMIN — TAMSULOSIN HYDROCHLORIDE 0.4 MG: 0.4 CAPSULE ORAL at 08:13

## 2024-08-14 RX ADMIN — Medication 5 ML: at 03:53

## 2024-08-14 RX ADMIN — URSODIOL 300 MG: 300 CAPSULE ORAL at 08:14

## 2024-08-14 RX ADMIN — POTASSIUM CHLORIDE 20 MEQ: 29.8 INJECTION, SOLUTION INTRAVENOUS at 05:27

## 2024-08-14 RX ADMIN — MYCOPHENOLATE MOFETIL 1500 MG: 500 INJECTION, POWDER, LYOPHILIZED, FOR SOLUTION INTRAVENOUS at 21:31

## 2024-08-14 RX ADMIN — Medication 5 ML: at 12:20

## 2024-08-14 RX ADMIN — SIROLIMUS 4 MG: 2 TABLET ORAL at 17:19

## 2024-08-14 RX ADMIN — CALCIUM CARBONATE 600 MG (1,500 MG)-VITAMIN D3 400 UNIT TABLET 1 TABLET: at 08:13

## 2024-08-14 RX ADMIN — POTASSIUM CHLORIDE 20 MEQ: 29.8 INJECTION, SOLUTION INTRAVENOUS at 06:34

## 2024-08-14 RX ADMIN — ATORVASTATIN CALCIUM 10 MG: 10 TABLET, FILM COATED ORAL at 20:21

## 2024-08-14 RX ADMIN — BRIMONIDINE TARTRATE 1 DROP: 2 SOLUTION/ DROPS OPHTHALMIC at 20:21

## 2024-08-14 ASSESSMENT — ACTIVITIES OF DAILY LIVING (ADL)
ADLS_ACUITY_SCORE: 24
ADLS_ACUITY_SCORE: 23
ADLS_ACUITY_SCORE: 24
ADLS_ACUITY_SCORE: 25
ADLS_ACUITY_SCORE: 24
ADLS_ACUITY_SCORE: 23
ADLS_ACUITY_SCORE: 25
ADLS_ACUITY_SCORE: 23
ADLS_ACUITY_SCORE: 23
ADLS_ACUITY_SCORE: 24
ADLS_ACUITY_SCORE: 25
ADLS_ACUITY_SCORE: 23
ADLS_ACUITY_SCORE: 25
ADLS_ACUITY_SCORE: 25
ADLS_ACUITY_SCORE: 23

## 2024-08-14 NOTE — PROGRESS NOTES
"BMT Progress Note  Chief complaint:  Arpit Forrest is a 73 year old male, currently day+7 s/p ELIEZER MUD PBSCT for MDS.  INTERIM HISTORY:   Afebrile. Doing well. Urinary frequency, but less bothersome. No dysuria. No diarrhea. No N/V. Eating well. Active.  REVIEW OF SYSTEMS: Otherwise unremarkable other than what is noted in the Interim History.   PHYSICAL EXAM:   Vitals:  /75 (BP Location: Right arm)   Pulse 72   Temp 98.9  F (37.2  C) (Oral)   Resp 16   Ht 1.825 m (5' 11.85\")   Wt 115.8 kg (255 lb 6.4 oz)   SpO2 99%   BMI 34.78 kg/m      General: alert and cooperative, NAD, face with maye appearance--stable per pt  HEENT: NC/AT, sclera anicteric, MMM, no oral lesions   CV: RRR, systolic murmur present  Resp: CTAB, normal work of breathing on room air   MSK: warm and well-perfused, normal tone  Lymph: trace edema  Skin: no rashes on limited exam,   Neuro: Alert and interactive, moves all extremities equally  Psych: Mood and affect normal  Vascular: Right CVC   LABS:  Lab Results   Component Value Date    WBC 0.1 (LL) 08/14/2024    ANEU 0.8 (L) 08/08/2024    HGB 8.6 (L) 08/14/2024    HCT 27.0 (L) 08/14/2024    PLT 19 (LL) 08/14/2024     08/14/2024    POTASSIUM 3.4 08/14/2024    CHLORIDE 104 08/14/2024    CO2 24 08/14/2024     (H) 08/14/2024    BUN 13.6 08/14/2024    CR 0.95 08/14/2024    MAG 1.9 08/14/2024    INR 1.26 (H) 08/12/2024    BILITOTAL 0.6 08/12/2024    AST 15 08/12/2024    ALT 19 08/12/2024    ALKPHOS 53 08/12/2024    PROTTOTAL 5.7 (L) 08/12/2024    ALBUMIN 3.5 08/12/2024        ASSESSMENT AND PLAN: Arpit Forrest is a 73 year old male, currently day +7 s/p ELIEZER MUD PBSCT for MDS.    BMT/IEC PROTOCOL for MDS  - Chemo protocol: [id number]; CY/flu/TBI  - Peripheral blood stem cell graft from 8/8 donor, ABO matched (Oneg), Cell dose: TBD  - continue allopurinol (hx of gout)  - Engraftment monitoring: Peripheral blood and bone marrow chimerisms on D28, D100, 6 months, 1 and 2 year  - " Restaging plan: BMBx with NGS on D28, D100, 6 months, 1 and 2 years    HEME/COAG  - GCSF starts day +5, continue until ANC > 1500 for 3 consecutive days.   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    -Next siro level 8/14    ID  #Neutropenic fever- T cell expansion vs. Infection- Cefepime (8/11-8/14) work up unremarkable.  Recent Hx: Multiple granulomas in the lung and bone marrow biopsy- fungitell, aspergillus galactomanan neg. 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 Imodium PRN.  # Hepatic steatosis and boderline iron deposition in liver  - LFTs and synthetic function normal on admission; Check LFTs qM/Th  - VOD prophy: Ursodiol 300mg TID    # Supportive   - Anti-emetics:  per protocol   - Ulcer prophy: PPI daily   - Dietician support to prevent malnutrition.     CARDIOVASCULAR  # Hx of A.fib with RVR   - A.fib with RVR while admitted with febrile episode on 3/15/24. Noted on his fitbit, not very symptomatic. Cardioversion planned for new onset A.fib. Not achieved with amiodarone, DCCV on 3/21/24 restored normal sinus rhythm. Amiodarone and anticoagulation was stopped in April 2024 without any recurrence.   - He could have a recurrence during transplant which would need to be addressed with rate control strategy   # Asymptomatic moderate aortic stenosis   # CAD  - Chest CT in February 2024 with mild to moderate CAC, three-vessel disease. No anginal symptoms   - Continue  atorvastatin 10mg   - EKG Sinus rhythm Inferior infarct , age undetermined. Abnormal ECG (known cardiovascular disease). QTc =460  # HTN- Continue amlodipine 10mg, added lisinopril 5mg 8/4 (PTA dose of lisinopril was 10mg)  - Risk of cardiomyopathy:  Baseline EF normal 60-65&  - Risk of arrhythmia: Baseline EKG showed NSR, Qtc 460     RESPIRATORY  # VINCENT : CPAP  - Baseline PFTs: normal   - Risk of respiratory complications: Frequent ambulation and incentive spirometer.     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 8/19-11 lasix per protocol Cytoxan flush   -baseline weight 8/14, no lasix  # Electrolyte management: replace per sliding scale  - cont Ca w/vit D  - hold eye vitamin and Coq10 peritransplant     DIABETES/ENDOCRINE  #DMII: hx steroid-induced hyperglycemia: Carb coverage and Novolog sliding scale. Endo initially consulted, signed off with stable sugars.   *held PTA metformin on admission.      MUSCULOSKELETAL/FRAILTY  # Gout: Continue allopurinol indefinitely   - Baseline Frailty Score: Not frail   - Daily PT/OT as needed while inpatient     SOCIAL DETERMINANTS  - Caregiver: Wife, Meghna, children     Medically Ready for Discharge: Anticipated in 5+ Days    Clinically Significant Risk Factors               # Coagulation Defect: INR = 1.26 (Ref range: 0.85 - 1.15) and/or PTT = 34 Seconds (Ref range: 22 - 38 Seconds), will monitor for bleeding  # Thrombocytopenia: Lowest platelets = 19 in last 2 days, will monitor for bleeding   # Hypertension: Noted on problem list            # Obesity: Estimated body mass index is 34.78 kg/m  as calculated from the following:    Height as of this  "encounter: 1.825 m (5' 11.85\").    Weight as of this encounter: 115.8 kg (255 lb 6.4 oz).           I spent 30 minutes in the care of this patient today, which included time necessary for preparation for the visit, obtaining history, ordering medications/tests/procedures as medically indicated, review of pertinent medical literature, counseling of the patient, communication of recommendations to the care team, and documentation time.    Destinee Sanches PA-C  , [facility]era       "

## 2024-08-14 NOTE — PLAN OF CARE
OT 5C BMT: DEFER    Occupational Therapy: Orders received. Chart reviewed and discussed with care team.? Occupational Therapy not indicated due to lack of acute IP OT needs at this time. Pt limited by deconditioning and is followed by IP PT to address mobility, strength, and ax tolerance as needs arise. Pt is up INDly in room/halls.? Defer discharge recommendations to PT and medical team.? Will complete orders.

## 2024-08-14 NOTE — PROGRESS NOTES
Neuro: A&Ox4.   Cardiac: SR. VSS.   Respiratory: Sating >95% on RA.  GI/: Adequate urine output, uses Primofit overnight for urgency. No BM this shift.  Diet/appetite: Tolerating regular diet. Eating well.  Activity:  Up ad soo to chair and in halls.  Pain: Denied this shift.  Skin: Small left elbow laceration, flushing to face and neck  LDA's: R DL CVC- saline locked    Shift Updates: Pt in good spirits, denying pain and nausea but complaining of some lethargy. Potassium replaced. AM Sirolimus levels low (4.3) - 4 mg 1 time dose given. CVC dressing and cap change completed.    Plan: Continue with POC. Notify primary team with changes.

## 2024-08-15 ENCOUNTER — APPOINTMENT (OUTPATIENT)
Dept: PHYSICAL THERAPY | Facility: CLINIC | Age: 74
DRG: 014 | End: 2024-08-15
Attending: INTERNAL MEDICINE
Payer: MEDICARE

## 2024-08-15 LAB
ABO/RH(D): NORMAL
ALBUMIN SERPL BCG-MCNC: 3.7 G/DL (ref 3.5–5.2)
ALBUMIN UR-MCNC: 20 MG/DL
ALP SERPL-CCNC: 61 U/L (ref 40–150)
ALT SERPL W P-5'-P-CCNC: 15 U/L (ref 0–70)
ANION GAP SERPL CALCULATED.3IONS-SCNC: 10 MMOL/L (ref 7–15)
ANTIBODY SCREEN: NEGATIVE
APPEARANCE UR: CLEAR
AST SERPL W P-5'-P-CCNC: 15 U/L (ref 0–45)
BASOPHILS # BLD AUTO: ABNORMAL 10*3/UL
BASOPHILS NFR BLD AUTO: ABNORMAL %
BILIRUB DIRECT SERPL-MCNC: <0.2 MG/DL (ref 0–0.3)
BILIRUB SERPL-MCNC: 0.5 MG/DL
BILIRUB UR QL STRIP: NEGATIVE
BLD PROD TYP BPU: NORMAL
BLOOD COMPONENT TYPE: NORMAL
BUN SERPL-MCNC: 16.4 MG/DL (ref 8–23)
CALCIUM SERPL-MCNC: 9.3 MG/DL (ref 8.8–10.4)
CHLORIDE SERPL-SCNC: 104 MMOL/L (ref 98–107)
CODING SYSTEM: NORMAL
COLOR UR AUTO: ABNORMAL
CREAT SERPL-MCNC: 0.91 MG/DL (ref 0.67–1.17)
EGFRCR SERPLBLD CKD-EPI 2021: 88 ML/MIN/1.73M2
EOSINOPHIL # BLD AUTO: ABNORMAL 10*3/UL
EOSINOPHIL NFR BLD AUTO: ABNORMAL %
ERYTHROCYTE [DISTWIDTH] IN BLOOD BY AUTOMATED COUNT: 15 % (ref 10–15)
GLUCOSE BLDC GLUCOMTR-MCNC: 129 MG/DL (ref 70–99)
GLUCOSE BLDC GLUCOMTR-MCNC: 132 MG/DL (ref 70–99)
GLUCOSE BLDC GLUCOMTR-MCNC: 133 MG/DL (ref 70–99)
GLUCOSE BLDC GLUCOMTR-MCNC: 160 MG/DL (ref 70–99)
GLUCOSE SERPL-MCNC: 125 MG/DL (ref 70–99)
GLUCOSE UR STRIP-MCNC: NEGATIVE MG/DL
HCO3 SERPL-SCNC: 25 MMOL/L (ref 22–29)
HCT VFR BLD AUTO: 29.2 % (ref 40–53)
HGB BLD-MCNC: 9.4 G/DL (ref 13.3–17.7)
HGB UR QL STRIP: NEGATIVE
HOLD SPECIMEN: NORMAL
IMM GRANULOCYTES # BLD: ABNORMAL 10*3/UL
IMM GRANULOCYTES NFR BLD: ABNORMAL %
ISSUE DATE AND TIME: NORMAL
KETONES UR STRIP-MCNC: NEGATIVE MG/DL
LEUKOCYTE ESTERASE UR QL STRIP: NEGATIVE
LYMPHOCYTES # BLD AUTO: ABNORMAL 10*3/UL
LYMPHOCYTES NFR BLD AUTO: ABNORMAL %
MAGNESIUM SERPL-MCNC: 2 MG/DL (ref 1.7–2.3)
MCH RBC QN AUTO: 29.7 PG (ref 26.5–33)
MCHC RBC AUTO-ENTMCNC: 32.2 G/DL (ref 31.5–36.5)
MCV RBC AUTO: 92 FL (ref 78–100)
MONOCYTES # BLD AUTO: ABNORMAL 10*3/UL
MONOCYTES NFR BLD AUTO: ABNORMAL %
NEUTROPHILS # BLD AUTO: ABNORMAL 10*3/UL
NEUTROPHILS NFR BLD AUTO: ABNORMAL %
NITRATE UR QL: NEGATIVE
PH UR STRIP: 7.5 [PH] (ref 5–7)
PHOSPHATE SERPL-MCNC: 3.4 MG/DL (ref 2.5–4.5)
PLATELET # BLD AUTO: 22 10E3/UL (ref 150–450)
PLATELET # BLD AUTO: 9 10E3/UL (ref 150–450)
POTASSIUM SERPL-SCNC: 3.7 MMOL/L (ref 3.4–5.3)
PROT SERPL-MCNC: 6.3 G/DL (ref 6.4–8.3)
RBC # BLD AUTO: 3.16 10E6/UL (ref 4.4–5.9)
RBC URINE: 0 /HPF
SODIUM SERPL-SCNC: 139 MMOL/L (ref 135–145)
SP GR UR STRIP: 1.01 (ref 1–1.03)
SPECIMEN EXPIRATION DATE: NORMAL
TRANSITIONAL EPI: <1 /HPF
UNIT ABO/RH: NORMAL
UNIT NUMBER: NORMAL
UNIT STATUS: NORMAL
UNIT TYPE ISBT: 9500
UROBILINOGEN UR STRIP-MCNC: NORMAL MG/DL
WBC # BLD AUTO: <0.1 10E3/UL (ref 4–11)
WBC URINE: 1 /HPF

## 2024-08-15 PROCEDURE — 250N000013 HC RX MED GY IP 250 OP 250 PS 637: Performed by: PHYSICIAN ASSISTANT

## 2024-08-15 PROCEDURE — 87086 URINE CULTURE/COLONY COUNT: CPT

## 2024-08-15 PROCEDURE — 250N000013 HC RX MED GY IP 250 OP 250 PS 637: Performed by: INTERNAL MEDICINE

## 2024-08-15 PROCEDURE — 97116 GAIT TRAINING THERAPY: CPT | Mod: GP

## 2024-08-15 PROCEDURE — 250N000011 HC RX IP 250 OP 636: Performed by: INTERNAL MEDICINE

## 2024-08-15 PROCEDURE — 82248 BILIRUBIN DIRECT: CPT | Performed by: PHYSICIAN ASSISTANT

## 2024-08-15 PROCEDURE — 250N000013 HC RX MED GY IP 250 OP 250 PS 637

## 2024-08-15 PROCEDURE — 85041 AUTOMATED RBC COUNT: CPT | Performed by: STUDENT IN AN ORGANIZED HEALTH CARE EDUCATION/TRAINING PROGRAM

## 2024-08-15 PROCEDURE — 250N000013 HC RX MED GY IP 250 OP 250 PS 637: Performed by: STUDENT IN AN ORGANIZED HEALTH CARE EDUCATION/TRAINING PROGRAM

## 2024-08-15 PROCEDURE — 81001 URINALYSIS AUTO W/SCOPE: CPT

## 2024-08-15 PROCEDURE — 258N000003 HC RX IP 258 OP 636: Performed by: INTERNAL MEDICINE

## 2024-08-15 PROCEDURE — 85049 AUTOMATED PLATELET COUNT: CPT

## 2024-08-15 PROCEDURE — 86901 BLOOD TYPING SEROLOGIC RH(D): CPT | Performed by: INTERNAL MEDICINE

## 2024-08-15 PROCEDURE — 250N000012 HC RX MED GY IP 250 OP 636 PS 637: Performed by: PHYSICIAN ASSISTANT

## 2024-08-15 PROCEDURE — 83735 ASSAY OF MAGNESIUM: CPT | Performed by: STUDENT IN AN ORGANIZED HEALTH CARE EDUCATION/TRAINING PROGRAM

## 2024-08-15 PROCEDURE — 206N000001 HC R&B BMT UMMC

## 2024-08-15 PROCEDURE — 82310 ASSAY OF CALCIUM: CPT | Performed by: STUDENT IN AN ORGANIZED HEALTH CARE EDUCATION/TRAINING PROGRAM

## 2024-08-15 PROCEDURE — 250N000011 HC RX IP 250 OP 636: Mod: JZ | Performed by: STUDENT IN AN ORGANIZED HEALTH CARE EDUCATION/TRAINING PROGRAM

## 2024-08-15 PROCEDURE — 258N000003 HC RX IP 258 OP 636: Performed by: PHYSICIAN ASSISTANT

## 2024-08-15 PROCEDURE — P9037 PLATE PHERES LEUKOREDU IRRAD: HCPCS | Performed by: STUDENT IN AN ORGANIZED HEALTH CARE EDUCATION/TRAINING PROGRAM

## 2024-08-15 PROCEDURE — 84100 ASSAY OF PHOSPHORUS: CPT | Performed by: STUDENT IN AN ORGANIZED HEALTH CARE EDUCATION/TRAINING PROGRAM

## 2024-08-15 PROCEDURE — 250N000011 HC RX IP 250 OP 636: Mod: JZ | Performed by: PHYSICIAN ASSISTANT

## 2024-08-15 RX ADMIN — FILGRASTIM-AAFI 480 MCG: 480 INJECTION, SOLUTION INTRAVENOUS; SUBCUTANEOUS at 20:21

## 2024-08-15 RX ADMIN — BRIMONIDINE TARTRATE 1 DROP: 2 SOLUTION/ DROPS OPHTHALMIC at 20:21

## 2024-08-15 RX ADMIN — URSODIOL 300 MG: 300 CAPSULE ORAL at 13:02

## 2024-08-15 RX ADMIN — AMLODIPINE BESYLATE 10 MG: 10 TABLET ORAL at 08:58

## 2024-08-15 RX ADMIN — Medication 5 ML: at 15:55

## 2024-08-15 RX ADMIN — LATANOPROST 2 DROP: 50 SOLUTION OPHTHALMIC at 23:34

## 2024-08-15 RX ADMIN — ATORVASTATIN CALCIUM 10 MG: 10 TABLET, FILM COATED ORAL at 20:15

## 2024-08-15 RX ADMIN — TAMSULOSIN HYDROCHLORIDE 0.4 MG: 0.4 CAPSULE ORAL at 08:58

## 2024-08-15 RX ADMIN — FLUTICASONE PROPIONATE 1 SPRAY: 50 SPRAY, METERED NASAL at 09:01

## 2024-08-15 RX ADMIN — ACYCLOVIR 800 MG: 800 TABLET ORAL at 08:56

## 2024-08-15 RX ADMIN — URSODIOL 300 MG: 300 CAPSULE ORAL at 08:58

## 2024-08-15 RX ADMIN — LISINOPRIL 5 MG: 5 TABLET ORAL at 09:02

## 2024-08-15 RX ADMIN — MYCOPHENOLATE MOFETIL 1500 MG: 500 INJECTION, POWDER, LYOPHILIZED, FOR SOLUTION INTRAVENOUS at 10:20

## 2024-08-15 RX ADMIN — PANTOPRAZOLE SODIUM 40 MG: 40 TABLET, DELAYED RELEASE ORAL at 08:56

## 2024-08-15 RX ADMIN — CALCIUM CARBONATE 600 MG (1,500 MG)-VITAMIN D3 400 UNIT TABLET 1 TABLET: at 08:57

## 2024-08-15 RX ADMIN — URSODIOL 300 MG: 300 CAPSULE ORAL at 20:15

## 2024-08-15 RX ADMIN — MICAFUNGIN SODIUM 150 MG: 50 INJECTION, POWDER, LYOPHILIZED, FOR SOLUTION INTRAVENOUS at 08:55

## 2024-08-15 RX ADMIN — SENNOSIDES AND DOCUSATE SODIUM 1 TABLET: 8.6; 5 TABLET ORAL at 17:41

## 2024-08-15 RX ADMIN — LEVOFLOXACIN 250 MG: 250 TABLET, FILM COATED ORAL at 10:20

## 2024-08-15 RX ADMIN — ACYCLOVIR 800 MG: 800 TABLET ORAL at 20:15

## 2024-08-15 RX ADMIN — Medication 5 ML: at 04:23

## 2024-08-15 RX ADMIN — SIROLIMUS 4 MG: 2 TABLET ORAL at 08:58

## 2024-08-15 RX ADMIN — SIMETHICONE 80 MG: 80 TABLET, CHEWABLE ORAL at 20:32

## 2024-08-15 RX ADMIN — MYCOPHENOLATE MOFETIL 1500 MG: 500 INJECTION, POWDER, LYOPHILIZED, FOR SOLUTION INTRAVENOUS at 21:18

## 2024-08-15 RX ADMIN — ALLOPURINOL 300 MG: 300 TABLET ORAL at 08:58

## 2024-08-15 RX ADMIN — BRIMONIDINE TARTRATE 1 DROP: 2 SOLUTION/ DROPS OPHTHALMIC at 09:01

## 2024-08-15 RX ADMIN — FLUTICASONE PROPIONATE 1 SPRAY: 50 SPRAY, METERED NASAL at 20:21

## 2024-08-15 RX ADMIN — Medication 3 CAPSULE: at 08:55

## 2024-08-15 RX ADMIN — Medication 10 ML: at 03:41

## 2024-08-15 ASSESSMENT — ACTIVITIES OF DAILY LIVING (ADL)
ADLS_ACUITY_SCORE: 23

## 2024-08-15 NOTE — PLAN OF CARE
Nursing Note  Shift: [id number]    BMT Day +8    VSS on room air. Afebrile. A&Ox4. Denies pain.     Voiding well. Patient requested external catheter while sleeping due to urgency/frequency. Otherwise getting up to use basin at bedside. Tolerating regular diet. BM x1 during shift. No skin concerns. Up independently. Patient stated that he was very tired and fatigued today. Wore CPAP while sleeping overnight.    Access: R CVC DL - heparin locked    Electrolytes: no replacements needed, AM draw.  Blood products: PLTs given    Plan: Continue with plan of care. Notify primary team with changes.

## 2024-08-15 NOTE — PROGRESS NOTES
"BMT Progress Note  Chief complaint:  Arpit Forrest is a 73 year old male, currently day+8 s/p ELIEZER MUD PBSCT for MDS.  INTERIM HISTORY:   Afebrile. Urinary frequency continues; no dysuria. Stools are soft. No N/V/D. Eating well, walking the halls. Feeling tired. No concerns today.  REVIEW OF SYSTEMS: Otherwise unremarkable other than what is noted in the Interim History.   PHYSICAL EXAM:   Vitals:  BP (!) 154/73 (BP Location: Right arm)   Pulse 73   Temp 98.2  F (36.8  C) (Oral)   Resp 16   Ht 1.825 m (5' 11.85\")   Wt 115.8 kg (255 lb 6.4 oz)   SpO2 97%   BMI 34.78 kg/m      General: alert and cooperative, NAD, face with maye appearance--stable per pt  HEENT: NC/AT, sclera anicteric   CV: well perfused  Resp: normal work of breathing on room air   Lymph: trace edema  Skin: no rashes on limited exam  Neuro: Alert and interactive, moves all extremities equally  Psych: Mood and affect normal  Vascular: Right CVC   LABS:  Lab Results   Component Value Date    WBC <0.1 (LL) 08/15/2024    ANEU 0.8 (L) 08/08/2024    HGB 9.4 (L) 08/15/2024    HCT 29.2 (L) 08/15/2024    PLT 9 (LL) 08/15/2024     08/15/2024    POTASSIUM 3.7 08/15/2024    CHLORIDE 104 08/15/2024    CO2 25 08/15/2024     (H) 08/15/2024    BUN 16.4 08/15/2024    CR 0.91 08/15/2024    MAG 2.0 08/15/2024    INR 1.26 (H) 08/12/2024    BILITOTAL 0.5 08/15/2024    AST 15 08/15/2024    ALT 15 08/15/2024    ALKPHOS 61 08/15/2024    PROTTOTAL 6.3 (L) 08/15/2024    ALBUMIN 3.7 08/15/2024        ASSESSMENT AND PLAN: Arpit Forrest is a 73 year old male, currently day +8 s/p ELIEZER MUD PBSCT for MDS.    BMT/IEC PROTOCOL for MDS  - Chemo protocol: [id number]; CY/flu/TBI  - Peripheral blood stem cell graft from 8/8 donor, ABO matched (Oneg), Cell dose: TBD  - continue allopurinol (hx of gout)  - Engraftment monitoring: Peripheral blood and bone marrow chimerisms on D28, D100, 6 months, 1 and 2 year  - Restaging plan: BMBx with NGS on D28, D100, 6 months, 1 " and 2 years    HEME/COAG  - GCSF started day +5, continue until ANC > 1500 for 3 consecutive days.   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    - Siro level (8/14) low at 4.3 -- given bolus x1 and no change to daily dose. Next level 8/16.     ID  #Neutropenic fever- T cell expansion vs. Infection vs. Other - Cefepime (8/11-8/14), work up unremarkable.  Recent Hx: Multiple granulomas in the lung and bone marrow biopsy- fungitell, aspergillus galactomanan neg. 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 Imodium PRN.  # Hepatic steatosis and boderline iron deposition in liver  - LFTs and synthetic function normal on admission; Check LFTs qM/Th  - VOD prophy: Ursodiol 300mg TID    # Supportive   - Anti-emetics:  per protocol   - Ulcer prophy: PPI daily   - Dietician support to prevent malnutrition.     CARDIOVASCULAR  # Hx of A.fib with RVR   A.fib with RVR while admitted with febrile episode on 3/15/24. Noted on his fitbit, not very symptomatic. Cardioversion planned for new onset A.fib. Not achieved with amiodarone, DCCV on 3/21/24 restored normal sinus rhythm. Amiodarone and anticoagulation was stopped in April 2024 without any recurrence.   He could have a recurrence during transplant which would need to be addressed with rate control strategy   # Asymptomatic moderate aortic stenosis   # CAD  Chest CT in February 2024 with mild to moderate CAC, three-vessel  disease. No anginal symptoms   Continue atorvastatin 10mg  EKG Sinus rhythm Inferior infarct , age undetermined. Abnormal ECG (known cardiovascular disease). QTc =460  # HTN- Continue amlodipine 10mg, added lisinopril 5mg 8/4 (PTA dose of lisinopril was 10mg) -- consider increasing lisinopril in coming days if BP remains elevated  - Risk of cardiomyopathy:  Baseline EF normal 60-65&  - Risk of arrhythmia: Baseline EKG showed NSR, Qtc 460     RESPIRATORY  # VINCENT : CPAP  - Baseline PFTs: normal   - Risk of respiratory complications: Frequent ambulation and incentive spirometer.     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 8/9-11 lasix per protocol Cytoxan flush   -baseline weight 8/14, no lasix 8/14 or 8/15  # Electrolyte management: replace per sliding scale  - cont Ca w/vit D  - hold eye vitamin and Coq10 peritransplant     DIABETES/ENDOCRINE  #DMII: hx steroid-induced hyperglycemia: Carb coverage and Novolog sliding scale. Endo initially consulted, signed off with stable sugars.   *held PTA metformin on admission.      MUSCULOSKELETAL/FRAILTY  # Gout: Continue allopurinol indefinitely   - Baseline Frailty Score: Not frail   - Daily PT/OT as needed while inpatient     SOCIAL DETERMINANTS  - Caregiver: Wife, Meghna, children     Medically Ready for Discharge: Anticipated in 5+ Days    Clinically Significant Risk Factors                # Thrombocytopenia: Lowest platelets = 9 in last 2 days, will monitor for bleeding   # Hypertension: Noted on problem list            # Obesity: Estimated body mass index is 34.78 kg/m  as calculated from the  "following:    Height as of this encounter: 1.825 m (5' 11.85\").    Weight as of this encounter: 115.8 kg (255 lb 6.4 oz).           I spent 30 minutes in the care of this patient today, which included time necessary for preparation for the visit, obtaining history, ordering medications/tests/procedures as medically indicated, review of pertinent medical literature, counseling of the patient, communication of recommendations to the care team, and documentation time.    Marilin Phillips PA-C  [phone]       "

## 2024-08-15 NOTE — PROGRESS NOTES
BMT Day +8    Neuro: A&Ox4.   Cardiac: SR, mumur present. Afebrile. BP elevated (156/77), but below parameters for notifying provider.  Respiratory: Sating >95% on RA.  GI/: Adequate urine output. No BM this shift.  Diet/appetite: Tolerating regular diet. Eating well.  Activity:  Up ad soo to chair and in halls.  Pain: Denied this shift.  Skin: Bump on right inner lip, two small red lesions to tongue and cheek.  LDA's: R DL CVC- both lumens hep locked    Shift Updates: Pt continued to report lethargy, but remained active, ambulating halls, up to table for meals, and completing daily cares.   Plan: Continue with POC. Notify primary team with changes.

## 2024-08-15 NOTE — PROGRESS NOTES
CLINICAL NUTRITION SERVICES - REASSESSMENT NOTE     Nutrition Prescription    RECOMMENDATIONS FOR MDs/PROVIDERS TO ORDER:  None at this time     Malnutrition Status:    Patient does not meet two of the established criteria necessary for diagnosing malnutrition    Recommendations already ordered by Registered Dietitian (RD):  PRN snacks/supplements  Ensure Max once daily     Future/Additional Recommendations:  -Monitor PO intake  -Monitor wt trends  -Monitor labs     EVALUATION OF THE PROGRESS TOWARD GOALS   Diet: High Kcal/High Protein, PRN snacks/supplements, Ensure Max once daily     Intake: Pt reports he continues to eat 3 meals daily: % at meals per RN flowsheets.      NEW FINDINGS   Pt is day 8 s/p Allo BMT. Met with pt at bedside. He notes he is tired today, however continues to eat regularly (3 meals + Ensure Max BID). Pt discussed disliking the food at [facility] and has plans for his family to bring in food for him from home moving forward. Pt notes he likes the Ensure Max much more than the Embrace. Pt denies any nausea presently.       GI:  LBM 8/13, no C/D noted.     SKIN:  No concerns noted.     LABS:  Reviewed. Notable for:   Glucose 125    MEDICATIONS:  Reviewed.  Acyclovir  Caltrate  Insulin  Protonix  Metamucil  Sirolimus  Ursodiol  PRN Insulin, Imodium, Miralax, Compazine, Senokot, Mylicon    WEIGHTS:  No wt taken yet today  Wt's have been stable during admission  Vitals:    08/11/24 1559 08/12/24 0749 08/12/24 1547 08/13/24 0758   Weight: 116.8 kg (257 lb 6.4 oz) 117.9 kg (260 lb) 118.9 kg (262 lb 1.6 oz) 117.4 kg (258 lb 12.8 oz)    08/14/24 0803   Weight: 115.8 kg (255 lb 6.4 oz)         MALNUTRITION  % Intake: No decreased intake noted  % Weight Loss: None noted  Subcutaneous Fat Loss: None observed  Muscle Loss: None observed  Fluid Accumulation/Edema: Combination of trace and mild edema noted on RN flowsheets  Malnutrition Diagnosis: Patient does not meet two of the established criteria  necessary for diagnosing malnutrition    Previous Goals   Patient to consume % of nutritionally adequate meal trays TID, or the equivalent with supplements/snacks.  Evaluation: Met    Previous Nutrition Diagnosis  Predicted inadequate nutrient intake (kcal/protein) related to recent BMT with potential for side effects to affect ability to take adequate PO.   Evaluation: ongoing     CURRENT NUTRITION DIAGNOSIS  Predicted inadequate nutrient intake (kcal/protein) related to recent BMT with potential for side effects to affect ability to take adequate PO.     INTERVENTIONS  Implementation  Medical food supplement therapy- continue current orders     Goals  Patient to consume % of nutritionally adequate meal trays TID, or the equivalent with supplements/snacks.    Monitoring/Evaluation  Progress toward goals will be monitored and evaluated per protocol.    Susan Rogers RD (Maggie), LD- 5C Clinical Dietitian   Available on [employer]  No longer available by paging

## 2024-08-16 LAB
ACANTHOCYTES BLD QL SMEAR: ABNORMAL
ANION GAP SERPL CALCULATED.3IONS-SCNC: 9 MMOL/L (ref 7–15)
AUER BODIES BLD QL SMEAR: ABNORMAL
BACTERIA UR CULT: NO GROWTH
BASO STIPL BLD QL SMEAR: ABNORMAL
BASOPHILS # BLD AUTO: ABNORMAL 10*3/UL
BASOPHILS NFR BLD AUTO: ABNORMAL %
BITE CELLS BLD QL SMEAR: ABNORMAL
BLISTER CELLS BLD QL SMEAR: ABNORMAL
BUN SERPL-MCNC: 16.9 MG/DL (ref 8–23)
BURR CELLS BLD QL SMEAR: ABNORMAL
CALCIUM SERPL-MCNC: 9.1 MG/DL (ref 8.8–10.4)
CHLORIDE SERPL-SCNC: 102 MMOL/L (ref 98–107)
CREAT SERPL-MCNC: 0.84 MG/DL (ref 0.67–1.17)
CULTURE HARVEST COMPLETE DATE: NORMAL
CULTURE HARVEST COMPLETE DATE: NORMAL
DACRYOCYTES BLD QL SMEAR: ABNORMAL
EGFRCR SERPLBLD CKD-EPI 2021: >90 ML/MIN/1.73M2
ELLIPTOCYTES BLD QL SMEAR: SLIGHT
EOSINOPHIL # BLD AUTO: ABNORMAL 10*3/UL
EOSINOPHIL NFR BLD AUTO: ABNORMAL %
ERYTHROCYTE [DISTWIDTH] IN BLOOD BY AUTOMATED COUNT: 14.8 % (ref 10–15)
FRAGMENTS BLD QL SMEAR: ABNORMAL
GLUCOSE BLDC GLUCOMTR-MCNC: 105 MG/DL (ref 70–99)
GLUCOSE BLDC GLUCOMTR-MCNC: 134 MG/DL (ref 70–99)
GLUCOSE BLDC GLUCOMTR-MCNC: 148 MG/DL (ref 70–99)
GLUCOSE BLDC GLUCOMTR-MCNC: 189 MG/DL (ref 70–99)
GLUCOSE SERPL-MCNC: 137 MG/DL (ref 70–99)
HCO3 SERPL-SCNC: 25 MMOL/L (ref 22–29)
HCT VFR BLD AUTO: 27.8 % (ref 40–53)
HGB BLD-MCNC: 9 G/DL (ref 13.3–17.7)
HGB C CRYSTALS: ABNORMAL
HOWELL-JOLLY BOD BLD QL SMEAR: ABNORMAL
IMM GRANULOCYTES # BLD: ABNORMAL 10*3/UL
IMM GRANULOCYTES NFR BLD: ABNORMAL %
INTERPRETATION: NORMAL
ISCN: NORMAL
LYMPHOCYTES # BLD AUTO: ABNORMAL 10*3/UL
LYMPHOCYTES NFR BLD AUTO: ABNORMAL %
MAGNESIUM SERPL-MCNC: 1.9 MG/DL (ref 1.7–2.3)
MCH RBC QN AUTO: 29.8 PG (ref 26.5–33)
MCHC RBC AUTO-ENTMCNC: 32.4 G/DL (ref 31.5–36.5)
MCV RBC AUTO: 92 FL (ref 78–100)
METHODS: NORMAL
MONOCYTES # BLD AUTO: ABNORMAL 10*3/UL
MONOCYTES NFR BLD AUTO: ABNORMAL %
NEUTROPHILS # BLD AUTO: ABNORMAL 10*3/UL
NEUTROPHILS NFR BLD AUTO: ABNORMAL %
NEUTS HYPERSEG BLD QL SMEAR: ABNORMAL
PHOSPHATE SERPL-MCNC: 3.3 MG/DL (ref 2.5–4.5)
PLAT MORPH BLD: ABNORMAL
PLATELET # BLD AUTO: 15 10E3/UL (ref 150–450)
POLYCHROMASIA BLD QL SMEAR: ABNORMAL
POTASSIUM SERPL-SCNC: 3.5 MMOL/L (ref 3.4–5.3)
RBC # BLD AUTO: 3.02 10E6/UL (ref 4.4–5.9)
RBC AGGLUT BLD QL: ABNORMAL
RBC MORPH BLD: ABNORMAL
ROULEAUX BLD QL SMEAR: ABNORMAL
SICKLE CELLS BLD QL SMEAR: ABNORMAL
SIROLIMUS BLD-MCNC: 6.9 UG/L (ref 5–15)
SMUDGE CELLS BLD QL SMEAR: ABNORMAL
SODIUM SERPL-SCNC: 136 MMOL/L (ref 135–145)
SPHEROCYTES BLD QL SMEAR: ABNORMAL
STOMATOCYTES BLD QL SMEAR: ABNORMAL
TARGETS BLD QL SMEAR: ABNORMAL
TME LAST DOSE: NORMAL H
TME LAST DOSE: NORMAL H
TOXIC GRANULES BLD QL SMEAR: ABNORMAL
VARIANT LYMPHS BLD QL SMEAR: ABNORMAL
WBC # BLD AUTO: <0.1 10E3/UL (ref 4–11)

## 2024-08-16 PROCEDURE — 250N000009 HC RX 250: Performed by: STUDENT IN AN ORGANIZED HEALTH CARE EDUCATION/TRAINING PROGRAM

## 2024-08-16 PROCEDURE — 250N000013 HC RX MED GY IP 250 OP 250 PS 637: Performed by: STUDENT IN AN ORGANIZED HEALTH CARE EDUCATION/TRAINING PROGRAM

## 2024-08-16 PROCEDURE — 250N000012 HC RX MED GY IP 250 OP 636 PS 637: Performed by: PHYSICIAN ASSISTANT

## 2024-08-16 PROCEDURE — 250N000013 HC RX MED GY IP 250 OP 250 PS 637

## 2024-08-16 PROCEDURE — 250N000013 HC RX MED GY IP 250 OP 250 PS 637: Performed by: PHYSICIAN ASSISTANT

## 2024-08-16 PROCEDURE — 258N000003 HC RX IP 258 OP 636: Performed by: INTERNAL MEDICINE

## 2024-08-16 PROCEDURE — 85027 COMPLETE CBC AUTOMATED: CPT | Performed by: STUDENT IN AN ORGANIZED HEALTH CARE EDUCATION/TRAINING PROGRAM

## 2024-08-16 PROCEDURE — 250N000011 HC RX IP 250 OP 636: Performed by: INTERNAL MEDICINE

## 2024-08-16 PROCEDURE — 80048 BASIC METABOLIC PNL TOTAL CA: CPT | Performed by: STUDENT IN AN ORGANIZED HEALTH CARE EDUCATION/TRAINING PROGRAM

## 2024-08-16 PROCEDURE — 84100 ASSAY OF PHOSPHORUS: CPT | Performed by: INTERNAL MEDICINE

## 2024-08-16 PROCEDURE — 83735 ASSAY OF MAGNESIUM: CPT | Performed by: INTERNAL MEDICINE

## 2024-08-16 PROCEDURE — 250N000013 HC RX MED GY IP 250 OP 250 PS 637: Performed by: INTERNAL MEDICINE

## 2024-08-16 PROCEDURE — 250N000011 HC RX IP 250 OP 636: Mod: JZ | Performed by: PHYSICIAN ASSISTANT

## 2024-08-16 PROCEDURE — 206N000001 HC R&B BMT UMMC

## 2024-08-16 PROCEDURE — 250N000011 HC RX IP 250 OP 636: Mod: JZ | Performed by: STUDENT IN AN ORGANIZED HEALTH CARE EDUCATION/TRAINING PROGRAM

## 2024-08-16 PROCEDURE — 80195 ASSAY OF SIROLIMUS: CPT | Performed by: STUDENT IN AN ORGANIZED HEALTH CARE EDUCATION/TRAINING PROGRAM

## 2024-08-16 PROCEDURE — 258N000003 HC RX IP 258 OP 636: Performed by: PHYSICIAN ASSISTANT

## 2024-08-16 PROCEDURE — 250N000012 HC RX MED GY IP 250 OP 636 PS 637

## 2024-08-16 RX ORDER — SIROLIMUS 2 MG/1
2 TABLET, FILM COATED ORAL ONCE
Status: COMPLETED | OUTPATIENT
Start: 2024-08-16 | End: 2024-08-16

## 2024-08-16 RX ADMIN — ACYCLOVIR 800 MG: 800 TABLET ORAL at 20:41

## 2024-08-16 RX ADMIN — URSODIOL 300 MG: 300 CAPSULE ORAL at 07:51

## 2024-08-16 RX ADMIN — URSODIOL 300 MG: 300 CAPSULE ORAL at 13:22

## 2024-08-16 RX ADMIN — SIROLIMUS 2 MG: 2 TABLET ORAL at 15:43

## 2024-08-16 RX ADMIN — LATANOPROST 2 DROP: 50 SOLUTION OPHTHALMIC at 22:39

## 2024-08-16 RX ADMIN — ALLOPURINOL 300 MG: 300 TABLET ORAL at 07:51

## 2024-08-16 RX ADMIN — FLUTICASONE PROPIONATE 1 SPRAY: 50 SPRAY, METERED NASAL at 07:48

## 2024-08-16 RX ADMIN — ATORVASTATIN CALCIUM 10 MG: 10 TABLET, FILM COATED ORAL at 20:41

## 2024-08-16 RX ADMIN — BRIMONIDINE TARTRATE 1 DROP: 2 SOLUTION/ DROPS OPHTHALMIC at 20:53

## 2024-08-16 RX ADMIN — LEVOFLOXACIN 250 MG: 250 TABLET, FILM COATED ORAL at 10:45

## 2024-08-16 RX ADMIN — PANTOPRAZOLE SODIUM 40 MG: 40 TABLET, DELAYED RELEASE ORAL at 07:51

## 2024-08-16 RX ADMIN — Medication 5 ML: at 23:39

## 2024-08-16 RX ADMIN — MICAFUNGIN SODIUM 150 MG: 50 INJECTION, POWDER, LYOPHILIZED, FOR SOLUTION INTRAVENOUS at 07:52

## 2024-08-16 RX ADMIN — TAMSULOSIN HYDROCHLORIDE 0.4 MG: 0.4 CAPSULE ORAL at 07:50

## 2024-08-16 RX ADMIN — Medication 3 CAPSULE: at 07:48

## 2024-08-16 RX ADMIN — Medication 5 ML: at 03:40

## 2024-08-16 RX ADMIN — SIROLIMUS 4 MG: 2 TABLET ORAL at 07:51

## 2024-08-16 RX ADMIN — CALCIUM CARBONATE 600 MG (1,500 MG)-VITAMIN D3 400 UNIT TABLET 1 TABLET: at 07:51

## 2024-08-16 RX ADMIN — Medication 5 ML: at 07:49

## 2024-08-16 RX ADMIN — BRIMONIDINE TARTRATE 1 DROP: 2 SOLUTION/ DROPS OPHTHALMIC at 07:47

## 2024-08-16 RX ADMIN — ACYCLOVIR 800 MG: 800 TABLET ORAL at 07:49

## 2024-08-16 RX ADMIN — FILGRASTIM-AAFI 480 MCG: 480 INJECTION, SOLUTION INTRAVENOUS; SUBCUTANEOUS at 20:41

## 2024-08-16 RX ADMIN — URSODIOL 300 MG: 300 CAPSULE ORAL at 20:41

## 2024-08-16 RX ADMIN — LORAZEPAM 1 MG: 0.5 TABLET ORAL at 22:36

## 2024-08-16 RX ADMIN — LISINOPRIL 5 MG: 5 TABLET ORAL at 07:53

## 2024-08-16 RX ADMIN — SIMETHICONE 80 MG: 80 TABLET, CHEWABLE ORAL at 13:22

## 2024-08-16 RX ADMIN — LOPERAMIDE HYDROCHLORIDE 4 MG: 2 CAPSULE ORAL at 22:35

## 2024-08-16 RX ADMIN — LOPERAMIDE HYDROCHLORIDE 4 MG: 2 CAPSULE ORAL at 13:22

## 2024-08-16 RX ADMIN — MYCOPHENOLATE MOFETIL 1500 MG: 500 INJECTION, POWDER, LYOPHILIZED, FOR SOLUTION INTRAVENOUS at 20:41

## 2024-08-16 RX ADMIN — FLUTICASONE PROPIONATE 1 SPRAY: 50 SPRAY, METERED NASAL at 20:54

## 2024-08-16 RX ADMIN — MYCOPHENOLATE MOFETIL 1500 MG: 500 INJECTION, POWDER, LYOPHILIZED, FOR SOLUTION INTRAVENOUS at 09:18

## 2024-08-16 RX ADMIN — INSULIN ASPART 1 UNITS: 100 INJECTION, SOLUTION INTRAVENOUS; SUBCUTANEOUS at 17:16

## 2024-08-16 RX ADMIN — AMLODIPINE BESYLATE 10 MG: 10 TABLET ORAL at 07:51

## 2024-08-16 RX ADMIN — SENNOSIDES AND DOCUSATE SODIUM 1 TABLET: 8.6; 5 TABLET ORAL at 07:59

## 2024-08-16 ASSESSMENT — ACTIVITIES OF DAILY LIVING (ADL)
ADLS_ACUITY_SCORE: 23
ADLS_ACUITY_SCORE: 23
ADLS_ACUITY_SCORE: 24
ADLS_ACUITY_SCORE: 23
ADLS_ACUITY_SCORE: 23
ADLS_ACUITY_SCORE: 24
ADLS_ACUITY_SCORE: 23
ADLS_ACUITY_SCORE: 23
ADLS_ACUITY_SCORE: 24
ADLS_ACUITY_SCORE: 24
ADLS_ACUITY_SCORE: 23
ADLS_ACUITY_SCORE: 23
ADLS_ACUITY_SCORE: 24
ADLS_ACUITY_SCORE: 23
ADLS_ACUITY_SCORE: 24

## 2024-08-16 NOTE — PROGRESS NOTES
"BMT Progress Note  Chief complaint:  Arpit Forrest is a 73 year old male, currently day+9 s/p ELIEZER MUD PBSCT for MDS.  INTERIM HISTORY:   MARISAArpit reports feeling tired today and he continues to have a metallic taste with his mouth, but reports okay PO intake. He walked 1 mile in the hallway and is motivated to complete the marrowthon. He was not diuresed yesterday, but continued to urinate frequently and used an external catheter overnight. UA yesterday did not show signs of an infection.   Denies fever, chills, SOB, chest pain, abdominal pain, dysuria, or rashes.   REVIEW OF SYSTEMS: Otherwise unremarkable other than what is noted in the Interim History.   PHYSICAL EXAM:   Vitals:  BP (!) 155/80 (BP Location: Right arm)   Pulse 77   Temp 98.7  F (37.1  C) (Oral)   Resp 16   Ht 1.825 m (5' 11.85\")   Wt 113.6 kg (250 lb 6.4 oz)   SpO2 98%   BMI 34.10 kg/m      General: alert and cooperative, NAD, face with maye appearance--stable per pt  HEENT: NC/AT, sclera anicteric   CV: RRR, well perfused  Resp: CTAB, normal work of breathing on room air   Skin: no rashes on limited exam  Neuro: Alert and interactive, moves all extremities equally  Psych: Mood and affect normal  Vascular: Right CVC   LABS:  Lab Results   Component Value Date    WBC <0.1 (LL) 08/16/2024    ANEU 0.8 (L) 08/08/2024    HGB 9.0 (L) 08/16/2024    HCT 27.8 (L) 08/16/2024    PLT 15 (LL) 08/16/2024     08/16/2024    POTASSIUM 3.5 08/16/2024    CHLORIDE 102 08/16/2024    CO2 25 08/16/2024     (H) 08/16/2024    BUN 16.9 08/16/2024    CR 0.84 08/16/2024    MAG 1.9 08/16/2024    INR 1.26 (H) 08/12/2024    BILITOTAL 0.5 08/15/2024    AST 15 08/15/2024    ALT 15 08/15/2024    ALKPHOS 61 08/15/2024    PROTTOTAL 6.3 (L) 08/15/2024    ALBUMIN 3.7 08/15/2024        ASSESSMENT AND PLAN: Arpit Forrest is a 73 year old male, currently day +9 s/p ELIEZER MUD PBSCT for MDS.  BMT/IEC PROTOCOL for MDS  - Chemo protocol: [id number]; CY/flu/TBI  - " Peripheral blood stem cell graft from 8/8 donor, ABO matched (Oneg), Cell dose: TBD  - continue allopurinol (hx of gout)  - Engraftment monitoring: Peripheral blood and bone marrow chimerisms on D28, D100, 6 months, 1 and 2 year  - Restaging plan: BMBx with NGS on D28, D100, 6 months, 1 and 2 years    HEME/COAG  - GCSF started day +5, continue until ANC > 1500 for 3 consecutive days.   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    - Siro level (8/14) low at 4.3 -- given bolus x1 and no change to daily dose. Level 8/16 pending      ID  #Neutropenic fever- T cell expansion vs. Infection vs. Other - Cefepime (8/11-8/14), work up unremarkable. No recurrent fevers so far after stopping cefepime 8/14.   Recent Hx: Multiple granulomas in the lung and bone marrow biopsy- fungitell, aspergillus galactomanan neg. 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 Imodium PRN.  # Hepatic steatosis and boderline iron deposition in liver  - LFTs and synthetic function normal on admission; Check LFTs qM/Th  - VOD prophy: Ursodiol 300mg TID    # Supportive   - Anti-emetics:  per protocol   - Ulcer prophy: PPI daily   - Dietician support to prevent malnutrition.     CARDIOVASCULAR  # Hx of A.fib with RVR   A.fib with RVR while admitted with febrile episode on 3/15/24. Noted on his fitbit, not very symptomatic. Cardioversion planned for  new onset A.fib. Not achieved with amiodarone, DCCV on 3/21/24 restored normal sinus rhythm. Amiodarone and anticoagulation was stopped in April 2024 without any recurrence.   He could have a recurrence during transplant which would need to be addressed with rate control strategy   # Asymptomatic moderate aortic stenosis   # CAD  Chest CT in February 2024 with mild to moderate CAC, three-vessel disease. No anginal symptoms   Continue atorvastatin 10mg  EKG Sinus rhythm Inferior infarct , age undetermined. Abnormal ECG (known cardiovascular disease). QTc =460  # HTN- Continue amlodipine 10mg, added lisinopril 5mg 8/4 (PTA dose of lisinopril was 10mg) -- consider increasing lisinopril in coming days if BP remains elevated  - Risk of cardiomyopathy:  Baseline EF normal 60-65&  - Risk of arrhythmia: Baseline EKG showed NSR, Qtc 460     RESPIRATORY  # VINCENT : CPAP  - Baseline PFTs: normal   - Risk of respiratory complications: Frequent ambulation and incentive spirometer.     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 8/9-11 lasix per protocol Cytoxan flush   -baseline weight 8/14, and held off on additional lasix since  # Electrolyte management: replace per sliding scale  - cont Ca w/vit D  - hold eye vitamin and Coq10 peritransplant     DIABETES/ENDOCRINE  #DMII: hx steroid-induced hyperglycemia: Carb coverage and Novolog sliding scale. Endo initially consulted, signed off with stable sugars.   *held PTA metformin on admission.      MUSCULOSKELETAL/FRAILTY  # Gout: Continue allopurinol indefinitely   - Baseline Frailty Score: Not frail   - Daily PT/OT as needed while  "inpatient     SOCIAL DETERMINANTS  - Caregiver: Wife, Meghna, children   Medically Ready for Discharge: Anticipated in 5+ Days    Clinically Significant Risk Factors                # Thrombocytopenia: Lowest platelets = 9 in last 2 days, will monitor for bleeding   # Hypertension: Noted on problem list            # Obesity: Estimated body mass index is 34.1 kg/m  as calculated from the following:    Height as of this encounter: 1.825 m (5' 11.85\").    Weight as of this encounter: 113.6 kg (250 lb 6.4 oz).             Pt was staffed with Dr. Hany Rivera MD   Hematology/Oncology Fellow PGY6  Pager: [phone]      "

## 2024-08-16 NOTE — PLAN OF CARE
Nursing Note  Shift: [id number]    BMT Day +9    General: VSS on room air. 140-150's systolic. Afebrile. A&Ox4. Fatigued. Denies pain, headache, or shortness of breath.   GI/: BM x1. Reports intermittent gas pains, simethicone given PRN x1. Voiding with high urgency and frequency. Placed external catheter to improve sleep quality during the night. Tolerating normal diet with good appetite.   Skin: Potential mouth lesions, using saline mouth rinse. Patient reports biting right side of tongue. No other skin concerns.  Activity: Up independently.   Access: R CVC - both lumens heparin locked.  Electrolytes: No replacements needed, recheck tomorrow AM.  Blood products: Within parameters. PLTs recheck at 1600. Otherwise recheck tomorrow AM.    Plan: Changed MMF due time to 0900/2100 schedule per patient request. Patient would like to be disturbed as little as possible at night to improve sleep. Able to leave undisturbed from 11p-4am/labdraw.  Continue with plan of care. Notify primary team with changes.

## 2024-08-17 LAB
ACANTHOCYTES BLD QL SMEAR: ABNORMAL
ANION GAP SERPL CALCULATED.3IONS-SCNC: 11 MMOL/L (ref 7–15)
AUER BODIES BLD QL SMEAR: ABNORMAL
BASO STIPL BLD QL SMEAR: ABNORMAL
BASOPHILS # BLD AUTO: ABNORMAL 10*3/UL
BASOPHILS NFR BLD AUTO: ABNORMAL %
BITE CELLS BLD QL SMEAR: ABNORMAL
BLD PROD TYP BPU: NORMAL
BLISTER CELLS BLD QL SMEAR: ABNORMAL
BLOOD COMPONENT TYPE: NORMAL
BUN SERPL-MCNC: 14.4 MG/DL (ref 8–23)
BURR CELLS BLD QL SMEAR: ABNORMAL
CALCIUM SERPL-MCNC: 9.1 MG/DL (ref 8.8–10.4)
CHLORIDE SERPL-SCNC: 102 MMOL/L (ref 98–107)
CODING SYSTEM: NORMAL
CREAT SERPL-MCNC: 0.81 MG/DL (ref 0.67–1.17)
DACRYOCYTES BLD QL SMEAR: ABNORMAL
EGFRCR SERPLBLD CKD-EPI 2021: >90 ML/MIN/1.73M2
ELLIPTOCYTES BLD QL SMEAR: ABNORMAL
EOSINOPHIL # BLD AUTO: ABNORMAL 10*3/UL
EOSINOPHIL NFR BLD AUTO: ABNORMAL %
ERYTHROCYTE [DISTWIDTH] IN BLOOD BY AUTOMATED COUNT: 14.6 % (ref 10–15)
FRAGMENTS BLD QL SMEAR: ABNORMAL
GLUCOSE BLDC GLUCOMTR-MCNC: 130 MG/DL (ref 70–99)
GLUCOSE BLDC GLUCOMTR-MCNC: 142 MG/DL (ref 70–99)
GLUCOSE BLDC GLUCOMTR-MCNC: 153 MG/DL (ref 70–99)
GLUCOSE BLDC GLUCOMTR-MCNC: 216 MG/DL (ref 70–99)
GLUCOSE SERPL-MCNC: 143 MG/DL (ref 70–99)
HCO3 SERPL-SCNC: 24 MMOL/L (ref 22–29)
HCT VFR BLD AUTO: 28.3 % (ref 40–53)
HGB BLD-MCNC: 9.3 G/DL (ref 13.3–17.7)
HGB C CRYSTALS: ABNORMAL
HOWELL-JOLLY BOD BLD QL SMEAR: ABNORMAL
IMM GRANULOCYTES # BLD: ABNORMAL 10*3/UL
IMM GRANULOCYTES NFR BLD: ABNORMAL %
ISSUE DATE AND TIME: NORMAL
LYMPHOCYTES # BLD AUTO: ABNORMAL 10*3/UL
LYMPHOCYTES NFR BLD AUTO: ABNORMAL %
MAGNESIUM SERPL-MCNC: 1.9 MG/DL (ref 1.7–2.3)
MCH RBC QN AUTO: 30 PG (ref 26.5–33)
MCHC RBC AUTO-ENTMCNC: 32.9 G/DL (ref 31.5–36.5)
MCV RBC AUTO: 91 FL (ref 78–100)
MONOCYTES # BLD AUTO: ABNORMAL 10*3/UL
MONOCYTES NFR BLD AUTO: ABNORMAL %
NEUTROPHILS # BLD AUTO: ABNORMAL 10*3/UL
NEUTROPHILS NFR BLD AUTO: ABNORMAL %
NEUTS HYPERSEG BLD QL SMEAR: ABNORMAL
PHOSPHATE SERPL-MCNC: 3.3 MG/DL (ref 2.5–4.5)
PLAT MORPH BLD: ABNORMAL
PLATELET # BLD AUTO: 21 10E3/UL (ref 150–450)
PLATELET # BLD AUTO: 7 10E3/UL (ref 150–450)
POLYCHROMASIA BLD QL SMEAR: ABNORMAL
POTASSIUM SERPL-SCNC: 3.3 MMOL/L (ref 3.4–5.3)
POTASSIUM SERPL-SCNC: 3.6 MMOL/L (ref 3.4–5.3)
RBC # BLD AUTO: 3.1 10E6/UL (ref 4.4–5.9)
RBC AGGLUT BLD QL: ABNORMAL
RBC MORPH BLD: ABNORMAL
ROULEAUX BLD QL SMEAR: ABNORMAL
SICKLE CELLS BLD QL SMEAR: ABNORMAL
SMUDGE CELLS BLD QL SMEAR: ABNORMAL
SODIUM SERPL-SCNC: 137 MMOL/L (ref 135–145)
SPHEROCYTES BLD QL SMEAR: ABNORMAL
STOMATOCYTES BLD QL SMEAR: ABNORMAL
TARGETS BLD QL SMEAR: SLIGHT
TOXIC GRANULES BLD QL SMEAR: ABNORMAL
UNIT ABO/RH: NORMAL
UNIT NUMBER: NORMAL
UNIT STATUS: NORMAL
UNIT TYPE ISBT: 5100
VARIANT LYMPHS BLD QL SMEAR: ABNORMAL
WBC # BLD AUTO: <0.1 10E3/UL (ref 4–11)

## 2024-08-17 PROCEDURE — 85027 COMPLETE CBC AUTOMATED: CPT | Performed by: STUDENT IN AN ORGANIZED HEALTH CARE EDUCATION/TRAINING PROGRAM

## 2024-08-17 PROCEDURE — 85049 AUTOMATED PLATELET COUNT: CPT | Performed by: STUDENT IN AN ORGANIZED HEALTH CARE EDUCATION/TRAINING PROGRAM

## 2024-08-17 PROCEDURE — 250N000011 HC RX IP 250 OP 636: Mod: JZ | Performed by: STUDENT IN AN ORGANIZED HEALTH CARE EDUCATION/TRAINING PROGRAM

## 2024-08-17 PROCEDURE — 250N000013 HC RX MED GY IP 250 OP 250 PS 637

## 2024-08-17 PROCEDURE — 83735 ASSAY OF MAGNESIUM: CPT | Performed by: INTERNAL MEDICINE

## 2024-08-17 PROCEDURE — 84100 ASSAY OF PHOSPHORUS: CPT | Performed by: INTERNAL MEDICINE

## 2024-08-17 PROCEDURE — P9037 PLATE PHERES LEUKOREDU IRRAD: HCPCS | Performed by: STUDENT IN AN ORGANIZED HEALTH CARE EDUCATION/TRAINING PROGRAM

## 2024-08-17 PROCEDURE — 250N000013 HC RX MED GY IP 250 OP 250 PS 637: Performed by: INTERNAL MEDICINE

## 2024-08-17 PROCEDURE — 250N000011 HC RX IP 250 OP 636: Mod: JZ | Performed by: PHYSICIAN ASSISTANT

## 2024-08-17 PROCEDURE — 250N000013 HC RX MED GY IP 250 OP 250 PS 637: Performed by: PHYSICIAN ASSISTANT

## 2024-08-17 PROCEDURE — 250N000011 HC RX IP 250 OP 636: Performed by: INTERNAL MEDICINE

## 2024-08-17 PROCEDURE — 82310 ASSAY OF CALCIUM: CPT | Performed by: STUDENT IN AN ORGANIZED HEALTH CARE EDUCATION/TRAINING PROGRAM

## 2024-08-17 PROCEDURE — 84132 ASSAY OF SERUM POTASSIUM: CPT | Performed by: INTERNAL MEDICINE

## 2024-08-17 PROCEDURE — 258N000003 HC RX IP 258 OP 636: Performed by: INTERNAL MEDICINE

## 2024-08-17 PROCEDURE — 250N000013 HC RX MED GY IP 250 OP 250 PS 637: Performed by: STUDENT IN AN ORGANIZED HEALTH CARE EDUCATION/TRAINING PROGRAM

## 2024-08-17 PROCEDURE — 206N000001 HC R&B BMT UMMC

## 2024-08-17 PROCEDURE — 250N000012 HC RX MED GY IP 250 OP 636 PS 637: Performed by: PHYSICIAN ASSISTANT

## 2024-08-17 PROCEDURE — 258N000003 HC RX IP 258 OP 636: Performed by: PHYSICIAN ASSISTANT

## 2024-08-17 RX ORDER — POTASSIUM CHLORIDE 750 MG/1
40 TABLET, EXTENDED RELEASE ORAL ONCE
Status: COMPLETED | OUTPATIENT
Start: 2024-08-17 | End: 2024-08-17

## 2024-08-17 RX ORDER — DIPHENHYDRAMINE HYDROCHLORIDE AND LIDOCAINE HYDROCHLORIDE AND ALUMINUM HYDROXIDE AND MAGNESIUM HYDRO
10 KIT EVERY 6 HOURS PRN
Status: DISCONTINUED | OUTPATIENT
Start: 2024-08-17 | End: 2024-08-18

## 2024-08-17 RX ADMIN — URSODIOL 300 MG: 300 CAPSULE ORAL at 07:53

## 2024-08-17 RX ADMIN — FILGRASTIM-AAFI 480 MCG: 480 INJECTION, SOLUTION INTRAVENOUS; SUBCUTANEOUS at 19:52

## 2024-08-17 RX ADMIN — DIPHENHYDRAMINE HYDROCHLORIDE AND LIDOCAINE HYDROCHLORIDE AND ALUMINUM HYDROXIDE AND MAGNESIUM HYDRO 10 ML: KIT at 23:03

## 2024-08-17 RX ADMIN — FLUTICASONE PROPIONATE 1 SPRAY: 50 SPRAY, METERED NASAL at 19:55

## 2024-08-17 RX ADMIN — MYCOPHENOLATE MOFETIL 1500 MG: 500 INJECTION, POWDER, LYOPHILIZED, FOR SOLUTION INTRAVENOUS at 09:14

## 2024-08-17 RX ADMIN — BRIMONIDINE TARTRATE 1 DROP: 2 SOLUTION/ DROPS OPHTHALMIC at 19:55

## 2024-08-17 RX ADMIN — ATORVASTATIN CALCIUM 10 MG: 10 TABLET, FILM COATED ORAL at 19:52

## 2024-08-17 RX ADMIN — CALCIUM CARBONATE 600 MG (1,500 MG)-VITAMIN D3 400 UNIT TABLET 1 TABLET: at 07:53

## 2024-08-17 RX ADMIN — BRIMONIDINE TARTRATE 1 DROP: 2 SOLUTION/ DROPS OPHTHALMIC at 07:48

## 2024-08-17 RX ADMIN — Medication 3 CAPSULE: at 07:52

## 2024-08-17 RX ADMIN — TAMSULOSIN HYDROCHLORIDE 0.4 MG: 0.4 CAPSULE ORAL at 07:52

## 2024-08-17 RX ADMIN — LORAZEPAM 1 MG: 0.5 TABLET ORAL at 22:46

## 2024-08-17 RX ADMIN — AMLODIPINE BESYLATE 10 MG: 10 TABLET ORAL at 07:52

## 2024-08-17 RX ADMIN — DIPHENHYDRAMINE HYDROCHLORIDE AND LIDOCAINE HYDROCHLORIDE AND ALUMINUM HYDROXIDE AND MAGNESIUM HYDRO 10 ML: KIT at 18:50

## 2024-08-17 RX ADMIN — FLUTICASONE PROPIONATE 1 SPRAY: 50 SPRAY, METERED NASAL at 07:46

## 2024-08-17 RX ADMIN — INSULIN ASPART 1 UNITS: 100 INJECTION, SOLUTION INTRAVENOUS; SUBCUTANEOUS at 07:46

## 2024-08-17 RX ADMIN — INSULIN ASPART 1 UNITS: 100 INJECTION, SOLUTION INTRAVENOUS; SUBCUTANEOUS at 12:20

## 2024-08-17 RX ADMIN — LISINOPRIL 5 MG: 5 TABLET ORAL at 07:53

## 2024-08-17 RX ADMIN — ACYCLOVIR 800 MG: 800 TABLET ORAL at 07:52

## 2024-08-17 RX ADMIN — SIROLIMUS 4 MG: 2 TABLET ORAL at 07:52

## 2024-08-17 RX ADMIN — PANTOPRAZOLE SODIUM 40 MG: 40 TABLET, DELAYED RELEASE ORAL at 07:52

## 2024-08-17 RX ADMIN — URSODIOL 300 MG: 300 CAPSULE ORAL at 19:52

## 2024-08-17 RX ADMIN — LATANOPROST 2 DROP: 50 SOLUTION OPHTHALMIC at 22:46

## 2024-08-17 RX ADMIN — MYCOPHENOLATE MOFETIL 1500 MG: 500 INJECTION, POWDER, LYOPHILIZED, FOR SOLUTION INTRAVENOUS at 20:11

## 2024-08-17 RX ADMIN — ACYCLOVIR 800 MG: 800 TABLET ORAL at 19:52

## 2024-08-17 RX ADMIN — ALLOPURINOL 300 MG: 300 TABLET ORAL at 07:52

## 2024-08-17 RX ADMIN — LEVOFLOXACIN 250 MG: 250 TABLET, FILM COATED ORAL at 09:19

## 2024-08-17 RX ADMIN — MICAFUNGIN SODIUM 150 MG: 50 INJECTION, POWDER, LYOPHILIZED, FOR SOLUTION INTRAVENOUS at 07:58

## 2024-08-17 RX ADMIN — POTASSIUM CHLORIDE 40 MEQ: 750 TABLET, EXTENDED RELEASE ORAL at 06:52

## 2024-08-17 RX ADMIN — URSODIOL 300 MG: 300 CAPSULE ORAL at 13:46

## 2024-08-17 RX ADMIN — Medication 5 ML: at 03:57

## 2024-08-17 RX ADMIN — DIPHENHYDRAMINE HYDROCHLORIDE AND LIDOCAINE HYDROCHLORIDE AND ALUMINUM HYDROXIDE AND MAGNESIUM HYDRO 10 ML: KIT at 12:22

## 2024-08-17 ASSESSMENT — ACTIVITIES OF DAILY LIVING (ADL)
ADLS_ACUITY_SCORE: 24

## 2024-08-17 NOTE — PLAN OF CARE
"Goal Outcome Evaluation:      Plan of Care Reviewed With: patient, spouse     Blood pressure (!) 142/70, pulse 78, temperature 98.3  F (36.8  C), temperature source Oral, resp. rate 16, height 1.825 m (5' 11.85\"), weight 113.6 kg (250 lb 6.4 oz), SpO2 97%.    Neuro: A&Ox4. Endorses fatigue  Cardiac: No tele, VSS, slightly hypertensive.   Respiratory: Sating >92%  on RA.  GI/: Adequate urine output. BM X2- loose, Imodium given  Diet/appetite: Tolerating regular diet. Eating well.  Activity:  Up independently to chair and in halls.  Pain: At acceptable level on current regimen.   Skin: No new deficits noted.  LDA's: CVC- Hep locked    Plan: Continue with POC. Notify primary team with changes.      "

## 2024-08-17 NOTE — PLAN OF CARE
"Goal Outcome Evaluation:      Plan of Care Reviewed With: patient    Overall Patient Progress: no changeOverall Patient Progress: no change    .BP (!) 150/77 (BP Location: Right arm)   Pulse 78   Temp 98.3  F (36.8  C) (Oral)   Resp 18   Ht 1.825 m (5' 11.85\")   Wt 113.6 kg (250 lb 6.4 oz)   SpO2 96%   BMI 34.10 kg/m      Pt alert and oriented. Tmax 99.8. HTN-not meeting paging parameter. Ovss on room air. Not using home Cpap during the night. Requested 2lpm NC for sleep otherwise sating well on room air. Prn Ativan for sleep, per pt slept better than previous night. Imodium x1. Replaced 40mEq Potassium per protocol this morning. Transfusing 1 unit of platelets for count of 7k. Using primofit external catheter for sleep, good urine output. Cont with poc.        Problem: Adult Inpatient Plan of Care  Goal: Plan of Care Review  Description: The Plan of Care Review/Shift note should be completed every shift.  The Outcome Evaluation is a brief statement about your assessment that the patient is improving, declining, or no change.  This information will be displayed automatically on your shift  note.  Outcome: Progressing  Flowsheets (Taken 8/17/2024 6258)  Plan of Care Reviewed With: patient  Overall Patient Progress: no change     Problem: Stem Cell/Bone Marrow Transplant  Goal: Optimal Coping with Transplant  Outcome: Progressing      "

## 2024-08-17 NOTE — PLAN OF CARE
"Goal Outcome Evaluation:      Plan of Care Reviewed With: patient, spouse      Blood pressure (!) 150/75, pulse 82, temperature 98.5  F (36.9  C), temperature source Oral, resp. rate 16, height 1.825 m (5' 11.85\"), weight 112 kg (246 lb 14.4 oz), SpO2 99%.    Patient slightly hypertensive, other vitals stable, alert and oriented, on RA. Patient up ambulating but endorses fatigue. Patient voiding adequate amounts, no diarrhea today. Tolerating regular diet though c/o mouth sores- more in the inner lips- magic mouth wash given. CVC intact- hep locked. Patient would like to shower later tonight. Received a unit of platelet, tolerated well. Continue with plan of care, report changes to provider.   "

## 2024-08-17 NOTE — PROGRESS NOTES
"BMT Progress Note  Chief complaint:  Arpit Forrest is a 73 year old male, currently day+10 s/p ELIEZER MUD PBSCT for MDS.  INTERIM HISTORY:   AMADAMIRNACHEMOArpit continues to feel tired today. Walked the halls yesterday and plans on walking more today. Mild mouth pain today, however, no sores yet. Diarrhea yesterday which resolved after imodium. Denies fever, chills, SOB, chest pain, abdominal pain, dysuria, or rashes.   REVIEW OF SYSTEMS: Otherwise unremarkable other than what is noted in the Interim History.   PHYSICAL EXAM:   Vitals:  BP (!) 148/77   Pulse 72   Temp 99  F (37.2  C)   Resp 20   Ht 1.825 m (5' 11.85\")   Wt 112 kg (246 lb 14.4 oz)   SpO2 97%   BMI 33.63 kg/m      General: alert and cooperative, NAD, lying in bed. Face with maye appearance--stable per pt  HEENT: NC/AT, sclera anicteric. Oropharynx pink and moist; no ulcerations or sores present.  CV: well perfused  Resp: normal work of breathing on room air   Skin: no rashes on limited exam  Neuro: Alert and interactive, moves all extremities equally  Psych: Mood and affect normal  Vascular: Right CVC   LABS:  Lab Results   Component Value Date    WBC <0.1 (LL) 08/17/2024    ANEU 0.8 (L) 08/08/2024    HGB 9.3 (L) 08/17/2024    HCT 28.3 (L) 08/17/2024    PLT 21 (LL) 08/17/2024     08/17/2024    POTASSIUM 3.3 (L) 08/17/2024    CHLORIDE 102 08/17/2024    CO2 24 08/17/2024     (H) 08/17/2024    BUN 14.4 08/17/2024    CR 0.81 08/17/2024    MAG 1.9 08/17/2024    INR 1.26 (H) 08/12/2024    BILITOTAL 0.5 08/15/2024    AST 15 08/15/2024    ALT 15 08/15/2024    ALKPHOS 61 08/15/2024    PROTTOTAL 6.3 (L) 08/15/2024    ALBUMIN 3.7 08/15/2024        ASSESSMENT AND PLAN: Arpit Forrest is a 73 year old male, currently day +10 s/p ELIEZER MUD PBSCT for MDS.  BMT/IEC PROTOCOL for MDS  - Chemo protocol: [id number]; CY/flu/TBI  - Peripheral blood stem cell graft from 8/8 donor, ABO matched (Oneg), Cell dose: TBD  - continue allopurinol (hx of gout)  - " Engraftment monitoring: Peripheral blood and bone marrow chimerisms on D28, D100, 6 months, 1 and 2 year  - Restaging plan: BMBx with NGS on D28, D100, 6 months, 1 and 2 years    HEME/COAG  - GCSF started day +5, continue until ANC > 1500 for 3 consecutive days.   - Transfusion parameters: hemoglobin <7, platelets <10  Post plt check on 8/17 showed adequate bump following replacement   -Daily CBC, can increase to BID platelet checks if persistently needing transfusions     RISK OF GVHD  - Prophylaxis: PT Cy 50mg/kg on D+3 and D+4 transplant given at 11p, dose of cytoxan start time coordinated with transplant delay, began 2p day +3; MMF (D+5 to 35); Sirolimus (started D+5, target level 5-10).    - Siro level (8/16) low at 6.9 -- given bolus x1 and no change to daily dose. Next level Monday 8/19.     ID  #Neutropenic fever- T cell expansion vs. Infection vs. Other - Cefepime (8/11-8/14), work up unremarkable. No recurrent fevers so far after stopping cefepime 8/14.   Recent Hx: Multiple granulomas in the lung and bone marrow biopsy- fungitell, aspergillus galactomanan neg. 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 MMW prn  #Frequent stools- c.diff negative (8/11). Imodium PRN.  # Hepatic steatosis and boderline iron deposition in liver  - LFTs and synthetic function normal on admission; Check LFTs qM/Th  - VOD prophy: Ursodiol 300mg TID    # Supportive   - Anti-emetics:  per protocol   - Ulcer prophy: PPI daily   - Dietician support to prevent malnutrition.     CARDIOVASCULAR  # Hx of A.fib with RVR   A.fib with RVR while admitted with febrile episode on 3/15/24. Noted on his fitbit, not very symptomatic. Cardioversion planned for new  onset A.fib. Not achieved with amiodarone, DCCV on 3/21/24 restored normal sinus rhythm. Amiodarone and anticoagulation was stopped in April 2024 without any recurrence.   He could have a recurrence during transplant which would need to be addressed with rate control strategy   # Asymptomatic moderate aortic stenosis   # CAD  Chest CT in February 2024 with mild to moderate CAC, three-vessel disease. No anginal symptoms   Continue atorvastatin 10mg  EKG Sinus rhythm Inferior infarct , age undetermined. Abnormal ECG (known cardiovascular disease). QTc =460  # HTN- Continue amlodipine 10mg, added lisinopril 5mg 8/4 (PTA dose of lisinopril was 10mg)   - Risk of cardiomyopathy:  Baseline EF normal 60-65&  - Risk of arrhythmia: Baseline EKG showed NSR, Qtc 460     RESPIRATORY  # VINCENT : CPAP  - Baseline PFTs: normal   - Risk of respiratory complications: Frequent ambulation and incentive spirometer.     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 8/9-11 lasix per protocol Cytoxan flush  -baseline weight 8/14, and held off on additional lasix since  # Electrolyte management: replace per sliding scale  - cont Ca w/vit D  - hold eye vitamin and Coq10 peritransplant     DIABETES/ENDOCRINE  #DMII: hx steroid-induced hyperglycemia: Carb coverage and Novolog sliding scale. Endo initially consulted, signed off with stable sugars.   *held PTA metformin on admission.      MUSCULOSKELETAL/FRAILTY  # Gout: Continue allopurinol indefinitely   - Baseline Frailty Score: Not frail   - Daily PT/OT as needed while inpatient     SOCIAL DETERMINANTS  - Caregiver: Wife, Meghna, children  "  Medically Ready for Discharge: Anticipated in 5+ Days    Clinically Significant Risk Factors        # Hypokalemia: Lowest K = 3.3 mmol/L in last 2 days, will replace as needed         # Thrombocytopenia: Lowest platelets = 7 in last 2 days, will monitor for bleeding   # Hypertension: Noted on problem list            # Obesity: Estimated body mass index is 33.63 kg/m  as calculated from the following:    Height as of this encounter: 1.825 m (5' 11.85\").    Weight as of this encounter: 112 kg (246 lb 14.4 oz).             I spent 30 minutes in the care of this patient today, which included time necessary for preparation for the visit, obtaining history, ordering medications/tests/procedures as medically indicated, review of pertinent medical literature, counseling of the patient, communication of recommendations to the care team, and documentation time.    Marilin Phillips PA-C  [phone]      "

## 2024-08-18 LAB
ABO/RH(D): NORMAL
ACANTHOCYTES BLD QL SMEAR: NORMAL
ANION GAP SERPL CALCULATED.3IONS-SCNC: 11 MMOL/L (ref 7–15)
ANTIBODY SCREEN: NEGATIVE
AUER BODIES BLD QL SMEAR: NORMAL
BASO STIPL BLD QL SMEAR: NORMAL
BASOPHILS # BLD AUTO: ABNORMAL 10*3/UL
BASOPHILS NFR BLD AUTO: ABNORMAL %
BITE CELLS BLD QL SMEAR: NORMAL
BLD PROD TYP BPU: NORMAL
BLISTER CELLS BLD QL SMEAR: NORMAL
BLOOD COMPONENT TYPE: NORMAL
BUN SERPL-MCNC: 14 MG/DL (ref 8–23)
BURR CELLS BLD QL SMEAR: NORMAL
CALCIUM SERPL-MCNC: 9.1 MG/DL (ref 8.8–10.4)
CHLORIDE SERPL-SCNC: 104 MMOL/L (ref 98–107)
CODING SYSTEM: NORMAL
CREAT SERPL-MCNC: 0.85 MG/DL (ref 0.67–1.17)
DACRYOCYTES BLD QL SMEAR: NORMAL
EGFRCR SERPLBLD CKD-EPI 2021: >90 ML/MIN/1.73M2
ELLIPTOCYTES BLD QL SMEAR: NORMAL
EOSINOPHIL # BLD AUTO: ABNORMAL 10*3/UL
EOSINOPHIL NFR BLD AUTO: ABNORMAL %
ERYTHROCYTE [DISTWIDTH] IN BLOOD BY AUTOMATED COUNT: 14.4 % (ref 10–15)
FRAGMENTS BLD QL SMEAR: NORMAL
GLUCOSE BLDC GLUCOMTR-MCNC: 133 MG/DL (ref 70–99)
GLUCOSE BLDC GLUCOMTR-MCNC: 156 MG/DL (ref 70–99)
GLUCOSE BLDC GLUCOMTR-MCNC: 156 MG/DL (ref 70–99)
GLUCOSE BLDC GLUCOMTR-MCNC: 222 MG/DL (ref 70–99)
GLUCOSE SERPL-MCNC: 143 MG/DL (ref 70–99)
HCO3 SERPL-SCNC: 23 MMOL/L (ref 22–29)
HCT VFR BLD AUTO: 26.5 % (ref 40–53)
HGB BLD-MCNC: 8.8 G/DL (ref 13.3–17.7)
HGB C CRYSTALS: NORMAL
HOWELL-JOLLY BOD BLD QL SMEAR: NORMAL
IMM GRANULOCYTES # BLD: ABNORMAL 10*3/UL
IMM GRANULOCYTES NFR BLD: ABNORMAL %
ISSUE DATE AND TIME: NORMAL
LYMPHOCYTES # BLD AUTO: ABNORMAL 10*3/UL
LYMPHOCYTES NFR BLD AUTO: ABNORMAL %
MAGNESIUM SERPL-MCNC: 1.9 MG/DL (ref 1.7–2.3)
MCH RBC QN AUTO: 29.6 PG (ref 26.5–33)
MCHC RBC AUTO-ENTMCNC: 33.2 G/DL (ref 31.5–36.5)
MCV RBC AUTO: 89 FL (ref 78–100)
MONOCYTES # BLD AUTO: ABNORMAL 10*3/UL
MONOCYTES NFR BLD AUTO: ABNORMAL %
NEUTROPHILS # BLD AUTO: ABNORMAL 10*3/UL
NEUTROPHILS NFR BLD AUTO: ABNORMAL %
NEUTS HYPERSEG BLD QL SMEAR: NORMAL
PHOSPHATE SERPL-MCNC: 3 MG/DL (ref 2.5–4.5)
PLAT MORPH BLD: NORMAL
PLATELET # BLD AUTO: 16 10E3/UL (ref 150–450)
POLYCHROMASIA BLD QL SMEAR: NORMAL
POTASSIUM SERPL-SCNC: 3.4 MMOL/L (ref 3.4–5.3)
POTASSIUM SERPL-SCNC: 3.6 MMOL/L (ref 3.4–5.3)
RBC # BLD AUTO: 2.97 10E6/UL (ref 4.4–5.9)
RBC AGGLUT BLD QL: NORMAL
RBC MORPH BLD: NORMAL
ROULEAUX BLD QL SMEAR: NORMAL
SICKLE CELLS BLD QL SMEAR: NORMAL
SMUDGE CELLS BLD QL SMEAR: NORMAL
SODIUM SERPL-SCNC: 138 MMOL/L (ref 135–145)
SPECIMEN EXPIRATION DATE: NORMAL
SPHEROCYTES BLD QL SMEAR: NORMAL
STOMATOCYTES BLD QL SMEAR: NORMAL
TARGETS BLD QL SMEAR: NORMAL
TOXIC GRANULES BLD QL SMEAR: NORMAL
UNIT ABO/RH: NORMAL
UNIT NUMBER: NORMAL
UNIT STATUS: NORMAL
UNIT TYPE ISBT: 5100
VARIANT LYMPHS BLD QL SMEAR: NORMAL
WBC # BLD AUTO: <0.1 10E3/UL (ref 4–11)

## 2024-08-18 PROCEDURE — 84100 ASSAY OF PHOSPHORUS: CPT | Performed by: INTERNAL MEDICINE

## 2024-08-18 PROCEDURE — 258N000003 HC RX IP 258 OP 636: Performed by: INTERNAL MEDICINE

## 2024-08-18 PROCEDURE — 250N000013 HC RX MED GY IP 250 OP 250 PS 637: Performed by: PHYSICIAN ASSISTANT

## 2024-08-18 PROCEDURE — 206N000001 HC R&B BMT UMMC

## 2024-08-18 PROCEDURE — 250N000011 HC RX IP 250 OP 636: Mod: JZ | Performed by: STUDENT IN AN ORGANIZED HEALTH CARE EDUCATION/TRAINING PROGRAM

## 2024-08-18 PROCEDURE — 258N000003 HC RX IP 258 OP 636: Performed by: PHYSICIAN ASSISTANT

## 2024-08-18 PROCEDURE — 250N000011 HC RX IP 250 OP 636: Performed by: INTERNAL MEDICINE

## 2024-08-18 PROCEDURE — 250N000009 HC RX 250: Performed by: INTERNAL MEDICINE

## 2024-08-18 PROCEDURE — 250N000009 HC RX 250

## 2024-08-18 PROCEDURE — 250N000013 HC RX MED GY IP 250 OP 250 PS 637

## 2024-08-18 PROCEDURE — 80048 BASIC METABOLIC PNL TOTAL CA: CPT | Performed by: STUDENT IN AN ORGANIZED HEALTH CARE EDUCATION/TRAINING PROGRAM

## 2024-08-18 PROCEDURE — P9037 PLATE PHERES LEUKOREDU IRRAD: HCPCS | Performed by: STUDENT IN AN ORGANIZED HEALTH CARE EDUCATION/TRAINING PROGRAM

## 2024-08-18 PROCEDURE — 86901 BLOOD TYPING SEROLOGIC RH(D): CPT | Performed by: STUDENT IN AN ORGANIZED HEALTH CARE EDUCATION/TRAINING PROGRAM

## 2024-08-18 PROCEDURE — 250N000013 HC RX MED GY IP 250 OP 250 PS 637: Performed by: INTERNAL MEDICINE

## 2024-08-18 PROCEDURE — 85027 COMPLETE CBC AUTOMATED: CPT | Performed by: STUDENT IN AN ORGANIZED HEALTH CARE EDUCATION/TRAINING PROGRAM

## 2024-08-18 PROCEDURE — 250N000011 HC RX IP 250 OP 636: Mod: JZ | Performed by: PHYSICIAN ASSISTANT

## 2024-08-18 PROCEDURE — 250N000012 HC RX MED GY IP 250 OP 636 PS 637: Performed by: PHYSICIAN ASSISTANT

## 2024-08-18 PROCEDURE — 250N000013 HC RX MED GY IP 250 OP 250 PS 637: Performed by: STUDENT IN AN ORGANIZED HEALTH CARE EDUCATION/TRAINING PROGRAM

## 2024-08-18 PROCEDURE — 84132 ASSAY OF SERUM POTASSIUM: CPT | Performed by: STUDENT IN AN ORGANIZED HEALTH CARE EDUCATION/TRAINING PROGRAM

## 2024-08-18 PROCEDURE — 83735 ASSAY OF MAGNESIUM: CPT | Performed by: INTERNAL MEDICINE

## 2024-08-18 RX ORDER — DIPHENHYDRAMINE HYDROCHLORIDE AND LIDOCAINE HYDROCHLORIDE AND ALUMINUM HYDROXIDE AND MAGNESIUM HYDRO
10 KIT EVERY 6 HOURS PRN
Status: DISCONTINUED | OUTPATIENT
Start: 2024-08-18 | End: 2024-08-25 | Stop reason: HOSPADM

## 2024-08-18 RX ORDER — POTASSIUM CHLORIDE 750 MG/1
40 TABLET, EXTENDED RELEASE ORAL ONCE
Status: COMPLETED | OUTPATIENT
Start: 2024-08-18 | End: 2024-08-18

## 2024-08-18 RX ORDER — OXYMETAZOLINE HYDROCHLORIDE 0.05 G/100ML
2 SPRAY NASAL 2 TIMES DAILY PRN
Status: DISCONTINUED | OUTPATIENT
Start: 2024-08-18 | End: 2024-08-25 | Stop reason: HOSPADM

## 2024-08-18 RX ADMIN — LEVOFLOXACIN 250 MG: 250 TABLET, FILM COATED ORAL at 09:41

## 2024-08-18 RX ADMIN — DIPHENHYDRAMINE HYDROCHLORIDE AND LIDOCAINE HYDROCHLORIDE AND ALUMINUM HYDROXIDE AND MAGNESIUM HYDRO 10 ML: KIT at 09:28

## 2024-08-18 RX ADMIN — ATORVASTATIN CALCIUM 10 MG: 10 TABLET, FILM COATED ORAL at 20:18

## 2024-08-18 RX ADMIN — Medication 5 ML: at 22:20

## 2024-08-18 RX ADMIN — INSULIN ASPART 1 UNITS: 100 INJECTION, SOLUTION INTRAVENOUS; SUBCUTANEOUS at 09:23

## 2024-08-18 RX ADMIN — FLUTICASONE PROPIONATE 1 SPRAY: 50 SPRAY, METERED NASAL at 08:56

## 2024-08-18 RX ADMIN — Medication 3 CAPSULE: at 08:57

## 2024-08-18 RX ADMIN — SENNOSIDES AND DOCUSATE SODIUM 1 TABLET: 8.6; 5 TABLET ORAL at 09:41

## 2024-08-18 RX ADMIN — TOPICAL ANESTHETIC 0.5 ML: 200 SPRAY DENTAL; PERIODONTAL at 11:43

## 2024-08-18 RX ADMIN — MICAFUNGIN SODIUM 150 MG: 50 INJECTION, POWDER, LYOPHILIZED, FOR SOLUTION INTRAVENOUS at 08:10

## 2024-08-18 RX ADMIN — DIPHENHYDRAMINE HYDROCHLORIDE AND LIDOCAINE HYDROCHLORIDE AND ALUMINUM HYDROXIDE AND MAGNESIUM HYDRO 10 ML: KIT at 17:05

## 2024-08-18 RX ADMIN — LORAZEPAM 1 MG: 0.5 TABLET ORAL at 22:16

## 2024-08-18 RX ADMIN — TOPICAL ANESTHETIC 1 ML: 200 SPRAY DENTAL; PERIODONTAL at 17:06

## 2024-08-18 RX ADMIN — URSODIOL 300 MG: 300 CAPSULE ORAL at 20:18

## 2024-08-18 RX ADMIN — LATANOPROST 2 DROP: 50 SOLUTION OPHTHALMIC at 22:17

## 2024-08-18 RX ADMIN — Medication 5 ML: at 03:47

## 2024-08-18 RX ADMIN — URSODIOL 300 MG: 300 CAPSULE ORAL at 15:11

## 2024-08-18 RX ADMIN — ACYCLOVIR 800 MG: 800 TABLET ORAL at 08:57

## 2024-08-18 RX ADMIN — URSODIOL 300 MG: 300 CAPSULE ORAL at 08:57

## 2024-08-18 RX ADMIN — PANTOPRAZOLE SODIUM 40 MG: 40 TABLET, DELAYED RELEASE ORAL at 08:57

## 2024-08-18 RX ADMIN — LISINOPRIL 5 MG: 5 TABLET ORAL at 08:57

## 2024-08-18 RX ADMIN — Medication 10 ML: at 09:00

## 2024-08-18 RX ADMIN — POTASSIUM CHLORIDE 40 MEQ: 750 TABLET, EXTENDED RELEASE ORAL at 08:57

## 2024-08-18 RX ADMIN — TAMSULOSIN HYDROCHLORIDE 0.4 MG: 0.4 CAPSULE ORAL at 08:57

## 2024-08-18 RX ADMIN — AMLODIPINE BESYLATE 10 MG: 10 TABLET ORAL at 08:57

## 2024-08-18 RX ADMIN — ALLOPURINOL 300 MG: 300 TABLET ORAL at 08:57

## 2024-08-18 RX ADMIN — CALCIUM CARBONATE 600 MG (1,500 MG)-VITAMIN D3 400 UNIT TABLET 1 TABLET: at 08:57

## 2024-08-18 RX ADMIN — OXYMETAZOLINE HCL 2 SPRAY: 0.05 SPRAY NASAL at 08:23

## 2024-08-18 RX ADMIN — MYCOPHENOLATE MOFETIL 1500 MG: 500 INJECTION, POWDER, LYOPHILIZED, FOR SOLUTION INTRAVENOUS at 20:18

## 2024-08-18 RX ADMIN — BRIMONIDINE TARTRATE 1 DROP: 2 SOLUTION/ DROPS OPHTHALMIC at 08:56

## 2024-08-18 RX ADMIN — FILGRASTIM-AAFI 480 MCG: 480 INJECTION, SOLUTION INTRAVENOUS; SUBCUTANEOUS at 20:18

## 2024-08-18 RX ADMIN — MYCOPHENOLATE MOFETIL 1500 MG: 500 INJECTION, POWDER, LYOPHILIZED, FOR SOLUTION INTRAVENOUS at 09:21

## 2024-08-18 RX ADMIN — BRIMONIDINE TARTRATE 1 DROP: 2 SOLUTION/ DROPS OPHTHALMIC at 20:33

## 2024-08-18 RX ADMIN — SIROLIMUS 4 MG: 2 TABLET ORAL at 08:57

## 2024-08-18 RX ADMIN — INSULIN ASPART 1 UNITS: 100 INJECTION, SOLUTION INTRAVENOUS; SUBCUTANEOUS at 13:19

## 2024-08-18 RX ADMIN — ACYCLOVIR 800 MG: 800 TABLET ORAL at 20:18

## 2024-08-18 ASSESSMENT — ACTIVITIES OF DAILY LIVING (ADL)
ADLS_ACUITY_SCORE: 24

## 2024-08-18 NOTE — PROGRESS NOTES
"BMT Progress Note  Chief complaint:  Arpit Forrest is a 73 year old male, currently day+11 s/p ELIEZER MUD PBSCT for MDS.  INTERIM HISTORY:   Arpit had a nosebleed this morning. Afrin and PLTs given. Endorses mouth pain is still present, now with throat pain as well. No N/V/D. No abdominal pain.     REVIEW OF SYSTEMS: Otherwise unremarkable other than what is noted in the Interim History.   PHYSICAL EXAM:   Vitals:  BP (!) 147/72 (BP Location: Right arm)   Pulse 78   Temp 98  F (36.7  C) (Oral)   Resp 18   Ht 1.825 m (5' 11.85\")   Wt 112 kg (246 lb 14.4 oz)   SpO2 97%   BMI 33.63 kg/m      General: alert and cooperative, NAD. Face with maye appearance--stable per pt. Patient sitting at edge of bed with nosebleed.  HEENT: NC/AT, sclera anicteric. Unable to assess oropharynx d/t pt tending to epistaxis at time of exam  CV: well perfused  Resp: normal work of breathing on room air   Skin: no rashes on limited exam  Neuro: Alert and interactive, moves all extremities equally  Psych: Mood and affect normal  Vascular: Right CVC   LABS:  Lab Results   Component Value Date    WBC <0.1 (LL) 08/18/2024    ANEU 0.8 (L) 08/08/2024    HGB 8.8 (L) 08/18/2024    HCT 26.5 (L) 08/18/2024    PLT 16 (LL) 08/18/2024     08/18/2024    POTASSIUM 3.4 08/18/2024    CHLORIDE 104 08/18/2024    CO2 23 08/18/2024     (H) 08/18/2024    BUN 14.0 08/18/2024    CR 0.85 08/18/2024    MAG 1.9 08/18/2024    INR 1.26 (H) 08/12/2024    BILITOTAL 0.5 08/15/2024    AST 15 08/15/2024    ALT 15 08/15/2024    ALKPHOS 61 08/15/2024    PROTTOTAL 6.3 (L) 08/15/2024    ALBUMIN 3.7 08/15/2024        ASSESSMENT AND PLAN: Arpit Forrest is a 73 year old male, currently day +11 s/p ELIEZER MUD PBSCT for MDS.  BMT/IEC PROTOCOL for MDS  - Chemo protocol: [id number]; CY/flu/TBI  - Peripheral blood stem cell graft from 8/8 donor, ABO matched (Oneg), Cell dose: TBD  - continue allopurinol (hx of gout)  - Engraftment monitoring: Peripheral blood and bone " marrow chimerisms on D28, D100, 6 months, 1 and 2 year  - Restaging plan: BMBx with NGS on D28, D100, 6 months, 1 and 2 years    HEME/COAG  - GCSF started day +5, continue until ANC > 1500 for 3 consecutive days.   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    - Siro level (8/16) low at 6.9 -- given bolus x1 and no change to daily dose. Next level Monday 8/19.     ID  #Neutropenic fever- T cell expansion vs. Infection vs. Other - Cefepime (8/11-8/14), work up unremarkable. No recurrent fevers so far after stopping cefepime 8/14.   Recent Hx: Multiple granulomas in the lung and bone marrow biopsy- fungitell, aspergillus galactomanan neg. 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 MMW and hurricane spray prn.  #Frequent stools- c.diff negative (8/11). Imodium PRN.  # Hepatic steatosis and boderline iron deposition in liver  - LFTs and synthetic function normal on admission; Check LFTs qM/Th  - VOD prophy: Ursodiol 300mg TID    # Supportive   - Anti-emetics:  per protocol   - Ulcer prophy: PPI daily   - Dietician support to prevent malnutrition.     CARDIOVASCULAR  # Hx of A.fib with RVR   A.fib with RVR while admitted with febrile episode on 3/15/24. Noted on his fitbit, not very symptomatic. Cardioversion planned for new onset A.fib. Not  achieved with amiodarone, DCCV on 3/21/24 restored normal sinus rhythm. Amiodarone and anticoagulation was stopped in April 2024 without any recurrence.   He could have a recurrence during transplant which would need to be addressed with rate control strategy   # Asymptomatic moderate aortic stenosis   # CAD  Chest CT in February 2024 with mild to moderate CAC, three-vessel disease. No anginal symptoms   Continue atorvastatin 10mg  EKG Sinus rhythm Inferior infarct , age undetermined. Abnormal ECG (known cardiovascular disease). QTc =460  # HTN- Continue amlodipine 10mg, added lisinopril 5mg 8/4 (PTA dose of lisinopril was 10mg)   - Risk of cardiomyopathy:  Baseline EF normal 60-65&  - Risk of arrhythmia: Baseline EKG showed NSR, Qtc 460     RESPIRATORY  # VINCENT : CPAP  - Baseline PFTs: normal   - Risk of respiratory complications: Frequent ambulation and incentive spirometer.     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 8/9-11 lasix per protocol Cytoxan flush  -baseline weight 8/14, and held off on additional lasix since  # Electrolyte management: replace per sliding scale  - cont Ca w/vit D  - hold eye vitamin and Coq10 peritransplant     DIABETES/ENDOCRINE  #DMII: hx steroid-induced hyperglycemia: Carb coverage and Novolog sliding scale. Endo initially consulted, signed off with stable sugars.   *held PTA metformin on admission.      MUSCULOSKELETAL/FRAILTY  # Gout: Continue allopurinol indefinitely   - Baseline Frailty Score: Not frail   - Daily PT/OT as needed while inpatient     SOCIAL DETERMINANTS  - Caregiver: Wife, Meghna, children   Medically Ready for  "Discharge: Anticipated in 5+ Days    Clinically Significant Risk Factors        # Hypokalemia: Lowest K = 3.3 mmol/L in last 2 days, will replace as needed         # Thrombocytopenia: Lowest platelets = 7 in last 2 days, will monitor for bleeding   # Hypertension: Noted on problem list            # Obesity: Estimated body mass index is 33.63 kg/m  as calculated from the following:    Height as of this encounter: 1.825 m (5' 11.85\").    Weight as of this encounter: 112 kg (246 lb 14.4 oz).             I spent 30 minutes in the care of this patient today, which included time necessary for preparation for the visit, obtaining history, ordering medications/tests/procedures as medically indicated, review of pertinent medical literature, counseling of the patient, communication of recommendations to the care team, and documentation time.    Marilin Phillips PA-C  [phone]      "

## 2024-08-18 NOTE — PLAN OF CARE
"Goal Outcome Evaluation:      Plan of Care Reviewed With: patient, spouse    Blood pressure (!) 149/77, pulse 96, temperature 98.1  F (36.7  C), temperature source Oral, resp. rate 18, height 1.825 m (5' 11.85\"), weight 112 kg (246 lb 14.4 oz), SpO2 98%.    Patient is afebrile, vitals stable. Denies any other pain except mouth discomfort. Patient is sick feeling today, very fatigued, nose bleed this am- improved after platelet infusion and Afrin spray. Worsening mouth/throat pain. Only able to take liquids. Voiding adequate amounts, stool twice today. Continue to monitor, report changes to provider.       Problem: Adult Inpatient Plan of Care  Goal: Absence of Hospital-Acquired Illness or Injury  Intervention: Identify and Manage Fall Risk  Recent Flowsheet Documentation  Taken 8/18/2024 1600 by Mago Santacruz RN  Safety Promotion/Fall Prevention:   safety round/check completed   clutter free environment maintained   lighting adjusted  Taken 8/18/2024 0900 by Mago Santacruz RN  Safety Promotion/Fall Prevention:   safety round/check completed   clutter free environment maintained   lighting adjusted         Problem: Stem Cell/Bone Marrow Transplant  Goal: Optimal Coping with Transplant  Outcome: Unable to Meet  Intervention: Optimize Patient/Family Adjustment to Transplant  Recent Flowsheet Documentation  Taken 8/18/2024 1600 by Mago Santacruz RN  Supportive Measures:   active listening utilized   self-care encouraged   verbalization of feelings encouraged  Taken 8/18/2024 0900 by Mago Santacruz RN  Supportive Measures:   active listening utilized   self-care encouraged   verbalization of feelings encouraged     "

## 2024-08-18 NOTE — PLAN OF CARE
"Goal Outcome Evaluation:      Plan of Care Reviewed With: patient    Overall Patient Progress: no changeOverall Patient Progress: no change    ./74 (BP Location: Right arm)   Pulse 91   Temp 99.3  F (37.4  C) (Oral)   Resp 18   Ht 1.825 m (5' 11.85\")   Wt 112 kg (246 lb 14.4 oz)   SpO2 96%   BMI 33.63 kg/m       Patient oriented, coherent and conversant. CVC double lumen; intact. Afebrile, episodes of slight increased systolic BP (150s) noted, other vitals signs stable. Latest BP revealed within normal range. Blood blister on right upper lip noted, proper oral care educated. Home CPAP not used during sleep, hooked to O2 @ 2.5lpm via nasal cannula as per patient's request and as ordered. PRN Ativan 1mg given for sleep. Primofit external catheter attached during sleep; good urine output noted. CVC purple lumen cap changed. Latest Platelet level of 16. Potassium revealed 3.4; For oral replacement. Episode of small nosebleed noted. Put gauze and ice pack. Notified night hospitalist. Monitored accordingly, for further care and management.         Problem: Adult Inpatient Plan of Care  Goal: Plan of Care Review  Description: The Plan of Care Review/Shift note should be completed every shift.  The Outcome Evaluation is a brief statement about your assessment that the patient is improving, declining, or no change.  This information will be displayed automatically on your shift  note.  Outcome: Progressing  Flowsheets (Taken 8/18/2024 0100)  Plan of Care Reviewed With: patient  Overall Patient Progress: no change     Problem: Stem Cell/Bone Marrow Transplant  Goal: Optimal Coping with Transplant  Outcome: Progressing  Intervention: Optimize Patient/Family Adjustment to Transplant  Recent Flowsheet Documentation  Taken 8/17/2024 2000 by Loraine Lindquist, RN  Supportive Measures:   active listening utilized   self-care encouraged   verbalization of feelings encouraged     Problem: Stem Cell/Bone Marrow " Transplant  Goal: Diarrhea Symptom Control  Outcome: Progressing

## 2024-08-19 ENCOUNTER — APPOINTMENT (OUTPATIENT)
Dept: PHYSICAL THERAPY | Facility: CLINIC | Age: 74
DRG: 014 | End: 2024-08-19
Attending: INTERNAL MEDICINE
Payer: MEDICARE

## 2024-08-19 ENCOUNTER — TELEPHONE (OUTPATIENT)
Dept: TRANSPLANT | Facility: CLINIC | Age: 74
End: 2024-08-19
Payer: MEDICARE

## 2024-08-19 LAB
ACANTHOCYTES BLD QL SMEAR: ABNORMAL
ALBUMIN SERPL BCG-MCNC: 3.6 G/DL (ref 3.5–5.2)
ALP SERPL-CCNC: 65 U/L (ref 40–150)
ALT SERPL W P-5'-P-CCNC: 18 U/L (ref 0–70)
ANION GAP SERPL CALCULATED.3IONS-SCNC: 12 MMOL/L (ref 7–15)
AST SERPL W P-5'-P-CCNC: 16 U/L (ref 0–45)
AUER BODIES BLD QL SMEAR: ABNORMAL
BASO STIPL BLD QL SMEAR: ABNORMAL
BASOPHILS # BLD AUTO: ABNORMAL 10*3/UL
BASOPHILS NFR BLD AUTO: ABNORMAL %
BILIRUB DIRECT SERPL-MCNC: <0.2 MG/DL (ref 0–0.3)
BILIRUB SERPL-MCNC: 0.3 MG/DL
BILL ONLY ALLO PBPC PROCESSING: NORMAL
BITE CELLS BLD QL SMEAR: ABNORMAL
BLISTER CELLS BLD QL SMEAR: ABNORMAL
BUN SERPL-MCNC: 13.8 MG/DL (ref 8–23)
BURR CELLS BLD QL SMEAR: ABNORMAL
CALCIUM SERPL-MCNC: 9.3 MG/DL (ref 8.8–10.4)
CHLORIDE SERPL-SCNC: 101 MMOL/L (ref 98–107)
CREAT SERPL-MCNC: 0.94 MG/DL (ref 0.67–1.17)
DACRYOCYTES BLD QL SMEAR: ABNORMAL
EGFRCR SERPLBLD CKD-EPI 2021: 85 ML/MIN/1.73M2
ELLIPTOCYTES BLD QL SMEAR: SLIGHT
EOSINOPHIL # BLD AUTO: ABNORMAL 10*3/UL
EOSINOPHIL NFR BLD AUTO: ABNORMAL %
ERYTHROCYTE [DISTWIDTH] IN BLOOD BY AUTOMATED COUNT: 14.2 % (ref 10–15)
FRAGMENTS BLD QL SMEAR: ABNORMAL
GLUCOSE BLDC GLUCOMTR-MCNC: 138 MG/DL (ref 70–99)
GLUCOSE BLDC GLUCOMTR-MCNC: 150 MG/DL (ref 70–99)
GLUCOSE BLDC GLUCOMTR-MCNC: 153 MG/DL (ref 70–99)
GLUCOSE BLDC GLUCOMTR-MCNC: 197 MG/DL (ref 70–99)
GLUCOSE BLDC GLUCOMTR-MCNC: 227 MG/DL (ref 70–99)
GLUCOSE SERPL-MCNC: 169 MG/DL (ref 70–99)
HCO3 SERPL-SCNC: 23 MMOL/L (ref 22–29)
HCT VFR BLD AUTO: 27.1 % (ref 40–53)
HGB BLD-MCNC: 8.7 G/DL (ref 13.3–17.7)
HGB C CRYSTALS: ABNORMAL
HOWELL-JOLLY BOD BLD QL SMEAR: ABNORMAL
IMM GRANULOCYTES # BLD: ABNORMAL 10*3/UL
IMM GRANULOCYTES NFR BLD: ABNORMAL %
INR PPP: 1.17 (ref 0.85–1.15)
LYMPHOCYTES # BLD AUTO: ABNORMAL 10*3/UL
LYMPHOCYTES NFR BLD AUTO: ABNORMAL %
MAGNESIUM SERPL-MCNC: 1.9 MG/DL (ref 1.7–2.3)
MCH RBC QN AUTO: 29.2 PG (ref 26.5–33)
MCHC RBC AUTO-ENTMCNC: 32.1 G/DL (ref 31.5–36.5)
MCV RBC AUTO: 91 FL (ref 78–100)
MONOCYTES # BLD AUTO: ABNORMAL 10*3/UL
MONOCYTES NFR BLD AUTO: ABNORMAL %
NEUTROPHILS # BLD AUTO: ABNORMAL 10*3/UL
NEUTROPHILS NFR BLD AUTO: ABNORMAL %
NEUTS HYPERSEG BLD QL SMEAR: ABNORMAL
PHOSPHATE SERPL-MCNC: 2.9 MG/DL (ref 2.5–4.5)
PLAT MORPH BLD: ABNORMAL
PLATELET # BLD AUTO: 26 10E3/UL (ref 150–450)
POLYCHROMASIA BLD QL SMEAR: ABNORMAL
POTASSIUM SERPL-SCNC: 3.4 MMOL/L (ref 3.4–5.3)
POTASSIUM SERPL-SCNC: 3.7 MMOL/L (ref 3.4–5.3)
PROT SERPL-MCNC: 6.2 G/DL (ref 6.4–8.3)
RBC # BLD AUTO: 2.98 10E6/UL (ref 4.4–5.9)
RBC AGGLUT BLD QL: ABNORMAL
RBC MORPH BLD: ABNORMAL
ROULEAUX BLD QL SMEAR: ABNORMAL
SICKLE CELLS BLD QL SMEAR: ABNORMAL
SIROLIMUS BLD-MCNC: 6.9 UG/L (ref 5–15)
SMUDGE CELLS BLD QL SMEAR: ABNORMAL
SODIUM SERPL-SCNC: 136 MMOL/L (ref 135–145)
SPHEROCYTES BLD QL SMEAR: ABNORMAL
STOMATOCYTES BLD QL SMEAR: ABNORMAL
TARGETS BLD QL SMEAR: SLIGHT
TME LAST DOSE: NORMAL H
TME LAST DOSE: NORMAL H
TOXIC GRANULES BLD QL SMEAR: ABNORMAL
VARIANT LYMPHS BLD QL SMEAR: ABNORMAL
WBC # BLD AUTO: <0.1 10E3/UL (ref 4–11)

## 2024-08-19 PROCEDURE — 83735 ASSAY OF MAGNESIUM: CPT | Performed by: INTERNAL MEDICINE

## 2024-08-19 PROCEDURE — 258N000003 HC RX IP 258 OP 636: Performed by: INTERNAL MEDICINE

## 2024-08-19 PROCEDURE — 258N000003 HC RX IP 258 OP 636

## 2024-08-19 PROCEDURE — 250N000013 HC RX MED GY IP 250 OP 250 PS 637

## 2024-08-19 PROCEDURE — 250N000012 HC RX MED GY IP 250 OP 636 PS 637

## 2024-08-19 PROCEDURE — 84100 ASSAY OF PHOSPHORUS: CPT | Performed by: INTERNAL MEDICINE

## 2024-08-19 PROCEDURE — 82435 ASSAY OF BLOOD CHLORIDE: CPT | Performed by: STUDENT IN AN ORGANIZED HEALTH CARE EDUCATION/TRAINING PROGRAM

## 2024-08-19 PROCEDURE — 85027 COMPLETE CBC AUTOMATED: CPT | Performed by: STUDENT IN AN ORGANIZED HEALTH CARE EDUCATION/TRAINING PROGRAM

## 2024-08-19 PROCEDURE — 250N000011 HC RX IP 250 OP 636: Performed by: STUDENT IN AN ORGANIZED HEALTH CARE EDUCATION/TRAINING PROGRAM

## 2024-08-19 PROCEDURE — 250N000011 HC RX IP 250 OP 636: Performed by: INTERNAL MEDICINE

## 2024-08-19 PROCEDURE — 250N000011 HC RX IP 250 OP 636

## 2024-08-19 PROCEDURE — 84132 ASSAY OF SERUM POTASSIUM: CPT | Performed by: STUDENT IN AN ORGANIZED HEALTH CARE EDUCATION/TRAINING PROGRAM

## 2024-08-19 PROCEDURE — 250N000013 HC RX MED GY IP 250 OP 250 PS 637: Performed by: STUDENT IN AN ORGANIZED HEALTH CARE EDUCATION/TRAINING PROGRAM

## 2024-08-19 PROCEDURE — 250N000011 HC RX IP 250 OP 636: Mod: JZ | Performed by: PHYSICIAN ASSISTANT

## 2024-08-19 PROCEDURE — 82040 ASSAY OF SERUM ALBUMIN: CPT | Performed by: PHYSICIAN ASSISTANT

## 2024-08-19 PROCEDURE — 85610 PROTHROMBIN TIME: CPT | Performed by: STUDENT IN AN ORGANIZED HEALTH CARE EDUCATION/TRAINING PROGRAM

## 2024-08-19 PROCEDURE — 80195 ASSAY OF SIROLIMUS: CPT | Performed by: INTERNAL MEDICINE

## 2024-08-19 PROCEDURE — 250N000011 HC RX IP 250 OP 636: Mod: JZ | Performed by: STUDENT IN AN ORGANIZED HEALTH CARE EDUCATION/TRAINING PROGRAM

## 2024-08-19 PROCEDURE — 258N000003 HC RX IP 258 OP 636: Performed by: PHYSICIAN ASSISTANT

## 2024-08-19 PROCEDURE — 250N000009 HC RX 250

## 2024-08-19 PROCEDURE — 97530 THERAPEUTIC ACTIVITIES: CPT | Mod: GP

## 2024-08-19 PROCEDURE — 250N000012 HC RX MED GY IP 250 OP 636 PS 637: Performed by: PHYSICIAN ASSISTANT

## 2024-08-19 PROCEDURE — 206N000001 HC R&B BMT UMMC

## 2024-08-19 RX ORDER — NALOXONE HYDROCHLORIDE 0.4 MG/ML
0.2 INJECTION, SOLUTION INTRAMUSCULAR; INTRAVENOUS; SUBCUTANEOUS
Status: DISCONTINUED | OUTPATIENT
Start: 2024-08-19 | End: 2024-08-25 | Stop reason: HOSPADM

## 2024-08-19 RX ORDER — NALOXONE HYDROCHLORIDE 0.4 MG/ML
0.4 INJECTION, SOLUTION INTRAMUSCULAR; INTRAVENOUS; SUBCUTANEOUS
Status: DISCONTINUED | OUTPATIENT
Start: 2024-08-19 | End: 2024-08-25 | Stop reason: HOSPADM

## 2024-08-19 RX ORDER — OXYCODONE HYDROCHLORIDE 5 MG/1
5 TABLET ORAL EVERY 6 HOURS PRN
Status: DISCONTINUED | OUTPATIENT
Start: 2024-08-19 | End: 2024-08-20

## 2024-08-19 RX ORDER — POTASSIUM CHLORIDE 29.8 MG/ML
20 INJECTION INTRAVENOUS
Status: COMPLETED | OUTPATIENT
Start: 2024-08-19 | End: 2024-08-19

## 2024-08-19 RX ORDER — SIROLIMUS 2 MG/1
2 TABLET, FILM COATED ORAL ONCE
Status: COMPLETED | OUTPATIENT
Start: 2024-08-19 | End: 2024-08-19

## 2024-08-19 RX ORDER — OXYCODONE HYDROCHLORIDE 5 MG/1
5 TABLET ORAL EVERY 4 HOURS PRN
Status: DISCONTINUED | OUTPATIENT
Start: 2024-08-19 | End: 2024-08-19

## 2024-08-19 RX ADMIN — LISINOPRIL 5 MG: 5 TABLET ORAL at 08:05

## 2024-08-19 RX ADMIN — SIROLIMUS 4 MG: 2 TABLET ORAL at 08:41

## 2024-08-19 RX ADMIN — OXYCODONE HYDROCHLORIDE 5 MG: 5 TABLET ORAL at 15:08

## 2024-08-19 RX ADMIN — ACYCLOVIR SODIUM 550 MG: 1000 INJECTION, SOLUTION INTRAVENOUS at 12:29

## 2024-08-19 RX ADMIN — Medication 10 ML: at 23:18

## 2024-08-19 RX ADMIN — POTASSIUM CHLORIDE 20 MEQ: 29.8 INJECTION, SOLUTION INTRAVENOUS at 05:43

## 2024-08-19 RX ADMIN — ACYCLOVIR SODIUM 550 MG: 1000 INJECTION, SOLUTION INTRAVENOUS at 04:00

## 2024-08-19 RX ADMIN — SIROLIMUS 2 MG: 2 TABLET ORAL at 15:08

## 2024-08-19 RX ADMIN — LEVOFLOXACIN 250 MG: 250 TABLET, FILM COATED ORAL at 10:49

## 2024-08-19 RX ADMIN — FILGRASTIM-AAFI 480 MCG: 480 INJECTION, SOLUTION INTRAVENOUS; SUBCUTANEOUS at 21:00

## 2024-08-19 RX ADMIN — DIPHENHYDRAMINE HYDROCHLORIDE AND LIDOCAINE HYDROCHLORIDE AND ALUMINUM HYDROXIDE AND MAGNESIUM HYDRO 10 ML: KIT at 21:00

## 2024-08-19 RX ADMIN — BRIMONIDINE TARTRATE 1 DROP: 2 SOLUTION/ DROPS OPHTHALMIC at 08:06

## 2024-08-19 RX ADMIN — MICAFUNGIN SODIUM 150 MG: 50 INJECTION, POWDER, LYOPHILIZED, FOR SOLUTION INTRAVENOUS at 08:28

## 2024-08-19 RX ADMIN — OXYCODONE HYDROCHLORIDE 5 MG: 5 TABLET ORAL at 08:27

## 2024-08-19 RX ADMIN — BRIMONIDINE TARTRATE 1 DROP: 2 SOLUTION/ DROPS OPHTHALMIC at 21:10

## 2024-08-19 RX ADMIN — URSODIOL 300 MG: 300 CAPSULE ORAL at 21:00

## 2024-08-19 RX ADMIN — URSODIOL 300 MG: 300 CAPSULE ORAL at 13:23

## 2024-08-19 RX ADMIN — DIPHENHYDRAMINE HYDROCHLORIDE AND LIDOCAINE HYDROCHLORIDE AND ALUMINUM HYDROXIDE AND MAGNESIUM HYDRO 10 ML: KIT at 08:30

## 2024-08-19 RX ADMIN — OXYCODONE HYDROCHLORIDE 5 MG: 5 TABLET ORAL at 21:10

## 2024-08-19 RX ADMIN — MYCOPHENOLATE MOFETIL 1500 MG: 500 INJECTION, POWDER, LYOPHILIZED, FOR SOLUTION INTRAVENOUS at 08:28

## 2024-08-19 RX ADMIN — DIPHENHYDRAMINE HYDROCHLORIDE AND LIDOCAINE HYDROCHLORIDE AND ALUMINUM HYDROXIDE AND MAGNESIUM HYDRO 10 ML: KIT at 03:24

## 2024-08-19 RX ADMIN — AMLODIPINE BESYLATE 10 MG: 10 TABLET ORAL at 08:05

## 2024-08-19 RX ADMIN — ATORVASTATIN CALCIUM 10 MG: 10 TABLET, FILM COATED ORAL at 21:00

## 2024-08-19 RX ADMIN — POTASSIUM CHLORIDE 20 MEQ: 29.8 INJECTION, SOLUTION INTRAVENOUS at 06:40

## 2024-08-19 RX ADMIN — FLUTICASONE PROPIONATE 1 SPRAY: 50 SPRAY, METERED NASAL at 08:06

## 2024-08-19 RX ADMIN — MYCOPHENOLATE MOFETIL 1500 MG: 500 INJECTION, POWDER, LYOPHILIZED, FOR SOLUTION INTRAVENOUS at 21:00

## 2024-08-19 RX ADMIN — Medication 5 ML: at 03:12

## 2024-08-19 RX ADMIN — INSULIN ASPART 2 UNITS: 100 INJECTION, SOLUTION INTRAVENOUS; SUBCUTANEOUS at 08:15

## 2024-08-19 RX ADMIN — URSODIOL 300 MG: 300 CAPSULE ORAL at 11:10

## 2024-08-19 RX ADMIN — PANTOPRAZOLE SODIUM 40 MG: 40 INJECTION, POWDER, FOR SOLUTION INTRAVENOUS at 10:49

## 2024-08-19 RX ADMIN — LATANOPROST 2 DROP: 50 SOLUTION OPHTHALMIC at 23:24

## 2024-08-19 RX ADMIN — TAMSULOSIN HYDROCHLORIDE 0.4 MG: 0.4 CAPSULE ORAL at 08:41

## 2024-08-19 RX ADMIN — ALLOPURINOL 300 MG: 300 TABLET ORAL at 08:05

## 2024-08-19 RX ADMIN — INSULIN ASPART 1 UNITS: 100 INJECTION, SOLUTION INTRAVENOUS; SUBCUTANEOUS at 17:48

## 2024-08-19 ASSESSMENT — ACTIVITIES OF DAILY LIVING (ADL)
ADLS_ACUITY_SCORE: 24
ADLS_ACUITY_SCORE: 25
ADLS_ACUITY_SCORE: 25
ADLS_ACUITY_SCORE: 24
ADLS_ACUITY_SCORE: 25
ADLS_ACUITY_SCORE: 24
ADLS_ACUITY_SCORE: 25
ADLS_ACUITY_SCORE: 25
ADLS_ACUITY_SCORE: 24
ADLS_ACUITY_SCORE: 25
ADLS_ACUITY_SCORE: 25
ADLS_ACUITY_SCORE: 24
ADLS_ACUITY_SCORE: 25
ADLS_ACUITY_SCORE: 24
ADLS_ACUITY_SCORE: 24

## 2024-08-19 NOTE — PROGRESS NOTES
"BMT Progress Note  Chief complaint:  Arpit Forrest is a 73 year old male, currently day+12 s/p ELIEZER MUD PBSCT for MDS.  INTERIM HISTORY:   Mucositis R side mostly of buccal mucosa. No congestion, sinus pressure. Difficulty chewing. Appetite decreased. No N/V. No diarrhea. Feels very fatigued and run down making him not want to be very active.    REVIEW OF SYSTEMS: Otherwise unremarkable other than what is noted in the Interim History.   PHYSICAL EXAM:   Vitals:  BP (!) 163/79 (BP Location: Right arm)   Pulse 95   Temp 99.3  F (37.4  C) (Axillary)   Resp 20   Ht 1.825 m (5' 11.85\")   Wt 110.2 kg (242 lb 14.4 oz)   SpO2 100%   BMI 33.08 kg/m      General: alert and cooperative, NAD. Face with maye appearance--stable per pt.  HEENT: NC/AT, sclera anicteric. Generalized erythema and sloughing, ulcer on R side.   CV: RRR, pan systolic murmur (known)  Resp: normal work of breathing on room air   Skin: no rashes on limited exam  Neuro: Alert and interactive, moves all extremities equally  Psych: Mood and affect normal  Vascular: Right CVC   LABS:  Lab Results   Component Value Date    WBC <0.1 (LL) 08/19/2024    ANEU 0.8 (L) 08/08/2024    HGB 8.7 (L) 08/19/2024    HCT 27.1 (L) 08/19/2024    PLT 26 (LL) 08/19/2024     08/19/2024    POTASSIUM 3.7 08/19/2024    CHLORIDE 101 08/19/2024    CO2 23 08/19/2024     (H) 08/19/2024    BUN 13.8 08/19/2024    CR 0.94 08/19/2024    MAG 1.9 08/19/2024    INR 1.17 (H) 08/19/2024    BILITOTAL 0.3 08/19/2024    AST 16 08/19/2024    ALT 18 08/19/2024    ALKPHOS 65 08/19/2024    PROTTOTAL 6.2 (L) 08/19/2024    ALBUMIN 3.6 08/19/2024        ASSESSMENT AND PLAN: Arpit Forrest is a 73 year old male, currently day +12 s/p ELIEZER MUD PBSCT for MDS.  BMT/IEC PROTOCOL for MDS  - Chemo protocol: [id number]; CY/flu/TBI  - Peripheral blood stem cell graft from 8/8 donor, ABO matched (Oneg), Cell dose: TBD  - continue allopurinol (hx of gout)  - Engraftment monitoring: Peripheral blood " and bone marrow chimerisms on D28, D100, 6 months, 1 and 2 year  - Restaging plan: BMBx with NGS on D28, D100, 6 months, 1 and 2 years    HEME/COAG  - GCSF started day +5, continue until ANC > 1500 for 3 consecutive days.   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    - Siro level (8/16) low at 6.9 -- given bolus x1 and no change to daily dose. Next level Monday 8/19 pending.    ID  #Neutropenic fever- T cell expansion vs. Infection vs. Other - Cefepime (8/11-8/14), work up unremarkable. No recurrent fevers so far after stopping cefepime 8/14.   Recent Hx: Multiple granulomas in the lung and bone marrow biopsy- fungitell, aspergillus galactomanan neg. 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 Switch to IV meds.  #Frequent stools- c.diff negative (8/11). Imodium PRN.  # Hepatic steatosis and boderline iron deposition in liver  - LFTs and synthetic function normal on admission; Check LFTs qM/Th  - VOD prophy: Ursodiol 300mg TID    # Supportive   - Anti-emetics:  per protocol   - Ulcer prophy: PPI daily   - Dietician support to prevent malnutrition.     CARDIOVASCULAR  #Holosystolic murmur- Moderate aortic stenosis seen on ECHO  # Hx of A.fib with RVR   A.fib with RVR while admitted with febrile episode on 3/15/24.  Noted on his fitbit, not very symptomatic. Cardioversion planned for new onset A.fib. Not achieved with amiodarone, DCCV on 3/21/24 restored normal sinus rhythm. Amiodarone and anticoagulation was stopped in April 2024 without any recurrence.   He could have a recurrence during transplant which would need to be addressed with rate control strategy   # Asymptomatic moderate aortic stenosis   # CAD  Chest CT in February 2024 with mild to moderate CAC, three-vessel disease. No anginal symptoms   Continue atorvastatin 10mg  EKG Sinus rhythm Inferior infarct , age undetermined. Abnormal ECG (known cardiovascular disease). QTc =460  # HTN- Continue amlodipine 10mg, added lisinopril 5mg 8/4 (PTA dose of lisinopril was 10mg)   - Risk of cardiomyopathy:  Baseline EF normal 60-65&  - Risk of arrhythmia: Baseline EKG showed NSR, Qtc 460     RESPIRATORY  # VINCENT : CPAP  - Baseline PFTs: normal   - Risk of respiratory complications: Frequent ambulation and incentive spirometer.     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 Weight baseline 8/19  -baseline weight 8/14, and held off on additional lasix since  # Electrolyte management: replace per sliding scale  - cont Ca w/vit D  - hold eye vitamin and Coq10 peritransplant     DIABETES/ENDOCRINE  #DMII: hx steroid-induced hyperglycemia: Carb coverage and Novolog sliding scale. Endo initially consulted, signed off with stable sugars.   *held PTA metformin on admission.      MUSCULOSKELETAL/FRAILTY  # Gout: Continue allopurinol indefinitely   - Baseline Frailty Score: Not frail   - Daily PT/OT as needed while inpatient     SOCIAL DETERMINANTS  -  "Caregiver: Wife, Meghna, children   Medically Ready for Discharge: Anticipated in 5+ Days    Clinically Significant Risk Factors               # Coagulation Defect: INR = 1.17 (Ref range: 0.85 - 1.15) and/or PTT = 34 Seconds (Ref range: 22 - 38 Seconds), will monitor for bleeding  # Thrombocytopenia: Lowest platelets = 16 in last 2 days, will monitor for bleeding   # Hypertension: Noted on problem list            # Obesity: Estimated body mass index is 33.08 kg/m  as calculated from the following:    Height as of this encounter: 1.825 m (5' 11.85\").    Weight as of this encounter: 110.2 kg (242 lb 14.4 oz).             I spent 30 minutes in the care of this patient today, which included time necessary for preparation for the visit, obtaining history, ordering medications/tests/procedures as medically indicated, review of pertinent medical literature, counseling of the patient, communication of recommendations to the care team, and documentation time.    ELLE Manuel    "

## 2024-08-19 NOTE — PLAN OF CARE
"Goal Outcome Evaluation:      Plan of Care Reviewed With: patient    Overall Patient Progress: no changeOverall Patient Progress: no change    .BP (!) 149/78 (BP Location: Right arm)   Pulse 90   Temp 99  F (37.2  C) (Axillary)   Resp 18   Ht 1.825 m (5' 11.85\")   Wt 112 kg (246 lb 14.4 oz)   SpO2 98%   BMI 33.63 kg/m      Pt alert and oriented. Tmax 99.7ax. HTN. Ovss on room air. Home Cpap used overnight. Denies nausea. C/o worsening right sided/face/mouth/throat pain, gave prn Magic mouth wash once. Blood glucose 169 and 222, covered per sliding scale. Good UOP, primofit used during shift and currently off. Feeling weak and unsteady this morning. Fall precaution armband on and using bed alarm-education provided. Gave 40 mEq potassium this morning, rechecked needed post infusion. Cont with poc.        Problem: Adult Inpatient Plan of Care  Goal: Plan of Care Review  Description: The Plan of Care Review/Shift note should be completed every shift.  The Outcome Evaluation is a brief statement about your assessment that the patient is improving, declining, or no change.  This information will be displayed automatically on your shift  note.  Outcome: Not Progressing  Flowsheets (Taken 8/19/2024 0657)  Plan of Care Reviewed With: patient  Overall Patient Progress: no change     Problem: Pain Acute  Goal: Optimal Pain Control and Function  Outcome: Not Progressing     "

## 2024-08-19 NOTE — TELEPHONE ENCOUNTER
Aranza Escoto, RN  P Bmt Adult  [facility]  Cc: Moreno Perrin, BERT Mitchell,    This patient is ready to schedule for BAN visits.    Admitting VINNY: orders signed by Yi Pruitt LT RNCC: Moreno  MD: Malcolm Mistryx: In clinic  Weekly lab day preference:  unknown at this time    Clonoseq: no    Restage per protocol. Orders are scanned into the media tab.  For allos only: please schedule with primary MD at D28, D60, D100, and D180.    Thanks!    Aranza

## 2024-08-19 NOTE — PLAN OF CARE
VSS, except BP. Hypertensive this morning, but improved after morning meds given. Systolic BP in 140-150s. Tmax: 99.3 F.  Circulatory: Electrolyte protocol, replaced potassium this morning. Recheck due in morning.  Respiratory: Sating >99 on RA.  GI/: Adequate urine output into basin this shift. BM X1.  Diet/appetite: Tolerating regular diet. Eating fair today. Mouth and throat pain, Yonker available for thick secretions.  Activity:  Stand by assist, up in [place] today with family.  Pain: At acceptable level on current regimen. Oxycodone PRN given x2 today for mouth pain.  Skin: No new deficits noted.  LDA's: Double lumen CVC, infusing TKO.    Plan: Continue with POC. Notify primary team with changes.

## 2024-08-20 ENCOUNTER — APPOINTMENT (OUTPATIENT)
Dept: PHYSICAL THERAPY | Facility: CLINIC | Age: 74
DRG: 014 | End: 2024-08-20
Attending: INTERNAL MEDICINE
Payer: MEDICARE

## 2024-08-20 LAB
ACANTHOCYTES BLD QL SMEAR: NORMAL
ANION GAP SERPL CALCULATED.3IONS-SCNC: 13 MMOL/L (ref 7–15)
AUER BODIES BLD QL SMEAR: NORMAL
BASO STIPL BLD QL SMEAR: NORMAL
BASOPHILS # BLD AUTO: ABNORMAL 10*3/UL
BASOPHILS NFR BLD AUTO: ABNORMAL %
BITE CELLS BLD QL SMEAR: NORMAL
BLISTER CELLS BLD QL SMEAR: NORMAL
BUN SERPL-MCNC: 13.4 MG/DL (ref 8–23)
BURR CELLS BLD QL SMEAR: NORMAL
CALCIUM SERPL-MCNC: 9.4 MG/DL (ref 8.8–10.4)
CHLORIDE SERPL-SCNC: 104 MMOL/L (ref 98–107)
CREAT SERPL-MCNC: 0.93 MG/DL (ref 0.67–1.17)
DACRYOCYTES BLD QL SMEAR: NORMAL
EGFRCR SERPLBLD CKD-EPI 2021: 86 ML/MIN/1.73M2
ELLIPTOCYTES BLD QL SMEAR: NORMAL
EOSINOPHIL # BLD AUTO: ABNORMAL 10*3/UL
EOSINOPHIL NFR BLD AUTO: ABNORMAL %
ERYTHROCYTE [DISTWIDTH] IN BLOOD BY AUTOMATED COUNT: 14.4 % (ref 10–15)
FRAGMENTS BLD QL SMEAR: NORMAL
GLUCOSE BLDC GLUCOMTR-MCNC: 153 MG/DL (ref 70–99)
GLUCOSE BLDC GLUCOMTR-MCNC: 153 MG/DL (ref 70–99)
GLUCOSE BLDC GLUCOMTR-MCNC: 170 MG/DL (ref 70–99)
GLUCOSE BLDC GLUCOMTR-MCNC: 184 MG/DL (ref 70–99)
GLUCOSE BLDC GLUCOMTR-MCNC: 202 MG/DL (ref 70–99)
GLUCOSE SERPL-MCNC: 165 MG/DL (ref 70–99)
HCO3 SERPL-SCNC: 21 MMOL/L (ref 22–29)
HCT VFR BLD AUTO: 26.2 % (ref 40–53)
HGB BLD-MCNC: 8.7 G/DL (ref 13.3–17.7)
HGB C CRYSTALS: NORMAL
HOWELL-JOLLY BOD BLD QL SMEAR: NORMAL
IMM GRANULOCYTES # BLD: ABNORMAL 10*3/UL
IMM GRANULOCYTES NFR BLD: ABNORMAL %
LACTATE SERPL-SCNC: 1.2 MMOL/L (ref 0.7–2)
LYMPHOCYTES # BLD AUTO: ABNORMAL 10*3/UL
LYMPHOCYTES NFR BLD AUTO: ABNORMAL %
MAGNESIUM SERPL-MCNC: 1.8 MG/DL (ref 1.7–2.3)
MCH RBC QN AUTO: 29.8 PG (ref 26.5–33)
MCHC RBC AUTO-ENTMCNC: 33.2 G/DL (ref 31.5–36.5)
MCV RBC AUTO: 90 FL (ref 78–100)
MONOCYTES # BLD AUTO: ABNORMAL 10*3/UL
MONOCYTES NFR BLD AUTO: ABNORMAL %
NEUTROPHILS # BLD AUTO: ABNORMAL 10*3/UL
NEUTROPHILS NFR BLD AUTO: ABNORMAL %
NEUTS HYPERSEG BLD QL SMEAR: NORMAL
PHOSPHATE SERPL-MCNC: 3 MG/DL (ref 2.5–4.5)
PLAT MORPH BLD: NORMAL
PLATELET # BLD AUTO: 30 10E3/UL (ref 150–450)
POLYCHROMASIA BLD QL SMEAR: NORMAL
POTASSIUM SERPL-SCNC: 3.3 MMOL/L (ref 3.4–5.3)
POTASSIUM SERPL-SCNC: 3.5 MMOL/L (ref 3.4–5.3)
RBC # BLD AUTO: 2.92 10E6/UL (ref 4.4–5.9)
RBC AGGLUT BLD QL: NORMAL
RBC MORPH BLD: NORMAL
ROULEAUX BLD QL SMEAR: NORMAL
SICKLE CELLS BLD QL SMEAR: NORMAL
SMUDGE CELLS BLD QL SMEAR: NORMAL
SODIUM SERPL-SCNC: 138 MMOL/L (ref 135–145)
SPHEROCYTES BLD QL SMEAR: NORMAL
STOMATOCYTES BLD QL SMEAR: NORMAL
TARGETS BLD QL SMEAR: NORMAL
TOXIC GRANULES BLD QL SMEAR: NORMAL
VARIANT LYMPHS BLD QL SMEAR: NORMAL
WBC # BLD AUTO: 0.2 10E3/UL (ref 4–11)

## 2024-08-20 PROCEDURE — 250N000011 HC RX IP 250 OP 636: Performed by: INTERNAL MEDICINE

## 2024-08-20 PROCEDURE — 93005 ELECTROCARDIOGRAM TRACING: CPT

## 2024-08-20 PROCEDURE — 97530 THERAPEUTIC ACTIVITIES: CPT | Mod: GP

## 2024-08-20 PROCEDURE — 80048 BASIC METABOLIC PNL TOTAL CA: CPT | Performed by: STUDENT IN AN ORGANIZED HEALTH CARE EDUCATION/TRAINING PROGRAM

## 2024-08-20 PROCEDURE — 250N000009 HC RX 250

## 2024-08-20 PROCEDURE — 250N000012 HC RX MED GY IP 250 OP 636 PS 637: Performed by: PHYSICIAN ASSISTANT

## 2024-08-20 PROCEDURE — 84132 ASSAY OF SERUM POTASSIUM: CPT | Performed by: INTERNAL MEDICINE

## 2024-08-20 PROCEDURE — 250N000013 HC RX MED GY IP 250 OP 250 PS 637

## 2024-08-20 PROCEDURE — 250N000011 HC RX IP 250 OP 636: Performed by: STUDENT IN AN ORGANIZED HEALTH CARE EDUCATION/TRAINING PROGRAM

## 2024-08-20 PROCEDURE — 93010 ELECTROCARDIOGRAM REPORT: CPT | Performed by: INTERNAL MEDICINE

## 2024-08-20 PROCEDURE — 84100 ASSAY OF PHOSPHORUS: CPT | Performed by: STUDENT IN AN ORGANIZED HEALTH CARE EDUCATION/TRAINING PROGRAM

## 2024-08-20 PROCEDURE — 250N000011 HC RX IP 250 OP 636

## 2024-08-20 PROCEDURE — 206N000001 HC R&B BMT UMMC

## 2024-08-20 PROCEDURE — 258N000003 HC RX IP 258 OP 636: Performed by: PHYSICIAN ASSISTANT

## 2024-08-20 PROCEDURE — 250N000009 HC RX 250: Performed by: PHYSICIAN ASSISTANT

## 2024-08-20 PROCEDURE — 258N000003 HC RX IP 258 OP 636

## 2024-08-20 PROCEDURE — 36415 COLL VENOUS BLD VENIPUNCTURE: CPT | Performed by: STUDENT IN AN ORGANIZED HEALTH CARE EDUCATION/TRAINING PROGRAM

## 2024-08-20 PROCEDURE — 250N000011 HC RX IP 250 OP 636: Mod: JZ | Performed by: PHYSICIAN ASSISTANT

## 2024-08-20 PROCEDURE — 250N000013 HC RX MED GY IP 250 OP 250 PS 637: Performed by: PHYSICIAN ASSISTANT

## 2024-08-20 PROCEDURE — 250N000013 HC RX MED GY IP 250 OP 250 PS 637: Performed by: STUDENT IN AN ORGANIZED HEALTH CARE EDUCATION/TRAINING PROGRAM

## 2024-08-20 PROCEDURE — 83735 ASSAY OF MAGNESIUM: CPT | Performed by: STUDENT IN AN ORGANIZED HEALTH CARE EDUCATION/TRAINING PROGRAM

## 2024-08-20 PROCEDURE — 87040 BLOOD CULTURE FOR BACTERIA: CPT | Performed by: STUDENT IN AN ORGANIZED HEALTH CARE EDUCATION/TRAINING PROGRAM

## 2024-08-20 PROCEDURE — 85027 COMPLETE CBC AUTOMATED: CPT | Performed by: STUDENT IN AN ORGANIZED HEALTH CARE EDUCATION/TRAINING PROGRAM

## 2024-08-20 PROCEDURE — 83605 ASSAY OF LACTIC ACID: CPT | Performed by: INTERNAL MEDICINE

## 2024-08-20 PROCEDURE — 250N000011 HC RX IP 250 OP 636: Mod: JZ | Performed by: STUDENT IN AN ORGANIZED HEALTH CARE EDUCATION/TRAINING PROGRAM

## 2024-08-20 PROCEDURE — 258N000003 HC RX IP 258 OP 636: Performed by: INTERNAL MEDICINE

## 2024-08-20 PROCEDURE — 87040 BLOOD CULTURE FOR BACTERIA: CPT | Performed by: INTERNAL MEDICINE

## 2024-08-20 PROCEDURE — 250N000013 HC RX MED GY IP 250 OP 250 PS 637: Performed by: INTERNAL MEDICINE

## 2024-08-20 RX ORDER — METOPROLOL TARTRATE 1 MG/ML
5 INJECTION, SOLUTION INTRAVENOUS ONCE
Status: COMPLETED | OUTPATIENT
Start: 2024-08-20 | End: 2024-08-20

## 2024-08-20 RX ORDER — OXYCODONE HYDROCHLORIDE 5 MG/1
5 TABLET ORAL EVERY 4 HOURS PRN
Status: DISCONTINUED | OUTPATIENT
Start: 2024-08-20 | End: 2024-08-20

## 2024-08-20 RX ORDER — METOPROLOL TARTRATE 25 MG/1
25 TABLET, FILM COATED ORAL 2 TIMES DAILY
Status: DISCONTINUED | OUTPATIENT
Start: 2024-08-20 | End: 2024-08-20

## 2024-08-20 RX ORDER — METOPROLOL TARTRATE 25 MG/1
25 TABLET, FILM COATED ORAL 2 TIMES DAILY
Status: DISCONTINUED | OUTPATIENT
Start: 2024-08-20 | End: 2024-08-25 | Stop reason: HOSPADM

## 2024-08-20 RX ORDER — SALIVA STIMULANT COMB. NO.3
2 SPRAY, NON-AEROSOL (ML) MUCOUS MEMBRANE 4 TIMES DAILY
Status: DISCONTINUED | OUTPATIENT
Start: 2024-08-20 | End: 2024-08-25 | Stop reason: HOSPADM

## 2024-08-20 RX ORDER — OXYCODONE HYDROCHLORIDE 10 MG/1
10 TABLET ORAL EVERY 4 HOURS PRN
Status: DISCONTINUED | OUTPATIENT
Start: 2024-08-20 | End: 2024-08-25 | Stop reason: HOSPADM

## 2024-08-20 RX ORDER — HYDROMORPHONE HCL IN WATER/PF 6 MG/30 ML
0.2 PATIENT CONTROLLED ANALGESIA SYRINGE INTRAVENOUS EVERY 6 HOURS PRN
Status: DISCONTINUED | OUTPATIENT
Start: 2024-08-20 | End: 2024-08-25 | Stop reason: HOSPADM

## 2024-08-20 RX ORDER — POTASSIUM CHLORIDE 29.8 MG/ML
20 INJECTION INTRAVENOUS
Status: COMPLETED | OUTPATIENT
Start: 2024-08-20 | End: 2024-08-20

## 2024-08-20 RX ORDER — POTASSIUM CHLORIDE 29.8 MG/ML
20 INJECTION INTRAVENOUS ONCE
Status: COMPLETED | OUTPATIENT
Start: 2024-08-20 | End: 2024-08-20

## 2024-08-20 RX ORDER — CEFEPIME HYDROCHLORIDE 2 G/1
2 INJECTION, POWDER, FOR SOLUTION INTRAVENOUS EVERY 8 HOURS
Status: DISCONTINUED | OUTPATIENT
Start: 2024-08-20 | End: 2024-08-22

## 2024-08-20 RX ORDER — OXYCODONE HYDROCHLORIDE 5 MG/1
5 TABLET ORAL EVERY 4 HOURS PRN
Status: DISCONTINUED | OUTPATIENT
Start: 2024-08-20 | End: 2024-08-25 | Stop reason: HOSPADM

## 2024-08-20 RX ADMIN — INSULIN ASPART 1 UNITS: 100 INJECTION, SOLUTION INTRAVENOUS; SUBCUTANEOUS at 10:07

## 2024-08-20 RX ADMIN — BRIMONIDINE TARTRATE 1 DROP: 2 SOLUTION/ DROPS OPHTHALMIC at 08:18

## 2024-08-20 RX ADMIN — URSODIOL 300 MG: 300 CAPSULE ORAL at 08:17

## 2024-08-20 RX ADMIN — Medication 2 SPRAY: at 13:23

## 2024-08-20 RX ADMIN — DIPHENHYDRAMINE HYDROCHLORIDE AND LIDOCAINE HYDROCHLORIDE AND ALUMINUM HYDROXIDE AND MAGNESIUM HYDRO 10 ML: KIT at 08:10

## 2024-08-20 RX ADMIN — FILGRASTIM-AAFI 480 MCG: 480 INJECTION, SOLUTION INTRAVENOUS; SUBCUTANEOUS at 21:18

## 2024-08-20 RX ADMIN — HYDROMORPHONE HYDROCHLORIDE 0.2 MG: 0.2 INJECTION, SOLUTION INTRAMUSCULAR; INTRAVENOUS; SUBCUTANEOUS at 10:16

## 2024-08-20 RX ADMIN — ALLOPURINOL 300 MG: 300 TABLET ORAL at 08:16

## 2024-08-20 RX ADMIN — Medication 2 SPRAY: at 16:05

## 2024-08-20 RX ADMIN — SIROLIMUS 4 MG: 2 TABLET ORAL at 08:16

## 2024-08-20 RX ADMIN — MYCOPHENOLATE MOFETIL 1500 MG: 500 INJECTION, POWDER, LYOPHILIZED, FOR SOLUTION INTRAVENOUS at 10:22

## 2024-08-20 RX ADMIN — CARBAMIDE PEROXIDE 6.5% 5 DROP: 6.5 LIQUID AURICULAR (OTIC) at 21:20

## 2024-08-20 RX ADMIN — LISINOPRIL 5 MG: 5 TABLET ORAL at 08:16

## 2024-08-20 RX ADMIN — TOPICAL ANESTHETIC 0.5 ML: 200 SPRAY DENTAL; PERIODONTAL at 16:11

## 2024-08-20 RX ADMIN — LATANOPROST 2 DROP: 50 SOLUTION OPHTHALMIC at 21:18

## 2024-08-20 RX ADMIN — DIPHENHYDRAMINE HYDROCHLORIDE AND LIDOCAINE HYDROCHLORIDE AND ALUMINUM HYDROXIDE AND MAGNESIUM HYDRO 10 ML: KIT at 04:00

## 2024-08-20 RX ADMIN — ATORVASTATIN CALCIUM 10 MG: 10 TABLET, FILM COATED ORAL at 21:17

## 2024-08-20 RX ADMIN — CALCIUM CARBONATE 600 MG (1,500 MG)-VITAMIN D3 400 UNIT TABLET 1 TABLET: at 08:16

## 2024-08-20 RX ADMIN — Medication 5 ML: at 04:01

## 2024-08-20 RX ADMIN — INSULIN ASPART 1 UNITS: 100 INJECTION, SOLUTION INTRAVENOUS; SUBCUTANEOUS at 13:53

## 2024-08-20 RX ADMIN — OXYCODONE HYDROCHLORIDE 5 MG: 5 TABLET ORAL at 03:58

## 2024-08-20 RX ADMIN — ACYCLOVIR SODIUM 550 MG: 1000 INJECTION, SOLUTION INTRAVENOUS at 07:32

## 2024-08-20 RX ADMIN — CARBAMIDE PEROXIDE 6.5% 5 DROP: 6.5 LIQUID AURICULAR (OTIC) at 10:26

## 2024-08-20 RX ADMIN — ACYCLOVIR SODIUM 550 MG: 1000 INJECTION, SOLUTION INTRAVENOUS at 16:04

## 2024-08-20 RX ADMIN — URSODIOL 300 MG: 300 CAPSULE ORAL at 21:18

## 2024-08-20 RX ADMIN — OXYCODONE HYDROCHLORIDE 5 MG: 5 TABLET ORAL at 07:32

## 2024-08-20 RX ADMIN — INSULIN ASPART 1 UNITS: 100 INJECTION, SOLUTION INTRAVENOUS; SUBCUTANEOUS at 18:25

## 2024-08-20 RX ADMIN — FLUTICASONE PROPIONATE 1 SPRAY: 50 SPRAY, METERED NASAL at 08:18

## 2024-08-20 RX ADMIN — POTASSIUM CHLORIDE 20 MEQ: 29.8 INJECTION, SOLUTION INTRAVENOUS at 05:22

## 2024-08-20 RX ADMIN — BRIMONIDINE TARTRATE 1 DROP: 2 SOLUTION/ DROPS OPHTHALMIC at 21:20

## 2024-08-20 RX ADMIN — MYCOPHENOLATE MOFETIL 1500 MG: 500 INJECTION, POWDER, LYOPHILIZED, FOR SOLUTION INTRAVENOUS at 21:19

## 2024-08-20 RX ADMIN — SODIUM CHLORIDE, POTASSIUM CHLORIDE, SODIUM LACTATE AND CALCIUM CHLORIDE 1000 ML: 600; 310; 30; 20 INJECTION, SOLUTION INTRAVENOUS at 10:47

## 2024-08-20 RX ADMIN — POTASSIUM CHLORIDE 20 MEQ: 29.8 INJECTION, SOLUTION INTRAVENOUS at 13:57

## 2024-08-20 RX ADMIN — PANTOPRAZOLE SODIUM 40 MG: 40 INJECTION, POWDER, FOR SOLUTION INTRAVENOUS at 08:16

## 2024-08-20 RX ADMIN — MICAFUNGIN SODIUM 150 MG: 50 INJECTION, POWDER, LYOPHILIZED, FOR SOLUTION INTRAVENOUS at 08:17

## 2024-08-20 RX ADMIN — AMLODIPINE BESYLATE 10 MG: 10 TABLET ORAL at 08:16

## 2024-08-20 RX ADMIN — CEFEPIME HYDROCHLORIDE 2 G: 2 INJECTION, POWDER, FOR SOLUTION INTRAVENOUS at 23:02

## 2024-08-20 RX ADMIN — ACETAMINOPHEN 650 MG: 325 TABLET, FILM COATED ORAL at 21:17

## 2024-08-20 RX ADMIN — LEVOFLOXACIN 250 MG: 250 TABLET, FILM COATED ORAL at 10:46

## 2024-08-20 RX ADMIN — Medication 2 SPRAY: at 10:10

## 2024-08-20 RX ADMIN — Medication 3 CAPSULE: at 08:13

## 2024-08-20 RX ADMIN — URSODIOL 300 MG: 300 CAPSULE ORAL at 14:03

## 2024-08-20 RX ADMIN — SIMETHICONE 80 MG: 80 TABLET, CHEWABLE ORAL at 18:31

## 2024-08-20 RX ADMIN — Medication 2 SPRAY: at 21:28

## 2024-08-20 RX ADMIN — METOPROLOL TARTRATE 25 MG: 25 TABLET, FILM COATED ORAL at 21:17

## 2024-08-20 RX ADMIN — POTASSIUM CHLORIDE 20 MEQ: 29.8 INJECTION, SOLUTION INTRAVENOUS at 06:33

## 2024-08-20 RX ADMIN — METOPROLOL TARTRATE 5 MG: 5 INJECTION INTRAVENOUS at 13:46

## 2024-08-20 RX ADMIN — TOPICAL ANESTHETIC 0.5 ML: 200 SPRAY DENTAL; PERIODONTAL at 16:05

## 2024-08-20 RX ADMIN — DIPHENHYDRAMINE HYDROCHLORIDE AND LIDOCAINE HYDROCHLORIDE AND ALUMINUM HYDROXIDE AND MAGNESIUM HYDRO 10 ML: KIT at 13:23

## 2024-08-20 RX ADMIN — TAMSULOSIN HYDROCHLORIDE 0.4 MG: 0.4 CAPSULE ORAL at 08:16

## 2024-08-20 RX ADMIN — METOPROLOL TARTRATE 25 MG: 25 TABLET, FILM COATED ORAL at 16:05

## 2024-08-20 ASSESSMENT — ACTIVITIES OF DAILY LIVING (ADL)
ADLS_ACUITY_SCORE: 23
ADLS_ACUITY_SCORE: 23
ADLS_ACUITY_SCORE: 24
ADLS_ACUITY_SCORE: 23
ADLS_ACUITY_SCORE: 24
ADLS_ACUITY_SCORE: 24
ADLS_ACUITY_SCORE: 23
ADLS_ACUITY_SCORE: 24
ADLS_ACUITY_SCORE: 24
ADLS_ACUITY_SCORE: 23
ADLS_ACUITY_SCORE: 24
ADLS_ACUITY_SCORE: 23
ADLS_ACUITY_SCORE: 24
ADLS_ACUITY_SCORE: 23
ADLS_ACUITY_SCORE: 23
ADLS_ACUITY_SCORE: 24
ADLS_ACUITY_SCORE: 24

## 2024-08-20 NOTE — PLAN OF CARE
Nursing Note  Shift: [id number]    BMT Day +13    General: Hypertensive with 150s systolic, otherwise VSS on room air. Afebrile. A&Ox4. Reports oral mucosa pain 5-7/10. Does not feel that 5 mg oxy q6h PRN has been managing this pain. Radiating into right jaw and ear. Lips, cheeks, tongue swollen. Worsening speech clarity and discomfort with swallowing. Denies difficulty breathing. Given magic mouthwash x2.   GI/: Last BM 08/19. Voiding well, wore external catheter overnight. Taking small pills in applesauce. Decreased appetite and intake due to oral pain. Used oral suction x2.  Skin: No new concerns aside from oral lesions.  Activity: Assist x1-2. Patient significantly fatigued and endorses feeling weaker.   @2027: patient attempted to pull pants up after using the bathroom. Reports feeling too weak to get up, so he sat down and used emergency bathroom call light. States that he did not fall. Did not hit his head. Skin assessment completed with no obvious injury. Patient denies new onset pain. Provider saw patient at bedside. Fall documentation completed.    Access: CVC DL   Electrolytes: potassium replaced  Blood products: no replacement needed. Recheck tomorrow AM.    Plan: Continue with plan of care. Notify primary team with changes.

## 2024-08-20 NOTE — PROGRESS NOTES
Continue to complained throat and mouth mucositis pain, administrated oral and IV medications after that patient stated pain down to 3 so best, around 12 pm HR up to 120 to 140 MD was notified started cardiac monitoring, patient was asymptomatic, EKG showered afib, and patient has history of that, administrated IV metoprolol and started PO too, around that time patient started refused pain medication, he stated he is fine with without pain medication, continue to have poor oral intake, using external cath good urine output, evening HR down to 100 to 120, continue to monitor   n/a

## 2024-08-20 NOTE — PROGRESS NOTES
"    Physician Attestation   I, Theresa Julien MD, was present with the medical/VINNY student who participated in the service and in the documentation of the note.  I have verified the history and personally performed the physical exam and medical decision making.  I agree with the assessment and plan of care as documented in the note.      Key findings: patient with mechanical fall in the bathroom, likely associated with difficulty applying hemorrhoid cream. Did not hit head.   - continue to work with PT/OT for deconditioning  - to ask nursing for help with hemorrhoid cream      Theresa Julien MD  Date of Service (when I saw the patient): 08/19/24      Cross-cover notified that patient was found seated on the bathroom floor for a suspected unassisted fall. Vitals stable and no signs of trauma. Patient reports that he did not fall and sat on the floor after going to the bathroom due to leg weakness. Says he was not on the floor for long. Did not lose consciousness.     Patient was alert and oriented, laying comfortably in bed. No apparent trauma or head abrasions. Known 3/6 heart murmur appreciated, clear lung sounds without work of breathing. Able to sit-up to edge of bed without difficulty.    BP (!) 149/79 (BP Location: Right arm)   Pulse 118   Temp 98.5  F (36.9  C) (Axillary)   Resp 20   Ht 1.825 m (5' 11.85\")   Wt 110.2 kg (242 lb 14.4 oz)   SpO2 97%   BMI 33.08 kg/m      A/P:  #Concern for fall  No indications of traumatic fall.  - Continue point of care, monitor for any acute changes      [facility]  [employer]  [employer]  "

## 2024-08-20 NOTE — PROGRESS NOTES
"BMT Progress Note  Chief complaint:  Arpit Forrest is a 73 year old male, currently day+13 s/p ELIEZER MUD PBSCT for MDS.  INTERIM HISTORY:   Mucositis pain on R side, sore throat, difficulty swallowing. He is using suction. Oxycodone not working well at current dose. Ear tenderness/fullness, no sinus pressure, mastoid tenderness.   No N/V/D. Appetite down trending, but getting liquids and soft foods in.  REVIEW OF SYSTEMS: Otherwise unremarkable other than what is noted in the Interim History.   PHYSICAL EXAM:   Vitals:  /83 (BP Location: Right arm)   Pulse 104   Temp 98.8  F (37.1  C) (Oral)   Resp 18   Ht 1.825 m (5' 11.85\")   Wt 110.5 kg (243 lb 8 oz)   SpO2 98%   BMI 33.16 kg/m      General: alert and cooperative, NAD. Face with maye appearance--stable per pt.  HEENT: NC/AT, sclera anicteric. Generalized erythema and sloughing, ulcers R side buccal mucosa. Bilateral cerumen impactions  CV: RRR, pan systolic murmur (known)  Resp: CTAB  Skin: no rashes on limited exam  Neuro: Alert and interactive, moves all extremities equally, neuro exam unremarkable CNII-XII intact, strength 5/5 all extremities, no pronator drift   Psych: Mood and affect normal  Vascular: Right CVC   LABS:  Lab Results   Component Value Date    WBC 0.2 (LL) 08/20/2024    ANEU 0.8 (L) 08/08/2024    HGB 8.7 (L) 08/20/2024    HCT 26.2 (L) 08/20/2024    PLT 30 (LL) 08/20/2024     08/20/2024    POTASSIUM 3.3 (L) 08/20/2024    CHLORIDE 104 08/20/2024    CO2 21 (L) 08/20/2024     (H) 08/20/2024    BUN 13.4 08/20/2024    CR 0.93 08/20/2024    MAG 1.8 08/20/2024    INR 1.17 (H) 08/19/2024    BILITOTAL 0.3 08/19/2024    AST 16 08/19/2024    ALT 18 08/19/2024    ALKPHOS 65 08/19/2024    PROTTOTAL 6.2 (L) 08/19/2024    ALBUMIN 3.6 08/19/2024        ASSESSMENT AND PLAN: Arpit Forrest is a 73 year old male, currently day +13 s/p ELIEZER MUD PBSCT for MDS.  BMT/IEC PROTOCOL for MDS  - Chemo protocol: [id number]; CY/flu/TBI  - Peripheral " blood stem cell graft from 8/8 donor, ABO matched (Oneg), Cell dose: TBD  - continue allopurinol (hx of gout)  - Engraftment monitoring: Peripheral blood and bone marrow chimerisms on D28, D100, 6 months, 1 and 2 year  - Restaging plan: BMBx with NGS on D28, D100, 6 months, 1 and 2 years    HEME/COAG  - GCSF started day +5, continue until ANC > 1500 for 3 consecutive days.   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    - Siro level (8/19) low at 6.9 -- given bolus 2mg    ID  #Neutropenic fever- T cell expansion vs. Infection vs. Other - Cefepime (8/11-8/14), work up unremarkable. No recurrent fevers so far after stopping cefepime 8/14.   Recent Hx: Multiple granulomas in the lung and bone marrow biopsy- fungitell, aspergillus galactomanan neg. 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 Switch to IV meds.  #Frequent stools- c.diff negative (8/11). Imodium PRN.  # Hepatic steatosis and boderline iron deposition in liver  - LFTs and synthetic function normal on admission; Check LFTs qM/Th  - VOD prophy: Ursodiol 300mg TID    # Supportive   - Anti-emetics:  per protocol   - Ulcer prophy: PPI daily   - Dietician support to prevent malnutrition.     CARDIOVASCULAR  #Possible syncopal episode 8/19 in setting of decreased PO intake -1L bolus  #Holosystolic  murmur- Moderate aortic stenosis seen on ECHO  # Hx of A.fib with RVR    -8/20 afib with RVR this afternoon rates 120-150, hemodynamically stable. Asymptomatic.     *Metoprolol x1 5mg IV     *consider metoprolol PO if does not improve, will consult cards if necessary   A.ricardo with RVR while admitted with febrile episode on 3/15/24. Noted on his fitbit, not very symptomatic. Cardioversion planned for new onset [email or web address]. Not achieved with amiodarone, DCCV on 3/21/24 restored normal sinus rhythm. Amiodarone and anticoagulation was stopped in April 2024 without any recurrence.   He could have a recurrence during transplant which would need to be addressed with rate control strategy   # Asymptomatic moderate aortic stenosis   # CAD  Chest CT in February 2024 with mild to moderate CAC, three-vessel disease. No anginal symptoms   Continue atorvastatin 10mg  EKG Sinus rhythm Inferior infarct , age undetermined. Abnormal ECG (known cardiovascular disease). QTc =460  # HTN- Continue amlodipine 10mg, added lisinopril 5mg 8/4 (PTA dose of lisinopril was 10mg)   - Risk of cardiomyopathy:  Baseline EF normal 60-65&  - Risk of arrhythmia: Baseline EKG showed NSR, Qtc 460     RESPIRATORY  # VINCENT : CPAP  - Baseline PFTs: normal   - Risk of respiratory complications: Frequent ambulation and incentive spirometer.     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 Weight baseline 8/19  -baseline weight 8/14, and held off on additional lasix since  # Electrolyte management: replace per sliding scale  - cont Ca w/vit D  - hold eye vitamin and Coq10 peritransplant     DIABETES/ENDOCRINE  #DMII: hx steroid-induced  "hyperglycemia: Carb coverage and Novolog sliding scale. Endo initially consulted, signed off with stable sugars.   *held PTA metformin on admission.      MUSCULOSKELETAL/FRAILTY  # Gout: Continue allopurinol indefinitely   - Baseline Frailty Score: Not frail   - Daily PT/OT as needed while inpatient     SOCIAL DETERMINANTS  - Caregiver: Wife, Meghna, children   Medically Ready for Discharge: Anticipated in 5+ Days    Clinically Significant Risk Factors        # Hypokalemia: Lowest K = 3.3 mmol/L in last 2 days, will replace as needed        # Coagulation Defect: INR = 1.17 (Ref range: 0.85 - 1.15) and/or PTT = 34 Seconds (Ref range: 22 - 38 Seconds), will monitor for bleeding  # Thrombocytopenia: Lowest platelets = 26 in last 2 days, will monitor for bleeding   # Hypertension: Noted on problem list            # Obesity: Estimated body mass index is 33.16 kg/m  as calculated from the following:    Height as of this encounter: 1.825 m (5' 11.85\").    Weight as of this encounter: 110.5 kg (243 lb 8 oz).             I spent 30 minutes in the care of this patient today, which included time necessary for preparation for the visit, obtaining history, ordering medications/tests/procedures as medically indicated, review of pertinent medical literature, counseling of the patient, communication of recommendations to the care team, and documentation time.    ELLE Manuel    "

## 2024-08-21 ENCOUNTER — APPOINTMENT (OUTPATIENT)
Dept: CARDIOLOGY | Facility: CLINIC | Age: 74
DRG: 014 | End: 2024-08-21
Payer: MEDICARE

## 2024-08-21 ENCOUNTER — HOME INFUSION (PRE-WILLOW HOME INFUSION) (OUTPATIENT)
Dept: PHARMACY | Facility: CLINIC | Age: 74
End: 2024-08-21
Payer: MEDICARE

## 2024-08-21 ENCOUNTER — APPOINTMENT (OUTPATIENT)
Dept: GENERAL RADIOLOGY | Facility: CLINIC | Age: 74
DRG: 014 | End: 2024-08-21
Payer: MEDICARE

## 2024-08-21 LAB
ABO/RH(D): NORMAL
ALBUMIN UR-MCNC: 50 MG/DL
ANION GAP SERPL CALCULATED.3IONS-SCNC: 11 MMOL/L (ref 7–15)
ANTIBODY SCREEN: NEGATIVE
APPEARANCE UR: CLEAR
ATRIAL RATE - MUSE: NORMAL BPM
BASOPHILS # BLD AUTO: ABNORMAL 10*3/UL
BASOPHILS # BLD MANUAL: 0 10E3/UL (ref 0–0.2)
BASOPHILS NFR BLD AUTO: ABNORMAL %
BASOPHILS NFR BLD MANUAL: 0 %
BILIRUB UR QL STRIP: NEGATIVE
BUN SERPL-MCNC: 11.4 MG/DL (ref 8–23)
CALCIUM SERPL-MCNC: 9.3 MG/DL (ref 8.8–10.4)
CHLORIDE SERPL-SCNC: 104 MMOL/L (ref 98–107)
CMV DNA SPEC NAA+PROBE-ACNC: NOT DETECTED IU/ML
COLOR UR AUTO: ABNORMAL
CREAT SERPL-MCNC: 0.93 MG/DL (ref 0.67–1.17)
CULTURE HARVEST COMPLETE DATE: NORMAL
DIASTOLIC BLOOD PRESSURE - MUSE: NORMAL MMHG
EGFRCR SERPLBLD CKD-EPI 2021: 86 ML/MIN/1.73M2
ELLIPTOCYTES BLD QL SMEAR: SLIGHT
EOSINOPHIL # BLD AUTO: ABNORMAL 10*3/UL
EOSINOPHIL # BLD MANUAL: 0 10E3/UL (ref 0–0.7)
EOSINOPHIL NFR BLD AUTO: ABNORMAL %
EOSINOPHIL NFR BLD MANUAL: 0 %
ERYTHROCYTE [DISTWIDTH] IN BLOOD BY AUTOMATED COUNT: 14.4 % (ref 10–15)
GLUCOSE BLDC GLUCOMTR-MCNC: 145 MG/DL (ref 70–99)
GLUCOSE BLDC GLUCOMTR-MCNC: 146 MG/DL (ref 70–99)
GLUCOSE BLDC GLUCOMTR-MCNC: 163 MG/DL (ref 70–99)
GLUCOSE BLDC GLUCOMTR-MCNC: 164 MG/DL (ref 70–99)
GLUCOSE SERPL-MCNC: 148 MG/DL (ref 70–99)
GLUCOSE UR STRIP-MCNC: 300 MG/DL
GRANULAR CAST: 2 /LPF
HCO3 SERPL-SCNC: 22 MMOL/L (ref 22–29)
HCT VFR BLD AUTO: 26.1 % (ref 40–53)
HGB BLD-MCNC: 8.6 G/DL (ref 13.3–17.7)
HGB UR QL STRIP: NEGATIVE
IMM GRANULOCYTES # BLD: ABNORMAL 10*3/UL
IMM GRANULOCYTES NFR BLD: ABNORMAL %
INTERPRETATION ECG - MUSE: NORMAL
INTERPRETATION: NORMAL
KETONES UR STRIP-MCNC: ABNORMAL MG/DL
LACTATE SERPL-SCNC: 1 MMOL/L (ref 0.7–2)
LEUKOCYTE ESTERASE UR QL STRIP: NEGATIVE
LYMPHOCYTES # BLD AUTO: ABNORMAL 10*3/UL
LYMPHOCYTES # BLD MANUAL: 0 10E3/UL (ref 0.8–5.3)
LYMPHOCYTES NFR BLD AUTO: ABNORMAL %
LYMPHOCYTES NFR BLD MANUAL: 0 %
MAGNESIUM SERPL-MCNC: 1.8 MG/DL (ref 1.7–2.3)
MCH RBC QN AUTO: 29.7 PG (ref 26.5–33)
MCHC RBC AUTO-ENTMCNC: 33 G/DL (ref 31.5–36.5)
MCV RBC AUTO: 90 FL (ref 78–100)
MONOCYTES # BLD AUTO: ABNORMAL 10*3/UL
MONOCYTES # BLD MANUAL: 0.2 10E3/UL (ref 0–1.3)
MONOCYTES NFR BLD AUTO: ABNORMAL %
MONOCYTES NFR BLD MANUAL: 14 %
MUCOUS THREADS #/AREA URNS LPF: PRESENT /LPF
MYELOCYTES # BLD MANUAL: 0 10E3/UL
MYELOCYTES NFR BLD MANUAL: 1 %
NEUTROPHILS # BLD AUTO: ABNORMAL 10*3/UL
NEUTROPHILS # BLD MANUAL: 1.2 10E3/UL (ref 1.6–8.3)
NEUTROPHILS NFR BLD AUTO: ABNORMAL %
NEUTROPHILS NFR BLD MANUAL: 85 %
NITRATE UR QL: NEGATIVE
NRBC # BLD AUTO: 0 10E3/UL
NRBC BLD AUTO-RTO: 0 /100
P AXIS - MUSE: NORMAL DEGREES
PH UR STRIP: 5.5 [PH] (ref 5–7)
PHOSPHATE SERPL-MCNC: 2.9 MG/DL (ref 2.5–4.5)
PLAT MORPH BLD: ABNORMAL
PLATELET # BLD AUTO: 40 10E3/UL (ref 150–450)
POTASSIUM SERPL-SCNC: 3.4 MMOL/L (ref 3.4–5.3)
POTASSIUM SERPL-SCNC: 3.5 MMOL/L (ref 3.4–5.3)
PR INTERVAL - MUSE: NORMAL MS
QRS DURATION - MUSE: 88 MS
QT - MUSE: 270 MS
QTC - MUSE: 407 MS
R AXIS - MUSE: -24 DEGREES
RBC # BLD AUTO: 2.9 10E6/UL (ref 4.4–5.9)
RBC MORPH BLD: ABNORMAL
RBC URINE: <1 /HPF
SIROLIMUS BLD-MCNC: 7.6 UG/L (ref 5–15)
SODIUM SERPL-SCNC: 137 MMOL/L (ref 135–145)
SP GR UR STRIP: 1.02 (ref 1–1.03)
SPECIMEN EXPIRATION DATE: NORMAL
SQUAMOUS EPITHELIAL: <1 /HPF
SYSTOLIC BLOOD PRESSURE - MUSE: NORMAL MMHG
T AXIS - MUSE: 8 DEGREES
TME LAST DOSE: NORMAL H
TME LAST DOSE: NORMAL H
UROBILINOGEN UR STRIP-MCNC: NORMAL MG/DL
VENTRICULAR RATE- MUSE: 137 BPM
WBC # BLD AUTO: 1.4 10E3/UL (ref 4–11)
WBC URINE: 2 /HPF

## 2024-08-21 PROCEDURE — 250N000011 HC RX IP 250 OP 636: Mod: JZ | Performed by: STUDENT IN AN ORGANIZED HEALTH CARE EDUCATION/TRAINING PROGRAM

## 2024-08-21 PROCEDURE — 84132 ASSAY OF SERUM POTASSIUM: CPT | Performed by: INTERNAL MEDICINE

## 2024-08-21 PROCEDURE — 258N000003 HC RX IP 258 OP 636

## 2024-08-21 PROCEDURE — 84100 ASSAY OF PHOSPHORUS: CPT | Performed by: INTERNAL MEDICINE

## 2024-08-21 PROCEDURE — 93306 TTE W/DOPPLER COMPLETE: CPT

## 2024-08-21 PROCEDURE — 250N000009 HC RX 250

## 2024-08-21 PROCEDURE — 250N000011 HC RX IP 250 OP 636: Performed by: INTERNAL MEDICINE

## 2024-08-21 PROCEDURE — 250N000013 HC RX MED GY IP 250 OP 250 PS 637

## 2024-08-21 PROCEDURE — 81001 URINALYSIS AUTO W/SCOPE: CPT

## 2024-08-21 PROCEDURE — 85007 BL SMEAR W/DIFF WBC COUNT: CPT | Performed by: STUDENT IN AN ORGANIZED HEALTH CARE EDUCATION/TRAINING PROGRAM

## 2024-08-21 PROCEDURE — 250N000012 HC RX MED GY IP 250 OP 636 PS 637

## 2024-08-21 PROCEDURE — 250N000013 HC RX MED GY IP 250 OP 250 PS 637: Performed by: STUDENT IN AN ORGANIZED HEALTH CARE EDUCATION/TRAINING PROGRAM

## 2024-08-21 PROCEDURE — 86901 BLOOD TYPING SEROLOGIC RH(D): CPT | Performed by: STUDENT IN AN ORGANIZED HEALTH CARE EDUCATION/TRAINING PROGRAM

## 2024-08-21 PROCEDURE — 85027 COMPLETE CBC AUTOMATED: CPT | Performed by: STUDENT IN AN ORGANIZED HEALTH CARE EDUCATION/TRAINING PROGRAM

## 2024-08-21 PROCEDURE — 71046 X-RAY EXAM CHEST 2 VIEWS: CPT

## 2024-08-21 PROCEDURE — 93005 ELECTROCARDIOGRAM TRACING: CPT

## 2024-08-21 PROCEDURE — 258N000003 HC RX IP 258 OP 636: Performed by: INTERNAL MEDICINE

## 2024-08-21 PROCEDURE — 250N000012 HC RX MED GY IP 250 OP 636 PS 637: Performed by: PHYSICIAN ASSISTANT

## 2024-08-21 PROCEDURE — 250N000011 HC RX IP 250 OP 636

## 2024-08-21 PROCEDURE — 93010 ELECTROCARDIOGRAM REPORT: CPT | Performed by: INTERNAL MEDICINE

## 2024-08-21 PROCEDURE — 83735 ASSAY OF MAGNESIUM: CPT | Performed by: INTERNAL MEDICINE

## 2024-08-21 PROCEDURE — 80048 BASIC METABOLIC PNL TOTAL CA: CPT | Performed by: STUDENT IN AN ORGANIZED HEALTH CARE EDUCATION/TRAINING PROGRAM

## 2024-08-21 PROCEDURE — 71046 X-RAY EXAM CHEST 2 VIEWS: CPT | Mod: 26 | Performed by: RADIOLOGY

## 2024-08-21 PROCEDURE — 258N000003 HC RX IP 258 OP 636: Performed by: PHYSICIAN ASSISTANT

## 2024-08-21 PROCEDURE — 80195 ASSAY OF SIROLIMUS: CPT | Performed by: INTERNAL MEDICINE

## 2024-08-21 PROCEDURE — 206N000001 HC R&B BMT UMMC

## 2024-08-21 PROCEDURE — 250N000011 HC RX IP 250 OP 636: Performed by: STUDENT IN AN ORGANIZED HEALTH CARE EDUCATION/TRAINING PROGRAM

## 2024-08-21 PROCEDURE — 83605 ASSAY OF LACTIC ACID: CPT | Performed by: INTERNAL MEDICINE

## 2024-08-21 PROCEDURE — 250N000011 HC RX IP 250 OP 636: Mod: JZ | Performed by: PHYSICIAN ASSISTANT

## 2024-08-21 PROCEDURE — 93306 TTE W/DOPPLER COMPLETE: CPT | Mod: 26 | Performed by: INTERNAL MEDICINE

## 2024-08-21 PROCEDURE — 250N000013 HC RX MED GY IP 250 OP 250 PS 637: Performed by: PHYSICIAN ASSISTANT

## 2024-08-21 RX ORDER — POTASSIUM CHLORIDE 750 MG/1
20 TABLET, EXTENDED RELEASE ORAL ONCE
Status: COMPLETED | OUTPATIENT
Start: 2024-08-21 | End: 2024-08-21

## 2024-08-21 RX ORDER — MAGNESIUM SULFATE HEPTAHYDRATE 40 MG/ML
2 INJECTION, SOLUTION INTRAVENOUS ONCE
Status: COMPLETED | OUTPATIENT
Start: 2024-08-21 | End: 2024-08-21

## 2024-08-21 RX ORDER — SIROLIMUS 2 MG/1
2 TABLET, FILM COATED ORAL ONCE
Status: COMPLETED | OUTPATIENT
Start: 2024-08-21 | End: 2024-08-21

## 2024-08-21 RX ORDER — POTASSIUM CHLORIDE 29.8 MG/ML
20 INJECTION INTRAVENOUS
Status: COMPLETED | OUTPATIENT
Start: 2024-08-21 | End: 2024-08-21

## 2024-08-21 RX ORDER — PANTOPRAZOLE SODIUM 40 MG/1
40 TABLET, DELAYED RELEASE ORAL
Status: DISCONTINUED | OUTPATIENT
Start: 2024-08-22 | End: 2024-08-25 | Stop reason: HOSPADM

## 2024-08-21 RX ADMIN — CARBAMIDE PEROXIDE 6.5% 5 DROP: 6.5 LIQUID AURICULAR (OTIC) at 20:53

## 2024-08-21 RX ADMIN — LATANOPROST 2 DROP: 50 SOLUTION OPHTHALMIC at 22:16

## 2024-08-21 RX ADMIN — CALCIUM CARBONATE 600 MG (1,500 MG)-VITAMIN D3 400 UNIT TABLET 1 TABLET: at 08:08

## 2024-08-21 RX ADMIN — OXYCODONE HYDROCHLORIDE 5 MG: 5 TABLET ORAL at 19:41

## 2024-08-21 RX ADMIN — FILGRASTIM-AAFI 480 MCG: 480 INJECTION, SOLUTION INTRAVENOUS; SUBCUTANEOUS at 20:49

## 2024-08-21 RX ADMIN — ACYCLOVIR SODIUM 550 MG: 1000 INJECTION, SOLUTION INTRAVENOUS at 04:14

## 2024-08-21 RX ADMIN — BRIMONIDINE TARTRATE 1 DROP: 2 SOLUTION/ DROPS OPHTHALMIC at 20:48

## 2024-08-21 RX ADMIN — URSODIOL 300 MG: 300 CAPSULE ORAL at 15:12

## 2024-08-21 RX ADMIN — MYCOPHENOLATE MOFETIL 1500 MG: 500 INJECTION, POWDER, LYOPHILIZED, FOR SOLUTION INTRAVENOUS at 08:38

## 2024-08-21 RX ADMIN — DIPHENHYDRAMINE HYDROCHLORIDE AND LIDOCAINE HYDROCHLORIDE AND ALUMINUM HYDROXIDE AND MAGNESIUM HYDRO 10 ML: KIT at 19:42

## 2024-08-21 RX ADMIN — Medication 2 SPRAY: at 12:15

## 2024-08-21 RX ADMIN — INSULIN ASPART 1 UNITS: 100 INJECTION, SOLUTION INTRAVENOUS; SUBCUTANEOUS at 17:23

## 2024-08-21 RX ADMIN — MICAFUNGIN SODIUM 150 MG: 50 INJECTION, POWDER, LYOPHILIZED, FOR SOLUTION INTRAVENOUS at 07:42

## 2024-08-21 RX ADMIN — SIROLIMUS 2 MG: 2 TABLET ORAL at 17:26

## 2024-08-21 RX ADMIN — AMLODIPINE BESYLATE 10 MG: 10 TABLET ORAL at 08:08

## 2024-08-21 RX ADMIN — CARBAMIDE PEROXIDE 6.5% 5 DROP: 6.5 LIQUID AURICULAR (OTIC) at 07:46

## 2024-08-21 RX ADMIN — CEFEPIME HYDROCHLORIDE 2 G: 2 INJECTION, POWDER, FOR SOLUTION INTRAVENOUS at 06:35

## 2024-08-21 RX ADMIN — Medication 2 SPRAY: at 08:32

## 2024-08-21 RX ADMIN — URSODIOL 300 MG: 300 CAPSULE ORAL at 20:53

## 2024-08-21 RX ADMIN — Medication 5 ML: at 20:54

## 2024-08-21 RX ADMIN — INSULIN ASPART 1 UNITS: 100 INJECTION, SOLUTION INTRAVENOUS; SUBCUTANEOUS at 08:25

## 2024-08-21 RX ADMIN — MAGNESIUM SULFATE HEPTAHYDRATE 2 G: 2 INJECTION, SOLUTION INTRAVENOUS at 06:42

## 2024-08-21 RX ADMIN — POTASSIUM CHLORIDE 20 MEQ: 29.8 INJECTION, SOLUTION INTRAVENOUS at 07:42

## 2024-08-21 RX ADMIN — ATORVASTATIN CALCIUM 10 MG: 10 TABLET, FILM COATED ORAL at 20:53

## 2024-08-21 RX ADMIN — POTASSIUM CHLORIDE 20 MEQ: 29.8 INJECTION, SOLUTION INTRAVENOUS at 06:32

## 2024-08-21 RX ADMIN — Medication 3 CAPSULE: at 08:08

## 2024-08-21 RX ADMIN — METOPROLOL TARTRATE 25 MG: 25 TABLET, FILM COATED ORAL at 08:08

## 2024-08-21 RX ADMIN — URSODIOL 300 MG: 300 CAPSULE ORAL at 08:08

## 2024-08-21 RX ADMIN — INSULIN ASPART 1 UNITS: 100 INJECTION, SOLUTION INTRAVENOUS; SUBCUTANEOUS at 13:23

## 2024-08-21 RX ADMIN — MYCOPHENOLATE MOFETIL 1500 MG: 500 INJECTION, POWDER, LYOPHILIZED, FOR SOLUTION INTRAVENOUS at 22:11

## 2024-08-21 RX ADMIN — SIROLIMUS 4 MG: 2 TABLET ORAL at 08:08

## 2024-08-21 RX ADMIN — POTASSIUM CHLORIDE 20 MEQ: 750 TABLET, EXTENDED RELEASE ORAL at 15:12

## 2024-08-21 RX ADMIN — TAMSULOSIN HYDROCHLORIDE 0.4 MG: 0.4 CAPSULE ORAL at 08:08

## 2024-08-21 RX ADMIN — Medication 10 ML: at 12:14

## 2024-08-21 RX ADMIN — METOPROLOL TARTRATE 25 MG: 25 TABLET, FILM COATED ORAL at 20:53

## 2024-08-21 RX ADMIN — BRIMONIDINE TARTRATE 1 DROP: 2 SOLUTION/ DROPS OPHTHALMIC at 07:46

## 2024-08-21 RX ADMIN — CEFEPIME HYDROCHLORIDE 2 G: 2 INJECTION, POWDER, FOR SOLUTION INTRAVENOUS at 15:13

## 2024-08-21 RX ADMIN — LORAZEPAM 1 MG: 0.5 TABLET ORAL at 02:03

## 2024-08-21 RX ADMIN — ALLOPURINOL 300 MG: 300 TABLET ORAL at 08:08

## 2024-08-21 RX ADMIN — PANTOPRAZOLE SODIUM 40 MG: 40 INJECTION, POWDER, FOR SOLUTION INTRAVENOUS at 07:57

## 2024-08-21 RX ADMIN — Medication 5 ML: at 17:20

## 2024-08-21 RX ADMIN — FLUTICASONE PROPIONATE 1 SPRAY: 50 SPRAY, METERED NASAL at 07:46

## 2024-08-21 RX ADMIN — LISINOPRIL 5 MG: 5 TABLET ORAL at 08:08

## 2024-08-21 RX ADMIN — FLUTICASONE PROPIONATE 1 SPRAY: 50 SPRAY, METERED NASAL at 20:43

## 2024-08-21 RX ADMIN — ACYCLOVIR SODIUM 550 MG: 1000 INJECTION, SOLUTION INTRAVENOUS at 16:34

## 2024-08-21 ASSESSMENT — ACTIVITIES OF DAILY LIVING (ADL)
ADLS_ACUITY_SCORE: 23
ADLS_ACUITY_SCORE: 26
ADLS_ACUITY_SCORE: 23
ADLS_ACUITY_SCORE: 26
ADLS_ACUITY_SCORE: 23
ADLS_ACUITY_SCORE: 26
ADLS_ACUITY_SCORE: 23
ADLS_ACUITY_SCORE: 26
ADLS_ACUITY_SCORE: 23
ADLS_ACUITY_SCORE: 23
ADLS_ACUITY_SCORE: 26
ADLS_ACUITY_SCORE: 23

## 2024-08-21 NOTE — PROGRESS NOTES
Blood and Marrow Transplant Discharge Teach    RNCC met with Deena to discuss upcoming discharge. Reviewed plan for line care supplies, PT/OT recommendations and upcoming clinic visits.    Patient demonstrates understanding of the following:  Which situations necessitate calling provider and whom to contact: Yes  Proper use and care of (medical equipment, care aids, etc.) Yes  Reviewed Post Allo Transplant guidelines and patient verbalizes understanding      Infection Control:  Patient instructed on hand hygiene: Yes  Signs and symptoms of infection taught: Yes      For CAR-T patient: Verified that patient has product specific wallet card. Patient instructed to remain within close proximity of facility (within 30-40 minutes per program standard) for at least four weeks post infusion. CAR-T patient is instructed not to operate motorized vehicle or heavy machinery for a period of 8 weeks.    Time spent with patient: 60 minutes.  Specific Concerns: NA    Discharge location: Home     [ x ] Home Infusion Company: heparin script     [ x ] Pharmacy needs: Micafungin (clinic)                                      Sirolimus: $0                                      Tacrolimus: $0                                      MMF: $0                                      Prevymis: CMV neg     [ x ] Can fill meds at [facility] pharmacy (BMT benefits soft check): Yes              If not, preferred pharmacy at discharge: n/a     [ x ] PT/OT recommendations: home with assist      [ x ] 1st time discharge? Yes    [ x ] Discharge teach     [ x ] Micafungin teach- n/a. Infusion in clinic.      [ x ] Weekly Lab Day Preference? Tuesday, Wednesday, Thursday. Mid mornings.      [ x ] D0 BAN scheduling notification     [ x ] clonoSEQ testing needed? no     [ x ] Car-T wallet card- n/a    Aranza Escoto  BMT Nurse Coordinator  Phone: [phone]  Pager: [phone]

## 2024-08-21 NOTE — PLAN OF CARE
Arpit reports significant fatigue and has slept deeply taking many naps this shift.  His wife is here visiting.  He continues to have mouth and throat mucositis but it is improving,  magic mouthwash requested once.  12 lead confirmed NSR,  telemetry discontinued, echo done.  C/O ear pain, visible wax, debrox ear drops instilled.      Problem: Adult Inpatient Plan of Care  Goal: Plan of Care Review  Description: The Plan of Care Review/Shift note should be completed every shift.  The Outcome Evaluation is a brief statement about your assessment that the patient is improving, declining, or no change.  This information will be displayed automatically on your shift  note.  Outcome: Progressing  Flowsheets (Taken 8/21/2024 1606)  Plan of Care Reviewed With:   patient   spouse  Overall Patient Progress: improving  Goal: Optimal Comfort and Wellbeing  Outcome: Progressing  Intervention: Monitor Pain and Promote Comfort  Recent Flowsheet Documentation  Taken 8/21/2024 0800 by Mela Restrepo, RN  Pain Management Interventions: medication (see MAR)     Problem: Stem Cell/Bone Marrow Transplant  Goal: Blood Counts Within Acceptable Range  Outcome: Progressing  Intervention: Monitor and Manage Hematologic Symptoms  Recent Flowsheet Documentation  Taken 8/21/2024 1200 by Mela Restrepo, RN  Medication Review/Management: medications reviewed  Taken 8/21/2024 0800 by Mela Restrepo, RN  Bleeding Precautions:   monitored for signs of bleeding   gentle oral care promoted   blood pressure closely monitored  Medication Review/Management: medications reviewed  Goal: Improved Oral Mucous Membrane Health and Integrity  Outcome: Progressing  Intervention: Promote Oral Comfort and Health  Recent Flowsheet Documentation  Taken 8/21/2024 0800 by Mela Restrepo, RN  Oral Care:   lip/mouth moisturizer applied   swabbed with sterile water     Problem: Stem Cell/Bone Marrow Transplant  Goal: Optimal Coping with Transplant  Outcome:  Progressing  Intervention: Optimize Patient/Family Adjustment to Transplant  Recent Flowsheet Documentation  Taken 8/21/2024 0800 by Mela Restrepo, RN  Supportive Measures:   active listening utilized   self-care encouraged   Goal Outcome Evaluation:      Plan of Care Reviewed With: patient, spouse    Overall Patient Progress: improvingOverall Patient Progress: improving

## 2024-08-21 NOTE — PROGRESS NOTES
Therapy: [facility] + IV Micafungin  Insurance: Medicare + [employer] FEP     This patient does not have coverage for IV Micafungin through his Primary Medicare Plan, Drug must go to his part D and he will be responsible for any Copays(Copay for Micafungin 150mg q24 is $0.00). Pt has [facility] supply coverage through his secondary plan and if he is not homebound, Nursing is also covered by secondary. Secondary BCBS plan Covers at 100%, no coinsurance or Deductible will apply.    In reference to  [facility] for admission date 07/31/2024 to check [facility]+Micafungin coverage.   Please contact Intake with any questions, [phone] or In Basket pool,  [facility] ([phone]).

## 2024-08-21 NOTE — PLAN OF CARE
Pt was in NSR with PVCs throughout shift. Pt's VSS except Tmax of 101.1 - Administered tylenol. Pt reported right ear ache - Administered ear drops. Pt has mucositis/bilateral mouth sores and reported that 'swallowing pills feels like razor blades down throat,' however tolerated PO meds well. Pt was eager to learn and demonstrated understanding of their varying medications, routes of administration, and purposes for the specific medications. Pt received Novolog insulin. Pt on CPAP overnight. Pt has external urinary catheter, had 2 BMs overnight (formed stool), and uses bedside commode. Pt had high energy for the day at 0500 and reports being ready to discharge from hospital. Denies bed alarm; SBA to bedside commode (pink bucket for urinal). Replaced magnesium and potassium, recheck this morning.     Goal Outcome Evaluation:      Plan of Care Reviewed With: patient    Overall Patient Progress: improvingOverall Patient Progress: improving    Outcome Evaluation: WBCs increasing, pt's energy increasing, eager to DC, all VSS except T max 101.1 and administered tylenol

## 2024-08-21 NOTE — PROGRESS NOTES
"BMT Progress Note  Chief complaint:  Arpit Forrest is a 73 year old male, currently day+14 s/p ELIEZER MUD PBSCT for MDS.  INTERIM HISTORY:   Now NSR, asymptomatic. Febrile to 101.1 over night, only symptom is R ear discomfort.   REVIEW OF SYSTEMS: Otherwise unremarkable other than what is noted in the Interim History.   PHYSICAL EXAM:   Vitals:  BP (!) 150/73 (BP Location: Right arm)   Pulse 92   Temp 98.3  F (36.8  C) (Oral)   Resp 18   Ht 1.825 m (5' 11.85\")   Wt 112.9 kg (248 lb 14.4 oz)   SpO2 97%   BMI 33.90 kg/m      General: alert and cooperative, NAD. Face with maye appearance--stable per pt.  HEENT: NC/AT, sclera anicteric. Generalized erythema and sloughing, ulcers R side buccal mucosa. Non tender with pinna mobility, no erythema, drainage in ear canal, Bilateral cerumen impactions, able to visualize TM on (8/21) no erythema, cone of light visible. No sinus, mastoid tenderness.   CV: RRR, pan systolic murmur (known)  Resp: CTAB  Abdomen: NT, ND, BS+  Skin: no rashes on limited exam  Psych: Mood and affect normal  Vascular: Right CVC   LABS:  Lab Results   Component Value Date    WBC 1.4 (L) 08/21/2024    ANEU 1.2 (L) 08/21/2024    HGB 8.6 (L) 08/21/2024    HCT 26.1 (L) 08/21/2024    PLT 40 (LL) 08/21/2024     08/21/2024    POTASSIUM 3.4 08/21/2024    CHLORIDE 104 08/21/2024    CO2 22 08/21/2024     (H) 08/21/2024    BUN 11.4 08/21/2024    CR 0.93 08/21/2024    MAG 1.8 08/21/2024    INR 1.17 (H) 08/19/2024    BILITOTAL 0.3 08/19/2024    AST 16 08/19/2024    ALT 18 08/19/2024    ALKPHOS 65 08/19/2024    PROTTOTAL 6.2 (L) 08/19/2024    ALBUMIN 3.6 08/19/2024        ASSESSMENT AND PLAN: Arpit Forrest is a 73 year old male, currently day +14 s/p ELIEZER MUD PBSCT for MDS.    BMT/IEC PROTOCOL for MDS  - Chemo protocol: [id number]; CY/flu/TBI  - Peripheral blood stem cell graft from 8/8 donor, ABO matched (Oneg), Cell dose: TBD  - continue allopurinol (hx of gout)  - Engraftment monitoring: " Peripheral blood and bone marrow chimerisms on D28, D100, 6 months, 1 and 2 year  - Restaging plan: BMBx with NGS on D28, D100, 6 months, 1 and 2 years    HEME/COAG  - GCSF started day +5, continue until ANC > 1500 for 3 consecutive days.   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    - Siro level (8/19) low at 6.9 -- given bolus 2mg    ID  #Neutropenic fever- T cell expansion vs. Infection vs. Other - Cefepime (8/11-8/14), work up unremarkable. No recurrent fevers so far after stopping cefepime 8/14.   Recent Hx: Multiple granulomas in the lung and bone marrow biopsy- fungitell, aspergillus galactomanan neg. 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 Switch to IV meds.  #Frequent stools- c.diff negative (8/11). Imodium PRN.  # Hepatic steatosis and boderline iron deposition in liver  - LFTs and synthetic function normal on admission; Check LFTs qM/Th  - VOD prophy: Ursodiol 300mg TID    # Supportive   - Anti-emetics:  per protocol   - Ulcer prophy: PPI daily   - Dietician support to prevent malnutrition.     CARDIOVASCULAR  #Possible syncopal episode 8/19 in setting of decreased PO intake -1L bolus  #Holosystolic murmur- Moderate aortic stenosis seen on ECHO  # A.fib with RVR - resolved    -repeat ECHO pending 8/21   -8/20 afib with RVR this  afternoon rates 120-150, hemodynamically stable. Asymptomatic.     *Metoprolol x1 5mg IV , metoprolol BID 25mg, now in NSR    *consider metoprolol PO if does not improve, will consult cards if necessary   A.fib with RVR while admitted with febrile episode on 3/15/24. Noted on his fitbit, not very symptomatic. Cardioversion planned for new onset A.fib. Not achieved with amiodarone, DCCV on 3/21/24 restored normal sinus rhythm. Amiodarone and anticoagulation was stopped in April 2024 without any recurrence.   He could have a recurrence during transplant which would need to be addressed with rate control strategy   # Asymptomatic moderate aortic stenosis   # CAD  Chest CT in February 2024 with mild to moderate CAC, three-vessel disease. No anginal symptoms   Continue atorvastatin 10mg  EKG Sinus rhythm Inferior infarct , age undetermined. Abnormal ECG (known cardiovascular disease). QTc =460  # HTN- Continue amlodipine 10mg, added lisinopril 5mg 8/4 (PTA dose of lisinopril was 10mg)   - Risk of cardiomyopathy:  Baseline EF normal 60-65&  - Risk of arrhythmia: Baseline EKG showed NSR, Qtc 460     RESPIRATORY  # VINCENT : CPAP  - Baseline PFTs: normal   - Risk of respiratory complications: Frequent ambulation and incentive spirometer.     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 Weight baseline 8/19  -baseline weight 8/14, and held off on additional lasix since  # Electrolyte management: replace per sliding scale  - cont Ca w/vit D  - hold eye vitamin and Coq10 peritransplant     DIABETES/ENDOCRINE  #DMII: hx steroid-induced hyperglycemia: Carb coverage and Novolog sliding scale. Endo  "initially consulted, signed off with stable sugars.   *held PTA metformin on admission.      MUSCULOSKELETAL/FRAILTY  # Gout: Continue allopurinol indefinitely   - Baseline Frailty Score: Not frail   - Daily PT/OT as needed while inpatient     SOCIAL DETERMINANTS  - Caregiver: Wife, Meghna, children   Medically Ready for Discharge: Anticipated in 5+ Days Sunday 8/25    Clinically Significant Risk Factors        # Hypokalemia: Lowest K = 3.3 mmol/L in last 2 days, will replace as needed        # Coagulation Defect: INR = 1.17 (Ref range: 0.85 - 1.15) and/or PTT = 34 Seconds (Ref range: 22 - 38 Seconds), will monitor for bleeding  # Thrombocytopenia: Lowest platelets = 30 in last 2 days, will monitor for bleeding   # Hypertension: Noted on problem list            # Obesity: Estimated body mass index is 33.9 kg/m  as calculated from the following:    Height as of this encounter: 1.825 m (5' 11.85\").    Weight as of this encounter: 112.9 kg (248 lb 14.4 oz).             I spent 30 minutes in the care of this patient today, which included time necessary for preparation for the visit, obtaining history, ordering medications/tests/procedures as medically indicated, review of pertinent medical literature, counseling of the patient, communication of recommendations to the care team, and documentation time.    ELLE Manuel    "

## 2024-08-22 ENCOUNTER — APPOINTMENT (OUTPATIENT)
Dept: PHYSICAL THERAPY | Facility: CLINIC | Age: 74
DRG: 014 | End: 2024-08-22
Attending: INTERNAL MEDICINE
Payer: MEDICARE

## 2024-08-22 LAB
ALBUMIN SERPL BCG-MCNC: 3.1 G/DL (ref 3.5–5.2)
ALP SERPL-CCNC: 63 U/L (ref 40–150)
ALT SERPL W P-5'-P-CCNC: 16 U/L (ref 0–70)
ANION GAP SERPL CALCULATED.3IONS-SCNC: 10 MMOL/L (ref 7–15)
AST SERPL W P-5'-P-CCNC: 16 U/L (ref 0–45)
ATRIAL RATE - MUSE: 90 BPM
BASOPHILS # BLD AUTO: ABNORMAL 10*3/UL
BASOPHILS # BLD MANUAL: 0 10E3/UL (ref 0–0.2)
BASOPHILS NFR BLD AUTO: ABNORMAL %
BASOPHILS NFR BLD MANUAL: 0 %
BI-PLANE LVEF ECHO: NORMAL
BILIRUB DIRECT SERPL-MCNC: <0.2 MG/DL (ref 0–0.3)
BILIRUB SERPL-MCNC: 0.2 MG/DL
BUN SERPL-MCNC: 11.3 MG/DL (ref 8–23)
CALCIUM SERPL-MCNC: 8.8 MG/DL (ref 8.8–10.4)
CHLORIDE SERPL-SCNC: 104 MMOL/L (ref 98–107)
CREAT SERPL-MCNC: 0.87 MG/DL (ref 0.67–1.17)
DIASTOLIC BLOOD PRESSURE - MUSE: NORMAL MMHG
EGFRCR SERPLBLD CKD-EPI 2021: 90 ML/MIN/1.73M2
ELLIPTOCYTES BLD QL SMEAR: SLIGHT
EOSINOPHIL # BLD AUTO: ABNORMAL 10*3/UL
EOSINOPHIL # BLD MANUAL: 0 10E3/UL (ref 0–0.7)
EOSINOPHIL NFR BLD AUTO: ABNORMAL %
EOSINOPHIL NFR BLD MANUAL: 0 %
ERYTHROCYTE [DISTWIDTH] IN BLOOD BY AUTOMATED COUNT: 14.3 % (ref 10–15)
GLUCOSE BLDC GLUCOMTR-MCNC: 127 MG/DL (ref 70–99)
GLUCOSE BLDC GLUCOMTR-MCNC: 157 MG/DL (ref 70–99)
GLUCOSE BLDC GLUCOMTR-MCNC: 162 MG/DL (ref 70–99)
GLUCOSE BLDC GLUCOMTR-MCNC: 172 MG/DL (ref 70–99)
GLUCOSE SERPL-MCNC: 134 MG/DL (ref 70–99)
HCO3 SERPL-SCNC: 23 MMOL/L (ref 22–29)
HCT VFR BLD AUTO: 25.6 % (ref 40–53)
HGB BLD-MCNC: 8.3 G/DL (ref 13.3–17.7)
IMM GRANULOCYTES # BLD: ABNORMAL 10*3/UL
IMM GRANULOCYTES NFR BLD: ABNORMAL %
INTERPRETATION ECG - MUSE: NORMAL
LVEF ECHO: NORMAL
LYMPHOCYTES # BLD AUTO: ABNORMAL 10*3/UL
LYMPHOCYTES # BLD MANUAL: 0.1 10E3/UL (ref 0.8–5.3)
LYMPHOCYTES NFR BLD AUTO: ABNORMAL %
LYMPHOCYTES NFR BLD MANUAL: 2 %
MAGNESIUM SERPL-MCNC: 2 MG/DL (ref 1.7–2.3)
MCH RBC QN AUTO: 29.3 PG (ref 26.5–33)
MCHC RBC AUTO-ENTMCNC: 32.4 G/DL (ref 31.5–36.5)
MCV RBC AUTO: 91 FL (ref 78–100)
METAMYELOCYTES # BLD MANUAL: 0.1 10E3/UL
METAMYELOCYTES NFR BLD MANUAL: 1 %
MONOCYTES # BLD AUTO: ABNORMAL 10*3/UL
MONOCYTES # BLD MANUAL: 0.8 10E3/UL (ref 0–1.3)
MONOCYTES NFR BLD AUTO: ABNORMAL %
MONOCYTES NFR BLD MANUAL: 15 %
NEUTROPHILS # BLD AUTO: ABNORMAL 10*3/UL
NEUTROPHILS # BLD MANUAL: 4.1 10E3/UL (ref 1.6–8.3)
NEUTROPHILS NFR BLD AUTO: ABNORMAL %
NEUTROPHILS NFR BLD MANUAL: 81 %
NRBC # BLD AUTO: 0 10E3/UL
NRBC BLD AUTO-RTO: 0 /100
P AXIS - MUSE: 40 DEGREES
PHOSPHATE SERPL-MCNC: 3.7 MG/DL (ref 2.5–4.5)
PLAT MORPH BLD: ABNORMAL
PLATELET # BLD AUTO: 59 10E3/UL (ref 150–450)
POLYCHROMASIA BLD QL SMEAR: SLIGHT
POTASSIUM SERPL-SCNC: 3.4 MMOL/L (ref 3.4–5.3)
POTASSIUM SERPL-SCNC: 3.7 MMOL/L (ref 3.4–5.3)
PR INTERVAL - MUSE: 150 MS
PROMYELOCYTES # BLD MANUAL: 0.1 10E3/UL
PROMYELOCYTES NFR BLD MANUAL: 1 %
PROT SERPL-MCNC: 5.8 G/DL (ref 6.4–8.3)
QRS DURATION - MUSE: 108 MS
QT - MUSE: 388 MS
QTC - MUSE: 474 MS
R AXIS - MUSE: -28 DEGREES
RBC # BLD AUTO: 2.83 10E6/UL (ref 4.4–5.9)
RBC MORPH BLD: ABNORMAL
SODIUM SERPL-SCNC: 137 MMOL/L (ref 135–145)
SYSTOLIC BLOOD PRESSURE - MUSE: NORMAL MMHG
T AXIS - MUSE: 33 DEGREES
TROPONIN T SERPL HS-MCNC: 29 NG/L
VENTRICULAR RATE- MUSE: 90 BPM
WBC # BLD AUTO: 5.1 10E3/UL (ref 4–11)

## 2024-08-22 PROCEDURE — 250N000011 HC RX IP 250 OP 636

## 2024-08-22 PROCEDURE — 258N000003 HC RX IP 258 OP 636

## 2024-08-22 PROCEDURE — 250N000012 HC RX MED GY IP 250 OP 636 PS 637: Performed by: PHYSICIAN ASSISTANT

## 2024-08-22 PROCEDURE — 85027 COMPLETE CBC AUTOMATED: CPT | Performed by: STUDENT IN AN ORGANIZED HEALTH CARE EDUCATION/TRAINING PROGRAM

## 2024-08-22 PROCEDURE — 97530 THERAPEUTIC ACTIVITIES: CPT | Mod: GP

## 2024-08-22 PROCEDURE — 82248 BILIRUBIN DIRECT: CPT | Performed by: PHYSICIAN ASSISTANT

## 2024-08-22 PROCEDURE — 97110 THERAPEUTIC EXERCISES: CPT | Mod: GP

## 2024-08-22 PROCEDURE — 250N000011 HC RX IP 250 OP 636: Performed by: INTERNAL MEDICINE

## 2024-08-22 PROCEDURE — 258N000003 HC RX IP 258 OP 636: Performed by: PHYSICIAN ASSISTANT

## 2024-08-22 PROCEDURE — 250N000011 HC RX IP 250 OP 636: Performed by: STUDENT IN AN ORGANIZED HEALTH CARE EDUCATION/TRAINING PROGRAM

## 2024-08-22 PROCEDURE — 250N000009 HC RX 250

## 2024-08-22 PROCEDURE — 83735 ASSAY OF MAGNESIUM: CPT | Performed by: INTERNAL MEDICINE

## 2024-08-22 PROCEDURE — 99222 1ST HOSP IP/OBS MODERATE 55: CPT | Mod: GC | Performed by: INTERNAL MEDICINE

## 2024-08-22 PROCEDURE — 250N000013 HC RX MED GY IP 250 OP 250 PS 637

## 2024-08-22 PROCEDURE — 84132 ASSAY OF SERUM POTASSIUM: CPT | Performed by: INTERNAL MEDICINE

## 2024-08-22 PROCEDURE — 250N000011 HC RX IP 250 OP 636: Mod: JZ | Performed by: PHYSICIAN ASSISTANT

## 2024-08-22 PROCEDURE — 206N000001 HC R&B BMT UMMC

## 2024-08-22 PROCEDURE — 258N000003 HC RX IP 258 OP 636: Performed by: INTERNAL MEDICINE

## 2024-08-22 PROCEDURE — 97116 GAIT TRAINING THERAPY: CPT | Mod: GP

## 2024-08-22 PROCEDURE — 250N000013 HC RX MED GY IP 250 OP 250 PS 637: Performed by: STUDENT IN AN ORGANIZED HEALTH CARE EDUCATION/TRAINING PROGRAM

## 2024-08-22 PROCEDURE — 84484 ASSAY OF TROPONIN QUANT: CPT

## 2024-08-22 PROCEDURE — 84100 ASSAY OF PHOSPHORUS: CPT | Performed by: INTERNAL MEDICINE

## 2024-08-22 PROCEDURE — 250N000013 HC RX MED GY IP 250 OP 250 PS 637: Performed by: INTERNAL MEDICINE

## 2024-08-22 PROCEDURE — 85007 BL SMEAR W/DIFF WBC COUNT: CPT | Performed by: STUDENT IN AN ORGANIZED HEALTH CARE EDUCATION/TRAINING PROGRAM

## 2024-08-22 PROCEDURE — 250N000013 HC RX MED GY IP 250 OP 250 PS 637: Performed by: PHYSICIAN ASSISTANT

## 2024-08-22 PROCEDURE — 250N000012 HC RX MED GY IP 250 OP 636 PS 637

## 2024-08-22 RX ORDER — POTASSIUM CHLORIDE 750 MG/1
20 TABLET, EXTENDED RELEASE ORAL ONCE
Status: COMPLETED | OUTPATIENT
Start: 2024-08-22 | End: 2024-08-22

## 2024-08-22 RX ORDER — POTASSIUM CHLORIDE 29.8 MG/ML
20 INJECTION INTRAVENOUS
Status: COMPLETED | OUTPATIENT
Start: 2024-08-22 | End: 2024-08-22

## 2024-08-22 RX ORDER — MAGNESIUM SULFATE HEPTAHYDRATE 40 MG/ML
2 INJECTION, SOLUTION INTRAVENOUS ONCE
Status: COMPLETED | OUTPATIENT
Start: 2024-08-22 | End: 2024-08-22

## 2024-08-22 RX ORDER — LORATADINE 10 MG/1
10 TABLET ORAL DAILY
Status: DISCONTINUED | OUTPATIENT
Start: 2024-08-22 | End: 2024-08-25 | Stop reason: HOSPADM

## 2024-08-22 RX ORDER — MYCOPHENOLATE MOFETIL 500 MG/1
1500 TABLET ORAL
Status: DISCONTINUED | OUTPATIENT
Start: 2024-08-22 | End: 2024-08-25 | Stop reason: HOSPADM

## 2024-08-22 RX ORDER — ACYCLOVIR 800 MG/1
800 TABLET ORAL 2 TIMES DAILY
Status: DISCONTINUED | OUTPATIENT
Start: 2024-08-22 | End: 2024-08-25 | Stop reason: HOSPADM

## 2024-08-22 RX ADMIN — URSODIOL 300 MG: 300 CAPSULE ORAL at 14:45

## 2024-08-22 RX ADMIN — Medication 3 CAPSULE: at 08:58

## 2024-08-22 RX ADMIN — SIROLIMUS 4 MG: 2 TABLET ORAL at 08:58

## 2024-08-22 RX ADMIN — CARBAMIDE PEROXIDE 6.5% 5 DROP: 6.5 LIQUID AURICULAR (OTIC) at 20:57

## 2024-08-22 RX ADMIN — CEFEPIME HYDROCHLORIDE 2 G: 2 INJECTION, POWDER, FOR SOLUTION INTRAVENOUS at 00:44

## 2024-08-22 RX ADMIN — POTASSIUM CHLORIDE 20 MEQ: 29.8 INJECTION, SOLUTION INTRAVENOUS at 06:19

## 2024-08-22 RX ADMIN — LORATADINE 10 MG: 10 TABLET ORAL at 08:59

## 2024-08-22 RX ADMIN — LISINOPRIL 5 MG: 5 TABLET ORAL at 08:58

## 2024-08-22 RX ADMIN — URSODIOL 300 MG: 300 CAPSULE ORAL at 08:58

## 2024-08-22 RX ADMIN — METOPROLOL TARTRATE 25 MG: 25 TABLET, FILM COATED ORAL at 08:59

## 2024-08-22 RX ADMIN — TAMSULOSIN HYDROCHLORIDE 0.4 MG: 0.4 CAPSULE ORAL at 08:59

## 2024-08-22 RX ADMIN — AMLODIPINE BESYLATE 10 MG: 10 TABLET ORAL at 08:59

## 2024-08-22 RX ADMIN — ACYCLOVIR SODIUM 550 MG: 1000 INJECTION, SOLUTION INTRAVENOUS at 04:04

## 2024-08-22 RX ADMIN — Medication 5 ML: at 14:47

## 2024-08-22 RX ADMIN — DIPHENHYDRAMINE HYDROCHLORIDE AND LIDOCAINE HYDROCHLORIDE AND ALUMINUM HYDROXIDE AND MAGNESIUM HYDRO 10 ML: KIT at 06:18

## 2024-08-22 RX ADMIN — FLUTICASONE PROPIONATE 1 SPRAY: 50 SPRAY, METERED NASAL at 20:58

## 2024-08-22 RX ADMIN — CEFEPIME HYDROCHLORIDE 2 G: 2 INJECTION, POWDER, FOR SOLUTION INTRAVENOUS at 08:54

## 2024-08-22 RX ADMIN — LATANOPROST 2 DROP: 50 SOLUTION OPHTHALMIC at 21:01

## 2024-08-22 RX ADMIN — Medication 2 SPRAY: at 11:46

## 2024-08-22 RX ADMIN — BRIMONIDINE TARTRATE 1 DROP: 2 SOLUTION/ DROPS OPHTHALMIC at 09:00

## 2024-08-22 RX ADMIN — INSULIN ASPART 1 UNITS: 100 INJECTION, SOLUTION INTRAVENOUS; SUBCUTANEOUS at 09:01

## 2024-08-22 RX ADMIN — Medication 2 SPRAY: at 09:00

## 2024-08-22 RX ADMIN — OXYCODONE HYDROCHLORIDE 5 MG: 5 TABLET ORAL at 17:51

## 2024-08-22 RX ADMIN — INSULIN ASPART 1 UNITS: 100 INJECTION, SOLUTION INTRAVENOUS; SUBCUTANEOUS at 12:58

## 2024-08-22 RX ADMIN — ATORVASTATIN CALCIUM 10 MG: 10 TABLET, FILM COATED ORAL at 20:52

## 2024-08-22 RX ADMIN — CARBAMIDE PEROXIDE 6.5% 5 DROP: 6.5 LIQUID AURICULAR (OTIC) at 09:00

## 2024-08-22 RX ADMIN — OXYCODONE HYDROCHLORIDE 5 MG: 5 TABLET ORAL at 00:51

## 2024-08-22 RX ADMIN — URSODIOL 300 MG: 300 CAPSULE ORAL at 20:54

## 2024-08-22 RX ADMIN — ACYCLOVIR 800 MG: 800 TABLET ORAL at 20:52

## 2024-08-22 RX ADMIN — Medication 2 SPRAY: at 20:59

## 2024-08-22 RX ADMIN — METOPROLOL TARTRATE 25 MG: 25 TABLET, FILM COATED ORAL at 20:52

## 2024-08-22 RX ADMIN — MICAFUNGIN SODIUM 150 MG: 50 INJECTION, POWDER, LYOPHILIZED, FOR SOLUTION INTRAVENOUS at 08:57

## 2024-08-22 RX ADMIN — CALCIUM CARBONATE 600 MG (1,500 MG)-VITAMIN D3 400 UNIT TABLET 1 TABLET: at 08:59

## 2024-08-22 RX ADMIN — FLUTICASONE PROPIONATE 1 SPRAY: 50 SPRAY, METERED NASAL at 09:00

## 2024-08-22 RX ADMIN — MYCOPHENOLATE MOFETIL 1500 MG: 500 TABLET, FILM COATED ORAL at 20:53

## 2024-08-22 RX ADMIN — BRIMONIDINE TARTRATE 1 DROP: 2 SOLUTION/ DROPS OPHTHALMIC at 20:51

## 2024-08-22 RX ADMIN — MYCOPHENOLATE MOFETIL 1500 MG: 500 INJECTION, POWDER, LYOPHILIZED, FOR SOLUTION INTRAVENOUS at 08:57

## 2024-08-22 RX ADMIN — MAGNESIUM SULFATE HEPTAHYDRATE 2 G: 2 INJECTION, SOLUTION INTRAVENOUS at 05:23

## 2024-08-22 RX ADMIN — ALLOPURINOL 300 MG: 300 TABLET ORAL at 08:59

## 2024-08-22 RX ADMIN — DIPHENHYDRAMINE HYDROCHLORIDE AND LIDOCAINE HYDROCHLORIDE AND ALUMINUM HYDROXIDE AND MAGNESIUM HYDRO 10 ML: KIT at 00:49

## 2024-08-22 RX ADMIN — ACYCLOVIR 800 MG: 800 TABLET ORAL at 08:59

## 2024-08-22 RX ADMIN — PANTOPRAZOLE SODIUM 40 MG: 40 TABLET, DELAYED RELEASE ORAL at 08:59

## 2024-08-22 RX ADMIN — POTASSIUM CHLORIDE 20 MEQ: 750 TABLET, EXTENDED RELEASE ORAL at 17:52

## 2024-08-22 RX ADMIN — DIPHENHYDRAMINE HYDROCHLORIDE AND LIDOCAINE HYDROCHLORIDE AND ALUMINUM HYDROXIDE AND MAGNESIUM HYDRO 10 ML: KIT at 17:52

## 2024-08-22 RX ADMIN — POTASSIUM CHLORIDE 20 MEQ: 29.8 INJECTION, SOLUTION INTRAVENOUS at 05:25

## 2024-08-22 ASSESSMENT — ACTIVITIES OF DAILY LIVING (ADL)
ADLS_ACUITY_SCORE: 26
ADLS_ACUITY_SCORE: 26
ADLS_ACUITY_SCORE: 29
ADLS_ACUITY_SCORE: 26
ADLS_ACUITY_SCORE: 29
ADLS_ACUITY_SCORE: 26
ADLS_ACUITY_SCORE: 29
ADLS_ACUITY_SCORE: 26
ADLS_ACUITY_SCORE: 29
ADLS_ACUITY_SCORE: 26
ADLS_ACUITY_SCORE: 30
ADLS_ACUITY_SCORE: 26
ADLS_ACUITY_SCORE: 29
ADLS_ACUITY_SCORE: 29
ADLS_ACUITY_SCORE: 30
ADLS_ACUITY_SCORE: 26

## 2024-08-22 NOTE — PLAN OF CARE
Pt arrived to IS, ambulatory, for labs. Pt voices no complaints. Labs drawn with 23g butterfly needle in the L-AC. Pt left IS with no s/sx of distress. Follow up appointment confirmed.   Sore mouth and throat are improving with the increase in his WBC, magic mouthwash taken once.  Ate a full breakfast and lunch, blood sugars 157 and 175, sliding scale and carbohydrate coverage provided.   Steady on his feet today and calling for help out of bed appropriately, bed alarm turned off.  His daughter is here visiting.  More pills changed to oral.  Working towards discharge Sunday.       Problem: Adult Inpatient Plan of Care  Goal: Plan of Care Review  Description: The Plan of Care Review/Shift note should be completed every shift.  The Outcome Evaluation is a brief statement about your assessment that the patient is improving, declining, or no change.  This information will be displayed automatically on your shift  note.  Outcome: Progressing  Flowsheets (Taken 8/22/2024 1603)  Plan of Care Reviewed With: patient  Overall Patient Progress: improving     Problem: Stem Cell/Bone Marrow Transplant  Goal: Blood Counts Within Acceptable Range  Outcome: Progressing  Intervention: Monitor and Manage Hematologic Symptoms  Recent Flowsheet Documentation  Taken 8/22/2024 1200 by Mela Restrepo, RN  Bleeding Precautions:   blood pressure closely monitored   gentle oral care promoted  Medication Review/Management: medications reviewed  Taken 8/22/2024 0741 by Mela Restrepo, RN  Bleeding Precautions:   blood pressure closely monitored   gentle oral care promoted  Medication Review/Management: medications reviewed  Goal: Improved Oral Mucous Membrane Health and Integrity  Outcome: Progressing  Intervention: Promote Oral Comfort and Health  Recent Flowsheet Documentation  Taken 8/22/2024 0741 by Mela Restrepo, RN  Oral Care: oral rinse provided     Problem: Adult Inpatient Plan of Care  Goal: Readiness for Transition of Care  Outcome: Progressing   Goal Outcome Evaluation:      Plan of Care Reviewed With: patient    Overall Patient Progress: improvingOverall Patient Progress: improving

## 2024-08-22 NOTE — PROGRESS NOTES
"BMT Progress Note  Chief complaint:  Arpit Forrest is a 73 year old male, currently day+15 s/p ELIEZER MUD PBSCT for MDS.  INTERIM HISTORY:   Afebrile. Asx infectious symptoms, and work up unremarkable.  Mucositis pain is improving.  Eating better.  Formed stools, no N/V, no rashes.    REVIEW OF SYSTEMS: Otherwise unremarkable other than what is noted in the Interim History.   PHYSICAL EXAM:   Vitals:  /67 (BP Location: Right arm)   Pulse 83   Temp 98  F (36.7  C) (Oral)   Resp 18   Ht 1.825 m (5' 11.85\")   Wt 112.5 kg (248 lb)   SpO2 100%   BMI 33.78 kg/m      General: alert and cooperative, NAD. Face with maye appearance--stable per pt.  HEENT: NC/AT, sclera anicteric. Generalized erythema and sloughing, HEALING ulcers. Non tender with pinna mobility, no erythema, drainage in ear canal, Bilateral cerumen impactions, able to visualize TM on (8/21) no erythema, cone of light visible. No sinus, mastoid tenderness.   CV: RRR, pan systolic murmur (known)  Resp: CTAB  Abdomen: NT, ND, BS+  Skin: no rashes on limited exam  Psych: Mood and affect normal  Vascular: Right CVC   LABS:  Lab Results   Component Value Date    WBC 5.1 08/22/2024    ANEU 4.1 08/22/2024    HGB 8.3 (L) 08/22/2024    HCT 25.6 (L) 08/22/2024    PLT 59 (L) 08/22/2024     08/22/2024    POTASSIUM 3.7 08/22/2024    CHLORIDE 104 08/22/2024    CO2 23 08/22/2024     (H) 08/22/2024    BUN 11.3 08/22/2024    CR 0.87 08/22/2024    MAG 2.0 08/22/2024    INR 1.17 (H) 08/19/2024    BILITOTAL 0.2 08/22/2024    AST 16 08/22/2024    ALT 16 08/22/2024    ALKPHOS 63 08/22/2024    PROTTOTAL 5.8 (L) 08/22/2024    ALBUMIN 3.1 (L) 08/22/2024        ASSESSMENT AND PLAN: Arpit Forrest is a 73 year old male, currently day +15 s/p ELIEZER MUD PBSCT for MDS.    BMT/IEC PROTOCOL for MDS  - Chemo protocol: [id number]; CY/flu/TBI  - Peripheral blood stem cell graft from 8/8 donor, ABO matched (Oneg), Cell dose: TBD  - continue allopurinol (hx of gout)  - " Engraftment monitoring: Peripheral blood and bone marrow chimerisms on D28, D100, 6 months, 1 and 2 year  - Restaging plan: BMBx with NGS on D28, D100, 6 months, 1 and 2 years    HEME/COAG  - GCSF started day +5, continue until ANC > 1500 for 3 consecutive days.   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    - Siro level (8/21) = 7.6 bolus given     ID  #N/F (8/20) Cefepime stopped 8/22 with negative cultures, likely 2/2 engraftment  #Neutropenic fever- T cell expansion vs. Infection vs. Other - Cefepime (8/11-8/14), work up unremarkable. No recurrent fevers so far after stopping cefepime 8/14.   Recent Hx: Multiple granulomas in the lung and bone marrow biopsy- fungitell, aspergillus galactomanan neg. 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 Switch to IV meds.  #Frequent stools- c.diff negative (8/11). Imodium PRN.  # Hepatic steatosis and boderline iron deposition in liver  - LFTs and synthetic function normal on admission; Check LFTs qM/Th  - VOD prophy: Ursodiol 300mg TID    # Supportive   - Anti-emetics:  per protocol   - Ulcer prophy: PPI daily   - Dietician support to prevent malnutrition.     CARDIOVASCULAR  #Possible syncopal episode 8/19 in setting of decreased PO intake -1L bolus  #Holosystolic murmur- Moderate aortic stenosis seen on ECHO  #A.fib with RVR -  resolved    -repeat ECHO pending 8/21 grade 1 diastolic dysfunction. Discussed with CARDS as this is new finding. Reviewed ECHO, noted to be a normal finding with aging, no further follow up or work up is necessary.   -8/20 afib with RVR this afternoon rates 120-150, hemodynamically stable. Asymptomatic.     *Metoprolol x1 5mg IV , PO metoprolol BID 25mg, now in NSR  A.fib with RVR while admitted with febrile episode on 3/15/24. Noted on his fitbit, not very symptomatic. Cardioversion planned for new onset A.fib. Not achieved with amiodarone, DCCV on 3/21/24 restored normal sinus rhythm. Amiodarone and anticoagulation was stopped in April 2024 without any recurrence.   He could have a recurrence during transplant which would need to be addressed with rate control strategy   # Asymptomatic moderate aortic stenosis   # CAD  Chest CT in February 2024 with mild to moderate CAC, three-vessel disease. No anginal symptoms   Continue atorvastatin 10mg  EKG Sinus rhythm Inferior infarct , age undetermined. Abnormal ECG (known cardiovascular disease). QTc =460  # HTN- Continue amlodipine 10mg, added lisinopril 5mg 8/4 (PTA dose of lisinopril was 10mg)   - Risk of cardiomyopathy:  Baseline EF normal 60-65&  - Risk of arrhythmia: Baseline EKG showed NSR, Qtc 460     RESPIRATORY  # VINCENT : CPAP  - Baseline PFTs: normal   - Risk of respiratory complications: Frequent ambulation and incentive spirometer.     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 Weight baseline 8/19  -baseline weight 8/14, and held off on additional lasix since  # Electrolyte management: replace per sliding scale  - cont Ca w/vit  "D  - hold eye vitamin and Coq10 peritransplant     DIABETES/ENDOCRINE  #DMII: hx steroid-induced hyperglycemia: Carb coverage and Novolog sliding scale. Endo initially consulted, signed off with stable sugars.   *held PTA metformin on admission.      MUSCULOSKELETAL/FRAILTY  # Gout: Continue allopurinol indefinitely   - Baseline Frailty Score: Not frail   - Daily PT/OT as needed while inpatient     SOCIAL DETERMINANTS  - Caregiver: Wife, Meghna, children   Medically Ready for Discharge: Anticipated in 5+ Days Sunday 8/25    Clinically Significant Risk Factors              # Hypoalbuminemia: Lowest albumin = 3.1 g/dL at 8/22/2024  4:08 AM, will monitor as appropriate   # Thrombocytopenia: Lowest platelets = 40 in last 2 days, will monitor for bleeding   # Hypertension: Noted on problem list            # Obesity: Estimated body mass index is 33.78 kg/m  as calculated from the following:    Height as of this encounter: 1.825 m (5' 11.85\").    Weight as of this encounter: 112.5 kg (248 lb).             I spent 30 minutes in the care of this patient today, which included time necessary for preparation for the visit, obtaining history, ordering medications/tests/procedures as medically indicated, review of pertinent medical literature, counseling of the patient, communication of recommendations to the care team, and documentation time.    ELLE Manuel    "

## 2024-08-22 NOTE — CONSULTS
Cardiology Inpatient Consultation  August 22, 2024    Reason for Consult: appreciate recs for optimizing medical management, further work up for new decreased EF, evidence of diastolic dysfuntion and hypokinesis in BMT who has recieved cardiotoxic chemo therapy.     HPI:   Arpit Forrest is a 73 year old male with past medical history significant for MDS now status post stem cell transplant day 15 currently admitted to the BMT unit.  Cardiology consulted due to concern for wall motion abnormalities noted on echo.  Echo images from yesterday and prior study reviewed, normal biventricular function with no regional wall motion abnormalities, the echo report now reflects this.  CT chest from July showing mild/moderate coronary calcification, this was discussed with patient at his clinic visit in July, however decision for an angiogram was deferred since patient was not a candidate for DAPT at that time due to severe thrombocytopenia that was expected to get worse after stem cell transplant.  Of note patient did receive Cytoxan as chemotherapy prior to stem cell transplant, has also received decitabine in the past.   He had 1 episode of atrial fibrillation in March 2024 in the setting of neutropenic fever, for which she was cardioverted and has not had any recurrence since.  Did have 1 episode of A-fib 8/21 and converted to sinus rhythm spontaneously.  At the time of interview, the patient denies chest pain, dyspnea at rest or with exertion, orthopnea, PND, palpitations, lightheadedness, or syncope.     Review of Systems:    Complete review of systems was performed and negative except per HPI.    PMH:  History reviewed. No pertinent past medical history.  Active Problems:  Patient Active Problem List    Diagnosis Date Noted    MDS (myelodysplastic syndrome) (H) 07/31/2024     Priority: Medium    Glaucoma 07/18/2024     Priority: Medium    H/O: gout 06/07/2024     Priority: Medium    Myelodysplastic syndrome (H)  2024     Priority: Medium    Autoinflammatory syndrome (H) 2024     Priority: Medium    Obesity (BMI 30-39.9) 2024     Priority: Medium    Bilateral sacroiliitis (H24) 2023     Priority: Medium    History of agent Orange exposure 2023     Priority: Medium    History of kidney donation 2023     Priority: Medium    Type 2 diabetes mellitus without complication, without long-term current use of insulin (H) 10/16/2023     Priority: Medium    Ankylosing spondylitis lumbar region (H) 10/10/2022     Priority: Medium    Macdonald's esophagus without dysplasia 10/10/2022     Priority: Medium    Essential hypertension 10/10/2022     Priority: Medium    Hyperlipidemia 10/10/2022     Priority: Medium    Obstructive sleep apnea syndrome 10/10/2022     Priority: Medium     Social History:  Social History     Tobacco Use    Smoking status: Former     Current packs/day: 0.00     Average packs/day: 0.5 packs/day for 4.0 years (2.0 ttl pk-yrs)     Types: Cigarettes     Start date:      Quit date: 1970     Years since quittin.6    Smokeless tobacco: Never   Vaping Use    Vaping status: Never Used   Substance Use Topics    Alcohol use: Not Currently     Comment: 22 Quit 1 year ago    Drug use: Never     Family History:  History reviewed. No pertinent family history.    Medications:  Current Facility-Administered Medications   Medication Dose Route Frequency Provider Last Rate Last Admin    acyclovir (ZOVIRAX) tablet 800 mg  800 mg Oral BID Destinee Sanches PA-C   800 mg at 24 0859    allopurinol (ZYLOPRIM) tablet 300 mg  300 mg Oral Daily Yi Martinez PA-C   300 mg at 24 0859    amLODIPine (NORVASC) tablet 10 mg  10 mg Oral QAM Yi Martinez PA-C   10 mg at 24 0859    artificial saliva (BIOTENE MT) solution 2 spray  2 spray Swish & Spit 4x Daily Destinee Sanches PA-C   2 spray at 24 1146    atorvastatin (LIPITOR) tablet 10 mg  10 mg Oral QPM Yi Martinez  ELLE VILLALOBOS   10 mg at 08/21/24 2053    brimonidine (ALPHAGAN) 0.2 % ophthalmic solution 1 drop  1 drop Both Eyes BID Yi Martinez PA-C   1 drop at 08/22/24 0900    calcium carbonate-vitamin D (CALTRATE) 600-10 MG-MCG per tablet 1 tablet  1 tablet Oral Daily Maria Alejandra Fisher PA-C   1 tablet at 08/22/24 0859    carbamide peroxide (DEBROX) 6.5 % otic solution 5 drop  5 drop Both Ears BID Destinee Sanches PA-C   5 drop at 08/22/24 0900    fluticasone (FLONASE) 50 MCG/ACT spray 1 spray  1 spray Both Nostrils BID Yi Martinez PA-C   1 spray at 08/22/24 0900    heparin lock flush 10 unit/mL injection 5-20 mL  5-20 mL Intracatheter Q24H Josh Vaca MD   5 mL at 08/20/24 0401    insulin aspart (NovoLOG) injection (RAPID ACTING)   Subcutaneous TID w/meals Susan Yan PA-C   5 Units at 08/22/24 1258    insulin aspart (NovoLOG) injection (RAPID ACTING)  1-7 Units Subcutaneous TID AC Susan Yan PA-C   1 Units at 08/22/24 1258    insulin aspart (NovoLOG) injection (RAPID ACTING)  1-5 Units Subcutaneous At Bedtime Susan Yan PA-C   1 Units at 08/19/24 2130    latanoprost (XALATAN) 0.005 % ophthalmic solution 2 drop  2 drop Both Eyes At Bedtime Yi Martinez PA-C   2 drop at 08/21/24 2216    lisinopril (ZESTRIL) tablet 5 mg  5 mg Oral Daily Yi Martinez PA-C   5 mg at 08/22/24 0858    loratadine (CLARITIN) tablet 10 mg  10 mg Oral Daily Destinee Sanches PA-C   10 mg at 08/22/24 0859    metoprolol tartrate (LOPRESSOR) tablet 25 mg  25 mg Oral BID Destinee Sanches PA-C   25 mg at 08/22/24 0859    micafungin (MYCAMINE) 150 mg in sodium chloride 0.9 % 100 mL intermittent infusion  150 mg Intravenous Daily Nicole, Krishan,  mL/hr at 08/22/24 0857 150 mg at 08/22/24 0857    mycophenolate (GENERIC EQUIVALENT) tablet 1,500 mg  1,500 mg Oral BID IS Destinee Sanches PA-C        pantoprazole (PROTONIX) EC tablet 40 mg  40 mg Oral QAM AC Destinee Sanches PA-C   40 mg at 08/22/24 0859    psyllium  "(METAMUCIL/KONSYL) capsule 3 capsule  3 capsule Oral Daily Destinee Sanches PA-C   3 capsule at 08/22/24 0858    sirolimus (GENERIC EQUIVALENT) tablet 4 mg  4 mg Oral Daily Maria Alejandra Fisher PA-C   4 mg at 08/22/24 0858    [START ON 9/9/2024] sulfamethoxazole-trimethoprim (BACTRIM DS) 800-160 MG per tablet 1 tablet  1 tablet Oral Q Mon Tues BID Yi Martinez PA-C        tamsulosin (FLOMAX) capsule 0.4 mg  0.4 mg Oral Daily Destinee Sanches PA-C   0.4 mg at 08/22/24 0859    ursodiol (ACTIGALL) suspension 300 mg  300 mg Oral TID Destinee Sanches PA-C   300 mg at 08/22/24 1445       Current Facility-Administered Medications   Medication Dose Route Frequency Provider Last Rate Last Admin       Physical Exam:  Temp:  [97.7  F (36.5  C)-99.4  F (37.4  C)] 98  F (36.7  C)  Pulse:  [83-87] 83  Resp:  [16-18] 18  BP: (123-151)/(67-78) 131/67  SpO2:  [96 %-100 %] 100 %    Intake/Output Summary (Last 24 hours) at 8/22/2024 1448  Last data filed at 8/22/2024 1300  Gross per 24 hour   Intake 1986 ml   Output 1500 ml   Net 486 ml     General: Well-nourished, well-developed, NAD   Chest: Normal work of breathing. clear to ausculation.   Cardio: Rhythm is regular.  S1 normal, S2. Murmurs are not present, JVP not elevated.  Abdomen: without tenderness, rebound, guarding, masses, ascites  Extremities: Edema not present  Neuro: CN II-XII grossly intact. Alert and oriented x 4.   Diagnostics:  All laboratory and imaging data in the past 24 hours reviewed.    No results found for: \"TROPI\", \"TROPONIN\", \"TROPR\", \"TROPN\"    EKG: Normal sinus rhythm, no ST-T changes    Transthoracic echocardiogram:   Global and regional left ventricular function is normal. Biplane LVEF is 59%.  Global right ventricular function is normal.  Moderate aortic stenosis. Vmax 3.4 m/s, MG 29 mmHg.  The inferior vena cava was normal in size with preserved respiratory  variability.     Compared to the prior study on 7/22/2024, there has been no " significant  change.    Coronary Angiogram: None prior    Assessment and Recommendation:  Arpit Forrest is a 73 year old male with past medical history significant for MDS now status post stem cell transplant day 15 currently admitted to the BMT unit.  Cardiology consulted due to concern for wall motion abnormalities noted on echo.  Echo images from yesterday and prior study reviewed, normal biventricular function with no regional wall motion abnormalities, unchanged from prior echo in July, the echo report now reflects this.    # Coronary artery calcification on CT  # Paroxysmal A-fib, now in sinus    -No further inpatient cardiology workup recommended.  -Can start aspirin 81 mg if okay from hematology perspective.  Can also start Lipitor 40 mg nightly.  -Outpatient cardiology follow-up to discuss possible options for ischemic evaluation.  -Currently not on anticoagulation for A-fib due to thrombocytopenia, AMY?DS?-VASc 4    I have seen and discussed the patient with the staff attending, Dr. Patino who agrees with the above.  Cardiology will sign off.    Thank you for consulting the cardiovascular services at the [facility]. Please do not hesitate to call us with any questions.

## 2024-08-22 NOTE — PLAN OF CARE
Goal Outcome Evaluation:      Plan of Care Reviewed With: patient, spouse    Overall Patient Progress: no changeOverall Patient Progress: no change    Temp max 99.4 ax.  Complained of mucositis and rectal pain.  Oxycodone and MMW given x 1 with adequate decrease in pain.  Also gave pt lotion cleansers and soft wipes + barrier cream + tucks for hemorrhoids.  Complained of ears being plugged - has scheduled debrox.  Is steady on his feet, but a little weak.  Is a SBA when he calls.  Refuses bed alarm.  CPAP machine not working; applied oxygen at 2 liters at HS.  Also, applied external cath at HS.  Extra dose of sirolimus given (level was low).

## 2024-08-22 NOTE — PROGRESS NOTES
CLINICAL NUTRITION SERVICES - REASSESSMENT NOTE     Nutrition Prescription    RECOMMENDATIONS FOR MDs/PROVIDERS TO ORDER:  None at this time     Malnutrition Status:    Patient does not meet two of the established criteria necessary for diagnosing malnutrition    Recommendations already ordered by Registered Dietitian (RD):  PRN snacks/supplements  Ensure Max once daily     Future/Additional Recommendations:  Continue to monitor nutrition-related findings and follow pt per protocol     EVALUATION OF THE PROGRESS TOWARD GOALS   Diet: High Kcal/High Protein  Supplements: Ensure Max 2pm +additional ONS PRN   PO Intake: Eating 100%, 3 meals/day      NEW FINDINGS   Pt reports good appetite and intakes, meals TID, mostly [facility] food d/t not liking [facility] menu/food.     Weights:  Most Recent Weight: 112.5 kg (248 lb)  on 8/22/24 via Standing scale  Body mass index is 33.78 kg/m .  Wt down 3.1 kg from admission. (2.6%). Down 9 L since 8/8 per I/O.     GI:  0-4 unmeasured BMs per day over past week, per I/O.   Last BM: 08/22/24, x3 occurrences today thus far     Medications:  Acyclovir, allopurinol, calcium carbonate (600 mg) - Vit D (10 mcg), sliding scale insulin, micafungin, mycophenolate, protonix, psyllium, sirolimus, ursodiol    Labs:   Electrolytes  Potassium (mmol/L)   Date Value   08/22/2024 3.4   08/21/2024 3.5   08/21/2024 3.4     Phosphorus (mg/dL)   Date Value   08/22/2024 3.7   08/21/2024 2.9   08/20/2024 3.0   08/19/2024 2.9   08/18/2024 3.0    Blood Glucose  Glucose (mg/dL)   Date Value   08/22/2024 134 (H)   08/21/2024 148 (H)     GLUCOSE BY METER POCT (mg/dL)   Date Value   08/22/2024 157 (H)   08/21/2024 146 (H)   08/21/2024 145 (H)   08/21/2024 163 (H)   08/21/2024 164 (H)     Hemoglobin A1C (%)   Date Value   07/31/2024 5.0    Inflammatory Markers  WBC Count (10e3/uL)   Date Value   08/22/2024 5.1   08/21/2024 1.4 (L)   08/20/2024 0.2 (LL)     Albumin (g/dL)   Date Value   08/22/2024 3.1 (L)   08/19/2024 3.6    08/15/2024 3.7      Magnesium (mg/dL)   Date Value   08/22/2024 2.0   08/21/2024 1.8   08/20/2024 1.8     Sodium (mmol/L)   Date Value   08/22/2024 137   08/21/2024 137   08/20/2024 138    Renal  Urea Nitrogen (mg/dL)   Date Value   08/22/2024 11.3   08/21/2024 11.4   08/20/2024 13.4     Creatinine (mg/dL)   Date Value   08/22/2024 0.87   08/21/2024 0.93   08/20/2024 0.93     Additional  Triglycerides (mg/dL)   Date Value   07/22/2024 293 (H)     Ketones Urine (mg/dL)   Date Value   08/21/2024 Trace (A)     Platelet Count (10e3/uL)   Date Value   08/22/2024 59 (L)     aPTT (Seconds)   Date Value   07/31/2024 34     INR (no units)   Date Value   08/19/2024 1.17 (H)        RENAL  GFR >90    RESPIRATORY  Room air     SKIN  No pressure injuries documented at this time     MALNUTRITION  % Intake: No decreased intake noted  % Weight Loss: Weight loss does not meet criteria for malnutrition  -- cannot rule out confounded by fluid status  Subcutaneous Fat Loss: None observed   Muscle Loss: None observed  Fluid Accumulation/Edema: Does not meet criteria  Malnutrition Diagnosis: Patient does not meet two of the established criteria necessary for diagnosing malnutrition    Previous Goals   Patient to consume % of nutritionally adequate meal trays TID, or the equivalent with supplements/snacks.   Evaluation: Met    Previous Nutrition Diagnosis  Predicted inadequate nutrient intake (kcal/protein) related to recent BMT with potential for side effects to affect ability to take adequate PO.   Evaluation: No change    CURRENT NUTRITION DIAGNOSIS  Predicted inadequate nutrient intake (kcal/protein) related to recent BMT with potential for side effects to affect ability to take adequate PO.     INTERVENTIONS  Implementation  Medical food supplement therapy- continue current orders     Goals  Patient to consume % of nutritionally adequate meal trays TID, or the equivalent with  supplements/snacks.    Monitoring/Evaluation  Progress toward goals will be monitored and evaluated per protocol.    [place], MPH, RDN, LD  5C (BMT) OCTAVIA Ramirez [5C Clinical Dietitian]   Weekend/Holiday: James - Weekend Clinical Dietitian

## 2024-08-22 NOTE — PLAN OF CARE
"Afebrile, hypertensive (150s/70s), OVSS. PT reporting pain in mouth, MMW given x2 and oxycodone given x1 with relief. Pt denies nausea and vomiting. Potassium bag 2 of 2 infusing. Magnesium infused Pt agreeable to bed alarm over night, using external male catheter. No BMs overnight. Pt resting between cares.       Problem: Adult Inpatient Plan of Care  Goal: Plan of Care Review  Description: The Plan of Care Review/Shift note should be completed every shift.  The Outcome Evaluation is a brief statement about your assessment that the patient is improving, declining, or no change.  This information will be displayed automatically on your shift  note.  Outcome: Progressing  Goal: Patient-Specific Goal (Individualized)  Description: You can add care plan individualizations to a care plan. Examples of Individualization might be:  \"Parent requests to be called daily at 9am for status\", \"I have a hard time hearing out of my right ear\", or \"Do not touch me to wake me up as it startles  me\".  Outcome: Progressing  Goal: Absence of Hospital-Acquired Illness or Injury  Outcome: Progressing  Intervention: Identify and Manage Fall Risk  Recent Flowsheet Documentation  Taken 8/22/2024 0220 by Marika Jaen RN  Safety Promotion/Fall Prevention: safety round/check completed  Taken 8/22/2024 0051 by Marika Jean RN  Safety Promotion/Fall Prevention:   safety round/check completed   activity supervised   assistive device/personal items within reach   nonskid shoes/slippers when out of bed   room organization consistent  Intervention: Prevent Skin Injury  Recent Flowsheet Documentation  Taken 8/22/2024 0051 by Marika Jean RN  Body Position: position changed independently  Intervention: Prevent and Manage VTE (Venous Thromboembolism) Risk  Recent Flowsheet Documentation  Taken 8/22/2024 0051 by Marika Jean RN  VTE Prevention/Management: SCDs off (sequential compression devices)  Intervention: Prevent " Infection  Recent Flowsheet Documentation  Taken 8/22/2024 0051 by Marika Jean RN  Infection Prevention:   cohorting utilized   environmental surveillance performed   equipment surfaces disinfected   hand hygiene promoted   personal protective equipment utilized   rest/sleep promoted   single patient room provided  Goal: Optimal Comfort and Wellbeing  Outcome: Progressing  Intervention: Monitor Pain and Promote Comfort  Recent Flowsheet Documentation  Taken 8/22/2024 0051 by Marika Jean RN  Pain Management Interventions: medication (see MAR)  Intervention: Provide Person-Centered Care  Recent Flowsheet Documentation  Taken 8/22/2024 0051 by Marika Jean RN  Trust Relationship/Rapport:   care explained   choices provided   empathic listening provided   questions answered   reassurance provided  Goal: Readiness for Transition of Care  Outcome: Progressing     Problem: Risk for Delirium  Goal: Optimal Coping  Outcome: Progressing  Goal: Improved Sleep  Outcome: Progressing     Problem: Stem Cell/Bone Marrow Transplant  Goal: Optimal Coping with Transplant  Outcome: Progressing  Goal: Symptom-Free Urinary Elimination  Outcome: Progressing  Intervention: Prevent or Manage Bladder Irritation  Recent Flowsheet Documentation  Taken 8/22/2024 0051 by Marika Jean RN  Pain Management Interventions: medication (see MAR)  Goal: Diarrhea Symptom Control  Outcome: Progressing  Goal: Improved Activity Tolerance  Outcome: Progressing  Goal: Blood Counts Within Acceptable Range  Outcome: Progressing  Intervention: Monitor and Manage Hematologic Symptoms  Recent Flowsheet Documentation  Taken 8/22/2024 0051 by Marika Jean RN  Bleeding Precautions:   blood pressure closely monitored   coagulation study results reviewed   foot protection facilitated   gentle oral care promoted   monitored for signs of bleeding  Medication Review/Management:   medications reviewed   high-risk medications  identified  Goal: Absence of Hypersensitivity Reaction  Outcome: Progressing  Goal: Absence of Infection  Outcome: Progressing  Intervention: Prevent and Manage Infection  Recent Flowsheet Documentation  Taken 8/22/2024 0051 by Marika Jean RN  Infection Prevention:   cohorting utilized   environmental surveillance performed   equipment surfaces disinfected   hand hygiene promoted   personal protective equipment utilized   rest/sleep promoted   single patient room provided  Infection Management: aseptic technique maintained  Isolation Precautions: protective environment maintained  Goal: Improved Oral Mucous Membrane Health and Integrity  Outcome: Progressing  Goal: Nausea and Vomiting Symptom Relief  Outcome: Progressing  Intervention: Prevent and Manage Nausea and Vomiting  Recent Flowsheet Documentation  Taken 8/22/2024 0051 by Marika Jean RN  Nausea/Vomiting Interventions: (pt denies) other (see comments)     Problem: Infection  Goal: Absence of Infection Signs and Symptoms  Outcome: Progressing  Intervention: Prevent or Manage Infection  Recent Flowsheet Documentation  Taken 8/22/2024 0051 by Marika Jean RN  Infection Management: aseptic technique maintained  Isolation Precautions: protective environment maintained     Problem: Comorbidity Management  Goal: Blood Glucose Levels Within Targeted Range  Outcome: Progressing  Intervention: Monitor and Manage Glycemia  Recent Flowsheet Documentation  Taken 8/22/2024 0051 by Marika Jean RN  Medication Review/Management:   medications reviewed   high-risk medications identified     Problem: Obstructive Sleep Apnea Risk or Actual  Goal: Unobstructed Breathing During Sleep  Outcome: Progressing  Intervention: Monitor and Manage Obstructive Sleep Apnea  Recent Flowsheet Documentation  Taken 8/22/2024 0051 by Marika Jean RN  Medication Review/Management:   medications reviewed   high-risk medications identified     Problem: Adult  "Inpatient Plan of Care  Goal: Plan of Care Review  Description: The Plan of Care Review/Shift note should be completed every shift.  The Outcome Evaluation is a brief statement about your assessment that the patient is improving, declining, or no change.  This information will be displayed automatically on your shift  note.  Outcome: Progressing  Goal: Patient-Specific Goal (Individualized)  Description: You can add care plan individualizations to a care plan. Examples of Individualization might be:  \"Parent requests to be called daily at 9am for status\", \"I have a hard time hearing out of my right ear\", or \"Do not touch me to wake me up as it startles  me\".  Outcome: Progressing  Goal: Absence of Hospital-Acquired Illness or Injury  Outcome: Progressing  Intervention: Identify and Manage Fall Risk  Recent Flowsheet Documentation  Taken 8/22/2024 0220 by Marika Jean RN  Safety Promotion/Fall Prevention: safety round/check completed  Taken 8/22/2024 0051 by Marika Jean RN  Safety Promotion/Fall Prevention:   safety round/check completed   activity supervised   assistive device/personal items within reach   nonskid shoes/slippers when out of bed   room organization consistent  Intervention: Prevent Skin Injury  Recent Flowsheet Documentation  Taken 8/22/2024 0051 by Marika Jean RN  Body Position: position changed independently  Intervention: Prevent and Manage VTE (Venous Thromboembolism) Risk  Recent Flowsheet Documentation  Taken 8/22/2024 0051 by Marika Jean RN  VTE Prevention/Management: SCDs off (sequential compression devices)  Intervention: Prevent Infection  Recent Flowsheet Documentation  Taken 8/22/2024 0051 by Marika Jean RN  Infection Prevention:   cohorting utilized   environmental surveillance performed   equipment surfaces disinfected   hand hygiene promoted   personal protective equipment utilized   rest/sleep promoted   single patient room provided  Goal: Optimal " Comfort and Wellbeing  Outcome: Progressing  Intervention: Monitor Pain and Promote Comfort  Recent Flowsheet Documentation  Taken 8/22/2024 0051 by Marika Jean RN  Pain Management Interventions: medication (see MAR)  Intervention: Provide Person-Centered Care  Recent Flowsheet Documentation  Taken 8/22/2024 0051 by Marika Jean RN  Trust Relationship/Rapport:   care explained   choices provided   empathic listening provided   questions answered   reassurance provided  Goal: Readiness for Transition of Care  Outcome: Progressing     Problem: Pain Acute  Goal: Optimal Pain Control and Function  Outcome: Progressing  Intervention: Develop Pain Management Plan  Recent Flowsheet Documentation  Taken 8/22/2024 0051 by Marika Jean RN  Pain Management Interventions: medication (see MAR)  Intervention: Prevent or Manage Pain  Recent Flowsheet Documentation  Taken 8/22/2024 0051 by Marika Jean RN  Medication Review/Management:   medications reviewed   high-risk medications identified     Problem: Stem Cell/Bone Marrow Transplant  Goal: Optimal Coping with Transplant  Outcome: Progressing  Goal: Symptom-Free Urinary Elimination  Outcome: Progressing  Intervention: Prevent or Manage Bladder Irritation  Recent Flowsheet Documentation  Taken 8/22/2024 0051 by Marika Jean RN  Pain Management Interventions: medication (see MAR)  Goal: Diarrhea Symptom Control  Outcome: Progressing  Goal: Improved Activity Tolerance  Outcome: Progressing  Goal: Blood Counts Within Acceptable Range  Outcome: Progressing  Intervention: Monitor and Manage Hematologic Symptoms  Recent Flowsheet Documentation  Taken 8/22/2024 0051 by Marika Jean RN  Bleeding Precautions:   blood pressure closely monitored   coagulation study results reviewed   foot protection facilitated   gentle oral care promoted   monitored for signs of bleeding  Medication Review/Management:   medications reviewed   high-risk medications  identified  Goal: Absence of Hypersensitivity Reaction  Outcome: Progressing  Goal: Absence of Infection  Outcome: Progressing  Intervention: Prevent and Manage Infection  Recent Flowsheet Documentation  Taken 8/22/2024 0051 by Marika Jean RN  Infection Prevention:   cohorting utilized   environmental surveillance performed   equipment surfaces disinfected   hand hygiene promoted   personal protective equipment utilized   rest/sleep promoted   single patient room provided  Infection Management: aseptic technique maintained  Isolation Precautions: protective environment maintained  Goal: Improved Oral Mucous Membrane Health and Integrity  Outcome: Progressing  Goal: Nausea and Vomiting Symptom Relief  Outcome: Progressing  Intervention: Prevent and Manage Nausea and Vomiting  Recent Flowsheet Documentation  Taken 8/22/2024 0051 by Marika Jean, RN  Nausea/Vomiting Interventions: (pt denies) other (see comments)  Goal: Optimal Nutrition Intake  Outcome: Progressing

## 2024-08-22 NOTE — PROGRESS NOTES
Care Management Discharge Note    Discharge Date: 08/25/2024       Discharge Disposition: Home    Discharge Services: None    Discharge DME: None    Discharge Transportation: family or friend will provide    Private pay costs discussed: Not applicable    Does the patient's insurance plan have a 3 day qualifying hospital stay waiver?  No    PAS Confirmation Code: [id number]  Patient/family educated on Medicare website which has current facility and service quality ratings: yes    Education Provided on the Discharge Plan: Yes  Persons Notified of Discharge Plans: Pt  Patient/Family in Agreement with the Plan: yes    Handoff Referral Completed: No    Additional Information:  Pt is a 73 year old male, currently day +15 s/p allo BMT. Plan is for discharge Sunday 8/25/24. The pt shared that he is doing great, but was at rock bottom a few weeks ago with how sick he was. He shared that he did not expect to get as sick as he did and that was a bit shocking, although notes he was told, just didn't believe it. He discussed how appreciative of all the care and support he got has been.     The pt also discussed that he is still working through his [place] with the [facility]. He was originally denied, but appealed this and has a court hearing in early Oct to speak with the . He is hopeful things will get approved as he meets all the criteria listed for approval.     CSW reviewed the discharge folder with the pt.  He will need an IMM prior to discharge.    SHUN Valdez, [facility]  Phone [phone]  [employer]- BMT  3

## 2024-08-23 ENCOUNTER — APPOINTMENT (OUTPATIENT)
Dept: PHYSICAL THERAPY | Facility: CLINIC | Age: 74
DRG: 014 | End: 2024-08-23
Attending: INTERNAL MEDICINE
Payer: MEDICARE

## 2024-08-23 LAB
ANION GAP SERPL CALCULATED.3IONS-SCNC: 11 MMOL/L (ref 7–15)
BASOPHILS # BLD AUTO: ABNORMAL 10*3/UL
BASOPHILS # BLD MANUAL: 0 10E3/UL (ref 0–0.2)
BASOPHILS NFR BLD AUTO: ABNORMAL %
BASOPHILS NFR BLD MANUAL: 0 %
BUN SERPL-MCNC: 9.6 MG/DL (ref 8–23)
CALCIUM SERPL-MCNC: 8.8 MG/DL (ref 8.8–10.4)
CHLORIDE SERPL-SCNC: 102 MMOL/L (ref 98–107)
CREAT SERPL-MCNC: 0.88 MG/DL (ref 0.67–1.17)
EGFRCR SERPLBLD CKD-EPI 2021: 90 ML/MIN/1.73M2
EOSINOPHIL # BLD AUTO: ABNORMAL 10*3/UL
EOSINOPHIL # BLD MANUAL: 0 10E3/UL (ref 0–0.7)
EOSINOPHIL NFR BLD AUTO: ABNORMAL %
EOSINOPHIL NFR BLD MANUAL: 0 %
ERYTHROCYTE [DISTWIDTH] IN BLOOD BY AUTOMATED COUNT: 14.6 % (ref 10–15)
GLUCOSE BLDC GLUCOMTR-MCNC: 119 MG/DL (ref 70–99)
GLUCOSE BLDC GLUCOMTR-MCNC: 155 MG/DL (ref 70–99)
GLUCOSE BLDC GLUCOMTR-MCNC: 156 MG/DL (ref 70–99)
GLUCOSE BLDC GLUCOMTR-MCNC: 169 MG/DL (ref 70–99)
GLUCOSE SERPL-MCNC: 152 MG/DL (ref 70–99)
HCO3 SERPL-SCNC: 23 MMOL/L (ref 22–29)
HCT VFR BLD AUTO: 26.3 % (ref 40–53)
HGB BLD-MCNC: 8.6 G/DL (ref 13.3–17.7)
IMM GRANULOCYTES # BLD: ABNORMAL 10*3/UL
IMM GRANULOCYTES NFR BLD: ABNORMAL %
LYMPHOCYTES # BLD AUTO: ABNORMAL 10*3/UL
LYMPHOCYTES # BLD MANUAL: 0.1 10E3/UL (ref 0.8–5.3)
LYMPHOCYTES NFR BLD AUTO: ABNORMAL %
LYMPHOCYTES NFR BLD MANUAL: 1 %
MAGNESIUM SERPL-MCNC: 2.1 MG/DL (ref 1.7–2.3)
MCH RBC QN AUTO: 29.7 PG (ref 26.5–33)
MCHC RBC AUTO-ENTMCNC: 32.7 G/DL (ref 31.5–36.5)
MCV RBC AUTO: 91 FL (ref 78–100)
MONOCYTES # BLD AUTO: ABNORMAL 10*3/UL
MONOCYTES # BLD MANUAL: 0.6 10E3/UL (ref 0–1.3)
MONOCYTES NFR BLD AUTO: ABNORMAL %
MONOCYTES NFR BLD MANUAL: 8 %
NEUTROPHILS # BLD AUTO: ABNORMAL 10*3/UL
NEUTROPHILS # BLD MANUAL: 6.9 10E3/UL (ref 1.6–8.3)
NEUTROPHILS NFR BLD AUTO: ABNORMAL %
NEUTROPHILS NFR BLD MANUAL: 91 %
NRBC # BLD AUTO: 0 10E3/UL
NRBC BLD AUTO-RTO: 0 /100
PHOSPHATE SERPL-MCNC: 3.5 MG/DL (ref 2.5–4.5)
PLAT MORPH BLD: ABNORMAL
PLATELET # BLD AUTO: 66 10E3/UL (ref 150–450)
POTASSIUM SERPL-SCNC: 3.4 MMOL/L (ref 3.4–5.3)
POTASSIUM SERPL-SCNC: 3.5 MMOL/L (ref 3.4–5.3)
RBC # BLD AUTO: 2.9 10E6/UL (ref 4.4–5.9)
RBC MORPH BLD: ABNORMAL
SIROLIMUS BLD-MCNC: 8.5 UG/L (ref 5–15)
SODIUM SERPL-SCNC: 136 MMOL/L (ref 135–145)
TME LAST DOSE: NORMAL H
TME LAST DOSE: NORMAL H
WBC # BLD AUTO: 7.6 10E3/UL (ref 4–11)

## 2024-08-23 PROCEDURE — 84100 ASSAY OF PHOSPHORUS: CPT | Performed by: INTERNAL MEDICINE

## 2024-08-23 PROCEDURE — 250N000013 HC RX MED GY IP 250 OP 250 PS 637: Performed by: STUDENT IN AN ORGANIZED HEALTH CARE EDUCATION/TRAINING PROGRAM

## 2024-08-23 PROCEDURE — 97110 THERAPEUTIC EXERCISES: CPT | Mod: GP

## 2024-08-23 PROCEDURE — 85007 BL SMEAR W/DIFF WBC COUNT: CPT | Performed by: STUDENT IN AN ORGANIZED HEALTH CARE EDUCATION/TRAINING PROGRAM

## 2024-08-23 PROCEDURE — 250N000013 HC RX MED GY IP 250 OP 250 PS 637

## 2024-08-23 PROCEDURE — 250N000011 HC RX IP 250 OP 636: Performed by: INTERNAL MEDICINE

## 2024-08-23 PROCEDURE — 82310 ASSAY OF CALCIUM: CPT | Performed by: STUDENT IN AN ORGANIZED HEALTH CARE EDUCATION/TRAINING PROGRAM

## 2024-08-23 PROCEDURE — 250N000012 HC RX MED GY IP 250 OP 636 PS 637

## 2024-08-23 PROCEDURE — 258N000003 HC RX IP 258 OP 636: Performed by: INTERNAL MEDICINE

## 2024-08-23 PROCEDURE — 84132 ASSAY OF SERUM POTASSIUM: CPT | Performed by: STUDENT IN AN ORGANIZED HEALTH CARE EDUCATION/TRAINING PROGRAM

## 2024-08-23 PROCEDURE — 250N000013 HC RX MED GY IP 250 OP 250 PS 637: Performed by: PHYSICIAN ASSISTANT

## 2024-08-23 PROCEDURE — 85027 COMPLETE CBC AUTOMATED: CPT | Performed by: STUDENT IN AN ORGANIZED HEALTH CARE EDUCATION/TRAINING PROGRAM

## 2024-08-23 PROCEDURE — 250N000012 HC RX MED GY IP 250 OP 636 PS 637: Performed by: PHYSICIAN ASSISTANT

## 2024-08-23 PROCEDURE — 80195 ASSAY OF SIROLIMUS: CPT | Performed by: INTERNAL MEDICINE

## 2024-08-23 PROCEDURE — 250N000013 HC RX MED GY IP 250 OP 250 PS 637: Performed by: INTERNAL MEDICINE

## 2024-08-23 PROCEDURE — 97530 THERAPEUTIC ACTIVITIES: CPT | Mod: GP

## 2024-08-23 PROCEDURE — 206N000001 HC R&B BMT UMMC

## 2024-08-23 PROCEDURE — 250N000009 HC RX 250

## 2024-08-23 PROCEDURE — 83735 ASSAY OF MAGNESIUM: CPT | Performed by: INTERNAL MEDICINE

## 2024-08-23 RX ORDER — POTASSIUM CHLORIDE 750 MG/1
20 TABLET, EXTENDED RELEASE ORAL ONCE
Status: COMPLETED | OUTPATIENT
Start: 2024-08-23 | End: 2024-08-23

## 2024-08-23 RX ORDER — ASPIRIN 81 MG/1
81 TABLET ORAL DAILY
Status: DISCONTINUED | OUTPATIENT
Start: 2024-08-23 | End: 2024-08-25 | Stop reason: HOSPADM

## 2024-08-23 RX ORDER — POTASSIUM CHLORIDE 750 MG/1
40 TABLET, EXTENDED RELEASE ORAL ONCE
Status: COMPLETED | OUTPATIENT
Start: 2024-08-23 | End: 2024-08-23

## 2024-08-23 RX ADMIN — CALCIUM CARBONATE 600 MG (1,500 MG)-VITAMIN D3 400 UNIT TABLET 1 TABLET: at 08:15

## 2024-08-23 RX ADMIN — BRIMONIDINE TARTRATE 1 DROP: 2 SOLUTION/ DROPS OPHTHALMIC at 08:17

## 2024-08-23 RX ADMIN — ALLOPURINOL 300 MG: 300 TABLET ORAL at 08:15

## 2024-08-23 RX ADMIN — URSODIOL 300 MG: 300 CAPSULE ORAL at 13:24

## 2024-08-23 RX ADMIN — URSODIOL 300 MG: 300 CAPSULE ORAL at 20:40

## 2024-08-23 RX ADMIN — MICAFUNGIN SODIUM 150 MG: 50 INJECTION, POWDER, LYOPHILIZED, FOR SOLUTION INTRAVENOUS at 08:18

## 2024-08-23 RX ADMIN — MYCOPHENOLATE MOFETIL 1500 MG: 500 TABLET, FILM COATED ORAL at 08:15

## 2024-08-23 RX ADMIN — INSULIN ASPART 1 UNITS: 100 INJECTION, SOLUTION INTRAVENOUS; SUBCUTANEOUS at 08:39

## 2024-08-23 RX ADMIN — Medication 2 SPRAY: at 08:17

## 2024-08-23 RX ADMIN — ACYCLOVIR 800 MG: 800 TABLET ORAL at 08:15

## 2024-08-23 RX ADMIN — POTASSIUM CHLORIDE 40 MEQ: 750 TABLET, EXTENDED RELEASE ORAL at 06:16

## 2024-08-23 RX ADMIN — BRIMONIDINE TARTRATE 1 DROP: 2 SOLUTION/ DROPS OPHTHALMIC at 20:41

## 2024-08-23 RX ADMIN — SIROLIMUS 4 MG: 2 TABLET ORAL at 08:15

## 2024-08-23 RX ADMIN — POTASSIUM CHLORIDE 20 MEQ: 750 TABLET, EXTENDED RELEASE ORAL at 12:40

## 2024-08-23 RX ADMIN — LISINOPRIL 5 MG: 5 TABLET ORAL at 08:15

## 2024-08-23 RX ADMIN — FLUTICASONE PROPIONATE 1 SPRAY: 50 SPRAY, METERED NASAL at 08:16

## 2024-08-23 RX ADMIN — INSULIN ASPART 1 UNITS: 100 INJECTION, SOLUTION INTRAVENOUS; SUBCUTANEOUS at 13:25

## 2024-08-23 RX ADMIN — PANTOPRAZOLE SODIUM 40 MG: 40 TABLET, DELAYED RELEASE ORAL at 08:15

## 2024-08-23 RX ADMIN — Medication 3 CAPSULE: at 08:14

## 2024-08-23 RX ADMIN — ASPIRIN 81 MG: 81 TABLET ORAL at 12:40

## 2024-08-23 RX ADMIN — Medication 5 ML: at 03:51

## 2024-08-23 RX ADMIN — AMLODIPINE BESYLATE 10 MG: 10 TABLET ORAL at 08:15

## 2024-08-23 RX ADMIN — METOPROLOL TARTRATE 25 MG: 25 TABLET, FILM COATED ORAL at 20:40

## 2024-08-23 RX ADMIN — METOPROLOL TARTRATE 25 MG: 25 TABLET, FILM COATED ORAL at 08:15

## 2024-08-23 RX ADMIN — ATORVASTATIN CALCIUM 10 MG: 10 TABLET, FILM COATED ORAL at 20:39

## 2024-08-23 RX ADMIN — LORATADINE 10 MG: 10 TABLET ORAL at 08:15

## 2024-08-23 RX ADMIN — Medication 2 SPRAY: at 12:50

## 2024-08-23 RX ADMIN — TAMSULOSIN HYDROCHLORIDE 0.4 MG: 0.4 CAPSULE ORAL at 08:15

## 2024-08-23 RX ADMIN — LATANOPROST 2 DROP: 50 SOLUTION OPHTHALMIC at 22:28

## 2024-08-23 RX ADMIN — URSODIOL 300 MG: 300 CAPSULE ORAL at 08:14

## 2024-08-23 RX ADMIN — MYCOPHENOLATE MOFETIL 1500 MG: 500 TABLET, FILM COATED ORAL at 18:10

## 2024-08-23 RX ADMIN — FLUTICASONE PROPIONATE 1 SPRAY: 50 SPRAY, METERED NASAL at 20:41

## 2024-08-23 RX ADMIN — SIMETHICONE 80 MG: 80 TABLET, CHEWABLE ORAL at 18:15

## 2024-08-23 RX ADMIN — Medication 5 ML: at 09:43

## 2024-08-23 RX ADMIN — ACYCLOVIR 800 MG: 800 TABLET ORAL at 20:39

## 2024-08-23 RX ADMIN — Medication 2 SPRAY: at 20:43

## 2024-08-23 RX ADMIN — DIPHENHYDRAMINE HYDROCHLORIDE AND LIDOCAINE HYDROCHLORIDE AND ALUMINUM HYDROXIDE AND MAGNESIUM HYDRO 10 ML: KIT at 18:12

## 2024-08-23 ASSESSMENT — ACTIVITIES OF DAILY LIVING (ADL)
ADLS_ACUITY_SCORE: 29
ADLS_ACUITY_SCORE: 28
ADLS_ACUITY_SCORE: 29
ADLS_ACUITY_SCORE: 29
ADLS_ACUITY_SCORE: 28
ADLS_ACUITY_SCORE: 28
ADLS_ACUITY_SCORE: 29
ADLS_ACUITY_SCORE: 28
ADLS_ACUITY_SCORE: 29
ADLS_ACUITY_SCORE: 29
ADLS_ACUITY_SCORE: 28
ADLS_ACUITY_SCORE: 28
ADLS_ACUITY_SCORE: 26
ADLS_ACUITY_SCORE: 29
ADLS_ACUITY_SCORE: 28
ADLS_ACUITY_SCORE: 28
ADLS_ACUITY_SCORE: 29
ADLS_ACUITY_SCORE: 25
ADLS_ACUITY_SCORE: 26

## 2024-08-23 NOTE — PLAN OF CARE
"/76 (BP Location: Right arm)   Pulse 91   Temp 98.7  F (37.1  C) (Oral)   Resp 12   Ht 1.825 m (5' 11.85\")   Wt 112.1 kg (247 lb 3.2 oz)   SpO2 98%   BMI 33.67 kg/m      Afebrile; VSS on RA. -156; insulin administered per sliding scale and carb coverage. Mouth and throat pain continues to improve per pt; managed with PRN MMW only x2. No complaints of nausea. Eating soft foods, taking all PO meds, and drinking fluids without issues. Potassium recheck 3.5; additional replacement ordered per protocol with recheck tomorrow AM. Plan for discharge on Sunday. Continue POC.     Goal Outcome Evaluation:    Plan of Care Reviewed With: patient, spouse  Overall Patient Progress: improvingOverall Patient Progress: improving  Problem: Adult Inpatient Plan of Care  Goal: Plan of Care Review  Description: The Plan of Care Review/Shift note should be completed every shift.  The Outcome Evaluation is a brief statement about your assessment that the patient is improving, declining, or no change.  This information will be displayed automatically on your shift  note.  Outcome: Progressing  Flowsheets (Taken 8/23/2024 7200)  Plan of Care Reviewed With:   patient   spouse  Overall Patient Progress: improving  Goal: Patient-Specific Goal (Individualized)  Description: You can add care plan individualizations to a care plan. Examples of Individualization might be:  \"Parent requests to be called daily at 9am for status\", \"I have a hard time hearing out of my right ear\", or \"Do not touch me to wake me up as it startles  me\".  Outcome: Progressing  Goal: Absence of Hospital-Acquired Illness or Injury  Outcome: Progressing  Intervention: Identify and Manage Fall Risk  Recent Flowsheet Documentation  Taken 8/23/2024 1530 by Katelyn Turk RN  Safety Promotion/Fall Prevention:   safety round/check completed   room organization consistent   patient and family education   nonskid shoes/slippers when out of bed   clutter free " environment maintained  Taken 8/23/2024 1200 by Katelyn Turk RN  Safety Promotion/Fall Prevention:   safety round/check completed   room organization consistent   patient and family education   nonskid shoes/slippers when out of bed   clutter free environment maintained  Taken 8/23/2024 0800 by Katelyn Turk RN  Safety Promotion/Fall Prevention:   safety round/check completed   room organization consistent   patient and family education   nonskid shoes/slippers when out of bed   clutter free environment maintained  Intervention: Prevent Skin Injury  Recent Flowsheet Documentation  Taken 8/23/2024 0800 by Katelyn Turk RN  Body Position: position changed independently  Skin Protection: adhesive use limited  Device Skin Pressure Protection: adhesive use limited  Intervention: Prevent Infection  Recent Flowsheet Documentation  Taken 8/23/2024 1530 by Katelyn Turk RN  Infection Prevention:   cohorting utilized   environmental surveillance performed   equipment surfaces disinfected   hand hygiene promoted   personal protective equipment utilized   rest/sleep promoted   single patient room provided   visitors restricted/screened  Taken 8/23/2024 1200 by Katelyn Turk RN  Infection Prevention:   cohorting utilized   environmental surveillance performed   equipment surfaces disinfected   hand hygiene promoted   personal protective equipment utilized   rest/sleep promoted   single patient room provided   visitors restricted/screened  Taken 8/23/2024 0800 by Katelyn Turk RN  Infection Prevention:   cohorting utilized   environmental surveillance performed   equipment surfaces disinfected   hand hygiene promoted   personal protective equipment utilized   rest/sleep promoted   single patient room provided   visitors restricted/screened  Goal: Optimal Comfort and Wellbeing  Outcome: Progressing  Goal: Readiness for Transition of Care  Outcome: Progressing     Problem: Adult Inpatient Plan of  "Care  Goal: Plan of Care Review  Description: The Plan of Care Review/Shift note should be completed every shift.  The Outcome Evaluation is a brief statement about your assessment that the patient is improving, declining, or no change.  This information will be displayed automatically on your shift  note.  Outcome: Progressing  Flowsheets (Taken 8/23/2024 1743)  Plan of Care Reviewed With:   patient   spouse  Overall Patient Progress: improving  Goal: Patient-Specific Goal (Individualized)  Description: You can add care plan individualizations to a care plan. Examples of Individualization might be:  \"Parent requests to be called daily at 9am for status\", \"I have a hard time hearing out of my right ear\", or \"Do not touch me to wake me up as it startles  me\".  Outcome: Progressing  Goal: Absence of Hospital-Acquired Illness or Injury  Outcome: Progressing  Intervention: Identify and Manage Fall Risk  Recent Flowsheet Documentation  Taken 8/23/2024 1530 by Katelyn Turk RN  Safety Promotion/Fall Prevention:   safety round/check completed   room organization consistent   patient and family education   nonskid shoes/slippers when out of bed   clutter free environment maintained  Taken 8/23/2024 1200 by Katelyn Turk RN  Safety Promotion/Fall Prevention:   safety round/check completed   room organization consistent   patient and family education   nonskid shoes/slippers when out of bed   clutter free environment maintained  Taken 8/23/2024 0800 by Katelyn Turk RN  Safety Promotion/Fall Prevention:   safety round/check completed   room organization consistent   patient and family education   nonskid shoes/slippers when out of bed   clutter free environment maintained  Intervention: Prevent Skin Injury  Recent Flowsheet Documentation  Taken 8/23/2024 0800 by Katelyn Turk RN  Body Position: position changed independently  Skin Protection: adhesive use limited  Device Skin Pressure Protection: adhesive use " limited  Intervention: Prevent Infection  Recent Flowsheet Documentation  Taken 8/23/2024 1530 by Katelyn Turk RN  Infection Prevention:   cohorting utilized   environmental surveillance performed   equipment surfaces disinfected   hand hygiene promoted   personal protective equipment utilized   rest/sleep promoted   single patient room provided   visitors restricted/screened  Taken 8/23/2024 1200 by Katelyn Turk RN  Infection Prevention:   cohorting utilized   environmental surveillance performed   equipment surfaces disinfected   hand hygiene promoted   personal protective equipment utilized   rest/sleep promoted   single patient room provided   visitors restricted/screened  Taken 8/23/2024 0800 by Katelyn Turk RN  Infection Prevention:   cohorting utilized   environmental surveillance performed   equipment surfaces disinfected   hand hygiene promoted   personal protective equipment utilized   rest/sleep promoted   single patient room provided   visitors restricted/screened  Goal: Optimal Comfort and Wellbeing  Outcome: Progressing  Goal: Readiness for Transition of Care  Outcome: Progressing     Problem: Risk for Delirium  Goal: Optimal Coping  Outcome: Progressing  Goal: Improved Behavioral Control  Intervention: Minimize Safety Risk  Recent Flowsheet Documentation  Taken 8/23/2024 1530 by Katelyn Turk RN  Enhanced Safety Measures:   patient/family teach back on injury risk   review medications for side effects with activity  Taken 8/23/2024 1200 by Katelyn Turk RN  Enhanced Safety Measures:   patient/family teach back on injury risk   review medications for side effects with activity  Taken 8/23/2024 0800 by Katelyn Turk RN  Enhanced Safety Measures:   patient/family teach back on injury risk   review medications for side effects with activity  Goal: Improved Sleep  Outcome: Progressing     Problem: Stem Cell/Bone Marrow Transplant  Goal: Optimal Coping with Transplant  Outcome:  Progressing  Goal: Symptom-Free Urinary Elimination  Outcome: Progressing  Goal: Diarrhea Symptom Control  Outcome: Progressing  Intervention: Manage Diarrhea  Recent Flowsheet Documentation  Taken 8/23/2024 0800 by Katelyn Turk RN  Skin Protection: adhesive use limited  Perineal Care: perineal hygiene encouraged  Goal: Improved Activity Tolerance  Outcome: Progressing  Intervention: Promote Improved Energy  Recent Flowsheet Documentation  Taken 8/23/2024 0800 by Katelyn Turk RN  Activity Management: activity adjusted per tolerance  Goal: Blood Counts Within Acceptable Range  Outcome: Progressing  Intervention: Monitor and Manage Hematologic Symptoms  Recent Flowsheet Documentation  Taken 8/23/2024 1530 by Katelyn Turk RN  Medication Review/Management:   medications reviewed   high-risk medications identified  Taken 8/23/2024 1200 by Katelyn Turk RN  Medication Review/Management:   medications reviewed   high-risk medications identified  Taken 8/23/2024 0800 by Katelyn Turk RN  Bleeding Precautions:   blood pressure closely monitored   foot protection facilitated   gentle oral care promoted   monitored for signs of bleeding  Medication Review/Management:   medications reviewed   high-risk medications identified  Goal: Absence of Hypersensitivity Reaction  Outcome: Progressing  Goal: Absence of Infection  Outcome: Progressing  Intervention: Prevent and Manage Infection  Recent Flowsheet Documentation  Taken 8/23/2024 1530 by Katelyn Turk RN  Infection Prevention:   cohorting utilized   environmental surveillance performed   equipment surfaces disinfected   hand hygiene promoted   personal protective equipment utilized   rest/sleep promoted   single patient room provided   visitors restricted/screened  Isolation Precautions: protective environment maintained  Taken 8/23/2024 1200 by Katelyn Turk RN  Infection Prevention:   cohorting utilized   environmental surveillance performed    equipment surfaces disinfected   hand hygiene promoted   personal protective equipment utilized   rest/sleep promoted   single patient room provided   visitors restricted/screened  Isolation Precautions: protective environment maintained  Taken 8/23/2024 0800 by Katelyn Turk RN  Infection Prevention:   cohorting utilized   environmental surveillance performed   equipment surfaces disinfected   hand hygiene promoted   personal protective equipment utilized   rest/sleep promoted   single patient room provided   visitors restricted/screened  Isolation Precautions: protective environment maintained  Goal: Improved Oral Mucous Membrane Health and Integrity  Outcome: Progressing  Intervention: Promote Oral Comfort and Health  Recent Flowsheet Documentation  Taken 8/23/2024 0800 by Katelyn Turk RN  Oral Care: oral rinse provided  Goal: Nausea and Vomiting Symptom Relief  Outcome: Progressing  Intervention: Prevent and Manage Nausea and Vomiting  Recent Flowsheet Documentation  Taken 8/23/2024 0800 by Katelyn Turk RN  Nausea/Vomiting Interventions: (denies) other (see comments)     Problem: Stem Cell/Bone Marrow Transplant  Goal: Optimal Coping with Transplant  Outcome: Progressing  Goal: Symptom-Free Urinary Elimination  Outcome: Progressing  Goal: Diarrhea Symptom Control  Outcome: Progressing  Intervention: Manage Diarrhea  Recent Flowsheet Documentation  Taken 8/23/2024 0800 by Katelyn Turk RN  Skin Protection: adhesive use limited  Perineal Care: perineal hygiene encouraged  Goal: Improved Activity Tolerance  Outcome: Progressing  Intervention: Promote Improved Energy  Recent Flowsheet Documentation  Taken 8/23/2024 0800 by Katelyn Turk RN  Activity Management: activity adjusted per tolerance  Goal: Blood Counts Within Acceptable Range  Outcome: Progressing  Intervention: Monitor and Manage Hematologic Symptoms  Recent Flowsheet Documentation  Taken 8/23/2024 1530 by Katelyn Turk  RN  Medication Review/Management:   medications reviewed   high-risk medications identified  Taken 8/23/2024 1200 by Katelyn Turk RN  Medication Review/Management:   medications reviewed   high-risk medications identified  Taken 8/23/2024 0800 by Katelyn Turk RN  Bleeding Precautions:   blood pressure closely monitored   foot protection facilitated   gentle oral care promoted   monitored for signs of bleeding  Medication Review/Management:   medications reviewed   high-risk medications identified  Goal: Absence of Hypersensitivity Reaction  Outcome: Progressing  Goal: Absence of Infection  Outcome: Progressing  Intervention: Prevent and Manage Infection  Recent Flowsheet Documentation  Taken 8/23/2024 1530 by Katelyn Turk RN  Infection Prevention:   cohorting utilized   environmental surveillance performed   equipment surfaces disinfected   hand hygiene promoted   personal protective equipment utilized   rest/sleep promoted   single patient room provided   visitors restricted/screened  Isolation Precautions: protective environment maintained  Taken 8/23/2024 1200 by Katelyn Turk RN  Infection Prevention:   cohorting utilized   environmental surveillance performed   equipment surfaces disinfected   hand hygiene promoted   personal protective equipment utilized   rest/sleep promoted   single patient room provided   visitors restricted/screened  Isolation Precautions: protective environment maintained  Taken 8/23/2024 0800 by Katelyn Turk RN  Infection Prevention:   cohorting utilized   environmental surveillance performed   equipment surfaces disinfected   hand hygiene promoted   personal protective equipment utilized   rest/sleep promoted   single patient room provided   visitors restricted/screened  Isolation Precautions: protective environment maintained  Goal: Improved Oral Mucous Membrane Health and Integrity  Outcome: Progressing  Intervention: Promote Oral Comfort and Health  Recent  Flowsheet Documentation  Taken 8/23/2024 0800 by Katelyn Turk RN  Oral Care: oral rinse provided  Goal: Nausea and Vomiting Symptom Relief  Outcome: Progressing  Intervention: Prevent and Manage Nausea and Vomiting  Recent Flowsheet Documentation  Taken 8/23/2024 0800 by Katelyn Turk RN  Nausea/Vomiting Interventions: (denies) other (see comments)  Goal: Optimal Nutrition Intake  Outcome: Progressing     Problem: Infection  Goal: Absence of Infection Signs and Symptoms  Outcome: Progressing  Intervention: Prevent or Manage Infection  Recent Flowsheet Documentation  Taken 8/23/2024 1530 by Katelyn Turk RN  Isolation Precautions: protective environment maintained  Taken 8/23/2024 1200 by Katelyn Turk RN  Isolation Precautions: protective environment maintained  Taken 8/23/2024 0800 by Katelyn Turk RN  Isolation Precautions: protective environment maintained     Problem: Comorbidity Management  Goal: Blood Glucose Levels Within Targeted Range  Outcome: Progressing  Intervention: Monitor and Manage Glycemia  Recent Flowsheet Documentation  Taken 8/23/2024 1530 by Katelyn Turk RN  Medication Review/Management:   medications reviewed   high-risk medications identified  Taken 8/23/2024 1200 by Katelyn Turk RN  Medication Review/Management:   medications reviewed   high-risk medications identified  Taken 8/23/2024 0800 by Katelyn Turk RN  Medication Review/Management:   medications reviewed   high-risk medications identified     Problem: Obstructive Sleep Apnea Risk or Actual  Goal: Unobstructed Breathing During Sleep  Outcome: Progressing  Intervention: Monitor and Manage Obstructive Sleep Apnea  Recent Flowsheet Documentation  Taken 8/23/2024 1530 by Katelyn Turk RN  Medication Review/Management:   medications reviewed   high-risk medications identified  Taken 8/23/2024 1200 by Katelyn Turk RN  Medication Review/Management:   medications reviewed   high-risk medications  identified  Taken 8/23/2024 0800 by Katelyn Turk RN  Medication Review/Management:   medications reviewed   high-risk medications identified     Problem: Pain Acute  Goal: Optimal Pain Control and Function  Outcome: Progressing  Intervention: Prevent or Manage Pain  Recent Flowsheet Documentation  Taken 8/23/2024 1530 by Katelyn Turk RN  Medication Review/Management:   medications reviewed   high-risk medications identified  Taken 8/23/2024 1200 by Katelyn Turk RN  Medication Review/Management:   medications reviewed   high-risk medications identified  Taken 8/23/2024 0800 by Katelyn Turk RN  Medication Review/Management:   medications reviewed   high-risk medications identified

## 2024-08-23 NOTE — PROGRESS NOTES
"BMT Progress Note  Chief complaint:  Arpit Forrest is a 73 year old male, currently day+16 s/p ELIEZER MUD PBSCT for MDS.  INTERIM HISTORY:   No diarrhea, no N/V. Eating full meals. Pain on R side mouth improved.   REVIEW OF SYSTEMS: Otherwise unremarkable other than what is noted in the Interim History.   PHYSICAL EXAM:   Vitals:  /75 (BP Location: Right arm)   Pulse 88   Temp 97.9  F (36.6  C) (Oral)   Resp 16   Ht 1.825 m (5' 11.85\")   Wt 112.1 kg (247 lb 3.2 oz)   SpO2 97%   BMI 33.67 kg/m      General: alert and cooperative, NAD. Face with maye appearance--stable per pt.  HEENT: NC/AT, sclera anicteric. Mucosal ulcers healing on R buccal.  CV: RRR, pan systolic murmur (known)  Resp: CTAB  Abdomen: NT, ND, BS+  Skin: no rashes on limited exam  Psych: Mood and affect normal  Vascular: Right CVC     LABS:  Lab Results   Component Value Date    WBC 7.6 08/23/2024    ANEU 6.9 08/23/2024    HGB 8.6 (L) 08/23/2024    HCT 26.3 (L) 08/23/2024    PLT 66 (L) 08/23/2024     08/23/2024    POTASSIUM 3.5 08/23/2024    CHLORIDE 102 08/23/2024    CO2 23 08/23/2024     (H) 08/23/2024    BUN 9.6 08/23/2024    CR 0.88 08/23/2024    MAG 2.1 08/23/2024    INR 1.17 (H) 08/19/2024    BILITOTAL 0.2 08/22/2024    AST 16 08/22/2024    ALT 16 08/22/2024    ALKPHOS 63 08/22/2024    PROTTOTAL 5.8 (L) 08/22/2024    ALBUMIN 3.1 (L) 08/22/2024        ASSESSMENT AND PLAN: Arpit Forrest is a 73 year old male, currently day +16 s/p ELIEZER MUD PBSCT for MDS.    BMT/IEC PROTOCOL for MDS  - Chemo protocol: [id number]; CY/flu/TBI  - Peripheral blood stem cell graft from 8/8 donor, ABO matched (Oneg), Cell dose: TBD  - continue allopurinol (hx of gout)  - Engraftment monitoring: Peripheral blood and bone marrow chimerisms on D28, D100, 6 months, 1 and 2 year  - Restaging plan: BMBx with NGS on D28, D100, 6 months, 1 and 2 years    HEME/COAG  - GCSF started day +5, continue until ANC > 1500 for 3 consecutive days. 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    - Siro level (8/21) = 7.6 bolus given     ID  #N/F (8/20) Cefepime stopped 8/22 with negative cultures, likely 2/2 engraftment  Recent Hx: Multiple granulomas in the lung and bone marrow biopsy- fungitell, aspergillus galactomanan neg. 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 Switch to IV meds.  #Frequent stools- c.diff negative (8/11). Imodium PRN.  # Hepatic steatosis and boderline iron deposition in liver  - LFTs and synthetic function normal on admission; Check LFTs qM/Th  - VOD prophy: Ursodiol 300mg TID    # Supportive   - Anti-emetics:  per protocol   - Ulcer prophy: PPI daily   - Dietician support to prevent malnutrition.     CARDIOVASCULAR  #Possible syncopal episode 8/19 in setting of decreased PO intake -1L bolus  #Holosystolic murmur- Moderate aortic stenosis seen on ECHO  #A.fib with RVR - resolved    -repeat ECHO pending 8/21 grade 1 diastolic dysfunction. Discussed with CARDS as this is new finding. Reviewed ECHO, noted to be a normal finding with aging, no further follow up or work up is necessary. Will add ASA, pt already on statin.   -8/20 afib with RVR this afternoon rates 120-150, hemodynamically stable. Asymptomatic.     *Metoprolol x1 5mg IV , PO metoprolol BID 25mg, now in NSR  A.fib with RVR while  admitted with febrile episode on 3/15/24. Noted on his fitbit, not very symptomatic. Cardioversion planned for new onset A.fib. Not achieved with amiodarone, DCCV on 3/21/24 restored normal sinus rhythm. Amiodarone and anticoagulation was stopped in April 2024 without any recurrence.   He could have a recurrence during transplant which would need to be addressed with rate control strategy   # Asymptomatic moderate aortic stenosis   # CAD  Chest CT in February 2024 with mild to moderate CAC, three-vessel disease. No anginal symptoms   Continue atorvastatin 10mg  EKG Sinus rhythm Inferior infarct , age undetermined. Abnormal ECG (known cardiovascular disease). QTc =460  # HTN- Continue amlodipine 10mg, added lisinopril 5mg 8/4 (PTA dose of lisinopril was 10mg)   - Risk of cardiomyopathy:  Baseline EF normal 60-65&  - Risk of arrhythmia: Baseline EKG showed NSR, Qtc 460     RESPIRATORY  # VINCENT : CPAP  - Baseline PFTs: normal   - Risk of respiratory complications: Frequent ambulation and incentive spirometer.     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 Endo initially consulted, signed off with stable sugars.   *held PTA metformin on admission.      MUSCULOSKELETAL/FRAILTY  # Gout: Continue allopurinol indefinitely   - Baseline Frailty Score: Not frail   - Daily PT/OT as needed while inpatient     SOCIAL DETERMINANTS  - Caregiver: Wife, Meghna, children   Medically Ready for Discharge 8/25 Sunday    Clinically Significant Risk Factors              #  "Hypoalbuminemia: Lowest albumin = 3.1 g/dL at 8/22/2024  4:08 AM, will monitor as appropriate   # Thrombocytopenia: Lowest platelets = 59 in last 2 days, will monitor for bleeding   # Hypertension: Noted on problem list            # Obesity: Estimated body mass index is 33.67 kg/m  as calculated from the following:    Height as of this encounter: 1.825 m (5' 11.85\").    Weight as of this encounter: 112.1 kg (247 lb 3.2 oz).             I spent 30 minutes in the care of this patient today, which included time necessary for preparation for the visit, obtaining history, ordering medications/tests/procedures as medically indicated, review of pertinent medical literature, counseling of the patient, communication of recommendations to the care team, and documentation time.    ELLE Manuel    "

## 2024-08-23 NOTE — PLAN OF CARE
"/70 (BP Location: Right arm)   Pulse 81   Temp 97.9  F (36.6  C) (Oral)   Resp 16   Ht 1.825 m (5' 11.85\")   Wt 112.5 kg (248 lb)   SpO2 97%   BMI 33.78 kg/m      Patient is alert and oriented. Hypertensive, denies nausea, SOB. Afebrile, other VSS. Has been having sore throat and mouth pain, did not request pain medication overnight. MMW and prn oxy available. Good urine output via primofit overnight. No BM this shift. Potassium replaced this AM. Working towards discharge on Sunday. Continue to monitor and follow plan of care.     Problem: Adult Inpatient Plan of Care  Goal: Absence of Hospital-Acquired Illness or Injury  Intervention: Identify and Manage Fall Risk  Recent Flowsheet Documentation  Taken 8/23/2024 0000 by Eric Haas RN  Safety Promotion/Fall Prevention: safety round/check completed  Intervention: Prevent Skin Injury  Recent Flowsheet Documentation  Taken 8/23/2024 0000 by Eric Haas RN  Body Position: position changed independently  Skin Protection: adhesive use limited  Device Skin Pressure Protection: adhesive use limited  Intervention: Prevent and Manage VTE (Venous Thromboembolism) Risk  Recent Flowsheet Documentation  Taken 8/23/2024 0000 by Eric Haas RN  VTE Prevention/Management: SCDs off (sequential compression devices)  Intervention: Prevent Infection  Recent Flowsheet Documentation  Taken 8/23/2024 0000 by Eric Haas RN  Infection Prevention:   hand hygiene promoted   personal protective equipment utilized  Goal: Optimal Comfort and Wellbeing  Intervention: Monitor Pain and Promote Comfort  Recent Flowsheet Documentation  Taken 8/23/2024 0000 by Eric Haas RN  Pain Management Interventions: medication offered but refused  Intervention: Provide Person-Centered Care  Recent Flowsheet Documentation  Taken 8/23/2024 0000 by Eric Haas RN  Trust Relationship/Rapport:   care explained   choices provided    "

## 2024-08-23 NOTE — PLAN OF CARE
Goal Outcome Evaluation:      Plan of Care Reviewed With: patient, family    Overall Patient Progress: improvingOverall Patient Progress: improving    Pt had MMW and oxycodone for mouth/throat pain (before eating) with adequate relief.  Mouth is healing.  Took pills well. Denied nausea and ate fair. Rectal area (hemorrhoids) less painful with lotion cleanser. Had 1 medium stool.  Potassium was replaced. Pt is improving physically and mentally (less forgetful).  Had questions about his diabetes management for discharge (see sticky note).  Discharge is planned for Sunday.

## 2024-08-24 LAB
ABO/RH(D): NORMAL
ANION GAP SERPL CALCULATED.3IONS-SCNC: 10 MMOL/L (ref 7–15)
ANTIBODY SCREEN: NEGATIVE
BACTERIA BLD CULT: NO GROWTH
BASOPHILS # BLD AUTO: ABNORMAL 10*3/UL
BASOPHILS # BLD MANUAL: 0 10E3/UL (ref 0–0.2)
BASOPHILS NFR BLD AUTO: ABNORMAL %
BASOPHILS NFR BLD MANUAL: 0 %
BUN SERPL-MCNC: 9 MG/DL (ref 8–23)
CALCIUM SERPL-MCNC: 9 MG/DL (ref 8.8–10.4)
CHLORIDE SERPL-SCNC: 105 MMOL/L (ref 98–107)
CREAT SERPL-MCNC: 0.89 MG/DL (ref 0.67–1.17)
EGFRCR SERPLBLD CKD-EPI 2021: 89 ML/MIN/1.73M2
EOSINOPHIL # BLD AUTO: ABNORMAL 10*3/UL
EOSINOPHIL # BLD MANUAL: 0 10E3/UL (ref 0–0.7)
EOSINOPHIL NFR BLD AUTO: ABNORMAL %
EOSINOPHIL NFR BLD MANUAL: 0 %
ERYTHROCYTE [DISTWIDTH] IN BLOOD BY AUTOMATED COUNT: 14.6 % (ref 10–15)
GLUCOSE BLDC GLUCOMTR-MCNC: 163 MG/DL (ref 70–99)
GLUCOSE BLDC GLUCOMTR-MCNC: 167 MG/DL (ref 70–99)
GLUCOSE BLDC GLUCOMTR-MCNC: 182 MG/DL (ref 70–99)
GLUCOSE BLDC GLUCOMTR-MCNC: 191 MG/DL (ref 70–99)
GLUCOSE SERPL-MCNC: 151 MG/DL (ref 70–99)
HCO3 SERPL-SCNC: 24 MMOL/L (ref 22–29)
HCT VFR BLD AUTO: 25.9 % (ref 40–53)
HGB BLD-MCNC: 8.3 G/DL (ref 13.3–17.7)
IMM GRANULOCYTES # BLD: ABNORMAL 10*3/UL
IMM GRANULOCYTES NFR BLD: ABNORMAL %
LYMPHOCYTES # BLD AUTO: ABNORMAL 10*3/UL
LYMPHOCYTES # BLD MANUAL: 0.2 10E3/UL (ref 0.8–5.3)
LYMPHOCYTES NFR BLD AUTO: ABNORMAL %
LYMPHOCYTES NFR BLD MANUAL: 4 %
MAGNESIUM SERPL-MCNC: 2 MG/DL (ref 1.7–2.3)
MCH RBC QN AUTO: 29.1 PG (ref 26.5–33)
MCHC RBC AUTO-ENTMCNC: 32 G/DL (ref 31.5–36.5)
MCV RBC AUTO: 91 FL (ref 78–100)
METAMYELOCYTES # BLD MANUAL: 0.1 10E3/UL
METAMYELOCYTES NFR BLD MANUAL: 1 %
MONOCYTES # BLD AUTO: ABNORMAL 10*3/UL
MONOCYTES # BLD MANUAL: 0.7 10E3/UL (ref 0–1.3)
MONOCYTES NFR BLD AUTO: ABNORMAL %
MONOCYTES NFR BLD MANUAL: 14 %
MYELOCYTES # BLD MANUAL: 0.1 10E3/UL
MYELOCYTES NFR BLD MANUAL: 1 %
NEUTROPHILS # BLD AUTO: ABNORMAL 10*3/UL
NEUTROPHILS # BLD MANUAL: 4.2 10E3/UL (ref 1.6–8.3)
NEUTROPHILS NFR BLD AUTO: ABNORMAL %
NEUTROPHILS NFR BLD MANUAL: 79 %
NRBC # BLD AUTO: 0 10E3/UL
NRBC # BLD AUTO: 0.1 10E3/UL
NRBC BLD AUTO-RTO: 1 /100
NRBC BLD MANUAL-RTO: 2 %
PHOSPHATE SERPL-MCNC: 3.9 MG/DL (ref 2.5–4.5)
PLAT MORPH BLD: ABNORMAL
PLATELET # BLD AUTO: 83 10E3/UL (ref 150–450)
POLYCHROMASIA BLD QL SMEAR: SLIGHT
POTASSIUM SERPL-SCNC: 3.7 MMOL/L (ref 3.4–5.3)
PROMYELOCYTES # BLD MANUAL: 0.1 10E3/UL
PROMYELOCYTES NFR BLD MANUAL: 1 %
RBC # BLD AUTO: 2.85 10E6/UL (ref 4.4–5.9)
RBC MORPH BLD: ABNORMAL
SODIUM SERPL-SCNC: 139 MMOL/L (ref 135–145)
SPECIMEN EXPIRATION DATE: NORMAL
WBC # BLD AUTO: 5.3 10E3/UL (ref 4–11)

## 2024-08-24 PROCEDURE — 250N000013 HC RX MED GY IP 250 OP 250 PS 637: Performed by: INTERNAL MEDICINE

## 2024-08-24 PROCEDURE — 85027 COMPLETE CBC AUTOMATED: CPT | Performed by: STUDENT IN AN ORGANIZED HEALTH CARE EDUCATION/TRAINING PROGRAM

## 2024-08-24 PROCEDURE — 250N000013 HC RX MED GY IP 250 OP 250 PS 637

## 2024-08-24 PROCEDURE — 83735 ASSAY OF MAGNESIUM: CPT | Performed by: STUDENT IN AN ORGANIZED HEALTH CARE EDUCATION/TRAINING PROGRAM

## 2024-08-24 PROCEDURE — 258N000003 HC RX IP 258 OP 636: Performed by: INTERNAL MEDICINE

## 2024-08-24 PROCEDURE — 250N000012 HC RX MED GY IP 250 OP 636 PS 637: Performed by: PHYSICIAN ASSISTANT

## 2024-08-24 PROCEDURE — 86901 BLOOD TYPING SEROLOGIC RH(D): CPT | Performed by: STUDENT IN AN ORGANIZED HEALTH CARE EDUCATION/TRAINING PROGRAM

## 2024-08-24 PROCEDURE — 84100 ASSAY OF PHOSPHORUS: CPT | Performed by: STUDENT IN AN ORGANIZED HEALTH CARE EDUCATION/TRAINING PROGRAM

## 2024-08-24 PROCEDURE — 250N000013 HC RX MED GY IP 250 OP 250 PS 637: Performed by: STUDENT IN AN ORGANIZED HEALTH CARE EDUCATION/TRAINING PROGRAM

## 2024-08-24 PROCEDURE — 250N000009 HC RX 250

## 2024-08-24 PROCEDURE — 80048 BASIC METABOLIC PNL TOTAL CA: CPT | Performed by: STUDENT IN AN ORGANIZED HEALTH CARE EDUCATION/TRAINING PROGRAM

## 2024-08-24 PROCEDURE — 250N000012 HC RX MED GY IP 250 OP 636 PS 637

## 2024-08-24 PROCEDURE — 85007 BL SMEAR W/DIFF WBC COUNT: CPT | Performed by: STUDENT IN AN ORGANIZED HEALTH CARE EDUCATION/TRAINING PROGRAM

## 2024-08-24 PROCEDURE — 250N000011 HC RX IP 250 OP 636: Performed by: INTERNAL MEDICINE

## 2024-08-24 PROCEDURE — 250N000013 HC RX MED GY IP 250 OP 250 PS 637: Performed by: PHYSICIAN ASSISTANT

## 2024-08-24 PROCEDURE — 206N000001 HC R&B BMT UMMC

## 2024-08-24 RX ORDER — PANTOPRAZOLE SODIUM 40 MG/1
40 TABLET, DELAYED RELEASE ORAL
Qty: 60 TABLET | Refills: 1 | Status: SHIPPED | OUTPATIENT
Start: 2024-08-25

## 2024-08-24 RX ORDER — EPINEPHRINE 1 MG/ML
0.3 INJECTION, SOLUTION, CONCENTRATE INTRAVENOUS EVERY 5 MIN PRN
OUTPATIENT
Start: 2024-08-24

## 2024-08-24 RX ORDER — MAGNESIUM OXIDE 400 MG/1
400 TABLET ORAL EVERY 4 HOURS
Status: COMPLETED | OUTPATIENT
Start: 2024-08-24 | End: 2024-08-24

## 2024-08-24 RX ORDER — ACYCLOVIR 800 MG/1
800 TABLET ORAL 2 TIMES DAILY
Qty: 120 TABLET | Refills: 1 | Status: SHIPPED | OUTPATIENT
Start: 2024-08-24

## 2024-08-24 RX ORDER — HEPARIN SODIUM,PORCINE 10 UNIT/ML
10 VIAL (ML) INTRAVENOUS DAILY
Qty: 300 ML | Refills: 0 | Status: SHIPPED | OUTPATIENT
Start: 2024-08-24 | End: 2024-09-06

## 2024-08-24 RX ORDER — DIPHENHYDRAMINE HYDROCHLORIDE 50 MG/ML
50 INJECTION INTRAMUSCULAR; INTRAVENOUS
Start: 2024-08-24

## 2024-08-24 RX ORDER — ASPIRIN 81 MG/1
81 TABLET ORAL DAILY
Qty: 60 TABLET | Refills: 1 | Status: SHIPPED | OUTPATIENT
Start: 2024-08-25

## 2024-08-24 RX ORDER — ALLOPURINOL 300 MG/1
300 TABLET ORAL DAILY
Qty: 60 TABLET | Refills: 1 | Status: SHIPPED | OUTPATIENT
Start: 2024-08-25

## 2024-08-24 RX ORDER — PROCHLORPERAZINE MALEATE 5 MG
5 TABLET ORAL EVERY 6 HOURS PRN
Qty: 30 TABLET | Refills: 0 | Status: SHIPPED | OUTPATIENT
Start: 2024-08-24

## 2024-08-24 RX ORDER — URSODIOL 300 MG/1
300 CAPSULE ORAL 3 TIMES DAILY
Qty: 126 CAPSULE | Refills: 0 | Status: SHIPPED | OUTPATIENT
Start: 2024-08-24

## 2024-08-24 RX ORDER — TAMSULOSIN HYDROCHLORIDE 0.4 MG/1
0.4 CAPSULE ORAL DAILY
Qty: 30 CAPSULE | Refills: 1 | Status: SHIPPED | OUTPATIENT
Start: 2024-08-25 | End: 2024-09-06

## 2024-08-24 RX ORDER — MYCOPHENOLATE MOFETIL 500 MG/1
1500 TABLET ORAL 2 TIMES DAILY
Qty: 72 TABLET | Refills: 0 | Status: SHIPPED | OUTPATIENT
Start: 2024-08-24

## 2024-08-24 RX ORDER — ATORVASTATIN CALCIUM 10 MG/1
10 TABLET, FILM COATED ORAL EVERY MORNING
Qty: 60 TABLET | Refills: 1 | Status: SHIPPED | OUTPATIENT
Start: 2024-08-24

## 2024-08-24 RX ORDER — HEPARIN SODIUM (PORCINE) LOCK FLUSH IV SOLN 100 UNIT/ML 100 UNIT/ML
5 SOLUTION INTRAVENOUS
Status: CANCELLED | OUTPATIENT
Start: 2024-08-26

## 2024-08-24 RX ORDER — HEPARIN SODIUM,PORCINE 10 UNIT/ML
5-20 VIAL (ML) INTRAVENOUS DAILY PRN
OUTPATIENT
Start: 2024-08-24

## 2024-08-24 RX ORDER — SULFAMETHOXAZOLE/TRIMETHOPRIM 800-160 MG
1 TABLET ORAL
Qty: 28 TABLET | Refills: 1 | Status: SHIPPED | OUTPATIENT
Start: 2024-09-09

## 2024-08-24 RX ORDER — HEPARIN SODIUM (PORCINE) LOCK FLUSH IV SOLN 100 UNIT/ML 100 UNIT/ML
5 SOLUTION INTRAVENOUS
OUTPATIENT
Start: 2024-08-24

## 2024-08-24 RX ORDER — CALCIUM CARBONATE/VITAMIN D3 600 MG-10
1 TABLET ORAL DAILY
Qty: 60 TABLET | Refills: 1 | Status: SHIPPED | OUTPATIENT
Start: 2024-08-25

## 2024-08-24 RX ORDER — HEPARIN SODIUM,PORCINE 10 UNIT/ML
5-20 VIAL (ML) INTRAVENOUS DAILY PRN
Status: CANCELLED | OUTPATIENT
Start: 2024-08-26

## 2024-08-24 RX ORDER — LISINOPRIL 5 MG/1
5 TABLET ORAL DAILY
Qty: 60 TABLET | Refills: 1 | Status: SHIPPED | OUTPATIENT
Start: 2024-08-25

## 2024-08-24 RX ORDER — POTASSIUM CHLORIDE 750 MG/1
20 TABLET, EXTENDED RELEASE ORAL ONCE
Status: COMPLETED | OUTPATIENT
Start: 2024-08-24 | End: 2024-08-24

## 2024-08-24 RX ORDER — SIROLIMUS 2 MG/1
4 TABLET, FILM COATED ORAL DAILY
Qty: 60 TABLET | Refills: 1 | Status: SHIPPED | OUTPATIENT
Start: 2024-08-25 | End: 2024-09-13 | Stop reason: DRUGHIGH

## 2024-08-24 RX ORDER — METOPROLOL TARTRATE 25 MG/1
25 TABLET, FILM COATED ORAL 2 TIMES DAILY
Qty: 60 TABLET | Refills: 1 | Status: SHIPPED | OUTPATIENT
Start: 2024-08-24 | End: 2024-09-17

## 2024-08-24 RX ORDER — AMLODIPINE BESYLATE 10 MG/1
10 TABLET ORAL EVERY MORNING
Qty: 60 TABLET | Refills: 1 | Status: SHIPPED | OUTPATIENT
Start: 2024-08-24

## 2024-08-24 RX ADMIN — TAMSULOSIN HYDROCHLORIDE 0.4 MG: 0.4 CAPSULE ORAL at 09:25

## 2024-08-24 RX ADMIN — LORATADINE 10 MG: 10 TABLET ORAL at 09:24

## 2024-08-24 RX ADMIN — MAGNESIUM OXIDE TAB 400 MG (241.3 MG ELEMENTAL MG) 400 MG: 400 (241.3 MG) TAB at 05:06

## 2024-08-24 RX ADMIN — ACYCLOVIR 800 MG: 800 TABLET ORAL at 20:59

## 2024-08-24 RX ADMIN — FLUTICASONE PROPIONATE 1 SPRAY: 50 SPRAY, METERED NASAL at 21:01

## 2024-08-24 RX ADMIN — BRIMONIDINE TARTRATE 1 DROP: 2 SOLUTION/ DROPS OPHTHALMIC at 21:00

## 2024-08-24 RX ADMIN — URSODIOL 300 MG: 300 CAPSULE ORAL at 13:41

## 2024-08-24 RX ADMIN — MYCOPHENOLATE MOFETIL 1500 MG: 500 TABLET, FILM COATED ORAL at 18:36

## 2024-08-24 RX ADMIN — ASPIRIN 81 MG: 81 TABLET ORAL at 09:24

## 2024-08-24 RX ADMIN — BRIMONIDINE TARTRATE 1 DROP: 2 SOLUTION/ DROPS OPHTHALMIC at 13:42

## 2024-08-24 RX ADMIN — Medication 2 SPRAY: at 13:42

## 2024-08-24 RX ADMIN — METOPROLOL TARTRATE 25 MG: 25 TABLET, FILM COATED ORAL at 20:59

## 2024-08-24 RX ADMIN — ATORVASTATIN CALCIUM 10 MG: 10 TABLET, FILM COATED ORAL at 20:59

## 2024-08-24 RX ADMIN — Medication 2 SPRAY: at 21:01

## 2024-08-24 RX ADMIN — CALCIUM CARBONATE 600 MG (1,500 MG)-VITAMIN D3 400 UNIT TABLET 1 TABLET: at 09:24

## 2024-08-24 RX ADMIN — MAGNESIUM OXIDE TAB 400 MG (241.3 MG ELEMENTAL MG) 400 MG: 400 (241.3 MG) TAB at 09:24

## 2024-08-24 RX ADMIN — SIROLIMUS 4 MG: 2 TABLET ORAL at 09:24

## 2024-08-24 RX ADMIN — ALLOPURINOL 300 MG: 300 TABLET ORAL at 09:25

## 2024-08-24 RX ADMIN — AMLODIPINE BESYLATE 10 MG: 10 TABLET ORAL at 09:24

## 2024-08-24 RX ADMIN — MYCOPHENOLATE MOFETIL 1500 MG: 500 TABLET, FILM COATED ORAL at 09:24

## 2024-08-24 RX ADMIN — LISINOPRIL 5 MG: 5 TABLET ORAL at 09:25

## 2024-08-24 RX ADMIN — URSODIOL 300 MG: 300 CAPSULE ORAL at 09:25

## 2024-08-24 RX ADMIN — URSODIOL 300 MG: 300 CAPSULE ORAL at 20:59

## 2024-08-24 RX ADMIN — FLUTICASONE PROPIONATE 1 SPRAY: 50 SPRAY, METERED NASAL at 09:33

## 2024-08-24 RX ADMIN — MICAFUNGIN SODIUM 300 MG: 50 INJECTION, POWDER, LYOPHILIZED, FOR SOLUTION INTRAVENOUS at 13:42

## 2024-08-24 RX ADMIN — POTASSIUM CHLORIDE 20 MEQ: 750 TABLET, EXTENDED RELEASE ORAL at 05:06

## 2024-08-24 RX ADMIN — ACYCLOVIR 800 MG: 800 TABLET ORAL at 09:24

## 2024-08-24 RX ADMIN — METOPROLOL TARTRATE 25 MG: 25 TABLET, FILM COATED ORAL at 09:25

## 2024-08-24 RX ADMIN — Medication 3 CAPSULE: at 09:24

## 2024-08-24 RX ADMIN — Medication 2 SPRAY: at 15:39

## 2024-08-24 RX ADMIN — Medication 2 SPRAY: at 09:33

## 2024-08-24 RX ADMIN — Medication 5 ML: at 15:36

## 2024-08-24 RX ADMIN — SIMETHICONE 80 MG: 80 TABLET, CHEWABLE ORAL at 16:37

## 2024-08-24 RX ADMIN — Medication 5 ML: at 03:35

## 2024-08-24 RX ADMIN — LATANOPROST 2 DROP: 50 SOLUTION OPHTHALMIC at 22:19

## 2024-08-24 RX ADMIN — PANTOPRAZOLE SODIUM 40 MG: 40 TABLET, DELAYED RELEASE ORAL at 09:25

## 2024-08-24 ASSESSMENT — ACTIVITIES OF DAILY LIVING (ADL)
ADLS_ACUITY_SCORE: 27
ADLS_ACUITY_SCORE: 28
ADLS_ACUITY_SCORE: 27
ADLS_ACUITY_SCORE: 26
ADLS_ACUITY_SCORE: 28
ADLS_ACUITY_SCORE: 26
ADLS_ACUITY_SCORE: 28
ADLS_ACUITY_SCORE: 28
ADLS_ACUITY_SCORE: 26
ADLS_ACUITY_SCORE: 27
ADLS_ACUITY_SCORE: 28
ADLS_ACUITY_SCORE: 27
ADLS_ACUITY_SCORE: 27
ADLS_ACUITY_SCORE: 28
ADLS_ACUITY_SCORE: 27
ADLS_ACUITY_SCORE: 26

## 2024-08-24 NOTE — PROVIDER NOTIFICATION
Time of notification: 1:54 PM  Provider notified: Alonzo Hernandez  Patient status: Hey just updating you on her bowel movements. She had another bowel movement that was maroon and black. I added a picture to the media tab in her chart. Hgb is 7.1.     She has new pain in the left arm and has been tired today but otherwise asymptomatic.     Temp:  [97.7  F (36.5  C)-99.1  F (37.3  C)] 98.5  F (36.9  C)  Pulse:  [90-94] 90  Resp:  [12-16] 16  BP: (118-151)/(65-86) 118/65  SpO2:  [98 %] 98 %    Orders received: Thanks. We're alerting GI. Is she otherwise asymptomatic?

## 2024-08-24 NOTE — PROGRESS NOTES
"BMT Progress Note  Chief complaint:  Arpit Forrest is a 73 year old male, currently day+17 s/p ELIEZER MUD PBSCT for MDS.  INTERIM HISTORY:   Doing well. Eating well. Not requiring much sliding scale insulin, will monitor POCT glucose today without. No N/V. No diarrhea or rashes. Active.  REVIEW OF SYSTEMS: Otherwise unremarkable other than what is noted in the Interim History.   PHYSICAL EXAM:   Vitals:  BP (!) 148/86 (BP Location: Right arm)   Pulse 91   Temp 98.4  F (36.9  C) (Oral)   Resp 16   Ht 1.825 m (5' 11.85\")   Wt 110.8 kg (244 lb 4.8 oz)   SpO2 98%   BMI 33.27 kg/m      General: alert and cooperative, NAD. Face with maye appearance--stable per pt.  CV: RRR, pan systolic murmur (known)  Resp: CTAB  Abdomen: NT, ND, BS+  Skin: no rashes on limited exam  Psych: Mood and affect normal  Vascular: Right CVC     LABS:  Lab Results   Component Value Date    WBC 5.3 08/24/2024    ANEU 4.2 08/24/2024    HGB 8.3 (L) 08/24/2024    HCT 25.9 (L) 08/24/2024    PLT 83 (L) 08/24/2024     08/24/2024    POTASSIUM 3.7 08/24/2024    CHLORIDE 105 08/24/2024    CO2 24 08/24/2024     (H) 08/24/2024    BUN 9.0 08/24/2024    CR 0.89 08/24/2024    MAG 2.0 08/24/2024    INR 1.17 (H) 08/19/2024    BILITOTAL 0.2 08/22/2024    AST 16 08/22/2024    ALT 16 08/22/2024    ALKPHOS 63 08/22/2024    PROTTOTAL 5.8 (L) 08/22/2024    ALBUMIN 3.1 (L) 08/22/2024        ASSESSMENT AND PLAN: Arpit Forrest is a 73 year old male, currently day +17 s/p ELIEZER MUD PBSCT for MDS.    Summary:   -Ashley 300mg TTS in clinic   -sliding scale insulin stopped, PTA metformin, monitor BG, will need Endocrine follow up outpatient    BMT/IEC PROTOCOL for MDS  - Chemo protocol: [id number]; CY/flu/TBI  - Peripheral blood stem cell graft from 8/8 donor, ABO matched (Oneg), Cell dose: TBD  - continue allopurinol (hx of gout)  - Engraftment monitoring: Peripheral blood and bone marrow chimerisms on D28, D100, 6 months, 1 and 2 year  - Restaging plan: BMBx " with NGS on D28, D100, 6 months, 1 and 2 years    HEME/COAG  #A/C: CAD ppx ASA 81mg daily (8/23 now that platelets >/50k)  - GCSF started day +5, continue until ANC > 1500 for 3 consecutive days. 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 8/23 Siro =8.5, continue 4mg daily.    ID  #N/F (8/20) Cefepime stopped 8/22 with negative cultures, likely 2/2 engraftment  Recent Hx: Multiple granulomas in the lung and bone marrow biopsy- fungitell, aspergillus galactomanan neg. 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 Imodium PRN.  # Hepatic steatosis and boderline iron deposition in liver  - LFTs and synthetic function normal on admission; Check LFTs qM/Th  - VOD prophy: Ursodiol 300mg TID    # Supportive   - Anti-emetics:  per protocol   - Ulcer prophy: PPI daily   - Dietician support to prevent malnutrition.     CARDIOVASCULAR  # Asymptomatic moderate aortic stenosis  # Holosystolic murmur  # A.fib with RVR - resolved    -repeat ECHO pending 8/21 grade 1 diastolic dysfunction. Discussed with CARDS as this is new finding. Reviewed ECHO, noted to be a normal finding with aging, no further follow up or work up is necessary. Will add ASA, pt already on statin.    *Metoprolol x1 5mg IV , PO metoprolol BID 25mg, now in NSR  A.fib with RVR while admitted with febrile  episode on 3/15/24. Noted on his fitbit, not very symptomatic. Cardioversion planned for new onset A.fib. Not achieved with amiodarone, DCCV on 3/21/24 restored normal sinus rhythm. Amiodarone and anticoagulation was stopped in April 2024 without any recurrence.   # CAD  Chest CT in February 2024 with mild to moderate CAC, three-vessel disease. No anginal symptoms   Continue atorvastatin 10mg, started ASA 81mg (8/23 as platelets stable above 50k)  EKG Sinus rhythm Inferior infarct , age undetermined. Abnormal ECG (known cardiovascular disease). QTc =460  # HTN- Continue amlodipine 10mg, lisinopril 5mg  - Risk of cardiomyopathy:  Baseline EF normal 60-65&  - Risk of arrhythmia: Baseline EKG showed NSR, Qtc 460     RESPIRATORY  # VINCENT : CPAP  - Baseline PFTs: normal   - Risk of respiratory complications: Frequent ambulation and incentive spirometer.     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 Endo initially consulted, signed off with stable sugars. Sliding scale insulin stopped (8/24) as pt has not required substantial insulin replacement. Continue to hold PTA metformin. Will follow up with outpatient endocrinology clinic once further from transplant.     MUSCULOSKELETAL/FRAILTY  # Gout: Continue allopurinol indefinitely   - Baseline Frailty Score: Not frail   - Daily PT/OT as needed while inpatient    SOCIAL DETERMINANTS  - Caregiver: Wife, Meghna, children   Medically Ready for Discharge 8/25 Sunday    Clinically Significant Risk Factors              #  "Hypoalbuminemia: Lowest albumin = 3.1 g/dL at 8/22/2024  4:08 AM, will monitor as appropriate   # Thrombocytopenia: Lowest platelets = 66 in last 2 days, will monitor for bleeding   # Hypertension: Noted on problem list            # Obesity: Estimated body mass index is 33.27 kg/m  as calculated from the following:    Height as of this encounter: 1.825 m (5' 11.85\").    Weight as of this encounter: 110.8 kg (244 lb 4.8 oz).             I spent 30 minutes in the care of this patient today, which included time necessary for preparation for the visit, obtaining history, ordering medications/tests/procedures as medically indicated, review of pertinent medical literature, counseling of the patient, communication of recommendations to the care team, and documentation time.    ELLE Manuel    "

## 2024-08-24 NOTE — PLAN OF CARE
"/65 (BP Location: Right arm)   Pulse 90   Temp 98.5  F (36.9  C) (Oral)   Resp 16   Ht 1.825 m (5' 11.85\")   Wt 110.8 kg (244 lb 4.8 oz)   SpO2 99%   BMI 33.27 kg/m      VSS on RA. BG monitored 167-191. Pt stating provider spoke of no longer needing insulin. Patient and spouse with many questions regarding blood sugar management at home. RN reached out to diabetes educator who recommended endocrinology consult for discharge recommendations. Reminded to call for BG. Mouth/ throat pain improving per patient no PRN given. No nausea reported. Tolerating food well, taking all PO meds, and drinking fluids. K+ & Mag replaced. Line teaching initiated. Patient ambulated in gonzalez independently this care shift.  Micofungin increased for anticipated discharge 8/25.       Problem: Adult Inpatient Plan of Care  Goal: Plan of Care Review  Description: The Plan of Care Review/Shift note should be completed every shift.  The Outcome Evaluation is a brief statement about your assessment that the patient is improving, declining, or no change.  This information will be displayed automatically on your shift  note.  Outcome: Progressing  Flowsheets (Taken 8/24/2024 1459)  Plan of Care Reviewed With: patient  Overall Patient Progress: improving  Goal: Patient-Specific Goal (Individualized)  Description: You can add care plan individualizations to a care plan. Examples of Individualization might be:  \"Parent requests to be called daily at 9am for status\", \"I have a hard time hearing out of my right ear\", or \"Do not touch me to wake me up as it startles  me\".  Outcome: Progressing  Goal: Absence of Hospital-Acquired Illness or Injury  Outcome: Progressing  Intervention: Identify and Manage Fall Risk  Recent Flowsheet Documentation  Taken 8/24/2024 0924 by Marybeth Hanks, RN  Safety Promotion/Fall Prevention: safety round/check completed  Intervention: Prevent Skin Injury  Recent Flowsheet Documentation  Taken 8/24/2024 0924 " by Marybeth Hanks RN  Body Position: position changed independently  Skin Protection: adhesive use limited  Device Skin Pressure Protection: adhesive use limited  Intervention: Prevent and Manage VTE (Venous Thromboembolism) Risk  Recent Flowsheet Documentation  Taken 8/24/2024 0924 by Marybeth Hanks RN  VTE Prevention/Management: (pt oriented and walking in room) SCDs off (sequential compression devices)  Intervention: Prevent Infection  Recent Flowsheet Documentation  Taken 8/24/2024 0924 by Marybeth Hanks RN  Infection Prevention:   cohorting utilized   environmental surveillance performed   equipment surfaces disinfected   hand hygiene promoted   personal protective equipment utilized   single patient room provided   visitors restricted/screened  Goal: Optimal Comfort and Wellbeing  Outcome: Progressing  Intervention: Provide Person-Centered Care  Recent Flowsheet Documentation  Taken 8/24/2024 0924 by Marybeth Hanks RN  Trust Relationship/Rapport:   care explained   choices provided  Goal: Readiness for Transition of Care  Outcome: Progressing     Problem: Risk for Delirium  Goal: Optimal Coping  Outcome: Progressing  Intervention: Optimize Psychosocial Adjustment to Delirium  Recent Flowsheet Documentation  Taken 8/24/2024 0924 by Marybeth Hanks RN  Supportive Measures: active listening utilized  Goal: Improved Sleep  Outcome: Progressing     Problem: Stem Cell/Bone Marrow Transplant  Goal: Optimal Coping with Transplant  Outcome: Progressing  Intervention: Optimize Patient/Family Adjustment to Transplant  Recent Flowsheet Documentation  Taken 8/24/2024 0924 by Marybeth Hanks RN  Supportive Measures: active listening utilized  Goal: Symptom-Free Urinary Elimination  Outcome: Progressing  Intervention: Prevent or Manage Bladder Irritation  Recent Flowsheet Documentation  Taken 8/24/2024 0924 by Marybeth Hanks RN  Urinary Elimination Promotion: frequent voiding  encouraged  Hyperhydration Management: fluids provided  Goal: Diarrhea Symptom Control  Outcome: Progressing  Intervention: Manage Diarrhea  Recent Flowsheet Documentation  Taken 8/24/2024 0924 by Marybeth Hanks RN  Skin Protection: adhesive use limited  Fluid/Electrolyte Management: fluids provided  Goal: Improved Activity Tolerance  Outcome: Progressing  Intervention: Promote Improved Energy  Recent Flowsheet Documentation  Taken 8/24/2024 0924 by Marybeth Hanks RN  Activity Management: up in chair  Environmental Support: calm environment promoted  Goal: Blood Counts Within Acceptable Range  Outcome: Progressing  Intervention: Monitor and Manage Hematologic Symptoms  Recent Flowsheet Documentation  Taken 8/24/2024 0924 by Marybeth Hanks RN  Bleeding Precautions:   blood pressure closely monitored   foot protection facilitated   gentle oral care promoted   monitored for signs of bleeding  Medication Review/Management: medications reviewed  Goal: Absence of Hypersensitivity Reaction  Outcome: Progressing  Goal: Absence of Infection  Outcome: Progressing  Intervention: Prevent and Manage Infection  Recent Flowsheet Documentation  Taken 8/24/2024 0924 by Marybeth Hanks RN  Infection Prevention:   cohorting utilized   environmental surveillance performed   equipment surfaces disinfected   hand hygiene promoted   personal protective equipment utilized   single patient room provided   visitors restricted/screened  Infection Management: aseptic technique maintained  Isolation Precautions: protective environment maintained  Goal: Improved Oral Mucous Membrane Health and Integrity  Outcome: Progressing  Intervention: Promote Oral Comfort and Health  Recent Flowsheet Documentation  Taken 8/24/2024 0924 by Marybeth Hanks RN  Oral Mucous Membrane Protection:   nonirritating oral fluids promoted   nonirritating oral foods promoted  Goal: Nausea and Vomiting Symptom Relief  Outcome: Progressing    "  Problem: Infection  Goal: Absence of Infection Signs and Symptoms  Outcome: Progressing  Intervention: Prevent or Manage Infection  Recent Flowsheet Documentation  Taken 8/24/2024 0924 by Marybeth Hanks RN  Infection Management: aseptic technique maintained  Isolation Precautions: protective environment maintained     Problem: Comorbidity Management  Goal: Blood Glucose Levels Within Targeted Range  Outcome: Progressing  Intervention: Monitor and Manage Glycemia  Recent Flowsheet Documentation  Taken 8/24/2024 0924 by Marybeth Hanks RN  Glycemic Management:   carbohydrate replacement provided   blood glucose monitored  Medication Review/Management: medications reviewed     Problem: Obstructive Sleep Apnea Risk or Actual  Goal: Unobstructed Breathing During Sleep  Outcome: Progressing  Intervention: Monitor and Manage Obstructive Sleep Apnea  Recent Flowsheet Documentation  Taken 8/24/2024 0924 by Marybeth Hanks RN  Medication Review/Management: medications reviewed     Problem: Adult Inpatient Plan of Care  Goal: Plan of Care Review  Description: The Plan of Care Review/Shift note should be completed every shift.  The Outcome Evaluation is a brief statement about your assessment that the patient is improving, declining, or no change.  This information will be displayed automatically on your shift  note.  Outcome: Progressing  Flowsheets (Taken 8/24/2024 1459)  Plan of Care Reviewed With: patient  Overall Patient Progress: improving  Goal: Patient-Specific Goal (Individualized)  Description: You can add care plan individualizations to a care plan. Examples of Individualization might be:  \"Parent requests to be called daily at 9am for status\", \"I have a hard time hearing out of my right ear\", or \"Do not touch me to wake me up as it startles  me\".  Outcome: Progressing  Goal: Absence of Hospital-Acquired Illness or Injury  Outcome: Progressing  Intervention: Identify and Manage Fall Risk  Recent " Flowsheet Documentation  Taken 8/24/2024 0924 by Marybeth Hanks RN  Safety Promotion/Fall Prevention: safety round/check completed  Intervention: Prevent Skin Injury  Recent Flowsheet Documentation  Taken 8/24/2024 0924 by Marybeth Hanks RN  Body Position: position changed independently  Skin Protection: adhesive use limited  Device Skin Pressure Protection: adhesive use limited  Intervention: Prevent and Manage VTE (Venous Thromboembolism) Risk  Recent Flowsheet Documentation  Taken 8/24/2024 0924 by Marybeth Hanks RN  VTE Prevention/Management: (pt oriented and walking in room) SCDs off (sequential compression devices)  Intervention: Prevent Infection  Recent Flowsheet Documentation  Taken 8/24/2024 0924 by Marybeth Hanks RN  Infection Prevention:   cohorting utilized   environmental surveillance performed   equipment surfaces disinfected   hand hygiene promoted   personal protective equipment utilized   single patient room provided   visitors restricted/screened  Goal: Optimal Comfort and Wellbeing  Outcome: Progressing  Intervention: Provide Person-Centered Care  Recent Flowsheet Documentation  Taken 8/24/2024 0924 by Marybeth Hanks RN  Trust Relationship/Rapport:   care explained   choices provided  Goal: Readiness for Transition of Care  Outcome: Progressing     Problem: Pain Acute  Goal: Optimal Pain Control and Function  Outcome: Progressing  Intervention: Prevent or Manage Pain  Recent Flowsheet Documentation  Taken 8/24/2024 0924 by Marybeth Hanks RN  Bowel Elimination Promotion: adequate fluid intake promoted  Medication Review/Management: medications reviewed  Intervention: Optimize Psychosocial Wellbeing  Recent Flowsheet Documentation  Taken 8/24/2024 0924 by Marybeth Hanks RN  Supportive Measures: active listening utilized     Problem: Stem Cell/Bone Marrow Transplant  Goal: Optimal Coping with Transplant  Outcome: Progressing  Intervention: Optimize  Patient/Family Adjustment to Transplant  Recent Flowsheet Documentation  Taken 8/24/2024 0924 by Marybeth Hanks RN  Supportive Measures: active listening utilized  Goal: Symptom-Free Urinary Elimination  Outcome: Progressing  Intervention: Prevent or Manage Bladder Irritation  Recent Flowsheet Documentation  Taken 8/24/2024 0924 by Marybeth Hanks RN  Urinary Elimination Promotion: frequent voiding encouraged  Hyperhydration Management: fluids provided  Goal: Diarrhea Symptom Control  Outcome: Progressing  Intervention: Manage Diarrhea  Recent Flowsheet Documentation  Taken 8/24/2024 0924 by Marybeth Hanks RN  Skin Protection: adhesive use limited  Fluid/Electrolyte Management: fluids provided  Goal: Improved Activity Tolerance  Outcome: Progressing  Intervention: Promote Improved Energy  Recent Flowsheet Documentation  Taken 8/24/2024 0924 by Marybeth Hanks RN  Activity Management: up in chair  Environmental Support: calm environment promoted  Goal: Blood Counts Within Acceptable Range  Outcome: Progressing  Intervention: Monitor and Manage Hematologic Symptoms  Recent Flowsheet Documentation  Taken 8/24/2024 0924 by Marybeth Hanks RN  Bleeding Precautions:   blood pressure closely monitored   foot protection facilitated   gentle oral care promoted   monitored for signs of bleeding  Medication Review/Management: medications reviewed  Goal: Absence of Hypersensitivity Reaction  Outcome: Progressing  Goal: Absence of Infection  Outcome: Progressing  Intervention: Prevent and Manage Infection  Recent Flowsheet Documentation  Taken 8/24/2024 0924 by Marybeth Hanks RN  Infection Prevention:   cohorting utilized   environmental surveillance performed   equipment surfaces disinfected   hand hygiene promoted   personal protective equipment utilized   single patient room provided   visitors restricted/screened  Infection Management: aseptic technique maintained  Isolation Precautions:  protective environment maintained  Goal: Improved Oral Mucous Membrane Health and Integrity  Outcome: Progressing  Intervention: Promote Oral Comfort and Health  Recent Flowsheet Documentation  Taken 8/24/2024 0924 by Marybeth Hanks RN  Oral Mucous Membrane Protection:   nonirritating oral fluids promoted   nonirritating oral foods promoted  Goal: Nausea and Vomiting Symptom Relief  Outcome: Progressing  Goal: Optimal Nutrition Intake  Outcome: Progressing  Intervention: Minimize and Manage Barriers to Oral Intake  Recent Flowsheet Documentation  Taken 8/24/2024 0924 by Marybeth Hanks RN  Oral Nutrition Promotion: calorie-dense foods provided       Goal Outcome Evaluation:      Plan of Care Reviewed With: patient    Overall Patient Progress: improvingOverall Patient Progress: improving

## 2024-08-24 NOTE — PROGRESS NOTES
Care Management Discharge Note    Discharge Date: 08/25/2024     Discharge Disposition: Home     Discharge Services: None     Discharge DME: None     Discharge Transportation: family or friend will provide     Private pay costs discussed: Not applicable     Does the patient's insurance plan have a 3 day qualifying hospital stay waiver?  No     PAS Confirmation Code: n/a    Patient/family educated on Medicare website which has current facility and service quality ratings: yes     Education Provided on the Discharge Plan: Yes    Persons Notified of Discharge Plans: Pt    Patient/Family in Agreement with the Plan: yes     Handoff Referral Completed: No    Additional Information:  Discharge visit previously completed by primary BMT SIXTO Nowak. CSW reviewed IMM Notice of Medicare Discharge rights. Pt declined/questions or concerns. CSW provided supportive listening and encouragement as pt shared about his healthcare and life experiences.  SW encouraged pt to contact SW for support, questions and/or resources.     IMM completed, faxed to HIM, and copy placed in pts chart.     Assessment: Pt presented as pleasant. Pt continues to be supported by spouse Meghna.    SHUN Saez, LGSW  [facility]  Adult Blood & Marrow Transplant   Phone: [phone]  [email or web address] SEARCHABLE: BMT SW #4  Securely message with [employer]   Support Groups at [facility]: Social Work Services for Cancer Patients ([email or web address])

## 2024-08-24 NOTE — PLAN OF CARE
[id number]: D+17 Allo. Tmax 99.1. Hypertensive within parameters. OVSS on RA. Denies nausea. Mucositis pain improving. Declines intervention.  and 151. K 3.7 and Mag 2.0. Both replaced orally with recheck tomorrow morning. Uses primofit overnight. UAL- declining SBA.      Plan: Discharge Sunday pending clinical stability.

## 2024-08-25 ENCOUNTER — APPOINTMENT (OUTPATIENT)
Dept: PHYSICAL THERAPY | Facility: CLINIC | Age: 74
DRG: 014 | End: 2024-08-25
Attending: INTERNAL MEDICINE
Payer: MEDICARE

## 2024-08-25 VITALS
TEMPERATURE: 97.9 F | DIASTOLIC BLOOD PRESSURE: 77 MMHG | BODY MASS INDEX: 33.02 KG/M2 | HEIGHT: 72 IN | HEART RATE: 83 BPM | OXYGEN SATURATION: 99 % | WEIGHT: 243.8 LBS | RESPIRATION RATE: 16 BRPM | SYSTOLIC BLOOD PRESSURE: 140 MMHG

## 2024-08-25 LAB
ANION GAP SERPL CALCULATED.3IONS-SCNC: 12 MMOL/L (ref 7–15)
BACTERIA BLD CULT: NO GROWTH
BACTERIA BLD CULT: NO GROWTH
BASOPHILS # BLD AUTO: ABNORMAL 10*3/UL
BASOPHILS # BLD MANUAL: 0 10E3/UL (ref 0–0.2)
BASOPHILS NFR BLD AUTO: ABNORMAL %
BASOPHILS NFR BLD MANUAL: 0 %
BUN SERPL-MCNC: 8.8 MG/DL (ref 8–23)
CALCIUM SERPL-MCNC: 8.9 MG/DL (ref 8.8–10.4)
CHLORIDE SERPL-SCNC: 104 MMOL/L (ref 98–107)
CREAT SERPL-MCNC: 0.88 MG/DL (ref 0.67–1.17)
EGFRCR SERPLBLD CKD-EPI 2021: 90 ML/MIN/1.73M2
EOSINOPHIL # BLD AUTO: ABNORMAL 10*3/UL
EOSINOPHIL # BLD MANUAL: 0 10E3/UL (ref 0–0.7)
EOSINOPHIL NFR BLD AUTO: ABNORMAL %
EOSINOPHIL NFR BLD MANUAL: 0 %
ERYTHROCYTE [DISTWIDTH] IN BLOOD BY AUTOMATED COUNT: 14.5 % (ref 10–15)
FRAGMENTS BLD QL SMEAR: SLIGHT
GLUCOSE BLDC GLUCOMTR-MCNC: 160 MG/DL (ref 70–99)
GLUCOSE SERPL-MCNC: 213 MG/DL (ref 70–99)
HCO3 SERPL-SCNC: 23 MMOL/L (ref 22–29)
HCT VFR BLD AUTO: 27.5 % (ref 40–53)
HGB BLD-MCNC: 8.8 G/DL (ref 13.3–17.7)
IMM GRANULOCYTES # BLD: ABNORMAL 10*3/UL
IMM GRANULOCYTES NFR BLD: ABNORMAL %
LYMPHOCYTES # BLD AUTO: ABNORMAL 10*3/UL
LYMPHOCYTES # BLD MANUAL: 0.2 10E3/UL (ref 0.8–5.3)
LYMPHOCYTES NFR BLD AUTO: ABNORMAL %
LYMPHOCYTES NFR BLD MANUAL: 6 %
MAGNESIUM SERPL-MCNC: 1.9 MG/DL (ref 1.7–2.3)
MCH RBC QN AUTO: 29.4 PG (ref 26.5–33)
MCHC RBC AUTO-ENTMCNC: 32 G/DL (ref 31.5–36.5)
MCV RBC AUTO: 92 FL (ref 78–100)
METAMYELOCYTES # BLD MANUAL: 0.1 10E3/UL
METAMYELOCYTES NFR BLD MANUAL: 3 %
MONOCYTES # BLD AUTO: ABNORMAL 10*3/UL
MONOCYTES # BLD MANUAL: 0.6 10E3/UL (ref 0–1.3)
MONOCYTES NFR BLD AUTO: ABNORMAL %
MONOCYTES NFR BLD MANUAL: 17 %
NEUTROPHILS # BLD AUTO: ABNORMAL 10*3/UL
NEUTROPHILS # BLD MANUAL: 2.7 10E3/UL (ref 1.6–8.3)
NEUTROPHILS NFR BLD AUTO: ABNORMAL %
NEUTROPHILS NFR BLD MANUAL: 74 %
NRBC # BLD AUTO: 0 10E3/UL
NRBC BLD AUTO-RTO: 1 /100
PHOSPHATE SERPL-MCNC: 3.7 MG/DL (ref 2.5–4.5)
PLAT MORPH BLD: ABNORMAL
PLATELET # BLD AUTO: 93 10E3/UL (ref 150–450)
POLYCHROMASIA BLD QL SMEAR: SLIGHT
POTASSIUM SERPL-SCNC: 3.8 MMOL/L (ref 3.4–5.3)
RBC # BLD AUTO: 2.99 10E6/UL (ref 4.4–5.9)
RBC MORPH BLD: ABNORMAL
SODIUM SERPL-SCNC: 139 MMOL/L (ref 135–145)
WBC # BLD AUTO: 3.6 10E3/UL (ref 4–11)

## 2024-08-25 PROCEDURE — 250N000012 HC RX MED GY IP 250 OP 636 PS 637: Performed by: PHYSICIAN ASSISTANT

## 2024-08-25 PROCEDURE — 250N000013 HC RX MED GY IP 250 OP 250 PS 637

## 2024-08-25 PROCEDURE — 250N000013 HC RX MED GY IP 250 OP 250 PS 637: Performed by: STUDENT IN AN ORGANIZED HEALTH CARE EDUCATION/TRAINING PROGRAM

## 2024-08-25 PROCEDURE — 85007 BL SMEAR W/DIFF WBC COUNT: CPT | Performed by: STUDENT IN AN ORGANIZED HEALTH CARE EDUCATION/TRAINING PROGRAM

## 2024-08-25 PROCEDURE — 250N000013 HC RX MED GY IP 250 OP 250 PS 637: Performed by: PHYSICIAN ASSISTANT

## 2024-08-25 PROCEDURE — 250N000012 HC RX MED GY IP 250 OP 636 PS 637

## 2024-08-25 PROCEDURE — 84100 ASSAY OF PHOSPHORUS: CPT | Performed by: INTERNAL MEDICINE

## 2024-08-25 PROCEDURE — 80048 BASIC METABOLIC PNL TOTAL CA: CPT | Performed by: STUDENT IN AN ORGANIZED HEALTH CARE EDUCATION/TRAINING PROGRAM

## 2024-08-25 PROCEDURE — 250N000009 HC RX 250

## 2024-08-25 PROCEDURE — 97530 THERAPEUTIC ACTIVITIES: CPT | Mod: GP

## 2024-08-25 PROCEDURE — 85014 HEMATOCRIT: CPT | Performed by: STUDENT IN AN ORGANIZED HEALTH CARE EDUCATION/TRAINING PROGRAM

## 2024-08-25 PROCEDURE — 83735 ASSAY OF MAGNESIUM: CPT | Performed by: INTERNAL MEDICINE

## 2024-08-25 RX ORDER — POTASSIUM CHLORIDE 750 MG/1
20 TABLET, EXTENDED RELEASE ORAL ONCE
Status: COMPLETED | OUTPATIENT
Start: 2024-08-25 | End: 2024-08-25

## 2024-08-25 RX ORDER — MAGNESIUM OXIDE 400 MG/1
400 TABLET ORAL EVERY 4 HOURS
Status: COMPLETED | OUTPATIENT
Start: 2024-08-25 | End: 2024-08-25

## 2024-08-25 RX ADMIN — FLUTICASONE PROPIONATE 1 SPRAY: 50 SPRAY, METERED NASAL at 08:59

## 2024-08-25 RX ADMIN — ACYCLOVIR 800 MG: 800 TABLET ORAL at 08:55

## 2024-08-25 RX ADMIN — AMLODIPINE BESYLATE 10 MG: 10 TABLET ORAL at 08:55

## 2024-08-25 RX ADMIN — SIROLIMUS 4 MG: 2 TABLET ORAL at 08:55

## 2024-08-25 RX ADMIN — Medication 3 CAPSULE: at 08:55

## 2024-08-25 RX ADMIN — MYCOPHENOLATE MOFETIL 1500 MG: 500 TABLET, FILM COATED ORAL at 08:55

## 2024-08-25 RX ADMIN — POTASSIUM CHLORIDE 20 MEQ: 750 TABLET, EXTENDED RELEASE ORAL at 05:43

## 2024-08-25 RX ADMIN — LORATADINE 10 MG: 10 TABLET ORAL at 08:55

## 2024-08-25 RX ADMIN — MAGNESIUM OXIDE TAB 400 MG (241.3 MG ELEMENTAL MG) 400 MG: 400 (241.3 MG) TAB at 05:43

## 2024-08-25 RX ADMIN — TAMSULOSIN HYDROCHLORIDE 0.4 MG: 0.4 CAPSULE ORAL at 08:55

## 2024-08-25 RX ADMIN — METOPROLOL TARTRATE 25 MG: 25 TABLET, FILM COATED ORAL at 08:55

## 2024-08-25 RX ADMIN — Medication 2 SPRAY: at 08:59

## 2024-08-25 RX ADMIN — DIPHENHYDRAMINE HYDROCHLORIDE AND LIDOCAINE HYDROCHLORIDE AND ALUMINUM HYDROXIDE AND MAGNESIUM HYDRO 10 ML: KIT at 00:28

## 2024-08-25 RX ADMIN — PANTOPRAZOLE SODIUM 40 MG: 40 TABLET, DELAYED RELEASE ORAL at 08:55

## 2024-08-25 RX ADMIN — URSODIOL 300 MG: 300 CAPSULE ORAL at 08:55

## 2024-08-25 RX ADMIN — MAGNESIUM OXIDE TAB 400 MG (241.3 MG ELEMENTAL MG) 400 MG: 400 (241.3 MG) TAB at 08:55

## 2024-08-25 RX ADMIN — ALLOPURINOL 300 MG: 300 TABLET ORAL at 08:55

## 2024-08-25 RX ADMIN — CALCIUM CARBONATE 600 MG (1,500 MG)-VITAMIN D3 400 UNIT TABLET 1 TABLET: at 08:55

## 2024-08-25 RX ADMIN — LISINOPRIL 5 MG: 5 TABLET ORAL at 08:55

## 2024-08-25 RX ADMIN — ASPIRIN 81 MG: 81 TABLET ORAL at 09:02

## 2024-08-25 ASSESSMENT — ACTIVITIES OF DAILY LIVING (ADL)
ADLS_ACUITY_SCORE: 26
ADLS_ACUITY_SCORE: 25
ADLS_ACUITY_SCORE: 26
ADLS_ACUITY_SCORE: 25
ADLS_ACUITY_SCORE: 26
ADLS_ACUITY_SCORE: 25
ADLS_ACUITY_SCORE: 26

## 2024-08-25 NOTE — PLAN OF CARE
"BP (!) 140/77 (BP Location: Right arm)   Pulse 83   Temp 97.9  F (36.6  C) (Oral)   Resp 16   Ht 1.825 m (5' 11.85\")   Wt 110.6 kg (243 lb 12.8 oz)   SpO2 99%   BMI 33.20 kg/m      Afebrile. HTN this AM prior to receiving scheduled antihypertensives; still within parameters. Remainder of VSS on RA. . No complaints of pain or nausea. Tolerating all PO meds. Eating and drinking without issues. Medically appropriate for discharge. Pt and caregiver provided with copy of AVS, discharge instructions, and medication information. Pt and caregiver voiced understanding and all questions answered.    Pt discharged to: Home.   Via: Private vehicle.   Time: 12:44.  Reason not before 11am or 2 hrs after order written: NA.   Accompanied by: Spouse.   Belongings: Sent with pt.   Teaching: Per unit protocol.   Cap and line flushed with caregiver: Done 8/24, reinforced 8/25.   Central line teach back questions complete: Done 8/25.  Pill box filled: Yes.   Clinic appointment: Tomorrow 8/26 12:30.  Report called/faxed: NA.   Local housing: Home with family.     Goal Outcome Evaluation:    Plan of Care Reviewed With: patient, caregiver  Overall Patient Progress: improvingOverall Patient Progress: improving  Problem: Adult Inpatient Plan of Care  Goal: Plan of Care Review  Description: The Plan of Care Review/Shift note should be completed every shift.  The Outcome Evaluation is a brief statement about your assessment that the patient is improving, declining, or no change.  This information will be displayed automatically on your shift  note.  Outcome: Met  Flowsheets (Taken 8/25/2024 2215)  Plan of Care Reviewed With:   patient   caregiver  Overall Patient Progress: improving  Goal: Patient-Specific Goal (Individualized)  Description: You can add care plan individualizations to a care plan. Examples of Individualization might be:  \"Parent requests to be called daily at 9am for status\", \"I have a hard time hearing out of my " "right ear\", or \"Do not touch me to wake me up as it startles  me\".  Outcome: Met  Goal: Absence of Hospital-Acquired Illness or Injury  Outcome: Met  Intervention: Identify and Manage Fall Risk  Recent Flowsheet Documentation  Taken 8/25/2024 0800 by Katelyn Turk RN  Safety Promotion/Fall Prevention:   safety round/check completed   room organization consistent   patient and family education   nonskid shoes/slippers when out of bed   clutter free environment maintained  Intervention: Prevent Skin Injury  Recent Flowsheet Documentation  Taken 8/25/2024 0800 by Katelyn Turk RN  Body Position: position changed independently  Skin Protection: adhesive use limited  Device Skin Pressure Protection: adhesive use limited  Intervention: Prevent Infection  Recent Flowsheet Documentation  Taken 8/25/2024 0800 by Katelyn Turk RN  Infection Prevention:   cohorting utilized   environmental surveillance performed   equipment surfaces disinfected   hand hygiene promoted   personal protective equipment utilized   rest/sleep promoted   single patient room provided   visitors restricted/screened  Goal: Optimal Comfort and Wellbeing  Outcome: Met  Goal: Readiness for Transition of Care  Outcome: Met     Problem: Adult Inpatient Plan of Care  Goal: Plan of Care Review  Description: The Plan of Care Review/Shift note should be completed every shift.  The Outcome Evaluation is a brief statement about your assessment that the patient is improving, declining, or no change.  This information will be displayed automatically on your shift  note.  Outcome: Met  Flowsheets (Taken 8/25/2024 0955)  Plan of Care Reviewed With:   patient   caregiver  Overall Patient Progress: improving  Goal: Patient-Specific Goal (Individualized)  Description: You can add care plan individualizations to a care plan. Examples of Individualization might be:  \"Parent requests to be called daily at 9am for status\", \"I have a hard time hearing out of my " "right ear\", or \"Do not touch me to wake me up as it startles  me\".  Outcome: Met  Goal: Absence of Hospital-Acquired Illness or Injury  Outcome: Met  Intervention: Identify and Manage Fall Risk  Recent Flowsheet Documentation  Taken 8/25/2024 0800 by Katelyn Turk RN  Safety Promotion/Fall Prevention:   safety round/check completed   room organization consistent   patient and family education   nonskid shoes/slippers when out of bed   clutter free environment maintained  Intervention: Prevent Skin Injury  Recent Flowsheet Documentation  Taken 8/25/2024 0800 by Katelyn Turk RN  Body Position: position changed independently  Skin Protection: adhesive use limited  Device Skin Pressure Protection: adhesive use limited  Intervention: Prevent Infection  Recent Flowsheet Documentation  Taken 8/25/2024 0800 by Katelyn Turk RN  Infection Prevention:   cohorting utilized   environmental surveillance performed   equipment surfaces disinfected   hand hygiene promoted   personal protective equipment utilized   rest/sleep promoted   single patient room provided   visitors restricted/screened  Goal: Optimal Comfort and Wellbeing  Outcome: Met  Goal: Readiness for Transition of Care  Outcome: Met     Problem: Risk for Delirium  Goal: Optimal Coping  Outcome: Met  Goal: Improved Behavioral Control  Intervention: Minimize Safety Risk  Recent Flowsheet Documentation  Taken 8/25/2024 0800 by Katelyn Turk RN  Enhanced Safety Measures: patient/family teach back on injury risk  Goal: Improved Sleep  Outcome: Met     Problem: Stem Cell/Bone Marrow Transplant  Goal: Optimal Coping with Transplant  Outcome: Met  Goal: Symptom-Free Urinary Elimination  Outcome: Met  Goal: Diarrhea Symptom Control  Outcome: Met  Intervention: Manage Diarrhea  Recent Flowsheet Documentation  Taken 8/25/2024 0800 by Katelyn Turk RN  Skin Protection: adhesive use limited  Goal: Improved Activity Tolerance  Outcome: Met  Intervention: " Promote Improved Energy  Recent Flowsheet Documentation  Taken 8/25/2024 0800 by Katelyn Turk RN  Activity Management: activity adjusted per tolerance  Goal: Blood Counts Within Acceptable Range  Outcome: Met  Intervention: Monitor and Manage Hematologic Symptoms  Recent Flowsheet Documentation  Taken 8/25/2024 0800 by Katelyn Turk RN  Bleeding Precautions:   blood pressure closely monitored   gentle oral care promoted   foot protection facilitated   monitored for signs of bleeding  Medication Review/Management:   medications reviewed   high-risk medications identified  Goal: Absence of Hypersensitivity Reaction  Outcome: Met  Goal: Absence of Infection  Outcome: Met  Intervention: Prevent and Manage Infection  Recent Flowsheet Documentation  Taken 8/25/2024 0800 by Katelyn Turk RN  Infection Prevention:   cohorting utilized   environmental surveillance performed   equipment surfaces disinfected   hand hygiene promoted   personal protective equipment utilized   rest/sleep promoted   single patient room provided   visitors restricted/screened  Isolation Precautions: protective environment maintained  Goal: Improved Oral Mucous Membrane Health and Integrity  Outcome: Met  Intervention: Promote Oral Comfort and Health  Recent Flowsheet Documentation  Taken 8/25/2024 0800 by Katelyn Turk RN  Oral Care: oral rinse provided  Goal: Nausea and Vomiting Symptom Relief  Outcome: Met     Problem: Stem Cell/Bone Marrow Transplant  Goal: Optimal Coping with Transplant  Outcome: Met  Goal: Symptom-Free Urinary Elimination  Outcome: Met  Goal: Diarrhea Symptom Control  Outcome: Met  Intervention: Manage Diarrhea  Recent Flowsheet Documentation  Taken 8/25/2024 0800 by Katelyn Turk RN  Skin Protection: adhesive use limited  Goal: Improved Activity Tolerance  Outcome: Met  Intervention: Promote Improved Energy  Recent Flowsheet Documentation  Taken 8/25/2024 0800 by Katelyn Turk RN  Activity Management:  activity adjusted per tolerance  Goal: Blood Counts Within Acceptable Range  Outcome: Met  Intervention: Monitor and Manage Hematologic Symptoms  Recent Flowsheet Documentation  Taken 8/25/2024 0800 by Katelyn Turk RN  Bleeding Precautions:   blood pressure closely monitored   gentle oral care promoted   foot protection facilitated   monitored for signs of bleeding  Medication Review/Management:   medications reviewed   high-risk medications identified  Goal: Absence of Hypersensitivity Reaction  Outcome: Met  Goal: Absence of Infection  Outcome: Met  Intervention: Prevent and Manage Infection  Recent Flowsheet Documentation  Taken 8/25/2024 0800 by Katelyn Turk RN  Infection Prevention:   cohorting utilized   environmental surveillance performed   equipment surfaces disinfected   hand hygiene promoted   personal protective equipment utilized   rest/sleep promoted   single patient room provided   visitors restricted/screened  Isolation Precautions: protective environment maintained  Goal: Improved Oral Mucous Membrane Health and Integrity  Outcome: Met  Intervention: Promote Oral Comfort and Health  Recent Flowsheet Documentation  Taken 8/25/2024 0800 by Katelyn Turk RN  Oral Care: oral rinse provided  Goal: Nausea and Vomiting Symptom Relief  Outcome: Met  Goal: Optimal Nutrition Intake  Outcome: Met     Problem: Infection  Goal: Absence of Infection Signs and Symptoms  Outcome: Met  Intervention: Prevent or Manage Infection  Recent Flowsheet Documentation  Taken 8/25/2024 0800 by Katelyn Turk RN  Isolation Precautions: protective environment maintained     Problem: Comorbidity Management  Goal: Blood Glucose Levels Within Targeted Range  Outcome: Met  Intervention: Monitor and Manage Glycemia  Recent Flowsheet Documentation  Taken 8/25/2024 0800 by Katelyn Turk RN  Medication Review/Management:   medications reviewed   high-risk medications identified     Problem: Obstructive Sleep Apnea  Risk or Actual  Goal: Unobstructed Breathing During Sleep  Outcome: Met  Intervention: Monitor and Manage Obstructive Sleep Apnea  Recent Flowsheet Documentation  Taken 8/25/2024 0800 by Katelyn Turk RN  Medication Review/Management:   medications reviewed   high-risk medications identified     Problem: Pain Acute  Goal: Optimal Pain Control and Function  Outcome: Met  Intervention: Prevent or Manage Pain  Recent Flowsheet Documentation  Taken 8/25/2024 0800 by Katelyn Turk RN  Medication Review/Management:   medications reviewed   high-risk medications identified

## 2024-08-25 NOTE — DISCHARGE SUMMARY
[facility] Discharge Summary   Arpit Forrest MRN# [id number]   Age: 73 year old  YOB: 1950   Date of Admission: 7/31/2024  Date of Discharge:  08/25/24  Admitting Physician: GASPER Rios  Discharge Physician:  Dr. Hany MD  Discharge Diagnoses:    S/p ELIEZER MUD PBSCT   MDS  Pancytopenia 2/2 chemo- anemia, thrombocytopenia, leukopenia  N/F  CINV  Diarrhea  Hepatic steatosis   Mucositis  Afib RVR  CAD  Aortic stenosis- moderate  HTN  VINCENT  BPH  Solitary kidney  Hypokalemia  Hypomagnesemia  Hypocalemia  DMII  Glaucoma  Discharge Medications:         Medication List        Started      aspirin 81 MG EC tablet  81 mg, Oral, DAILY     calcium carbonate-vitamin D 600-10 MG-MCG per tablet  Commonly known as: CALTRATE  1 tablet, Oral, DAILY     carbamide peroxide 6.5 % otic solution  Commonly known as: DEBROX  5 drops, Both Ears, 2 TIMES DAILY     Heparin Sod (Pork) Lock Flush 10 UNIT/ML Soln  10 mLs, Intravenous, DAILY, Inject 5mL into each lumen once a day.     lisinopril 5 MG tablet  Commonly known as: ZESTRIL  5 mg, Oral, DAILY     metFORMIN 500 MG tablet  Commonly known as: GLUCOPHAGE  Take 1 tablet (500mg) every morning and 2 tablets (1000mg) every evening.  Replaces: metFORMIN 500 MG 24 hr tablet     metoprolol tartrate 25 MG tablet  Commonly known as: LOPRESSOR  25 mg, Oral, 2 TIMES DAILY     mycophenolate 500 MG tablet  Commonly known as: GENERIC EQUIVALENT  1,500 mg, Oral, 2 TIMES DAILY     pantoprazole 40 MG EC tablet  Commonly known as: PROTONIX  40 mg, Oral, EVERY MORNING BEFORE BREAKFAST     pramox-pe-glycerin-petrolatum 1-0.25-14.4-15 % Crea cream  Commonly known as: PREPARATION H  Rectal, 3 TIMES DAILY PRN     prochlorperazine 5 MG tablet  Commonly known as: COMPAZINE  5 mg, Oral, EVERY 6 HOURS PRN     psyllium capsule  Commonly known as: METAMUCIL/KONSYL  3 capsules, Oral, DAILY     sirolimus 2 MG tablet  Commonly known as: GENERIC EQUIVALENT  4 mg, Oral, DAILY      sulfamethoxazole-trimethoprim 800-160 MG tablet  Commonly known as: BACTRIM DS  1 tablet, Oral, EVERY MONDAY TUESDAY BID, Do not begin taking until instructed to do so by BMT clinic.  Start taking on: September 9, 2024     tamsulosin 0.4 MG capsule  Commonly known as: FLOMAX  0.4 mg, Oral, DAILY     ursodiol 300 MG capsule  Commonly known as: ACTIGALL  300 mg, Oral, 3 TIMES DAILY            Modified      acyclovir 800 MG tablet  Commonly known as: ZOVIRAX  800 mg, Oral, 2 TIMES DAILY  What changed:   medication strength  See the new instructions.     allopurinol 300 MG tablet  Commonly known as: ZYLOPRIM  300 mg, Oral, DAILY  What changed:   medication strength  when to take this            Discontinued      acetaminophen 500 MG tablet  Commonly known as: TYLENOL     Calcium Carbonate Antacid 600 MG Chew     coenzyme Q-10 capsule     levofloxacin 500 MG tablet  Commonly known as: LEVAQUIN     metFORMIN 500 MG 24 hr tablet  Commonly known as: GLUCOPHAGE XR  Replaced by: metFORMIN 500 MG tablet     RABEprazole 20 MG EC tablet  Commonly known as: ACIPHEX     Vitamin D3 25 mcg (1000 units) tablet  Commonly known as: CHOLECALCIFEROL            Brief History of Illness:    **Adopted from H&P  Hematological History     Data  July 2023: Episode of gout in left ankle seen by Rheumatology and treated with prednisone taper, and allopurinol.  August 2023:  Collapsed in the bathroom while on a trip to [facility]. Had high fevers and rigors, treated with doxycycline.   11/30/2023: Seen by Rheumatology in the [place]. He had acute right sacroilitis. Ferritin was elevated to 1600, no hemachromatosis.   12/3/2023 to 12/9/2023: Hospitalized with high fevers and new onset mild pancytopenias. CT C/A/P did not show any lymphadenopathy or splenomegaly.  Infectious cultures were negative. 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 He was treated with anakinra, solumedrol and then started on oral prednisone.   12/7/2023: Bone marrow biopsy showed hypercellular marrow, no dysplasia, 34% ring sideroblasts, increased reticuloendothelial iron stores, no increase in blasts. Flow showed a CD5 positive B-cell infiltrate (5% of the marrow cellularity), consistent with monoclonal B-cell lymphocytosis. Cytogenetics showed a normal male karyotype. Myeloid malignancy panel did not show an SF3B1 mutation. However, there were mutations in SRSF2, DNMT3A, RUNX1, IDH2 p.[id number] , BCOR, BCORL1, and TET2. Diagnosed with CCUS.   1/31/2024 to 2/6/24: Hospitalized with fevers to 104 despite anti-IL1 therapy. Anakinra was increased, then switched to canakinumab. PET-Ct on 2/9/24 did not show any malignancy. Diagnosed with subclinical hyperthyroidism treated with methimazole starting 2/14/2024.  3/15/2024 to 3/25/2024: Hospitalized for neutropenic fevers. He also had an episode of atrial fibrillation with rapid ventricular response placed on AC with LMWH. Methimazole was held due to risk of agranulocytosis.  3/18/2024: Bone marrow biopsy showed Myelodysplastic syndrome, with multilineage dysplasia. 2% bone marrow blasts, and 3% circulating blasts.18% ringed sideroblasts, slight reticulin fibrosis. Concurrent CBC showed WBC 1.7, ANC 0.5, Hb 8.7, platelet count 43. Normal cytogenetics. NGS could not be sent from the bone marrow. NGS peripheral blood (4/18/24) showed mutations in BCOR (56%), BCORL1 (11%), DNMT3A (34%), IDH2 (9%), SRSF2 (33%), STAG2 (9%), TET2 (2%), two RUNX1 mutations. IPSS-M: very high risk 1.98. Scattered non-caseating granulomas identified on the bone marrow biopsy and clot sections, negative for microorganisms. Also seen on flow was CD5-positive lambda monotypic B cells (4.3%).      3/21/2024: Cycle 1 decitabine 20 mg/m2 for 3 days only.  4/22/2024: Cycle 2  decitabine 20 mg/m2 for 5 days   5/21/2024: Cycle 3 decitabine   6/19/24: Cycle 4     7/23/24: Pre transplant BMBx showed persistent MDS. Normocellular marrow (20-30%) with increase dysplastic megakaryocytes, no bone marrow blasts, and also rare circulating blasts. Compared to the previous bone marrow, this bone marrow is less cellular, only 1% ring sideroblasts and less circulating blasts (1% current vs 3% previous). Flow did not show any myeloid blasts, CD5 positive lambda monotypic cells (10%). Cytogenetics and NGS pending       Lab Values     I have assessed all abnormal lab values for their clinical significance and any values considered clinically significant have been addressed in the assessment and plan.   Hospital Course:    ASSESSMENT AND PLAN: Arpit Forrest is a 73 year old male, currently day +18 s/p ELIEZER MUD PBSCT for MDS.     Summary:              -Ashley 300mg TTS in clinic              -sliding scale insulin stopped, PTA metformin restarted, monitor BG, outpatient endocrine referral sent on discharge              -monitor GI toxicity vs. Gvhd in setting of restarting metformin     BMT/IEC PROTOCOL for MDS  - Chemo protocol: [id number]; CY/flu/TBI  - Peripheral blood stem cell graft from 8/8 donor, ABO matched (Oneg), Cell dose: TBD  - continue allopurinol (hx of gout)  - Engraftment monitoring: Peripheral blood and bone marrow chimerisms on D28, D100, 6 months, 1 and 2 year  - Restaging plan: BMBx with NGS on D28, D100, 6 months, 1 and 2 years     HEME/COAG  #A/C: CAD ppx ASA 81mg daily (8/23 now that platelets >/50k)  - GCSF started day +5, continue until ANC > 1500 for 3 consecutive days. 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 8/23 Siro =8.5, continue 4mg daily.    ID  #N/F (8/20) Cefepime stopped 8/22 with negative cultures, likely 2/2 engraftment  Recent Hx: Multiple granulomas in the lung and bone marrow biopsy- fungitell, aspergillus galactomanan neg. 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 Imodium PRN.  # Hepatic steatosis and boderline iron deposition in liver  - LFTs and synthetic function normal on admission; Check LFTs qM/Th  - VOD prophy: Ursodiol 300mg TID    # Supportive   - Anti-emetics:  per protocol   - Ulcer prophy: PPI daily   - Dietician support to prevent malnutrition.     CARDIOVASCULAR  # Asymptomatic moderate aortic stenosis  # Holosystolic murmur  # A.fib with RVR - resolved               -repeat ECHO pending 8/21 grade 1 diastolic dysfunction. Discussed with CARDS as this is new finding. Reviewed ECHO, noted to be a normal finding with aging, no further follow up or work up is necessary. Will add ASA, pt already on statin.                          *Metoprolol x1 5mg IV , PO metoprolol BID 25mg, now in NSR  A.fib with RVR while admitted with febrile episode on 3/15/24. Noted on his fitbit, not very symptomatic. Cardioversion planned for new onset A.fib. Not achieved with amiodarone, DCCV on 3/21/24 restored normal sinus rhythm. Amiodarone and anticoagulation was stopped in April 2024 without any recurrence.   # CAD  Chest CT in February 2024 with mild to moderate CAC, three-vessel disease. No anginal symptoms   Continue atorvastatin 10mg, started ASA 81mg (8/23 as platelets stable above 50k)  EKG Sinus rhythm Inferior  infarct , age undetermined. Abnormal ECG (known cardiovascular disease). QTc =460  CONTINUE TO HOLD CoQ until instructed to restart per BMT clinic, can interact with medications  # HTN- Continue amlodipine 10mg, lisinopril 5mg  - Risk of cardiomyopathy:  Baseline EF normal 60-65&  - Risk of arrhythmia: Baseline EKG showed NSR, Qtc 460     RESPIRATORY  # VINCENT : CPAP  - Baseline PFTs: normal   - Risk of respiratory complications: Frequent ambulation and incentive spirometer.     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 Endo initially consulted, signed off with stable sugars. Sliding scale insulin stopped (8/24) as pt has not required substantial insulin replacement. Restart metformin PTA dosing. Endocrine outpt requested.     Ophthalmology:  #Hx Glaucoma- Lutein supplements can restart on discharge. Continue brimonidine, latanaprost eye drops     MUSCULOSKELETAL/FRAILTY  # Gout: Continue allopurinol indefinitely   - Baseline Frailty Score: Not frail   - Daily PT/OT as needed while inpatient     SOCIAL DETERMINANTS  - Caregiver: Wife, Meghna, children   Medically Ready for Discharge 8/25 Sunday     Clinically Significant Risk Factors              # Hypoalbuminemia: Lowest albumin = 3.1 g/dL at 8/22/2024  4:08 AM, will monitor as appropriate   # Thrombocytopenia: Lowest platelets = 83 in last 2 days, will monitor for bleeding   # Hypertension: Noted on problem list           # Obesity: Estimated body mass index is 33.2 kg/m  as calculated from the following:    Height as of this encounter: 1.825 m (5'  "11.85\").    Weight as of this encounter: 110.6 kg (243 lb 12.8 oz).                   CODE STATUS: FULL CODE  Discharge Instructions and Follow-Up:    Discharge diet: Regular diet as tolerated  Discharge activity: Activity as tolerated   Discharge follow-up: Follow up with BMT Clinic as follows:  Monday 8/26 Monday 1230 lab, 1pm infusion Tuesday 8/27 Tuesday 115p lab, 2p pharmacy, 2p provider visit, 3p infusion Thursday 8/24 Thursday 645a lab, 7a provider visit, 730a infusion   Discharge Disposition:    Discharged to home.    Destinee Sanches PA-C  8/25/2024    I spent 60 minutes in the care of this patient today, which included time necessary for preparation for the visit, obtaining history, ordering medications/tests/procedures as medically indicated, review of pertinent medical literature, counseling of the patient, communication of recommendations to the care team, and documentation time.    Advice for Patients concerning COVID19:  a. Avoid contact with individuals:   i. Who are sick or have recently been sick  ii. Have traveled to high risk areas (per CDC guidelines) or have been on a cruise in the last 14 days  iii. Who were or could have been exposed to COVID-19   b. If experiencing symptoms such as: Fever, cough or shortness of breath contact BMT at [phone] Mon-Fri 8am-4:30pm or After Hours at [phone] (ask to speak to a BMT Fellow) for guidance on need for clinical assessment  c. Avoid all non- essential travel at this time; if traveling is necessary use mask (N-95)   d. Wear a mask when in public areas  d. Avoid crowded places, if possible  f. Follow CDC advice https://www.cdc.gov/coronavirus/2019-ncov/index.html and travel guidelines https://www.cdc.gov/coronavirus/2019-ncov/travelers/index.html    "

## 2024-08-25 NOTE — PROGRESS NOTES
"BMT Progress Note  Chief complaint:  Arpit Forrest is a 73 year old male, currently day+18 s/p ELIEZER MUD PBSCT for MDS.  INTERIM HISTORY:   Doing well. No N/V/D, rashes. Eating. Active.   REVIEW OF SYSTEMS: Otherwise unremarkable other than what is noted in the Interim History.   PHYSICAL EXAM:   Vitals:  BP (!) 140/77 (BP Location: Right arm)   Pulse 83   Temp 97.9  F (36.6  C) (Oral)   Resp 16   Ht 1.825 m (5' 11.85\")   Wt 110.6 kg (243 lb 12.8 oz)   SpO2 99%   BMI 33.20 kg/m      General: alert and cooperative, NAD. Face with maye appearance--stable per pt.  CV: RRR, pan systolic murmur (known)  Resp: CTAB  Abdomen: NT, ND, BS+  Skin: no rashes on limited exam  Psych: Mood and affect normal  Vascular: Right CVC     LABS:  Lab Results   Component Value Date    WBC 3.6 (L) 08/25/2024    ANEU 2.7 08/25/2024    HGB 8.8 (L) 08/25/2024    HCT 27.5 (L) 08/25/2024    PLT 93 (L) 08/25/2024     08/25/2024    POTASSIUM 3.8 08/25/2024    CHLORIDE 104 08/25/2024    CO2 23 08/25/2024     (H) 08/25/2024    BUN 8.8 08/25/2024    CR 0.88 08/25/2024    MAG 1.9 08/25/2024    INR 1.17 (H) 08/19/2024    BILITOTAL 0.2 08/22/2024    AST 16 08/22/2024    ALT 16 08/22/2024    ALKPHOS 63 08/22/2024    PROTTOTAL 5.8 (L) 08/22/2024    ALBUMIN 3.1 (L) 08/22/2024        ASSESSMENT AND PLAN: Arpit Forrest is a 73 year old male, currently day +18 s/p ELIEZER MUD PBSCT for MDS.    Summary:   -Ashley 300mg TTS in clinic   -sliding scale insulin stopped, PTA metformin restarted, monitor BG, outpatient endocrine referral sent on discharge   -monitor GI toxicity vs. Gvhd in setting of restarting metformin    BMT/IEC PROTOCOL for MDS  - Chemo protocol: [id number]; CY/flu/TBI  - Peripheral blood stem cell graft from 8/8 donor, ABO matched (Oneg), Cell dose: TBD  - continue allopurinol (hx of gout)  - Engraftment monitoring: Peripheral blood and bone marrow chimerisms on D28, D100, 6 months, 1 and 2 year  - Restaging plan: BMBx with NGS " on D28, D100, 6 months, 1 and 2 years    HEME/COAG  #A/C: CAD ppx ASA 81mg daily (8/23 now that platelets >/50k)  - GCSF started day +5, continue until ANC > 1500 for 3 consecutive days. 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 8/23 Siro =8.5, continue 4mg daily.    ID  #N/F (8/20) Cefepime stopped 8/22 with negative cultures, likely 2/2 engraftment  Recent Hx: Multiple granulomas in the lung and bone marrow biopsy- fungitell, aspergillus galactomanan neg. 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 Imodium PRN.  # Hepatic steatosis and boderline iron deposition in liver  - LFTs and synthetic function normal on admission; Check LFTs qM/Th  - VOD prophy: Ursodiol 300mg TID    # Supportive   - Anti-emetics:  per protocol   - Ulcer prophy: PPI daily   - Dietician support to prevent malnutrition.     CARDIOVASCULAR  # Asymptomatic moderate aortic stenosis  # Holosystolic murmur  # A.fib with RVR - resolved    -repeat ECHO pending 8/21 grade 1 diastolic dysfunction. Discussed with CARDS as this is new finding. Reviewed ECHO, noted to be a normal finding with aging, no further follow up or work up is necessary. Will add ASA, pt already on statin.    *Metoprolol x1 5mg IV , PO metoprolol BID 25mg, now in NSR  A.fib with RVR while admitted with febrile episode on  3/15/24. Noted on his fitbit, not very symptomatic. Cardioversion planned for new onset A.fib. Not achieved with amiodarone, DCCV on 3/21/24 restored normal sinus rhythm. Amiodarone and anticoagulation was stopped in April 2024 without any recurrence.   # CAD  Chest CT in February 2024 with mild to moderate CAC, three-vessel disease. No anginal symptoms   Continue atorvastatin 10mg, started ASA 81mg (8/23 as platelets stable above 50k)  EKG Sinus rhythm Inferior infarct , age undetermined. Abnormal ECG (known cardiovascular disease). QTc =460  CONTINUE TO HOLD CoQ until instructed to restart per BMT clinic, can interact with medications  # HTN- Continue amlodipine 10mg, lisinopril 5mg  - Risk of cardiomyopathy:  Baseline EF normal 60-65&  - Risk of arrhythmia: Baseline EKG showed NSR, Qtc 460     RESPIRATORY  # VINCENT : CPAP  - Baseline PFTs: normal   - Risk of respiratory complications: Frequent ambulation and incentive spirometer.     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 Endo initially consulted, signed off with stable sugars. Sliding scale insulin stopped (8/24) as pt has not required substantial insulin replacement. Restart metformin PTA dosing. Endocrine outpt requested.    Ophthalmology:  #Hx Glaucoma- Lutein supplements can restart on discharge. Continue brimonidine, latanaprost eye drops     MUSCULOSKELETAL/FRAILTY  # Gout: Continue allopurinol indefinitely   - Baseline Frailty Score: Not frail   - Daily PT/OT as needed while inpatient    SOCIAL DETERMINANTS  - Caregiver: Wife, Meghna,  "children   Medically Ready for Discharge 8/25 Sunday    Clinically Significant Risk Factors              # Hypoalbuminemia: Lowest albumin = 3.1 g/dL at 8/22/2024  4:08 AM, will monitor as appropriate   # Thrombocytopenia: Lowest platelets = 83 in last 2 days, will monitor for bleeding   # Hypertension: Noted on problem list            # Obesity: Estimated body mass index is 33.2 kg/m  as calculated from the following:    Height as of this encounter: 1.825 m (5' 11.85\").    Weight as of this encounter: 110.6 kg (243 lb 12.8 oz).             I spent 30 minutes in the care of this patient today, which included time necessary for preparation for the visit, obtaining history, ordering medications/tests/procedures as medically indicated, review of pertinent medical literature, counseling of the patient, communication of recommendations to the care team, and documentation time.    ELLE Manuel    "

## 2024-08-25 NOTE — PLAN OF CARE
1930-0730: D+18 Allo. Hypertensive within parameters. OVSS on RA. MMW X1 for 4/10 mucositis pain.  and 213. K 3.8 and Mag 1.9. Both being replaced orally. Utilized primofit overnight.      Plan: Discharge today pending clinical stability. Has questions regarding resuming metformin. Needs one more dose of PO mag.

## 2024-08-25 NOTE — PLAN OF CARE
Physical Therapy Discharge Summary    Reason for therapy discharge:    All goals and outcomes met, no further needs identified.    Progress towards therapy goal(s). See goals on Care Plan in [facility] electronic health record for goal details.  Goals met    Therapy recommendation(s):    Continue home exercise program.

## 2024-08-26 ENCOUNTER — APPOINTMENT (OUTPATIENT)
Dept: LAB | Facility: CLINIC | Age: 74
End: 2024-08-26
Attending: STUDENT IN AN ORGANIZED HEALTH CARE EDUCATION/TRAINING PROGRAM
Payer: MEDICARE

## 2024-08-26 ENCOUNTER — INFUSION THERAPY VISIT (OUTPATIENT)
Dept: TRANSPLANT | Facility: CLINIC | Age: 74
End: 2024-08-26
Attending: STUDENT IN AN ORGANIZED HEALTH CARE EDUCATION/TRAINING PROGRAM
Payer: MEDICARE

## 2024-08-26 VITALS
SYSTOLIC BLOOD PRESSURE: 134 MMHG | BODY MASS INDEX: 33.31 KG/M2 | OXYGEN SATURATION: 100 % | TEMPERATURE: 97.5 F | RESPIRATION RATE: 18 BRPM | DIASTOLIC BLOOD PRESSURE: 76 MMHG | WEIGHT: 244.6 LBS | HEART RATE: 94 BPM

## 2024-08-26 DIAGNOSIS — D46.9 MYELODYSPLASTIC SYNDROME (H): ICD-10-CM

## 2024-08-26 DIAGNOSIS — D46.9 MDS (MYELODYSPLASTIC SYNDROME) (H): Primary | ICD-10-CM

## 2024-08-26 LAB
ALBUMIN SERPL BCG-MCNC: 3.6 G/DL (ref 3.5–5.2)
ALP SERPL-CCNC: 83 U/L (ref 40–150)
ALT SERPL W P-5'-P-CCNC: 19 U/L (ref 0–70)
ANION GAP SERPL CALCULATED.3IONS-SCNC: 11 MMOL/L (ref 7–15)
AST SERPL W P-5'-P-CCNC: 24 U/L (ref 0–45)
BASOPHILS # BLD AUTO: ABNORMAL 10*3/UL
BASOPHILS # BLD MANUAL: 0 10E3/UL (ref 0–0.2)
BASOPHILS NFR BLD AUTO: ABNORMAL %
BASOPHILS NFR BLD MANUAL: 0 %
BILIRUB SERPL-MCNC: 0.2 MG/DL
BUN SERPL-MCNC: 11 MG/DL (ref 8–23)
CALCIUM SERPL-MCNC: 9.6 MG/DL (ref 8.8–10.4)
CHLORIDE SERPL-SCNC: 105 MMOL/L (ref 98–107)
CREAT SERPL-MCNC: 1.03 MG/DL (ref 0.67–1.17)
EGFRCR SERPLBLD CKD-EPI 2021: 76 ML/MIN/1.73M2
EOSINOPHIL # BLD AUTO: ABNORMAL 10*3/UL
EOSINOPHIL # BLD MANUAL: 0 10E3/UL (ref 0–0.7)
EOSINOPHIL NFR BLD AUTO: ABNORMAL %
EOSINOPHIL NFR BLD MANUAL: 0 %
ERYTHROCYTE [DISTWIDTH] IN BLOOD BY AUTOMATED COUNT: 14.8 % (ref 10–15)
GLUCOSE SERPL-MCNC: 157 MG/DL (ref 70–99)
HCO3 SERPL-SCNC: 24 MMOL/L (ref 22–29)
HCT VFR BLD AUTO: 27.9 % (ref 40–53)
HGB BLD-MCNC: 9 G/DL (ref 13.3–17.7)
IMM GRANULOCYTES # BLD: ABNORMAL 10*3/UL
IMM GRANULOCYTES NFR BLD: ABNORMAL %
LYMPHOCYTES # BLD AUTO: ABNORMAL 10*3/UL
LYMPHOCYTES # BLD MANUAL: 0.2 10E3/UL (ref 0.8–5.3)
LYMPHOCYTES NFR BLD AUTO: ABNORMAL %
LYMPHOCYTES NFR BLD MANUAL: 3 %
MCH RBC QN AUTO: 29.4 PG (ref 26.5–33)
MCHC RBC AUTO-ENTMCNC: 32.3 G/DL (ref 31.5–36.5)
MCV RBC AUTO: 91 FL (ref 78–100)
METAMYELOCYTES # BLD MANUAL: 0.2 10E3/UL
METAMYELOCYTES NFR BLD MANUAL: 3 %
MONOCYTES # BLD AUTO: ABNORMAL 10*3/UL
MONOCYTES # BLD MANUAL: 1 10E3/UL (ref 0–1.3)
MONOCYTES NFR BLD AUTO: ABNORMAL %
MONOCYTES NFR BLD MANUAL: 18 %
MYELOCYTES # BLD MANUAL: 0.1 10E3/UL
MYELOCYTES NFR BLD MANUAL: 2 %
NEUTROPHILS # BLD AUTO: ABNORMAL 10*3/UL
NEUTROPHILS # BLD MANUAL: 4.1 10E3/UL (ref 1.6–8.3)
NEUTROPHILS NFR BLD AUTO: ABNORMAL %
NEUTROPHILS NFR BLD MANUAL: 74 %
NRBC # BLD AUTO: 0 10E3/UL
NRBC # BLD AUTO: 0.1 10E3/UL
NRBC BLD AUTO-RTO: 1 /100
NRBC BLD MANUAL-RTO: 2 %
PLAT MORPH BLD: ABNORMAL
PLATELET # BLD AUTO: 96 10E3/UL (ref 150–450)
POTASSIUM SERPL-SCNC: 4 MMOL/L (ref 3.4–5.3)
PROT SERPL-MCNC: 6.3 G/DL (ref 6.4–8.3)
RBC # BLD AUTO: 3.06 10E6/UL (ref 4.4–5.9)
RBC MORPH BLD: ABNORMAL
SODIUM SERPL-SCNC: 140 MMOL/L (ref 135–145)
WBC # BLD AUTO: 5.6 10E3/UL (ref 4–11)

## 2024-08-26 PROCEDURE — 85007 BL SMEAR W/DIFF WBC COUNT: CPT

## 2024-08-26 PROCEDURE — 85027 COMPLETE CBC AUTOMATED: CPT

## 2024-08-26 PROCEDURE — 82040 ASSAY OF SERUM ALBUMIN: CPT

## 2024-08-26 PROCEDURE — 250N000011 HC RX IP 250 OP 636: Performed by: STUDENT IN AN ORGANIZED HEALTH CARE EDUCATION/TRAINING PROGRAM

## 2024-08-26 PROCEDURE — 36415 COLL VENOUS BLD VENIPUNCTURE: CPT

## 2024-08-26 RX ORDER — HEPARIN SODIUM (PORCINE) LOCK FLUSH IV SOLN 100 UNIT/ML 100 UNIT/ML
5 SOLUTION INTRAVENOUS
Status: CANCELLED | OUTPATIENT
Start: 2024-08-27

## 2024-08-26 RX ORDER — HEPARIN SODIUM,PORCINE 10 UNIT/ML
5-20 VIAL (ML) INTRAVENOUS DAILY PRN
Status: CANCELLED | OUTPATIENT
Start: 2024-08-27

## 2024-08-26 RX ORDER — HEPARIN SODIUM,PORCINE 10 UNIT/ML
5 VIAL (ML) INTRAVENOUS
Status: DISCONTINUED | OUTPATIENT
Start: 2024-08-26 | End: 2024-08-26 | Stop reason: HOSPADM

## 2024-08-26 RX ADMIN — Medication 5 ML: at 12:50

## 2024-08-26 ASSESSMENT — PAIN SCALES - GENERAL: PAINLEVEL: NO PAIN (0)

## 2024-08-26 NOTE — PROGRESS NOTES
Infusion Nursing Note:  Arpit Forrest presents today for possible infusion.    Patient seen by provider today: No   present during visit today: Not Applicable.    Note: Patient's labs were monitored and no infusions needed today. Patient advised to bring medication box and medications to clinic tomorrow 8/27 for pharm visit.       Patient expressed that the BMT team would like him to see an endocrinologist. Patient stated he was unable to get into the endocrinologist until possibly next July- Nurse coordinator forwarded this information and will follow up with patient. Patient did not have any further concerns/questions.      Intravenous Access:  [facility].    Treatment Conditions:  Results reviewed, labs did NOT meet treatment parameters: hgb, electrolytes, plt, etc.        Discharge Plan:   Patient and/or family verbalized understanding of discharge instructions and all questions answered.      Ailyn Juan RN

## 2024-08-26 NOTE — NURSING NOTE
Chief Complaint   Patient presents with    Blood Draw     Labs drawn via CVC by RN.      Labs drawn via CVC by RN. Flushed with saline and heparin. Pt tolerated well. Vitals taken. Pt checked into next appointment.    Cayla Scott RN

## 2024-08-27 ENCOUNTER — ONCOLOGY VISIT (OUTPATIENT)
Dept: TRANSPLANT | Facility: CLINIC | Age: 74
End: 2024-08-27
Attending: STUDENT IN AN ORGANIZED HEALTH CARE EDUCATION/TRAINING PROGRAM
Payer: MEDICARE

## 2024-08-27 ENCOUNTER — APPOINTMENT (OUTPATIENT)
Dept: LAB | Facility: CLINIC | Age: 74
End: 2024-08-27
Attending: STUDENT IN AN ORGANIZED HEALTH CARE EDUCATION/TRAINING PROGRAM
Payer: MEDICARE

## 2024-08-27 VITALS
WEIGHT: 243.7 LBS | OXYGEN SATURATION: 98 % | SYSTOLIC BLOOD PRESSURE: 157 MMHG | TEMPERATURE: 97.9 F | HEART RATE: 85 BPM | BODY MASS INDEX: 33.19 KG/M2 | RESPIRATION RATE: 16 BRPM | DIASTOLIC BLOOD PRESSURE: 81 MMHG

## 2024-08-27 DIAGNOSIS — D46.9 MYELODYSPLASTIC SYNDROME (H): Primary | ICD-10-CM

## 2024-08-27 DIAGNOSIS — D46.9 MDS (MYELODYSPLASTIC SYNDROME) (H): Primary | ICD-10-CM

## 2024-08-27 DIAGNOSIS — D46.9 MDS (MYELODYSPLASTIC SYNDROME) (H): ICD-10-CM

## 2024-08-27 LAB
ANION GAP SERPL CALCULATED.3IONS-SCNC: 12 MMOL/L (ref 7–15)
BASOPHILS # BLD AUTO: ABNORMAL 10*3/UL
BASOPHILS # BLD MANUAL: 0 10E3/UL (ref 0–0.2)
BASOPHILS NFR BLD AUTO: ABNORMAL %
BASOPHILS NFR BLD MANUAL: 0 %
BUN SERPL-MCNC: 13.3 MG/DL (ref 8–23)
CALCIUM SERPL-MCNC: 9.7 MG/DL (ref 8.8–10.4)
CHLORIDE SERPL-SCNC: 104 MMOL/L (ref 98–107)
CREAT SERPL-MCNC: 1.01 MG/DL (ref 0.67–1.17)
EGFRCR SERPLBLD CKD-EPI 2021: 78 ML/MIN/1.73M2
EOSINOPHIL # BLD AUTO: ABNORMAL 10*3/UL
EOSINOPHIL # BLD MANUAL: 0 10E3/UL (ref 0–0.7)
EOSINOPHIL NFR BLD AUTO: ABNORMAL %
EOSINOPHIL NFR BLD MANUAL: 0 %
ERYTHROCYTE [DISTWIDTH] IN BLOOD BY AUTOMATED COUNT: 14.7 % (ref 10–15)
GLUCOSE SERPL-MCNC: 180 MG/DL (ref 70–99)
HCO3 SERPL-SCNC: 24 MMOL/L (ref 22–29)
HCT VFR BLD AUTO: 28.5 % (ref 40–53)
HGB BLD-MCNC: 9 G/DL (ref 13.3–17.7)
IMM GRANULOCYTES # BLD: ABNORMAL 10*3/UL
IMM GRANULOCYTES NFR BLD: ABNORMAL %
LYMPHOCYTES # BLD AUTO: ABNORMAL 10*3/UL
LYMPHOCYTES # BLD MANUAL: 0.3 10E3/UL (ref 0.8–5.3)
LYMPHOCYTES NFR BLD AUTO: ABNORMAL %
LYMPHOCYTES NFR BLD MANUAL: 5 %
MCH RBC QN AUTO: 28.8 PG (ref 26.5–33)
MCHC RBC AUTO-ENTMCNC: 31.6 G/DL (ref 31.5–36.5)
MCV RBC AUTO: 91 FL (ref 78–100)
METAMYELOCYTES # BLD MANUAL: 0.2 10E3/UL
METAMYELOCYTES NFR BLD MANUAL: 4 %
MONOCYTES # BLD AUTO: ABNORMAL 10*3/UL
MONOCYTES # BLD MANUAL: 1.3 10E3/UL (ref 0–1.3)
MONOCYTES NFR BLD AUTO: ABNORMAL %
MONOCYTES NFR BLD MANUAL: 24 %
NEUTROPHILS # BLD AUTO: ABNORMAL 10*3/UL
NEUTROPHILS # BLD MANUAL: 3.6 10E3/UL (ref 1.6–8.3)
NEUTROPHILS NFR BLD AUTO: ABNORMAL %
NEUTROPHILS NFR BLD MANUAL: 67 %
NRBC # BLD AUTO: 0 10E3/UL
NRBC BLD AUTO-RTO: 0 /100
PLAT MORPH BLD: ABNORMAL
PLATELET # BLD AUTO: 108 10E3/UL (ref 150–450)
POTASSIUM SERPL-SCNC: 3.9 MMOL/L (ref 3.4–5.3)
RBC # BLD AUTO: 3.12 10E6/UL (ref 4.4–5.9)
RBC MORPH BLD: ABNORMAL
SIROLIMUS BLD-MCNC: 21 UG/L (ref 5–15)
SODIUM SERPL-SCNC: 140 MMOL/L (ref 135–145)
TME LAST DOSE: ABNORMAL H
TME LAST DOSE: ABNORMAL H
WBC # BLD AUTO: 5.4 10E3/UL (ref 4–11)

## 2024-08-27 PROCEDURE — 80195 ASSAY OF SIROLIMUS: CPT

## 2024-08-27 PROCEDURE — 36592 COLLECT BLOOD FROM PICC: CPT

## 2024-08-27 PROCEDURE — 250N000011 HC RX IP 250 OP 636

## 2024-08-27 PROCEDURE — 85048 AUTOMATED LEUKOCYTE COUNT: CPT | Performed by: STUDENT IN AN ORGANIZED HEALTH CARE EDUCATION/TRAINING PROGRAM

## 2024-08-27 PROCEDURE — 258N000003 HC RX IP 258 OP 636

## 2024-08-27 PROCEDURE — 80048 BASIC METABOLIC PNL TOTAL CA: CPT

## 2024-08-27 PROCEDURE — 85007 BL SMEAR W/DIFF WBC COUNT: CPT | Performed by: STUDENT IN AN ORGANIZED HEALTH CARE EDUCATION/TRAINING PROGRAM

## 2024-08-27 PROCEDURE — G0463 HOSPITAL OUTPT CLINIC VISIT: HCPCS

## 2024-08-27 PROCEDURE — 99215 OFFICE O/P EST HI 40 MIN: CPT

## 2024-08-27 PROCEDURE — 250N000011 HC RX IP 250 OP 636: Performed by: STUDENT IN AN ORGANIZED HEALTH CARE EDUCATION/TRAINING PROGRAM

## 2024-08-27 PROCEDURE — 96365 THER/PROPH/DIAG IV INF INIT: CPT

## 2024-08-27 RX ORDER — HEPARIN SODIUM,PORCINE 10 UNIT/ML
5 VIAL (ML) INTRAVENOUS ONCE
Status: COMPLETED | OUTPATIENT
Start: 2024-08-27 | End: 2024-08-27

## 2024-08-27 RX ORDER — HEPARIN SODIUM (PORCINE) LOCK FLUSH IV SOLN 100 UNIT/ML 100 UNIT/ML
5 SOLUTION INTRAVENOUS
Status: CANCELLED | OUTPATIENT
Start: 2024-08-28

## 2024-08-27 RX ORDER — HEPARIN SODIUM,PORCINE 10 UNIT/ML
5-20 VIAL (ML) INTRAVENOUS DAILY PRN
Status: CANCELLED | OUTPATIENT
Start: 2024-08-28

## 2024-08-27 RX ADMIN — Medication 5 ML: at 13:50

## 2024-08-27 RX ADMIN — MICAFUNGIN SODIUM 300 MG: 100 INJECTION, POWDER, LYOPHILIZED, FOR SOLUTION INTRAVENOUS at 14:50

## 2024-08-27 ASSESSMENT — PAIN SCALES - GENERAL: PAINLEVEL: NO PAIN (0)

## 2024-08-27 NOTE — LETTER
8/27/2024      Arpit Forrest  [address]  [place] [address]      Dear Colleague,    Thank you for referring your patient, Arpit Forrest, to the [facility] BLOOD AND MARROW TRANSPLANT PROGRAM [place]. Please see a copy of my visit note below.                                                                                                                                                                                                                                                                                                                                                                                                                                                                                                                                                                                                                                                                                                                                                                                                                                                                                                                                                                                                                                                                                                                                                                                                                                                                                                                                                                                                                                                                                                                                                                                                                                                                                                                                                                                                                                                                                                                                                                                                                                                                                                                                                                                                                                                                                                                                                                                                                                                                                                                                                                                                                                                                                                                                                                                                                                                                                                                                                                                                                                                                                                     BMT Progress Note  Chief complaint:  Arpit Forrest is a 73 year old male, currently day+20 s/p ELIEZER MUD PBSCT for MDS. Discharged on Sunday.     INTERIM HISTORY:      Doing okay post discharge.   Urination issues for >1yr continues to be tiring as never gets a lot of restful sleep but this is not new or different.   Otherwise doing okay so far.     REVIEW OF SYSTEMS: 8 point ROS Otherwise unremarkable other than what is noted in the Interim History.     PHYSICAL EXAM:   BP (!) 157/81 (BP Location: Left arm, Patient Position: Sitting, Cuff Size: Adult Regular)   Pulse 85   Temp 97.9  F (36.6  C) (Oral)   Resp 16   Wt 110.5 kg (243 lb 11.2 oz)    SpO2 98%   BMI 33.19 kg/m      General: alert and cooperative, NAD.   HEENT: sclera anicteric. Masked   CV: RRR, pan systolic murmur (known)  Resp: CTA anteriorly   Skin: no rashes on limited exam  Psych: Mood and affect normal  Vascular: Right CVC      Arpit Forrest is a 73 year old male, currently day +20 s/p ELIEZER MUD PBSCT for MDS.      BMT/IEC PROTOCOL for MDS  - Chemo protocol: [id number]; CY/flu/TBI  - Peripheral blood stem cell graft from 8/8 donor, ABO matched (Oneg)  - continue allopurinol (hx of gout)  - Engraftment monitoring: Peripheral blood and bone marrow chimerisms on D28, D100, 6 months, 1 and 2 year  - Restaging plan: BMBx with NGS on D28, D100, 6 months, 1 and 2 years   - BM BX scheduled 8/30.      HEME/COAG  #A/C: CAD ppx ASA 81mg daily (resumed 8/23 platelets >/50k)  - Transfusion parameters: hemoglobin <7, platelets <20 (epistaxis)  - Pancytopenia 2/2 chemo: anemia, thrombocytopenia, leukopenia      RISK OF GVHD  - Prophylaxis: PT Cy D+3 and D+4. MMF (D+5 to 35); Sirolimus (target level 5-10). Level pending but he thinks he took it so will order another level on Th as well. Pharm D aware and pt knows to hold for Thursday level.     ID  Recent Hx: Multiple granulomas in the lung and bone marrow biopsy- fungitell, aspergillus galactomanan neg. 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 Pending today.   - EBV every 2 weeks from D30 to D180     GI/NUTRITION  # Hepatic steatosis and boderline iron deposition in liver  - VOD prophy: Ursodiol 300mg TID    # Supportive   - Anti-emetics:  per protocol   - Ulcer prophy: PPI daily      CARDIOVASCULAR  # Asymptomatic moderate aortic stenosis  # Holosystolic murmur  # A.fib with RVR - resolved               -repeat ECHO 8/21 grade 1 diastolic  dysfunction. Discussed with CARDS as this is new finding. Reviewed ECHO, noted to be a normal finding with aging, no further follow up or work up is necessary. Added ASA, pt already on statin, on metoprolol.                            A.fib with RVR while admitted with febrile episode on 3/15/24. Noted on his fitbit, not very symptomatic. Cardioversion planned for new onset A.fib. Not achieved with amiodarone, DCCV on 3/21/24 restored normal sinus rhythm. Amiodarone and anticoagulation was stopped in April 2024 without any recurrence.   # CAD  Chest CT in February 2024 with mild to moderate CAC, three-vessel disease. No anginal symptoms   Continue atorvastatin 10mg, started ASA 81mg (8/23 as platelets stable above 50k)  EKG Sinus rhythm Inferior infarct , age undetermined. Abnormal ECG (known cardiovascular disease). 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 Endo initially consulted, signed off with stable sugars. Sliding scale insulin stopped (8/24) as pt has not required substantial insulin replacement. Restarted metformin PTA dosing on discharge. Endocrine outpt requested--RN coordinator helping to get a sooner appt as he called and they were scheduling way out.     Ophthalmology:  #Hx Glaucoma- Lutein supplements can restart on discharge. Continue brimonidine, latanaprost eye drops      MUSCULOSKELETAL/FRAILTY  # Gout: Continue allopurinol indefinitely   - Baseline Frailty Score: Not frail   - Daily PT/OT as needed while inpatient    RTC: Thursday as planned. BM BX 8/30.     I spent 40 minutes in the care of this patient today, which included time necessary for preparation for the visit, obtaining history, ordering medications/tests/procedures as medically indicated, review of pertinent medical literature, counseling of the patient, communication of recommendations to the care team, and documentation time.     Alysia Conley PA-C  [phone]      Again, thank you for allowing me to participate in the care of your patient.        Sincerely,        BMT Advanced Practice Provider

## 2024-08-27 NOTE — PROGRESS NOTES
BMT Progress Note  Chief complaint:  Arpit Forrest is a 73 year old male, currently day+20 s/p ELIEZER MUD PBSCT for MDS. Discharged on Sunday.     INTERIM HISTORY:      Doing okay post discharge.   Urination issues for >1yr continues to be tiring as never gets a lot of restful sleep but this is not new or different.   Otherwise doing okay so far.     REVIEW OF SYSTEMS: 8 point ROS Otherwise unremarkable other than what is noted in the Interim History.     PHYSICAL EXAM:   BP (!) 157/81 (BP Location: Left arm, Patient Position: Sitting, Cuff Size: Adult Regular)   Pulse 85   Temp 97.9  F (36.6  C) (Oral)   Resp 16   Wt 110.5 kg (243 lb 11.2 oz)   SpO2 98%   BMI 33.19 kg/m      General: alert and cooperative, NAD.   HEENT: sclera anicteric. Masked   CV: RRR, pan systolic murmur (known)  Resp: CTA anteriorly   Skin: no rashes on limited exam  Psych: Mood and affect normal  Vascular: Right CVC      Arpit BETH  White is a 73 year old male, currently day +20 s/p ELIEZER MUD PBSCT for MDS.      BMT/IEC PROTOCOL for MDS  - Chemo protocol: [id number]; CY/flu/TBI  - Peripheral blood stem cell graft from 8/8 donor, ABO matched (Oneg)  - continue allopurinol (hx of gout)  - Engraftment monitoring: Peripheral blood and bone marrow chimerisms on D28, D100, 6 months, 1 and 2 year  - Restaging plan: BMBx with NGS on D28, D100, 6 months, 1 and 2 years   - BM BX scheduled 8/30.      HEME/COAG  #A/C: CAD ppx ASA 81mg daily (resumed 8/23 platelets >/50k)  - Transfusion parameters: hemoglobin <7, platelets <20 (epistaxis)  - Pancytopenia 2/2 chemo: anemia, thrombocytopenia, leukopenia      RISK OF GVHD  - Prophylaxis: PT Cy D+3 and D+4. MMF (D+5 to 35); Sirolimus (target level 5-10). Level pending but he thinks he took it so will order another level on Th as well. Pharm D aware and pt knows to hold for Thursday level.     ID  Recent Hx: Multiple granulomas in the lung and bone marrow biopsy- fungitell, aspergillus galactomanan neg. 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 Pending today.   - EBV every 2 weeks from D30 to D180     GI/NUTRITION  # Hepatic steatosis and boderline iron deposition in liver  - VOD prophy: Ursodiol 300mg TID    # Supportive   - Anti-emetics:  per protocol   - Ulcer prophy: PPI daily      CARDIOVASCULAR  # Asymptomatic moderate aortic stenosis  # Holosystolic murmur  # A.fib with RVR - resolved               -repeat ECHO 8/21 grade 1 diastolic dysfunction. Discussed with CARDS as this is new finding. Reviewed ECHO, noted to be a normal finding with aging, no further follow up or work up is necessary. Added ASA, pt already on statin, on metoprolol.                            A.fib with RVR while admitted with  febrile episode on 3/15/24. Noted on his fitbit, not very symptomatic. Cardioversion planned for new onset A.fib. Not achieved with amiodarone, DCCV on 3/21/24 restored normal sinus rhythm. Amiodarone and anticoagulation was stopped in April 2024 without any recurrence.   # CAD  Chest CT in February 2024 with mild to moderate CAC, three-vessel disease. No anginal symptoms   Continue atorvastatin 10mg, started ASA 81mg (8/23 as platelets stable above 50k)  EKG Sinus rhythm Inferior infarct , age undetermined. Abnormal ECG (known cardiovascular disease). 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 Endo initially consulted, signed off with stable sugars. Sliding scale insulin stopped (8/24) as pt has not required substantial insulin replacement. Restarted metformin PTA dosing on discharge. Endocrine outpt requested--RN coordinator helping to get a sooner appt as he called and they were scheduling way out.     Ophthalmology:  #Hx Glaucoma- Lutein supplements can restart on discharge. Continue brimonidine, latanaprost eye drops     MUSCULOSKELETAL/FRAILTY  # Gout: Continue allopurinol indefinitely   - Baseline Frailty Score: Not frail   - Daily PT/OT as needed while inpatient    RTC: Thursday as planned. BM BX 8/30.     I spent 40 minutes in the care of this patient today, which included time  necessary for preparation for the visit, obtaining history, ordering medications/tests/procedures as medically indicated, review of pertinent medical literature, counseling of the patient, communication of recommendations to the care team, and documentation time.     Alysia Conley PA-C  [phone]

## 2024-08-27 NOTE — NURSING NOTE
"Oncology Rooming Note    August 27, 2024 2:05 PM   Arpit Forrest is a 73 year old male who presents for:    Chief Complaint   Patient presents with    Blood Draw     Labs drawn via CVC by RN in lab    Oncology Clinic Visit     myelodysplastic syndrome     Initial Vitals: BP (!) 157/81 (BP Location: Left arm, Patient Position: Sitting, Cuff Size: Adult Regular)   Pulse 85   Temp 97.9  F (36.6  C) (Oral)   Resp 16   Wt 110.5 kg (243 lb 11.2 oz)   SpO2 98%   BMI 33.19 kg/m   Estimated body mass index is 33.19 kg/m  as calculated from the following:    Height as of 7/31/24: 1.825 m (5' 11.85\").    Weight as of this encounter: 110.5 kg (243 lb 11.2 oz). Body surface area is 2.37 meters squared.  No Pain (0) Comment: Data Unavailable   No LMP for male patient.  Allergies reviewed: Yes  Medications reviewed: Yes    Medications: Medication refills not needed today.  Pharmacy name entered into [employer]: [id number] - [place], [place] - [address] AT [facility] [address]    Frailty Screening:   Is the patient here for a new oncology consult visit in cancer care? 2. No      Clinical concerns: concerns about diabetes medications. Was told to get off for certain period and go back on.       Pt has concerns about urinary output. Says he needs to urinate almost every hour or two. Has been effecting sleep.       Frandy Lanier"

## 2024-08-27 NOTE — PROGRESS NOTES
Infusion Nursing Note:  Arpit Forrest presents today for Micafungin infusion.    Patient seen by provider today: Yes: Alysia Conley, VINNY   present during visit today: Not Applicable.    Note: Pt received Micafungin infusion, tolerated well. CVC dressing changed and AVS given to pt upon discharge. No further concerns.       Intravenous Access:  [place].    Treatment Conditions:  Lab Results   Component Value Date    HGB 9.0 (L) 08/27/2024    WBC 5.4 08/27/2024    ANEU 3.6 08/27/2024    ANEUTAUTO 2.7 08/07/2024     (L) 08/27/2024        Lab Results   Component Value Date     08/27/2024    POTASSIUM 3.9 08/27/2024    MAG 1.9 08/25/2024    CR 1.01 08/27/2024    MC 9.7 08/27/2024    BILITOTAL 0.2 08/26/2024    ALBUMIN 3.6 08/26/2024    ALT 19 08/26/2024    AST 24 08/26/2024       Results reviewed, labs MET treatment parameters, ok to proceed with treatment.      Post Infusion Assessment:  Patient tolerated infusion without incident.  Blood return noted pre and post infusion.       Discharge Plan:   Patient and/or family verbalized understanding of discharge instructions and all questions answered.  AVS to patient via [employer].  Patient will return 8/29 for next appointment.   Patient discharged in stable condition accompanied by: self.      Estrellita Bustillos RN

## 2024-08-27 NOTE — LETTER
8/27/2024      Arpit Forrest  [address]  [place] [address]      Dear Colleague,    Thank you for referring your patient, Arpit Forrest, to the [facility] BLOOD AND MARROW TRANSPLANT PROGRAM [place]. Please see a copy of my visit note below.    I met with Arpit Forrest to review his medication list after hospital discharge post-transplant. Arpit Forrest and his caregiver had the opportunity to ask questions regarding his therapy which were answered to their satisfaction. We specifically discussed the following items:    1. BMT: Overall doing well. All medications brought to clinic and reviewed with patient and his wife.  2. ID: Acyclovir appropriately in med box and micafungin to be administered in clinic.  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 He did already take his dose this morning prior to labs so level will be void. Will recheck on Thursday. I moved the sirolimus in the med box to the mid-morning time slot.  4: GI: No issues as of now. I discussed taking MMF in the AM and bedtime rather than evening to ensure he is taking 12 hours apart as taking too close together may increase incidence of diarrhea.    Changes made to scheduled medication list by today's provider include:  Added: None  Deleted: None  Changed: None    Current Outpatient Medications   Medication Sig Dispense Refill     acyclovir (ZOVIRAX) 800 MG tablet Take 1 tablet (800 mg) by mouth 2 times daily. 120 tablet 1     allopurinol (ZYLOPRIM) 300 MG tablet Take 1 tablet (300 mg) by mouth daily. 60 tablet 1     amLODIPine (NORVASC) 10 MG tablet Take 1 tablet (10 mg) by mouth every morning. 60 tablet 1     aspirin 81 MG EC tablet Take 1 tablet (81 mg) by mouth daily. 60 tablet 1     atorvastatin (LIPITOR) 10 MG tablet Take 1 tablet (10 mg) by mouth every morning. 60 tablet 1     brimonidine (ALPHAGAN) 0.2 % ophthalmic solution Place 1 drop into both eyes 2 times daily       calcium carbonate-vitamin D  (CALTRATE) 600-10 MG-MCG per tablet Take 1 tablet by mouth daily. 60 tablet 1     carbamide peroxide (DEBROX) 6.5 % otic solution Place 5 drops into both ears 2 times daily. 15 mL 0     fluticasone (FLONASE) 50 MCG/ACT nasal spray Spray 1 spray into both nostrils 2 times daily       Heparin Sod, Pork, Lock Flush 10 UNIT/ML SOLN Inject 10 mLs into the vein daily. Inject 5mL into each lumen once a day. 300 mL 0     latanoprost (XALATAN) 0.005 % ophthalmic solution INSTILL 2 DROPS IN BOTH EYES AT BEDTIME       lisinopril (ZESTRIL) 5 MG tablet Take 1 tablet (5 mg) by mouth daily. 60 tablet 1     Lutein-Zeaxanthin 25-5 MG CAPS Take 1 capsule by mouth daily       metFORMIN (GLUCOPHAGE) 500 MG tablet Take 1 tablet (500mg) every morning and 2 tablets (1000mg) every evening. 90 tablet 0     metoprolol tartrate (LOPRESSOR) 25 MG tablet Take 1 tablet (25 mg) by mouth 2 times daily. 60 tablet 1     mycophenolate (GENERIC EQUIVALENT) 500 MG tablet Take 3 tablets (1,500 mg) by mouth 2 times daily. 72 tablet 0     pantoprazole (PROTONIX) 40 MG EC tablet Take 1 tablet (40 mg) by mouth every morning (before breakfast). 60 tablet 1     pramox-pe-glycerin-petrolatum (PREPARATION H) 1-0.25-14.4-15 % CREA cream Place rectally 3 times daily as needed for hemorrhoids. 25.5 g 0     prochlorperazine (COMPAZINE) 5 MG tablet Take 1 tablet (5 mg) by mouth every 6 hours as needed for nausea or vomiting. 30 tablet 0     psyllium (METAMUCIL/KONSYL) capsule Take 3 capsules by mouth daily. 270 capsule 3     sirolimus (GENERIC EQUIVALENT) 2 MG tablet Take 2 tablets (4 mg) by mouth daily. 60 tablet 1     [START ON 9/9/2024] sulfamethoxazole-trimethoprim (BACTRIM DS) 800-160 MG tablet Take 1 tablet by mouth Every Mon, Tues two times daily. Do not begin taking until instructed to do so by [facility] clinic. Do not start before September 9, 2024. 28 tablet 1     tamsulosin (FLOMAX) 0.4 MG capsule Take 1 capsule (0.4 mg) by mouth daily. 30 capsule 1      ursodiol (ACTIGALL) 300 MG capsule Take 1 capsule (300 mg) by mouth 3 times daily. 126 capsule 0        Pertinent labs considered today:  Lab Results   Component Value Date     08/27/2024                 normal sodium 136-148  Lab Results   Component Value Date    POTASSIUM 3.9 08/27/2024       normal potassium 3.5-5.2   Lab Results   Component Value Date    CHLORIDE 104 08/27/2024           normal chloride    Lab Results   Component Value Date    CO2 24 08/27/2024                normal CO2 20-32  Lab Results   Component Value Date    BUN 13.3 08/27/2024                 normal BUN 5-24  Lab Results   Component Value Date     08/27/2024     08/25/2024                 normal glucose   Lab Results   Component Value Date    CR 1.01 08/27/2024                 normal cr 0.8-1.5  Lab Results   Component Value Date    MC 9.7 08/27/2024               normal calcium  8.5-10.4  Lab Results   Component Value Date    BILITOTAL 0.2 08/26/2024          normal bilirubin 0.2-1.3  Lab Results   Component Value Date    PROTTOTAL 6.3 08/26/2024          normal total protein 6.0-8.2  Lab Results   Component Value Date    ALBUMIN 3.6 08/26/2024             normal albumin 3.2-4.5  Lab Results   Component Value Date    ALKPHOS 83 08/26/2024            normal alkphos   Lab Results   Component Value Date    ALT 19 08/26/2024         normal ALT 0-65  Lab Results   Component Value Date    AST 24 08/26/2024         normal AST 0-37  Lab Results   Component Value Date    HGB 9.0 08/27/2024     Lab Results   Component Value Date    WBC 5.4 08/27/2024      Lab Results   Component Value Date     08/27/2024     Estimated Creatinine Clearance: 81 mL/min (based on SCr of 1.01 mg/dL).      SELVIN MENDOZA, DAVIS, PharmD      Again, thank you for allowing me to participate in the care of your patient.        Sincerely,        BMT Pharm D, RPH

## 2024-08-27 NOTE — PROGRESS NOTES
I met with Arpit Forrest to review his medication list after hospital discharge post-transplant. Arpit Forrest and his caregiver had the opportunity to ask questions regarding his therapy which were answered to their satisfaction. We specifically discussed the following items:    1. BMT: Overall doing well. All medications brought to clinic and reviewed with patient and his wife.  2. ID: Acyclovir appropriately in med box and micafungin to be administered in clinic.  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 He did already take his dose this morning prior to labs so level will be void. Will recheck on Thursday. I moved the sirolimus in the med box to the mid-morning time slot.  4: GI: No issues as of now. I discussed taking MMF in the AM and bedtime rather than evening to ensure he is taking 12 hours apart as taking too close together may increase incidence of diarrhea.    Changes made to scheduled medication list by today's provider include:  Added: None  Deleted: None  Changed: None    Current Outpatient Medications   Medication Sig Dispense Refill    acyclovir (ZOVIRAX) 800 MG tablet Take 1 tablet (800 mg) by mouth 2 times daily. 120 tablet 1    allopurinol (ZYLOPRIM) 300 MG tablet Take 1 tablet (300 mg) by mouth daily. 60 tablet 1    amLODIPine (NORVASC) 10 MG tablet Take 1 tablet (10 mg) by mouth every morning. 60 tablet 1    aspirin 81 MG EC tablet Take 1 tablet (81 mg) by mouth daily. 60 tablet 1    atorvastatin (LIPITOR) 10 MG tablet Take 1 tablet (10 mg) by mouth every morning. 60 tablet 1    brimonidine (ALPHAGAN) 0.2 % ophthalmic solution Place 1 drop into both eyes 2 times daily      calcium carbonate-vitamin D (CALTRATE) 600-10 MG-MCG per tablet Take 1 tablet by mouth daily. 60 tablet 1    carbamide peroxide (DEBROX) 6.5 % otic solution Place 5 drops into both ears 2 times daily. 15 mL 0    fluticasone (FLONASE) 50 MCG/ACT nasal spray Spray 1 spray into both nostrils 2 times daily       Heparin Sod, Pork, Lock Flush 10 UNIT/ML SOLN Inject 10 mLs into the vein daily. Inject 5mL into each lumen once a day. 300 mL 0    latanoprost (XALATAN) 0.005 % ophthalmic solution INSTILL 2 DROPS IN BOTH EYES AT BEDTIME      lisinopril (ZESTRIL) 5 MG tablet Take 1 tablet (5 mg) by mouth daily. 60 tablet 1    Lutein-Zeaxanthin 25-5 MG CAPS Take 1 capsule by mouth daily      metFORMIN (GLUCOPHAGE) 500 MG tablet Take 1 tablet (500mg) every morning and 2 tablets (1000mg) every evening. 90 tablet 0    metoprolol tartrate (LOPRESSOR) 25 MG tablet Take 1 tablet (25 mg) by mouth 2 times daily. 60 tablet 1    mycophenolate (GENERIC EQUIVALENT) 500 MG tablet Take 3 tablets (1,500 mg) by mouth 2 times daily. 72 tablet 0    pantoprazole (PROTONIX) 40 MG EC tablet Take 1 tablet (40 mg) by mouth every morning (before breakfast). 60 tablet 1    pramox-pe-glycerin-petrolatum (PREPARATION H) 1-0.25-14.4-15 % CREA cream Place rectally 3 times daily as needed for hemorrhoids. 25.5 g 0    prochlorperazine (COMPAZINE) 5 MG tablet Take 1 tablet (5 mg) by mouth every 6 hours as needed for nausea or vomiting. 30 tablet 0    psyllium (METAMUCIL/KONSYL) capsule Take 3 capsules by mouth daily. 270 capsule 3    sirolimus (GENERIC EQUIVALENT) 2 MG tablet Take 2 tablets (4 mg) by mouth daily. 60 tablet 1    [START ON 9/9/2024] sulfamethoxazole-trimethoprim (BACTRIM DS) 800-160 MG tablet Take 1 tablet by mouth Every Mon, Tues two times daily. Do not begin taking until instructed to do so by BMT clinic. Do not start before September 9, 2024. 28 tablet 1    tamsulosin (FLOMAX) 0.4 MG capsule Take 1 capsule (0.4 mg) by mouth daily. 30 capsule 1    ursodiol (ACTIGALL) 300 MG capsule Take 1 capsule (300 mg) by mouth 3 times daily. 126 capsule 0        Pertinent labs considered today:  Lab Results   Component Value Date     08/27/2024                 normal sodium 136-148  Lab Results   Component Value Date    POTASSIUM 3.9 08/27/2024        normal potassium 3.5-5.2   Lab Results   Component Value Date    CHLORIDE 104 08/27/2024           normal chloride    Lab Results   Component Value Date    CO2 24 08/27/2024                normal CO2 20-32  Lab Results   Component Value Date    BUN 13.3 08/27/2024                 normal BUN 5-24  Lab Results   Component Value Date     08/27/2024     08/25/2024                 normal glucose   Lab Results   Component Value Date    CR 1.01 08/27/2024                 normal cr 0.8-1.5  Lab Results   Component Value Date    MC 9.7 08/27/2024               normal calcium  8.5-10.4  Lab Results   Component Value Date    BILITOTAL 0.2 08/26/2024          normal bilirubin 0.2-1.3  Lab Results   Component Value Date    PROTTOTAL 6.3 08/26/2024          normal total protein 6.0-8.2  Lab Results   Component Value Date    ALBUMIN 3.6 08/26/2024             normal albumin 3.2-4.5  Lab Results   Component Value Date    ALKPHOS 83 08/26/2024            normal alkphos   Lab Results   Component Value Date    ALT 19 08/26/2024         normal ALT 0-65  Lab Results   Component Value Date    AST 24 08/26/2024         normal AST 0-37  Lab Results   Component Value Date    HGB 9.0 08/27/2024     Lab Results   Component Value Date    WBC 5.4 08/27/2024      Lab Results   Component Value Date     08/27/2024     Estimated Creatinine Clearance: 81 mL/min (based on SCr of 1.01 mg/dL).      SELVIN MENDOZA, RP, PharmD

## 2024-08-28 DIAGNOSIS — Z94.84 STEM CELLS TRANSPLANT STATUS (H): Primary | ICD-10-CM

## 2024-08-28 LAB
ABO/RH(D): NORMAL
ANTIBODY SCREEN: NEGATIVE
CMV DNA SPEC NAA+PROBE-ACNC: NOT DETECTED IU/ML
SPECIMEN EXPIRATION DATE: NORMAL

## 2024-08-29 ENCOUNTER — CARE COORDINATION (OUTPATIENT)
Dept: TRANSPLANT | Facility: CLINIC | Age: 74
End: 2024-08-29

## 2024-08-29 ENCOUNTER — INFUSION THERAPY VISIT (OUTPATIENT)
Dept: TRANSPLANT | Facility: CLINIC | Age: 74
End: 2024-08-29
Attending: STUDENT IN AN ORGANIZED HEALTH CARE EDUCATION/TRAINING PROGRAM
Payer: MEDICARE

## 2024-08-29 ENCOUNTER — TELEPHONE (OUTPATIENT)
Dept: ENDOCRINOLOGY | Facility: CLINIC | Age: 74
End: 2024-08-29

## 2024-08-29 ENCOUNTER — APPOINTMENT (OUTPATIENT)
Dept: LAB | Facility: CLINIC | Age: 74
End: 2024-08-29
Attending: STUDENT IN AN ORGANIZED HEALTH CARE EDUCATION/TRAINING PROGRAM
Payer: MEDICARE

## 2024-08-29 VITALS
DIASTOLIC BLOOD PRESSURE: 77 MMHG | SYSTOLIC BLOOD PRESSURE: 148 MMHG | WEIGHT: 242.2 LBS | HEART RATE: 96 BPM | RESPIRATION RATE: 16 BRPM | OXYGEN SATURATION: 98 % | BODY MASS INDEX: 32.99 KG/M2 | TEMPERATURE: 98 F

## 2024-08-29 DIAGNOSIS — D46.9 MYELODYSPLASTIC SYNDROME (H): Primary | ICD-10-CM

## 2024-08-29 DIAGNOSIS — Z94.81 STATUS POST BONE MARROW TRANSPLANT (H): ICD-10-CM

## 2024-08-29 DIAGNOSIS — Z94.84 STEM CELLS TRANSPLANT STATUS (H): ICD-10-CM

## 2024-08-29 DIAGNOSIS — D46.9 MDS (MYELODYSPLASTIC SYNDROME) (H): Primary | ICD-10-CM

## 2024-08-29 LAB
ALBUMIN SERPL BCG-MCNC: 3.7 G/DL (ref 3.5–5.2)
ALP SERPL-CCNC: 79 U/L (ref 40–150)
ALT SERPL W P-5'-P-CCNC: 23 U/L (ref 0–70)
ANION GAP SERPL CALCULATED.3IONS-SCNC: 14 MMOL/L (ref 7–15)
AST SERPL W P-5'-P-CCNC: 26 U/L (ref 0–45)
BASOPHILS # BLD AUTO: ABNORMAL 10*3/UL
BASOPHILS # BLD MANUAL: 0.1 10E3/UL (ref 0–0.2)
BASOPHILS NFR BLD AUTO: ABNORMAL %
BASOPHILS NFR BLD MANUAL: 2 %
BILIRUB SERPL-MCNC: 0.2 MG/DL
BUN SERPL-MCNC: 13.5 MG/DL (ref 8–23)
CALCIUM SERPL-MCNC: 9.5 MG/DL (ref 8.8–10.4)
CHLORIDE SERPL-SCNC: 102 MMOL/L (ref 98–107)
CREAT SERPL-MCNC: 1.01 MG/DL (ref 0.67–1.17)
EGFRCR SERPLBLD CKD-EPI 2021: 78 ML/MIN/1.73M2
EOSINOPHIL # BLD AUTO: ABNORMAL 10*3/UL
EOSINOPHIL # BLD MANUAL: 0 10E3/UL (ref 0–0.7)
EOSINOPHIL NFR BLD AUTO: ABNORMAL %
EOSINOPHIL NFR BLD MANUAL: 0 %
ERYTHROCYTE [DISTWIDTH] IN BLOOD BY AUTOMATED COUNT: 14.8 % (ref 10–15)
GLUCOSE SERPL-MCNC: 279 MG/DL (ref 70–99)
HCO3 SERPL-SCNC: 22 MMOL/L (ref 22–29)
HCT VFR BLD AUTO: 27.1 % (ref 40–53)
HGB BLD-MCNC: 8.7 G/DL (ref 13.3–17.7)
IMM GRANULOCYTES # BLD: ABNORMAL 10*3/UL
IMM GRANULOCYTES NFR BLD: ABNORMAL %
LAB DIRECTOR DISCLAIMER: NORMAL
LAB DIRECTOR DISCLAIMER: NORMAL
LAB DIRECTOR INTERPRETATION: NORMAL
LAB DIRECTOR INTERPRETATION: NORMAL
LAB DIRECTOR METHODOLOGY: NORMAL
LAB DIRECTOR METHODOLOGY: NORMAL
LAB DIRECTOR RESULTS: NORMAL
LAB DIRECTOR RESULTS: NORMAL
LOCATION OF TASK: NORMAL
LOCATION OF TASK: NORMAL
LYMPHOCYTES # BLD AUTO: ABNORMAL 10*3/UL
LYMPHOCYTES # BLD MANUAL: 0.1 10E3/UL (ref 0.8–5.3)
LYMPHOCYTES NFR BLD AUTO: ABNORMAL %
LYMPHOCYTES NFR BLD MANUAL: 3 %
MCH RBC QN AUTO: 29.5 PG (ref 26.5–33)
MCHC RBC AUTO-ENTMCNC: 32.1 G/DL (ref 31.5–36.5)
MCV RBC AUTO: 92 FL (ref 78–100)
METAMYELOCYTES # BLD MANUAL: 0 10E3/UL
METAMYELOCYTES NFR BLD MANUAL: 1 %
MONOCYTES # BLD AUTO: ABNORMAL 10*3/UL
MONOCYTES # BLD MANUAL: 0.6 10E3/UL (ref 0–1.3)
MONOCYTES NFR BLD AUTO: ABNORMAL %
MONOCYTES NFR BLD MANUAL: 16 %
MYELOCYTES # BLD MANUAL: 0.1 10E3/UL
MYELOCYTES NFR BLD MANUAL: 3 %
NEUTROPHILS # BLD AUTO: ABNORMAL 10*3/UL
NEUTROPHILS # BLD MANUAL: 2.9 10E3/UL (ref 1.6–8.3)
NEUTROPHILS NFR BLD AUTO: ABNORMAL %
NEUTROPHILS NFR BLD MANUAL: 75 %
NRBC # BLD AUTO: 0 10E3/UL
NRBC # BLD AUTO: 0.1 10E3/UL
NRBC BLD AUTO-RTO: 0 /100
NRBC BLD MANUAL-RTO: 2 %
PLAT MORPH BLD: ABNORMAL
PLATELET # BLD AUTO: 101 10E3/UL (ref 150–450)
POTASSIUM SERPL-SCNC: 3.6 MMOL/L (ref 3.4–5.3)
PROT SERPL-MCNC: 6.3 G/DL (ref 6.4–8.3)
RBC # BLD AUTO: 2.95 10E6/UL (ref 4.4–5.9)
RBC MORPH BLD: ABNORMAL
SIROLIMUS BLD-MCNC: 8.5 UG/L (ref 5–15)
SODIUM SERPL-SCNC: 138 MMOL/L (ref 135–145)
SPECIMEN DESCRIPTION: NORMAL
SPECIMEN DESCRIPTION: NORMAL
TME LAST DOSE: NORMAL H
TME LAST DOSE: NORMAL H
WBC # BLD AUTO: 3.8 10E3/UL (ref 4–11)

## 2024-08-29 PROCEDURE — 80195 ASSAY OF SIROLIMUS: CPT

## 2024-08-29 PROCEDURE — G0452 MOLECULAR PATHOLOGY INTERPR: HCPCS | Mod: 26 | Performed by: PATHOLOGY

## 2024-08-29 PROCEDURE — 81268 CHIMERISM ANAL W/CELL SELECT: CPT | Performed by: STUDENT IN AN ORGANIZED HEALTH CARE EDUCATION/TRAINING PROGRAM

## 2024-08-29 PROCEDURE — 85027 COMPLETE CBC AUTOMATED: CPT

## 2024-08-29 PROCEDURE — 36415 COLL VENOUS BLD VENIPUNCTURE: CPT

## 2024-08-29 PROCEDURE — 96365 THER/PROPH/DIAG IV INF INIT: CPT

## 2024-08-29 PROCEDURE — 258N000003 HC RX IP 258 OP 636

## 2024-08-29 PROCEDURE — 250N000011 HC RX IP 250 OP 636: Performed by: STUDENT IN AN ORGANIZED HEALTH CARE EDUCATION/TRAINING PROGRAM

## 2024-08-29 PROCEDURE — 250N000011 HC RX IP 250 OP 636

## 2024-08-29 PROCEDURE — 99214 OFFICE O/P EST MOD 30 MIN: CPT

## 2024-08-29 PROCEDURE — 86900 BLOOD TYPING SEROLOGIC ABO: CPT

## 2024-08-29 PROCEDURE — 84155 ASSAY OF PROTEIN SERUM: CPT

## 2024-08-29 PROCEDURE — 85007 BL SMEAR W/DIFF WBC COUNT: CPT

## 2024-08-29 PROCEDURE — G0463 HOSPITAL OUTPT CLINIC VISIT: HCPCS | Mod: 25

## 2024-08-29 RX ORDER — HEPARIN SODIUM (PORCINE) LOCK FLUSH IV SOLN 100 UNIT/ML 100 UNIT/ML
5 SOLUTION INTRAVENOUS
OUTPATIENT
Start: 2024-08-29

## 2024-08-29 RX ORDER — HEPARIN SODIUM,PORCINE 10 UNIT/ML
5 VIAL (ML) INTRAVENOUS
Status: DISCONTINUED | OUTPATIENT
Start: 2024-08-29 | End: 2024-08-29 | Stop reason: HOSPADM

## 2024-08-29 RX ORDER — HEPARIN SODIUM,PORCINE 10 UNIT/ML
5-20 VIAL (ML) INTRAVENOUS DAILY PRN
Status: CANCELLED | OUTPATIENT
Start: 2024-08-30

## 2024-08-29 RX ORDER — HEPARIN SODIUM (PORCINE) LOCK FLUSH IV SOLN 100 UNIT/ML 100 UNIT/ML
5 SOLUTION INTRAVENOUS
Status: CANCELLED | OUTPATIENT
Start: 2024-08-30

## 2024-08-29 RX ORDER — DIPHENHYDRAMINE HYDROCHLORIDE 50 MG/ML
50 INJECTION INTRAMUSCULAR; INTRAVENOUS
Start: 2024-08-29

## 2024-08-29 RX ORDER — EPINEPHRINE 1 MG/ML
0.3 INJECTION, SOLUTION INTRAMUSCULAR; SUBCUTANEOUS EVERY 5 MIN PRN
OUTPATIENT
Start: 2024-08-29

## 2024-08-29 RX ORDER — HEPARIN SODIUM,PORCINE 10 UNIT/ML
5-20 VIAL (ML) INTRAVENOUS DAILY PRN
OUTPATIENT
Start: 2024-08-29

## 2024-08-29 RX ADMIN — MICAFUNGIN SODIUM 300 MG: 100 INJECTION, POWDER, LYOPHILIZED, FOR SOLUTION INTRAVENOUS at 07:47

## 2024-08-29 RX ADMIN — Medication 5 ML: at 07:06

## 2024-08-29 ASSESSMENT — PAIN SCALES - GENERAL: PAINLEVEL: NO PAIN (0)

## 2024-08-29 NOTE — PROGRESS NOTES
Writer called patient to discuss endocrine appointment at . Currently, patient is not able to be seen until January. Writer reiterated wait times at external clinics are more difficult for writer to change. LVM requesting a call back to discuss more.

## 2024-08-29 NOTE — NURSING NOTE
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    Weight as of this encounter: 109.9 kg (242 lb 3.2 oz). Body surface area is 2.36 meters squared.  No Pain (0) Comment: Data Unavailable   No LMP for male patient.  Allergies reviewed: Yes  Medications reviewed: Yes - unable to get accurate hx.  patient is unaware of what he is taking. He states he doesn't know, and that he is \"doing whatever the pharmacist told him to do\".    Medications: Medication refills not needed today.  Pharmacy name entered into [employer]: [id number] [place] [place] [place] - [address] AT [facility] [address]    Frailty Screening:   Is the patient here for a new oncology consult visit in cancer care? 2. No      Clinical concerns: none       Tiffanie Boswell RN              "

## 2024-08-29 NOTE — TELEPHONE ENCOUNTER
If 9/10-9/11 is the earliest go ahead and scheduled then . Sydney Gaviria RN on 8/29/2024 at 4:06 PM

## 2024-08-29 NOTE — NURSING NOTE
Chief Complaint   Patient presents with    Blood Draw     Vitals taken, cvc labs drawn, heparin locked, checked into next appt     BP (!) 148/77 (BP Location: Left arm, Patient Position: Sitting, Cuff Size: Adult Large)   Pulse 96   Temp 98  F (36.7  C) (Oral)   Resp 16   Wt 109.9 kg (242 lb 3.2 oz)   SpO2 98%   BMI 32.99 kg/m    Mason Moreno RN on 8/29/2024 at 7:00 AM

## 2024-08-29 NOTE — TELEPHONE ENCOUNTER
Please try to schedule him sooner for steroid induced diabetes in the next few weeks for BMT. He is needing direction on metformin but needs to be seen first. Thank you Sydney Gaviria RN on 8/29/2024 at 12:23 PM

## 2024-08-29 NOTE — PROGRESS NOTES
Infusion Nursing Note:  Arpit Forrest presents today for scheduled infusion.    Patient seen by provider today: Yes: Maria Alejandra Fisher   present during visit today: Not Applicable.    Note: Labs monitored. Patient was assessed by provider. Ashley infused per treatment plan. Message sent to scheduling to add pt on for Ashley tomorrow and cancel his Saturday appt.    Intravenous Access:  Andrews.    Treatment Conditions:  Lab Results   Component Value Date    HGB 8.7 (L) 08/29/2024    WBC 3.8 (L) 08/29/2024    ANEU 2.9 08/29/2024    ANEUTAUTO 2.7 08/07/2024     (L) 08/29/2024        Lab Results   Component Value Date     08/29/2024    POTASSIUM 3.6 08/29/2024    MAG 1.9 08/25/2024    CR 1.01 08/29/2024    MC 9.5 08/29/2024    BILITOTAL 0.2 08/29/2024    ALBUMIN 3.7 08/29/2024    ALT 23 08/29/2024    AST 26 08/29/2024         Post Infusion Assessment:  Patient tolerated infusion without incident.  Blood return noted pre and post infusion.       Discharge Plan:   Patient and/or family verbalized understanding of discharge instructions and all questions answered.  Patient discharged in stable condition accompanied by: self and wife.  Departure Mode: Ambulatory.      Tiffanie Boswell RN

## 2024-08-29 NOTE — PROGRESS NOTES
BMT Progress Note    Chief complaint:  Arpit Forrest is a 73 year old male, currently day +22 s/p ELIEZER MUD PBSCT for MDS. Discharged on Sunday.     INTERIM HISTORY:    Here for follow-up and aftab. He is fatigued. Minimal/occasional nausea. Appetite is decent. Some loose stools intermittently respond to prn Imodium; formed stool this am. No fevers, bleeding, or edema.    REVIEW OF SYSTEMS: 8 point ROS Otherwise unremarkable other than what is noted in the Interim History.     PHYSICAL EXAM:   Blood pressure (!) 148/77, pulse 96, temperature 98  F (36.7  C), temperature source Oral, resp. rate 16, weight 109.9 kg (242 lb 3.2 oz), SpO2 98%.  Wt Readings from Last 4 Encounters:   08/29/24 109.9 kg (242 lb 3.2 oz)   08/27/24 110.5 kg (243 lb 11.2 oz)   08/26/24 110.9 kg (244 lb 9.6 oz)   08/25/24 110.6 kg (243 lb 12.8 oz)     KPS: 80  General: alert and cooperative, NAD.   HEENT: sclera anicteric OP semi-dry, no ulcerations  CV: RRR, pan systolic murmur (known)  Resp: CTAB  Abd: +bs, soft, NT/ND  Skin: no rash  Lymph: no upper/lower extremity edema  Psych: Mood and affect normal  Access: Right CVC dressing cdi    Labs:  Lab Results   Component Value Date    WBC 3.8 (L) 08/29/2024    ANEU 3.6 08/27/2024    HGB 8.7 (L) 08/29/2024    HCT 27.1 (L) 08/29/2024     (L) 08/29/2024     08/29/2024    POTASSIUM 3.6 08/29/2024    CHLORIDE 102 08/29/2024    CO2 22 08/29/2024     (H) 08/29/2024    BUN 13.5 08/29/2024    CR 1.01 08/29/2024    MAG 1.9 08/25/2024    INR 1.17 (H) 08/19/2024    BILITOTAL 0.2 08/29/2024    AST 26 08/29/2024    ALT 23 08/29/2024    ALKPHOS 79 08/29/2024    PROTTOTAL 6.3 (L) 08/29/2024    ALBUMIN 3.7 08/29/2024        A/P: Arpit BETH White is a 73 year old male, currently day +22 s/p ELIEZER MUD PBSCT for MDS.      BMT/IEC PROTOCOL for MDS  - Chemo protocol: [id number]; CY/flu/TBI  - Peripheral blood stem cell graft from 8/8 donor, ABO matched (Oneg)  - continue allopurinol (hx of gout)  -  Engraftment monitoring: Peripheral blood and bone marrow chimerisms on D28, D100, 6 months, 1 and 2 year  - Restaging plan: BMBx with NGS on D28, D100, 6 months, 1 and 2 years   - BM BX scheduled 8/30.      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 MMF (D+5 to 35); Sirolimus (target level 5-10). Took dose 8/27 do level was high, disregard. Level pending 8/29 (he held dose).      ID  Recent Hx: Multiple granulomas in the lung and bone marrow biopsy- fungitell, aspergillus galactomanan neg. 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 Bactrim at day+28- has Rx; likely start 9/9.    Monitoring:   - CMV weekly. Neg 8/27.   - EBV every 2 weeks from D30 to D180     GI/NUTRITION  # Hepatic steatosis and boderline iron deposition in liver  - VOD prophy: Ursodiol   # Supportive   - Anti-emetics prn  - Ulcer prophy: PPI daily      CARDIOVASCULAR  # Asymptomatic moderate aortic stenosis  # Holosystolic murmur  # A.fib with RVR - resolved               -repeat ECHO 8/21 grade 1 diastolic dysfunction. Discussed with CARDS as this is new finding. Reviewed ECHO, noted to be a normal finding with aging, no further follow up or work up is necessary. Added ASA, pt already on statin, on metoprolol.                            A.fib with RVR while admitted with febrile episode on 3/15/24. Noted on his fitbit, not very symptomatic. Cardioversion planned for new onset A.fib. Not achieved with amiodarone, DCCV on 3/21/24 restored normal sinus rhythm.  Amiodarone and anticoagulation was stopped in April 2024 without any recurrence.   # CAD  Chest CT in February 2024 with mild to moderate CAC, three-vessel disease. No anginal symptoms   Continue atorvastatin 10mg, started ASA 81mg (8/23 as platelets stable above 50k)  EKG Sinus rhythm Inferior infarct , age undetermined. Abnormal ECG (known cardiovascular disease). 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 Endo initially consulted, signed off with stable sugars. Sliding scale insulin stopped (8/24) as pt has not required substantial insulin replacement. Restarted metformin PTA dosing on discharge. Endocrine outpt requested--RN coordinator helping to get a sooner appt as he called and they were scheduling way out.     Ophthalmology:  #Hx Glaucoma- Lutein supplements can restart on discharge. Continue brimonidine, latanaprost eye drops     MUSCULOSKELETAL/FRAILTY  # Gout: Continue allopurinol indefinitely   - Baseline Frailty Score: Not frail       Summary:  Aftab  RTC tomorrow for labs, BMbx, aftab  Tues 9/3 labs, aftab, VINNY  Thurs aftab (consider moving to Friday with MD visit)  Friday 9/6 follow-up Dr. Fowler (BMbx review).    qTues VINNY requested 9/10, 9/17    I spent 30 minutes in the care of this patient today, which included time necessary for preparation for the visit, obtaining history, ordering  medications/tests/procedures as medically indicated, review of pertinent medical literature, counseling of the patient, communication of recommendations to the care team, and documentation time.     Maria Alejandra Fisher PA-C

## 2024-08-29 NOTE — TELEPHONE ENCOUNTER
----- Message from Moreno GARCIA sent at 8/29/2024 11:23 AM CDT -----  Regarding: RE: Endocrine  Hi Sydney, just wanting to follow up that he is trying to schedule with us and is scheduled for January. Is it possible for him to be seen sooner?    Moreno  ----- Message -----  From: Sydney Gaviria RN  Sent: 8/26/2024   4:38 PM CDT  To: Moreno Perrin RN  Subject: FW: Endocrine                                    [facility]  is he trying to schedule with them ?  ----- Message -----  From: Moreno Perrin RN  Sent: 8/26/2024   3:28 PM CDT  To: GASPER Rios; #  Subject: RE: Endocrine                                    Hello,    I am wanting to follow up on this patient's Endo follow up after their discharge from the hospital after an Allogenic Bone Marrow transplant. Wondering if this is the soonest the patient can get in to help manage their metformin?    Thank you,    BERT Mayer  ----- Message -----  From: Ailyn Juan RN  Sent: 8/26/2024   3:05 PM CDT  To: Moreno Perrin RN  Subject: Endocrine                                        Hi Moreno,      Patient was in today for possible infusions. He expressed that he started taking metformin and the BMT team told him he needs to follow-up with endocrinology. He could not get in until possibly January, but most likely next July. He is wondering if someone could call him to figure out if it is OK to wait until then. If you or someone in your team could follow up with patient, it would be greatly appreciated.           -Jamila Juan RN

## 2024-08-30 ENCOUNTER — APPOINTMENT (OUTPATIENT)
Dept: LAB | Facility: CLINIC | Age: 74
End: 2024-08-30
Payer: MEDICARE

## 2024-08-30 ENCOUNTER — INFUSION THERAPY VISIT (OUTPATIENT)
Dept: TRANSPLANT | Facility: CLINIC | Age: 74
End: 2024-08-30
Attending: STUDENT IN AN ORGANIZED HEALTH CARE EDUCATION/TRAINING PROGRAM
Payer: MEDICARE

## 2024-08-30 ENCOUNTER — TELEPHONE (OUTPATIENT)
Dept: ENDOCRINOLOGY | Facility: CLINIC | Age: 74
End: 2024-08-30

## 2024-08-30 ENCOUNTER — OFFICE VISIT (OUTPATIENT)
Dept: TRANSPLANT | Facility: CLINIC | Age: 74
End: 2024-08-30
Payer: MEDICARE

## 2024-08-30 VITALS
HEART RATE: 71 BPM | BODY MASS INDEX: 32.97 KG/M2 | OXYGEN SATURATION: 99 % | WEIGHT: 242.1 LBS | DIASTOLIC BLOOD PRESSURE: 83 MMHG | SYSTOLIC BLOOD PRESSURE: 133 MMHG | TEMPERATURE: 97.9 F | RESPIRATION RATE: 16 BRPM

## 2024-08-30 DIAGNOSIS — Z94.84 STEM CELLS TRANSPLANT STATUS (H): ICD-10-CM

## 2024-08-30 DIAGNOSIS — D46.9 MDS (MYELODYSPLASTIC SYNDROME) (H): Primary | ICD-10-CM

## 2024-08-30 DIAGNOSIS — Z94.81 STATUS POST BONE MARROW TRANSPLANT (H): ICD-10-CM

## 2024-08-30 DIAGNOSIS — D46.9 MYELODYSPLASTIC SYNDROME (H): ICD-10-CM

## 2024-08-30 DIAGNOSIS — D46.9 MYELODYSPLASTIC SYNDROME (H): Primary | ICD-10-CM

## 2024-08-30 LAB
ANION GAP SERPL CALCULATED.3IONS-SCNC: 11 MMOL/L (ref 7–15)
BASOPHILS # BLD AUTO: ABNORMAL 10*3/UL
BASOPHILS # BLD MANUAL: 0 10E3/UL (ref 0–0.2)
BASOPHILS NFR BLD AUTO: ABNORMAL %
BASOPHILS NFR BLD MANUAL: 1 %
BUN SERPL-MCNC: 12.5 MG/DL (ref 8–23)
CALCIUM SERPL-MCNC: 9.6 MG/DL (ref 8.8–10.4)
CHLORIDE SERPL-SCNC: 104 MMOL/L (ref 98–107)
CREAT SERPL-MCNC: 0.94 MG/DL (ref 0.67–1.17)
EGFRCR SERPLBLD CKD-EPI 2021: 85 ML/MIN/1.73M2
EOSINOPHIL # BLD AUTO: ABNORMAL 10*3/UL
EOSINOPHIL # BLD MANUAL: 0 10E3/UL (ref 0–0.7)
EOSINOPHIL NFR BLD AUTO: ABNORMAL %
EOSINOPHIL NFR BLD MANUAL: 0 %
ERYTHROCYTE [DISTWIDTH] IN BLOOD BY AUTOMATED COUNT: 14.7 % (ref 10–15)
GLUCOSE SERPL-MCNC: 221 MG/DL (ref 70–99)
HCO3 SERPL-SCNC: 23 MMOL/L (ref 22–29)
HCT VFR BLD AUTO: 27.6 % (ref 40–53)
HGB BLD-MCNC: 9 G/DL (ref 13.3–17.7)
IMM GRANULOCYTES # BLD: ABNORMAL 10*3/UL
IMM GRANULOCYTES NFR BLD: ABNORMAL %
INTERPRETATION: NORMAL
LAB DIRECTOR DISCLAIMER: NORMAL
LAB DIRECTOR INTERPRETATION: NORMAL
LAB DIRECTOR METHODOLOGY: NORMAL
LAB DIRECTOR RESULTS: NORMAL
LOCATION OF TASK: NORMAL
LYMPHOCYTES # BLD AUTO: ABNORMAL 10*3/UL
LYMPHOCYTES # BLD MANUAL: 0.1 10E3/UL (ref 0.8–5.3)
LYMPHOCYTES NFR BLD AUTO: ABNORMAL %
LYMPHOCYTES NFR BLD MANUAL: 3 %
MCH RBC QN AUTO: 29.6 PG (ref 26.5–33)
MCHC RBC AUTO-ENTMCNC: 32.6 G/DL (ref 31.5–36.5)
MCV RBC AUTO: 91 FL (ref 78–100)
MONOCYTES # BLD AUTO: ABNORMAL 10*3/UL
MONOCYTES # BLD MANUAL: 0.3 10E3/UL (ref 0–1.3)
MONOCYTES NFR BLD AUTO: ABNORMAL %
MONOCYTES NFR BLD MANUAL: 7 %
NEUTROPHILS # BLD AUTO: ABNORMAL 10*3/UL
NEUTROPHILS # BLD MANUAL: 4.4 10E3/UL (ref 1.6–8.3)
NEUTROPHILS NFR BLD AUTO: ABNORMAL %
NEUTROPHILS NFR BLD MANUAL: 89 %
NRBC # BLD AUTO: 0 10E3/UL
NRBC BLD AUTO-RTO: 0 /100
PLAT MORPH BLD: ABNORMAL
PLATELET # BLD AUTO: 106 10E3/UL (ref 150–450)
POTASSIUM SERPL-SCNC: 3.9 MMOL/L (ref 3.4–5.3)
RBC # BLD AUTO: 3.04 10E6/UL (ref 4.4–5.9)
RBC MORPH BLD: ABNORMAL
SIGNIFICANT RESULTS: NORMAL
SODIUM SERPL-SCNC: 138 MMOL/L (ref 135–145)
SPECIMEN DESCRIPTION: NORMAL
SPECIMEN DESCRIPTION: NORMAL
TEST DETAILS, MDL: NORMAL
WBC # BLD AUTO: 4.9 10E3/UL (ref 4–11)

## 2024-08-30 PROCEDURE — 85097 BONE MARROW INTERPRETATION: CPT | Mod: GC | Performed by: STUDENT IN AN ORGANIZED HEALTH CARE EDUCATION/TRAINING PROGRAM

## 2024-08-30 PROCEDURE — 250N000011 HC RX IP 250 OP 636

## 2024-08-30 PROCEDURE — G0452 MOLECULAR PATHOLOGY INTERPR: HCPCS | Mod: 26 | Performed by: STUDENT IN AN ORGANIZED HEALTH CARE EDUCATION/TRAINING PROGRAM

## 2024-08-30 PROCEDURE — 88305 TISSUE EXAM BY PATHOLOGIST: CPT | Mod: 26 | Performed by: STUDENT IN AN ORGANIZED HEALTH CARE EDUCATION/TRAINING PROGRAM

## 2024-08-30 PROCEDURE — 88237 TISSUE CULTURE BONE MARROW: CPT | Performed by: STUDENT IN AN ORGANIZED HEALTH CARE EDUCATION/TRAINING PROGRAM

## 2024-08-30 PROCEDURE — 81403 MOPATH PROCEDURE LEVEL 4: CPT | Performed by: STUDENT IN AN ORGANIZED HEALTH CARE EDUCATION/TRAINING PROGRAM

## 2024-08-30 PROCEDURE — 88311 DECALCIFY TISSUE: CPT | Mod: 26 | Performed by: STUDENT IN AN ORGANIZED HEALTH CARE EDUCATION/TRAINING PROGRAM

## 2024-08-30 PROCEDURE — 88271 CYTOGENETICS DNA PROBE: CPT | Performed by: STUDENT IN AN ORGANIZED HEALTH CARE EDUCATION/TRAINING PROGRAM

## 2024-08-30 PROCEDURE — 85007 BL SMEAR W/DIFF WBC COUNT: CPT

## 2024-08-30 PROCEDURE — 38222 DX BONE MARROW BX & ASPIR: CPT | Mod: RT | Performed by: STUDENT IN AN ORGANIZED HEALTH CARE EDUCATION/TRAINING PROGRAM

## 2024-08-30 PROCEDURE — 81401 MOPATH PROCEDURE LEVEL 2: CPT | Performed by: STUDENT IN AN ORGANIZED HEALTH CARE EDUCATION/TRAINING PROGRAM

## 2024-08-30 PROCEDURE — 88189 FLOWCYTOMETRY/READ 16 & >: CPT | Mod: GC | Performed by: STUDENT IN AN ORGANIZED HEALTH CARE EDUCATION/TRAINING PROGRAM

## 2024-08-30 PROCEDURE — 88264 CHROMOSOME ANALYSIS 20-25: CPT | Performed by: STUDENT IN AN ORGANIZED HEALTH CARE EDUCATION/TRAINING PROGRAM

## 2024-08-30 PROCEDURE — 88342 IMHCHEM/IMCYTCHM 1ST ANTB: CPT | Mod: TC | Performed by: STUDENT IN AN ORGANIZED HEALTH CARE EDUCATION/TRAINING PROGRAM

## 2024-08-30 PROCEDURE — 258N000003 HC RX IP 258 OP 636

## 2024-08-30 PROCEDURE — 88342 IMHCHEM/IMCYTCHM 1ST ANTB: CPT | Mod: 26 | Performed by: STUDENT IN AN ORGANIZED HEALTH CARE EDUCATION/TRAINING PROGRAM

## 2024-08-30 PROCEDURE — 250N000011 HC RX IP 250 OP 636: Performed by: STUDENT IN AN ORGANIZED HEALTH CARE EDUCATION/TRAINING PROGRAM

## 2024-08-30 PROCEDURE — 88185 FLOWCYTOMETRY/TC ADD-ON: CPT | Performed by: STUDENT IN AN ORGANIZED HEALTH CARE EDUCATION/TRAINING PROGRAM

## 2024-08-30 PROCEDURE — 96374 THER/PROPH/DIAG INJ IV PUSH: CPT

## 2024-08-30 PROCEDURE — 80048 BASIC METABOLIC PNL TOTAL CA: CPT

## 2024-08-30 PROCEDURE — 36592 COLLECT BLOOD FROM PICC: CPT

## 2024-08-30 PROCEDURE — 88341 IMHCHEM/IMCYTCHM EA ADD ANTB: CPT | Mod: 26 | Performed by: STUDENT IN AN ORGANIZED HEALTH CARE EDUCATION/TRAINING PROGRAM

## 2024-08-30 PROCEDURE — 96365 THER/PROPH/DIAG IV INF INIT: CPT

## 2024-08-30 PROCEDURE — 38221 DX BONE MARROW BIOPSIES: CPT | Mod: RT | Performed by: STUDENT IN AN ORGANIZED HEALTH CARE EDUCATION/TRAINING PROGRAM

## 2024-08-30 PROCEDURE — 85014 HEMATOCRIT: CPT

## 2024-08-30 PROCEDURE — 81267 CHIMERISM ANAL NO CELL SELEC: CPT | Performed by: STUDENT IN AN ORGANIZED HEALTH CARE EDUCATION/TRAINING PROGRAM

## 2024-08-30 PROCEDURE — G0452 MOLECULAR PATHOLOGY INTERPR: HCPCS | Mod: 26 | Performed by: PATHOLOGY

## 2024-08-30 RX ORDER — HEPARIN SODIUM,PORCINE 10 UNIT/ML
5 VIAL (ML) INTRAVENOUS ONCE
Status: COMPLETED | OUTPATIENT
Start: 2024-08-30 | End: 2024-08-30

## 2024-08-30 RX ADMIN — MICAFUNGIN SODIUM 300 MG: 100 INJECTION, POWDER, LYOPHILIZED, FOR SOLUTION INTRAVENOUS at 08:11

## 2024-08-30 RX ADMIN — Medication 5 ML: at 08:01

## 2024-08-30 RX ADMIN — MIDAZOLAM 1 MG: 1 INJECTION INTRAMUSCULAR; INTRAVENOUS at 09:43

## 2024-08-30 ASSESSMENT — PAIN SCALES - GENERAL: PAINLEVEL: NO PAIN (0)

## 2024-08-30 NOTE — LETTER
8/30/2024      Arpit Forrest  [address] [place] [address]      Dear Colleague,    Thank you for referring your patient, Arpit Forrest, to the [facility] BLOOD AND MARROW TRANSPLANT PROGRAM [place]. Please see a copy of my visit note below.    BMT ONC Adult Bone Marrow Biopsy Procedure Note  August 30, 2024  /83   Pulse 71   Temp 97.9  F (36.6  C) (Oral)   Resp 16   Wt 109.8 kg (242 lb 1.6 oz)   SpO2 99%   BMI 32.97 kg/m       Learning needs assessment complete within 12 months? YES    DIAGNOSIS: MDS, s/p BMT    PROCEDURE: Unilateral Bone Marrow Biopsy and Unilateral Aspirate    LOCATION: [facility] 2nd Floor    Patient s identification was positively verified by verbal identification and invasive procedure safety checklist was completed. Informed consent was obtained. Following the administration of Midazolam as pre-medication, patient was placed in the prone position and prepped and draped in a sterile manner. Approximately 10 cc of 1% Lidocaine was used over the right posterior iliac spine. Following this a 3 mm incision was made. Trephine bone marrow core(s) was (were) obtained from the [facility]. Bone marrow aspirates were obtained from the [facility]. Aspirates were sent for morphology, immunophenotyping, cytogenetics, and molecular diagnostics RFLP. A total of approximately 20 ml of marrow was aspirated. Following this procedure a sterile dressing was applied to the bone marrow biopsy site(s). The patient was placed in the supine position to maintain pressure on the biopsy site. Post-procedure wound care instructions were given.     Complications: NO    Pre-procedural pain: 0 out of 10 on the numeric pain rating scale.     Procedural pain: 1 out of 10 on the numeric pain rating scale.     Post-procedural pain assessment: 0 out of 10 on the numeric pain rating scale.     Interventions: NO    Length of procedure:20 minutes or less      Procedure performed by: Yi Martinez PA-C on  8/30/2024 at 2:50 PM        Again, thank you for allowing me to participate in the care of your patient.        Sincerely,        BMT Advanced Practice Provider

## 2024-08-30 NOTE — PROGRESS NOTES
Infusion Nursing Note:  Arpit Forrest presents today for scheduled infusion.    Patient seen by provider today: Yes: Yi BLACK   present during visit today: Not Applicable.    Note: Patient received micafungin per provider's request (Per Maria Alejandra Fisher's note on 8/29, give today so he can have Saturday off; resume Tuesday, Thursday, and Saturday after today).      Intravenous Access:  Andrews.    Treatment Conditions:  Results reviewed, labs MET treatment parameters, ok to proceed with treatment.      Post Infusion Assessment:  Patient tolerated infusion without incident.       Discharge Plan:   Patient and/or family verbalized understanding of discharge instructions and all questions answered.      Ailyn Juan RN

## 2024-08-30 NOTE — PROGRESS NOTES
BMT ONC Adult Bone Marrow Biopsy Procedure Note  August 30, 2024  /83   Pulse 71   Temp 97.9  F (36.6  C) (Oral)   Resp 16   Wt 109.8 kg (242 lb 1.6 oz)   SpO2 99%   BMI 32.97 kg/m       Learning needs assessment complete within 12 months? YES    DIAGNOSIS: MDS, s/p BMT    PROCEDURE: Unilateral Bone Marrow Biopsy and Unilateral Aspirate    LOCATION: [facility] 2nd [facility]    Patient s identification was positively verified by verbal identification and invasive procedure safety checklist was completed. Informed consent was obtained. Following the administration of Midazolam as pre-medication, patient was placed in the prone position and prepped and draped in a sterile manner. Approximately 10 cc of 1% Lidocaine was used over the right posterior iliac spine. Following this a 3 mm incision was made. Trephine bone marrow core(s) was (were) obtained from the [facility]. Bone marrow aspirates were obtained from the [facility]. Aspirates were sent for morphology, immunophenotyping, cytogenetics, and molecular diagnostics RFLP. A total of approximately 20 ml of marrow was aspirated. Following this procedure a sterile dressing was applied to the bone marrow biopsy site(s). The patient was placed in the supine position to maintain pressure on the biopsy site. Post-procedure wound care instructions were given.     Complications: NO    Pre-procedural pain: 0 out of 10 on the numeric pain rating scale.     Procedural pain: 1 out of 10 on the numeric pain rating scale.     Post-procedural pain assessment: 0 out of 10 on the numeric pain rating scale.     Interventions: NO    Length of procedure:20 minutes or less      Procedure performed by: Yi Martinez PA-C on 8/30/2024 at 2:50 PM

## 2024-08-30 NOTE — NURSING NOTE
BMBX Teaching and Assessment       Teaching concerns addressed: Bone marrow biopsy and infection prevention.     Person(s) involved in teaching: Patient  Motivation Level  Asks Questions: Yes  Eager to Learn: Yes  Cooperative: Yes  Receptive (willing/able to accept information): Yes    Patient demonstrates understanding of the following:     Reason for the appointment, diagnosis and treatment plan: Yes  Knowledge of proper use of medications and conditions for which they are ordered (with special attention to potential side effects or drug interactions): Yes  Which situations necessitate calling provider and whom to contact: Yes    Teaching concerns addressed:   Reviewed activity restrictions if received premeds, potential for bleeding and actions to take if develops any of the issues below    Pain management techniques: Yes  Patient instructed on hand hygiene: Yes  How and/when to access community resources: Yes    Infection Control:  Patient demonstrates understanding of the following:   Bone marrow procedure site care taught: Yes  Signs and symptoms of infection taught: Yes       Instructional Materials Used/Given: Pt instructed to keep bmbx site clean and dry for 24hrs. Pt educated to monitor site for signs of infection such as redness, rash, oozing, puss, bleeding, pain, and elevated temp. Pt instructed to go to call the [facility] triage line or go to the ER if any signs of infection should occur. Pt educated to not operate machinery if receiving versed. Pt and wife verbalize understanding.     Pre-procedure labs drawn via hung. Post procedure: Patient vital signs stable, ambulating, site is clean, dry and intact prior to discharge.  Pt discharged with wife as .

## 2024-08-30 NOTE — TELEPHONE ENCOUNTER
Left Voicemail (1st Attempt) and Sent Mychart (1st Attempt) for the patient to call back and schedule the following:    Appointment type: new diabetes   Provider: see below   Return date: sooner appt than 1/10  Specialty phone number: [phone]  Additional appointment(s) needed:   Additonal Notes:   If 9/10-9/11 is the earliest go ahead and scheduled then . Sydney Gaviria RN on 8/29/2024 at 4:06 PM           Available:   9/4 Harley virtual   9/10 Alameddine KARMEN ON CALL virtual   9/11 ^^   9/18 tasma virtual   9/24 ruanpeng KARMEN ON CALL virtual   9/25 ^^   10/1 Trost KARMEN ON CALL virtual   10/2 ^^^   10/28 Ahmed in person   10/30 Moheet in person   10/31 Kiannalisalgul in person   11/1 ahmed in person     Please note that the above appointment(s) will require manual scheduling as they are marked as KARMEN and will not appear using auto search. Do not schedule the patient if another patient has already been scheduled in the requested appointment slot.       Yady Belcher on 8/30/2024 at 10:02 AM

## 2024-08-30 NOTE — NURSING NOTE
Chief Complaint   Patient presents with    Blood Draw     Labs drawn via CVC by RN. VS taken.     Labs collected from CVC by RN, line flushed with saline and heparin.  Vitals taken. Pt checked in for appointment(s).     Lakesha Barron RN

## 2024-09-03 ENCOUNTER — ONCOLOGY VISIT (OUTPATIENT)
Dept: TRANSPLANT | Facility: CLINIC | Age: 74
End: 2024-09-03
Attending: STUDENT IN AN ORGANIZED HEALTH CARE EDUCATION/TRAINING PROGRAM
Payer: MEDICARE

## 2024-09-03 ENCOUNTER — APPOINTMENT (OUTPATIENT)
Dept: LAB | Facility: CLINIC | Age: 74
End: 2024-09-03
Attending: STUDENT IN AN ORGANIZED HEALTH CARE EDUCATION/TRAINING PROGRAM
Payer: MEDICARE

## 2024-09-03 VITALS
RESPIRATION RATE: 16 BRPM | OXYGEN SATURATION: 98 % | HEART RATE: 75 BPM | SYSTOLIC BLOOD PRESSURE: 132 MMHG | BODY MASS INDEX: 32.4 KG/M2 | DIASTOLIC BLOOD PRESSURE: 79 MMHG | WEIGHT: 237.9 LBS | TEMPERATURE: 97.9 F

## 2024-09-03 DIAGNOSIS — D46.9 MDS (MYELODYSPLASTIC SYNDROME) (H): Primary | ICD-10-CM

## 2024-09-03 DIAGNOSIS — Z94.81 STATUS POST BONE MARROW TRANSPLANT (H): ICD-10-CM

## 2024-09-03 DIAGNOSIS — D46.9 MYELODYSPLASTIC SYNDROME (H): ICD-10-CM

## 2024-09-03 DIAGNOSIS — Z94.84 STEM CELLS TRANSPLANT STATUS (H): ICD-10-CM

## 2024-09-03 LAB
ACANTHOCYTES BLD QL SMEAR: ABNORMAL
ALBUMIN UR-MCNC: 30 MG/DL
ANION GAP SERPL CALCULATED.3IONS-SCNC: 11 MMOL/L (ref 7–15)
APPEARANCE UR: CLEAR
AUER BODIES BLD QL SMEAR: ABNORMAL
BASO STIPL BLD QL SMEAR: ABNORMAL
BASOPHILS # BLD AUTO: 0 10E3/UL (ref 0–0.2)
BASOPHILS NFR BLD AUTO: 1 %
BILIRUB UR QL STRIP: NEGATIVE
BITE CELLS BLD QL SMEAR: ABNORMAL
BLISTER CELLS BLD QL SMEAR: ABNORMAL
BUN SERPL-MCNC: 14.3 MG/DL (ref 8–23)
BURR CELLS BLD QL SMEAR: ABNORMAL
CALCIUM SERPL-MCNC: 9.6 MG/DL (ref 8.8–10.4)
CHLORIDE SERPL-SCNC: 106 MMOL/L (ref 98–107)
COLOR UR AUTO: ABNORMAL
CREAT SERPL-MCNC: 0.98 MG/DL (ref 0.67–1.17)
DACRYOCYTES BLD QL SMEAR: ABNORMAL
EGFRCR SERPLBLD CKD-EPI 2021: 80 ML/MIN/1.73M2
ELLIPTOCYTES BLD QL SMEAR: SLIGHT
EOSINOPHIL # BLD AUTO: 0 10E3/UL (ref 0–0.7)
EOSINOPHIL NFR BLD AUTO: 0 %
ERYTHROCYTE [DISTWIDTH] IN BLOOD BY AUTOMATED COUNT: 15.2 % (ref 10–15)
FRAGMENTS BLD QL SMEAR: ABNORMAL
GLUCOSE SERPL-MCNC: 116 MG/DL (ref 70–99)
GLUCOSE UR STRIP-MCNC: NEGATIVE MG/DL
HCO3 SERPL-SCNC: 24 MMOL/L (ref 22–29)
HCT VFR BLD AUTO: 28.1 % (ref 40–53)
HGB BLD-MCNC: 9.2 G/DL (ref 13.3–17.7)
HGB C CRYSTALS: ABNORMAL
HGB UR QL STRIP: NEGATIVE
HOWELL-JOLLY BOD BLD QL SMEAR: ABNORMAL
IMM GRANULOCYTES # BLD: 0 10E3/UL
IMM GRANULOCYTES NFR BLD: 1 %
KETONES UR STRIP-MCNC: NEGATIVE MG/DL
LEUKOCYTE ESTERASE UR QL STRIP: NEGATIVE
LYMPHOCYTES # BLD AUTO: 0.2 10E3/UL (ref 0.8–5.3)
LYMPHOCYTES NFR BLD AUTO: 5 %
MCH RBC QN AUTO: 30 PG (ref 26.5–33)
MCHC RBC AUTO-ENTMCNC: 32.7 G/DL (ref 31.5–36.5)
MCV RBC AUTO: 92 FL (ref 78–100)
MONOCYTES # BLD AUTO: 0.9 10E3/UL (ref 0–1.3)
MONOCYTES NFR BLD AUTO: 25 %
MUCOUS THREADS #/AREA URNS LPF: PRESENT /LPF
NEUTROPHILS # BLD AUTO: 2.4 10E3/UL (ref 1.6–8.3)
NEUTROPHILS NFR BLD AUTO: 68 %
NEUTS HYPERSEG BLD QL SMEAR: ABNORMAL
NITRATE UR QL: NEGATIVE
NRBC # BLD AUTO: 0 10E3/UL
NRBC BLD AUTO-RTO: 0 /100
PATH REPORT.COMMENTS IMP SPEC: NORMAL
PATH REPORT.FINAL DX SPEC: NORMAL
PATH REPORT.FINAL DX SPEC: NORMAL
PATH REPORT.GROSS SPEC: NORMAL
PATH REPORT.MICROSCOPIC SPEC OTHER STN: NORMAL
PATH REPORT.RELEVANT HX SPEC: NORMAL
PATH REPORT.RELEVANT HX SPEC: NORMAL
PH UR STRIP: 5.5 [PH] (ref 5–7)
PLAT MORPH BLD: ABNORMAL
PLATELET # BLD AUTO: 98 10E3/UL (ref 150–450)
POLYCHROMASIA BLD QL SMEAR: ABNORMAL
POTASSIUM SERPL-SCNC: 3.5 MMOL/L (ref 3.4–5.3)
RBC # BLD AUTO: 3.07 10E6/UL (ref 4.4–5.9)
RBC AGGLUT BLD QL: ABNORMAL
RBC MORPH BLD: ABNORMAL
RBC URINE: <1 /HPF
ROULEAUX BLD QL SMEAR: ABNORMAL
SICKLE CELLS BLD QL SMEAR: ABNORMAL
SMUDGE CELLS BLD QL SMEAR: ABNORMAL
SODIUM SERPL-SCNC: 141 MMOL/L (ref 135–145)
SP GR UR STRIP: 1.02 (ref 1–1.03)
SPHEROCYTES BLD QL SMEAR: ABNORMAL
STOMATOCYTES BLD QL SMEAR: ABNORMAL
TARGETS BLD QL SMEAR: ABNORMAL
TOXIC GRANULES BLD QL SMEAR: ABNORMAL
UROBILINOGEN UR STRIP-MCNC: NORMAL MG/DL
VARIANT LYMPHS BLD QL SMEAR: ABNORMAL
WBC # BLD AUTO: 3.6 10E3/UL (ref 4–11)
WBC URINE: 1 /HPF

## 2024-09-03 PROCEDURE — 36592 COLLECT BLOOD FROM PICC: CPT

## 2024-09-03 PROCEDURE — 85025 COMPLETE CBC W/AUTO DIFF WBC: CPT

## 2024-09-03 PROCEDURE — 96365 THER/PROPH/DIAG IV INF INIT: CPT

## 2024-09-03 PROCEDURE — 250N000011 HC RX IP 250 OP 636

## 2024-09-03 PROCEDURE — 80048 BASIC METABOLIC PNL TOTAL CA: CPT

## 2024-09-03 PROCEDURE — 87799 DETECT AGENT NOS DNA QUANT: CPT

## 2024-09-03 PROCEDURE — 258N000003 HC RX IP 258 OP 636

## 2024-09-03 PROCEDURE — 81001 URINALYSIS AUTO W/SCOPE: CPT

## 2024-09-03 PROCEDURE — 99214 OFFICE O/P EST MOD 30 MIN: CPT

## 2024-09-03 PROCEDURE — 250N000011 HC RX IP 250 OP 636: Performed by: STUDENT IN AN ORGANIZED HEALTH CARE EDUCATION/TRAINING PROGRAM

## 2024-09-03 PROCEDURE — G0463 HOSPITAL OUTPT CLINIC VISIT: HCPCS

## 2024-09-03 RX ORDER — HEPARIN SODIUM (PORCINE) LOCK FLUSH IV SOLN 100 UNIT/ML 100 UNIT/ML
5 SOLUTION INTRAVENOUS
Status: CANCELLED | OUTPATIENT
Start: 2024-09-04

## 2024-09-03 RX ORDER — HEPARIN SODIUM,PORCINE 10 UNIT/ML
5 VIAL (ML) INTRAVENOUS ONCE
Status: COMPLETED | OUTPATIENT
Start: 2024-09-03 | End: 2024-09-03

## 2024-09-03 RX ORDER — HEPARIN SODIUM,PORCINE 10 UNIT/ML
5-20 VIAL (ML) INTRAVENOUS DAILY PRN
Status: CANCELLED | OUTPATIENT
Start: 2024-09-04

## 2024-09-03 RX ADMIN — MICAFUNGIN SODIUM 300 MG: 100 INJECTION, POWDER, LYOPHILIZED, FOR SOLUTION INTRAVENOUS at 13:23

## 2024-09-03 RX ADMIN — Medication 5 ML: at 13:22

## 2024-09-03 ASSESSMENT — PAIN SCALES - GENERAL: PAINLEVEL: NO PAIN (0)

## 2024-09-03 NOTE — LETTER
9/3/2024      Arpit Forrest  [address]  [place] [address]      Dear Colleague,    Thank you for referring your patient, Arpit Forrest, to the [facility] MARROW [facility]ANSPL[facility] [place]. Please see a copy of my visit note below.    BMT Progress Note    Chief complaint:  Arpit Forrest is a 73 year old male, currently day +27 s/p ELEIZER MUD PBSCT for MDS. Discharged on Sunday.     INTERIM HISTORY:    Here for follow-up and aftab. He is fatigued, but this is improving. He feels overall that things are getting a little better each day.  Minimal/occasional nausea. Appetite is decent. Some loose stools intermittently respond to prn Imodium; formed stool this am. No fevers, bleeding, or edema. Urinary frequency continues.     REVIEW OF SYSTEMS: 8 point ROS Otherwise unremarkable other than what is noted in the Interim History.     PHYSICAL EXAM:   Blood pressure 132/79, pulse 75, temperature 97.9  F (36.6  C), temperature source Oral, resp. rate 16, weight 107.9 kg (237 lb 14.4 oz), SpO2 98%.  Wt Readings from Last 4 Encounters:   08/30/24 109.8 kg (242 lb 1.6 oz)   08/29/24 109.9 kg (242 lb 3.2 oz)   08/27/24 110.5 kg (243 lb 11.2 oz)   08/26/24 110.9 kg (244 lb 9.6 oz)     KPS: 80  General: alert and cooperative, NAD.   HEENT: sclera anicteric OP semi-dry, no ulcerations  CV: RRR, pan systolic murmur (known)  Resp: CTAB  Abd: +bs, soft, NT/ND  Skin: no rash  Lymph: no upper/lower extremity edema  Psych: Mood and affect normal  Access: Right CVC dressing cdi    Labs:  Lab Results   Component Value Date    WBC 4.9 08/30/2024    ANEU 4.4 08/30/2024    HGB 9.0 (L) 08/30/2024    HCT 27.6 (L) 08/30/2024     (L) 08/30/2024     08/30/2024    POTASSIUM 3.9 08/30/2024    CHLORIDE 104 08/30/2024    CO2 23 08/30/2024     (H) 08/30/2024    BUN 12.5 08/30/2024    CR 0.94 08/30/2024    MAG 1.9 08/25/2024    INR 1.17 (H) 08/19/2024    BILITOTAL 0.2 08/29/2024    AST 26 08/29/2024     ALT 23 08/29/2024    ALKPHOS 79 08/29/2024    PROTTOTAL 6.3 (L) 08/29/2024    ALBUMIN 3.7 08/29/2024        A/P: Arpit BETH White is a 73 year old male, currently day +27 s/p ELIEZER MUD PBSCT for MDS.      BMT/IEC PROTOCOL for MDS  - Chemo protocol: [id number]; CY/flu/TBI  - Peripheral blood stem cell graft from 8/8 donor, ABO matched (Oneg)  - continue allopurinol (hx of gout)  - Engraftment monitoring: Peripheral blood and bone marrow chimerisms on D28, D100, 6 months, 1 and 2 year  - Restaging plan: BMBx with NGS on D28, D100, 6 months, 1 and 2 years   - BM BX completed 8/30, results pending.      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 MMF (D+5 to 35); Sirolimus (target level 5-10). Took dose 8/27 do level was high, disregard. Level pending 8/29 8.5.     ID  Recent Hx: Multiple granulomas in the lung and bone marrow biopsy- fungitell, aspergillus galactomanan neg. 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 Bactrim at day+28- has Rx; likely start 9/9.    Monitoring:   - CMV weekly. Neg 8/27.   - EBV every 2 weeks from D30 to D180     GI/NUTRITION  # Hepatic steatosis and borderline iron deposition in liver  - VOD prophy: Ursodiol   # Supportive   - Anti-emetics prn  - Ulcer prophy: PPI daily      CARDIOVASCULAR  # Asymptomatic moderate aortic stenosis  # Holosystolic murmur  # A.fib with RVR - resolved               -repeat ECHO 8/21 grade 1 diastolic dysfunction. Discussed with CARDS as this is new finding. Reviewed ECHO, noted to be a  normal finding with aging, no further follow up or work up is necessary. Added ASA, pt already on statin, on metoprolol.                            A.fib with RVR while admitted with febrile episode on 3/15/24. Noted on his fitbit, not very symptomatic. Cardioversion planned for new onset A.fib. Not achieved with amiodarone, DCCV on 3/21/24 restored normal sinus rhythm. Amiodarone and anticoagulation was stopped in April 2024 without any recurrence.   # CAD  Chest CT in February 2024 with mild to moderate CAC, three-vessel disease. No anginal symptoms   Continue atorvastatin 10mg, started ASA 81mg (8/23 as platelets stable above 50k)  EKG Sinus rhythm Inferior infarct , age undetermined. Abnormal ECG (known cardiovascular disease). 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 Endo initially consulted, signed off with stable sugars. Sliding scale insulin stopped (8/24) as pt has not required substantial insulin replacement. Restarted metformin PTA dosing on discharge. Endocrine outpt requested--RN coordinator helping to get a sooner appt as he called and they were scheduling way out.     Ophthalmology:  #Hx Glaucoma- Lutein supplements can restart on discharge. Continue brimonidine, latanaprost eye drops     MUSCULOSKELETAL/FRAILTY  # Gout: Continue allopurinol indefinitely   - Baseline  Frailty Score: Not frail       Summary:  Ashley    RTC  Friday 9/6 follow-up Dr. Fowler (BMbx review).  Labs, micafungin infusion      I spent 35 minutes in the care of this patient today, which included time necessary for preparation for the visit, obtaining history, ordering medications/tests/procedures as medically indicated, review of pertinent medical literature, counseling of the patient, communication of recommendations to the care team, and documentation time.     Yesi Vaca, CNP  James or Pager [phone]        Again, thank you for allowing me to participate in the care of your patient.        Sincerely,        BMT Advanced Practice Provider

## 2024-09-03 NOTE — NURSING NOTE
Chief Complaint   Patient presents with    Oncology Clinic Visit     Myelodysplastic syndrome      Blood Draw     Labs drawn via CVC by RN. VS taken.     Labs collected from CVC by RN, line flushed with saline and heparin.  Vitals taken. Pt checked in for appointment(s).     Lakesha Barron RN

## 2024-09-03 NOTE — NURSING NOTE
"Oncology Rooming Note    September 3, 2024 1:26 PM   Arpit Forrest is a 73 year old male who presents for:    Chief Complaint   Patient presents with    Oncology Clinic Visit     Myelodysplastic syndrome      Blood Draw     Labs drawn via CVC by RN. VS taken.     Initial Vitals: /79 (BP Location: Right arm, Patient Position: Sitting, Cuff Size: Adult Regular)   Pulse 75   Temp 97.9  F (36.6  C) (Oral)   Resp 16   Wt 107.9 kg (237 lb 14.4 oz)   SpO2 98%   BMI 32.40 kg/m   Estimated body mass index is 32.4 kg/m  as calculated from the following:    Height as of 7/31/24: 1.825 m (5' 11.85\").    Weight as of this encounter: 107.9 kg (237 lb 14.4 oz). Body surface area is 2.34 meters squared.  No Pain (0) Comment: Data Unavailable   No LMP for male patient.  Allergies reviewed: Yes  Medications reviewed: Yes    Medications: Medication refills not needed today.  Pharmacy name entered into [facility]: [id number] - [place], [place] - [address] AT [facility] [address] [place] [address]    Frailty Screening:   Is the patient here for a new oncology consult visit in cancer care? 2. No      Clinical concerns: Patient unsure what medications he is on; pt advised to go through medications and return on Thursday with refill requests, if needed.      Ailyn Juan RN              "

## 2024-09-03 NOTE — PROGRESS NOTES
Infusion Nursing Note:  Arpit Forrest presents today for scheduled infusion.    Patient seen by provider today: Yes: Yesi Vaca   present during visit today: Not Applicable.    Note: Patient received micafungin per therapy plan. UA obtained and sent to lab.      Intravenous Access:  Andrews.    Treatment Conditions:  Results reviewed, labs MET treatment parameters, ok to proceed with treatment.      Post Infusion Assessment:  Patient tolerated infusion without incident.       Discharge Plan:   Patient and/or family verbalized understanding of discharge instructions and all questions answered.      Ailyn Juan RN

## 2024-09-03 NOTE — PROGRESS NOTES
BMT Progress Note    Chief complaint:  Arpit Forrest is a 73 year old male, currently day +27 s/p ELIEZER MUD PBSCT for MDS. Discharged on Sunday.     INTERIM HISTORY:    Here for follow-up and aftab. He is fatigued, but this is improving. He feels overall that things are getting a little better each day.  Minimal/occasional nausea. Appetite is decent. Some loose stools intermittently respond to prn Imodium; formed stool this am. No fevers, bleeding, or edema. Urinary frequency continues.     REVIEW OF SYSTEMS: 8 point ROS Otherwise unremarkable other than what is noted in the Interim History.     PHYSICAL EXAM:   Blood pressure 132/79, pulse 75, temperature 97.9  F (36.6  C), temperature source Oral, resp. rate 16, weight 107.9 kg (237 lb 14.4 oz), SpO2 98%.  Wt Readings from Last 4 Encounters:   08/30/24 109.8 kg (242 lb 1.6 oz)   08/29/24 109.9 kg (242 lb 3.2 oz)   08/27/24 110.5 kg (243 lb 11.2 oz)   08/26/24 110.9 kg (244 lb 9.6 oz)     KPS: 80  General: alert and cooperative, NAD.   HEENT: sclera anicteric OP semi-dry, no ulcerations  CV: RRR, pan systolic murmur (known)  Resp: CTAB  Abd: +bs, soft, NT/ND  Skin: no rash  Lymph: no upper/lower extremity edema  Psych: Mood and affect normal  Access: Right CVC dressing cdi    Labs:  Lab Results   Component Value Date    WBC 4.9 08/30/2024    ANEU 4.4 08/30/2024    HGB 9.0 (L) 08/30/2024    HCT 27.6 (L) 08/30/2024     (L) 08/30/2024     08/30/2024    POTASSIUM 3.9 08/30/2024    CHLORIDE 104 08/30/2024    CO2 23 08/30/2024     (H) 08/30/2024    BUN 12.5 08/30/2024    CR 0.94 08/30/2024    MAG 1.9 08/25/2024    INR 1.17 (H) 08/19/2024    BILITOTAL 0.2 08/29/2024    AST 26 08/29/2024    ALT 23 08/29/2024    ALKPHOS 79 08/29/2024    PROTTOTAL 6.3 (L) 08/29/2024    ALBUMIN 3.7 08/29/2024        A/P: Arpit Forrest is a 73 year old male, currently day +27 s/p ELIEZER MUD PBSCT for MDS.      BMT/IEC PROTOCOL for MDS  - Chemo protocol: [id number]; CY/flu/TBI  -  Peripheral blood stem cell graft from 8/8 donor, ABO matched (Oneg)  - continue allopurinol (hx of gout)  - Engraftment monitoring: Peripheral blood and bone marrow chimerisms on D28, D100, 6 months, 1 and 2 year  - Restaging plan: BMBx with NGS on D28, D100, 6 months, 1 and 2 years   - BM BX completed 8/30, results pending.      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 MMF (D+5 to 35); Sirolimus (target level 5-10). Took dose 8/27 do level was high, disregard. Level pending 8/29 8.5.     ID  Recent Hx: Multiple granulomas in the lung and bone marrow biopsy- fungitell, aspergillus galactomanan neg. 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 Bactrim at day+28- has Rx; likely start 9/9.    Monitoring:   - CMV weekly. Neg 8/27.   - EBV every 2 weeks from D30 to D180     GI/NUTRITION  # Hepatic steatosis and borderline iron deposition in liver  - VOD prophy: Ursodiol   # Supportive   - Anti-emetics prn  - Ulcer prophy: PPI daily      CARDIOVASCULAR  # Asymptomatic moderate aortic stenosis  # Holosystolic murmur  # A.fib with RVR - resolved               -repeat ECHO 8/21 grade 1 diastolic dysfunction. Discussed with CARDS as this is new finding. Reviewed ECHO, noted to be a normal finding with aging, no further follow up or work up is necessary. Added ASA, pt already on statin, on metoprolol.                            A.fib with RVR while admitted with febrile episode on 3/15/24. Noted on his fitbit, not very symptomatic. Cardioversion  planned for new onset A.fib. Not achieved with amiodarone, DCCV on 3/21/24 restored normal sinus rhythm. Amiodarone and anticoagulation was stopped in April 2024 without any recurrence.   # CAD  Chest CT in February 2024 with mild to moderate CAC, three-vessel disease. No anginal symptoms   Continue atorvastatin 10mg, started ASA 81mg (8/23 as platelets stable above 50k)  EKG Sinus rhythm Inferior infarct , age undetermined. Abnormal ECG (known cardiovascular disease). 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 Endo initially consulted, signed off with stable sugars. Sliding scale insulin stopped (8/24) as pt has not required substantial insulin replacement. Restarted metformin PTA dosing on discharge. Endocrine outpt requested--RN coordinator helping to get a sooner appt as he called and they were scheduling way out.     Ophthalmology:  #Hx Glaucoma- Lutein supplements can restart on discharge. Continue brimonidine, latanaprost eye drops     MUSCULOSKELETAL/FRAILTY  # Gout: Continue allopurinol indefinitely   - Baseline Frailty Score: Not frail       Summary:  Ashley    RTC  Friday 9/6 follow-up Dr. Fowler (BMbx review).  Labs, micafungin infusion      I spent 35 minutes in the care of this patient today, which included time necessary for preparation for the visit, obtaining history,  ordering medications/tests/procedures as medically indicated, review of pertinent medical literature, counseling of the patient, communication of recommendations to the care team, and documentation time.     Yesi Vaca, CNP  Vocera or Pager [phone]

## 2024-09-04 ENCOUNTER — DOCUMENTATION ONLY (OUTPATIENT)
Dept: TRANSPLANT | Facility: CLINIC | Age: 74
End: 2024-09-04
Payer: MEDICARE

## 2024-09-04 LAB
BK VIRUS SPECIMEN TYPE: NORMAL
BKV DNA # SPEC NAA+PROBE: NOT DETECTED IU/ML

## 2024-09-04 ASSESSMENT — EJECTION FRACTION
LAST EJECTION FRACTION (EF) PRIOR TO CONDITIONING (%): 60
LAST EJECTION FRACTION (EF) PRIOR TO CONDITIONING (%): YES

## 2024-09-04 ASSESSMENT — PULMONARY FUNCTION TESTS: FEV1/FVC_PERCENT_PREDICTED: 115

## 2024-09-04 NOTE — TELEPHONE ENCOUNTER
Patient confirmed scheduled appointment:  Date: 9/11   Time: 10 am   Visit type: New Diabetes   Provider: Gaby   Location: [facility]  Testing/imaging: na   Additional notes: Spoke to pt and scheduled per below triage  If 9/10-9/11 is the earliest go ahead and scheduled then . Sydney Gaviria, RN on 8/29/2024 at 4:06 PM     Pia Cook on 9/4/2024 at 8:14 AM

## 2024-09-04 NOTE — PROGRESS NOTES
Outcome for 09/04/24 11:25 AM: [facility] message sent  Eli Gotti LPN   Outcome for 09/09/24 1:57 PM:  Patient will send [facility] report via email if unable to send via [employer].   Constance Rodriguez MA  Outcome for 09/09/24 3:13 PM:  [employer] Report below.   Constance Rodriguez MA    Patient is showing 5/5 MNCM met.   Constance Rodriguez MA

## 2024-09-06 ENCOUNTER — OFFICE VISIT (OUTPATIENT)
Dept: TRANSPLANT | Facility: CLINIC | Age: 74
End: 2024-09-06
Attending: STUDENT IN AN ORGANIZED HEALTH CARE EDUCATION/TRAINING PROGRAM
Payer: MEDICARE

## 2024-09-06 ENCOUNTER — APPOINTMENT (OUTPATIENT)
Dept: LAB | Facility: CLINIC | Age: 74
End: 2024-09-06
Attending: STUDENT IN AN ORGANIZED HEALTH CARE EDUCATION/TRAINING PROGRAM
Payer: MEDICARE

## 2024-09-06 VITALS
SYSTOLIC BLOOD PRESSURE: 132 MMHG | RESPIRATION RATE: 18 BRPM | WEIGHT: 237.2 LBS | OXYGEN SATURATION: 95 % | DIASTOLIC BLOOD PRESSURE: 79 MMHG | HEART RATE: 82 BPM | TEMPERATURE: 98.7 F | BODY MASS INDEX: 32.3 KG/M2

## 2024-09-06 DIAGNOSIS — D46.9 MDS (MYELODYSPLASTIC SYNDROME) (H): Primary | ICD-10-CM

## 2024-09-06 DIAGNOSIS — D46.9 MYELODYSPLASTIC SYNDROME (H): ICD-10-CM

## 2024-09-06 DIAGNOSIS — Z94.84 STEM CELLS TRANSPLANT STATUS (H): ICD-10-CM

## 2024-09-06 DIAGNOSIS — D46.9 MYELODYSPLASTIC SYNDROME (H): Primary | ICD-10-CM

## 2024-09-06 DIAGNOSIS — Z94.81 STATUS POST BONE MARROW TRANSPLANT (H): ICD-10-CM

## 2024-09-06 LAB
ALBUMIN SERPL BCG-MCNC: 3.9 G/DL (ref 3.5–5.2)
ALP SERPL-CCNC: 74 U/L (ref 40–150)
ALT SERPL W P-5'-P-CCNC: 34 U/L (ref 0–70)
ANION GAP SERPL CALCULATED.3IONS-SCNC: 11 MMOL/L (ref 7–15)
AST SERPL W P-5'-P-CCNC: 37 U/L (ref 0–45)
BASOPHILS # BLD AUTO: ABNORMAL 10*3/UL
BASOPHILS # BLD MANUAL: 0 10E3/UL (ref 0–0.2)
BASOPHILS NFR BLD AUTO: ABNORMAL %
BASOPHILS NFR BLD MANUAL: 0 %
BILIRUB SERPL-MCNC: 0.2 MG/DL
BUN SERPL-MCNC: 12.3 MG/DL (ref 8–23)
CALCIUM SERPL-MCNC: 9.5 MG/DL (ref 8.8–10.4)
CHLORIDE SERPL-SCNC: 102 MMOL/L (ref 98–107)
CREAT SERPL-MCNC: 0.98 MG/DL (ref 0.67–1.17)
CULTURE HARVEST COMPLETE DATE: NORMAL
CULTURE HARVEST COMPLETE DATE: NORMAL
EGFRCR SERPLBLD CKD-EPI 2021: 80 ML/MIN/1.73M2
EOSINOPHIL # BLD AUTO: ABNORMAL 10*3/UL
EOSINOPHIL # BLD MANUAL: 0 10E3/UL (ref 0–0.7)
EOSINOPHIL NFR BLD AUTO: ABNORMAL %
EOSINOPHIL NFR BLD MANUAL: 0 %
ERYTHROCYTE [DISTWIDTH] IN BLOOD BY AUTOMATED COUNT: 15.2 % (ref 10–15)
GLUCOSE SERPL-MCNC: 169 MG/DL (ref 70–99)
HCO3 SERPL-SCNC: 24 MMOL/L (ref 22–29)
HCT VFR BLD AUTO: 28.7 % (ref 40–53)
HGB BLD-MCNC: 9.3 G/DL (ref 13.3–17.7)
IMM GRANULOCYTES # BLD: ABNORMAL 10*3/UL
IMM GRANULOCYTES NFR BLD: ABNORMAL %
INTERPRETATION: NORMAL
LYMPHOCYTES # BLD AUTO: ABNORMAL 10*3/UL
LYMPHOCYTES # BLD MANUAL: 0.3 10E3/UL (ref 0.8–5.3)
LYMPHOCYTES NFR BLD AUTO: ABNORMAL %
LYMPHOCYTES NFR BLD MANUAL: 10 %
MAGNESIUM SERPL-MCNC: 1.6 MG/DL (ref 1.7–2.3)
MCH RBC QN AUTO: 29.5 PG (ref 26.5–33)
MCHC RBC AUTO-ENTMCNC: 32.4 G/DL (ref 31.5–36.5)
MCV RBC AUTO: 91 FL (ref 78–100)
MONOCYTES # BLD AUTO: ABNORMAL 10*3/UL
MONOCYTES # BLD MANUAL: 0.6 10E3/UL (ref 0–1.3)
MONOCYTES NFR BLD AUTO: ABNORMAL %
MONOCYTES NFR BLD MANUAL: 20 %
NEUTROPHILS # BLD AUTO: ABNORMAL 10*3/UL
NEUTROPHILS # BLD MANUAL: 2 10E3/UL (ref 1.6–8.3)
NEUTROPHILS NFR BLD AUTO: ABNORMAL %
NEUTROPHILS NFR BLD MANUAL: 70 %
NRBC # BLD AUTO: 0 10E3/UL
NRBC BLD AUTO-RTO: 0 /100
PLAT MORPH BLD: ABNORMAL
PLATELET # BLD AUTO: 89 10E3/UL (ref 150–450)
POTASSIUM SERPL-SCNC: 3.7 MMOL/L (ref 3.4–5.3)
PROT SERPL-MCNC: 6.5 G/DL (ref 6.4–8.3)
RBC # BLD AUTO: 3.15 10E6/UL (ref 4.4–5.9)
RBC MORPH BLD: ABNORMAL
SIROLIMUS BLD-MCNC: 7.4 UG/L (ref 5–15)
SODIUM SERPL-SCNC: 137 MMOL/L (ref 135–145)
TME LAST DOSE: NORMAL H
TME LAST DOSE: NORMAL H
WBC # BLD AUTO: 2.8 10E3/UL (ref 4–11)

## 2024-09-06 PROCEDURE — 85007 BL SMEAR W/DIFF WBC COUNT: CPT

## 2024-09-06 PROCEDURE — 250N000011 HC RX IP 250 OP 636: Performed by: STUDENT IN AN ORGANIZED HEALTH CARE EDUCATION/TRAINING PROGRAM

## 2024-09-06 PROCEDURE — 80195 ASSAY OF SIROLIMUS: CPT

## 2024-09-06 PROCEDURE — 258N000003 HC RX IP 258 OP 636

## 2024-09-06 PROCEDURE — 82040 ASSAY OF SERUM ALBUMIN: CPT | Performed by: STUDENT IN AN ORGANIZED HEALTH CARE EDUCATION/TRAINING PROGRAM

## 2024-09-06 PROCEDURE — G0463 HOSPITAL OUTPT CLINIC VISIT: HCPCS | Performed by: STUDENT IN AN ORGANIZED HEALTH CARE EDUCATION/TRAINING PROGRAM

## 2024-09-06 PROCEDURE — 85027 COMPLETE CBC AUTOMATED: CPT

## 2024-09-06 PROCEDURE — 96365 THER/PROPH/DIAG IV INF INIT: CPT

## 2024-09-06 PROCEDURE — 99417 PROLNG OP E/M EACH 15 MIN: CPT | Performed by: STUDENT IN AN ORGANIZED HEALTH CARE EDUCATION/TRAINING PROGRAM

## 2024-09-06 PROCEDURE — 250N000011 HC RX IP 250 OP 636

## 2024-09-06 PROCEDURE — 99211 OFF/OP EST MAY X REQ PHY/QHP: CPT | Performed by: STUDENT IN AN ORGANIZED HEALTH CARE EDUCATION/TRAINING PROGRAM

## 2024-09-06 PROCEDURE — 36592 COLLECT BLOOD FROM PICC: CPT | Performed by: STUDENT IN AN ORGANIZED HEALTH CARE EDUCATION/TRAINING PROGRAM

## 2024-09-06 PROCEDURE — 99215 OFFICE O/P EST HI 40 MIN: CPT | Performed by: STUDENT IN AN ORGANIZED HEALTH CARE EDUCATION/TRAINING PROGRAM

## 2024-09-06 PROCEDURE — 83735 ASSAY OF MAGNESIUM: CPT

## 2024-09-06 RX ORDER — HEPARIN SODIUM,PORCINE 10 UNIT/ML
5 VIAL (ML) INTRAVENOUS ONCE
Status: COMPLETED | OUTPATIENT
Start: 2024-09-06 | End: 2024-09-06

## 2024-09-06 RX ORDER — HYDROCORTISONE 2.5 %
CREAM (GRAM) TOPICAL 2 TIMES DAILY
Qty: 30 G | Refills: 0 | Status: SHIPPED | OUTPATIENT
Start: 2024-09-06

## 2024-09-06 RX ORDER — HEPARIN SODIUM,PORCINE 10 UNIT/ML
5-20 VIAL (ML) INTRAVENOUS DAILY PRN
Status: CANCELLED | OUTPATIENT
Start: 2024-09-07

## 2024-09-06 RX ORDER — HEPARIN SODIUM (PORCINE) LOCK FLUSH IV SOLN 100 UNIT/ML 100 UNIT/ML
5 SOLUTION INTRAVENOUS
Status: CANCELLED | OUTPATIENT
Start: 2024-09-07

## 2024-09-06 RX ORDER — TRIAMCINOLONE ACETONIDE 1 MG/G
CREAM TOPICAL 2 TIMES DAILY
Qty: 454 G | Refills: 0 | Status: SHIPPED | OUTPATIENT
Start: 2024-09-06

## 2024-09-06 RX ORDER — HEPARIN SODIUM,PORCINE 10 UNIT/ML
10 VIAL (ML) INTRAVENOUS DAILY
Qty: 300 ML | Refills: 0 | Status: SHIPPED | OUTPATIENT
Start: 2024-09-06

## 2024-09-06 RX ADMIN — Medication 5 ML: at 12:48

## 2024-09-06 RX ADMIN — MICAFUNGIN SODIUM 300 MG: 100 INJECTION, POWDER, LYOPHILIZED, FOR SOLUTION INTRAVENOUS at 13:32

## 2024-09-06 RX ADMIN — Medication 5 ML: at 14:43

## 2024-09-06 ASSESSMENT — PAIN SCALES - GENERAL: PAINLEVEL: NO PAIN (0)

## 2024-09-06 NOTE — NURSING NOTE
Chief Complaint   Patient presents with    Blood Draw     Labs drawn via CVC by RN in lab, vitals taken.      Labs drawn via CVC by RN. Flushed with saline and heparin. Vitals taken. Pt checked into next appt.     Yessica Lindsay RN

## 2024-09-06 NOTE — LETTER
9/6/2024      Arpit Forrest  [address] [place] [address]      Dear Colleague,    Thank you for referring your patient, Arpit Forrest, to the [facility], [facility] BLOOD AND MARROW TRANSPLANT PRO[facility] [place]. Please see a copy of my visit note below.    BMT/Cell Therapy Follow Up    Date of service: Sep 6, 2024     Patient ID: Arpit Forrest is a 73 year old y/o male who is day +30 post allogenic stem cell transplant for MDS-EB1    Diagnosis MDS-EB1 BMT type Allogenic  CMV  Donor -  Recipient -    Prep ELIEZER (Flu/Cy/TBI) Donor type  8/8 URD ABO D/R O- (matched)   GVHD ppx PTCy, siro, MMF Graft source PBSCT Toxo IgG Negative   Protocol [id number] CD34/kg 6.12E+06    BMT MD Nena Fowler     Pre-transplant disease history  Referring provider:  Dr. Beckwith, [facility]   Data   Diagnosis:   MDS-IB1. IPSS-M: very high risk 1.98   CD5+ Monoclonal B Lymphocytosis (MBL), chronic lymphocytic leukemia type  Recurrent febrile episodes, UBA1 negative, resolved with treatment of MDS  Treatment  3/21/24 to 6/19/24: Decitabine 20mg/m2 for 5 days x 4 cycles    Hematological history  July 2023: Episode of gout in left ankle seen by Rheumatology and treated with prednisone taper, and allopurinol.  August 2023:  Collapsed in the bathroom while on a trip to [facility]. Had high fevers and rigors, treated with doxycycline.   11/30/2023: Seen by Rheumatology in the [place]. He had acute right sacroilitis. Ferritin was elevated to 1600, no hemachromatosis.   12/3/2023 to 12/9/2023: Hospitalized with high fevers and new onset mild pancytopenias. CT C/A/P did not show any lymphadenopathy or splenomegaly.  Infectious cultures were negative. 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 He was treated with anakinra, solumedrol and then  started on oral prednisone.   12/7/2023: Bone marrow biopsy showed hypercellular marrow, no dysplasia, 34% ring sideroblasts, increased reticuloendothelial iron stores, no increase in blasts. Flow showed a CD5 positive B-cell infiltrate (5% of the marrow cellularity), consistent with monoclonal B-cell lymphocytosis. Cytogenetics showed a normal male karyotype. Myeloid malignancy panel did not show an SF3B1 mutation. However, there were mutations in SRSF2, DNMT3A, RUNX1, IDH2 p.Iph808Yth, BCOR, BCORL1, and TET2. Diagnosed with CCUS.   1/31/2024 to 2/6/24: Hospitalized with fevers to 104 despite anti-IL1 therapy. Anakinra was increased, then switched to canakinumab. PET-Ct on 2/9/24 did not show any malignancy. Diagnosed with subclinical hyperthyroidism treated with methimazole starting 2/14/2024.  3/15/2024 to 3/25/2024: Hospitalized for neutropenic fevers. He also had an episode of atrial fibrillation with rapid ventricular response placed on AC with LMWH. Methimazole was held due to risk of agranulocytosis.  3/18/2024: Bone marrow biopsy showed Myelodysplastic syndrome, with multilineage dysplasia. 2% bone marrow blasts, and 3% circulating blasts.18% ringed sideroblasts, slight reticulin fibrosis. Concurrent CBC showed WBC 1.7, ANC 0.5, Hb 8.7, platelet count 43. Normal cytogenetics. NGS could not be sent from the bone marrow. NGS peripheral blood (4/18/24) showed mutations in BCOR (56%), BCORL1 (11%), DNMT3A (34%), IDH2 (9%), SRSF2 (33%), STAG2 (9%), TET2 (2%), two RUNX1 mutations. IPSS-M: very high risk 1.98. Scattered non-caseating granulomas identified on the bone marrow biopsy and clot sections, negative for microorganisms. Also seen on flow was CD5-positive lambda monotypic B cells (4.3%).      3/21/2024: Cycle 1 decitabine 20 mg/m2 for 3 days only.  4/22/2024: Cycle 2 decitabine 20 mg/m2 for 5 days   5/21/2024: Cycle 3 decitabine   6/19/2024: Cycle 4 decitabine      7/23/24: Pre transplant BMBx showed  persistent MDS. Normocellular marrow (20-30%) with increase dysplastic megakaryocytes, no bone marrow blasts, and also rare circulating blasts. Compared to the previous bone marrow, this bone marrow is less cellular, only 1% ring sideroblasts and less circulating blasts (1% current vs 3% previous). Flow did not show any myeloid blasts, CD5 positive lambda monotypic cells (10%). Cytogenetics normal, NGS positive for DNMT3A (21%).     Post transplant  # A.fib with RVR on 8/21/24 which converted to sinus rhythm spontaneously. Was started on metoprolol 25mg BID         INTERVAL HISTORY     Arpit Forrest returns for a scheduled BMT clinic visit. He is here with his wife. Several issues were addressed today.   He has been home for around 2 weeks now. Says that he does not have energy, but notes a gradual improvement. Can walk on the treadmill for around 20 min.  - He still has sores on the side of his tongue. These are bothersome for him. He is able to tolerate only soft bland foods. Does not have any magic mouthwash, but says that when he used it in the hospital it worked only for 20min or so. He had other mouth sores also which have resolved.   - Taste is off, appetite is improving.  - Occasional nausea, has been taking compazine daily in the morning, okay to go to PRN  - No abdominal pain  - Says that he has cycles of bowel movements - he will be constipated, then he will have several well formed large Bms and will then have diarrhea. He needs to take imodium on the days that he has diarrhea, but does not take more than 6mg in total in a day. This cycle lasts 3-4 days and then repeats.   - He has a new rash on his face, chest and upper back. Itchy. Started around 4 days ago.   - Continues to have urine frequency which is unchanged compared to pre-transplant. He has been on flomax for several weeks now without any improvement. He would like to stop this.       Plan  - Reviewed D28 BMBx, shows CR. NGS was sent only for  a single gene (DNMT3A) which was negative - need to send for all mutations present at diagnosis.   - Chimerism Bone marrow 100%. Peripheral blood CD3 84%. Monitor in 2 weeks.   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 Rash does not look typical of siro induced acneform reaction. Start topical hydrocortisone on face and triamcinolone on chest/ back. Derm referral placed (urgent). Need to schedule for a skin punch biopsy with APPs.   - Monitor lower Gi symptoms/ diarrhea. Likely due to regimen related toxicities vs MMF. Continue psyllium daily and imodium PRN  - MMF to complete on D35  - Continue sirolimus with goal 5-10   - ID: continue acyclovir, micafungin. Start bactrim from Monday.   - CV: continue aspirin, statin, amlodipine. Lisinopril 5mg and metoprolol 25mg BID was started while inpatient. He probably does not need metoprolol long term, will refer to cardiology.   - Have sent a rx for magic mouthwash. Continue saline rinses. Since other mouth sores are improving, I suspect these are related to regimen related toxicities and will slowly resolve.   - DM: has an appointment with endocrine.   - Urine frequency has been a long standing issue. Okay to stop flomax since it has not helped him. Previous imaging shows prostatomegaly and thickened bladder wall. He did not have any post void residue when checked while inpatient. Have placed a urology referral for further evaluation - though would recommend deferring any non urgent invasive tests to after 3 months post transplant.   - He needs a letter stating that he underwent a bone marrow transplant for MDS to submit to the [facility] for agent orange appeal.   - RTC on 9/25/24    ROS: as above    PMH  - A.fib with RVR while admitted with febrile episode on 3/15/24 s/p cardioversion. He was on amiodarone and anticoagulation till 4/15/24, at which point it was discontinued.   - Moderate aortic stenosis  - Kidney donation to sister, baseline creat around 1.2   - Steroid induced hyperglycemia,  on metformin.   - Dyslipidemia  - HTN  - VINCENT  - Gout   - Exposure to agent orange     Medications  Current Outpatient Medications   Medication Sig Dispense Refill     Heparin Sod, Pork, Lock Flush 10 UNIT/ML SOLN Inject 10 mLs into the vein daily. Inject 5mL into each lumen once a day. 300 mL 0     hydrocortisone 2.5 % cream Apply topically 2 times daily. Use on face and scalp 30 g 0     magic mouthwash suspension (diphenhydrAMINE, lidocaine, aluminum-magnesium & simethicone) Swish and swallow 10 mLs in mouth every 6 hours as needed for mouth sores. 400 mL 0     triamcinolone (KENALOG) 0.1 % external cream Apply topically 2 times daily. Use on chest and back 454 g 0     acyclovir (ZOVIRAX) 800 MG tablet Take 1 tablet (800 mg) by mouth 2 times daily. 120 tablet 1     allopurinol (ZYLOPRIM) 300 MG tablet Take 1 tablet (300 mg) by mouth daily. 60 tablet 1     amLODIPine (NORVASC) 10 MG tablet Take 1 tablet (10 mg) by mouth every morning. 60 tablet 1     aspirin 81 MG EC tablet Take 1 tablet (81 mg) by mouth daily. 60 tablet 1     atorvastatin (LIPITOR) 10 MG tablet Take 1 tablet (10 mg) by mouth every morning. 60 tablet 1     brimonidine (ALPHAGAN) 0.2 % ophthalmic solution Place 1 drop into both eyes 2 times daily       calcium carbonate-vitamin D (CALTRATE) 600-10 MG-MCG per tablet Take 1 tablet by mouth daily. 60 tablet 1     carbamide peroxide (DEBROX) 6.5 % otic solution Place 5 drops into both ears 2 times daily. (Patient not taking: Reported on 9/6/2024) 15 mL 0     fluticasone (FLONASE) 50 MCG/ACT nasal spray Spray 1 spray into both nostrils 2 times daily       latanoprost (XALATAN) 0.005 % ophthalmic solution INSTILL 2 DROPS IN BOTH EYES AT BEDTIME       lisinopril (ZESTRIL) 5 MG tablet Take 1 tablet (5 mg) by mouth daily. 60 tablet 1     Lutein-Zeaxanthin 25-5 MG CAPS Take 1 capsule by mouth daily       metFORMIN (GLUCOPHAGE) 500 MG tablet Take 1 tablet (500mg) every morning and 2 tablets (1000mg) every  evening. 90 tablet 0     metoprolol tartrate (LOPRESSOR) 25 MG tablet Take 1 tablet (25 mg) by mouth 2 times daily. 60 tablet 1     mycophenolate (GENERIC EQUIVALENT) 500 MG tablet Take 3 tablets (1,500 mg) by mouth 2 times daily. 72 tablet 0     pantoprazole (PROTONIX) 40 MG EC tablet Take 1 tablet (40 mg) by mouth every morning (before breakfast). 60 tablet 1     pramox-pe-glycerin-petrolatum (PREPARATION H) 1-0.25-14.4-15 % CREA cream Place rectally 3 times daily as needed for hemorrhoids. 25.5 g 0     prochlorperazine (COMPAZINE) 5 MG tablet Take 1 tablet (5 mg) by mouth every 6 hours as needed for nausea or vomiting. 30 tablet 0     psyllium (METAMUCIL/KONSYL) capsule Take 3 capsules by mouth daily. 270 capsule 3     sirolimus (GENERIC EQUIVALENT) 2 MG tablet Take 2 tablets (4 mg) by mouth daily. 60 tablet 1     sulfamethoxazole-trimethoprim (BACTRIM DS) 800-160 MG tablet Take 1 tablet by mouth Every Mon, Tues two times daily. Do not begin taking until instructed to do so by BMT clinic. Do not start before September 9, 2024. 28 tablet 1     ursodiol (ACTIGALL) 300 MG capsule Take 1 capsule (300 mg) by mouth 3 times daily. 126 capsule 0         EXAM      /79 (BP Location: Right arm, Patient Position: Sitting, Cuff Size: Adult Large)   Pulse 82   Temp 98.7  F (37.1  C) (Oral)   Resp 18   Wt 107.6 kg (237 lb 3.2 oz)   SpO2 95%   BMI 32.30 kg/m    Wt Readings from Last 4 Encounters:   09/06/24 107.6 kg (237 lb 3.2 oz)   09/03/24 107.9 kg (237 lb 14.4 oz)   08/30/24 109.8 kg (242 lb 1.6 oz)   08/29/24 109.9 kg (242 lb 3.2 oz)       KPS: 70% Cannot do active work, but can care for self    General: Alert, awake  Eyes: Conjunctiva normal  Mouth and Throat: Scalloping of mucosa along the sides of the tongue, white coating on tongue  Abdomen: Non tender, non distended  Skin: Erythematous, slightly raised rash on face, scalp, chest and upper back  MSK: No pedal edema  Access: R CVC       LABS / PATHOLOGY/  IMAGING     Data     Blood Counts  Recent Labs   Lab Test 09/06/24  1257 09/03/24  1317 08/30/24  0757 08/29/24  0705   WBC 2.8* 3.6* 4.9 3.8*   HGB 9.3* 9.2* 9.0* 8.7*   PLT 89* 98* 106* 101*   NEUTROPHIL 70 68 89 75   ANEU 2.0  --  4.4 2.9   LYMPH 10 5 3 3   ALYM 0.3*  --  0.1* 0.1*         Basic Panel  Recent Labs   Lab Test 09/06/24  1257 09/03/24  1317 08/30/24  0757    141 138   POTASSIUM 3.7 3.5 3.9   CHLORIDE 102 106 104   CO2 24 24 23   BUN 12.3 14.3 12.5   CR 0.98 0.98 0.94   * 116* 221*        Calcium, Magnesium, Phosphorus  Recent Labs   Lab Test 09/06/24  1257 09/03/24  1317 08/30/24  0757 08/26/24  1253 08/25/24  0326 08/24/24  0326 08/23/24  0340   MC 9.5 9.6 9.6   < > 8.9 9.0 8.8   MAG 1.6*  --   --   --  1.9 2.0 2.1   PHOS  --   --   --   --  3.7 3.9 3.5    < > = values in this interval not displayed.        LFTs  Recent Labs   Lab Test 09/06/24  1257 08/29/24  0705 08/26/24  1253   ALKPHOS 74 79 83   AST 37 26 24   ALT 34 23 19   BILITOTAL 0.2 0.2 0.2   ALBUMIN 3.9 3.7 3.6     Immunosuppression  Recent Labs   Lab Test 09/06/24  1257 08/29/24  0705 08/27/24  1344   RAPAMY 7.4 8.5 21.0*       Recent Labs   Lab Test 09/06/24  1257 08/27/24  1344 08/21/24  0805   CMVQNT Not Detected Not Detected Not Detected       Chimerism  Lab Results   Component Value Date    SPECDES  08/30/2024     Bone Marrow: ACD Syringe      SPECDES  08/30/2024     Bone Marrow: ACD Syringe      LDRESULTS  08/30/2024     RESULTS:    POST BONE MARROW  DONOR: (WHITE, [id number])  100 %     RECIPIENT:  0 %    These results are accurate +/-5%.                LDRESULTS  08/29/2024     RESULTS:     POST CD3+ FRACTION  DONOR: (RUSS, [id number])  84 %     RECIPIENT:  16 %    These results are accurate +/-5%.                LDRESULTS  08/29/2024     RESULTS:     POST CD33/66b+(Myeloid) FRACTION  DONOR: (RUSS, [id number])  100 %     RECIPIENT:  0 %    These results are accurate +/-5%.                    Pathology  Bone marrow biopsy:   Lab Results   Component Value Date    FINALDX  08/30/2024     Bone marrow, posterior iliac crest, right decalcified trephine biopsy and touch imprint; particle crush, direct aspirate smear, and concentrated aspirate smear; and peripheral blood smear:  - No morphologic or immunophenotypic evidence of myeloid neoplasm or monoclonal B lymphocytosis  - Slightly hypercellular marrow for age (cellularity estimated at 30-40%) with trilineage hematopoietic maturation, no overt dysplasia, and no increase in blasts  - Peripheral blood showing moderate normochromic, normocytic anemia; lymphopenia; slight thrombocytopenia and no circulating blasts  - See comment      COMDX  08/30/2024     The previously detected CD5 positive lambda monotypic B-cell population in the previous flow study ([id number])  is not detected. Final interpretation requires correlation with results of other ancillary studies, morphologic, and clinical features.          Flow cytology:   Lab Results   Component Value Date    FLINTERP  08/30/2024     A. Iliac Crest, Bone Marrow Aspirate, Right:  - Polytypic B cells  - No increase in myeloid blasts and no abnormal myeloid blast population  - See comment        COMDX  08/30/2024     The previously detected CD5 positive lambda monotypic B-cell population in the previous flow study ([id number])  is not detected. Final interpretation requires correlation with results of other ancillary studies, morphologic, and clinical features.                   ASSESSMENT AND PLAN     BMT for high risk MDS: D30  - Chemo protocol: [id number]; Flu, Cy, TBI  - Peripheral blood stem cell graft from 8/8 donor, ABO matched (Oneg)  - continue allopurinol (hx of gout)     Disease status:   - D28 (8/30/24): Slightly hypercellular marrow (30-40%). No evidence of myeloid neoplasm or monoclonal B-lymphocytosis. Cytogenetics pending. NGS negative for DNMT3A (other mutations not tested)  - Need to send  NGS for all mutations present at diagnosis with future bone marrows    Graft status/chimerism:   - D28 (8/30/24): Bone marrow 100%. Peripheral blood CD33 100%, CD3 84%  - Repeat every 2 weeks till complete engraftment    GVHD  # Current immunosuppression is MMF and sirolimus   - Stop MMF at D35  - Continue sirolimus with goal 5-10    # GVHD grade  Skin: Possible grade 1  - BMT VINNY appt for skin biopsy. Derm referral  - Topical hydrocortisone and triamcinolone   GI: Symptoms likely due to MMF/ regimen related toxicities. GI score 0.  Liver: LFTs normal. Liver score 0.    HEME/ COAG  G-CSF last given on 8/22/4    ID  # Prophylaxis  Fungal: micafungin 300mg 3x/week until D+45, followed by mold active azole  PJP/ Toxo IgG negative: Bactrim from D28   Viral: Acyclovir 800 mg bid    # Monitoring  CMV: Monitor weekly till D100. 9/6 - negative   EBV: Monitor every 2 weeks between D30 to D180.   IgG: Check IgG at next visit.  Check serum IgG level q3 months, and consider IVIG repletion for IgG levels <400 mg/dL.    GI  # Hepatic steatosis and borderline iron deposition in liver  - VOD prophy: Ursodiol     # Supportive   - Ulcer prophy: PPI daily   - Alternating constipation and diarrhea: continue psyllium and imodium PRN    CARDIOVASCULAR  # Asymptomatic moderate aortic stenosis    # A.fib with RVR   - One episode while inpatient on 8/21/24 which converted to normal sinus rhythm spontaneously. Was started on metoprolol 25mg BID. Unclear if this is needed long term - will refer to cardiology.  - One previous episode of A.fib with RVR prior to transplant in the context of admission with febrile episode on 3/15/24. Noted on his fitbit, not very symptomatic. Cardioversion planned for new onset A.fib. Not achieved with amiodarone, DCCV on 3/21/24 restored normal sinus rhythm. Amiodarone and anticoagulation was stopped in April 2024 without any recurrence.     # CAD  - Coronary artery calcifications.   - Continue atorvastatin  10mg, ASA 81mg (resumed on 8/23/24 as platelets stable above 50k)  - Will need an ischemia evaluation in the long term    # HTN: Continue amlodipine 10mg (home med), lisinopril 5mg      RENAL/ELECTROLYTES/  # Urinary frequency, without evidence of retention or infection  - Prior imaging demonstrates prostatomegaly along with bladder thickening. This is a long standing issue which has been ongoing since pre-transplant. He was started on flomax while admitted, has taken it for around 3 weeks with no improvement, therefore stopped per patient preference.   - Will refer to urology, but any non-urgent invasive testing or procedures should be done only when off immunosuppression.     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 Continue brimonidine, latanaprost eye drops     MUSCULOSKELETAL/FRAILTY  # Gout: Continue allopurinol indefinitely     Post-transplant vaccinations  Flu vaccination after D60, then annually   COVID vaccine after D100      I spent 90 minutes in the care of this patient today, which included time necessary for preparation for the visit, obtaining history, ordering medications/tests/procedures as medically indicated, review of pertinent medical literature, counseling of the patient, communication of recommendations to the care team, and documentation time.    Nena Fowler  Department of Hematology, Oncology and Transplantation  Pager 1300/Text via [employer]        Again, thank you for allowing me to participate in the care of your patient.        Sincerely,        GASPER Rios

## 2024-09-06 NOTE — PROGRESS NOTES
BMT/Cell Therapy Follow Up    Date of service: Sep 6, 2024     Patient ID: Arpit BETH White is a 73 year old y/o male who is day +30 post allogenic stem cell transplant for MDS-EB1    Diagnosis MDS-EB1 BMT type Allogenic  CMV  Donor -  Recipient -    Prep ELIEZER (Flu/Cy/TBI) Donor type  8/8 URD ABO D/R O- (matched)   GVHD ppx PTCy, siro, MMF Graft source PBSCT Toxo IgG Negative   Protocol [id number] CD34/kg 6.12E+06    BMT MD Nena Fowler     Pre-transplant disease history  Referring provider:  Dr. Beckwith, [facility]   Data    Diagnosis:   MDS-IB1. IPSS-M: very high risk 1.98   CD5+ Monoclonal B Lymphocytosis (MBL), chronic lymphocytic leukemia type  Recurrent febrile episodes, UBA1 negative, resolved with treatment of MDS  Treatment  3/21/24 to 6/19/24: Decitabine 20mg/m2 for 5 days x 4 cycles    Hematological history  July 2023: Episode of gout in left ankle seen by Rheumatology and treated with prednisone taper, and allopurinol.  August 2023:  Collapsed in the bathroom while on a trip to [facility]. Had high fevers and rigors, treated with doxycycline.   11/30/2023: Seen by Rheumatology in the [place]. He had acute right sacroilitis. Ferritin was elevated to 1600, no hemachromatosis.   12/3/2023 to 12/9/2023: Hospitalized with high fevers and new onset mild pancytopenias. CT C/A/P did not show any lymphadenopathy or splenomegaly.  Infectious cultures were negative. 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 He was treated with anakinra, solumedrol and then started on oral prednisone.   12/7/2023: Bone marrow biopsy showed hypercellular marrow, no dysplasia, 34% ring sideroblasts, increased reticuloendothelial iron stores, no increase in blasts. Flow showed a CD5 positive B-cell infiltrate (5% of the marrow  cellularity), consistent with monoclonal B-cell lymphocytosis. Cytogenetics showed a normal male karyotype. Myeloid malignancy panel did not show an SF3B1 mutation. However, there were mutations in SRSF2, DNMT3A, RUNX1, IDH2 p.[id number], BCOR, BCORL1, and TET2. Diagnosed with CCUS.   1/31/2024 to 2/6/24: Hospitalized with fevers to 104 despite anti-IL1 therapy. Anakinra was increased, then switched to canakinumab. PET-Ct on 2/9/24 did not show any malignancy. Diagnosed with subclinical hyperthyroidism treated with methimazole starting 2/14/2024.  3/15/2024 to 3/25/2024: Hospitalized for neutropenic fevers. He also had an episode of atrial fibrillation with rapid ventricular response placed on AC with LMWH. Methimazole was held due to risk of agranulocytosis.  3/18/2024: Bone marrow biopsy showed Myelodysplastic syndrome, with multilineage dysplasia. 2% bone marrow blasts, and 3% circulating blasts.18% ringed sideroblasts, slight reticulin fibrosis. Concurrent CBC showed WBC 1.7, ANC 0.5, Hb 8.7, platelet count 43. Normal cytogenetics. NGS could not be sent from the bone marrow. NGS peripheral blood (4/18/24) showed mutations in BCOR (56%), BCORL1 (11%), DNMT3A (34%), IDH2 (9%), SRSF2 (33%), STAG2 (9%), TET2 (2%), two RUNX1 mutations. IPSS-M: very high risk 1.98. Scattered non-caseating granulomas identified on the bone marrow biopsy and clot sections, negative for microorganisms. Also seen on flow was CD5-positive lambda monotypic B cells (4.3%).      3/21/2024: Cycle 1 decitabine 20 mg/m2 for 3 days only.  4/22/2024: Cycle 2 decitabine 20 mg/m2 for 5 days   5/21/2024: Cycle 3 decitabine   6/19/2024: Cycle 4 decitabine      7/23/24: Pre transplant BMBx showed persistent MDS. Normocellular marrow (20-30%) with increase dysplastic megakaryocytes, no bone marrow blasts, and also rare circulating blasts. Compared to the previous bone marrow, this bone marrow is less cellular, only 1% ring sideroblasts and less  circulating blasts (1% current vs 3% previous). Flow did not show any myeloid blasts, CD5 positive lambda monotypic cells (10%). Cytogenetics normal, NGS positive for DNMT3A (21%).     Post transplant  # A.fib with RVR on 8/21/24 which converted to sinus rhythm spontaneously. Was started on metoprolol 25mg BID         INTERVAL HISTORY     Arpit Forrest returns for a scheduled BMT clinic visit. He is here with his wife. Several issues were addressed today.   He has been home for around 2 weeks now. Says that he does not have energy, but notes a gradual improvement. Can walk on the treadmill for around 20 min.  - He still has sores on the side of his tongue. These are bothersome for him. He is able to tolerate only soft bland foods. Does not have any magic mouthwash, but says that when he used it in the hospital it worked only for 20min or so. He had other mouth sores also which have resolved.   - Taste is off, appetite is improving.  - Occasional nausea, has been taking compazine daily in the morning, okay to go to PRN  - No abdominal pain  - Says that he has cycles of bowel movements - he will be constipated, then he will have several well formed large Bms and will then have diarrhea. He needs to take imodium on the days that he has diarrhea, but does not take more than 6mg in total in a day. This cycle lasts 3-4 days and then repeats.   - He has a new rash on his face, chest and upper back. Itchy. Started around 4 days ago.   - Continues to have urine frequency which is unchanged compared to pre-transplant. He has been on flomax for several weeks now without any improvement. He would like to stop this.       Plan  - Reviewed D28 BMBx, shows CR. NGS was sent only for a single gene (DNMT3A) which was negative - need to send for all mutations present at diagnosis.   - Chimerism Bone marrow 100%. Peripheral blood CD3 84%. Monitor in 2 weeks.   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 Rash does not look typical of siro induced  acneform reaction. Start topical hydrocortisone on face and triamcinolone on chest/ back. Derm referral placed (urgent). Need to schedule for a skin punch biopsy with APPs.   - Monitor lower Gi symptoms/ diarrhea. Likely due to regimen related toxicities vs MMF. Continue psyllium daily and imodium PRN  - MMF to complete on D35  - Continue sirolimus with goal 5-10   - ID: continue acyclovir, micafungin. Start bactrim from Monday.   - CV: continue aspirin, statin, amlodipine. Lisinopril 5mg and metoprolol 25mg BID was started while inpatient. He probably does not need metoprolol long term, will refer to cardiology.   - Have sent a rx for magic mouthwash. Continue saline rinses. Since other mouth sores are improving, I suspect these are related to regimen related toxicities and will slowly resolve.   - DM: has an appointment with endocrine.   - Urine frequency has been a long standing issue. Okay to stop flomax since it has not helped him. Previous imaging shows prostatomegaly and thickened bladder wall. He did not have any post void residue when checked while inpatient. Have placed a urology referral for further evaluation - though would recommend deferring any non urgent invasive tests to after 3 months post transplant.   - He needs a letter stating that he underwent a bone marrow transplant for MDS to submit to the [facility] for agent orange appeal.   - RTC on 9/25/24    ROS: as above    PMH  - A.fib with RVR while admitted with febrile episode on 3/15/24 s/p cardioversion. He was on amiodarone and anticoagulation till 4/15/24, at which point it was discontinued.   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   - Dyslipidemia  - HTN  - VINCENT  - Gout   - Exposure to agent orange     Medications  Current Outpatient Medications   Medication Sig Dispense Refill    Heparin Sod, Pork, Lock Flush 10 UNIT/ML SOLN Inject 10 mLs into the vein daily.  Inject 5mL into each lumen once a day. 300 mL 0    hydrocortisone 2.5 % cream Apply topically 2 times daily. Use on face and scalp 30 g 0    magic mouthwash suspension (diphenhydrAMINE, lidocaine, aluminum-magnesium & simethicone) Swish and swallow 10 mLs in mouth every 6 hours as needed for mouth sores. 400 mL 0    triamcinolone (KENALOG) 0.1 % external cream Apply topically 2 times daily. Use on chest and back 454 g 0    acyclovir (ZOVIRAX) 800 MG tablet Take 1 tablet (800 mg) by mouth 2 times daily. 120 tablet 1    allopurinol (ZYLOPRIM) 300 MG tablet Take 1 tablet (300 mg) by mouth daily. 60 tablet 1    amLODIPine (NORVASC) 10 MG tablet Take 1 tablet (10 mg) by mouth every morning. 60 tablet 1    aspirin 81 MG EC tablet Take 1 tablet (81 mg) by mouth daily. 60 tablet 1    atorvastatin (LIPITOR) 10 MG tablet Take 1 tablet (10 mg) by mouth every morning. 60 tablet 1    brimonidine (ALPHAGAN) 0.2 % ophthalmic solution Place 1 drop into both eyes 2 times daily      calcium carbonate-vitamin D (CALTRATE) 600-10 MG-MCG per tablet Take 1 tablet by mouth daily. 60 tablet 1    carbamide peroxide (DEBROX) 6.5 % otic solution Place 5 drops into both ears 2 times daily. (Patient not taking: Reported on 9/6/2024) 15 mL 0    fluticasone (FLONASE) 50 MCG/ACT nasal spray Spray 1 spray into both nostrils 2 times daily      latanoprost (XALATAN) 0.005 % ophthalmic solution INSTILL 2 DROPS IN BOTH EYES AT BEDTIME      lisinopril (ZESTRIL) 5 MG tablet Take 1 tablet (5 mg) by mouth daily. 60 tablet 1    Lutein-Zeaxanthin 25-5 MG CAPS Take 1 capsule by mouth daily      metFORMIN (GLUCOPHAGE) 500 MG tablet Take 1 tablet (500mg) every morning and 2 tablets (1000mg) every evening. 90 tablet 0    metoprolol tartrate (LOPRESSOR) 25 MG tablet Take 1 tablet (25 mg) by mouth 2 times daily. 60 tablet 1    mycophenolate (GENERIC EQUIVALENT) 500 MG tablet Take 3 tablets (1,500 mg) by mouth 2 times daily. 72 tablet 0    pantoprazole (PROTONIX)  40 MG EC tablet Take 1 tablet (40 mg) by mouth every morning (before breakfast). 60 tablet 1    pramox-pe-glycerin-petrolatum (PREPARATION H) 1-0.25-14.4-15 % CREA cream Place rectally 3 times daily as needed for hemorrhoids. 25.5 g 0    prochlorperazine (COMPAZINE) 5 MG tablet Take 1 tablet (5 mg) by mouth every 6 hours as needed for nausea or vomiting. 30 tablet 0    psyllium (METAMUCIL/KONSYL) capsule Take 3 capsules by mouth daily. 270 capsule 3    sirolimus (GENERIC EQUIVALENT) 2 MG tablet Take 2 tablets (4 mg) by mouth daily. 60 tablet 1    sulfamethoxazole-trimethoprim (BACTRIM DS) 800-160 MG tablet Take 1 tablet by mouth Every Mon, Tues two times daily. Do not begin taking until instructed to do so by BMT clinic. Do not start before September 9, 2024. 28 tablet 1    ursodiol (ACTIGALL) 300 MG capsule Take 1 capsule (300 mg) by mouth 3 times daily. 126 capsule 0         EXAM      /79 (BP Location: Right arm, Patient Position: Sitting, Cuff Size: Adult Large)   Pulse 82   Temp 98.7  F (37.1  C) (Oral)   Resp 18   Wt 107.6 kg (237 lb 3.2 oz)   SpO2 95%   BMI 32.30 kg/m    Wt Readings from Last 4 Encounters:   09/06/24 107.6 kg (237 lb 3.2 oz)   09/03/24 107.9 kg (237 lb 14.4 oz)   08/30/24 109.8 kg (242 lb 1.6 oz)   08/29/24 109.9 kg (242 lb 3.2 oz)       KPS: 70% Cannot do active work, but can care for self    General: Alert, awake  Eyes: Conjunctiva normal  Mouth and Throat: Scalloping of mucosa along the sides of the tongue, white coating on tongue  Abdomen: Non tender, non distended  Skin: Erythematous, slightly raised rash on face, scalp, chest and upper back  MSK: No pedal edema  Access: R CVC           LABS / PATHOLOGY/ IMAGING     Data      Blood Counts  Recent Labs   Lab Test 09/06/24  1257 09/03/24  1317 08/30/24  0757 08/29/24  0705   WBC 2.8* 3.6* 4.9 3.8*   HGB 9.3* 9.2* 9.0* 8.7*   PLT 89* 98* 106* 101*   NEUTROPHIL 70 68 89 75   ANEU 2.0  --  4.4 2.9   LYMPH 10 5 3 3   ALYM 0.3*  --   0.1* 0.1*         Basic Panel  Recent Labs   Lab Test 09/06/24  1257 09/03/24  1317 08/30/24  0757    141 138   POTASSIUM 3.7 3.5 3.9   CHLORIDE 102 106 104   CO2 24 24 23   BUN 12.3 14.3 12.5   CR 0.98 0.98 0.94   * 116* 221*        Calcium, Magnesium, Phosphorus  Recent Labs   Lab Test 09/06/24  1257 09/03/24  1317 08/30/24  0757 08/26/24  1253 08/25/24  0326 08/24/24  0326 08/23/24  0340   MC 9.5 9.6 9.6   < > 8.9 9.0 8.8   MAG 1.6*  --   --   --  1.9 2.0 2.1   PHOS  --   --   --   --  3.7 3.9 3.5    < > = values in this interval not displayed.        LFTs  Recent Labs   Lab Test 09/06/24  1257 08/29/24  0705 08/26/24  1253   ALKPHOS 74 79 83   AST 37 26 24   ALT 34 23 19   BILITOTAL 0.2 0.2 0.2   ALBUMIN 3.9 3.7 3.6     Immunosuppression  Recent Labs   Lab Test 09/06/24  1257 08/29/24  0705 08/27/24  1344   RAPAMY 7.4 8.5 21.0*       Recent Labs   Lab Test 09/06/24  1257 08/27/24  1344 08/21/24  0805   CMVQNT Not Detected Not Detected Not Detected       Chimerism  Lab Results   Component Value Date    SPECDES  08/30/2024     Bone Marrow: ACD Syringe      SPECDES  08/30/2024     Bone Marrow: ACD Syringe      LDRESULTS  08/30/2024     RESULTS:    POST BONE MARROW  DONOR: (RUSS, [id number])  100 %     RECIPIENT:  0 %    These results are accurate +/-5%.                LDRESULTS  08/29/2024     RESULTS:     POST CD3+ FRACTION  DONOR: (RUSS, [id number])  84 %     RECIPIENT:  16 %    These results are accurate +/-5%.                LDRESULTS  08/29/2024     RESULTS:     POST CD33/66b+(Myeloid) FRACTION  DONOR: (RUSS, [id number])  100 %     RECIPIENT:  0 %    These results are accurate +/-5%.                   Pathology  Bone marrow biopsy:   Lab Results   Component Value Date    FINALDX  08/30/2024     Bone marrow, posterior iliac crest, right decalcified trephine biopsy and touch imprint; particle crush, direct aspirate smear, and concentrated aspirate smear; and peripheral  blood smear:  - No morphologic or immunophenotypic evidence of myeloid neoplasm or monoclonal B lymphocytosis  - Slightly hypercellular marrow for age (cellularity estimated at 30-40%) with trilineage hematopoietic maturation, no overt dysplasia, and no increase in blasts  - Peripheral blood showing moderate normochromic, normocytic anemia; lymphopenia; slight thrombocytopenia and no circulating blasts  - See comment      COMDX  08/30/2024     The previously detected CD5 positive lambda monotypic B-cell population in the previous flow study ([id number])  is not detected. Final interpretation requires correlation with results of other ancillary studies, morphologic, and clinical features.          Flow cytology:   Lab Results   Component Value Date    FLINTERP  08/30/2024     A. Iliac Crest, Bone Marrow Aspirate, Right:  - Polytypic B cells  - No increase in myeloid blasts and no abnormal myeloid blast population  - See comment        COMDX  08/30/2024     The previously detected CD5 positive lambda monotypic B-cell population in the previous flow study ([id number])  is not detected. Final interpretation requires correlation with results of other ancillary studies, morphologic, and clinical features.                   ASSESSMENT AND PLAN     BMT for high risk MDS: D30  - Chemo protocol: [id number]; Flu, Cy, TBI  - Peripheral blood stem cell graft from 8/8 donor, ABO matched (Oneg)  - continue allopurinol (hx of gout)     Disease status:   - D28 (8/30/24): Slightly hypercellular marrow (30-40%). No evidence of myeloid neoplasm or monoclonal B-lymphocytosis. Cytogenetics pending. NGS negative for DNMT3A (other mutations not tested)  - Need to send NGS for all mutations present at diagnosis with future bone marrows    Graft status/chimerism:   - D28 (8/30/24): Bone marrow 100%. Peripheral blood CD33 100%, CD3 84%  - Repeat every 2 weeks till complete engraftment    GVHD  # Current immunosuppression is MMF and sirolimus    - Stop MMF at D35  - Continue sirolimus with goal 5-10    # GVHD grade  Skin: Possible grade 1  - BMT VINNY appt for skin biopsy. Derm referral  - Topical hydrocortisone and triamcinolone   GI: Symptoms likely due to MMF/ regimen related toxicities. GI score 0.  Liver: LFTs normal. Liver score 0.    HEME/ COAG  G-CSF last given on 8/22/4    ID  # Prophylaxis  Fungal: micafungin 300mg 3x/week until D+45, followed by mold active azole  PJP/ Toxo IgG negative: Bactrim from D28   Viral: Acyclovir 800 mg bid    # Monitoring  CMV: Monitor weekly till D100. 9/6 - negative   EBV: Monitor every 2 weeks between D30 to D180.   IgG: Check IgG at next visit.  Check serum IgG level q3 months, and consider IVIG repletion for IgG levels <400 mg/dL.    GI  # Hepatic steatosis and borderline iron deposition in liver  - VOD prophy: Ursodiol     # Supportive   - Ulcer prophy: PPI daily   - Alternating constipation and diarrhea: continue psyllium and imodium PRN    CARDIOVASCULAR  # Asymptomatic moderate aortic stenosis    # A.fib with RVR   - One episode while inpatient on 8/21/24 which converted to normal sinus rhythm spontaneously. Was started on metoprolol 25mg BID. Unclear if this is needed long term - will refer to cardiology.  - One previous episode of A.fib with RVR prior to transplant in the context of admission with febrile episode on 3/15/24. Noted on his fitbit, not very symptomatic. Cardioversion planned for new onset A.fib. Not achieved with amiodarone, DCCV on 3/21/24 restored normal sinus rhythm. Amiodarone and anticoagulation was stopped in April 2024 without any recurrence.     # CAD  - Coronary artery calcifications.   - Continue atorvastatin 10mg, ASA 81mg (resumed on 8/23/24 as platelets stable above 50k)  - Will need an ischemia evaluation in the long term    # HTN: Continue amlodipine 10mg (home med), lisinopril 5mg      RENAL/ELECTROLYTES/  # Urinary frequency, without evidence of retention or infection  -  Prior imaging demonstrates prostatomegaly along with bladder thickening. This is a long standing issue which has been ongoing since pre-transplant. He was started on flomax while admitted, has taken it for around 3 weeks with no improvement, therefore stopped per patient preference.   - Will refer to urology, but any non-urgent invasive testing or procedures should be done only when off immunosuppression.     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 Continue brimonidine, latanaprost eye drops     MUSCULOSKELETAL/FRAILTY  # Gout: Continue allopurinol indefinitely     Post-transplant vaccinations  Flu vaccination after D60, then annually   COVID vaccine after D100      I spent 90 minutes in the care of this patient today, which included time necessary for preparation for the visit, obtaining history, ordering medications/tests/procedures as medically indicated, review of pertinent medical literature, counseling of the patient, communication of recommendations to the care team, and documentation time.    Nena Fowler  Department of Hematology, Oncology and Transplantation  Pager 1300/Text via MOWGLI

## 2024-09-06 NOTE — NURSING NOTE
"Oncology Rooming Note    September 6, 2024 1:40 PM   Arpit Forrest is a 73 year old male who presents for:    Chief Complaint   Patient presents with    Blood Draw     Labs drawn via CVC by RN in lab, vitals taken.     Oncology Clinic Visit     Hx of MDS     Initial Vitals: /79 (BP Location: Right arm, Patient Position: Sitting, Cuff Size: Adult Large)   Pulse 82   Temp 98.7  F (37.1  C) (Oral)   Resp 18   Wt 107.6 kg (237 lb 3.2 oz)   SpO2 95%   BMI 32.30 kg/m   Estimated body mass index is 32.3 kg/m  as calculated from the following:    Height as of 7/31/24: 1.825 m (5' 11.85\").    Weight as of this encounter: 107.6 kg (237 lb 3.2 oz). Body surface area is 2.34 meters squared.  No Pain (0) Comment: Data Unavailable   No LMP for male patient.  Allergies reviewed: Yes  Medications reviewed: No    Medications: MEDICATION REFILLS NEEDED TODAY. Provider was notified. - patient has several questions regarding medications/refills. Advised patient to direct these towards Dr. Fowler.  Pharmacy name entered into [facility]: [id number] [place] [place] - [address] AT [facility] [address]    Frailty Screening:   Is the patient here for a new oncology consult visit in cancer care? 2. No      Clinical concerns: Patient would like to address with Dr. Malcolm Boswell, BERT              "

## 2024-09-06 NOTE — CONFIDENTIAL NOTE
RECORDS RECEIVED FROM: internal    DATE RECEIVED: 9.11.24   NOTES (FOR ALL VISITS) STATUS DETAILS   OFFICE NOTES from referring provider internal  Destinee Sanches PA-C    ED NOTES internal /ce internal  7.31.24 Hany     HP-   1.31.24 Juan   12.3.23 Tinger    MEDICATION LIST internal     IMAGING      MRI (BRAIN) ce  HP- 12.3.23  PET- 2.9.24   XR (Chest) internal  internal - 8.21.24, 8.10.24, 7.25.24  Hp- 4.10.24, 3.15.24,    CT (HEAD/NECK/CHEST/ABDOMEN) internal  internal - 7.25.24    HP- 2.2.24 , 12.3.23, 11.30.23   LABS     DIABETES: HBGA1C, CREATININE, FASTING LIPIDS, MICROALBUMIN URINE, POTASSIUM, TSH, T4    THYROID: TSH, T4, CBC, THYRODLONULIN, TOTAL T3, FREE T4, CALCITONIN, CEA internal

## 2024-09-06 NOTE — PROGRESS NOTES
Infusion Nursing Note:  Arpit Forrest presents today for scheduled infusion.    Patient seen by provider today: Yes:    present during visit today: Not Applicable.    Note: Labs monitored. Patient was assessed by provider today.     Ashley infused per treatment plan.       Intravenous Access:  Andrews.    Treatment Conditions:  Lab Results   Component Value Date    HGB 9.3 (L) 09/06/2024    WBC 2.8 (L) 09/06/2024    ANEU 2.0 09/06/2024    ANEUTAUTO 2.4 09/03/2024    PLT 89 (L) 09/06/2024        Lab Results   Component Value Date     09/06/2024    POTASSIUM 3.7 09/06/2024    MAG 1.6 (L) 09/06/2024    CR 0.98 09/06/2024    MC 9.5 09/06/2024    BILITOTAL 0.2 09/06/2024    ALBUMIN 3.9 09/06/2024    ALT 34 09/06/2024    AST 37 09/06/2024         Post Infusion Assessment:  Patient tolerated infusion without incident.       Discharge Plan:   Patient discharged in stable condition accompanied by: self and wife.  Departure Mode: Ambulatory.      Tiffanie Boswell RN

## 2024-09-07 ENCOUNTER — INFUSION THERAPY VISIT (OUTPATIENT)
Dept: TRANSPLANT | Facility: CLINIC | Age: 74
End: 2024-09-07
Attending: STUDENT IN AN ORGANIZED HEALTH CARE EDUCATION/TRAINING PROGRAM
Payer: MEDICARE

## 2024-09-07 VITALS
HEART RATE: 84 BPM | TEMPERATURE: 98.4 F | OXYGEN SATURATION: 98 % | SYSTOLIC BLOOD PRESSURE: 124 MMHG | DIASTOLIC BLOOD PRESSURE: 78 MMHG | RESPIRATION RATE: 14 BRPM

## 2024-09-07 DIAGNOSIS — Z94.84 STEM CELLS TRANSPLANT STATUS (H): ICD-10-CM

## 2024-09-07 DIAGNOSIS — D46.9 MDS (MYELODYSPLASTIC SYNDROME) (H): Primary | ICD-10-CM

## 2024-09-07 LAB — CMV DNA SPEC NAA+PROBE-ACNC: NOT DETECTED IU/ML

## 2024-09-07 PROCEDURE — 96365 THER/PROPH/DIAG IV INF INIT: CPT

## 2024-09-07 PROCEDURE — 250N000011 HC RX IP 250 OP 636

## 2024-09-07 PROCEDURE — 258N000003 HC RX IP 258 OP 636

## 2024-09-07 RX ORDER — HEPARIN SODIUM,PORCINE 10 UNIT/ML
5-20 VIAL (ML) INTRAVENOUS DAILY PRN
Status: CANCELLED | OUTPATIENT
Start: 2024-09-08

## 2024-09-07 RX ORDER — HEPARIN SODIUM (PORCINE) LOCK FLUSH IV SOLN 100 UNIT/ML 100 UNIT/ML
5 SOLUTION INTRAVENOUS
Status: CANCELLED | OUTPATIENT
Start: 2024-09-08

## 2024-09-07 RX ADMIN — MICAFUNGIN SODIUM 300 MG: 100 INJECTION, POWDER, LYOPHILIZED, FOR SOLUTION INTRAVENOUS at 11:23

## 2024-09-07 ASSESSMENT — PAIN SCALES - GENERAL: PAINLEVEL: NO PAIN (0)

## 2024-09-07 NOTE — PROGRESS NOTES
Infusion Nursing Note:  Arpit Forrest presents today for scheduled infusion.    Patient seen by provider today: No   present during visit today: Not Applicable.    Note: No labs today. Patient is feeling fatigued today. Oncology Toxicity Assessment done. Medications and allergies reviewed.    Micafungin infused per treatment plan.      Intravenous Access:  Andrews.    Treatment Conditions:  Not Applicable.      Post Infusion Assessment:  Patient tolerated infusion without incident.  Blood return noted pre and post infusion.  No evidence of extravasations.       Discharge Plan:   Patient declined prescription refills.  Patient discharged in stable condition accompanied by: self.  Departure Mode: Ambulatory.      Tiffanie Boswell RN

## 2024-09-09 ENCOUNTER — TELEPHONE (OUTPATIENT)
Dept: ENDOCRINOLOGY | Facility: CLINIC | Age: 74
End: 2024-09-09
Payer: MEDICARE

## 2024-09-09 ENCOUNTER — TELEPHONE (OUTPATIENT)
Dept: DERMATOLOGY | Facility: CLINIC | Age: 74
End: 2024-09-09
Payer: MEDICARE

## 2024-09-09 NOTE — PATIENT INSTRUCTIONS
New Diabetes Patient Info  Welcome to the Diabetes Optimization Program at the Endocrinology and Diabetes Clinic at [facility] and [place]!    Our Diabetes Optimization Program is here to provide you with a team-based, collaborative approach to optimize your diabetes management. The team is made up of Endocrinologists and Physician Assistants here at the Clinic, your Primary Care Physician, Certified Diabetes Educators, Registered Nurses, Medical Assistants, Emergency Medical Technicians and Visit Facilitators.     During your care with us, we promise to:   Work with you and your Primary Care Provider Dr. Max to optimize your diabetes management.   Provide you with the tools and support you need to achieve a healthier lifestyle  Help you to control your diabetes by offering new treatments, education, and support to improve your diabetes management  Help you graduate back to your primary care provider for ongoing care once your diabetes is under control      Endocrinology Clinics Phone Number: [phone]    [facility] [place]   [place] Address:     [address] [address]   [address] [address] [place] [id number]

## 2024-09-09 NOTE — TELEPHONE ENCOUNTER
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 Based on the availability of our provider(s), we are unable to accommodate this request.    Were all sites offered this patient?  Yes  1st choice [facility] in [place]  2nd choice [facility] in [place]  3rd choice [facility] in [place]  Does scheduling algorithm request to schedule next available?  Patient appointment has not been scheduled.  Please review the referral request for accommodation and contact the patient.  If unable to accommodate, please resubmit a referral and indicate a preferred partner or affiliate location using Provider Finder or Scheduling Instructions field.       Referral Order in Epic  Records in Epic  No outside Records    I wasn't sure if this was General or Transplant - so sending TE  Please review and call Pt to schedule    Pt said rash is on his face     Pt and his wife prefer start times for Appts to be at 9:30 am or later if possible please    Pt has had a Bone Marrow Transplant    Thank you!

## 2024-09-09 NOTE — TELEPHONE ENCOUNTER
Spoke with patient. Pt is using livongo. Instructions sent via [employer] on how to create 14 day summary report with livongo. Pt requested an email address in case unable to attach report in [facility]. Email address sent as well. Constance Rodriguez CMA on 9/9/2024 at 2:00 PM

## 2024-09-10 ENCOUNTER — APPOINTMENT (OUTPATIENT)
Dept: LAB | Facility: CLINIC | Age: 74
End: 2024-09-10
Attending: STUDENT IN AN ORGANIZED HEALTH CARE EDUCATION/TRAINING PROGRAM
Payer: MEDICARE

## 2024-09-10 ENCOUNTER — CARE COORDINATION (OUTPATIENT)
Dept: TRANSPLANT | Facility: CLINIC | Age: 74
End: 2024-09-10

## 2024-09-10 ENCOUNTER — ONCOLOGY VISIT (OUTPATIENT)
Dept: TRANSPLANT | Facility: CLINIC | Age: 74
End: 2024-09-10
Attending: STUDENT IN AN ORGANIZED HEALTH CARE EDUCATION/TRAINING PROGRAM
Payer: MEDICARE

## 2024-09-10 VITALS
HEART RATE: 80 BPM | TEMPERATURE: 98.2 F | BODY MASS INDEX: 32.1 KG/M2 | WEIGHT: 235.7 LBS | OXYGEN SATURATION: 99 % | RESPIRATION RATE: 18 BRPM | SYSTOLIC BLOOD PRESSURE: 131 MMHG | DIASTOLIC BLOOD PRESSURE: 73 MMHG

## 2024-09-10 DIAGNOSIS — D46.9 MDS (MYELODYSPLASTIC SYNDROME) (H): Primary | ICD-10-CM

## 2024-09-10 DIAGNOSIS — Z94.81 STATUS POST BONE MARROW TRANSPLANT (H): ICD-10-CM

## 2024-09-10 DIAGNOSIS — Z94.84 STEM CELLS TRANSPLANT STATUS (H): ICD-10-CM

## 2024-09-10 DIAGNOSIS — D46.9 MYELODYSPLASTIC SYNDROME (H): ICD-10-CM

## 2024-09-10 LAB
ANION GAP SERPL CALCULATED.3IONS-SCNC: 12 MMOL/L (ref 7–15)
BASOPHILS # BLD AUTO: ABNORMAL 10*3/UL
BASOPHILS # BLD MANUAL: 0 10E3/UL (ref 0–0.2)
BASOPHILS NFR BLD AUTO: ABNORMAL %
BASOPHILS NFR BLD MANUAL: 1 %
BUN SERPL-MCNC: 14.3 MG/DL (ref 8–23)
CALCIUM SERPL-MCNC: 9.5 MG/DL (ref 8.8–10.4)
CHLORIDE SERPL-SCNC: 101 MMOL/L (ref 98–107)
CREAT SERPL-MCNC: 1.01 MG/DL (ref 0.67–1.17)
EGFRCR SERPLBLD CKD-EPI 2021: 78 ML/MIN/1.73M2
EOSINOPHIL # BLD AUTO: ABNORMAL 10*3/UL
EOSINOPHIL # BLD MANUAL: 0.1 10E3/UL (ref 0–0.7)
EOSINOPHIL NFR BLD AUTO: ABNORMAL %
EOSINOPHIL NFR BLD MANUAL: 3 %
ERYTHROCYTE [DISTWIDTH] IN BLOOD BY AUTOMATED COUNT: 15 % (ref 10–15)
GLUCOSE SERPL-MCNC: 173 MG/DL (ref 70–99)
HCO3 SERPL-SCNC: 23 MMOL/L (ref 22–29)
HCT VFR BLD AUTO: 29.1 % (ref 40–53)
HGB BLD-MCNC: 9.6 G/DL (ref 13.3–17.7)
IMM GRANULOCYTES # BLD: ABNORMAL 10*3/UL
IMM GRANULOCYTES NFR BLD: ABNORMAL %
LYMPHOCYTES # BLD AUTO: ABNORMAL 10*3/UL
LYMPHOCYTES # BLD MANUAL: 0 10E3/UL (ref 0.8–5.3)
LYMPHOCYTES NFR BLD AUTO: ABNORMAL %
LYMPHOCYTES NFR BLD MANUAL: 1 %
Lab: NORMAL
MCH RBC QN AUTO: 29.6 PG (ref 26.5–33)
MCHC RBC AUTO-ENTMCNC: 33 G/DL (ref 31.5–36.5)
MCV RBC AUTO: 90 FL (ref 78–100)
MONOCYTES # BLD AUTO: ABNORMAL 10*3/UL
MONOCYTES # BLD MANUAL: 0.7 10E3/UL (ref 0–1.3)
MONOCYTES NFR BLD AUTO: ABNORMAL %
MONOCYTES NFR BLD MANUAL: 24 %
NEUTROPHILS # BLD AUTO: ABNORMAL 10*3/UL
NEUTROPHILS # BLD MANUAL: 2 10E3/UL (ref 1.6–8.3)
NEUTROPHILS NFR BLD AUTO: ABNORMAL %
NEUTROPHILS NFR BLD MANUAL: 71 %
NRBC # BLD AUTO: 0 10E3/UL
NRBC BLD AUTO-RTO: 0 /100
PERFORMING LABORATORY: NORMAL
PLAT MORPH BLD: ABNORMAL
PLATELET # BLD AUTO: 96 10E3/UL (ref 150–450)
POTASSIUM SERPL-SCNC: 3.4 MMOL/L (ref 3.4–5.3)
RBC # BLD AUTO: 3.24 10E6/UL (ref 4.4–5.9)
RBC MORPH BLD: ABNORMAL
SODIUM SERPL-SCNC: 136 MMOL/L (ref 135–145)
SPECIMEN STATUS: NORMAL
TEST NAME: NORMAL
WBC # BLD AUTO: 2.8 10E3/UL (ref 4–11)

## 2024-09-10 PROCEDURE — 85027 COMPLETE CBC AUTOMATED: CPT

## 2024-09-10 PROCEDURE — 250N000011 HC RX IP 250 OP 636

## 2024-09-10 PROCEDURE — 85007 BL SMEAR W/DIFF WBC COUNT: CPT

## 2024-09-10 PROCEDURE — 258N000003 HC RX IP 258 OP 636

## 2024-09-10 PROCEDURE — 82374 ASSAY BLOOD CARBON DIOXIDE: CPT

## 2024-09-10 PROCEDURE — 250N000011 HC RX IP 250 OP 636: Performed by: PHYSICIAN ASSISTANT

## 2024-09-10 PROCEDURE — G0463 HOSPITAL OUTPT CLINIC VISIT: HCPCS | Mod: 25

## 2024-09-10 PROCEDURE — 36592 COLLECT BLOOD FROM PICC: CPT

## 2024-09-10 PROCEDURE — 96365 THER/PROPH/DIAG IV INF INIT: CPT

## 2024-09-10 PROCEDURE — 99215 OFFICE O/P EST HI 40 MIN: CPT

## 2024-09-10 RX ORDER — HEPARIN SODIUM,PORCINE 10 UNIT/ML
5 VIAL (ML) INTRAVENOUS
Status: DISCONTINUED | OUTPATIENT
Start: 2024-09-10 | End: 2024-09-10 | Stop reason: HOSPADM

## 2024-09-10 RX ORDER — HEPARIN SODIUM (PORCINE) LOCK FLUSH IV SOLN 100 UNIT/ML 100 UNIT/ML
5 SOLUTION INTRAVENOUS
Status: CANCELLED | OUTPATIENT
Start: 2024-09-11

## 2024-09-10 RX ORDER — HEPARIN SODIUM,PORCINE 10 UNIT/ML
5-20 VIAL (ML) INTRAVENOUS DAILY PRN
Status: CANCELLED | OUTPATIENT
Start: 2024-09-11

## 2024-09-10 RX ADMIN — Medication 5 ML: at 11:10

## 2024-09-10 RX ADMIN — MICAFUNGIN SODIUM 300 MG: 100 INJECTION, POWDER, LYOPHILIZED, FOR SOLUTION INTRAVENOUS at 11:28

## 2024-09-10 ASSESSMENT — PAIN SCALES - GENERAL: PAINLEVEL: NO PAIN (0)

## 2024-09-10 NOTE — PROGRESS NOTES
BMT/Cell Therapy Follow Up    Date of service: Sep 10, 2024     Patient ID: Arpit BETH White is a 73 year old y/o male who is day +34 post allogenic stem cell transplant for MDS-EB1    Diagnosis MDS-EB1 BMT type Allogenic  CMV  Donor -  Recipient -    Prep ELIEZER (Flu/Cy/TBI) Donor type  8/8 URD ABO D/R O- (matched)   GVHD ppx PTCy, siro, MMF Graft source PBSCT Toxo IgG Negative   Protocol [id number] CD34/kg 6.12E+06    BMT MD Nena Fowler      INTERVAL HISTORY     Rash continues on face/neck/maybe a little on very upper chest and back at base of neck.   No other rash. Was given creams last visit but didn't think helped so not really using.   No new N/V/D  No other acute issues  No new meds.     ROS: Remainder of 10 point ROS negative as above       EXAM      /73   Pulse 80   Temp 98.2  F (36.8  C) (Oral)   Resp 18   Wt 106.9 kg (235 lb 11.2 oz)   SpO2 99%   BMI 32.10 kg/m    Wt Readings from Last 4 Encounters:   09/10/24 106.9 kg (235 lb 11.2 oz)   09/06/24 107.6 kg (237 lb 3.2 oz)   09/03/24 107.9 kg (237 lb 14.4 oz)   08/30/24 109.8 kg (242 lb 1.6 oz)       KPS: 70% Cannot do active work, but can care for self    General: Alert, awake  Eyes: Conjunctiva normal  Skin: Erythematous, slightly raised rash on face, scalp, neck, little bit at bottom of neck on back and very minimal onto chest. Rest of his skin is pale and zero evidence of rash.   MSK: No pedal edema  Access: R CVC       ASSESSMENT AND PLAN     BMT for high risk MDS: D+34  - Chemo protocol: [id number]; Flu, Cy, TBI  - Peripheral blood stem cell graft from 8/8 donor, ABO matched (Oneg)  - continue allopurinol (hx of gout)  Disease status:   - D28 (8/30/24): Slightly hypercellular marrow (30-40%). No evidence of myeloid neoplasm or monoclonal B-lymphocytosis. Cytogenetics pending. NGS negative for DNMT3A (other mutations not tested)  - Need to send NGS for all mutations present at diagnosis with future bone marrows    Graft status/chimerism:   - D28  (8/30/24): Bone marrow 100%. Peripheral blood CD33 100%, CD3 84%  - Repeat every 2 weeks till complete engraftment. Will order for Thursday.     GVHD  # Current immunosuppression is MMF and sirolimus.   - Stop MMF at D35. He tells me he ran out a few days ago and it was never refilled so he is officially off now.   - Continue sirolimus with goal 5-10. 9/6 Therapeutic 7.4. Will get level Thursday.     # GVHD grade  Skin: grade 1--noted last week and has not spread or changed.   - Dr. Fowler referred to dermatology. I don't see anything scheduled yet. Asked schedulers to assist as her referral said urgent.   - I told him to put the creams on TID that Dr. FRANKS prescribed.   - requested skin bx appt for Thursday in event no derm appt.      No other organ involvement noted currently.     HEME/ COAG  G-CSF last given on 8/22/4    ID  # Prophylaxis  Fungal: micafungin 300mg 3x/week until D+45, followed by mold active azole.   PJP/ Toxo IgG negative: Bactrim. He tells me it was unclear if he was supposed to start or not. It is now Tue afternoon so will skip this week as also recent new rash. He can start on next Monday.   Viral: Acyclovir 800 mg bid     # Monitoring  CMV: Monitor weekly till D100. 9/6 - negative   EBV: Monitor every 2 weeks between D30 to D180.   IgG: Check serum IgG level q3 months, and consider IVIG repletion for IgG levels <400 mg/dL. Not drawn today. Will order at next visit.     GI  # Hepatic steatosis and borderline iron deposition in liver  - VOD prophy: Ursodiol     # Supportive   - Ulcer prophy: PPI daily   - Alternating constipation and diarrhea: continue psyllium and imodium PRN    CARDIOVASCULAR  # Asymptomatic moderate aortic stenosis    # A.fib with RVR   - One episode while inpatient on 8/21/24 which converted to normal sinus rhythm spontaneously. Was started on metoprolol 25mg BID. Unclear if this is needed long term - Dr. Fowler  referred to cardiology.  - One previous episode of A.fib with  RVR prior to transplant in the context of admission with febrile episode on 3/15/24. Noted on his fitbit, not very symptomatic. Cardioversion planned for new onset A.fib. Not achieved with amiodarone, DCCV on 3/21/24 restored normal sinus rhythm. Amiodarone and anticoagulation was stopped in April 2024 without any recurrence.     # CAD  - Coronary artery calcifications.   - Continue atorvastatin 10mg, ASA 81mg (resumed on 8/23/24 as platelets stable above 50k)  - Will need an ischemia evaluation in the long term    # HTN: Continue amlodipine 10mg (home med), lisinopril 5mg      RENAL/ELECTROLYTES/  # Urinary frequency, without evidence of retention or infection  - Prior imaging demonstrates prostatomegaly along with bladder thickening. This is a long standing issue which has been ongoing since pre-transplant. He was started on flomax while admitted, has taken it for around 3 weeks with no improvement, therefore stopped per patient preference.   - Dr. Fowler Referred to urology, but any non-urgent invasive testing or procedures should be done only when off immunosuppression.     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 Continue brimonidine, latanaprost eye drops     MUSCULOSKELETAL/FRAILTY  # Gout: Continue allopurinol indefinitely     Post-transplant vaccinations  Flu vaccination after D60, then annually   COVID vaccine after D100    I also was given consent to sign with patient for research database as portion missed initially. Given to Moreno when I was done.       RTC: Th labs, provider,  skin bx slot, infusion for  juan     I spent 50 minutes in the care of this patient today, which included time necessary for preparation for the visit, obtaining history, ordering medications/tests/procedures as medically indicated, review of pertinent medical literature, counseling of the patient, communication of recommendations to the care team, and documentation time.

## 2024-09-10 NOTE — LETTER
9/10/2024      Arpit Forrest  [address] [place] [address]      Dear Colleague,    Thank you for referring your patient, Arpit Forrest, to the [facility] BLOOD AND MARROW TRANSPLANT PROGRAM [place]. Please see a copy of my visit note below.    BMT/Cell Therapy Follow Up    Date of service: Sep 10, 2024     Patient ID: Arpit Forrest is a 73 year old y/o male who is day +34 post allogenic stem cell transplant for MDS-EB1    Diagnosis MDS-EB1 BMT type Allogenic  CMV  Donor -  Recipient -    Prep ELIEZER (Flu/Cy/TBI) Donor type  8/8 URD ABO D/R O- (matched)   GVHD ppx PTCy, siro, MMF Graft source PBSCT Toxo IgG Negative   Protocol [id number] CD34/kg 6.12E+06    BMT MD Nena Fowler      INTERVAL HISTORY     Rash continues on face/neck/maybe a little on very upper chest and back at base of neck.   No other rash. Was given creams last visit but didn't think helped so not really using.   No new N/V/D  No other acute issues  No new meds.     ROS: Remainder of 10 point ROS negative as above       EXAM      /73   Pulse 80   Temp 98.2  F (36.8  C) (Oral)   Resp 18   Wt 106.9 kg (235 lb 11.2 oz)   SpO2 99%   BMI 32.10 kg/m    Wt Readings from Last 4 Encounters:   09/10/24 106.9 kg (235 lb 11.2 oz)   09/06/24 107.6 kg (237 lb 3.2 oz)   09/03/24 107.9 kg (237 lb 14.4 oz)   08/30/24 109.8 kg (242 lb 1.6 oz)       KPS: 70% Cannot do active work, but can care for self    General: Alert, awake  Eyes: Conjunctiva normal  Skin: Erythematous, slightly raised rash on face, scalp, neck, little bit at bottom of neck on back and very minimal onto chest. Rest of his skin is pale and zero evidence of rash.   MSK: No pedal edema  Access: R CVC       ASSESSMENT AND PLAN     BMT for high risk MDS: D+34  - Chemo protocol: [id number]; Flu, Cy, TBI  - Peripheral blood stem cell graft from 8/8 donor, ABO matched (Oneg)  - continue allopurinol (hx of gout)  Disease status:   - D28 (8/30/24): Slightly hypercellular  marrow (30-40%). No evidence of myeloid neoplasm or monoclonal B-lymphocytosis. Cytogenetics pending. NGS negative for DNMT3A (other mutations not tested)  - Need to send NGS for all mutations present at diagnosis with future bone marrows    Graft status/chimerism:   - D28 (8/30/24): Bone marrow 100%. Peripheral blood CD33 100%, CD3 84%  - Repeat every 2 weeks till complete engraftment. Will order for Thursday.     GVHD  # Current immunosuppression is MMF and sirolimus.   - Stop MMF at D35. He tells me he ran out a few days ago and it was never refilled so he is officially off now.   - Continue sirolimus with goal 5-10. 9/6 Therapeutic 7.4. Will get level Thursday.     # GVHD grade  Skin: grade 1--noted last week and has not spread or changed.   - Dr. Fowler referred to dermatology. I don't see anything scheduled yet. Asked schedulers to assist as her referral said urgent.   - I told him to put the creams on TID that Dr. FRANKS prescribed.   - requested skin bx appt for Thursday in event no derm appt.      No other organ involvement noted currently.     HEME/ COAG  G-CSF last given on 8/22/4    ID  # Prophylaxis  Fungal: micafungin 300mg 3x/week until D+45, followed by mold active azole.   PJP/ Toxo IgG negative: Bactrim. He tells me it was unclear if he was supposed to start or not. It is now Tue afternoon so will skip this week as also recent new rash. He can start on next Monday.   Viral: Acyclovir 800 mg bid     # Monitoring  CMV: Monitor weekly till D100. 9/6 - negative   EBV: Monitor every 2 weeks between D30 to D180.   IgG: Check serum IgG level q3 months, and consider IVIG repletion for IgG levels <400 mg/dL. Not drawn today. Will order at next visit.     GI  # Hepatic steatosis and borderline iron deposition in liver  - VOD prophy: Ursodiol     # Supportive   - Ulcer prophy: PPI daily   - Alternating constipation and diarrhea: continue psyllium and imodium PRN    CARDIOVASCULAR  # Asymptomatic moderate aortic  stenosis    # A.fib with RVR   - One episode while inpatient on 8/21/24 which converted to normal sinus rhythm spontaneously. Was started on metoprolol 25mg BID. Unclear if this is needed long term - Dr. Fowler  referred to cardiology.  - One previous episode of A.fib with RVR prior to transplant in the context of admission with febrile episode on 3/15/24. Noted on his fitbit, not very symptomatic. Cardioversion planned for new onset A.fib. Not achieved with amiodarone, DCCV on 3/21/24 restored normal sinus rhythm. Amiodarone and anticoagulation was stopped in April 2024 without any recurrence.     # CAD  - Coronary artery calcifications.   - Continue atorvastatin 10mg, ASA 81mg (resumed on 8/23/24 as platelets stable above 50k)  - Will need an ischemia evaluation in the long term    # HTN: Continue amlodipine 10mg (home med), lisinopril 5mg      RENAL/ELECTROLYTES/  # Urinary frequency, without evidence of retention or infection  - Prior imaging demonstrates prostatomegaly along with bladder thickening. This is a long standing issue which has been ongoing since pre-transplant. He was started on flomax while admitted, has taken it for around 3 weeks with no improvement, therefore stopped per patient preference.   - Dr. Fowler Referred to urology, but any non-urgent invasive testing or procedures should be done only when off immunosuppression.     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 Continue brimonidine, latanaprost eye drops     MUSCULOSKELETAL/FRAILTY  # Gout: Continue allopurinol indefinitely     Post-transplant  vaccinations  Flu vaccination after D60, then annually   COVID vaccine after D100    I also was given consent to sign with patient for research database as portion missed initially. Given to Moreno when I was done.       RTC:  labs, provider,  skin bx slot, infusion for aftab.     I spent 50 minutes in the care of this patient today, which included time necessary for preparation for the visit, obtaining history, ordering medications/tests/procedures as medically indicated, review of pertinent medical literature, counseling of the patient, communication of recommendations to the care team, and documentation time.      Again, thank you for allowing me to participate in the care of your patient.        Sincerely,        BMT Advanced Practice Provider

## 2024-09-10 NOTE — PROGRESS NOTES
Infusion Nursing Note:  Arpit Forrest presents today for Micafungin.    Patient seen by provider today: Yes: Alysia Conley, VINNY   present during visit today: Not Applicable.    Note: Lab results monitored. Pt received Micafungin infusion, tolerated well. CVC dressing changed. Pt discharged with no further concerns at this time.       Intravenous Access:  [place].    Treatment Conditions:  Results reviewed, labs MET treatment parameters, ok to proceed with treatment.      Post Infusion Assessment:  Patient tolerated infusion without incident.  Blood return noted pre and post infusion.       Discharge Plan:   Patient and/or family verbalized understanding of discharge instructions and all questions answered.  Patient discharged in stable condition accompanied by: wife.      Estrellita Bustillos RN

## 2024-09-10 NOTE — NURSING NOTE
Chief Complaint   Patient presents with    Blood Draw     Labs drawn via CVC by RN in lab. VS taken.      CVC accessed, labs drawn. Caps changed. Line flushed and Heparin locked. Vital signs taken. Checked into next appointment.   Mela Pablo RN

## 2024-09-11 ENCOUNTER — PRE VISIT (OUTPATIENT)
Dept: ENDOCRINOLOGY | Facility: CLINIC | Age: 74
End: 2024-09-11

## 2024-09-11 ENCOUNTER — VIRTUAL VISIT (OUTPATIENT)
Dept: ENDOCRINOLOGY | Facility: CLINIC | Age: 74
End: 2024-09-11
Payer: MEDICARE

## 2024-09-11 DIAGNOSIS — E11.9 TYPE 2 DIABETES MELLITUS WITHOUT COMPLICATION, WITHOUT LONG-TERM CURRENT USE OF INSULIN (H): ICD-10-CM

## 2024-09-11 PROCEDURE — 99204 OFFICE O/P NEW MOD 45 MIN: CPT | Mod: 95 | Performed by: STUDENT IN AN ORGANIZED HEALTH CARE EDUCATION/TRAINING PROGRAM

## 2024-09-11 PROCEDURE — G2211 COMPLEX E/M VISIT ADD ON: HCPCS | Mod: 95 | Performed by: STUDENT IN AN ORGANIZED HEALTH CARE EDUCATION/TRAINING PROGRAM

## 2024-09-11 NOTE — PROGRESS NOTES
Endocrinology Clinic Visit 9/11/2024      Video-Visit Details    Type of service:  Video Visit    Video Start Time (time video started): 10:06 am    Video End Time (time video stopped): 10:37 am    Originating Location (pt. Location): Home        Distant Location (provider location):  Off-site    Mode of Communication:  Video Conference via [employer]    Physician has received verbal consent for a Video Visit from the patient? Yes    I spent a total of 45 minutes on the date of encounter reviewing medical records, evaluating the patient, coordinating care and documenting in the EHR, as detailed above.      NAME:  Arpit Forrest  PCP:  Tanvir Max  MRN:  [id number]  Reason for Consult:  DM2  Requesting Provider:  Destinee Sanches    Chief Complaint     Chief Complaint   Patient presents with    Consult       History of Present Illness     Arpit Forrest is a 73 year old male who is seen in video visit for DM2      The patient was diagnosed with type 2 diabetes a year ago. Per chart review, dx with prediabetes in May 2023 with A1C 6.2. A1C 6.5 noted in August 2023  (T2DM), shortly after starting prednisone for gout and has remained on prednisone daily up until 1 week before his BMT admission.  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 He had mild steroid induced hyperglycemia while inpatient, managed with insulin, was seen by IDS. Discharged back on metformin only.    Previous A1C in records reviewed. Had A1c done at .  Lab Results   Component Value Date    A1C 5.0 07/31/2024       Weight is     Wt Readings from Last 4 Encounters:   09/10/24 106.9 kg (235 lb 11.2 oz)   09/06/24 107.6 kg (237 lb 3.2 oz)   09/03/24 107.9 kg (237 lb 14.4 oz)   08/30/24 109.8 kg (242 lb 1.6 oz)         Diabetes Care/Complications:   Eyes: last eye exam 1/2024, he believes he has no DR.  Kidneys: normal Cr, last checked 9/2024  Nerves: chronic peripheral neuropathy for 30 years.  Smoking: no  Blood Pressure: acceptable  Lipids: on lipitor 10  mg , ldl 57 on 7/24  Macrovascular: no  Hypoglycemia: no    Previous DM related Hospitalization:no    Current treatment strategy:   Metformin 1500 mg total per day    Blood Glucose Monitoring:                     Problem List     Patient Active Problem List   Diagnosis    Ankylosing spondylitis lumbar region (H)    Autoinflammatory syndrome (H)    Macdonald's esophagus without dysplasia    Bilateral sacroiliitis (H24)    Essential hypertension    H/O: gout    History of agent Orange exposure    History of kidney donation    Hyperlipidemia    Myelodysplastic syndrome (H)    Obstructive sleep apnea syndrome    Obesity (BMI 30-39.9)    Type 2 diabetes mellitus without complication, without long-term current use of insulin (H)    Glaucoma    MDS (myelodysplastic syndrome) (H)    Stem cells transplant status (H)        Medications     Current Outpatient Medications   Medication Sig Dispense Refill    acyclovir (ZOVIRAX) 800 MG tablet Take 1 tablet (800 mg) by mouth 2 times daily. 120 tablet 1    allopurinol (ZYLOPRIM) 300 MG tablet Take 1 tablet (300 mg) by mouth daily. 60 tablet 1    amLODIPine (NORVASC) 10 MG tablet Take 1 tablet (10 mg) by mouth every morning. 60 tablet 1    aspirin 81 MG EC tablet Take 1 tablet (81 mg) by mouth daily. 60 tablet 1    atorvastatin (LIPITOR) 10 MG tablet Take 1 tablet (10 mg) by mouth every morning. 60 tablet 1    brimonidine (ALPHAGAN) 0.2 % ophthalmic solution Place 1 drop into both eyes 2 times daily      calcium carbonate-vitamin D (CALTRATE) 600-10 MG-MCG per tablet Take 1 tablet by mouth daily. 60 tablet 1    carbamide peroxide (DEBROX) 6.5 % otic solution Place 5 drops into both ears 2 times daily. (Patient not taking: Reported on 9/6/2024) 15 mL 0    fluticasone (FLONASE) 50 MCG/ACT nasal spray Spray 1 spray into both nostrils 2 times daily      Heparin Sod, Pork, Lock Flush 10 UNIT/ML SOLN Inject 10 mLs into the vein daily. Inject 5mL into each lumen once a day. 300 mL 0     hydrocortisone 2.5 % cream Apply topically 2 times daily. Use on face and scalp 30 g 0    latanoprost (XALATAN) 0.005 % ophthalmic solution INSTILL 2 DROPS IN BOTH EYES AT BEDTIME      lisinopril (ZESTRIL) 5 MG tablet Take 1 tablet (5 mg) by mouth daily. 60 tablet 1    Lutein-Zeaxanthin 25-5 MG CAPS Take 1 capsule by mouth daily      magic mouthwash suspension (diphenhydrAMINE, lidocaine, aluminum-magnesium & simethicone) Swish and swallow 10 mLs in mouth every 6 hours as needed for mouth sores. 400 mL 0    metFORMIN (GLUCOPHAGE) 500 MG tablet Take 1 tablet (500mg) every morning and 2 tablets (1000mg) every evening. 90 tablet 0    metoprolol tartrate (LOPRESSOR) 25 MG tablet Take 1 tablet (25 mg) by mouth 2 times daily. 60 tablet 1    mycophenolate (GENERIC EQUIVALENT) 500 MG tablet Take 3 tablets (1,500 mg) by mouth 2 times daily. 72 tablet 0    pantoprazole (PROTONIX) 40 MG EC tablet Take 1 tablet (40 mg) by mouth every morning (before breakfast). 60 tablet 1    pramox-pe-glycerin-petrolatum (PREPARATION H) 1-0.25-14.4-15 % CREA cream Place rectally 3 times daily as needed for hemorrhoids. 25.5 g 0    prochlorperazine (COMPAZINE) 5 MG tablet Take 1 tablet (5 mg) by mouth every 6 hours as needed for nausea or vomiting. 30 tablet 0    psyllium (METAMUCIL/KONSYL) capsule Take 3 capsules by mouth daily. 270 capsule 3    sirolimus (GENERIC EQUIVALENT) 2 MG tablet Take 2 tablets (4 mg) by mouth daily. 60 tablet 1    sulfamethoxazole-trimethoprim (BACTRIM DS) 800-160 MG tablet Take 1 tablet by mouth Every Mon, Tues two times daily. Do not begin taking until instructed to do so by [facility] clinic. Do not start before September 9, 2024. 28 tablet 1    triamcinolone (KENALOG) 0.1 % external cream Apply topically 2 times daily. Use on chest and back 454 g 0    ursodiol (ACTIGALL) 300 MG capsule Take 1 capsule (300 mg) by mouth 3 times daily. 126 capsule 0     No current facility-administered medications for this visit.         Allergies     Allergies   Allergen Reactions    Gramineae Pollens Other (See Comments)     Seasonal sneezing, runny nose.       Medical / Surgical History     No past medical history on file.  Past Surgical History:   Procedure Laterality Date    ARTHROSCOPY KNEE RT/LT Left     IR CVC TUNNEL PLACEMENT > 5 YRS OF AGE  2024    IR LUMBAR PUNCTURE  2024    NEPHRECTOMY      donated a kidney       Social History     Social History     Socioeconomic History    Marital status:      Spouse name: Meghna    Number of children: 2    Years of education: Not on file    Highest education level: Not on file   Occupational History    Not on file   Tobacco Use    Smoking status: Former     Current packs/day: 0.00     Average packs/day: 0.5 packs/day for 4.0 years (2.0 ttl pk-yrs)     Types: Cigarettes     Start date:      Quit date:      Years since quittin.7    Smokeless tobacco: Never   Vaping Use    Vaping status: Never Used   Substance and Sexual Activity    Alcohol use: Not Currently     Comment: 24 Quit 1 year ago    Drug use: Never    Sexual activity: Not on file   Other Topics Concern    Not on file   Social History Narrative    Not on file     Social Determinants of Health     Financial Resource Strain: Unknown (2024)    Financial Resource Strain     Within the past 12 months, have you or your family members you live with been unable to get utilities (heat, electricity) when it was really needed?: Patient declined   Food Insecurity: Unknown (2024)    Food Insecurity     Within the past 12 months, did you worry that your food would run out before you got money to buy more?: Patient declined     Within the past 12 months, did the food you bought just not last and you didn t have money to get more?: Patient declined   Transportation Needs: Unknown (2024)    Transportation Needs     Within the past 12 months, has lack of transportation kept you from medical appointments,  "getting your medicines, non-medical meetings or appointments, work, or from getting things that you need?: Patient declined   Physical Activity: Insufficiently Active (4/3/2024)    [facility] from [facility]    Exercise Vital Sign     Days of Exercise per Week: 3 days     Minutes of Exercise per Session: 20 min   Stress: Not on file   Social Connections: Not on file   Interpersonal Safety: Low Risk  (8/23/2024)    Interpersonal Safety     Do you feel physically and emotionally safe where you currently live?: Yes     Within the past 12 months, have you been hit, slapped, kicked or otherwise physically hurt by someone?: No     Within the past 12 months, have you been humiliated or emotionally abused in other ways by your partner or ex-partner?: No   Housing Stability: Unknown (8/23/2024)    Housing Stability     Do you have housing? : Patient declined     Are you worried about losing your housing?: Patient declined       Family History     No family history on file.    ROS     12 ROS completed, pertinent positive and negative in HPI    Physical Exam   There were no vitals taken for this visit.   GENERAL: alert and no distress  EYES: Eyes grossly normal to inspection.  No discharge or erythema, or obvious scleral/conjunctival abnormalities.  RESP: No audible wheeze, cough, or visible cyanosis.    SKIN: Visible skin clear. No significant rash, abnormal pigmentation or lesions.  NEURO: Cranial nerves grossly intact.  Mentation and speech appropriate for age.  PSYCH: Appropriate affect, tone, and pace of words     Labs/Imaging     Pertinent Labs were reviewed and updated in EPIC and discussed briefly.  Radiology Results were  reviewed and updated in EPIC and discussed briefly.    Summary of recent findings:   Lab Results   Component Value Date    A1C 5.0 07/31/2024       No results found for: \"TSH\", \"T4\"    Creatinine   Date Value Ref Range Status   09/10/2024 1.01 0.67 - 1.17 mg/dL Final       Recent Labs   Lab Test " "07/22/24  0957   CHOL 152   HDL 36*   LDL 57   TRIG 293*       No results found for: \"[id number]\", \"[id number]\", \"[id number]\"    I personally reviewed the patient's outside records from [facility] EMR and Care Everywhere. Summary of pertinent findings in HPI.    Impression / Plan     1. Diabetes Mellitus: Type 2  2. Hx of steroid induced hyperglycemia  Not on steroids anymore. Managed with metformin only. We reviewed his SMBG, doing well overall. A1c was 5 on 7/2024. Good glycemic control. We reviewed general information about DM2 and focused on management and goal BG.   Check A1c in 1 month. Every 3 m.  No changes for now.     2. Diabetes Complications: refer to hpi. No urine alb.     3. Blood Pressure Management: Blood pressure is acceptable based on chart review.     4.Lipid Management: Per the new ACC/SOWMYA/NHLBI guidelines, statins are recommended for individuals with diabetes aged 40-75 with LDL  without ASCVD, and for any individual with ASCVD. Currently the patient is on a statin.      5. Smoking Status: Patient Pt is smoke free..     Review of the result(s) of each unique test - A1c and diabetes related labs.  45 minutes spent on the date of the encounter doing chart review, history and exam, documentation and further activities per the note           Test and/or medications prescribed today:  No orders of the defined types were placed in this encounter.        Follow up: 3 m then graduate to PCP    The longitudinal plan of care for the diagnosis(es)/condition(s) as documented were addressed during this visit. Due to the added complexity in care, I will continue to support Arpit in the subsequent management and with ongoing continuity of care.        Markus Griffin MD  Endocrinology, Diabetes and Metabolism  [facility]    "

## 2024-09-11 NOTE — LETTER
9/11/2024       RE: Arpit Forrest  [address] [place] [address]     Dear Colleague,    Thank you for referring your patient, Arpit Forrest, to the [facility] [place] at [facility]. Please see a copy of my visit note below.    Outcome for 09/04/24 11:25 AM: [employer] message sent  Eli Gotti LPN   Outcome for 09/09/24 1:57 PM:  Patient will send livongo report via email if unable to send via [employer].   Constance Rodriguez MA  Outcome for 09/09/24 3:13 PM:  Liv[employer] Report below.   Constance Rodriguez MA    Patient is showing 5/5 MNCM met.   Constance Rodriguez MA              Endocrinology Clinic Visit 9/11/2024      Video-Visit Details    Type of service:  Video Visit    Video Start Time (time video started): 10:06 am    Video End Time (time video stopped): 10:37 am    Originating Location (pt. Location): Home        Distant Location (provider location):  Off-site    Mode of Communication:  Video Conference via [facility]    Physician has received verbal consent for a Video Visit from the patient? Yes    I spent a total of 45 minutes on the date of encounter reviewing medical records, evaluating the patient, coordinating care and documenting in the EHR, as detailed above.      NAME:  Arpit Forrest  PCP:  Tanvir Max  MRN:  [id number]  Reason for Consult:  DM2  Requesting Provider:  Destinee Sanches    Chief Complaint     Chief Complaint   Patient presents with     Consult       History of Present Illness     Arpit Forrest is a 73 year old male who is seen in video visit for DM2      The patient was diagnosed with type 2 diabetes a year ago. Per chart review, dx with prediabetes in May 2023 with A1C 6.2. A1C 6.5 noted in August 2023  (T2DM), shortly after starting prednisone for gout and has remained on prednisone daily up until 1 week before his BMT admission.  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 He had mild steroid induced  hyperglycemia while inpatient, managed with insulin, was seen by IDS. Discharged back on metformin only.    Previous A1C in records reviewed. Had A1c done at .  Lab Results   Component Value Date    A1C 5.0 07/31/2024       Weight is     Wt Readings from Last 4 Encounters:   09/10/24 106.9 kg (235 lb 11.2 oz)   09/06/24 107.6 kg (237 lb 3.2 oz)   09/03/24 107.9 kg (237 lb 14.4 oz)   08/30/24 109.8 kg (242 lb 1.6 oz)         Diabetes Care/Complications:   Eyes: last eye exam 1/2024, he believes he has no DR.  Kidneys: normal Cr, last checked 9/2024  Nerves: chronic peripheral neuropathy for 30 years.  Smoking: no  Blood Pressure: acceptable  Lipids: on lipitor 10 mg , ldl 57 on 7/24  Macrovascular: no  Hypoglycemia: no    Previous DM related Hospitalization:no    Current treatment strategy:   Metformin 1500 mg total per day    Blood Glucose Monitoring:                     Problem List     Patient Active Problem List   Diagnosis     Ankylosing spondylitis lumbar region (H)     Autoinflammatory syndrome (H)     Macdonald's esophagus without dysplasia     Bilateral sacroiliitis (H24)     Essential hypertension     H/O: gout     History of agent Orange exposure     History of kidney donation     Hyperlipidemia     Myelodysplastic syndrome (H)     Obstructive sleep apnea syndrome     Obesity (BMI 30-39.9)     Type 2 diabetes mellitus without complication, without long-term current use of insulin (H)     Glaucoma     MDS (myelodysplastic syndrome) (H)     Stem cells transplant status (H)        Medications     Current Outpatient Medications   Medication Sig Dispense Refill     acyclovir (ZOVIRAX) 800 MG tablet Take 1 tablet (800 mg) by mouth 2 times daily. 120 tablet 1     allopurinol (ZYLOPRIM) 300 MG tablet Take 1 tablet (300 mg) by mouth daily. 60 tablet 1     amLODIPine (NORVASC) 10 MG tablet Take 1 tablet (10 mg) by mouth every morning. 60 tablet 1     aspirin 81 MG EC tablet Take 1 tablet (81 mg) by mouth daily. 60  tablet 1     atorvastatin (LIPITOR) 10 MG tablet Take 1 tablet (10 mg) by mouth every morning. 60 tablet 1     brimonidine (ALPHAGAN) 0.2 % ophthalmic solution Place 1 drop into both eyes 2 times daily       calcium carbonate-vitamin D (CALTRATE) 600-10 MG-MCG per tablet Take 1 tablet by mouth daily. 60 tablet 1     carbamide peroxide (DEBROX) 6.5 % otic solution Place 5 drops into both ears 2 times daily. (Patient not taking: Reported on 9/6/2024) 15 mL 0     fluticasone (FLONASE) 50 MCG/ACT nasal spray Spray 1 spray into both nostrils 2 times daily       Heparin Sod, Pork, Lock Flush 10 UNIT/ML SOLN Inject 10 mLs into the vein daily. Inject 5mL into each lumen once a day. 300 mL 0     hydrocortisone 2.5 % cream Apply topically 2 times daily. Use on face and scalp 30 g 0     latanoprost (XALATAN) 0.005 % ophthalmic solution INSTILL 2 DROPS IN BOTH EYES AT BEDTIME       lisinopril (ZESTRIL) 5 MG tablet Take 1 tablet (5 mg) by mouth daily. 60 tablet 1     Lutein-Zeaxanthin 25-5 MG CAPS Take 1 capsule by mouth daily       magic mouthwash suspension (diphenhydrAMINE, lidocaine, aluminum-magnesium & simethicone) Swish and swallow 10 mLs in mouth every 6 hours as needed for mouth sores. 400 mL 0     metFORMIN (GLUCOPHAGE) 500 MG tablet Take 1 tablet (500mg) every morning and 2 tablets (1000mg) every evening. 90 tablet 0     metoprolol tartrate (LOPRESSOR) 25 MG tablet Take 1 tablet (25 mg) by mouth 2 times daily. 60 tablet 1     mycophenolate (GENERIC EQUIVALENT) 500 MG tablet Take 3 tablets (1,500 mg) by mouth 2 times daily. 72 tablet 0     pantoprazole (PROTONIX) 40 MG EC tablet Take 1 tablet (40 mg) by mouth every morning (before breakfast). 60 tablet 1     pramox-pe-glycerin-petrolatum (PREPARATION H) 1-0.25-14.4-15 % CREA cream Place rectally 3 times daily as needed for hemorrhoids. 25.5 g 0     prochlorperazine (COMPAZINE) 5 MG tablet Take 1 tablet (5 mg) by mouth every 6 hours as needed for nausea or vomiting. 30  tablet 0     psyllium (METAMUCIL/KONSYL) capsule Take 3 capsules by mouth daily. 270 capsule 3     sirolimus (GENERIC EQUIVALENT) 2 MG tablet Take 2 tablets (4 mg) by mouth daily. 60 tablet 1     sulfamethoxazole-trimethoprim (BACTRIM DS) 800-160 MG tablet Take 1 tablet by mouth Every Mon, Tues two times daily. Do not begin taking until instructed to do so by BMT clinic. Do not start before 2024. 28 tablet 1     triamcinolone (KENALOG) 0.1 % external cream Apply topically 2 times daily. Use on chest and back 454 g 0     ursodiol (ACTIGALL) 300 MG capsule Take 1 capsule (300 mg) by mouth 3 times daily. 126 capsule 0     No current facility-administered medications for this visit.        Allergies     Allergies   Allergen Reactions     Gramineae Pollens Other (See Comments)     Seasonal sneezing, runny nose.       Medical / Surgical History     No past medical history on file.  Past Surgical History:   Procedure Laterality Date     ARTHROSCOPY KNEE RT/LT Left      IR CVC TUNNEL PLACEMENT > 5 YRS OF AGE  2024     IR LUMBAR PUNCTURE  2024     NEPHRECTOMY      donated a kidney       Social History     Social History     Socioeconomic History     Marital status:      Spouse name: Meghna     Number of children: 2     Years of education: Not on file     Highest education level: Not on file   Occupational History     Not on file   Tobacco Use     Smoking status: Former     Current packs/day: 0.00     Average packs/day: 0.5 packs/day for 4.0 years (2.0 ttl pk-yrs)     Types: Cigarettes     Start date:      Quit date: 1970     Years since quittin.7     Smokeless tobacco: Never   Vaping Use     Vaping status: Never Used   Substance and Sexual Activity     Alcohol use: Not Currently     Comment: 24 Quit 1 year ago     Drug use: Never     Sexual activity: Not on file   Other Topics Concern     Not on file   Social History Narrative     Not on file     Social Determinants of Health      Financial Resource Strain: Unknown (8/23/2024)    Financial Resource Strain      Within the past 12 months, have you or your family members you live with been unable to get utilities (heat, electricity) when it was really needed?: Patient declined   Food Insecurity: Unknown (8/23/2024)    Food Insecurity      Within the past 12 months, did you worry that your food would run out before you got money to buy more?: Patient declined      Within the past 12 months, did the food you bought just not last and you didn t have money to get more?: Patient declined   Transportation Needs: Unknown (8/23/2024)    Transportation Needs      Within the past 12 months, has lack of transportation kept you from medical appointments, getting your medicines, non-medical meetings or appointments, work, or from getting things that you need?: Patient declined   Physical Activity: Insufficiently Active (4/3/2024)    Received from [facility]    Exercise Vital Sign      Days of Exercise per Week: 3 days      Minutes of Exercise per Session: 20 min   Stress: Not on file   Social Connections: Not on file   Interpersonal Safety: Low Risk  (8/23/2024)    Interpersonal Safety      Do you feel physically and emotionally safe where you currently live?: Yes      Within the past 12 months, have you been hit, slapped, kicked or otherwise physically hurt by someone?: No      Within the past 12 months, have you been humiliated or emotionally abused in other ways by your partner or ex-partner?: No   Housing Stability: Unknown (8/23/2024)    Housing Stability      Do you have housing? : Patient declined      Are you worried about losing your housing?: Patient declined       Family History     No family history on file.    ROS     12 ROS completed, pertinent positive and negative in HPI    Physical Exam   There were no vitals taken for this visit.   GENERAL: alert and no distress  EYES: Eyes grossly normal to inspection.  No discharge or erythema, or  "obvious scleral/conjunctival abnormalities.  RESP: No audible wheeze, cough, or visible cyanosis.    SKIN: Visible skin clear. No significant rash, abnormal pigmentation or lesions.  NEURO: Cranial nerves grossly intact.  Mentation and speech appropriate for age.  PSYCH: Appropriate affect, tone, and pace of words     Labs/Imaging     Pertinent Labs were reviewed and updated in EPIC and discussed briefly.  Radiology Results were  reviewed and updated in EPIC and discussed briefly.    Summary of recent findings:   Lab Results   Component Value Date    A1C 5.0 07/31/2024       No results found for: \"TSH\", \"T4\"    Creatinine   Date Value Ref Range Status   09/10/2024 1.01 0.67 - 1.17 mg/dL Final       Recent Labs   Lab Test 07/22/24  0957   CHOL 152   HDL 36*   LDL 57   TRIG 293*       No results found for: \"[id number]\", \"[id number]\", \"[id number]\"    I personally reviewed the patient's outside records from [facility] EMR and Care Everywhere. Summary of pertinent findings in [facility].    Impression / Plan     1. Diabetes Mellitus: Type 2  2. Hx of steroid induced hyperglycemia  Not on steroids anymore. Managed with metformin only. We reviewed his SMBG, doing well overall. A1c was 5 on 7/2024. Good glycemic control. We reviewed general information about DM2 and focused on management and goal BG.   Check A1c in 1 month. Every 3 m.  No changes for now.     2. Diabetes Complications: refer to hpi. No urine alb.     3. Blood Pressure Management: Blood pressure is acceptable based on chart review.     4.Lipid Management: Per the new ACC/SOWMYA/NHLBI guidelines, statins are recommended for individuals with diabetes aged 40-75 with LDL  without ASCVD, and for any individual with ASCVD. Currently the patient is on a statin.      5. Smoking Status: Patient Pt is smoke free..     Review of the result(s) of each unique test - A1c and diabetes related labs.  45 minutes spent on the date of the encounter doing chart review, history and " exam, documentation and further activities per the note           Test and/or medications prescribed today:  No orders of the defined types were placed in this encounter.        Follow up: 3 m then graduate to PCP    The longitudinal plan of care for the diagnosis(es)/condition(s) as documented were addressed during this visit. Due to the added complexity in care, I will continue to support Arpit in the subsequent management and with ongoing continuity of care.        Markus Griffin MD  Endocrinology, Diabetes and Metabolism  [facility]      Again, thank you for allowing me to participate in the care of your patient.      Sincerely,    Markus Griffin MD

## 2024-09-11 NOTE — NURSING NOTE
Current patient location: Home    Is the patient currently in the state of [place]? YES    Visit mode:VIDEO    If the visit is dropped, the patient can be reconnected by: VIDEO VISIT: Text to cell phone:   Telephone Information:   Mobile [phone]       Will anyone else be joining the visit? NO  (If patient encounters technical issues they should call [phone] :[id number])    How would you like to obtain your AVS? MyChart    Are changes needed to the allergy or medication list? No  Arpit reported no changes to e-check in information for visit. VIMAL did not review e-check in information again with Arpit due to this.       Are refills needed on medications prescribed by this physician? NO    Rooming Documentation:  Not applicable      Reason for visit: Consult    Belkys BOBBY

## 2024-09-12 ENCOUNTER — ONCOLOGY VISIT (OUTPATIENT)
Dept: TRANSPLANT | Facility: CLINIC | Age: 74
End: 2024-09-12
Attending: STUDENT IN AN ORGANIZED HEALTH CARE EDUCATION/TRAINING PROGRAM
Payer: MEDICARE

## 2024-09-12 ENCOUNTER — APPOINTMENT (OUTPATIENT)
Dept: LAB | Facility: CLINIC | Age: 74
End: 2024-09-12
Attending: STUDENT IN AN ORGANIZED HEALTH CARE EDUCATION/TRAINING PROGRAM
Payer: MEDICARE

## 2024-09-12 VITALS
DIASTOLIC BLOOD PRESSURE: 76 MMHG | WEIGHT: 236.1 LBS | OXYGEN SATURATION: 100 % | TEMPERATURE: 97.9 F | HEART RATE: 72 BPM | SYSTOLIC BLOOD PRESSURE: 128 MMHG | RESPIRATION RATE: 18 BRPM | BODY MASS INDEX: 32.15 KG/M2

## 2024-09-12 DIAGNOSIS — Z94.84 STEM CELLS TRANSPLANT STATUS (H): Primary | ICD-10-CM

## 2024-09-12 DIAGNOSIS — D46.9 MDS (MYELODYSPLASTIC SYNDROME) (H): ICD-10-CM

## 2024-09-12 DIAGNOSIS — Z94.81 STATUS POST BONE MARROW TRANSPLANT (H): ICD-10-CM

## 2024-09-12 DIAGNOSIS — R35.0 BENIGN PROSTATIC HYPERPLASIA WITH URINARY FREQUENCY: Primary | ICD-10-CM

## 2024-09-12 DIAGNOSIS — R21 RASH: ICD-10-CM

## 2024-09-12 DIAGNOSIS — Z94.84 STEM CELLS TRANSPLANT STATUS (H): ICD-10-CM

## 2024-09-12 DIAGNOSIS — D46.9 MYELODYSPLASTIC SYNDROME (H): ICD-10-CM

## 2024-09-12 DIAGNOSIS — N40.1 BENIGN PROSTATIC HYPERPLASIA WITH URINARY FREQUENCY: Primary | ICD-10-CM

## 2024-09-12 DIAGNOSIS — E11.9 TYPE 2 DIABETES MELLITUS WITHOUT COMPLICATION, WITHOUT LONG-TERM CURRENT USE OF INSULIN (H): ICD-10-CM

## 2024-09-12 DIAGNOSIS — Z90.5 HISTORY OF KIDNEY DONATION: ICD-10-CM

## 2024-09-12 DIAGNOSIS — Z86.2 PERSONAL HISTORY OF DISEASES OF BLOOD AND BLOOD-FORMING ORGANS: ICD-10-CM

## 2024-09-12 LAB
ALBUMIN SERPL BCG-MCNC: 3.8 G/DL (ref 3.5–5.2)
ALP SERPL-CCNC: 70 U/L (ref 40–150)
ALT SERPL W P-5'-P-CCNC: 38 U/L (ref 0–70)
ANION GAP SERPL CALCULATED.3IONS-SCNC: 12 MMOL/L (ref 7–15)
AST SERPL W P-5'-P-CCNC: 37 U/L (ref 0–45)
BASOPHILS # BLD AUTO: ABNORMAL 10*3/UL
BASOPHILS # BLD MANUAL: 0.1 10E3/UL (ref 0–0.2)
BASOPHILS NFR BLD AUTO: ABNORMAL %
BASOPHILS NFR BLD MANUAL: 2 %
BILIRUB SERPL-MCNC: 0.2 MG/DL
BUN SERPL-MCNC: 11.8 MG/DL (ref 8–23)
CALCIUM SERPL-MCNC: 9.4 MG/DL (ref 8.8–10.4)
CHLORIDE SERPL-SCNC: 103 MMOL/L (ref 98–107)
CMV DNA SPEC NAA+PROBE-ACNC: NOT DETECTED IU/ML
CREAT SERPL-MCNC: 0.91 MG/DL (ref 0.67–1.17)
CREAT UR-MCNC: 184 MG/DL
EBV DNA SERPL NAA+PROBE-ACNC: NOT DETECTED IU/ML
EGFRCR SERPLBLD CKD-EPI 2021: 88 ML/MIN/1.73M2
ELLIPTOCYTES BLD QL SMEAR: SLIGHT
EOSINOPHIL # BLD AUTO: ABNORMAL 10*3/UL
EOSINOPHIL # BLD MANUAL: 0.1 10E3/UL (ref 0–0.7)
EOSINOPHIL NFR BLD AUTO: ABNORMAL %
EOSINOPHIL NFR BLD MANUAL: 2 %
ERYTHROCYTE [DISTWIDTH] IN BLOOD BY AUTOMATED COUNT: 15 % (ref 10–15)
GLUCOSE SERPL-MCNC: 216 MG/DL (ref 70–99)
HBA1C MFR BLD: 6.9 %
HCO3 SERPL-SCNC: 23 MMOL/L (ref 22–29)
HCT VFR BLD AUTO: 29.4 % (ref 40–53)
HGB BLD-MCNC: 9.4 G/DL (ref 13.3–17.7)
IGG SERPL-MCNC: 467 MG/DL (ref 610–1616)
IMM GRANULOCYTES # BLD: ABNORMAL 10*3/UL
IMM GRANULOCYTES NFR BLD: ABNORMAL %
LAB DIRECTOR DISCLAIMER: NORMAL
LAB DIRECTOR DISCLAIMER: NORMAL
LAB DIRECTOR INTERPRETATION: NORMAL
LAB DIRECTOR INTERPRETATION: NORMAL
LAB DIRECTOR METHODOLOGY: NORMAL
LAB DIRECTOR METHODOLOGY: NORMAL
LAB DIRECTOR RESULTS: NORMAL
LAB DIRECTOR RESULTS: NORMAL
LOCATION OF TASK: NORMAL
LOCATION OF TASK: NORMAL
LYMPHOCYTES # BLD AUTO: ABNORMAL 10*3/UL
LYMPHOCYTES # BLD MANUAL: 0.6 10E3/UL (ref 0.8–5.3)
LYMPHOCYTES NFR BLD AUTO: ABNORMAL %
LYMPHOCYTES NFR BLD MANUAL: 19 %
MCH RBC QN AUTO: 28.7 PG (ref 26.5–33)
MCHC RBC AUTO-ENTMCNC: 32 G/DL (ref 31.5–36.5)
MCV RBC AUTO: 90 FL (ref 78–100)
MICROALBUMIN UR-MCNC: 173 MG/L
MICROALBUMIN/CREAT UR: 94.02 MG/G CR (ref 0–17)
MONOCYTES # BLD AUTO: ABNORMAL 10*3/UL
MONOCYTES # BLD MANUAL: 0.3 10E3/UL (ref 0–1.3)
MONOCYTES NFR BLD AUTO: ABNORMAL %
MONOCYTES NFR BLD MANUAL: 10 %
NEUTROPHILS # BLD AUTO: ABNORMAL 10*3/UL
NEUTROPHILS # BLD MANUAL: 2.2 10E3/UL (ref 1.6–8.3)
NEUTROPHILS NFR BLD AUTO: ABNORMAL %
NEUTROPHILS NFR BLD MANUAL: 67 %
NRBC # BLD AUTO: 0 10E3/UL
NRBC BLD AUTO-RTO: 0 /100
PLAT MORPH BLD: ABNORMAL
PLATELET # BLD AUTO: 123 10E3/UL (ref 150–450)
POTASSIUM SERPL-SCNC: 3.5 MMOL/L (ref 3.4–5.3)
PROT SERPL-MCNC: 6.1 G/DL (ref 6.4–8.3)
RBC # BLD AUTO: 3.27 10E6/UL (ref 4.4–5.9)
RBC MORPH BLD: ABNORMAL
SIROLIMUS BLD-MCNC: 9.3 UG/L (ref 5–15)
SODIUM SERPL-SCNC: 138 MMOL/L (ref 135–145)
SPECIMEN DESCRIPTION: NORMAL
SPECIMEN DESCRIPTION: NORMAL
TME LAST DOSE: NORMAL H
TME LAST DOSE: NORMAL H
WBC # BLD AUTO: 3.3 10E3/UL (ref 4–11)

## 2024-09-12 PROCEDURE — 85027 COMPLETE CBC AUTOMATED: CPT

## 2024-09-12 PROCEDURE — 81268 CHIMERISM ANAL W/CELL SELECT: CPT

## 2024-09-12 PROCEDURE — 82784 ASSAY IGA/IGD/IGG/IGM EACH: CPT

## 2024-09-12 PROCEDURE — 88305 TISSUE EXAM BY PATHOLOGIST: CPT | Mod: 26 | Performed by: DERMATOLOGY

## 2024-09-12 PROCEDURE — 99215 OFFICE O/P EST HI 40 MIN: CPT

## 2024-09-12 PROCEDURE — 82043 UR ALBUMIN QUANTITATIVE: CPT

## 2024-09-12 PROCEDURE — 88305 TISSUE EXAM BY PATHOLOGIST: CPT | Mod: TC

## 2024-09-12 PROCEDURE — 258N000003 HC RX IP 258 OP 636

## 2024-09-12 PROCEDURE — 87799 DETECT AGENT NOS DNA QUANT: CPT

## 2024-09-12 PROCEDURE — 36415 COLL VENOUS BLD VENIPUNCTURE: CPT

## 2024-09-12 PROCEDURE — 83036 HEMOGLOBIN GLYCOSYLATED A1C: CPT

## 2024-09-12 PROCEDURE — G0463 HOSPITAL OUTPT CLINIC VISIT: HCPCS

## 2024-09-12 PROCEDURE — G0452 MOLECULAR PATHOLOGY INTERPR: HCPCS | Mod: 26 | Performed by: STUDENT IN AN ORGANIZED HEALTH CARE EDUCATION/TRAINING PROGRAM

## 2024-09-12 PROCEDURE — 11104 PUNCH BX SKIN SINGLE LESION: CPT

## 2024-09-12 PROCEDURE — 250N000011 HC RX IP 250 OP 636: Performed by: STUDENT IN AN ORGANIZED HEALTH CARE EDUCATION/TRAINING PROGRAM

## 2024-09-12 PROCEDURE — 96365 THER/PROPH/DIAG IV INF INIT: CPT

## 2024-09-12 PROCEDURE — 80195 ASSAY OF SIROLIMUS: CPT

## 2024-09-12 PROCEDURE — 250N000011 HC RX IP 250 OP 636

## 2024-09-12 PROCEDURE — 85007 BL SMEAR W/DIFF WBC COUNT: CPT

## 2024-09-12 PROCEDURE — 82040 ASSAY OF SERUM ALBUMIN: CPT

## 2024-09-12 RX ORDER — HEPARIN SODIUM,PORCINE 10 UNIT/ML
5 VIAL (ML) INTRAVENOUS
Status: DISCONTINUED | OUTPATIENT
Start: 2024-09-12 | End: 2024-09-12 | Stop reason: HOSPADM

## 2024-09-12 RX ORDER — TAMSULOSIN HYDROCHLORIDE 0.4 MG/1
0.4 CAPSULE ORAL EVERY EVENING
Qty: 30 CAPSULE | Refills: 1 | Status: SHIPPED | OUTPATIENT
Start: 2024-09-12 | End: 2024-09-16

## 2024-09-12 RX ORDER — LIDOCAINE HYDROCHLORIDE AND EPINEPHRINE 10; 10 MG/ML; UG/ML
3 INJECTION, SOLUTION INFILTRATION; PERINEURAL ONCE
Status: DISCONTINUED | OUTPATIENT
Start: 2024-09-12 | End: 2024-09-12 | Stop reason: HOSPADM

## 2024-09-12 RX ORDER — HEPARIN SODIUM (PORCINE) LOCK FLUSH IV SOLN 100 UNIT/ML 100 UNIT/ML
5 SOLUTION INTRAVENOUS
Status: CANCELLED | OUTPATIENT
Start: 2024-09-13

## 2024-09-12 RX ORDER — HEPARIN SODIUM,PORCINE 10 UNIT/ML
5-20 VIAL (ML) INTRAVENOUS DAILY PRN
Status: CANCELLED | OUTPATIENT
Start: 2024-09-13

## 2024-09-12 RX ORDER — MYCOPHENOLATE MOFETIL 500 MG/1
1500 TABLET ORAL 2 TIMES DAILY
Qty: 72 TABLET | Refills: 0 | OUTPATIENT
Start: 2024-09-12

## 2024-09-12 RX ADMIN — Medication 5 ML: at 09:44

## 2024-09-12 RX ADMIN — MICAFUNGIN SODIUM 300 MG: 100 INJECTION, POWDER, LYOPHILIZED, FOR SOLUTION INTRAVENOUS at 10:55

## 2024-09-12 RX ADMIN — Medication 5 ML: at 09:45

## 2024-09-12 ASSESSMENT — PAIN SCALES - GENERAL: PAINLEVEL: NO PAIN (0)

## 2024-09-12 NOTE — LETTER
9/12/2024      Arpit Forrest  [address] [place] [address]      Dear Colleague,    Thank you for referring your patient, Arpit Forrest, to the [facility] BLOOD AND MARROW TRANSPLANT PROGRAM [place]. Please see a copy of my visit note below.    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  He has been using topical triamcinolone.  His appetite has been improved and he is eating more.  He denies any nausea vomiting anorexia diarrhea or loose stools.  No other evidence of GVHD talking to him.  His only other complaint is he is having frequent urination.  He denies any dysuria or hematuria, proteinuria bladder spasms or fevers.    ROS: Remainder of 10 point ROS negative as above       EXAM      There were no vitals taken for this visit.  Wt Readings from Last 4 Encounters:   09/12/24 107.1 kg (236 lb 1.6 oz)   09/10/24 106.9 kg (235 lb 11.2 oz)   09/06/24 107.6 kg (237 lb 3.2 oz)   09/03/24 107.9 kg (237 lb 14.4 oz)       KPS: 70% Cannot do active work, but can care for self    General: Alert, awake  Eyes: Conjunctiva normal  Skin: Erythematous, slightly raised rash on face, scalp, neck, little bit at bottom of neck on back and very minimal onto chest. Rest of his skin is pale and zero evidence of rash.   MSK: No pedal edema  Access: R CVC       ASSESSMENT AND PLAN     BMT for high risk MDS: D+36  - Chemo protocol: [id number]; Flu, Cy, TBI  - Peripheral blood stem cell  graft from 8/8 donor, ABO matched (O neg)  - continue allopurinol (hx of gout)  Disease status:   - D28 (8/30/24): Slightly hypercellular marrow (30-40%). No evidence of myeloid neoplasm or monoclonal B-lymphocytosis. Cytogenetics pending. NGS negative for DNMT3A (other mutations not tested)  - Need to send NGS for all mutations present at diagnosis with future bone marrows  -Unsorted bone marrow with 100% donor    Graft status/chimerism:   - D28 (8/30/24): Bone marrow 100%. Peripheral blood CD33 100%, CD3 84%  - Repeat every 2 weeks till complete engraftment.     GVHD prophylaxis  # Current immunosuppression is MMF and sirolimus.   - Stop MMF at D35. He tells me he ran out a few days ago and it was never refilled so he is officially off now.   - Continue sirolimus with goal 5-10. 9/6 Therapeutic 7.4. Will get level Thursday.     # GVHD, acute  Skin: grade 1--noted last week and has not spread or changed, though also had supratherapeutic sirolimus.   - Dr. Fowler referred to dermatology.  Biopsy done today  12/20/2024  -Currently using topical steroids with improvement of rash on his head but no change on the chest  -Has not spread or worsened, no other evidence of GVHD on exam or lab work (no eosinophilia, transaminitis or hyperbilirubinemia)  - This could be secondary to sirolimus, so will wait final biopsy interpretation    No other organ involvement noted currently.     HEME/ COAG  G-CSF last given on 8/22/4.  None needed currently    ID  # Prophylaxis  Fungal: micafungin 300mg 3x/week until D+45, followed by mold active azole.   PJP/ Toxo IgG negative: Bactrim.  Viral: Acyclovir 800 mg bid     # Monitoring  CMV: Monitor weekly till D100. 9/6 - negative   EBV: Monitor every 2 weeks between D30 to D180.   IgG: Check serum IgG level q3 months, and consider IVIG repletion for IgG levels <400 mg/dL.  Pending at this time    GI  # Hepatic steatosis and borderline iron deposition in liver  - VOD prophy: Ursodiol     #  Supportive   - Ulcer prophy: PPI daily   - Alternating constipation and diarrhea: continue psyllium and imodium PRN    CARDIOVASCULAR  # Asymptomatic moderate aortic stenosis    # A.fib with RVR   - One episode while inpatient on 8/21/24 which converted to normal sinus rhythm spontaneously. Was started on metoprolol 25mg BID. Unclear if this is needed long term - Dr. Fowler  referred to cardiology.  - One previous episode of A.fib with RVR prior to transplant in the context of admission with febrile episode on 3/15/24. Noted on his fitbit, not very symptomatic. Cardioversion planned for new onset A.fib. Not achieved with amiodarone, DCCV on 3/21/24 restored normal sinus rhythm. Amiodarone and anticoagulation was stopped in April 2024 without any recurrence.     # CAD  - Coronary artery calcifications.   - Continue atorvastatin 10mg, ASA 81mg (resumed on 8/23/24 as platelets stable above 50k)  - Will need an ischemia evaluation in the long term    # HTN: Continue amlodipine 10mg (home med), lisinopril 5mg      RENAL/ELECTROLYTES/  # Urinary frequency, without evidence of retention or infection  - Prior imaging demonstrates prostatomegaly along with bladder thickening. This is a long standing issue which has been ongoing since pre-transplant. He was started on flomax while admitted, has taken it for around 3 weeks with no improvement, therefore stopped per patient preference.   - Dr. Fowler Referred to urology, but any non-urgent invasive testing or procedures should be done only when off immunosuppression.   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 Continue brimonidine, latanaprost eye drops     MUSCULOSKELETAL/FRAILTY  # Gout: Continue allopurinol indefinitely     Post-transplant vaccinations  Flu vaccination after D60, then annually   COVID vaccine after D100          RTC: 3 times weekly for micafungin until day 45, we will follow-up with BMT VINNY next week    I spent 50 minutes in the care of this patient today, which included time necessary for preparation for the visit, obtaining history, ordering medications/tests/procedures as medically indicated, review of pertinent medical literature, counseling of the patient, communication of recommendations to the care team, and documentation time.      Again, thank you for allowing me to participate in the care of your patient.        Sincerely,         BMT Auto Cell Therapy

## 2024-09-12 NOTE — PROGRESS NOTES
Infusion Nursing Note:  Arpit Forrest presents today for scheduled infusion.    Patient seen by provider today: Dr. Méndez   present during visit today: Not Applicable.    Note: Labs monitored. Patient was assessed by provider today.    Patient received Ashley infusion per treatment plan.     Intravenous Access:  Andrews.    Treatment Conditions:  Lab Results   Component Value Date    HGB 9.4 (L) 09/12/2024    WBC 3.3 (L) 09/12/2024    ANEU 2.2 09/12/2024    ANEUTAUTO 2.4 09/03/2024     (L) 09/12/2024        Lab Results   Component Value Date     09/12/2024    POTASSIUM 3.5 09/12/2024    MAG 1.6 (L) 09/06/2024    CR 0.91 09/12/2024    MC 9.4 09/12/2024    BILITOTAL 0.2 09/12/2024    ALBUMIN 3.8 09/12/2024    ALT 38 09/12/2024    AST 37 09/12/2024       Results reviewed, labs MET treatment parameters, ok to proceed with treatment.      Post Infusion Assessment:  Patient tolerated infusion without incident.  Site patent and intact, free from redness, edema or discomfort.  No evidence of extravasations.       Discharge Plan:   Patient discharged in stable condition accompanied by: self.  Departure Mode: Ambulatory.      Tiffanie Boswell RN

## 2024-09-12 NOTE — PROGRESS NOTES
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  He has been using topical triamcinolone.  His appetite has been improved and he is eating more.  He denies any nausea vomiting anorexia diarrhea or loose stools.  No other evidence of GVHD talking to him.  His only other complaint is he is having frequent urination.  He denies any dysuria or hematuria, proteinuria bladder spasms or fevers.    ROS: Remainder of 10 point ROS negative as above       EXAM      There were no vitals taken for this visit.  Wt Readings from Last 4 Encounters:   09/12/24 107.1 kg (236 lb 1.6 oz)   09/10/24 106.9 kg (235 lb 11.2 oz)   09/06/24 107.6 kg (237 lb 3.2 oz)   09/03/24 107.9 kg (237 lb 14.4 oz)       KPS: 70% Cannot do active work, but can care for self    General: Alert, awake  Eyes: Conjunctiva normal  Skin: Erythematous, slightly raised rash on face, scalp, neck, little bit at bottom of neck on back and very minimal onto chest. Rest of his skin is pale and zero evidence of rash.   MSK: No pedal edema  Access: R CVC       ASSESSMENT AND PLAN     BMT for high risk MDS: D+36  - Chemo protocol: [id number]; Flu, Cy, TBI  - Peripheral blood stem cell graft from 8/8 donor, ABO matched (O neg)  - continue allopurinol (hx of gout)  Disease status:   - D28 (8/30/24): Slightly hypercellular marrow (30-40%). No evidence of myeloid neoplasm or monoclonal B-lymphocytosis. Cytogenetics pending. NGS negative for DNMT3A (other  mutations not tested)  - Need to send NGS for all mutations present at diagnosis with future bone marrows  -Unsorted bone marrow with 100% donor    Graft status/chimerism:   - D28 (8/30/24): Bone marrow 100%. Peripheral blood CD33 100%, CD3 84%  - Repeat every 2 weeks till complete engraftment.     GVHD prophylaxis  # Current immunosuppression is MMF and sirolimus.   - Stop MMF at D35. He tells me he ran out a few days ago and it was never refilled so he is officially off now.   - Continue sirolimus with goal 5-10. 9/6 Therapeutic 7.4. Will get level Thursday.     # GVHD, acute  Skin: grade 1--noted last week and has not spread or changed, though also had supratherapeutic sirolimus.   - Dr. Fowler referred to dermatology.  Biopsy done today  12/20/2024  -Currently using topical steroids with improvement of rash on his head but no change on the chest  -Has not spread or worsened, no other evidence of GVHD on exam or lab work (no eosinophilia, transaminitis or hyperbilirubinemia)  - This could be secondary to sirolimus, so will wait final biopsy interpretation    No other organ involvement noted currently.     HEME/ COAG  G-CSF last given on 8/22/4.  None needed currently    ID  # Prophylaxis  Fungal: micafungin 300mg 3x/week until D+45, followed by mold active azole.   PJP/ Toxo IgG negative: Bactrim.  Viral: Acyclovir 800 mg bid     # Monitoring  CMV: Monitor weekly till D100. 9/6 - negative   EBV: Monitor every 2 weeks between D30 to D180.   IgG: Check serum IgG level q3 months, and consider IVIG repletion for IgG levels <400 mg/dL.  Pending at this time    GI  # Hepatic steatosis and borderline iron deposition in liver  - VOD prophy: Ursodiol     # Supportive   - Ulcer prophy: PPI daily   - Alternating constipation and diarrhea: continue psyllium and imodium PRN    CARDIOVASCULAR  # Asymptomatic moderate aortic stenosis    # A.fib with RVR   - One episode while inpatient on 8/21/24 which converted to normal sinus  rhythm spontaneously. Was started on metoprolol 25mg BID. Unclear if this is needed long term - Dr. Fowler  referred to cardiology.  - One previous episode of A.fib with RVR prior to transplant in the context of admission with febrile episode on 3/15/24. Noted on his fitbit, not very symptomatic. Cardioversion planned for new onset A.fib. Not achieved with amiodarone, DCCV on 3/21/24 restored normal sinus rhythm. Amiodarone and anticoagulation was stopped in April 2024 without any recurrence.     # CAD  - Coronary artery calcifications.   - Continue atorvastatin 10mg, ASA 81mg (resumed on 8/23/24 as platelets stable above 50k)  - Will need an ischemia evaluation in the long term    # HTN: Continue amlodipine 10mg (home med), lisinopril 5mg      RENAL/ELECTROLYTES/  # Urinary frequency, without evidence of retention or infection  - Prior imaging demonstrates prostatomegaly along with bladder thickening. This is a long standing issue which has been ongoing since pre-transplant. He was started on flomax while admitted, has taken it for around 3 weeks with no improvement, therefore stopped per patient preference.   - Dr. Fowler Referred to urology, but any non-urgent invasive testing or procedures should be done only when off immunosuppression.   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 Continue brimonidine, latanaprost eye drops     MUSCULOSKELETAL/FRAILTY  # Gout: Continue allopurinol indefinitely     Post-transplant vaccinations  Flu  vaccination after D60, then annually   COVID vaccine after D100          RTC: 3 times weekly for micafungin until day 45, we will follow-up with BMT VINNY next week    I spent 50 minutes in the care of this patient today, which included time necessary for preparation for the visit, obtaining history, ordering medications/tests/procedures as medically indicated, review of pertinent medical literature, counseling of the patient, communication of recommendations to the care team, and documentation time.

## 2024-09-12 NOTE — LETTER
9/12/2024      Arpit Forrest  [address]  [place] [address]      Dear Colleague,    Thank you for referring your patient, Arpit Forrest, to the [facility] BLOOD AND MARROW TRANSPLANT PROGRAM [place]. Please see a copy of my visit note below.    BMT Skin Biopsy Procedure Note  September 12, 2024 11:29 AM    Indications: MDS    After informed consent, including risks of procedure, infection and/or bleeding, patient was prepped in the usual sterile manner. Local anesthesia was given with 3 ml of 1% lidocaine with epi. A 4 mm punch biopsy was taken from right upper back. 2 stitches were placed with a 4-0 prolene, nonabsorbable suture. Wound was dressed with triple antibiotic ointment and a bandaid.  Patient tolerated the procedure without complications. Patient given Biopsy Information pamphlet.      Yi Martinez PA-C      Again, thank you for allowing me to participate in the care of your patient.        Sincerely,        BMT Advanced Practice Provider

## 2024-09-12 NOTE — NURSING NOTE
"Oncology Rooming Note    September 12, 2024 10:06 AM   Arpit Forrest is a 73 year old male who presents for:    Chief Complaint   Patient presents with    Oncology Clinic Visit     RTN for MDS     Initial Vitals: There were no vitals taken for this visit. Estimated body mass index is 32.15 kg/m  as calculated from the following:    Height as of 7/31/24: 1.825 m (5' 11.85\").    Weight as of an earlier encounter on 9/12/24: 107.1 kg (236 lb 1.6 oz). There is no height or weight on file to calculate BSA.  Data Unavailable Comment: Data Unavailable   No LMP for male patient.  Allergies reviewed: Yes  Medications reviewed: Yes    Medications: Medication refills not needed today.  Pharmacy name entered into [employer]: [id number] - [place], [place] - [address] AT [facility] OF [place] [address]    Frailty Screening:   Is the patient here for a new oncology consult visit in cancer care? 2. No      Clinical concerns: Pt is here for his rash. Rash on his face is better. But the rash is still there on his chest.    Lindseyprema Arriola MA             "

## 2024-09-12 NOTE — NURSING NOTE
"Oncology Rooming Note    September 12, 2024 10:04 AM   Arpit Forrest is a 73 year old male who presents for:    Chief Complaint   Patient presents with    Blood Draw     Labs drawn via CVC by RN.      Initial Vitals: /76   Pulse 72   Temp 97.9  F (36.6  C) (Oral)   Resp 18   Wt 107.1 kg (236 lb 1.6 oz)   SpO2 100%   BMI 32.15 kg/m   Estimated body mass index is 32.15 kg/m  as calculated from the following:    Height as of 7/31/24: 1.825 m (5' 11.85\").    Weight as of this encounter: 107.1 kg (236 lb 1.6 oz). Body surface area is 2.33 meters squared.  No Pain (0) Comment: Data Unavailable   No LMP for male patient.  Allergies reviewed: Yes  Medications reviewed: Yes    Medications: Medication refills not needed today.  Pharmacy name entered into [employer]: [id number] - [place] [facility] [place] - [address] AT [facility] OF [place] [address]    Frailty Screening:   Is the patient here for a new oncology consult visit in cancer care? 2. No      Clinical concerns: Pt said his rash on face is gone but the rash on his chest is still there.        Lindseyprema Arriola MA             "

## 2024-09-12 NOTE — PROGRESS NOTES
BMT Skin Biopsy Procedure Note  September 12, 2024 11:29 AM    Indications: MDS    After informed consent, including risks of procedure, infection and/or bleeding, patient was prepped in the usual sterile manner. Local anesthesia was given with 3 ml of 1% lidocaine with epi. A 4 mm punch biopsy was taken from right upper back. 2 stitches were placed with a 4-0 prolene, nonabsorbable suture. Wound was dressed with triple antibiotic ointment and a bandaid.  Patient tolerated the procedure without complications. Patient given Biopsy Information pamphlet.      Yi Martinez PA-C

## 2024-09-13 ENCOUNTER — TELEPHONE (OUTPATIENT)
Dept: TRANSPLANT | Facility: CLINIC | Age: 74
End: 2024-09-13
Payer: MEDICARE

## 2024-09-13 DIAGNOSIS — D46.9 MYELODYSPLASTIC SYNDROME (H): Primary | ICD-10-CM

## 2024-09-13 DIAGNOSIS — Z94.81 STATUS POST BONE MARROW TRANSPLANT (H): ICD-10-CM

## 2024-09-13 LAB — MISCELLANEOUS TEST 1 (ARUP): NORMAL

## 2024-09-13 RX ORDER — SIROLIMUS 1 MG/1
3 TABLET, FILM COATED ORAL DAILY
Qty: 90 TABLET | Refills: 1 | Status: SHIPPED | OUTPATIENT
Start: 2024-09-13

## 2024-09-13 NOTE — TELEPHONE ENCOUNTER
I spoke with Arpit Forrest regarding his sirolimus level from BMT clinic visit dated 9/12, which resulted as 9.3 on sirolimus 4 mg daily. Discussed with Dr. Méndez, Arpit is to decrease his sirolimus to 3 mg daily. Arpit only has 2 mg tablets at home. A new prescription for 1 mg tablets was sent to his local [facility]'s. Arpit is aware to continue taking 4 mg daily until he picks up his new prescription at which time he will decrease his sirolimus to 3 mg daily. Arpit voiced his understanding of this dose change.     Martell Tenorio, PharmD

## 2024-09-14 ENCOUNTER — INFUSION THERAPY VISIT (OUTPATIENT)
Dept: TRANSPLANT | Facility: CLINIC | Age: 74
End: 2024-09-14
Attending: STUDENT IN AN ORGANIZED HEALTH CARE EDUCATION/TRAINING PROGRAM
Payer: MEDICARE

## 2024-09-14 VITALS
SYSTOLIC BLOOD PRESSURE: 155 MMHG | RESPIRATION RATE: 18 BRPM | OXYGEN SATURATION: 100 % | HEART RATE: 66 BPM | TEMPERATURE: 97.8 F | DIASTOLIC BLOOD PRESSURE: 77 MMHG

## 2024-09-14 DIAGNOSIS — Z94.84 STEM CELLS TRANSPLANT STATUS (H): ICD-10-CM

## 2024-09-14 DIAGNOSIS — D46.9 MDS (MYELODYSPLASTIC SYNDROME) (H): Primary | ICD-10-CM

## 2024-09-14 PROCEDURE — 96365 THER/PROPH/DIAG IV INF INIT: CPT

## 2024-09-14 PROCEDURE — 258N000003 HC RX IP 258 OP 636

## 2024-09-14 PROCEDURE — 250N000011 HC RX IP 250 OP 636

## 2024-09-14 RX ORDER — HEPARIN SODIUM (PORCINE) LOCK FLUSH IV SOLN 100 UNIT/ML 100 UNIT/ML
5 SOLUTION INTRAVENOUS
Status: CANCELLED | OUTPATIENT
Start: 2024-09-15

## 2024-09-14 RX ORDER — HEPARIN SODIUM,PORCINE 10 UNIT/ML
5-20 VIAL (ML) INTRAVENOUS DAILY PRN
Status: CANCELLED | OUTPATIENT
Start: 2024-09-15

## 2024-09-14 RX ADMIN — MICAFUNGIN SODIUM 300 MG: 100 INJECTION, POWDER, LYOPHILIZED, FOR SOLUTION INTRAVENOUS at 09:56

## 2024-09-14 ASSESSMENT — PAIN SCALES - GENERAL: PAINLEVEL: NO PAIN (0)

## 2024-09-14 NOTE — PROGRESS NOTES
Infusion Nursing Note:  Arpit Forrest presents today for   Chief Complaint   Patient presents with    Infusion     Post bmt for MDS here for micafungin     .    Patient seen by provider today: No   present during visit today: Not Applicable.    Note: pt received IV micafungin this visit.      Intravenous Access:  Andrews.    Treatment Conditions:  Not Applicable.      Post Infusion Assessment:  Patient tolerated infusion without incident.       Discharge Plan:   Patient and/or family verbalized understanding of discharge instructions and all questions answered.      Rosalba Neal RN

## 2024-09-14 NOTE — NURSING NOTE
"Oncology Rooming Note    September 14, 2024 10:06 AM   Arpit Forrest is a 73 year old male who presents for:    Chief Complaint   Patient presents with    Infusion     Post bmt for MDS here for micafungin     Initial Vitals: BP (!) 155/77   Pulse 66   Temp 97.8  F (36.6  C)   Resp 18   SpO2 100%  Estimated body mass index is 32.15 kg/m  as calculated from the following:    Height as of 7/31/24: 1.825 m (5' 11.85\").    Weight as of 9/12/24: 107.1 kg (236 lb 1.6 oz). There is no height or weight on file to calculate BSA.  No Pain (0) Comment: Data Unavailable   No LMP for male patient.  Allergies reviewed: Yes  Medications reviewed: Yes    Medications: Medication refills not needed today.  Pharmacy name entered into [employer]: [facility][id number] - [place], [place] - [address] AT [facility] OF [place] [address]    Frailty Screening:   Is the patient here for a new oncology consult visit in cancer care? 2. No      Clinical concerns: none       Rosalba Neal RN              "

## 2024-09-16 DIAGNOSIS — N40.1 BENIGN PROSTATIC HYPERPLASIA WITH URINARY FREQUENCY: ICD-10-CM

## 2024-09-16 DIAGNOSIS — R35.0 BENIGN PROSTATIC HYPERPLASIA WITH URINARY FREQUENCY: ICD-10-CM

## 2024-09-16 RX ORDER — TAMSULOSIN HYDROCHLORIDE 0.4 MG/1
0.4 CAPSULE ORAL EVERY EVENING
Qty: 90 CAPSULE | Refills: 1 | Status: SHIPPED | OUTPATIENT
Start: 2024-09-16

## 2024-09-17 ENCOUNTER — OFFICE VISIT (OUTPATIENT)
Dept: TRANSPLANT | Facility: CLINIC | Age: 74
End: 2024-09-17
Attending: PHYSICIAN ASSISTANT
Payer: MEDICARE

## 2024-09-17 ENCOUNTER — INFUSION THERAPY VISIT (OUTPATIENT)
Dept: TRANSPLANT | Facility: CLINIC | Age: 74
End: 2024-09-17
Attending: STUDENT IN AN ORGANIZED HEALTH CARE EDUCATION/TRAINING PROGRAM
Payer: MEDICARE

## 2024-09-17 ENCOUNTER — APPOINTMENT (OUTPATIENT)
Dept: LAB | Facility: CLINIC | Age: 74
End: 2024-09-17
Attending: PHYSICIAN ASSISTANT
Payer: MEDICARE

## 2024-09-17 VITALS
DIASTOLIC BLOOD PRESSURE: 72 MMHG | TEMPERATURE: 97.8 F | OXYGEN SATURATION: 98 % | BODY MASS INDEX: 32.05 KG/M2 | HEART RATE: 76 BPM | RESPIRATION RATE: 18 BRPM | WEIGHT: 235.3 LBS | SYSTOLIC BLOOD PRESSURE: 126 MMHG

## 2024-09-17 DIAGNOSIS — Z94.81 STATUS POST BONE MARROW TRANSPLANT (H): ICD-10-CM

## 2024-09-17 DIAGNOSIS — I48.91 ATRIAL FIBRILLATION, UNSPECIFIED TYPE (H): ICD-10-CM

## 2024-09-17 DIAGNOSIS — D46.9 MDS (MYELODYSPLASTIC SYNDROME) (H): Primary | ICD-10-CM

## 2024-09-17 DIAGNOSIS — D46.9 MYELODYSPLASTIC SYNDROME (H): ICD-10-CM

## 2024-09-17 DIAGNOSIS — Z94.84 STEM CELLS TRANSPLANT STATUS (H): ICD-10-CM

## 2024-09-17 DIAGNOSIS — E11.9 TYPE 2 DIABETES MELLITUS WITHOUT COMPLICATION, WITHOUT LONG-TERM CURRENT USE OF INSULIN (H): ICD-10-CM

## 2024-09-17 DIAGNOSIS — R35.0 URINARY FREQUENCY: Primary | ICD-10-CM

## 2024-09-17 LAB
ALBUMIN SERPL BCG-MCNC: 3.9 G/DL (ref 3.5–5.2)
ALP SERPL-CCNC: 74 U/L (ref 40–150)
ALT SERPL W P-5'-P-CCNC: 40 U/L (ref 0–70)
ANION GAP SERPL CALCULATED.3IONS-SCNC: 14 MMOL/L (ref 7–15)
AST SERPL W P-5'-P-CCNC: 34 U/L (ref 0–45)
BASOPHILS # BLD AUTO: 0 10E3/UL (ref 0–0.2)
BASOPHILS NFR BLD AUTO: 0 %
BILIRUB SERPL-MCNC: 0.2 MG/DL
BUN SERPL-MCNC: 11.4 MG/DL (ref 8–23)
CALCIUM SERPL-MCNC: 9.6 MG/DL (ref 8.8–10.4)
CHLORIDE SERPL-SCNC: 104 MMOL/L (ref 98–107)
CREAT SERPL-MCNC: 0.99 MG/DL (ref 0.67–1.17)
EGFRCR SERPLBLD CKD-EPI 2021: 79 ML/MIN/1.73M2
EOSINOPHIL # BLD AUTO: 0.2 10E3/UL (ref 0–0.7)
EOSINOPHIL NFR BLD AUTO: 3 %
ERYTHROCYTE [DISTWIDTH] IN BLOOD BY AUTOMATED COUNT: 15.2 % (ref 10–15)
GLUCOSE SERPL-MCNC: 228 MG/DL (ref 70–99)
HCO3 SERPL-SCNC: 22 MMOL/L (ref 22–29)
HCT VFR BLD AUTO: 31.5 % (ref 40–53)
HGB BLD-MCNC: 10.1 G/DL (ref 13.3–17.7)
IMM GRANULOCYTES # BLD: 0.1 10E3/UL
IMM GRANULOCYTES NFR BLD: 1 %
LYMPHOCYTES # BLD AUTO: 1.3 10E3/UL (ref 0.8–5.3)
LYMPHOCYTES NFR BLD AUTO: 18 %
MCH RBC QN AUTO: 29.3 PG (ref 26.5–33)
MCHC RBC AUTO-ENTMCNC: 32.1 G/DL (ref 31.5–36.5)
MCV RBC AUTO: 91 FL (ref 78–100)
MONOCYTES # BLD AUTO: 0.9 10E3/UL (ref 0–1.3)
MONOCYTES NFR BLD AUTO: 12 %
NEUTROPHILS # BLD AUTO: 4.6 10E3/UL (ref 1.6–8.3)
NEUTROPHILS NFR BLD AUTO: 66 %
NRBC # BLD AUTO: 0 10E3/UL
NRBC BLD AUTO-RTO: 0 /100
PLATELET # BLD AUTO: 163 10E3/UL (ref 150–450)
POTASSIUM SERPL-SCNC: 3.2 MMOL/L (ref 3.4–5.3)
PROT SERPL-MCNC: 6.2 G/DL (ref 6.4–8.3)
RBC # BLD AUTO: 3.45 10E6/UL (ref 4.4–5.9)
SODIUM SERPL-SCNC: 140 MMOL/L (ref 135–145)
WBC # BLD AUTO: 7 10E3/UL (ref 4–11)

## 2024-09-17 PROCEDURE — 99213 OFFICE O/P EST LOW 20 MIN: CPT

## 2024-09-17 PROCEDURE — 36592 COLLECT BLOOD FROM PICC: CPT

## 2024-09-17 PROCEDURE — 258N000003 HC RX IP 258 OP 636

## 2024-09-17 PROCEDURE — 250N000013 HC RX MED GY IP 250 OP 250 PS 637: Performed by: STUDENT IN AN ORGANIZED HEALTH CARE EDUCATION/TRAINING PROGRAM

## 2024-09-17 PROCEDURE — 82040 ASSAY OF SERUM ALBUMIN: CPT

## 2024-09-17 PROCEDURE — 250N000011 HC RX IP 250 OP 636: Performed by: PHYSICIAN ASSISTANT

## 2024-09-17 PROCEDURE — 85025 COMPLETE CBC W/AUTO DIFF WBC: CPT

## 2024-09-17 PROCEDURE — 96365 THER/PROPH/DIAG IV INF INIT: CPT

## 2024-09-17 PROCEDURE — G0463 HOSPITAL OUTPT CLINIC VISIT: HCPCS

## 2024-09-17 PROCEDURE — 250N000011 HC RX IP 250 OP 636

## 2024-09-17 PROCEDURE — 84450 TRANSFERASE (AST) (SGOT): CPT

## 2024-09-17 PROCEDURE — 99215 OFFICE O/P EST HI 40 MIN: CPT

## 2024-09-17 RX ORDER — OXYBUTYNIN CHLORIDE 5 MG/1
2.5 TABLET ORAL 3 TIMES DAILY
Qty: 30 TABLET | Refills: 1 | Status: SHIPPED | OUTPATIENT
Start: 2024-09-17

## 2024-09-17 RX ORDER — HEPARIN SODIUM,PORCINE 10 UNIT/ML
5 VIAL (ML) INTRAVENOUS ONCE
Status: COMPLETED | OUTPATIENT
Start: 2024-09-17 | End: 2024-09-17

## 2024-09-17 RX ORDER — METOPROLOL TARTRATE 25 MG/1
25 TABLET, FILM COATED ORAL 2 TIMES DAILY
Qty: 60 TABLET | Refills: 1 | Status: SHIPPED | OUTPATIENT
Start: 2024-09-17

## 2024-09-17 RX ORDER — HEPARIN SODIUM,PORCINE 10 UNIT/ML
5-20 VIAL (ML) INTRAVENOUS DAILY PRN
Status: CANCELLED | OUTPATIENT
Start: 2024-09-18

## 2024-09-17 RX ORDER — HEPARIN SODIUM (PORCINE) LOCK FLUSH IV SOLN 100 UNIT/ML 100 UNIT/ML
5 SOLUTION INTRAVENOUS
Status: CANCELLED | OUTPATIENT
Start: 2024-09-18

## 2024-09-17 RX ORDER — POTASSIUM CHLORIDE 1500 MG/1
40 TABLET, EXTENDED RELEASE ORAL ONCE
Status: COMPLETED | OUTPATIENT
Start: 2024-09-17 | End: 2024-09-17

## 2024-09-17 RX ADMIN — HEPARIN, PORCINE (PF) 10 UNIT/ML INTRAVENOUS SYRINGE 5 ML: at 08:08

## 2024-09-17 RX ADMIN — HEPARIN, PORCINE (PF) 10 UNIT/ML INTRAVENOUS SYRINGE 5 ML: at 08:09

## 2024-09-17 RX ADMIN — POTASSIUM CHLORIDE 40 MEQ: 1500 TABLET, EXTENDED RELEASE ORAL at 09:45

## 2024-09-17 RX ADMIN — MICAFUNGIN SODIUM 300 MG: 100 INJECTION, POWDER, LYOPHILIZED, FOR SOLUTION INTRAVENOUS at 08:21

## 2024-09-17 ASSESSMENT — PAIN SCALES - GENERAL: PAINLEVEL: NO PAIN (0)

## 2024-09-17 NOTE — PROGRESS NOTES
Infusion Nursing Note:  Arpit Forrest presents today for Micafungin infusion.    Patient seen by provider today: Yes: Maria Alejandra Fisher, VINNY   present during visit today: Not Applicable.    Note: Lab results monitored. Allergies and home medications reviewed.   Pt received Micafungin infusion and 40 meq of PO Potassium for replacement. CVC dressing changed, pt discharged with no further concerns.     Intravenous Access:  Andrews.    Treatment Conditions:  Lab Results   Component Value Date    HGB 10.1 (L) 09/17/2024    WBC 7.0 09/17/2024    ANEU 2.2 09/12/2024    ANEUTAUTO 4.6 09/17/2024     09/17/2024        Lab Results   Component Value Date     09/17/2024    POTASSIUM 3.2 (L) 09/17/2024    MAG 1.6 (L) 09/06/2024    CR 0.99 09/17/2024    MC 9.6 09/17/2024    BILITOTAL 0.2 09/17/2024    ALBUMIN 3.9 09/17/2024    ALT 40 09/17/2024    AST 34 09/17/2024       Results reviewed, labs MET treatment parameters, ok to proceed with treatment.      Post Infusion Assessment:  Patient tolerated infusion without incident.  Blood return noted pre and post infusion.       Discharge Plan:   Patient and/or family verbalized understanding of discharge instructions and all questions answered.  Patient discharged in stable condition accompanied by: wife.      Estrellita Bustillos RN

## 2024-09-17 NOTE — NURSING NOTE
Chief Complaint   Patient presents with    Blood Draw     Labs drawn via CVC by lab RN       Blood draw via DL Right IJ and flushed with heparin by lab RN. Vitals taken and appointment arrived.    Tiffany Salgado RN

## 2024-09-17 NOTE — NURSING NOTE
"Oncology Rooming Note    September 17, 2024 8:42 AM   Arpit Forrest is a 73 year old male who presents for:    Chief Complaint   Patient presents with    Blood Draw     Labs drawn via CVC by lab RN    Oncology Clinic Visit     VINNY visit s/p BMT hx MDS     Initial Vitals: /72   Pulse 76   Temp 97.8  F (36.6  C) (Oral)   Resp 18   Wt 106.7 kg (235 lb 4.8 oz)   SpO2 98%   BMI 32.05 kg/m   Estimated body mass index is 32.05 kg/m  as calculated from the following:    Height as of 7/31/24: 1.825 m (5' 11.85\").    Weight as of this encounter: 106.7 kg (235 lb 4.8 oz). Body surface area is 2.33 meters squared.  No Pain (0) Comment: Data Unavailable   No LMP for male patient.  Allergies reviewed: Yes  Medications reviewed: Yes    Medications: MEDICATION REFILLS NEEDED TODAY. Provider was notified.  Pharmacy name entered into [employer]: [id number] [place] [facility] [place] - [address] AT [facility] OF [place] [address]    Frailty Screening:   Is the patient here for a new oncology consult visit in cancer care? 2. No          Estrellita Bustillos RN              "

## 2024-09-17 NOTE — LETTER
9/17/2024      Arpit Forrest  [address] [address]      Dear Colleague,    Thank you for referring your patient, Arpit Forrest, to the [facility] [place]. Please see a copy of my visit note below.    BMT Clinic Note  Sep 17, 2024     Patient ID: Arpit Forrest is a 73 year male D+41 s/p ELIEZER PBSCT for MDS    Diagnosis MDS-EB1 BMT type Allogenic  CMV  Donor -  Recipient -    Prep ELIEZER (Flu/Cy/TBI) Donor type  8/8 URD ABO D/R O- (matched)   GVHD ppx PTCy, siro, MMF Graft source PBSCT Toxo IgG Negative   Protocol [id number] CD34/kg 6.12E+06    BMT MD Nena Fowler      INTERVAL HISTORY   Here for follow-up and aftab. Rash on chest/neck is resolving and improved on face with the steroid creams. Wants to cancel derm appointment; agree. Skin bx still pending. No n/v/d/c. Appetite is good. He is still bothered by nocturia, x6 last night which is more than usual. Restarted Flomax recently and states that he is emptying his bladder better, stronger urine flow. Denies dysuria or hematuria.    ROS: Remainder of 10 point ROS negative as above       EXAM      /72   Pulse 76   Temp 97.8  F (36.6  C) (Oral)   Resp 18   Wt 106.7 kg (235 lb 4.8 oz)   SpO2 98%   BMI 32.05 kg/m    Wt Readings from Last 4 Encounters:   09/17/24 106.7 kg (235 lb 4.8 oz)   09/12/24 107.1 kg (236 lb 1.6 oz)   09/10/24 106.9 kg (235 lb 11.2 oz)   09/06/24 107.6 kg (237 lb 3.2 oz)       KPS: 70% Cannot do active work, but can care for self    General: Alert, awake  Eyes: sclera anicteric; OP moist without lesions  CV: systolic murmur; RRR  Lungs: CTAB  Skin: facial erythema, no raised lesions, scattered, fading erythematous mac/pap rash to upper chest and upper back. Rest of his skin is pale and without rash.   MSK: No LE edema  Access: R CVC dressing cdi, no drainage    Labs:  Lab Results   Component Value Date    WBC 7.0 09/17/2024    ANEU 2.2 09/12/2024    HGB 10.1 (L) 09/17/2024     HCT 31.5 (L) 09/17/2024     09/17/2024     09/17/2024    POTASSIUM 3.2 (L) 09/17/2024    CHLORIDE 104 09/17/2024    CO2 22 09/17/2024     (H) 09/17/2024    BUN 11.4 09/17/2024    CR 0.99 09/17/2024    MAG 1.6 (L) 09/06/2024    INR 1.17 (H) 08/19/2024    BILITOTAL 0.2 09/17/2024    AST 34 09/17/2024    ALT 40 09/17/2024    ALKPHOS 74 09/17/2024    PROTTOTAL 6.2 (L) 09/17/2024    ALBUMIN 3.9 09/17/2024        ASSESSMENT AND PLAN     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 No evidence of myeloid neoplasm or monoclonal B-lymphocytosis. Cytogenetics pending. NGS negative for DNMT3A (other mutations not tested)  - Need to send NGS for all mutations present at diagnosis with future bone marrows  -Unsorted bone marrow with 100% donor    Graft status/chimerism:   - D28 (8/30/24): Bone marrow 100%. Peripheral blood CD33 100%, CD3 84%  - Repeat every 2 weeks till complete engraftment. Last on 9/12: CD33 100%D, CD3 98%D    GVHD prophylaxis  # s/p MMF, PtCy  - Continue sirolimus with goal 5-10. 9/12 therapeutic at 9.3 but dose decreased to 3mg/d (from 4); repeat level ordered for 9/19.     # skin rash to face, upper chest/back; ddx GVHD vs siro vs Bactrim/photosensitivity vs other  Skin rash noted last week and has not spread or changed, though also had supratherapeutic sirolimus.   - skin bx pending 9/12  - improving with TMC/hydrocort  - Has not spread or worsened, no other evidence of GVHD on exam or lab work (no eosinophilia, transaminitis or hyperbilirubinemia)  No other organ involvement noted currently.     HEME/ COAG  G-CSF last given on 8/22/4.  None needed currently  Anemia 2/2 chemo/BMT. WBC and plts now normal.    ID  # Prophylaxis  Fungal: micafungin 300mg 3x/week until D+45,  followed by mold active azole. Pt was previously on Vfend and will check # they have at home; plan to start next week 9/25 (next provider visit) and adjust siro accordinly.   PJP/ Toxo IgG negative: Bactrim.  Viral: Acyclovir 800 mg bid     # Monitoring  CMV: Monitor weekly till D100. CMV neg 9/12   EBV: Monitor every 2 weeks between D30 to D180. EBV neg 9/12   IgG: Check serum IgG level q3 months, and consider IVIG repletion for IgG levels <400 mg/dL. IgG 467 (9/12)    GI  # Hepatic steatosis and borderline iron deposition in liver  - VOD prophy: Ursodiol     # Supportive   - Ulcer prophy: PPI  - Alternating constipation and diarrhea: continue psyllium and imodium PRN    CARDIOVASCULAR  # Asymptomatic moderate aortic stenosis    # A.fib with RVR   - One episode while inpatient on 8/21/24 which converted to normal sinus rhythm spontaneously. Was started on metoprolol 25mg BID. Unclear if this is needed long term - Dr. Fowler  referred to cardiology- not scheduled as of 9/12.  - One previous episode of A.fib with RVR prior to transplant in the context of admission with febrile episode on 3/15/24. Noted on his fitbit, not very symptomatic. Cardioversion planned for new onset A.fib. Not achieved with amiodarone, DCCV on 3/21/24 restored normal sinus rhythm. Amiodarone and anticoagulation was stopped in April 2024 without any recurrence.     # CAD  - Coronary artery calcifications.   - Continue atorvastatin 10mg, ASA 81mg (resumed on 8/23/24 as platelets stable above 50k)  - Will need an ischemia evaluation in the long term    # HTN: Continue amlodipine 10mg (home med), lisinopril 5mg      RENAL/ELECTROLYTES/  # Urinary frequency, without evidence of retention or infection  - Prior imaging demonstrates prostatomegaly along with bladder thickening. This is a long standing issue which has been ongoing since pre-transplant. He was started on flomax while admitted, has taken it for around 3 weeks with no improvement,  therefore stopped per patient preference. Restarted Flomax 9/12.  9/17: trial Ditropan, start lower dose 2.5mg tid; titrate as needed  - Dr. Fowler Referred to urology, but any non-urgent invasive testing or procedures should be done only when off immunosuppression.     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 Rec 3m follow-up then transfer to PCP.    RESP  # VINCENT : CPAP     OPHTHALMOLOGY:  # Glaucoma- Lutein supplement; brimonidine, latanaprost eye drops     MUSCULOSKELETAL/FRAILTY  # Gout: Continue allopurinol indefinitely     Post-transplant vaccinations  Flu vaccination after D60, then annually   COVID vaccine after D100    Summary:  -RTC Thurs/Fri this week for labs, aftab  -Canceled derm appt per pt request; rash improving and bx pending; cont steroid creams  -Requested line removal 9/20 or 9/25 (or next avail after)  -Follow-up with Dr. Fowler 9/25; likely start azole per notes (Dr. Fowler to confirm); pt has Vfend from previous use, will check supply. Siro level ordered for 9/19. Will need to dose -reduce if/when start azole.  -Requested labs, VINNY 10/1 & 10/8    I spent 45 minutes in the care of this patient today, which included time necessary for preparation for the visit, obtaining history, ordering medications/tests/procedures as medically indicated, review of pertinent medical literature, counseling of the patient, communication of recommendations to the care team, and documentation time.      Maria Alejandra Fisher PA-C    Again, thank you for allowing me to participate in the care of your patient.        Sincerely,        BMT Advanced Practice Provider

## 2024-09-17 NOTE — PROGRESS NOTES
BMT Clinic Note  Sep 17, 2024     Patient ID: Arpit Forrest is a 73 year male D+41 s/p ELIEZER PBSCT for MDS    Diagnosis MDS-EB1 BMT type Allogenic  CMV  Donor -  Recipient -    Prep ELIEZER (Flu/Cy/TBI) Donor type  8/8 URD ABO D/R O- (matched)   GVHD ppx PTCy, siro, MMF Graft source PBSCT Toxo IgG Negative   Protocol [id number] CD34/kg 6.12E+06    BMT MD Nena Fowler      INTERVAL HISTORY   Here for follow-up and aftab. Rash on chest/neck is resolving and improved on face with the steroid creams. Wants to cancel derm appointment; agree. Skin bx still pending. No n/v/d/c. Appetite is good. He is still bothered by nocturia, x6 last night which is more than usual. Restarted Flomax recently and states that he is emptying his bladder better, stronger urine flow. Denies dysuria or hematuria.    ROS: Remainder of 10 point ROS negative as above       EXAM      /72   Pulse 76   Temp 97.8  F (36.6  C) (Oral)   Resp 18   Wt 106.7 kg (235 lb 4.8 oz)   SpO2 98%   BMI 32.05 kg/m    Wt Readings from Last 4 Encounters:   09/17/24 106.7 kg (235 lb 4.8 oz)   09/12/24 107.1 kg (236 lb 1.6 oz)   09/10/24 106.9 kg (235 lb 11.2 oz)   09/06/24 107.6 kg (237 lb 3.2 oz)       KPS: 70% Cannot do active work, but can care for self    General: Alert, awake  Eyes: sclera anicteric; OP moist without lesions  CV: systolic murmur; RRR  Lungs: CTAB  Skin: facial erythema, no raised lesions, scattered, fading erythematous mac/pap rash to upper chest and upper back. Rest of his skin is pale and without rash.   MSK: No LE edema  Access: R CVC dressing cdi, no drainage    Labs:  Lab Results   Component Value Date    WBC 7.0 09/17/2024    ANEU 2.2 09/12/2024    HGB 10.1 (L) 09/17/2024    HCT 31.5 (L) 09/17/2024     09/17/2024     09/17/2024    POTASSIUM 3.2 (L) 09/17/2024    CHLORIDE 104 09/17/2024    CO2 22 09/17/2024     (H) 09/17/2024    BUN 11.4 09/17/2024    CR 0.99 09/17/2024    MAG 1.6 (L) 09/06/2024    INR 1.17 (H)  08/19/2024    BILITOTAL 0.2 09/17/2024    AST 34 09/17/2024    ALT 40 09/17/2024    ALKPHOS 74 09/17/2024    PROTTOTAL 6.2 (L) 09/17/2024    ALBUMIN 3.9 09/17/2024        ASSESSMENT AND PLAN     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 No evidence of myeloid neoplasm or monoclonal B-lymphocytosis. Cytogenetics pending. NGS negative for DNMT3A (other mutations not tested)  - Need to send NGS for all mutations present at diagnosis with future bone marrows  -Unsorted bone marrow with 100% donor    Graft status/chimerism:   - D28 (8/30/24): Bone marrow 100%. Peripheral blood CD33 100%, CD3 84%  - Repeat every 2 weeks till complete engraftment. Last on 9/12: CD33 100%D, CD3 98%D    GVHD prophylaxis  # s/p MMF, PtCy  - Continue sirolimus with goal 5-10. 9/12 therapeutic at 9.3 but dose decreased to 3mg/d (from 4); repeat level ordered for 9/19.     # skin rash to face, upper chest/back; ddx GVHD vs siro vs Bactrim/photosensitivity vs other  Skin rash noted last week and has not spread or changed, though also had supratherapeutic sirolimus.   - skin bx pending 9/12  - improving with TMC/hydrocort  - Has not spread or worsened, no other evidence of GVHD on exam or lab work (no eosinophilia, transaminitis or hyperbilirubinemia)  No other organ involvement noted currently.     HEME/ COAG  G-CSF last given on 8/22/4.  None needed currently  Anemia 2/2 chemo/BMT. WBC and plts now normal.    ID  # Prophylaxis  Fungal: micafungin 300mg 3x/week until D+45, followed by mold active azole. Pt was previously on Vfend and will check # they have at home; plan to start next week 9/25 (next provider visit) and adjust siro accordinly.   PJP/ Toxo IgG negative: Bactrim.  Viral: Acyclovir 800 mg bid     # Monitoring  CMV: Monitor  weekly till D100. CMV neg 9/12   EBV: Monitor every 2 weeks between D30 to D180. EBV neg 9/12   IgG: Check serum IgG level q3 months, and consider IVIG repletion for IgG levels <400 mg/dL. IgG 467 (9/12)    GI  # Hepatic steatosis and borderline iron deposition in liver  - VOD prophy: Ursodiol     # Supportive   - Ulcer prophy: PPI  - Alternating constipation and diarrhea: continue psyllium and imodium PRN    CARDIOVASCULAR  # Asymptomatic moderate aortic stenosis    # A.fib with RVR   - One episode while inpatient on 8/21/24 which converted to normal sinus rhythm spontaneously. Was started on metoprolol 25mg BID. Unclear if this is needed long term - Dr. Fowler  referred to cardiology- not scheduled as of 9/12.  - One previous episode of A.fib with RVR prior to transplant in the context of admission with febrile episode on 3/15/24. Noted on his fitbit, not very symptomatic. Cardioversion planned for new onset A.fib. Not achieved with amiodarone, DCCV on 3/21/24 restored normal sinus rhythm. Amiodarone and anticoagulation was stopped in April 2024 without any recurrence.     # CAD  - Coronary artery calcifications.   - Continue atorvastatin 10mg, ASA 81mg (resumed on 8/23/24 as platelets stable above 50k)  - Will need an ischemia evaluation in the long term    # HTN: Continue amlodipine 10mg (home med), lisinopril 5mg      RENAL/ELECTROLYTES/  # Urinary frequency, without evidence of retention or infection  - Prior imaging demonstrates prostatomegaly along with bladder thickening. This is a long standing issue which has been ongoing since pre-transplant. He was started on flomax while admitted, has taken it for around 3 weeks with no improvement, therefore stopped per patient preference. Restarted Flomax 9/12.  9/17: trial Ditropan, start lower dose 2.5mg tid; titrate as needed  - Dr. Fowler Referred to urology, but any non-urgent invasive testing or procedures should be done only when off immunosuppression.     #  Solitary kidney due to hx of kidney donation to sister  - b/l creat is around 1.1 - 1.2  - 24 hour creat clearance is normal    # Electrolyte management: replace per sliding scale  - cont Ca w/vit D     DIABETES/ENDOCRINE  # DMII: hx steroid-induced hyperglycemia  - Continue metformin at present dose  - Had follow up appt with endocrine  9/11; no change. Rec 3m follow-up then transfer to PCP.    RESP  # VINCENT : CPAP     OPHTHALMOLOGY:  # Glaucoma- Lutein supplement; brimonidine, latanaprost eye drops     MUSCULOSKELETAL/FRAILTY  # Gout: Continue allopurinol indefinitely     Post-transplant vaccinations  Flu vaccination after D60, then annually   COVID vaccine after D100    Summary:  -RTC Thurs/Fri this week for labs, aftab  -Canceled derm appt per pt request; rash improving and bx pending; cont steroid creams  -Requested line removal 9/20 or 9/25 (or next avail after)  -Follow-up with Dr. Fowler 9/25; likely start azole per notes (Dr. Fowler to confirm); pt has Vfend from previous use, will check supply. Siro level ordered for 9/19. Will need to dose -reduce if/when start azole.  -Requested labs, VINNY 10/1 & 10/8    I spent 45 minutes in the care of this patient today, which included time necessary for preparation for the visit, obtaining history, ordering medications/tests/procedures as medically indicated, review of pertinent medical literature, counseling of the patient, communication of recommendations to the care team, and documentation time.      Maria Alejandra Fisher PA-C

## 2024-09-18 LAB
PATH REPORT.COMMENTS IMP SPEC: NORMAL
PATH REPORT.FINAL DX SPEC: NORMAL
PATH REPORT.GROSS SPEC: NORMAL
PATH REPORT.MICROSCOPIC SPEC OTHER STN: NORMAL
PATH REPORT.RELEVANT HX SPEC: NORMAL

## 2024-09-19 ENCOUNTER — DOCUMENTATION ONLY (OUTPATIENT)
Dept: TRANSPLANT | Facility: CLINIC | Age: 74
End: 2024-09-19

## 2024-09-19 ENCOUNTER — APPOINTMENT (OUTPATIENT)
Dept: LAB | Facility: CLINIC | Age: 74
End: 2024-09-19
Attending: STUDENT IN AN ORGANIZED HEALTH CARE EDUCATION/TRAINING PROGRAM
Payer: MEDICARE

## 2024-09-19 ENCOUNTER — INFUSION THERAPY VISIT (OUTPATIENT)
Dept: TRANSPLANT | Facility: CLINIC | Age: 74
End: 2024-09-19
Attending: STUDENT IN AN ORGANIZED HEALTH CARE EDUCATION/TRAINING PROGRAM
Payer: MEDICARE

## 2024-09-19 VITALS
OXYGEN SATURATION: 99 % | TEMPERATURE: 97.3 F | WEIGHT: 236.3 LBS | SYSTOLIC BLOOD PRESSURE: 121 MMHG | BODY MASS INDEX: 32.18 KG/M2 | HEART RATE: 75 BPM | DIASTOLIC BLOOD PRESSURE: 75 MMHG | RESPIRATION RATE: 18 BRPM

## 2024-09-19 DIAGNOSIS — Z94.81 STATUS POST BONE MARROW TRANSPLANT (H): ICD-10-CM

## 2024-09-19 DIAGNOSIS — D46.9 MDS (MYELODYSPLASTIC SYNDROME) (H): Primary | ICD-10-CM

## 2024-09-19 DIAGNOSIS — Z94.84 STEM CELLS TRANSPLANT STATUS (H): ICD-10-CM

## 2024-09-19 LAB
ALBUMIN SERPL BCG-MCNC: 3.9 G/DL (ref 3.5–5.2)
ALP SERPL-CCNC: 79 U/L (ref 40–150)
ALT SERPL W P-5'-P-CCNC: 37 U/L (ref 0–70)
ANION GAP SERPL CALCULATED.3IONS-SCNC: 12 MMOL/L (ref 7–15)
AST SERPL W P-5'-P-CCNC: 31 U/L (ref 0–45)
BASOPHILS # BLD AUTO: 0 10E3/UL (ref 0–0.2)
BASOPHILS NFR BLD AUTO: 1 %
BILIRUB SERPL-MCNC: 0.2 MG/DL
BUN SERPL-MCNC: 13.2 MG/DL (ref 8–23)
CALCIUM SERPL-MCNC: 9.7 MG/DL (ref 8.8–10.4)
CHLORIDE SERPL-SCNC: 104 MMOL/L (ref 98–107)
CREAT SERPL-MCNC: 1.01 MG/DL (ref 0.67–1.17)
EGFRCR SERPLBLD CKD-EPI 2021: 78 ML/MIN/1.73M2
EOSINOPHIL # BLD AUTO: 0.1 10E3/UL (ref 0–0.7)
EOSINOPHIL NFR BLD AUTO: 2 %
ERYTHROCYTE [DISTWIDTH] IN BLOOD BY AUTOMATED COUNT: 15.2 % (ref 10–15)
GLUCOSE SERPL-MCNC: 217 MG/DL (ref 70–99)
HCO3 SERPL-SCNC: 22 MMOL/L (ref 22–29)
HCT VFR BLD AUTO: 32.3 % (ref 40–53)
HGB BLD-MCNC: 10.2 G/DL (ref 13.3–17.7)
IMM GRANULOCYTES # BLD: 0 10E3/UL
IMM GRANULOCYTES NFR BLD: 0 %
LYMPHOCYTES # BLD AUTO: 1.3 10E3/UL (ref 0.8–5.3)
LYMPHOCYTES NFR BLD AUTO: 21 %
MCH RBC QN AUTO: 28.5 PG (ref 26.5–33)
MCHC RBC AUTO-ENTMCNC: 31.6 G/DL (ref 31.5–36.5)
MCV RBC AUTO: 90 FL (ref 78–100)
MONOCYTES # BLD AUTO: 0.7 10E3/UL (ref 0–1.3)
MONOCYTES NFR BLD AUTO: 12 %
NEUTROPHILS # BLD AUTO: 4 10E3/UL (ref 1.6–8.3)
NEUTROPHILS NFR BLD AUTO: 64 %
NRBC # BLD AUTO: 0 10E3/UL
NRBC BLD AUTO-RTO: 0 /100
PLATELET # BLD AUTO: 145 10E3/UL (ref 150–450)
POTASSIUM SERPL-SCNC: 3.7 MMOL/L (ref 3.4–5.3)
PROT SERPL-MCNC: 6.3 G/DL (ref 6.4–8.3)
RBC # BLD AUTO: 3.58 10E6/UL (ref 4.4–5.9)
SODIUM SERPL-SCNC: 138 MMOL/L (ref 135–145)
WBC # BLD AUTO: 6.2 10E3/UL (ref 4–11)

## 2024-09-19 PROCEDURE — 80053 COMPREHEN METABOLIC PANEL: CPT

## 2024-09-19 PROCEDURE — 96365 THER/PROPH/DIAG IV INF INIT: CPT

## 2024-09-19 PROCEDURE — 85025 COMPLETE CBC W/AUTO DIFF WBC: CPT

## 2024-09-19 PROCEDURE — 250N000011 HC RX IP 250 OP 636

## 2024-09-19 PROCEDURE — 258N000003 HC RX IP 258 OP 636

## 2024-09-19 PROCEDURE — 36592 COLLECT BLOOD FROM PICC: CPT

## 2024-09-19 PROCEDURE — 250N000011 HC RX IP 250 OP 636: Performed by: STUDENT IN AN ORGANIZED HEALTH CARE EDUCATION/TRAINING PROGRAM

## 2024-09-19 RX ORDER — HEPARIN SODIUM,PORCINE 10 UNIT/ML
5 VIAL (ML) INTRAVENOUS ONCE
Status: COMPLETED | OUTPATIENT
Start: 2024-09-19 | End: 2024-09-19

## 2024-09-19 RX ORDER — HEPARIN SODIUM,PORCINE 10 UNIT/ML
5-20 VIAL (ML) INTRAVENOUS DAILY PRN
Status: DISCONTINUED | OUTPATIENT
Start: 2024-09-19 | End: 2024-09-19 | Stop reason: HOSPADM

## 2024-09-19 RX ORDER — HEPARIN SODIUM (PORCINE) LOCK FLUSH IV SOLN 100 UNIT/ML 100 UNIT/ML
5 SOLUTION INTRAVENOUS
OUTPATIENT
Start: 2024-09-20

## 2024-09-19 RX ORDER — HEPARIN SODIUM,PORCINE 10 UNIT/ML
5-20 VIAL (ML) INTRAVENOUS DAILY PRN
OUTPATIENT
Start: 2024-09-20

## 2024-09-19 RX ADMIN — Medication 5 ML: at 10:33

## 2024-09-19 RX ADMIN — Medication 5 ML: at 11:56

## 2024-09-19 RX ADMIN — MICAFUNGIN SODIUM 300 MG: 100 INJECTION, POWDER, LYOPHILIZED, FOR SOLUTION INTRAVENOUS at 10:47

## 2024-09-19 ASSESSMENT — PAIN SCALES - GENERAL: PAINLEVEL: NO PAIN (0)

## 2024-09-19 NOTE — PROGRESS NOTES
Infusion Nursing Note:  Arpit Forrest presents today for micafungin infusion.    Patient seen by provider today: No   present during visit today: Not Applicable.    Note:   Pt received 300 mg micafungin IV over an hour.    Dressing change done using sterile technique; see flowsheet.      Intravenous Access:  Andrews.    Treatment Conditions:  Labs were monitored.      Post Infusion Assessment:  Patient tolerated infusion without incident.  Blood return noted pre and post infusion.  Site patent and intact, free from redness, edema or discomfort.  No evidence of extravasations.  Access discontinued per protocol.       Discharge Plan:   Discharge instructions reviewed with: Patient.  Patient and/or family verbalized understanding of discharge instructions and all questions answered.  Patient discharged in stable condition accompanied by: self.  Departure Mode: Ambulatory.      Ayana Lopez RN

## 2024-09-19 NOTE — NURSING NOTE
Chief Complaint   Patient presents with    Blood Draw     Labs drawn via CVC     Labs collected from CVC by RN, line flushed with saline and heparin.  Vitals taken. Pt checked in for appointment(s).     Tiffanie HERNANDEZ RN PHN BSN  BMT/Oncology Lab

## 2024-09-19 NOTE — NURSING NOTE
"Oncology Rooming Note    September 19, 2024 11:49 AM   Arpit Forrest is a 73 year old male who presents for:    Chief Complaint   Patient presents with    Blood Draw     Labs drawn via CVC    Infusion     Scheduled micafungin infusion. History of MDS.     Initial Vitals: /75   Pulse 75   Temp 97.3  F (36.3  C) (Oral)   Resp 18   Wt 107.2 kg (236 lb 4.8 oz)   SpO2 99%   BMI 32.18 kg/m   Estimated body mass index is 32.18 kg/m  as calculated from the following:    Height as of 7/31/24: 1.825 m (5' 11.85\").    Weight as of this encounter: 107.2 kg (236 lb 4.8 oz). Body surface area is 2.33 meters squared.  No Pain (0) Comment: Data Unavailable   No LMP for male patient.  Allergies reviewed: Yes  Medications reviewed: Yes    Medications: Medication refills not needed today.  Pharmacy name entered into [facility]: [id number] - [place], [place] - [address] AT [facility] OF [place] [address]    Frailty Screening:   Is the patient here for a new oncology consult visit in cancer care? 2. No      Clinical concerns: none       Ayana Lopez RN              "

## 2024-09-20 LAB — CMV DNA SPEC NAA+PROBE-ACNC: NOT DETECTED IU/ML

## 2024-09-21 ENCOUNTER — APPOINTMENT (OUTPATIENT)
Dept: LAB | Facility: CLINIC | Age: 74
End: 2024-09-21
Attending: STUDENT IN AN ORGANIZED HEALTH CARE EDUCATION/TRAINING PROGRAM
Payer: MEDICARE

## 2024-09-21 ENCOUNTER — INFUSION THERAPY VISIT (OUTPATIENT)
Dept: TRANSPLANT | Facility: CLINIC | Age: 74
End: 2024-09-21
Attending: STUDENT IN AN ORGANIZED HEALTH CARE EDUCATION/TRAINING PROGRAM
Payer: MEDICARE

## 2024-09-21 VITALS
DIASTOLIC BLOOD PRESSURE: 78 MMHG | OXYGEN SATURATION: 99 % | BODY MASS INDEX: 32.03 KG/M2 | WEIGHT: 235.2 LBS | TEMPERATURE: 97.6 F | HEART RATE: 75 BPM | SYSTOLIC BLOOD PRESSURE: 152 MMHG | RESPIRATION RATE: 16 BRPM

## 2024-09-21 DIAGNOSIS — Z94.84 STEM CELLS TRANSPLANT STATUS (H): ICD-10-CM

## 2024-09-21 DIAGNOSIS — D46.9 MDS (MYELODYSPLASTIC SYNDROME) (H): Primary | ICD-10-CM

## 2024-09-21 LAB
ALBUMIN SERPL BCG-MCNC: 4 G/DL (ref 3.5–5.2)
ALP SERPL-CCNC: 85 U/L (ref 40–150)
ALT SERPL W P-5'-P-CCNC: 33 U/L (ref 0–70)
ANION GAP SERPL CALCULATED.3IONS-SCNC: 12 MMOL/L (ref 7–15)
AST SERPL W P-5'-P-CCNC: 27 U/L (ref 0–45)
BASOPHILS # BLD AUTO: 0 10E3/UL (ref 0–0.2)
BASOPHILS NFR BLD AUTO: 1 %
BILIRUB SERPL-MCNC: 0.2 MG/DL
BUN SERPL-MCNC: 13.4 MG/DL (ref 8–23)
CALCIUM SERPL-MCNC: 9.6 MG/DL (ref 8.8–10.4)
CHLORIDE SERPL-SCNC: 105 MMOL/L (ref 98–107)
CREAT SERPL-MCNC: 0.94 MG/DL (ref 0.67–1.17)
EGFRCR SERPLBLD CKD-EPI 2021: 85 ML/MIN/1.73M2
EOSINOPHIL # BLD AUTO: 0.2 10E3/UL (ref 0–0.7)
EOSINOPHIL NFR BLD AUTO: 3 %
ERYTHROCYTE [DISTWIDTH] IN BLOOD BY AUTOMATED COUNT: 15.3 % (ref 10–15)
GLUCOSE SERPL-MCNC: 206 MG/DL (ref 70–99)
HCO3 SERPL-SCNC: 22 MMOL/L (ref 22–29)
HCT VFR BLD AUTO: 31.9 % (ref 40–53)
HGB BLD-MCNC: 10.2 G/DL (ref 13.3–17.7)
IMM GRANULOCYTES # BLD: 0 10E3/UL
IMM GRANULOCYTES NFR BLD: 0 %
LYMPHOCYTES # BLD AUTO: 1.5 10E3/UL (ref 0.8–5.3)
LYMPHOCYTES NFR BLD AUTO: 25 %
MCH RBC QN AUTO: 28.9 PG (ref 26.5–33)
MCHC RBC AUTO-ENTMCNC: 32 G/DL (ref 31.5–36.5)
MCV RBC AUTO: 90 FL (ref 78–100)
MONOCYTES # BLD AUTO: 0.7 10E3/UL (ref 0–1.3)
MONOCYTES NFR BLD AUTO: 12 %
NEUTROPHILS # BLD AUTO: 3.4 10E3/UL (ref 1.6–8.3)
NEUTROPHILS NFR BLD AUTO: 59 %
NRBC # BLD AUTO: 0 10E3/UL
NRBC BLD AUTO-RTO: 0 /100
PLATELET # BLD AUTO: 131 10E3/UL (ref 150–450)
POTASSIUM SERPL-SCNC: 3.8 MMOL/L (ref 3.4–5.3)
PROT SERPL-MCNC: 6.2 G/DL (ref 6.4–8.3)
RBC # BLD AUTO: 3.53 10E6/UL (ref 4.4–5.9)
SODIUM SERPL-SCNC: 139 MMOL/L (ref 135–145)
WBC # BLD AUTO: 5.8 10E3/UL (ref 4–11)

## 2024-09-21 PROCEDURE — 250N000011 HC RX IP 250 OP 636: Performed by: STUDENT IN AN ORGANIZED HEALTH CARE EDUCATION/TRAINING PROGRAM

## 2024-09-21 PROCEDURE — 96365 THER/PROPH/DIAG IV INF INIT: CPT

## 2024-09-21 PROCEDURE — 85025 COMPLETE CBC W/AUTO DIFF WBC: CPT | Performed by: STUDENT IN AN ORGANIZED HEALTH CARE EDUCATION/TRAINING PROGRAM

## 2024-09-21 PROCEDURE — 36592 COLLECT BLOOD FROM PICC: CPT | Performed by: STUDENT IN AN ORGANIZED HEALTH CARE EDUCATION/TRAINING PROGRAM

## 2024-09-21 PROCEDURE — 80053 COMPREHEN METABOLIC PANEL: CPT | Performed by: STUDENT IN AN ORGANIZED HEALTH CARE EDUCATION/TRAINING PROGRAM

## 2024-09-21 PROCEDURE — 258N000003 HC RX IP 258 OP 636

## 2024-09-21 PROCEDURE — 250N000011 HC RX IP 250 OP 636

## 2024-09-21 RX ORDER — HEPARIN SODIUM,PORCINE 10 UNIT/ML
5 VIAL (ML) INTRAVENOUS
Status: COMPLETED | OUTPATIENT
Start: 2024-09-21 | End: 2024-09-21

## 2024-09-21 RX ORDER — HEPARIN SODIUM,PORCINE 10 UNIT/ML
5 VIAL (ML) INTRAVENOUS ONCE
Status: COMPLETED | OUTPATIENT
Start: 2024-09-21 | End: 2024-09-21

## 2024-09-21 RX ORDER — HEPARIN SODIUM (PORCINE) LOCK FLUSH IV SOLN 100 UNIT/ML 100 UNIT/ML
5 SOLUTION INTRAVENOUS
OUTPATIENT
Start: 2024-09-22

## 2024-09-21 RX ORDER — HEPARIN SODIUM,PORCINE 10 UNIT/ML
5-20 VIAL (ML) INTRAVENOUS DAILY PRN
OUTPATIENT
Start: 2024-09-22

## 2024-09-21 RX ADMIN — Medication 5 ML: at 09:41

## 2024-09-21 RX ADMIN — MICAFUNGIN SODIUM 300 MG: 100 INJECTION, POWDER, LYOPHILIZED, FOR SOLUTION INTRAVENOUS at 10:05

## 2024-09-21 RX ADMIN — HEPARIN, PORCINE (PF) 10 UNIT/ML INTRAVENOUS SYRINGE 5 ML: at 11:19

## 2024-09-21 RX ADMIN — HEPARIN, PORCINE (PF) 10 UNIT/ML INTRAVENOUS SYRINGE 5 ML: at 11:20

## 2024-09-21 ASSESSMENT — PAIN SCALES - GENERAL: PAINLEVEL: NO PAIN (0)

## 2024-09-21 NOTE — NURSING NOTE
"Oncology Rooming Note    September 21, 2024 10:58 AM   Arpit Forrest is a 73 year old male who presents for:    Chief Complaint   Patient presents with    Blood Draw     Labs drawn via CVC by RN. VS taken.    Infusion     Post bmt for MDS here for aftab inf and suture removal     Initial Vitals: BP (!) 152/78 (BP Location: Left arm, Patient Position: Sitting, Cuff Size: Adult Regular)   Pulse 75   Temp 97.6  F (36.4  C) (Oral)   Resp 16   Wt 106.7 kg (235 lb 3.2 oz)   SpO2 99%   BMI 32.03 kg/m   Estimated body mass index is 32.03 kg/m  as calculated from the following:    Height as of 7/31/24: 1.825 m (5' 11.85\").    Weight as of this encounter: 106.7 kg (235 lb 3.2 oz). Body surface area is 2.33 meters squared.  No Pain (0) Comment: Data Unavailable   No LMP for male patient.  Allergies reviewed: Yes  Medications reviewed: Yes    Medications: Medication refills not needed today.  Pharmacy name entered into [employer]: [id number] - [facility] [place] [place] - [address] AT [facility] OF [place] [address] [address]    Frailty Screening:   Is the patient here for a new oncology consult visit in cancer care? 2. No      Clinical concerns: none pt is excited to have his line removed later this week      Rosalba Neal RN              "

## 2024-09-21 NOTE — PROGRESS NOTES
Infusion Nursing Note:  Arpit Forrest presents today for   Chief Complaint   Patient presents with    Blood Draw     Labs drawn via CVC by RN. VS taken.    Infusion     Post bmt for MDS here for aftab inf and suture removal     .    Patient seen by provider today: No   present during visit today: Not Applicable.    Note: pt received last does of aftab today.      Intravenous Access:  Andrews.    Treatment Conditions:  Results reviewed, labs MET treatment parameters, ok to proceed with treatment.      Post Infusion Assessment:  Patient tolerated infusion without incident.       Discharge Plan:   Patient and/or family verbalized understanding of discharge instructions and all questions answered.      Rosalba Neal RN

## 2024-09-25 ENCOUNTER — ANCILLARY PROCEDURE (OUTPATIENT)
Dept: INTERVENTIONAL RADIOLOGY/VASCULAR | Facility: CLINIC | Age: 74
End: 2024-09-25
Attending: PHYSICIAN ASSISTANT
Payer: MEDICARE

## 2024-09-25 ENCOUNTER — LAB (OUTPATIENT)
Dept: LAB | Facility: CLINIC | Age: 74
End: 2024-09-25
Attending: NURSE PRACTITIONER
Payer: MEDICARE

## 2024-09-25 ENCOUNTER — TELEPHONE (OUTPATIENT)
Dept: UROLOGY | Facility: CLINIC | Age: 74
End: 2024-09-25

## 2024-09-25 ENCOUNTER — ONCOLOGY VISIT (OUTPATIENT)
Dept: TRANSPLANT | Facility: CLINIC | Age: 74
End: 2024-09-25
Attending: STUDENT IN AN ORGANIZED HEALTH CARE EDUCATION/TRAINING PROGRAM
Payer: MEDICARE

## 2024-09-25 ENCOUNTER — TELEPHONE (OUTPATIENT)
Dept: CARDIOLOGY | Facility: CLINIC | Age: 74
End: 2024-09-25

## 2024-09-25 VITALS
OXYGEN SATURATION: 99 % | SYSTOLIC BLOOD PRESSURE: 137 MMHG | BODY MASS INDEX: 31.94 KG/M2 | WEIGHT: 234.5 LBS | DIASTOLIC BLOOD PRESSURE: 78 MMHG | HEART RATE: 80 BPM | TEMPERATURE: 97.6 F | RESPIRATION RATE: 18 BRPM

## 2024-09-25 DIAGNOSIS — D46.9 MDS (MYELODYSPLASTIC SYNDROME) (H): ICD-10-CM

## 2024-09-25 DIAGNOSIS — Z94.81 STATUS POST BONE MARROW TRANSPLANT (H): Primary | ICD-10-CM

## 2024-09-25 DIAGNOSIS — D46.9 MYELODYSPLASTIC SYNDROME (H): ICD-10-CM

## 2024-09-25 DIAGNOSIS — Z94.81 STATUS POST BONE MARROW TRANSPLANT (H): ICD-10-CM

## 2024-09-25 DIAGNOSIS — D46.9 MDS (MYELODYSPLASTIC SYNDROME) (H): Primary | ICD-10-CM

## 2024-09-25 LAB
ALBUMIN SERPL BCG-MCNC: 4.2 G/DL (ref 3.5–5.2)
ALP SERPL-CCNC: 83 U/L (ref 40–150)
ALT SERPL W P-5'-P-CCNC: 44 U/L (ref 0–70)
ANION GAP SERPL CALCULATED.3IONS-SCNC: 12 MMOL/L (ref 7–15)
AST SERPL W P-5'-P-CCNC: 36 U/L (ref 0–45)
BASOPHILS # BLD AUTO: 0 10E3/UL (ref 0–0.2)
BASOPHILS NFR BLD AUTO: 1 %
BILIRUB SERPL-MCNC: 0.2 MG/DL
BUN SERPL-MCNC: 13.8 MG/DL (ref 8–23)
CALCIUM SERPL-MCNC: 10 MG/DL (ref 8.8–10.4)
CHLORIDE SERPL-SCNC: 104 MMOL/L (ref 98–107)
CREAT SERPL-MCNC: 1.14 MG/DL (ref 0.67–1.17)
EGFRCR SERPLBLD CKD-EPI 2021: 67 ML/MIN/1.73M2
EOSINOPHIL # BLD AUTO: 0.2 10E3/UL (ref 0–0.7)
EOSINOPHIL NFR BLD AUTO: 4 %
ERYTHROCYTE [DISTWIDTH] IN BLOOD BY AUTOMATED COUNT: 15.4 % (ref 10–15)
GLUCOSE SERPL-MCNC: 141 MG/DL (ref 70–99)
HCO3 SERPL-SCNC: 22 MMOL/L (ref 22–29)
HCT VFR BLD AUTO: 33.5 % (ref 40–53)
HGB BLD-MCNC: 10.8 G/DL (ref 13.3–17.7)
IMM GRANULOCYTES # BLD: 0.1 10E3/UL
IMM GRANULOCYTES NFR BLD: 1 %
LAB DIRECTOR DISCLAIMER: NORMAL
LAB DIRECTOR DISCLAIMER: NORMAL
LAB DIRECTOR INTERPRETATION: NORMAL
LAB DIRECTOR INTERPRETATION: NORMAL
LAB DIRECTOR METHODOLOGY: NORMAL
LAB DIRECTOR METHODOLOGY: NORMAL
LAB DIRECTOR RESULTS: NORMAL
LAB DIRECTOR RESULTS: NORMAL
LYMPHOCYTES # BLD AUTO: 1.7 10E3/UL (ref 0.8–5.3)
LYMPHOCYTES NFR BLD AUTO: 27 %
MCH RBC QN AUTO: 28.7 PG (ref 26.5–33)
MCHC RBC AUTO-ENTMCNC: 32.2 G/DL (ref 31.5–36.5)
MCV RBC AUTO: 89 FL (ref 78–100)
MONOCYTES # BLD AUTO: 0.8 10E3/UL (ref 0–1.3)
MONOCYTES NFR BLD AUTO: 13 %
NEUTROPHILS # BLD AUTO: 3.4 10E3/UL (ref 1.6–8.3)
NEUTROPHILS NFR BLD AUTO: 55 %
NRBC # BLD AUTO: 0 10E3/UL
NRBC BLD AUTO-RTO: 0 /100
PLATELET # BLD AUTO: 111 10E3/UL (ref 150–450)
POTASSIUM SERPL-SCNC: 3.8 MMOL/L (ref 3.4–5.3)
PROT SERPL-MCNC: 6.8 G/DL (ref 6.4–8.3)
RBC # BLD AUTO: 3.76 10E6/UL (ref 4.4–5.9)
SIROLIMUS BLD-MCNC: 11.4 UG/L (ref 5–15)
SODIUM SERPL-SCNC: 138 MMOL/L (ref 135–145)
SPECIMEN DESCRIPTION: NORMAL
SPECIMEN DESCRIPTION: NORMAL
TME LAST DOSE: NORMAL H
TME LAST DOSE: NORMAL H
WBC # BLD AUTO: 6.2 10E3/UL (ref 4–11)

## 2024-09-25 PROCEDURE — 36589 REMOVAL TUNNELED CV CATH: CPT | Performed by: PHYSICIAN ASSISTANT

## 2024-09-25 PROCEDURE — 87799 DETECT AGENT NOS DNA QUANT: CPT

## 2024-09-25 PROCEDURE — G0452 MOLECULAR PATHOLOGY INTERPR: HCPCS | Mod: 26 | Performed by: PATHOLOGY

## 2024-09-25 PROCEDURE — G0463 HOSPITAL OUTPT CLINIC VISIT: HCPCS | Performed by: STUDENT IN AN ORGANIZED HEALTH CARE EDUCATION/TRAINING PROGRAM

## 2024-09-25 PROCEDURE — 80195 ASSAY OF SIROLIMUS: CPT

## 2024-09-25 PROCEDURE — 81268 CHIMERISM ANAL W/CELL SELECT: CPT

## 2024-09-25 PROCEDURE — 36592 COLLECT BLOOD FROM PICC: CPT | Performed by: STUDENT IN AN ORGANIZED HEALTH CARE EDUCATION/TRAINING PROGRAM

## 2024-09-25 PROCEDURE — 82784 ASSAY IGA/IGD/IGG/IGM EACH: CPT | Performed by: STUDENT IN AN ORGANIZED HEALTH CARE EDUCATION/TRAINING PROGRAM

## 2024-09-25 PROCEDURE — 99214 OFFICE O/P EST MOD 30 MIN: CPT | Performed by: STUDENT IN AN ORGANIZED HEALTH CARE EDUCATION/TRAINING PROGRAM

## 2024-09-25 PROCEDURE — 80053 COMPREHEN METABOLIC PANEL: CPT

## 2024-09-25 PROCEDURE — 85049 AUTOMATED PLATELET COUNT: CPT | Performed by: STUDENT IN AN ORGANIZED HEALTH CARE EDUCATION/TRAINING PROGRAM

## 2024-09-25 RX ORDER — LISINOPRIL AND HYDROCHLOROTHIAZIDE 20; 25 MG/1; MG/1
TABLET ORAL
COMMUNITY
Start: 2023-10-24 | End: 2024-09-29

## 2024-09-25 RX ORDER — LIDOCAINE HYDROCHLORIDE 10 MG/ML
5 INJECTION, SOLUTION EPIDURAL; INFILTRATION; INTRACAUDAL; PERINEURAL ONCE
Status: COMPLETED | OUTPATIENT
Start: 2024-09-25 | End: 2024-09-25

## 2024-09-25 RX ORDER — SIROLIMUS 0.5 MG/1
0.5 TABLET, FILM COATED ORAL DAILY
Qty: 60 TABLET | Refills: 1 | Status: SHIPPED | OUTPATIENT
Start: 2024-09-26

## 2024-09-25 RX ADMIN — LIDOCAINE HYDROCHLORIDE 5 ML: 10 INJECTION, SOLUTION EPIDURAL; INFILTRATION; INTRACAUDAL; PERINEURAL at 13:46

## 2024-09-25 ASSESSMENT — PAIN SCALES - GENERAL: PAINLEVEL: NO PAIN (0)

## 2024-09-25 NOTE — PROGRESS NOTES
BMT Clinic Note  Sep 25, 2024     Patient ID: Arpit Forrest is a 73 year old y/o male who is day +49  post allogenic stem cell transplant for MDS-EB1     Diagnosis MDS-EB1 BMT type Allogenic  CMV  Donor -  Recipient -    Prep ELIEZER (Flu/Cy/TBI) Donor type  8/8 URD ABO D/R O- (matched)   GVHD ppx PTCy, siro, MMF Graft source PBSCT Toxo IgG Negative   Protocol [id number] CD34/kg 6.12E+06    BMT MD Nena Fowler     Pre-transplant disease history  Referring provider:  Dr. Beckwith, [facility]   Data    Diagnosis:   MDS-IB1. IPSS-M: very high risk 1.98   CD5+ Monoclonal B Lymphocytosis (MBL), chronic lymphocytic leukemia type  Recurrent febrile episodes, UBA1 negative, resolved with treatment of MDS  Treatment  3/21/24 to 6/19/24: Decitabine 20mg/m2 for 5 days x 4 cycles     Hematological history  July 2023: Episode of gout in left ankle seen by Rheumatology and treated with prednisone taper, and allopurinol.  August 2023:  Collapsed in the bathroom while on a trip to [facility]. Had high fevers and rigors, treated with doxycycline.   11/30/2023: Seen by Rheumatology in the [place]. He had acute right sacroilitis. Ferritin was elevated to 1600, no hemachromatosis.   12/3/2023 to 12/9/2023: Hospitalized with high fevers and new onset mild pancytopenias. CT C/A/P did not show any lymphadenopathy or splenomegaly.  Infectious cultures were negative. 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 He was treated with anakinra, solumedrol and then started on oral prednisone.   12/7/2023: Bone marrow biopsy showed hypercellular marrow, no dysplasia, 34% ring sideroblasts, increased reticuloendothelial iron stores, no increase in blasts. Flow showed a CD5 positive B-cell infiltrate (5% of the marrow cellularity), consistent with  monoclonal B-cell lymphocytosis. Cytogenetics showed a normal male karyotype. Myeloid malignancy panel did not show an SF3B1 mutation. However, there were mutations in SRSF2, DNMT3A, RUNX1, IDH2 p.[id number], BCOR, BCORL1, and TET2. Diagnosed with CCUS.   1/31/2024 to 2/6/24: Hospitalized with fevers to 104 despite anti-IL1 therapy. Anakinra was increased, then switched to canakinumab. PET-Ct on 2/9/24 did not show any malignancy. Diagnosed with subclinical hyperthyroidism treated with methimazole starting 2/14/2024.  3/15/2024 to 3/25/2024: Hospitalized for neutropenic fevers. He also had an episode of atrial fibrillation with rapid ventricular response placed on AC with LMWH. Methimazole was held due to risk of agranulocytosis.  3/18/2024: Bone marrow biopsy showed Myelodysplastic syndrome, with multilineage dysplasia. 2% bone marrow blasts, and 3% circulating blasts.18% ringed sideroblasts, slight reticulin fibrosis. Concurrent CBC showed WBC 1.7, ANC 0.5, Hb 8.7, platelet count 43. Normal cytogenetics. NGS could not be sent from the bone marrow. NGS peripheral blood (4/18/24) showed mutations in BCOR (56%), BCORL1 (11%), DNMT3A (34%), IDH2 (9%), SRSF2 (33%), STAG2 (9%), TET2 (2%), two RUNX1 mutations. IPSS-M: very high risk 1.98. Scattered non-caseating granulomas identified on the bone marrow biopsy and clot sections, negative for microorganisms. Also seen on flow was CD5-positive lambda monotypic B cells (4.3%).      3/21/2024: Cycle 1 decitabine 20 mg/m2 for 3 days only.  4/22/2024: Cycle 2 decitabine 20 mg/m2 for 5 days   5/21/2024: Cycle 3 decitabine   6/19/2024: Cycle 4 decitabine      7/23/24: Pre transplant BMBx showed persistent MDS. Normocellular marrow (20-30%) with increase dysplastic megakaryocytes, no bone marrow blasts, and also rare circulating blasts. Compared to the previous bone marrow, this bone marrow is less cellular, only 1% ring sideroblasts and less circulating blasts (1% current vs 3%  previous). Flow did not show any myeloid blasts, CD5 positive lambda monotypic cells (10%). Cytogenetics normal, NGS positive for DNMT3A (21%).      Post transplant  # A.fib with RVR on 8/21/24 which converted to sinus rhythm spontaneously. 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 Max grade 1 (face, chest and upper back). Skin biopsy (9/1224) showed interface dermatitis suggestive of GVHD. Resolved with topical steroids.        INTERVAL HISTORY     Arpit is here for a schedule BMT follow up. Here with his wife, Meghna.   He has been doing well. Rash on the face and chest is much improved with topical steroids - now looks like a diffuse, very mild erythema on face.   Appetite is good. No nausea, vomiting or diarrhea.   He is scheduled to meet with urology in the coming week.     Plan  - D50 chimerisms 100%  - Skin biopsy results reviewed, showed mild interface dermatitis suggestive of GVHD. Controlled very well with topical steroids.   - Continue sirolimus with goal 8-12  - Counts good  - Continue acyclovir, bactrim. He will start voriconazole from tomorrow (9/26/24) using home supply. New rx has been sent. Line removed on 9/25/24. Siro dose adjusted.   - Okay to stop ursodiol at D60  - Urology appointment has been scheduled for urgency and frequency. Continue flomax. He was started on oxybutynin on 9/17/24 which has not helped at all. Further medication changes per urology  - Follow up as scheduled.     PMH  - A.fib with RVR while admitted with febrile episode on 3/15/24 s/p cardioversion. Recurrent episode on 8/21/24 while admitted for transplant.   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   - Dyslipidemia  - HTN  - VINCENT  - Gout   - Exposure to agent orange     Current Outpatient Medications   Medication Sig Dispense Refill    voriconazole (VFEND) 200 MG tablet Take 1 tablet  (200 mg) by mouth 2 times daily. 120 tablet 0    acyclovir (ZOVIRAX) 800 MG tablet Take 1 tablet (800 mg) by mouth 2 times daily. 120 tablet 1    allopurinol (ZYLOPRIM) 300 MG tablet Take 1 tablet (300 mg) by mouth daily. 60 tablet 1    amLODIPine (NORVASC) 10 MG tablet Take 1 tablet (10 mg) by mouth every morning. 60 tablet 1    aspirin 81 MG EC tablet Take 1 tablet (81 mg) by mouth daily. 60 tablet 1    atorvastatin (LIPITOR) 10 MG tablet Take 1 tablet (10 mg) by mouth every morning. 60 tablet 1    brimonidine (ALPHAGAN) 0.2 % ophthalmic solution Place 1 drop into both eyes 2 times daily      calcium carbonate-vitamin D (CALTRATE) 600-10 MG-MCG per tablet Take 1 tablet by mouth daily. 60 tablet 1    fluticasone (FLONASE) 50 MCG/ACT nasal spray Spray 1 spray into both nostrils 2 times daily      Heparin Sod, Pork, Lock Flush 10 UNIT/ML SOLN Inject 10 mLs into the vein daily. Inject 5mL into each lumen once a day. 300 mL 0    hydrocortisone 2.5 % cream Apply topically 2 times daily. Use on face and scalp 30 g 0    latanoprost (XALATAN) 0.005 % ophthalmic solution INSTILL 2 DROPS IN BOTH EYES AT BEDTIME      lisinopril (ZESTRIL) 5 MG tablet Take 1 tablet (5 mg) by mouth daily. 60 tablet 1    Lutein-Zeaxanthin 25-5 MG CAPS Take 1 capsule by mouth daily      metFORMIN (GLUCOPHAGE) 500 MG tablet Take 1 tablet (500mg) every morning and 2 tablets (1000mg) every evening. 90 tablet 1    metoprolol tartrate (LOPRESSOR) 25 MG tablet Take 1 tablet (25 mg) by mouth 2 times daily. 60 tablet 1    mycophenolate (GENERIC EQUIVALENT) 500 MG tablet Take 3 tablets (1,500 mg) by mouth 2 times daily. 72 tablet 0    oxyBUTYnin (DITROPAN) 5 MG tablet Take 0.5 tablets (2.5 mg) by mouth 3 times daily. 30 tablet 1    pantoprazole (PROTONIX) 40 MG EC tablet Take 1 tablet (40 mg) by mouth every morning (before breakfast). 60 tablet 1    pramox-pe-glycerin-petrolatum (PREPARATION H) 1-0.25-14.4-15 % CREA cream Place rectally 3 times daily as  needed for hemorrhoids. 25.5 g 0    prochlorperazine (COMPAZINE) 5 MG tablet Take 1 tablet (5 mg) by mouth every 6 hours as needed for nausea or vomiting. (Patient not taking: Reported on 9/17/2024) 30 tablet 0    psyllium (METAMUCIL/KONSYL) capsule Take 3 capsules by mouth daily. 270 capsule 3    sirolimus (GENERIC EQUIVALENT) 0.5 MG tablet Take 1 tablet (0.5 mg) by mouth daily. 60 tablet 1    sirolimus (GENERIC EQUIVALENT) 1 MG tablet Take 3 tablets (3 mg) by mouth daily. 90 tablet 1    sulfamethoxazole-trimethoprim (BACTRIM DS) 800-160 MG tablet Take 1 tablet by mouth Every Mon, Tues two times daily. Do not begin taking until instructed to do so by BMT clinic. Do not start before September 9, 2024. 28 tablet 1    tamsulosin (FLOMAX) 0.4 MG capsule Take 1 capsule (0.4 mg) by mouth every evening. 90 capsule 1    triamcinolone (KENALOG) 0.1 % external cream Apply topically 2 times daily. Use on chest and back 454 g 0    ursodiol (ACTIGALL) 300 MG capsule Take 1 capsule (300 mg) by mouth 3 times daily. 126 capsule 0       EXAM      /78 (BP Location: Right arm, Patient Position: Sitting, Cuff Size: Adult Large)   Pulse 80   Temp 97.6  F (36.4  C) (Oral)   Resp 18   Wt 106.4 kg (234 lb 8 oz)   SpO2 99%   BMI 31.94 kg/m    Wt Readings from Last 4 Encounters:   09/21/24 106.7 kg (235 lb 3.2 oz)   09/19/24 107.2 kg (236 lb 4.8 oz)   09/17/24 106.7 kg (235 lb 4.8 oz)   09/12/24 107.1 kg (236 lb 1.6 oz)       KPS: 80% Can perform normal activity with effort, some signs of disease    General: Alert, awake  Eyes: No mouth sores  CV: Not examined  Skin: Diffuse erythema on face.   MSK: No LE edema  Access: R CVC dressing cdi, no drainage     ASSESSMENT AND PLAN     BMT for high risk MDS: D+49  - Chemo protocol: [id number]; Flu, Cy, TBI  - Peripheral blood stem cell graft from 8/8 donor, ABO matched (O neg)  - Continue allopurinol (hx of gout)    Disease status:   - 8/30/24 (D28): Slightly hypercellular marrow  (30-40%). No evidence of myeloid neoplasm or monoclonal B-lymphocytosis. Normal male karyotype. NGS negative for DNMT3A (other mutations not tested)  - Need to send NGS for all mutations present at diagnosis with future bone marrows    Graft status/chimerism:   - 8/30/24 (D28): Bone marrow 100%. Peripheral blood CD33 100%, CD3 84%  - 9/12/24: Peripheral blood CD3 96%, CD33 100%  - 9/25/24: Peripheral blood CD3 100%, CD33 100%    Maintenance  Not discussed     GVHD prophylaxis  # Current immunosuppression is sirolimus   - Continue sirolimus with goal 8-12    # The current acute GVHD stage is 1.   Skin: Grade 1. Mild erythema on face.   - Continue topical hydrocortisone and triamcinolone   GI: GI score 0.  Liver: LFTs normal. Liver score 0.    HEME/ COAG  G-CSF last given on 8/22/4.  ABO matched.    ID  # Prophylaxis  Fungal: Start voriconazole 200mg BID from 9/26/24. Siro dose adjusted.   PJP/ Toxo IgG negative: Bactrim.  Viral: Acyclovir 800 mg bid     # Monitoring  CMV: Monitor weekly till D100. Negative on 9/25  EBV: Monitor every 2 weeks between D30 to D180. Negative on 9/25  IgG: IgG 467 (9/12/24). Check serum IgG level q3 months, and consider IVIG repletion for IgG levels <400 mg/dL.     GI  # Hepatic steatosis and borderline iron deposition in liver  - VOD prophy: Ursodiol     # Supportive   - Ulcer prophy: PPI  - Alternating constipation and diarrhea: continue psyllium and imodium PRN    CARDIOVASCULAR  # Asymptomatic moderate aortic stenosis    # A.fib with RVR   - One episode while inpatient on 8/21/24 which converted to normal sinus rhythm spontaneously. Was started on metoprolol 25mg BID. Unclear if this is needed long term. Cardiology follow up has been requested.   - One previous episode of A.fib with RVR prior to transplant in the context of admission with febrile episode on 3/15/24. Noted on his fitbit, not very symptomatic. Cardioversion planned for new onset A.fib. Not achieved with amiodarone. DCCV  on 3/21/24 restored normal sinus rhythm. Amiodarone and anticoagulation was stopped in April 2024 without any recurrence.     # CAD  - Coronary artery calcifications.   - Continue atorvastatin 10mg, ASA 81mg (resumed on 8/23/24 as platelets stable above 50k)  - Will need an ischemia evaluation in the long term    # HTN: Continue amlodipine 10mg (home med), lisinopril 5mg      RENAL/ELECTROLYTES/  # Urinary frequency, without evidence of retention or infection  # BPH  - Prior imaging demonstrates prostatomegaly along with bladder thickening. This is a long standing issue which has been ongoing since pre-transplant.   - Continue tamsulosin.  - Has been started on low dose oxybutynin on 9/17/24. No improvement in symptoms. Defer further management to urology.   - Please defer any non-urgent invasive testing.    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 Has been seen by endocrine on 9/11/24. Have recommended repeat HbA1c in 1 month.     RESP  # VINCENT : CPAP     OPHTHALMOLOGY:  # Glaucoma- Lutein supplement; brimonidine, latanaprost eye drops     MUSCULOSKELETAL/FRAILTY  # Gout: Continue allopurinol indefinitely     Post-transplant vaccinations  Flu vaccination after D60, then annually   COVID vaccine after D100    I spent 30 minutes in the care of this patient today, which included time necessary for preparation for the visit, obtaining history, ordering medications/tests/procedures as medically indicated, review of pertinent medical literature, counseling of the patient, communication of recommendations to the care team, and documentation time.     Nena Fowler  Department of Hematology, Oncology and Transplantation  Pager [phone]/Text via [email or web address]

## 2024-09-25 NOTE — NURSING NOTE
Chief Complaint   Patient presents with    Oncology Clinic Visit     myelodysplastic syndrome    Blood Draw     Labs drawn via cvc by RN in lab. VS taken.      Labs drawn via CVC by RN. Flushed with saline (no heparin d/t line being discontinued today). Vitals taken. Pt checked into next appt.     Chantelle Buchanan RN

## 2024-09-25 NOTE — LETTER
9/25/2024      Arpit Forrest  [address] [place] [address]      Dear Colleague,    Thank you for referring your patient, Arpit Forrest, to the [facility] BLOOD AND MARROW TRANSPLANT PROGRAM [place]. Please see a copy of my visit note below.    BMT Clinic Note  Sep 25, 2024     Patient ID: Arpit Forrest is a 73 year old y/o male who is day +49  post allogenic stem cell transplant for MDS-EB1     Diagnosis MDS-EB1 BMT type Allogenic  CMV  Donor -  Recipient -    Prep ELIEZER (Flu/Cy/TBI) Donor type  8/8 URD ABO D/R O- (matched)   GVHD ppx PTCy, siro, MMF Graft source PBSCT Toxo IgG Negative   Protocol [id number] CD34/kg 6.12E+06    BMT MD Nena Fowler     Pre-transplant disease history  Referring provider:  Dr. 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 IPSS-M: very high risk 1.98   CD5+ Monoclonal B Lymphocytosis (MBL), chronic lymphocytic leukemia type  Recurrent febrile episodes, UBA1 negative, resolved with treatment of MDS  Treatment  3/21/24 to 6/19/24: Decitabine 20mg/m2 for 5 days x 4 cycles     Hematological history  July 2023: Episode of gout in left ankle seen by Rheumatology and treated with prednisone taper, and allopurinol.  August 2023:  Collapsed in the bathroom while on a trip to [facility]. Had high fevers and rigors, treated with doxycycline.   11/30/2023: Seen by Rheumatology in the [place]. He had acute right sacroilitis. Ferritin was elevated to 1600, no hemachromatosis.   12/3/2023 to 12/9/2023: Hospitalized with high fevers and new onset mild pancytopenias. CT C/A/P did not show any lymphadenopathy or splenomegaly.  Infectious cultures were negative. 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 He was treated with anakinra, solumedrol and then started on oral  prednisone.   12/7/2023: Bone marrow biopsy showed hypercellular marrow, no dysplasia, 34% ring sideroblasts, increased reticuloendothelial iron stores, no increase in blasts. Flow showed a CD5 positive B-cell infiltrate (5% of the marrow cellularity), consistent with monoclonal B-cell lymphocytosis. Cytogenetics showed a normal male karyotype. Myeloid malignancy panel did not show an SF3B1 mutation. However, there were mutations in SRSF2, DNMT3A, RUNX1, IDH2 p.[id number], BCOR, BCORL1, and TET2. Diagnosed with CCUS.   1/31/2024 to 2/6/24: Hospitalized with fevers to 104 despite anti-IL1 therapy. Anakinra was increased, then switched to canakinumab. PET-Ct on 2/9/24 did not show any malignancy. Diagnosed with subclinical hyperthyroidism treated with methimazole starting 2/14/2024.  3/15/2024 to 3/25/2024: Hospitalized for neutropenic fevers. He also had an episode of atrial fibrillation with rapid ventricular response placed on AC with LMWH. Methimazole was held due to risk of agranulocytosis.  3/18/2024: Bone marrow biopsy showed Myelodysplastic syndrome, with multilineage dysplasia. 2% bone marrow blasts, and 3% circulating blasts.18% ringed sideroblasts, slight reticulin fibrosis. Concurrent CBC showed WBC 1.7, ANC 0.5, Hb 8.7, platelet count 43. Normal cytogenetics. NGS could not be sent from the bone marrow. NGS peripheral blood (4/18/24) showed mutations in BCOR (56%), BCORL1 (11%), DNMT3A (34%), IDH2 (9%), SRSF2 (33%), STAG2 (9%), TET2 (2%), two RUNX1 mutations. IPSS-M: very high risk 1.98. Scattered non-caseating granulomas identified on the bone marrow biopsy and clot sections, negative for microorganisms. Also seen on flow was CD5-positive lambda monotypic B cells (4.3%).      3/21/2024: Cycle 1 decitabine 20 mg/m2 for 3 days only.  4/22/2024: Cycle 2 decitabine 20 mg/m2 for 5 days   5/21/2024: Cycle 3 decitabine   6/19/2024: Cycle 4 decitabine      7/23/24: Pre transplant BMBx showed persistent MDS.  Normocellular marrow (20-30%) with increase dysplastic megakaryocytes, no bone marrow blasts, and also rare circulating blasts. Compared to the previous bone marrow, this bone marrow is less cellular, only 1% ring sideroblasts and less circulating blasts (1% current vs 3% previous). Flow did not show any myeloid blasts, CD5 positive lambda monotypic cells (10%). Cytogenetics normal, NGS positive for DNMT3A (21%).      Post transplant  # A.fib with RVR on 8/21/24 which converted to sinus rhythm spontaneously. 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 Max grade 1 (face, chest and upper back). Skin biopsy (9/1224) showed interface dermatitis suggestive of GVHD. Resolved with topical steroids.        INTERVAL HISTORY     Arpit is here for a schedule BMT follow up. Here with his wife, Meghna.   He has been doing well. Rash on the face and chest is much improved with topical steroids - now looks like a diffuse, very mild erythema on face.   Appetite is good. No nausea, vomiting or diarrhea.   He is scheduled to meet with urology in the coming week.     Plan  - D50 chimerisms 100%  - Skin biopsy results reviewed, showed mild interface dermatitis suggestive of GVHD. Controlled very well with topical steroids.   - Continue sirolimus with goal 8-12  - Counts good  - Continue acyclovir, bactrim. He will start voriconazole from tomorrow (9/26/24) using home supply. New rx has been sent. Line removed on 9/25/24. Siro dose adjusted.   - Okay to stop ursodiol at D60  - Urology appointment has been scheduled for urgency and frequency. Continue flomax. He was started on oxybutynin on 9/17/24 which has not helped at all. Further medication changes per urology  - Follow up as scheduled.     PMH  - A.fib with RVR while admitted with febrile episode on 3/15/24 s/p cardioversion. Recurrent episode on 8/21/24 while admitted for transplant.   - Moderate aortic stenosis  - Kidney donation to  sister, baseline creat around 1.2   - Steroid induced hyperglycemia, on metformin.   - Dyslipidemia  - HTN  - VINCENT  - Gout   - Exposure to agent orange     Current Outpatient Medications   Medication Sig Dispense Refill     voriconazole (VFEND) 200 MG tablet Take 1 tablet (200 mg) by mouth 2 times daily. 120 tablet 0     acyclovir (ZOVIRAX) 800 MG tablet Take 1 tablet (800 mg) by mouth 2 times daily. 120 tablet 1     allopurinol (ZYLOPRIM) 300 MG tablet Take 1 tablet (300 mg) by mouth daily. 60 tablet 1     amLODIPine (NORVASC) 10 MG tablet Take 1 tablet (10 mg) by mouth every morning. 60 tablet 1     aspirin 81 MG EC tablet Take 1 tablet (81 mg) by mouth daily. 60 tablet 1     atorvastatin (LIPITOR) 10 MG tablet Take 1 tablet (10 mg) by mouth every morning. 60 tablet 1     brimonidine (ALPHAGAN) 0.2 % ophthalmic solution Place 1 drop into both eyes 2 times daily       calcium carbonate-vitamin D (CALTRATE) 600-10 MG-MCG per tablet Take 1 tablet by mouth daily. 60 tablet 1     fluticasone (FLONASE) 50 MCG/ACT nasal spray Spray 1 spray into both nostrils 2 times daily       Heparin Sod, Pork, Lock Flush 10 UNIT/ML SOLN Inject 10 mLs into the vein daily. Inject 5mL into each lumen once a day. 300 mL 0     hydrocortisone 2.5 % cream Apply topically 2 times daily. Use on face and scalp 30 g 0     latanoprost (XALATAN) 0.005 % ophthalmic solution INSTILL 2 DROPS IN BOTH EYES AT BEDTIME       lisinopril (ZESTRIL) 5 MG tablet Take 1 tablet (5 mg) by mouth daily. 60 tablet 1     Lutein-Zeaxanthin 25-5 MG CAPS Take 1 capsule by mouth daily       metFORMIN (GLUCOPHAGE) 500 MG tablet Take 1 tablet (500mg) every morning and 2 tablets (1000mg) every evening. 90 tablet 1     metoprolol tartrate (LOPRESSOR) 25 MG tablet Take 1 tablet (25 mg) by mouth 2 times daily. 60 tablet 1     mycophenolate (GENERIC EQUIVALENT) 500 MG tablet Take 3 tablets (1,500 mg) by mouth 2 times daily. 72 tablet 0     oxyBUTYnin (DITROPAN) 5 MG tablet Take  0.5 tablets (2.5 mg) by mouth 3 times daily. 30 tablet 1     pantoprazole (PROTONIX) 40 MG EC tablet Take 1 tablet (40 mg) by mouth every morning (before breakfast). 60 tablet 1     pramox-pe-glycerin-petrolatum (PREPARATION H) 1-0.25-14.4-15 % CREA cream Place rectally 3 times daily as needed for hemorrhoids. 25.5 g 0     prochlorperazine (COMPAZINE) 5 MG tablet Take 1 tablet (5 mg) by mouth every 6 hours as needed for nausea or vomiting. (Patient not taking: Reported on 9/17/2024) 30 tablet 0     psyllium (METAMUCIL/KONSYL) capsule Take 3 capsules by mouth daily. 270 capsule 3     sirolimus (GENERIC EQUIVALENT) 0.5 MG tablet Take 1 tablet (0.5 mg) by mouth daily. 60 tablet 1     sirolimus (GENERIC EQUIVALENT) 1 MG tablet Take 3 tablets (3 mg) by mouth daily. 90 tablet 1     sulfamethoxazole-trimethoprim (BACTRIM DS) 800-160 MG tablet Take 1 tablet by mouth Every Mon, Tues two times daily. Do not begin taking until instructed to do so by BMT clinic. Do not start before September 9, 2024. 28 tablet 1     tamsulosin (FLOMAX) 0.4 MG capsule Take 1 capsule (0.4 mg) by mouth every evening. 90 capsule 1     triamcinolone (KENALOG) 0.1 % external cream Apply topically 2 times daily. Use on chest and back 454 g 0     ursodiol (ACTIGALL) 300 MG capsule Take 1 capsule (300 mg) by mouth 3 times daily. 126 capsule 0       EXAM      /78 (BP Location: Right arm, Patient Position: Sitting, Cuff Size: Adult Large)   Pulse 80   Temp 97.6  F (36.4  C) (Oral)   Resp 18   Wt 106.4 kg (234 lb 8 oz)   SpO2 99%   BMI 31.94 kg/m    Wt Readings from Last 4 Encounters:   09/21/24 106.7 kg (235 lb 3.2 oz)   09/19/24 107.2 kg (236 lb 4.8 oz)   09/17/24 106.7 kg (235 lb 4.8 oz)   09/12/24 107.1 kg (236 lb 1.6 oz)       KPS: 80% Can perform normal activity with effort, some signs of disease    General: Alert, awake  Eyes: No mouth sores  CV: Not examined  Skin: Diffuse erythema on face.   MSK: No LE edema  Access: R CVC dressing  cdi, no drainage     ASSESSMENT AND PLAN     BMT for high risk MDS: D+49  - Chemo protocol: [id number]; Flu, Cy, TBI  - Peripheral blood stem cell graft from 8/8 donor, ABO matched (O neg)  - Continue allopurinol (hx of gout)    Disease status:   - 8/30/24 (D28): Slightly hypercellular marrow (30-40%). No evidence of myeloid neoplasm or monoclonal B-lymphocytosis. Normal male karyotype. NGS negative for DNMT3A (other mutations not tested)  - Need to send NGS for all mutations present at diagnosis with future bone marrows    Graft status/chimerism:   - 8/30/24 (D28): Bone marrow 100%. Peripheral blood CD33 100%, CD3 84%  - 9/12/24: Peripheral blood CD3 96%, CD33 100%  - 9/25/24: Peripheral blood CD3 100%, CD33 100%    Maintenance  Not discussed     GVHD prophylaxis  # Current immunosuppression is sirolimus   - Continue sirolimus with goal 8-12    # The current acute GVHD stage is 1.   Skin: Grade 1. Mild erythema on face.   - Continue topical hydrocortisone and triamcinolone   GI: GI score 0.  Liver: LFTs normal. Liver score 0.    HEME/ COAG  G-CSF last given on 8/22/4.  ABO matched.    ID  # Prophylaxis  Fungal: Start voriconazole 200mg BID from 9/26/24. Siro dose adjusted.   PJP/ Toxo IgG negative: Bactrim.  Viral: Acyclovir 800 mg bid     # Monitoring  CMV: Monitor weekly till D100. Negative on 9/25  EBV: Monitor every 2 weeks between D30 to D180. Negative on 9/25  IgG: IgG 467 (9/12/24). Check serum IgG level q3 months, and consider IVIG repletion for IgG levels <400 mg/dL.     GI  # Hepatic steatosis and borderline iron deposition in liver  - VOD prophy: Ursodiol     # Supportive   - Ulcer prophy: PPI  - Alternating constipation and diarrhea: continue psyllium and imodium PRN    CARDIOVASCULAR  # Asymptomatic moderate aortic stenosis    # A.fib with RVR   - One episode while inpatient on 8/21/24 which converted to normal sinus rhythm spontaneously. Was started on metoprolol 25mg BID. Unclear if this is needed  long term. Cardiology follow up has been requested.   - One previous episode of A.fib with RVR prior to transplant in the context of admission with febrile episode on 3/15/24. Noted on his fitbit, not very symptomatic. Cardioversion planned for new onset A.fib. Not achieved with amiodarone. DCCV on 3/21/24 restored normal sinus rhythm. Amiodarone and anticoagulation was stopped in April 2024 without any recurrence.     # CAD  - Coronary artery calcifications.   - Continue atorvastatin 10mg, ASA 81mg (resumed on 8/23/24 as platelets stable above 50k)  - Will need an ischemia evaluation in the long term    # HTN: Continue amlodipine 10mg (home med), lisinopril 5mg      RENAL/ELECTROLYTES/  # Urinary frequency, without evidence of retention or infection  # BPH  - Prior imaging demonstrates prostatomegaly along with bladder thickening. This is a long standing issue which has been ongoing since pre-transplant.   - Continue tamsulosin.  - Has been started on low dose oxybutynin on 9/17/24. No improvement in symptoms. Defer further management to urology.   - Please defer any non-urgent invasive testing.    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 Has been seen by endocrine on 9/11/24. Have recommended repeat HbA1c in 1 month.     RESP  # VINCENT : CPAP     OPHTHALMOLOGY:  # Glaucoma- Lutein supplement; brimonidine, latanaprost eye drops     MUSCULOSKELETAL/FRAILTY  # Gout: Continue allopurinol indefinitely     Post-transplant vaccinations  Flu vaccination after D60, then annually   COVID vaccine after D100    I spent 30 minutes in the care of this patient today, which included time necessary for preparation for the visit, obtaining history, ordering  medications/tests/procedures as medically indicated, review of pertinent medical literature, counseling of the patient, communication of recommendations to the care team, and documentation time.     Nena Fowler  Department of Hematology, Oncology and Transplantation  Pager 1300/Text via [employer]         Again, thank you for allowing me to participate in the care of your patient.        Sincerely,        GASPER Rios

## 2024-09-25 NOTE — NURSING NOTE
"Oncology Rooming Note    September 25, 2024 12:18 PM   Arpit Forrest is a 73 year old male who presents for:    Chief Complaint   Patient presents with    Oncology Clinic Visit     myelodysplastic syndrome    Blood Draw     Labs drawn via cvc by RN in lab. VS taken.      Initial Vitals: /78 (BP Location: Right arm, Patient Position: Sitting, Cuff Size: Adult Large)   Pulse 80   Temp 97.6  F (36.4  C) (Oral)   Resp 18   Wt 106.4 kg (234 lb 8 oz)   SpO2 99%   BMI 31.94 kg/m   Estimated body mass index is 31.94 kg/m  as calculated from the following:    Height as of 7/31/24: 1.825 m (5' 11.85\").    Weight as of this encounter: 106.4 kg (234 lb 8 oz). Body surface area is 2.32 meters squared.  No Pain (0) Comment: Data Unavailable   No LMP for male patient.  Allergies reviewed: Yes  Medications reviewed: Yes    Medications: Medication refills not needed today.  Pharmacy name entered into icomply: [facility][id number] - [place], [place] - [address] AT [facility] OF [place] [address]    Frailty Screening:   Is the patient here for a new oncology consult visit in cancer care? 2. No      Clinical concerns: none.       Frandy Lanier"

## 2024-09-25 NOTE — DISCHARGE INSTRUCTIONS
A collaboration between [facility] and [facility]  Experts in minimally invasive, targeted treatments performed using imaging guidance    Tunneled Central Venous Catheter Removal    Today you had your existing tunneled central venous catheter removed because it was no longer needed for treatment.    One of our Radiology PAs performed this procedure for you today:  Date: 9/25/24  Provider: Stephenie Betancourt PA-C  Location: [facility]    After you go home:  Avoid lying flat or bending at the waist for 2 hours following removal of the catheter to reduce risk of bleeding from catheter site.  Keep any applied tape/gauze dressings clean and dry. Change tape/gauze dressings if they get wet or soiled.  You may shower following the procedure, however cover and protect from moisture any tape/gauze dressings.   You may remove tape/gauze dressings after 3 days if the site looks like it is in the process of healing or scabbing over.  Avoid strenuous activities (elevating heart rate) or lifting more than 10 pounds for the next 3 days. Walking, elliptical and golf are examples of acceptable activities.  If there is bleeding or oozing from the procedure site, apply firm pressure to the area above your collar bone (where the catheter entered the bloodstream) for 10 minutes.  If the bleeding continues, continue to hold pressure and seek medical attention at the phone numbers below.  Mild procedure site discomfort can be treated with an ice pack and over-the-counter pain relievers.           Call our Interventional Radiology (IR) service if:  If you start bleeding from the procedure site. Our radiology provider can help you decide if you need to return to the hospital.  If you have new or worsening pain related to the procedure.  If you have concerning swelling at the procedure site.  If you develop hives or a rash or any unexplained itching.  If you have a fever of greater than 100.5   F and chills in the first 5 days after procedure.  Any other concerns related to your procedure.    Contact Number:  [phone]  IR RN Triage Line, Monday - Friday 8:00 am - 4:30 pm    After hours for urgent concerns:  [phone]  After 4:30 pm Monday - Friday, Weekends and Holidays.   Ask for Interventional Radiology on-call.  Someone is available 24 hours a day.  [facility] toll free number:  [phone]  A collaboration between [facility] and [facility]  Experts in minimally invasive, targeted treatments performed using imaging guidance    Tunneled Central Venous Catheter Removal    Today you had your existing tunneled central venous catheter removed because it was no longer needed for treatment.    One of our Radiology PAs performed this procedure for you today:  Date: ***  Provider: ***  Location: ***    After you go home:  Avoid lying flat or bending at the waist for 2 hours following removal of the catheter to reduce risk of bleeding from catheter site.  Keep any applied tape/gauze dressings clean and dry. Change tape/gauze dressings if they get wet or soiled.  You may shower following the procedure, however cover and protect from moisture any tape/gauze dressings.   You may remove tape/gauze dressings after 3 days if the site looks like it is in the process of healing or scabbing over.  Avoid strenuous activities (elevating heart rate) or lifting more than 10 pounds for the next 3 days. Walking, elliptical and golf are examples of acceptable activities.  If there is bleeding or oozing from the procedure site, apply firm pressure to the area above your collar bone (where the catheter entered the bloodstream) for 10 minutes.  If the bleeding continues, continue to hold pressure and seek medical attention at the phone numbers below.  Mild procedure site discomfort can be treated with an ice pack and over-the-counter pain relievers.           Call our  Interventional Radiology (IR) service if:  If you start bleeding from the procedure site. Our radiology provider can help you decide if you need to return to the hospital.  If you have new or worsening pain related to the procedure.  If you have concerning swelling at the procedure site.  If you develop hives or a rash or any unexplained itching.  If you have a fever of greater than 100.5  F and chills in the first 5 days after procedure.  Any other concerns related to your procedure.    Contact Number:  [phone]  IR RN Triage Line, Monday - Friday 8:00 am - 4:30 pm    After hours for urgent concerns:  [phone]  After 4:30 pm Monday - Friday, Weekends and Holidays.   Ask for Interventional Radiology on-call.  Someone is available 24 hours a day.  [facility] toll free number:  [phone]

## 2024-09-25 NOTE — TELEPHONE ENCOUNTER
Patient confirmed scheduled appointment:  Date: Oct 3rd  Time: 10:30am  Visit type: Same Day   Provider: Candace Alba   Location: [facility]  Additional notes: per message -   Diagnosis: myelodysplastic syndrome. He has an enlarged prostate.   Visit Type: New, needs to establish   Provider: Candace

## 2024-09-25 NOTE — PROGRESS NOTES
Interventional Radiology Brief Post Procedure Note    Procedure: IR Right TCVC removal    Proceduralist: Stephenie Betancourt PA-C    Assistant: None    Time Out: Prior to the start of the procedure and with procedural staff participation, I verbally confirmed the patient s identity using two indicators, relevant allergies, that the procedure was appropriate and matched the consent or emergent situation, and that the correct equipment/implants were available. Immediately prior to starting the procedure I conducted the Time Out with the procedural staff and re-confirmed the patient s name, procedure, and site/side. (The Joint Commission universal protocol was followed.)  Yes    Medications   Medication Event Details Admin User Admin Time       Sedation: None. Local Anesthestic used    Findings: Right TCVC removal. Catheter removed in its entirety.     Estimated Blood Loss: None    Fluoroscopy Time:  N/A    SPECIMENS: None    Complications: 1. None     Condition: Stable    Plan: Patient to remain upright for the next two hours. No heavy lifting or straining. Avoidance of submersion under water until catheter site is completely healed.     Comments: See dictated procedure note for full details.    Stephenie Betnacourt PA-C

## 2024-09-25 NOTE — TELEPHONE ENCOUNTER
9/25 Left Voicemail (1st Attempt) and Sent Mychart (1st Attempt) for the patient to call back and schedule the following:    Appointment type: Return Cardiology  Provider: Makenzie  Return date: 11/11/2024 slots on hold  Specialty phone number: [phone] opt 1  Additional appointment(s) needed: n/a  Additonal Notes: n/a

## 2024-09-26 ENCOUNTER — TELEPHONE (OUTPATIENT)
Dept: PHARMACY | Facility: CLINIC | Age: 74
End: 2024-09-26
Payer: MEDICARE

## 2024-09-26 LAB
CMV DNA SPEC NAA+PROBE-ACNC: NOT DETECTED IU/ML
CMV DNA SPEC NAA+PROBE-ACNC: NOT DETECTED IU/ML
EBV DNA SERPL NAA+PROBE-ACNC: NOT DETECTED IU/ML
IGG SERPL-MCNC: 534 MG/DL (ref 610–1616)

## 2024-09-26 NOTE — TELEPHONE ENCOUNTER
Called Arpit regarding his sirolimus level 11.4. Asked his to hold taking todays dose. He started taking voriconazole today and has not yet picked up the 0.5 mg tablets. I asked him to start taking 1 mg every other day starting tomorrow then start 0.5 mg daily when he picks them up.

## 2024-09-29 ENCOUNTER — PRE VISIT (OUTPATIENT)
Dept: UROLOGY | Facility: CLINIC | Age: 74
End: 2024-09-29
Payer: MEDICARE

## 2024-09-29 RX ORDER — VORICONAZOLE 200 MG/1
200 TABLET, FILM COATED ORAL 2 TIMES DAILY
Qty: 120 TABLET | Refills: 0 | Status: SHIPPED | OUTPATIENT
Start: 2024-09-29 | End: 2024-11-28

## 2024-09-29 NOTE — TELEPHONE ENCOUNTER
Reason for visit: consult     Relevant information: myelodysplastic syndrome, enlarged prostate    Records/imaging/labs/orders: available    Pt called: No need for a call    At Rooming: standard    Billy Quinones  9/29/2024  11:47 AM

## 2024-10-01 ENCOUNTER — ONCOLOGY VISIT (OUTPATIENT)
Dept: TRANSPLANT | Facility: CLINIC | Age: 74
End: 2024-10-01
Attending: STUDENT IN AN ORGANIZED HEALTH CARE EDUCATION/TRAINING PROGRAM
Payer: MEDICARE

## 2024-10-01 ENCOUNTER — TELEPHONE (OUTPATIENT)
Dept: CARDIOLOGY | Facility: CLINIC | Age: 74
End: 2024-10-01
Payer: MEDICARE

## 2024-10-01 ENCOUNTER — APPOINTMENT (OUTPATIENT)
Dept: LAB | Facility: CLINIC | Age: 74
End: 2024-10-01
Attending: STUDENT IN AN ORGANIZED HEALTH CARE EDUCATION/TRAINING PROGRAM
Payer: MEDICARE

## 2024-10-01 ENCOUNTER — TELEPHONE (OUTPATIENT)
Dept: ENDOCRINOLOGY | Facility: CLINIC | Age: 74
End: 2024-10-01

## 2024-10-01 VITALS
TEMPERATURE: 98.1 F | OXYGEN SATURATION: 98 % | RESPIRATION RATE: 18 BRPM | WEIGHT: 233.5 LBS | SYSTOLIC BLOOD PRESSURE: 121 MMHG | HEART RATE: 87 BPM | BODY MASS INDEX: 31.8 KG/M2 | DIASTOLIC BLOOD PRESSURE: 72 MMHG

## 2024-10-01 DIAGNOSIS — Z94.81 STATUS POST BONE MARROW TRANSPLANT (H): ICD-10-CM

## 2024-10-01 LAB
ALBUMIN SERPL BCG-MCNC: 4 G/DL (ref 3.5–5.2)
ALP SERPL-CCNC: 85 U/L (ref 40–150)
ALT SERPL W P-5'-P-CCNC: 28 U/L (ref 0–70)
ANION GAP SERPL CALCULATED.3IONS-SCNC: 13 MMOL/L (ref 7–15)
AST SERPL W P-5'-P-CCNC: 25 U/L (ref 0–45)
BASOPHILS # BLD AUTO: 0 10E3/UL (ref 0–0.2)
BASOPHILS NFR BLD AUTO: 0 %
BILIRUB SERPL-MCNC: 0.2 MG/DL
BUN SERPL-MCNC: 19.2 MG/DL (ref 8–23)
CALCIUM SERPL-MCNC: 9.9 MG/DL (ref 8.8–10.4)
CHLORIDE SERPL-SCNC: 106 MMOL/L (ref 98–107)
CMV DNA SPEC NAA+PROBE-ACNC: NOT DETECTED IU/ML
CREAT SERPL-MCNC: 1.22 MG/DL (ref 0.67–1.17)
EGFRCR SERPLBLD CKD-EPI 2021: 62 ML/MIN/1.73M2
EOSINOPHIL # BLD AUTO: 0.1 10E3/UL (ref 0–0.7)
EOSINOPHIL NFR BLD AUTO: 2 %
ERYTHROCYTE [DISTWIDTH] IN BLOOD BY AUTOMATED COUNT: 15.6 % (ref 10–15)
GLUCOSE SERPL-MCNC: 219 MG/DL (ref 70–99)
HCO3 SERPL-SCNC: 20 MMOL/L (ref 22–29)
HCT VFR BLD AUTO: 31.1 % (ref 40–53)
HGB BLD-MCNC: 10.4 G/DL (ref 13.3–17.7)
IMM GRANULOCYTES # BLD: 0 10E3/UL
IMM GRANULOCYTES NFR BLD: 0 %
LYMPHOCYTES # BLD AUTO: 1.3 10E3/UL (ref 0.8–5.3)
LYMPHOCYTES NFR BLD AUTO: 22 %
MCH RBC QN AUTO: 29.5 PG (ref 26.5–33)
MCHC RBC AUTO-ENTMCNC: 33.4 G/DL (ref 31.5–36.5)
MCV RBC AUTO: 88 FL (ref 78–100)
MONOCYTES # BLD AUTO: 0.7 10E3/UL (ref 0–1.3)
MONOCYTES NFR BLD AUTO: 12 %
NEUTROPHILS # BLD AUTO: 3.6 10E3/UL (ref 1.6–8.3)
NEUTROPHILS NFR BLD AUTO: 63 %
NRBC # BLD AUTO: 0 10E3/UL
NRBC BLD AUTO-RTO: 0 /100
PLATELET # BLD AUTO: 94 10E3/UL (ref 150–450)
POTASSIUM SERPL-SCNC: 3.5 MMOL/L (ref 3.4–5.3)
PROT SERPL-MCNC: 6.4 G/DL (ref 6.4–8.3)
RBC # BLD AUTO: 3.53 10E6/UL (ref 4.4–5.9)
SIROLIMUS BLD-MCNC: 10.5 UG/L (ref 5–15)
SODIUM SERPL-SCNC: 139 MMOL/L (ref 135–145)
TME LAST DOSE: NORMAL H
TME LAST DOSE: NORMAL H
WBC # BLD AUTO: 5.8 10E3/UL (ref 4–11)

## 2024-10-01 PROCEDURE — G0463 HOSPITAL OUTPT CLINIC VISIT: HCPCS

## 2024-10-01 PROCEDURE — 99214 OFFICE O/P EST MOD 30 MIN: CPT | Mod: 24

## 2024-10-01 PROCEDURE — G2211 COMPLEX E/M VISIT ADD ON: HCPCS

## 2024-10-01 PROCEDURE — 85025 COMPLETE CBC W/AUTO DIFF WBC: CPT

## 2024-10-01 PROCEDURE — 36415 COLL VENOUS BLD VENIPUNCTURE: CPT

## 2024-10-01 PROCEDURE — 84132 ASSAY OF SERUM POTASSIUM: CPT

## 2024-10-01 PROCEDURE — 80048 BASIC METABOLIC PNL TOTAL CA: CPT

## 2024-10-01 PROCEDURE — 80195 ASSAY OF SIROLIMUS: CPT

## 2024-10-01 ASSESSMENT — PAIN SCALES - GENERAL: PAINLEVEL: NO PAIN (0)

## 2024-10-01 NOTE — NURSING NOTE
"Oncology Rooming Note    October 1, 2024 10:02 AM   Arpit Forrest is a 73 year old male who presents for:    Chief Complaint   Patient presents with    Blood Draw     Labs drawn via  by RN.     Oncology Clinic Visit     MDS; status post bone marrow transplant     Initial Vitals: /72   Pulse 87   Temp 98.1  F (36.7  C) (Oral)   Resp 18   Wt 105.9 kg (233 lb 8 oz)   SpO2 98%   BMI 31.80 kg/m   Estimated body mass index is 31.8 kg/m  as calculated from the following:    Height as of 7/31/24: 1.825 m (5' 11.85\").    Weight as of this encounter: 105.9 kg (233 lb 8 oz). Body surface area is 2.32 meters squared.  No Pain (0) Comment: Data Unavailable   No LMP for male patient.  Allergies reviewed: Yes  Medications reviewed: Yes    Medications: MEDICATION REFILLS NEEDED TODAY. Provider was notified.  Pharmacy name entered into Stripe: [facility] #[id number] - [place], [place] - [address] AT [facility] [address] [place] [address]    Frailty Screening:   Is the patient here for a new oncology consult visit in cancer care? 2. No      Clinical concerns: Refill Ursodiol 300 mg   Yi  was notified.      Lorna Plaat              "

## 2024-10-01 NOTE — LETTER
10/1/2024      Arpit Forrest  [address] [facility] [address]      Dear Colleague,    Thank you for referring your patient, Arpit Forrest, to the [facility]LOOD AND MARROW [facility]ANSPLANT [facility] [place]. Please see a copy of my visit note below.    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 Max grade 1 (face, chest and upper back). Skin biopsy (9/1224) showed interface dermatitis suggestive of GVHD. Resolved with topical steroids.         INTERVAL HISTORY   Arpit returns with his wife, Meghna. Rash fading, now difficult to see. Appetite is good. No nausea, vomiting or diarrhea. Still with frequency of urination. Resuming flomax helped with ease of starting his stream. No hematuria. No infectious complaints. Held siro for level.       EXAM      /72   Pulse 87   Temp 98.1  F (36.7  C) (Oral)   Resp 18   Wt 105.9 kg (233 lb 8 oz)   SpO2 98%   BMI 31.80 kg/m    Wt Readings from Last 4 Encounters:   10/01/24 105.9 kg (233 lb 8 oz)   09/25/24 106.4 kg (234 lb 8 oz)   09/21/24 106.7 kg (235 lb 3.2 oz)   09/19/24 107.2 kg (236 lb 4.8 oz)       KPS: 80% Can perform normal activity with effort, some signs of disease    General: Alert, awake; NAD  HEENT: atraumatic, sclera anicteric. 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 No evidence of myeloid neoplasm or monoclonal B-lymphocytosis. Normal male karyotype. NGS negative for DNMT3A (other mutations not tested)  - Need to send NGS for all mutations present at diagnosis with future bone marrows    Graft status/chimerism:   - 8/30/24 (D28): Bone marrow 100%. Peripheral blood CD33 100%, CD3 84%  - 9/12/24: Peripheral blood CD3 96%, CD33 100%  - 9/25/24: Peripheral blood CD3 100%, CD33 100%    Maintenance  Not discussed     GVHD prophylaxis  # Current immunosuppression is sirolimus   - Continue sirolimus with goal 8-12. 10/1 level in process    # The current acute GVHD stage is 1.   Skin: Grade 1. Mild erythema on face. Skin biopsy showed mild interface dermatitis suggestive of GVHD. Controlled very well with topical steroids.   - With great resolution, try holding topical hydrocortisone and triamcinolone 10/1 and monitoring  GI: GI score 0.  Liver: LFTs normal. Liver score 0.    HEME/ COAG  G-CSF last given on 8/22/4.  ABO matched.  Plt trending down, could be from recent skin GVHD. Will order complete viral panel 10/8    ID  # Prophylaxis  Fungal: Start voriconazole 200mg BID from 9/26/24. Siro dose adjusted.   PJP/ Toxo IgG negative: Bactrim.  Viral: Acyclovir 800 mg bid     # Monitoring  CMV: Monitor weekly till D100. Negative on 9/25  EBV: Monitor every 2 weeks between D30 to D180. Negative on 9/25  IgG: IgG 467 (9/12/24). Check serum IgG level q3 months, and consider IVIG repletion for IgG levels <400 mg/dL.     GI  # Hepatic  steatosis and borderline iron deposition in liver  - VOD prophy: Ursodiol ends ~10/6    # Supportive   - Ulcer prophy: PPI  - Alternating constipation and diarrhea: continue psyllium and imodium PRN    CARDIOVASCULAR  # Asymptomatic moderate aortic stenosis    # A.fib with RVR   - One episode while inpatient on 8/21/24 which converted to normal sinus rhythm spontaneously. Was started on metoprolol 25mg BID. Unclear if this is needed long term. Cardiology follow up Nov 11 2024 scheduled  - One previous episode of A.fib with RVR prior to transplant in the context of admission with febrile episode on 3/15/24. Noted on his fitbit, not very symptomatic. DCCV on 3/21/24 restored normal sinus rhythm. Amiodarone and anticoagulation was stopped in April 2024 without any recurrence.     # CAD  - Coronary artery calcifications.   - Continue atorvastatin 10mg, ASA 81mg (resumed on 8/23/24 as platelets stable above 50k)  - Will need an ischemia evaluation in the long term    # HTN: Continue amlodipine 10mg (home med), lisinopril 5mg      RENAL/ELECTROLYTES/  # Urinary frequency, without evidence of retention or infection  # BPH  - Prior imaging demonstrates prostatomegaly along with bladder thickening. This is a long standing issue which has been ongoing since pre-transplant.   - Continue tamsulosin. Flow improving, however frequency persists  - Has been started on low dose oxybutynin on 9/17/24. No improvement in symptoms. Defer further management to urology 10/3 visit  - Please defer any non-urgent invasive testing.    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 Has been seen by endocrine on 9/11/24. Have recommended repeat HbA1c in 1 month.     RESP  # VINCENT : CPAP      OPHTHALMOLOGY:  # Glaucoma- Lutein supplement; brimonidine, latanaprost eye drops     MUSCULOSKELETAL/FRAILTY  # Gout: Continue allopurinol indefinitely     PMH  - Exposure to agent orange     Post-transplant vaccinations  Flu vaccination after D60, then annually   COVID vaccine after D100      Plan   Stop ursodiol day 60 (about 10/6)  Stop topical steroids (can resume if rash recurs)    RTC 1 week as planned, checking siro, viral panel  Urology 10/3  Cardiology 11/11    I spent 30 minutes in the care of this patient today, which included time necessary for preparation for the visit, obtaining history, ordering medications/tests/procedures as medically indicated, review of pertinent medical literature, counseling of the patient, communication of recommendations to the care team, and documentation time.     The longitudinal plan of care for the diagnosis(es)/condition(s) as documented were addressed during this visit. Due to the added complexity in care, I will continue to support Arpit in the subsequent management and with ongoing continuity of care.    Yi Martinez PA-C      Again, thank you for allowing me to participate in the care of your patient.        Sincerely,        BMT Advanced Practice Provider

## 2024-10-01 NOTE — PROGRESS NOTES
BMT Clinic Note  Oct 1, 2024     Patient ID: Arpit Forrest is a 73 year old y/o male who is day +55 post allogenic stem cell transplant for MDS-EB1     Diagnosis MDS-EB1 BMT type Allogenic  CMV  Donor -  Recipient -    Prep ELIEZER (Flu/Cy/TBI) Donor type  8/8 URD ABO D/R O- (matched)   GVHD ppx PTCy, siro, MMF Graft source PBSCT Toxo IgG Negative   Protocol [id number] CD34/kg 6.12E+06    BMT MD Nena Fowler      Hematological history please see Dr Fowler's 9/25/2024 note for complete details    Post transplant  # A.fib with RVR on 8/21/24 which converted to sinus rhythm spontaneously. 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 Max grade 1 (face, chest and upper back). Skin biopsy (9/1224) showed interface dermatitis suggestive of GVHD. Resolved with topical steroids.         INTERVAL HISTORY   Arpit returns with his wife, Meghna. Rash fading, now difficult to see. Appetite is good. No nausea, vomiting or diarrhea. Still with frequency of urination. Resuming flomax helped with ease of starting his stream. No hematuria. No infectious complaints. Held siro for level.       EXAM      /72   Pulse 87   Temp 98.1  F (36.7  C) (Oral)   Resp 18   Wt 105.9 kg (233 lb 8 oz)   SpO2 98%   BMI 31.80 kg/m    Wt Readings from Last 4 Encounters:   10/01/24 105.9 kg (233 lb 8 oz)   09/25/24 106.4 kg (234 lb 8 oz)   09/21/24 106.7 kg (235 lb 3.2 oz)   09/19/24 107.2 kg (236 lb 4.8 oz)       KPS: 80% Can perform normal activity with effort, some signs of disease    General: Alert, awake; NAD  HEENT: atraumatic, sclera anicteric. 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 No evidence of myeloid neoplasm or monoclonal B-lymphocytosis. Normal male karyotype. NGS negative for DNMT3A (other mutations not tested)  - Need to send NGS for all mutations present at diagnosis with future bone marrows    Graft status/chimerism:   - 8/30/24 (D28): Bone marrow 100%. Peripheral blood CD33 100%, CD3 84%  - 9/12/24: Peripheral blood CD3 96%, CD33 100%  - 9/25/24: Peripheral blood CD3 100%, CD33 100%    Maintenance  Not discussed     GVHD prophylaxis  # Current immunosuppression is sirolimus   - Continue sirolimus with goal 8-12. 10/1 level in process    # The current acute GVHD stage is 1.   Skin: Grade 1. Mild erythema on face. Skin biopsy showed mild interface dermatitis suggestive of GVHD. Controlled very well with topical steroids.   - With great resolution, try holding topical hydrocortisone and triamcinolone 10/1 and monitoring  GI: GI score 0.  Liver: LFTs normal. Liver score 0.    HEME/ COAG  G-CSF last given on 8/22/4.  ABO matched.  Plt trending down, could be from recent skin GVHD. Will order complete viral panel 10/8    ID  # Prophylaxis  Fungal: Start voriconazole 200mg BID from 9/26/24. Siro dose adjusted.   PJP/ Toxo IgG negative: Bactrim.  Viral: Acyclovir 800 mg bid     # Monitoring  CMV: Monitor weekly till D100. Negative on 9/25  EBV: Monitor every 2 weeks between D30 to D180. Negative on 9/25  IgG: IgG 467 (9/12/24). Check serum IgG level q3 months, and consider IVIG repletion for IgG levels <400 mg/dL.     GI  # Hepatic steatosis and borderline iron deposition in liver  - VOD prophy: Ursodiol ends ~10/6    # Supportive   - Ulcer prophy: PPI  - Alternating constipation and diarrhea: continue psyllium and imodium PRN    CARDIOVASCULAR  # Asymptomatic moderate aortic stenosis    # A.fib with  RVR   - One episode while inpatient on 8/21/24 which converted to normal sinus rhythm spontaneously. Was started on metoprolol 25mg BID. Unclear if this is needed long term. Cardiology follow up Nov 11 2024 scheduled  - One previous episode of A.fib with RVR prior to transplant in the context of admission with febrile episode on 3/15/24. Noted on his fitbit, not very symptomatic. DCCV on 3/21/24 restored normal sinus rhythm. Amiodarone and anticoagulation was stopped in April 2024 without any recurrence.     # CAD  - Coronary artery calcifications.   - Continue atorvastatin 10mg, ASA 81mg (resumed on 8/23/24 as platelets stable above 50k)  - Will need an ischemia evaluation in the long term    # HTN: Continue amlodipine 10mg (home med), lisinopril 5mg      RENAL/ELECTROLYTES/  # Urinary frequency, without evidence of retention or infection  # BPH  - Prior imaging demonstrates prostatomegaly along with bladder thickening. This is a long standing issue which has been ongoing since pre-transplant.   - Continue tamsulosin. Flow improving, however frequency persists  - Has been started on low dose oxybutynin on 9/17/24. No improvement in symptoms. Defer further management to urology 10/3 visit  - Please defer any non-urgent invasive testing.    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 Has been seen by endocrine on 9/11/24. Have recommended repeat HbA1c in 1 month.     RESP  # VINCENT : CPAP     OPHTHALMOLOGY:  # Glaucoma- Lutein supplement; brimonidine, latanaprost eye drops     MUSCULOSKELETAL/FRAILTY  # Gout: Continue allopurinol indefinitely     PMH  - Exposure to agent orange     Post-transplant vaccinations  Flu vaccination after D60, then annually   COVID  vaccine after D100      Plan   Stop ursodiol day 60 (about 10/6)  Stop topical steroids (can resume if rash recurs)    RTC 1 week as planned, checking siro, viral panel  Urology 10/3  Cardiology 11/11    I spent 30 minutes in the care of this patient today, which included time necessary for preparation for the visit, obtaining history, ordering medications/tests/procedures as medically indicated, review of pertinent medical literature, counseling of the patient, communication of recommendations to the care team, and documentation time.     The longitudinal plan of care for the diagnosis(es)/condition(s) as documented were addressed during this visit. Due to the added complexity in care, I will continue to support Arpit in the subsequent management and with ongoing continuity of care.    Yi Martinez PA-C

## 2024-10-01 NOTE — TELEPHONE ENCOUNTER
10/1 Left Voicemail (1st Attempt) and Sent Mychart (1st Attempt) for the patient to call back and schedule the following:    Appointment type: Return Cardiology  Provider: Makenzie  Return date: 11/11/204 slot on hold  Specialty phone number: [phone] opt 1  Additional appointment(s) needed: n/a  Additonal Notes: n/a

## 2024-10-01 NOTE — TELEPHONE ENCOUNTER
Patient confirmed scheduled appointment:  Date: 12/18  Time: 12:30  Visit type: return diabetes  Provider: Gaby  Location: [facility]  Testing/imaging:   Additional notes:   Patient prefers in person visits and is okay with transitioning to [facility].    Per checkout comments:  Return in about 3 months (around 12/11/2024).     Marilin Cifuentes on 10/1/2024 at 4:13 PM

## 2024-10-01 NOTE — NURSING NOTE
Chief Complaint   Patient presents with    Blood Draw     Labs drawn via  by RN.      Labs drawn with  by RN. Vitals taken. Patient checked into next appointment.    Cayla Scott RN

## 2024-10-02 NOTE — PROGRESS NOTES
It was my pleasure to meet Mr. Arpit Forrest, a 73 year old year old male seen in consultation today at the request of hospital followup for chief complaint: Consult For (Frequency of urination )  MDS, enlarged prostate  Today he is unaccompanied     HPI: Mr. 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  Hospital course c/b urinary frequency without retention or evidence of infection.  Last UA on 9/3/24 was normal.  Others during his hospital stay were also normal.     Was sick for an entire year prior to transplant starting 7/2023  Since gemcitabine, just prior to the transplant   Whole body radiation   Coming up on day #60 following SCT    Urinary frequency - not new - has been increasing but manageable for a long time prior to now     Urinates every 2 hours around the clock.  If he uses the CPAP at night he can sleep for 3 hours at a time  Flomax improved the stream and lessened the urgency.  Feels he probably empties.  Does have some postvoid dribble  Blood sugar relatively under control (120mg/dL) - but    No dysuria or hematuria   Bowels - no constipation or diarrhea   Glaucoma since age 28 (open angle glaucoma - under good control).   Already has dry mouth and dry eyes   No caffeine.   5/19/23 - PSA 1.9ng/mL    No past medical history on file.    Past Surgical History:   Procedure Laterality Date    ARTHROSCOPY KNEE RT/LT Left     IR CVC TUNNEL PLACEMENT > 5 YRS OF AGE  7/31/2024    IR CVC TUNNEL REMOVAL RIGHT  9/25/2024    IR LUMBAR PUNCTURE  01/02/2024    NEPHRECTOMY      donated a kidney       FAMILY HISTORY: Denies family history of urologic cancer.   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 His smoking use included cigarettes. He started smoking about 58 years ago. He has a 2 pack-year smoking history. He has never used smokeless tobacco.    Current Outpatient Medications   Medication Sig Dispense Refill    acyclovir (ZOVIRAX) 800 MG tablet Take 1 tablet (800 mg) by mouth 2 times daily. 120 tablet 1    allopurinol (ZYLOPRIM) 300 MG tablet Take 1 tablet (300 mg) by mouth daily. 60 tablet 1    amLODIPine (NORVASC) 10 MG tablet Take 1 tablet (10 mg) by mouth every morning. 60 tablet 1    aspirin 81 MG EC tablet Take 1 tablet (81 mg) by mouth daily. 60 tablet 1    atorvastatin (LIPITOR) 10 MG tablet Take 1 tablet (10 mg) by mouth every morning. 60 tablet 1    brimonidine (ALPHAGAN) 0.2 % ophthalmic solution Place 1 drop into both eyes 2 times daily      calcium carbonate-vitamin D (CALTRATE) 600-10 MG-MCG per tablet Take 1 tablet by mouth daily. 60 tablet 1    fluticasone (FLONASE) 50 MCG/ACT nasal spray Spray 1 spray into both nostrils 2 times daily      Heparin Sod, Pork, Lock Flush 10 UNIT/ML SOLN Inject 10 mLs into the vein daily. Inject 5mL into each lumen once a day. 300 mL 0    hydrocortisone 2.5 % cream Apply topically 2 times daily. Use on face and scalp 30 g 0    latanoprost (XALATAN) 0.005 % ophthalmic solution INSTILL 2 DROPS IN BOTH EYES AT BEDTIME      lisinopril (ZESTRIL) 5 MG tablet Take 1 tablet (5 mg) by mouth daily. 60 tablet 1    Lutein-Zeaxanthin 25-5 MG CAPS Take 1 capsule by mouth daily      metFORMIN (GLUCOPHAGE) 500 MG tablet Take 1 tablet (500mg) every morning and 2 tablets (1000mg) every evening. 90 tablet 1    metoprolol tartrate (LOPRESSOR) 25 MG tablet Take 1 tablet (25 mg) by mouth 2 times daily. 60 tablet 1    mycophenolate (GENERIC EQUIVALENT)  "500 MG tablet Take 3 tablets (1,500 mg) by mouth 2 times daily. 72 tablet 0    oxyBUTYnin (DITROPAN) 5 MG tablet Take 0.5 tablets (2.5 mg) by mouth 3 times daily. 30 tablet 1    pantoprazole (PROTONIX) 40 MG EC tablet Take 1 tablet (40 mg) by mouth every morning (before breakfast). 60 tablet 1    pramox-pe-glycerin-petrolatum (PREPARATION H) 1-0.25-14.4-15 % CREA cream Place rectally 3 times daily as needed for hemorrhoids. 25.5 g 0    prochlorperazine (COMPAZINE) 5 MG tablet Take 1 tablet (5 mg) by mouth every 6 hours as needed for nausea or vomiting. (Patient not taking: Reported on 9/17/2024) 30 tablet 0    psyllium (METAMUCIL/KONSYL) capsule Take 3 capsules by mouth daily. 270 capsule 3    sirolimus (GENERIC EQUIVALENT) 0.5 MG tablet Take 1 tablet (0.5 mg) by mouth daily. 60 tablet 1    sirolimus (GENERIC EQUIVALENT) 1 MG tablet Take 3 tablets (3 mg) by mouth daily. 90 tablet 1    sulfamethoxazole-trimethoprim (BACTRIM DS) 800-160 MG tablet Take 1 tablet by mouth Every Mon, Tues two times daily. Do not begin taking until instructed to do so by [facility] clinic. Do not start before September 9, 2024. 28 tablet 1    tamsulosin (FLOMAX) 0.4 MG capsule Take 1 capsule (0.4 mg) by mouth every evening. 90 capsule 1    triamcinolone (KENALOG) 0.1 % external cream Apply topically 2 times daily. Use on chest and back 454 g 0    ursodiol (ACTIGALL) 300 MG capsule Take 1 capsule (300 mg) by mouth 3 times daily. 126 capsule 0    voriconazole (VFEND) 200 MG tablet Take 1 tablet (200 mg) by mouth 2 times daily. 120 tablet 0       ALLERGIES: Gramineae pollens      REVIEW OF SYSTEMS:  As above in HPI    GENERAL PHYSICAL EXAM:   Vitals: /71   Pulse 83   Ht 1.854 m (6' 1\")   Wt 104.3 kg (230 lb)   BMI 30.34 kg/m    Body mass index is 30.34 kg/m .    GENERAL: Well groomed, well developed, well nourished male in NAD..  NEURO: Alert and oriented x 3.  PSYCH: Normal mood and affect, pleasant and agreeable during interview and " exam.     PVR: Residual urine by ultrasound was 1 ml.      RADIOLOGY: The following tests were reviewed:   EXAM: CT CHEST ABD PELVIS W IV CONT   LOCATION: [facility]   DATE: 2/2/2024     INDICATION: Fevers, rigors, pancytopenia, hyperferritinemia, concern for possibly lymphoma or underlying malignancy   COMPARISON: CT of the chest, abdomen, and pelvis 12/03/2023   TECHNIQUE: Helical thin-section CT scan of the chest, abdomen, and pelvis was performed following injection of IV contrast. Multiplanar reformats were obtained. Dose reduction techniques were used.   CONTRAST: IOHEXOL 350 MG/ML IV SOLN 100 mL     FINDINGS:   LUNGS AND PLEURA:  Symmetric normal lung inflation. No interstitial or alveolar opacities. There is a benign calcified granuloma in the posterior basal right lower lobe and additional granulomatous calcifications in central intrapulmonary lymph nodes near the right hilum. No noncalcified nodule, mass or focal volume loss. Airways are normal. No pleural effusion.     MEDIASTINUM: Moderate degenerative thickening and calcification of aortic valve leaflets. Cardiac chambers are normal in size. No pericardial effusion. Main pulmonary artery is normal caliber. No pulmonary artery filling defects. Normal caliber thoracic aorta with 2 vessel arch anatomy. No enlarged mediastinal or hilar lymph nodes. The esophagus is decompressed. The thyroid gland is heterogeneous with irregular low-attenuation areas in patchy calcifications.     CORONARY ARTERY CALCIFICATION: Mild to moderate, three-vessel disease.     HEPATOBILIARY: Diffuse hepatic steatosis. No significant mass. No bile duct dilatation. No calcified gallstones.     PANCREAS: Normal.     SPLEEN: Normal.     ADRENAL GLANDS: Heterogeneous nodule associated with the lateral limb of the left thyroid with peripheral calcification and central fat attenuation measures 17 x 23 mm and is benign. Right adrenal gland has a 8 mm low-attenuation nodule, also  "unchanged most likely a benign adenoma.     KIDNEYS/BLADDER: Status post left nephroureterectomy. The right kidney is normal in size. There are several benign simple fluid attenuation right cortical renal cysts. No nephrolithiasis or hydronephrosis. Urinary bladder is normal.     BOWEL: No dilated bowel. There are a few distal colonic diverticula but no associated wall thickening or inflammatory stranding in the mesentery. No peritoneal thickening or ascites.     LYMPH NODES: Normal.     VASCULATURE: Nonaneurysmal abdominal aorta. Mild to moderate aortoiliac atheromatous calcifications.     PELVIC ORGANS: Mild to moderate prostate gland enlargement which slightly indents the bladder base. Seminal vesicles are symmetric. No pelvic free fluid.     MUSCULOSKELETAL: Moderate low thoracic and lumbar degenerative osteophytes. Low lumbar facet arthropathy. No aggressive or destructive bone lesions.     IMPRESSION:     1. No findings to suggest a primary malignancy or metastatic disease in the chest, abdomen, or pelvis.   2.  Mild diverticulosis of the distal colon, but no diverticulitis.   3.  Aortic valve leaflet calcification associated with calcific aortic stenosis.     LABS: The last test results for Ms. Arpit Forrest were reviewed.   PSA - No results found for: \"PSA\"  BMP -   Recent Labs   Lab Test 10/01/24  0954 09/25/24  1211 09/21/24  0937 09/10/24  1114 09/06/24  1257 08/25/24  0815 08/25/24  0326 08/24/24  0908 08/24/24  0326 08/23/24  0810 08/23/24  0340    138 139   < > 137   < > 139  --  139  --  136   POTASSIUM 3.5 3.8 3.8   < > 3.7   < > 3.8  --  3.7   < > 3.4   CHLORIDE 106 104 105   < > 102   < > 104  --  105  --  102   CO2 20* 22 22   < > 24   < > 23  --  24  --  23   BUN 19.2 13.8 13.4   < > 12.3   < > 8.8  --  9.0  --  9.6   CR 1.22* 1.14 0.94   < > 0.98   < > 0.88  --  0.89  --  0.88   * 141* 206*   < > 169*   < > 213*   < > 151*   < > 152*   MC 9.9 10.0 9.6   < > 9.5   < > 8.9  --  9.0  " --  8.8   MAG  --   --   --   --  1.6*  --  1.9  --  2.0  --  2.1   PHOS  --   --   --   --   --   --  3.7  --  3.9  --  3.5    < > = values in this interval not displayed.       CBC -   Recent Labs   Lab Test 10/01/24  0954 09/25/24  1211 09/21/24  0937   WBC 5.8 6.2 5.8   HGB 10.4* 10.8* 10.2*   PLT 94* 111* 131*       ASSESSMENT:   1) Urinary urgency and frequency in the setting of likely BPH (5/19/23 PSA of 1.9 makes prostate cancer unlikely)   2) Solitary right kidney s/p left donor nephrectomy    PLAN:   - Has already had multiple urine tests with his transplant team.  None show hematuria and no evidence of infection either   - PVR today - 1cc  - Continue tamsulosin 0.4mg qhs  - Stop oxybutynin   - Start Myrbetriq 25mg daily.  This is the low dose.  There is a higher 50mg dose.  Rarely this can cause blood pressure to go up.  Take blood pressure over next 2 weeks to make sure it is stable.  If this medication works a little bit but not well enough, we'll go to the higher dose.  If it is expensive let me know - I would switch you to a medication other than oxybutynin,.   - Caffeine can worsen urinary urgency.   - Return in 2-3 months to see MILDRED [place] Or Paulie Rendon NP (or me if there are no openings).  If medications aren't working, low threshhold for cystoscopy referral to one of our outlet obstruction surgeons.  Goal would be to evaluate the prostate for possible outlet procedure, but secondary goal would be to evaluate the bladder given Hx of smoking, cytoxan, whole body radiation and recent worsening of voiding symptoms.       VISIT DURATION: 11:03 - 11:29 + 7 minutes documentation on DOS (33 minutes)    Candace Alba PA-C  Department of Urologic Surgery

## 2024-10-03 ENCOUNTER — TELEPHONE (OUTPATIENT)
Dept: UROLOGY | Facility: CLINIC | Age: 74
End: 2024-10-03

## 2024-10-03 ENCOUNTER — OFFICE VISIT (OUTPATIENT)
Dept: UROLOGY | Facility: CLINIC | Age: 74
End: 2024-10-03
Payer: MEDICARE

## 2024-10-03 VITALS
DIASTOLIC BLOOD PRESSURE: 71 MMHG | SYSTOLIC BLOOD PRESSURE: 138 MMHG | WEIGHT: 230 LBS | HEIGHT: 73 IN | HEART RATE: 83 BPM | BODY MASS INDEX: 30.48 KG/M2

## 2024-10-03 DIAGNOSIS — N32.81 OVERACTIVE BLADDER: Primary | ICD-10-CM

## 2024-10-03 PROCEDURE — 51798 US URINE CAPACITY MEASURE: CPT | Performed by: PHYSICIAN ASSISTANT

## 2024-10-03 PROCEDURE — 99203 OFFICE O/P NEW LOW 30 MIN: CPT | Mod: 25 | Performed by: PHYSICIAN ASSISTANT

## 2024-10-03 RX ORDER — MIRABEGRON 25 MG/1
25 TABLET, FILM COATED, EXTENDED RELEASE ORAL DAILY
Qty: 90 TABLET | Refills: 1 | Status: SHIPPED | OUTPATIENT
Start: 2024-10-03 | End: 2024-10-04

## 2024-10-03 ASSESSMENT — PAIN SCALES - GENERAL: PAINLEVEL: NO PAIN (0)

## 2024-10-03 NOTE — PATIENT INSTRUCTIONS
PLAN:   - Continue tamsulosin 0.4mg  - Stop oxybutynin   - Start Myrbetriq 25mg daily.  This is the low dose.  There is a higher 50mg dose.  Rarely this can cause blood pressure to go up.  Take blood pressure over next 2 weeks to make sure it is stable.  If this medication works a little bit but not well enough, we'll go to the higher dose.  If it is expensive let me know - I would switch you to a medication other than oxybutynin,.   - Caffeine can worsen urinary urgency.   - Return in 2-3 months to see MILDRED [facility] Or Paulie Rendon NP (or me if there are no openings).  If medications aren't working we will refer you to one of our prostate cancer surgeons.      [facility]

## 2024-10-03 NOTE — LETTER
10/3/2024       RE: Arpit Forrest  [address]  [place] [address]     Dear Colleague,    Thank you for referring your patient, Arpit Forrest, to the [facility] [place] at [facility]. Please see a copy of my visit note below.    It was my pleasure to meet Mr. Arpit Forrest, a 73 year old year old male seen in consultation today at the request of hospital followup for chief complaint: Consult For (Frequency of urination )  MDS, enlarged prostate  Today he is unaccompanied     HPI: . 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  Hospital course c/b urinary frequency without retention or evidence of infection.  Last UA on 9/3/24 was normal.  Others during his hospital stay were also normal.     Was sick for an entire year prior to transplant starting 7/2023  Since gemcitabine, just prior to the transplant   Whole body radiation   Coming up on day #60 following SCT    Urinary frequency - not new - has been increasing but manageable for a long time prior to now     Urinates every 2 hours around the clock.  If he uses the CPAP at night he can sleep for 3 hours at a time  Flomax improved the stream and lessened the urgency.  Feels he probably empties.  Does have some postvoid dribble  Blood sugar relatively under control (120mg/dL) - but    No dysuria or hematuria   Bowels - no constipation or diarrhea   Glaucoma since age 28 (open angle glaucoma - under good control).   Already has dry mouth and dry eyes   No caffeine.   5/19/23 - PSA 1.9ng/mL    No past medical history on file.    Past Surgical History:    Procedure Laterality Date     ARTHROSCOPY KNEE RT/LT Left      IR CVC TUNNEL PLACEMENT > 5 YRS OF AGE  7/31/2024     IR CVC TUNNEL REMOVAL RIGHT  9/25/2024     IR LUMBAR PUNCTURE  01/02/2024     NEPHRECTOMY      donated a kidney       FAMILY HISTORY: Denies family history of urologic cancer.   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 His smoking use included cigarettes. He started smoking about 58 years ago. He has a 2 pack-year smoking history. He has never used smokeless tobacco.    Current Outpatient Medications   Medication Sig Dispense Refill     acyclovir (ZOVIRAX) 800 MG tablet Take 1 tablet (800 mg) by mouth 2 times daily. 120 tablet 1     allopurinol (ZYLOPRIM) 300 MG tablet Take 1 tablet (300 mg) by mouth daily. 60 tablet 1     amLODIPine (NORVASC) 10 MG tablet Take 1 tablet (10 mg) by mouth every morning. 60 tablet 1     aspirin 81 MG EC tablet Take 1 tablet (81 mg) by mouth daily. 60 tablet 1     atorvastatin (LIPITOR) 10 MG tablet Take 1 tablet (10 mg) by mouth every morning. 60 tablet 1     brimonidine (ALPHAGAN) 0.2 % ophthalmic solution Place 1 drop into both eyes 2 times daily       calcium carbonate-vitamin D (CALTRATE) 600-10 MG-MCG per tablet Take 1 tablet by mouth daily. 60 tablet 1     fluticasone (FLONASE) 50 MCG/ACT nasal spray Spray 1 spray into both nostrils 2 times daily       Heparin Sod, Pork, Lock Flush 10 UNIT/ML SOLN Inject 10 mLs into the vein daily. Inject 5mL into each lumen once a day. 300 mL 0     hydrocortisone 2.5 % cream Apply topically 2 times daily. Use on face and scalp 30 g 0     latanoprost (XALATAN) 0.005 % ophthalmic solution INSTILL 2 DROPS IN BOTH EYES AT BEDTIME       lisinopril (ZESTRIL) 5 MG tablet Take 1 tablet (5 mg) by mouth daily. 60 tablet 1      Lutein-Zeaxanthin 25-5 MG CAPS Take 1 capsule by mouth daily       metFORMIN (GLUCOPHAGE) 500 MG tablet Take 1 tablet (500mg) every morning and 2 tablets (1000mg) every evening. 90 tablet 1     metoprolol tartrate (LOPRESSOR) 25 MG tablet Take 1 tablet (25 mg) by mouth 2 times daily. 60 tablet 1     mycophenolate (GENERIC EQUIVALENT) 500 MG tablet Take 3 tablets (1,500 mg) by mouth 2 times daily. 72 tablet 0     oxyBUTYnin (DITROPAN) 5 MG tablet Take 0.5 tablets (2.5 mg) by mouth 3 times daily. 30 tablet 1     pantoprazole (PROTONIX) 40 MG EC tablet Take 1 tablet (40 mg) by mouth every morning (before breakfast). 60 tablet 1     pramox-pe-glycerin-petrolatum (PREPARATION H) 1-0.25-14.4-15 % CREA cream Place rectally 3 times daily as needed for hemorrhoids. 25.5 g 0     prochlorperazine (COMPAZINE) 5 MG tablet Take 1 tablet (5 mg) by mouth every 6 hours as needed for nausea or vomiting. (Patient not taking: Reported on 9/17/2024) 30 tablet 0     psyllium (METAMUCIL/KONSYL) capsule Take 3 capsules by mouth daily. 270 capsule 3     sirolimus (GENERIC EQUIVALENT) 0.5 MG tablet Take 1 tablet (0.5 mg) by mouth daily. 60 tablet 1     sirolimus (GENERIC EQUIVALENT) 1 MG tablet Take 3 tablets (3 mg) by mouth daily. 90 tablet 1     sulfamethoxazole-trimethoprim (BACTRIM DS) 800-160 MG tablet Take 1 tablet by mouth Every Mon, Tues two times daily. Do not begin taking until instructed to do so by [facility] clinic. Do not start before September 9, 2024. 28 tablet 1     tamsulosin (FLOMAX) 0.4 MG capsule Take 1 capsule (0.4 mg) by mouth every evening. 90 capsule 1     triamcinolone (KENALOG) 0.1 % external cream Apply topically 2 times daily. Use on chest and back 454 g 0     ursodiol (ACTIGALL) 300 MG capsule Take 1 capsule (300 mg) by mouth 3 times daily. 126 capsule 0     voriconazole (VFEND) 200 MG tablet Take 1 tablet (200 mg) by mouth 2 times daily. 120 tablet 0       ALLERGIES: Gramineae pollens      REVIEW OF SYSTEMS:  As above  "in HPI    GENERAL PHYSICAL EXAM:   Vitals: /71   Pulse 83   Ht 1.854 m (6' 1\")   Wt 104.3 kg (230 lb)   BMI 30.34 kg/m    Body mass index is 30.34 kg/m .    GENERAL: Well groomed, well developed, well nourished male in NAD..  NEURO: Alert and oriented x 3.  PSYCH: Normal mood and affect, pleasant and agreeable during interview and exam.     PVR: Residual urine by ultrasound was 1 ml.      RADIOLOGY: The following tests were reviewed:   EXAM: CT CHEST ABD PELVIS W IV CONT   LOCATION: [facility]   DATE: 2/2/2024     INDICATION: Fevers, rigors, pancytopenia, hyperferritinemia, concern for possibly lymphoma or underlying malignancy   COMPARISON: CT of the chest, abdomen, and pelvis 12/03/2023   TECHNIQUE: Helical thin-section CT scan of the chest, abdomen, and pelvis was performed following injection of IV contrast. Multiplanar reformats were obtained. Dose reduction techniques were used.   CONTRAST: IOHEXOL 350 MG/ML IV SOLN 100 mL     FINDINGS:   LUNGS AND PLEURA:  Symmetric normal lung inflation. No interstitial or alveolar opacities. There is a benign calcified granuloma in the posterior basal right lower lobe and additional granulomatous calcifications in central intrapulmonary lymph nodes near the right hilum. No noncalcified nodule, mass or focal volume loss. Airways are normal. No pleural effusion.     MEDIASTINUM: Moderate degenerative thickening and calcification of aortic valve leaflets. Cardiac chambers are normal in size. No pericardial effusion. Main pulmonary artery is normal caliber. No pulmonary artery filling defects. Normal caliber thoracic aorta with 2 vessel arch anatomy. No enlarged mediastinal or hilar lymph nodes. The esophagus is decompressed. The thyroid gland is heterogeneous with irregular low-attenuation areas in patchy calcifications.     CORONARY ARTERY CALCIFICATION: Mild to moderate, three-vessel disease.     HEPATOBILIARY: Diffuse hepatic steatosis. No significant mass. " "No bile duct dilatation. No calcified gallstones.     PANCREAS: Normal.     SPLEEN: Normal.     ADRENAL GLANDS: Heterogeneous nodule associated with the lateral limb of the left thyroid with peripheral calcification and central fat attenuation measures 17 x 23 mm and is benign. Right adrenal gland has a 8 mm low-attenuation nodule, also unchanged most likely a benign adenoma.     KIDNEYS/BLADDER: Status post left nephroureterectomy. The right kidney is normal in size. There are several benign simple fluid attenuation right cortical renal cysts. No nephrolithiasis or hydronephrosis. Urinary bladder is normal.     BOWEL: No dilated bowel. There are a few distal colonic diverticula but no associated wall thickening or inflammatory stranding in the mesentery. No peritoneal thickening or ascites.     LYMPH NODES: Normal.     VASCULATURE: Nonaneurysmal abdominal aorta. Mild to moderate aortoiliac atheromatous calcifications.     PELVIC ORGANS: Mild to moderate prostate gland enlargement which slightly indents the bladder base. Seminal vesicles are symmetric. No pelvic free fluid.     MUSCULOSKELETAL: Moderate low thoracic and lumbar degenerative osteophytes. Low lumbar facet arthropathy. No aggressive or destructive bone lesions.     IMPRESSION:     1. No findings to suggest a primary malignancy or metastatic disease in the chest, abdomen, or pelvis.   2.  Mild diverticulosis of the distal colon, but no diverticulitis.   3.  Aortic valve leaflet calcification associated with calcific aortic stenosis.     LABS: The last test results for Ms. Arpit Forrest were reviewed.   PSA - No results found for: \"PSA\"  BMP -   Recent Labs   Lab Test 10/01/24  0954 09/25/24  1211 09/21/24  0937 09/10/24  1114 09/06/24  1257 08/25/24  0815 08/25/24  0326 08/24/24  0908 08/24/24  0326 08/23/24  0810 08/23/24  0340    138 139   < > 137   < > 139  --  139  --  136   POTASSIUM 3.5 3.8 3.8   < > 3.7   < > 3.8  --  3.7   < > 3.4 "   CHLORIDE 106 104 105   < > 102   < > 104  --  105  --  102   CO2 20* 22 22   < > 24   < > 23  --  24  --  23   BUN 19.2 13.8 13.4   < > 12.3   < > 8.8  --  9.0  --  9.6   CR 1.22* 1.14 0.94   < > 0.98   < > 0.88  --  0.89  --  0.88   * 141* 206*   < > 169*   < > 213*   < > 151*   < > 152*   MC 9.9 10.0 9.6   < > 9.5   < > 8.9  --  9.0  --  8.8   MAG  --   --   --   --  1.6*  --  1.9  --  2.0  --  2.1   PHOS  --   --   --   --   --   --  3.7  --  3.9  --  3.5    < > = values in this interval not displayed.       CBC -   Recent Labs   Lab Test 10/01/24  0954 09/25/24  1211 09/21/24  0937   WBC 5.8 6.2 5.8   HGB 10.4* 10.8* 10.2*   PLT 94* 111* 131*       ASSESSMENT:   1) Urinary urgency and frequency in the setting of likely BPH (5/19/23 PSA of 1.9 makes prostate cancer unlikely)   2) Solitary right kidney s/p left donor nephrectomy    PLAN:   - Has already had multiple urine tests with his transplant team.  None show hematuria and no evidence of infection either   - PVR today - 1cc  - Continue tamsulosin 0.4mg qhs  - Stop oxybutynin   - Start Myrbetriq 25mg daily.  This is the low dose.  There is a higher 50mg dose.  Rarely this can cause blood pressure to go up.  Take blood pressure over next 2 weeks to make sure it is stable.  If this medication works a little bit but not well enough, we'll go to the higher dose.  If it is expensive let me know - I would switch you to a medication other than oxybutynin,.   - Caffeine can worsen urinary urgency.   - Return in 2-3 months to see MILDRED Glasgow Or Paulie Rendon NP (or me if there are no openings).  If medications aren't working, low threshhold for cystoscopy referral to one of our outlet obstruction surgeons.  Goal would be to evaluate the prostate for possible outlet procedure, but secondary goal would be to evaluate the bladder given Hx of smoking, cytoxan, whole body radiation and recent worsening of voiding symptoms.       VISIT DURATION: 11:03 -  11:29 + 7 minutes documentation on DOS (33 minutes)    Candace Alba PA-C  Department of Urologic Surgery      Again, thank you for allowing me to participate in the care of your patient.      Sincerely,    MILDRED London

## 2024-10-04 DIAGNOSIS — N32.81 OVERACTIVE BLADDER: Primary | ICD-10-CM

## 2024-10-04 RX ORDER — OXYBUTYNIN CHLORIDE 5 MG/1
5 TABLET, EXTENDED RELEASE ORAL DAILY
Qty: 90 TABLET | Refills: 1 | Status: SHIPPED | OUTPATIENT
Start: 2024-10-04 | End: 2024-11-11

## 2024-10-07 NOTE — TELEPHONE ENCOUNTER
New prescription sent to pharmacy 10/8/24 - Myrbetriq 25mg #90 with 3 refills. MILDRED Benjamin       Prior Authorization Approval    Medication: MIRABEGRON ER 25 MG PO TB24  Authorization Effective Date: 7/9/2024  Authorization Expiration Date: 10/7/2025  Approved Dose/Quantity:       Reference #:     Insurance Company: [employer] - Phone [phone] Fax [phone]  Expected CoPay: $    CoPay Card Available:      Financial Assistance Needed:   Which Pharmacy is filling the prescription: [id number] - [place], [place] - [address] AT [facility] [address] [place] [address]   Pharmacy Notified: YES  Patient Notified: Instructed pharmacy to notify patient once order is ready.

## 2024-10-08 ENCOUNTER — ONCOLOGY VISIT (OUTPATIENT)
Dept: TRANSPLANT | Facility: CLINIC | Age: 74
End: 2024-10-08
Attending: STUDENT IN AN ORGANIZED HEALTH CARE EDUCATION/TRAINING PROGRAM
Payer: MEDICARE

## 2024-10-08 ENCOUNTER — APPOINTMENT (OUTPATIENT)
Dept: LAB | Facility: CLINIC | Age: 74
End: 2024-10-08
Attending: STUDENT IN AN ORGANIZED HEALTH CARE EDUCATION/TRAINING PROGRAM
Payer: MEDICARE

## 2024-10-08 VITALS
RESPIRATION RATE: 18 BRPM | TEMPERATURE: 97.5 F | DIASTOLIC BLOOD PRESSURE: 73 MMHG | SYSTOLIC BLOOD PRESSURE: 120 MMHG | HEART RATE: 68 BPM | BODY MASS INDEX: 30.81 KG/M2 | WEIGHT: 233.5 LBS | OXYGEN SATURATION: 97 %

## 2024-10-08 DIAGNOSIS — Z94.81 STATUS POST BONE MARROW TRANSPLANT (H): ICD-10-CM

## 2024-10-08 LAB
ALBUMIN SERPL BCG-MCNC: 4.1 G/DL (ref 3.5–5.2)
ALP SERPL-CCNC: 91 U/L (ref 40–150)
ALT SERPL W P-5'-P-CCNC: 20 U/L (ref 0–70)
ANION GAP SERPL CALCULATED.3IONS-SCNC: 12 MMOL/L (ref 7–15)
AST SERPL W P-5'-P-CCNC: 22 U/L (ref 0–45)
BASOPHILS # BLD AUTO: 0 10E3/UL (ref 0–0.2)
BASOPHILS NFR BLD AUTO: 0 %
BILIRUB SERPL-MCNC: 0.2 MG/DL
BUN SERPL-MCNC: 16.4 MG/DL (ref 8–23)
CALCIUM SERPL-MCNC: 9.7 MG/DL (ref 8.8–10.4)
CHLORIDE SERPL-SCNC: 106 MMOL/L (ref 98–107)
CMV DNA SPEC NAA+PROBE-ACNC: NOT DETECTED IU/ML
CREAT SERPL-MCNC: 1.1 MG/DL (ref 0.67–1.17)
EBV DNA SERPL NAA+PROBE-ACNC: <35 IU/ML
EBV DNA SERPL NAA+PROBE-LOG#: <1.5 {LOG_COPIES}/ML
EGFRCR SERPLBLD CKD-EPI 2021: 70 ML/MIN/1.73M2
EOSINOPHIL # BLD AUTO: 0.1 10E3/UL (ref 0–0.7)
EOSINOPHIL NFR BLD AUTO: 2 %
ERYTHROCYTE [DISTWIDTH] IN BLOOD BY AUTOMATED COUNT: 15.5 % (ref 10–15)
GLUCOSE SERPL-MCNC: 195 MG/DL (ref 70–99)
HCO3 SERPL-SCNC: 22 MMOL/L (ref 22–29)
HCT VFR BLD AUTO: 31.3 % (ref 40–53)
HGB BLD-MCNC: 10.1 G/DL (ref 13.3–17.7)
IMM GRANULOCYTES # BLD: 0 10E3/UL
IMM GRANULOCYTES NFR BLD: 0 %
LYMPHOCYTES # BLD AUTO: 1 10E3/UL (ref 0.8–5.3)
LYMPHOCYTES NFR BLD AUTO: 19 %
MCH RBC QN AUTO: 28.6 PG (ref 26.5–33)
MCHC RBC AUTO-ENTMCNC: 32.3 G/DL (ref 31.5–36.5)
MCV RBC AUTO: 89 FL (ref 78–100)
MONOCYTES # BLD AUTO: 0.5 10E3/UL (ref 0–1.3)
MONOCYTES NFR BLD AUTO: 9 %
NEUTROPHILS # BLD AUTO: 3.7 10E3/UL (ref 1.6–8.3)
NEUTROPHILS NFR BLD AUTO: 69 %
NRBC # BLD AUTO: 0 10E3/UL
NRBC BLD AUTO-RTO: 0 /100
PLATELET # BLD AUTO: 90 10E3/UL (ref 150–450)
POTASSIUM SERPL-SCNC: 3.5 MMOL/L (ref 3.4–5.3)
PROT SERPL-MCNC: 6.5 G/DL (ref 6.4–8.3)
RBC # BLD AUTO: 3.53 10E6/UL (ref 4.4–5.9)
SIROLIMUS BLD-MCNC: 14.5 UG/L (ref 5–15)
SODIUM SERPL-SCNC: 140 MMOL/L (ref 135–145)
TME LAST DOSE: NORMAL H
TME LAST DOSE: NORMAL H
WBC # BLD AUTO: 5.4 10E3/UL (ref 4–11)

## 2024-10-08 PROCEDURE — G2211 COMPLEX E/M VISIT ADD ON: HCPCS

## 2024-10-08 PROCEDURE — 99215 OFFICE O/P EST HI 40 MIN: CPT

## 2024-10-08 PROCEDURE — G0463 HOSPITAL OUTPT CLINIC VISIT: HCPCS

## 2024-10-08 PROCEDURE — 82040 ASSAY OF SERUM ALBUMIN: CPT

## 2024-10-08 PROCEDURE — 85025 COMPLETE CBC W/AUTO DIFF WBC: CPT

## 2024-10-08 PROCEDURE — 87533 HHV-6 DNA QUANT: CPT

## 2024-10-08 PROCEDURE — 80195 ASSAY OF SIROLIMUS: CPT

## 2024-10-08 PROCEDURE — 36415 COLL VENOUS BLD VENIPUNCTURE: CPT

## 2024-10-08 PROCEDURE — 87799 DETECT AGENT NOS DNA QUANT: CPT

## 2024-10-08 RX ORDER — MIRABEGRON 25 MG/1
25 TABLET, FILM COATED, EXTENDED RELEASE ORAL DAILY
Qty: 90 TABLET | Refills: 3 | Status: SHIPPED | OUTPATIENT
Start: 2024-10-08 | End: 2024-11-06

## 2024-10-08 ASSESSMENT — PAIN SCALES - GENERAL: PAINLEVEL: NO PAIN (0)

## 2024-10-08 NOTE — PROGRESS NOTES
BMT Clinic Note  Oct 8, 2024     Patient ID: Arpit Forrest is a 73 year old y/o male who is day +62 post allogenic stem cell transplant for MDS-EB1     Diagnosis MDS-EB1 BMT type Allogenic  CMV  Donor -  Recipient -    Prep ELIEZER (Flu/Cy/TBI) Donor type  8/8 URD ABO D/R O- (matched)   GVHD ppx PTCy, siro, MMF Graft source PBSCT Toxo IgG Negative   Protocol [id number] CD34/kg 6.12E+06    BMT MD Nena Fowler      Hematological history please see Dr Fowler's 9/25/2024 note for complete details    Post transplant  # A.fib with RVR on 8/21/24 which converted to sinus rhythm spontaneously. 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 Max grade 1 (face, chest and upper back). Skin biopsy (9/1224) showed interface dermatitis suggestive of GVHD. Resolved with topical steroids.         INTERVAL HISTORY   Arpit returns with his wife, Meghna.   Still with rash on face and chest. Using hydrocort on face once/day and TMC on chest daily. Face is itchy. No n/v/d. Eating/drinking well. Urinating better since restarting flomax and oxybutinin.        EXAM      /73   Pulse 68   Temp 97.5  F (36.4  C) (Oral)   Resp 18   Wt 105.9 kg (233 lb 8 oz)   SpO2 97%   BMI 30.81 kg/m    Wt Readings from Last 4 Encounters:   10/08/24 105.9 kg (233 lb 8 oz)   10/03/24 104.3 kg (230 lb)   10/01/24 105.9 kg (233 lb 8 oz)   09/25/24 106.4 kg (234 lb 8 oz)       KPS: 80% Can perform normal activity with effort, some signs of disease    General: Alert, awake; NAD  HEENT: atraumatic, sclera anicteric. Oral mucosa moist, OP nonerythematous  CV: RRR, systolic murmur   Pulm: CTA bilaterally  Abd: soft, non tender, obese, bs+  Skin:Confluent erythema to face, no distinguishing macules/papules. Sparse maculopapular lesions on chest  MSK: No LE edema  Access: CVC removed, peripheral access     Lab Results   Component Value Date    WBC 5.4 10/08/2024    ANEU 2.2 09/12/2024    HGB 10.1 (L) 10/08/2024    HCT  31.3 (L) 10/08/2024    PLT 90 (L) 10/08/2024     10/08/2024    POTASSIUM 3.5 10/08/2024    CHLORIDE 106 10/08/2024    CO2 22 10/08/2024     (H) 10/08/2024    BUN 16.4 10/08/2024    CR 1.10 10/08/2024    MAG 1.6 (L) 09/06/2024    INR 1.17 (H) 08/19/2024        ASSESSMENT AND PLAN     BMT for high risk MDS: D+62  - Chemo protocol: [id number]; Flu, Cy, TBI  - Peripheral blood stem cell graft from 8/8 donor, ABO matched (O neg)  - Continue allopurinol (hx of gout)    Disease status:   - 8/30/24 (D28): Slightly hypercellular marrow (30-40%). No evidence of myeloid neoplasm or monoclonal B-lymphocytosis. Normal male karyotype. NGS negative for DNMT3A (other mutations not tested)  - Need to send NGS for all mutations present at diagnosis with future bone marrows    Graft status/chimerism:   - 8/30/24 (D28): Bone marrow 100%. Peripheral blood CD33 100%, CD3 84%  - 9/12/24: Peripheral blood CD3 96%, CD33 100%  - 9/25/24: Peripheral blood CD3 100%, CD33 100%    Maintenance  Not discussed     GVHD prophylaxis  # Current immunosuppression is sirolimus   - Continue sirolimus with goal 8-12. 10/8 added on   - sirolimus 10-20% skin rash SE, consider switching Friday if increasing topical steroids not helpful    # The current acute GVHD stage is 1.   Skin: Grade 1. Mild erythema on face. Skin biopsy showed mild interface dermatitis suggestive of GVHD. Controlled very well with topical steroids.   - increase hydrocort to bid on face with moisturizer. TID TMC on chest  GI: GI score 0.  Liver: LFTs normal. Liver score 0.    HEME/ COAG  G-CSF last given on 8/22/4.  ABO matched.  Plt trending down, could be from recent skin GVHD.   viral panel ordered but not drawn 10/8. Added on HHV-6    ID  # Prophylaxis  Fungal: Start voriconazole 200mg BID from 9/26/24. Siro dose adjusted.   PJP/ Toxo IgG negative: Bactrim.  Viral: Acyclovir 800 mg bid     # Monitoring  CMV: Monitor weekly till D100. Negative on 9/25  EBV: Monitor every  2 weeks between D30 to D180. Negative on 9/25  IgG: IgG 467 (9/12/24). Check serum IgG level q3 months, and consider IVIG repletion for IgG levels <400 mg/dL.     GI  # Hepatic steatosis and borderline iron deposition in liver  - VOD prophy: Ursodiol ends ~10/6    # Supportive   - Ulcer prophy: PPI  - Alternating constipation and diarrhea: continue psyllium and imodium PRN    CARDIOVASCULAR  # Asymptomatic moderate aortic stenosis    # A.fib with RVR   - One episode while inpatient on 8/21/24 which converted to normal sinus rhythm spontaneously. Was started on metoprolol 25mg BID. Unclear if this is needed long term. Cardiology follow up Nov 11 2024 scheduled  - One previous episode of A.fib with RVR prior to transplant in the context of admission with febrile episode on 3/15/24. Noted on his fitbit, not very symptomatic. DCCV on 3/21/24 restored normal sinus rhythm. Amiodarone and anticoagulation was stopped in April 2024 without any recurrence.     # CAD  - Coronary artery calcifications.   - Continue atorvastatin 10mg, ASA 81mg (resumed on 8/23/24 as platelets stable above 50k)  - Will need an ischemia evaluation in the long term    # HTN: Continue amlodipine 10mg (home med), lisinopril 5mg      RENAL/ELECTROLYTES/  # Urinary frequency, without evidence of retention or infection  # BPH  - Prior imaging demonstrates prostatomegaly along with bladder thickening. This is a long standing issue which has been ongoing since pre-transplant.   - Continue tamsulosin. Flow improving, however frequency persists  - Has been started on low dose oxybutynin on 9/17/24. No improvement in symptoms. Defer further management to urology 10/3 visit-added back flomax and oxybutynin  - Please defer any non-urgent invasive testing.    # Solitary kidney due to hx of kidney donation to sister  - b/l creat is around 1.1 - 1.2  - 24 hour creat clearance is normal    # Electrolyte management: replace per sliding scale  - Cont Ca w/vit D      DIABETES/ENDOCRINE  # DMII: hx steroid-induced hyperglycemia  - Continue metformin at present dose. Has been seen by endocrine on 9/11/24. Have recommended repeat HbA1c in 1 month.     RESP  # VINCENT : CPAP     OPHTHALMOLOGY:  # Glaucoma- Lutein supplement; brimonidine, latanaprost eye drops     MUSCULOSKELETAL/FRAILTY  # Gout: Continue allopurinol indefinitely     PMH  - Exposure to agent orange     Post-transplant vaccinations  Flu vaccination after D60, then annually   COVID vaccine after D100      Plan   Stop ursodiol day 60    Increase steroids to BID face and TID chest  Add on siro and hhv6 levels    RTC Friday with MD as previously scheduled     Cardiology 11/11    I spent 40 minutes in the care of this patient today, which included time necessary for preparation for the visit, obtaining history, ordering medications/tests/procedures as medically indicated, review of pertinent medical literature, counseling of the patient, communication of recommendations to the care team, and documentation time.     The longitudinal plan of care for the diagnosis(es)/condition(s) as documented were addressed during this visit. Due to the added complexity in care, I will continue to support Arpit in the subsequent management and with ongoing continuity of care.    Mirna Johnston NP

## 2024-10-08 NOTE — NURSING NOTE
Chief Complaint   Patient presents with    Oncology Clinic Visit     Status post bone marrow transplant        Blood Draw     Labs drawn via VPT by lab RN     Venipuncture labs drawn by lab RN, vitals taken and appointment arrived.    Tiffany Salgado RN

## 2024-10-08 NOTE — LETTER
10/8/2024      Arpit Forrest  [id number] [facility] [address]      Dear Colleague,    Thank you for referring your patient, Arpit Forrest, to the [facility] BLOOD AND MARROW TRANSPLANT PROGRAM [place]. Please see a copy of my visit note below.    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 Max grade 1 (face, chest and upper back). Skin biopsy (9/1224) showed interface dermatitis suggestive of GVHD. Resolved with topical steroids.         INTERVAL HISTORY   Arpit returns with his wife, Meghna.   Still with rash on face and chest. Using hydrocort on face once/day and TMC on chest daily. Face is itchy. No n/v/d. Eating/drinking well. Urinating better since restarting flomax and oxybutinin.        EXAM      /73   Pulse 68   Temp 97.5  F (36.4  C) (Oral)   Resp 18   Wt 105.9 kg (233 lb 8 oz)   SpO2 97%   BMI 30.81 kg/m    Wt Readings from Last 4 Encounters:   10/08/24 105.9 kg (233 lb 8 oz)   10/03/24 104.3 kg (230 lb)   10/01/24 105.9 kg (233 lb 8 oz)   09/25/24 106.4 kg (234 lb 8 oz)       KPS: 80% Can perform normal activity with effort, some signs of disease    General: Alert, awake; NAD  HEENT: atraumatic, sclera anicteric. Oral mucosa moist, OP nonerythematous  CV: RRR, systolic murmur   Pulm: CTA bilaterally  Abd: soft, non tender, obese, bs+  Skin:Confluent  erythema to face, no distinguishing macules/papules. 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 No evidence of myeloid neoplasm or monoclonal B-lymphocytosis. Normal male karyotype. NGS negative for DNMT3A (other mutations not tested)  - Need to send NGS for all mutations present at diagnosis with future bone marrows    Graft status/chimerism:   - 8/30/24 (D28): Bone marrow 100%. Peripheral blood CD33 100%, CD3 84%  - 9/12/24: Peripheral blood CD3 96%, CD33 100%  - 9/25/24: Peripheral blood CD3 100%, CD33 100%    Maintenance  Not discussed     GVHD prophylaxis  # Current immunosuppression is sirolimus   - Continue sirolimus with goal 8-12. 10/8 added on   - sirolimus 10-20% skin rash SE, consider switching Friday if increasing topical steroids not helpful    # The current acute GVHD stage is 1.   Skin: Grade 1. Mild erythema on face. Skin biopsy showed mild interface dermatitis suggestive of GVHD. Controlled very well with topical steroids.   - increase hydrocort to bid on face with moisturizer. TID TMC on chest  GI: GI score 0.  Liver: LFTs normal. Liver score 0.    HEME/ COAG  G-CSF last given on 8/22/4.  ABO matched.  Plt trending down, could be from recent skin GVHD.   viral panel ordered but not  drawn 10/8. Added on HHV-6    ID  # Prophylaxis  Fungal: Start voriconazole 200mg BID from 9/26/24. Siro dose adjusted.   PJP/ Toxo IgG negative: Bactrim.  Viral: Acyclovir 800 mg bid     # Monitoring  CMV: Monitor weekly till D100. Negative on 9/25  EBV: Monitor every 2 weeks between D30 to D180. Negative on 9/25  IgG: IgG 467 (9/12/24). Check serum IgG level q3 months, and consider IVIG repletion for IgG levels <400 mg/dL.     GI  # Hepatic steatosis and borderline iron deposition in liver  - VOD prophy: Ursodiol ends ~10/6    # Supportive   - Ulcer prophy: PPI  - Alternating constipation and diarrhea: continue psyllium and imodium PRN    CARDIOVASCULAR  # Asymptomatic moderate aortic stenosis    # A.fib with RVR   - One episode while inpatient on 8/21/24 which converted to normal sinus rhythm spontaneously. Was started on metoprolol 25mg BID. Unclear if this is needed long term. Cardiology follow up Nov 11 2024 scheduled  - One previous episode of A.fib with RVR prior to transplant in the context of admission with febrile episode on 3/15/24. Noted on his fitbit, not very symptomatic. DCCV on 3/21/24 restored normal sinus rhythm. Amiodarone and anticoagulation was stopped in April 2024 without any recurrence.     # CAD  - Coronary artery calcifications.   - Continue atorvastatin 10mg, ASA 81mg (resumed on 8/23/24 as platelets stable above 50k)  - Will need an ischemia evaluation in the long term    # HTN: Continue amlodipine 10mg (home med), lisinopril 5mg      RENAL/ELECTROLYTES/  # Urinary frequency, without evidence of retention or infection  # BPH  - Prior imaging demonstrates prostatomegaly along with bladder thickening. This is a long standing issue which has been ongoing since pre-transplant.   - Continue tamsulosin. Flow improving, however frequency persists  - Has been started on low dose oxybutynin on 9/17/24. No improvement in symptoms. Defer further management to urology 10/3 visit-added back flomax  and oxybutynin  - Please defer any non-urgent invasive testing.    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 Has been seen by endocrine on 9/11/24. Have recommended repeat HbA1c in 1 month.     RESP  # VINCENT : CPAP     OPHTHALMOLOGY:  # Glaucoma- Lutein supplement; brimonidine, latanaprost eye drops     MUSCULOSKELETAL/FRAILTY  # Gout: Continue allopurinol indefinitely     PMH  - Exposure to agent orange     Post-transplant vaccinations  Flu vaccination after D60, then annually   COVID vaccine after D100      Plan   Stop ursodiol day 60    Increase steroids to BID face and TID chest  Add on siro and hhv6 levels    RTC Friday with MD as previously scheduled     Cardiology 11/11    I spent 40 minutes in the care of this patient today, which included time necessary for preparation for the visit, obtaining history, ordering medications/tests/procedures as medically indicated, review of pertinent medical literature, counseling of the patient, communication of recommendations to the care team, and documentation time.     The longitudinal plan of care for the diagnosis(es)/condition(s) as documented were addressed during this visit. Due to the added complexity in care, I will continue to support Arpit in the subsequent management and with ongoing continuity of care.    Mirna Johnston NP      Again, thank you for allowing me to participate in the care of your patient.        Sincerely,        BMT Advanced Practice Provider

## 2024-10-08 NOTE — NURSING NOTE
"Oncology Rooming Note    October 8, 2024 10:20 AM   Arpit Forrest is a 73 year old male who presents for:    Chief Complaint   Patient presents with    Oncology Clinic Visit     Status post bone marrow transplant        Blood Draw     Labs drawn via VPT by lab RN     Initial Vitals: /73   Pulse 68   Temp 97.5  F (36.4  C) (Oral)   Resp 18   Wt 105.9 kg (233 lb 8 oz)   SpO2 97%   BMI 30.81 kg/m   Estimated body mass index is 30.81 kg/m  as calculated from the following:    Height as of 10/3/24: 1.854 m (6' 1\").    Weight as of this encounter: 105.9 kg (233 lb 8 oz). Body surface area is 2.34 meters squared.  No Pain (0) Comment: Data Unavailable   No LMP for male patient.  Allergies reviewed: Yes  Medications reviewed: Yes    Medications: Medication refills not needed today.  Pharmacy name entered into [employer]: [id number] - [place], [place] - [address] AT [facility] OF [place] [address]    Frailty Screening:   Is the patient here for a new oncology consult visit in cancer care? 2. No      Clinical concerns: Pt would like to know if they are able to take anything to help alleviate itchy eyes due to their current rash on their face. They wanted to check in to ensure there would be no interactions with their current medications.       Jeannie Suggs              "

## 2024-10-09 ENCOUNTER — TELEPHONE (OUTPATIENT)
Dept: TRANSPLANT | Facility: CLINIC | Age: 74
End: 2024-10-09
Payer: MEDICARE

## 2024-10-09 NOTE — TELEPHONE ENCOUNTER
I spoke with Arpit Forrest regarding sirolimus level from clinic visit dated 10/8/24, which resulted as 14.5 on 0.5 mg daily. Per Mirna, he is to decrease to 0.5 mg every other day. Plan to recheck level 10/11, pt was instructed to hold dose prior to labs. He repeated these directions to me and voiced his understanding.     Asmita White, PatriceD

## 2024-10-10 NOTE — PROGRESS NOTES
BMT Clinic Note  Oct 11, 2024     Patient ID: Arpit Forrest is a 73 year old y/o male who is day +64 post allogenic stem cell transplant for MDS-EB1     Diagnosis MDS-EB1 BMT type Allogenic  CMV  Donor -  Recipient -    Prep ELIEZER (Flu/Cy/TBI) Donor type  8/8 URD ABO D/R O- (matched)   GVHD ppx PTCy, siro, MMF Graft source PBSCT Toxo IgG Negative   Protocol [id number] CD34/kg 6.12E+06    BMT MD Nena Fowler      Hematological history please see Dr Fowler's 9/25/2024 note for complete details    Post transplant  # A.fib with RVR on 8/21/24 which converted to sinus rhythm spontaneously. 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 Max grade 1 (face, chest and upper back). Skin biopsy (9/1224) showed interface dermatitis suggestive of GVHD. Resolved with topical steroids.         INTERVAL HISTORY   Arpit returns generally feeling well.  He continues to have a rash on his face which is really unchanged and not too bothersome.  It is acne-like and not pruritic.  He has minimal erythema on his chest that r responds to topical triamcinolone.  He is eating and drinking well, has no diarrhea, he is active and busy at home.       EXAM      There were no vitals taken for this visit.  Wt Readings from Last 4 Encounters:   10/08/24 105.9 kg (233 lb 8 oz)   10/03/24 104.3 kg (230 lb)   10/01/24 105.9 kg (233 lb 8 oz)   09/25/24 106.4 kg (234 lb 8 oz)       KPS: 80% Can perform normal activity with effort, some signs of disease    General: Alert, awake; NAD  HEENT: atraumatic, sclera anicteric. Oral mucosa moist, OP nonerythematous  CV: RRR, systolic murmur   Pulm: CTA bilaterally  Abd: soft, non tender, obese, bs+  Skin: Mild erythema with some acneiform lesions on his face, minimal erythema on his central upper chest  MSK: No LE edema  Access: CVC removed, peripheral access     Acute GVHD          10/11/2024    16:57 9/11/2024    14:35 9/4/2024    14:34   Acute GVHD   New evidence of  Acute Pciac-Gusqag-Dkii Disease developed since last entry? No Yes No       Blood Counts       Recent Labs   Lab Test 10/11/24  1315 10/08/24  1011 10/01/24  0954   HGB 10.2* 10.1* 10.4*   HCT 31.9* 31.3* 31.1*   WBC 4.5 5.4 5.8   ANEUTAUTO 2.9 3.7 3.6   ALYMPAUTO 1.1 1.0 1.3   AMONOAUTO 0.5 0.5 0.7   AEOSAUTO 0.1 0.1 0.1   ABSBASO 0.0 0.0 0.0   NRBCMAN 0.0 0.0 0.0   PLT 85* 90* 94*           Chemistries     Basic Panel  Recent Labs   Lab Test 10/11/24  1315 10/08/24  1011 10/01/24  0954    140 139   POTASSIUM 4.3 3.5 3.5   CHLORIDE 107 106 106   CO2 23 22 20*   BUN 19.1 16.4 19.2   CR 1.12 1.10 1.22*   * 195* 219*        Calcium, Magnesium, Phosphorus  Recent Labs   Lab Test 10/11/24  1315 10/08/24  1011 10/01/24  0954 09/10/24  1114 09/06/24  1257 08/26/24  1253 08/25/24  0326 08/24/24  0326 08/23/24  0340   MC 9.9 9.7 9.9   < > 9.5   < > 8.9 9.0 8.8   MAG  --   --   --   --  1.6*  --  1.9 2.0 2.1   PHOS  --   --   --   --   --   --  3.7 3.9 3.5    < > = values in this interval not displayed.        LFTs  Recent Labs   Lab Test 10/11/24  1315 10/08/24  1011 10/01/24  0954   BILITOTAL 0.2 0.2 0.2   ALKPHOS 93 91 85   AST 28 22 25   ALT 24 20 28   ALBUMIN 4.1 4.1 4.0       LDH  No lab results found.      ImmuneSuppression     Sirolimus  Recent Labs   Lab Test 10/08/24  1011 10/01/24  0954 09/25/24  1211   RAPAMY 14.5 10.5 11.4                 ASSESSMENT AND PLAN     BMT for high risk MDS: D+64  - Chemo protocol: [id number]; Flu, Cy, TBI  - Peripheral blood stem cell graft from 8/8 donor, ABO matched (O neg)  - Continue allopurinol (hx of gout)    Disease status:   - 8/30/24 (D28): Slightly hypercellular marrow (30-40%). No evidence of myeloid neoplasm or monoclonal B-lymphocytosis. Normal male karyotype. NGS negative for DNMT3A (other mutations not tested)  - Need to send NGS for all mutations present at diagnosis with future bone marrows    Graft status/chimerism:   - 8/30/24 (D28): Bone marrow 100%.  Peripheral blood CD33 100%, CD3 84%  - 9/12/24: Peripheral blood CD3 96%, CD33 100%  - 9/25/24: Peripheral blood CD3 100%, CD33 100%    Maintenance  Not discussed     GVHD prophylaxis  # Current immunosuppression is sirolimus   - Continue sirolimus with goal 8-12. 10/8 added on   - sirolimus 10-20% skin rash SE, I suggested holding off on the topical creams to his face    # The current acute GVHD stage is 1.   Skin: Grade 1. Mild erythema on face. Skin biopsy showed mild interface dermatitis suggestive of GVHD. Controlled very well with topical steroids.   - increase hydrocort to bid on face with moisturizer. TID TMC on chest  GI: GI score 0.  Liver: LFTs normal. Liver score 0.    HEME/ COAG  G-CSF last given on 8/22/4.  ABO matched.  Plt trending down likely from elevated sirolimus level    ID  # Prophylaxis  Fungal: Start voriconazole 200mg BID from 9/26/24. Siro dose adjusted.   PJP/ Toxo IgG negative: Bactrim.  Viral: Acyclovir 800 mg bid     # Monitoring  CMV: Monitor weekly till D100. Negative on 9/25  EBV: Monitor every 2 weeks between D30 to D180. Negative on 9/25  IgG: IgG 467 (9/12/24). Check serum IgG level q3 months, and consider IVIG repletion for IgG levels <400 mg/dL.     GI  # Hepatic steatosis and borderline iron deposition in liver  - VOD prophy: Ursodiol ends ~10/6    # Supportive   - Ulcer prophy: PPI  - Alternating constipation and diarrhea: continue psyllium and imodium PRN    CARDIOVASCULAR  # Asymptomatic moderate aortic stenosis    # A.fib with RVR   - One episode while inpatient on 8/21/24 which converted to normal sinus rhythm spontaneously. Was started on metoprolol 25mg BID. Unclear if this is needed long term. Cardiology follow up Nov 11 2024 scheduled  - One previous episode of A.fib with RVR prior to transplant in the context of admission with febrile episode on 3/15/24. Noted on his fitbit, not very symptomatic. DCCV on 3/21/24 restored normal sinus rhythm. Amiodarone and  anticoagulation was stopped in April 2024 without any recurrence.     # CAD  - Coronary artery calcifications.   - Continue atorvastatin 10mg, ASA 81mg (resumed on 8/23/24 as platelets stable above 50k)  - Will need an ischemia evaluation in the long term    # HTN: Continue amlodipine 10mg (home med), lisinopril 5mg      RENAL/ELECTROLYTES/  # Urinary frequency, without evidence of retention or infection  # BPH  - Prior imaging demonstrates prostatomegaly along with bladder thickening. This is a long standing issue which has been ongoing since pre-transplant.   - Continue tamsulosin. Flow improving, however frequency persists  - Has been started on low dose oxybutynin on 9/17/24. No improvement in symptoms. Defer further management to urology 10/3 visit-added back flomax and oxybutynin  - Please defer any non-urgent invasive testing.    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 Has been seen by endocrine on 9/11/24. Have recommended repeat HbA1c in 1 month.     RESP  # VINCENT : CPAP     OPHTHALMOLOGY:  # Glaucoma- Lutein supplement; brimonidine, latanaprost eye drops     MUSCULOSKELETAL/FRAILTY  # Gout: Continue allopurinol indefinitely     PMH  - Exposure to agent orange     Post-transplant vaccinations  Flu vaccination after D60, then annually   COVID vaccine after D100    Summary: He is doing quite well and has no obvious signs of acute GVHD beyond minimal rash on his upper chest.  His facial rash appears to be sirolimus induced.  Given the facial rash and trend his platelet count, I recommended we lower his sirolimus dose to 3-8.  I reviewed his medicine list with him today and he is unsure about some medicines for example MMF which he believes he still  taking.  I gave him written instructions to return home to review the medicines in his box with his wife.  In addition he has 3 separate anti- spasmodic's for his bladder.  I recommended that he not take any of them and continue Flomax.  He will report if he has spasms.    Plan   Stop ursodiol day 60    Stop oxybutinin IR, ER and mirabegron  Check box for MMF - stop if present  Lower sirolimus level goal to 3-8  Flu shot today  Stop ursodiol    The longitudinal plan of care for the diagnosis(es)/condition(s) as documented were addressed during this visit. Due to the added complexity in care, I will continue to support Arpit in the subsequent management and with ongoing continuity of care.      30 minutes spent on the date of the encounter doing chart review, history and exam, lab review, documentation and coordniating care.    JIMMY RODRIGUEZ MD  October 11, 2024

## 2024-10-11 ENCOUNTER — ONCOLOGY VISIT (OUTPATIENT)
Dept: TRANSPLANT | Facility: CLINIC | Age: 74
End: 2024-10-11
Attending: INTERNAL MEDICINE
Payer: MEDICARE

## 2024-10-11 ENCOUNTER — LAB (OUTPATIENT)
Dept: LAB | Facility: CLINIC | Age: 74
End: 2024-10-11
Attending: INTERNAL MEDICINE
Payer: MEDICARE

## 2024-10-11 VITALS
WEIGHT: 232.6 LBS | RESPIRATION RATE: 16 BRPM | DIASTOLIC BLOOD PRESSURE: 70 MMHG | SYSTOLIC BLOOD PRESSURE: 119 MMHG | HEART RATE: 73 BPM | BODY MASS INDEX: 30.69 KG/M2 | TEMPERATURE: 97.7 F | OXYGEN SATURATION: 98 %

## 2024-10-11 DIAGNOSIS — Z23 NEED FOR PROPHYLACTIC VACCINATION AND INOCULATION AGAINST INFLUENZA: Primary | ICD-10-CM

## 2024-10-11 DIAGNOSIS — Z94.81 STATUS POST BONE MARROW TRANSPLANT (H): ICD-10-CM

## 2024-10-11 DIAGNOSIS — D46.9 MDS (MYELODYSPLASTIC SYNDROME) (H): Primary | ICD-10-CM

## 2024-10-11 LAB
ALBUMIN SERPL BCG-MCNC: 4.1 G/DL (ref 3.5–5.2)
ALP SERPL-CCNC: 93 U/L (ref 40–150)
ALT SERPL W P-5'-P-CCNC: 24 U/L (ref 0–70)
ANION GAP SERPL CALCULATED.3IONS-SCNC: 10 MMOL/L (ref 7–15)
AST SERPL W P-5'-P-CCNC: 28 U/L (ref 0–45)
BASOPHILS # BLD AUTO: 0 10E3/UL (ref 0–0.2)
BASOPHILS NFR BLD AUTO: 0 %
BILIRUB SERPL-MCNC: 0.2 MG/DL
BUN SERPL-MCNC: 19.1 MG/DL (ref 8–23)
CALCIUM SERPL-MCNC: 9.9 MG/DL (ref 8.8–10.4)
CHLORIDE SERPL-SCNC: 107 MMOL/L (ref 98–107)
CREAT SERPL-MCNC: 1.12 MG/DL (ref 0.67–1.17)
CULTURE HARVEST COMPLETE DATE: NORMAL
EGFRCR SERPLBLD CKD-EPI 2021: 69 ML/MIN/1.73M2
EOSINOPHIL # BLD AUTO: 0.1 10E3/UL (ref 0–0.7)
EOSINOPHIL NFR BLD AUTO: 1 %
ERYTHROCYTE [DISTWIDTH] IN BLOOD BY AUTOMATED COUNT: 15.3 % (ref 10–15)
GLUCOSE SERPL-MCNC: 155 MG/DL (ref 70–99)
HCO3 SERPL-SCNC: 23 MMOL/L (ref 22–29)
HCT VFR BLD AUTO: 31.9 % (ref 40–53)
HGB BLD-MCNC: 10.2 G/DL (ref 13.3–17.7)
HHV6 DNA # SPEC NAA+PROBE: <500 COPIES/ML
HHV6 DNA SPEC NAA+PROBE-LOG#: <2.7 {LOG_COPIES}/ML
IMM GRANULOCYTES # BLD: 0 10E3/UL
IMM GRANULOCYTES NFR BLD: 0 %
LAB DIRECTOR DISCLAIMER: NORMAL
LAB DIRECTOR DISCLAIMER: NORMAL
LAB DIRECTOR INTERPRETATION: NORMAL
LAB DIRECTOR INTERPRETATION: NORMAL
LAB DIRECTOR METHODOLOGY: NORMAL
LAB DIRECTOR METHODOLOGY: NORMAL
LAB DIRECTOR RESULTS: NORMAL
LAB DIRECTOR RESULTS: NORMAL
LYMPHOCYTES # BLD AUTO: 1.1 10E3/UL (ref 0.8–5.3)
LYMPHOCYTES NFR BLD AUTO: 24 %
MCH RBC QN AUTO: 28.4 PG (ref 26.5–33)
MCHC RBC AUTO-ENTMCNC: 32 G/DL (ref 31.5–36.5)
MCV RBC AUTO: 89 FL (ref 78–100)
MONOCYTES # BLD AUTO: 0.5 10E3/UL (ref 0–1.3)
MONOCYTES NFR BLD AUTO: 11 %
NEUTROPHILS # BLD AUTO: 2.9 10E3/UL (ref 1.6–8.3)
NEUTROPHILS NFR BLD AUTO: 64 %
NRBC # BLD AUTO: 0 10E3/UL
NRBC BLD AUTO-RTO: 0 /100
PLATELET # BLD AUTO: 85 10E3/UL (ref 150–450)
POTASSIUM SERPL-SCNC: 4.3 MMOL/L (ref 3.4–5.3)
PROT SERPL-MCNC: 6.6 G/DL (ref 6.4–8.3)
RBC # BLD AUTO: 3.59 10E6/UL (ref 4.4–5.9)
SIROLIMUS BLD-MCNC: 9.7 UG/L (ref 5–15)
SODIUM SERPL-SCNC: 140 MMOL/L (ref 135–145)
SPECIMEN DESCRIPTION: NORMAL
SPECIMEN DESCRIPTION: NORMAL
TME LAST DOSE: NORMAL H
TME LAST DOSE: NORMAL H
WBC # BLD AUTO: 4.5 10E3/UL (ref 4–11)

## 2024-10-11 PROCEDURE — 90662 IIV NO PRSV INCREASED AG IM: CPT | Performed by: INTERNAL MEDICINE

## 2024-10-11 PROCEDURE — 81268 CHIMERISM ANAL W/CELL SELECT: CPT | Performed by: STUDENT IN AN ORGANIZED HEALTH CARE EDUCATION/TRAINING PROGRAM

## 2024-10-11 PROCEDURE — 99214 OFFICE O/P EST MOD 30 MIN: CPT | Performed by: INTERNAL MEDICINE

## 2024-10-11 PROCEDURE — 250N000011 HC RX IP 250 OP 636: Performed by: INTERNAL MEDICINE

## 2024-10-11 PROCEDURE — 36415 COLL VENOUS BLD VENIPUNCTURE: CPT

## 2024-10-11 PROCEDURE — 80195 ASSAY OF SIROLIMUS: CPT

## 2024-10-11 PROCEDURE — G0463 HOSPITAL OUTPT CLINIC VISIT: HCPCS | Performed by: INTERNAL MEDICINE

## 2024-10-11 PROCEDURE — 82040 ASSAY OF SERUM ALBUMIN: CPT

## 2024-10-11 PROCEDURE — 85004 AUTOMATED DIFF WBC COUNT: CPT

## 2024-10-11 PROCEDURE — G0452 MOLECULAR PATHOLOGY INTERPR: HCPCS | Mod: 26 | Performed by: PATHOLOGY

## 2024-10-11 PROCEDURE — G0008 ADMIN INFLUENZA VIRUS VAC: HCPCS | Performed by: INTERNAL MEDICINE

## 2024-10-11 RX ADMIN — INFLUENZA A VIRUS A/VICTORIA/4897/2022 IVR-238 (H1N1) ANTIGEN (FORMALDEHYDE INACTIVATED), INFLUENZA A VIRUS A/CALIFORNIA/122/2022 SAN-022 (H3N2) ANTIGEN (FORMALDEHYDE INACTIVATED), AND INFLUENZA B VIRUS B/MICHIGAN/01/2021 ANTIGEN (FORMALDEHYDE INACTIVATED) 0.5 ML: 60; 60; 60 INJECTION, SUSPENSION INTRAMUSCULAR at 14:29

## 2024-10-11 ASSESSMENT — PAIN SCALES - GENERAL: PAINLEVEL: NO PAIN (0)

## 2024-10-11 NOTE — NURSING NOTE
"Oncology Rooming Note    October 11, 2024 1:22 PM   Arpit Forrest is a 73 year old male who presents for:    Chief Complaint   Patient presents with    Blood Draw     Labs drawn via  by RN in lab.  VS taken     Initial Vitals: /70   Pulse 73   Temp 97.7  F (36.5  C) (Oral)   Resp 16   Wt 105.5 kg (232 lb 9.6 oz)   SpO2 98%   BMI 30.69 kg/m   Estimated body mass index is 30.69 kg/m  as calculated from the following:    Height as of 10/3/24: 1.854 m (6' 1\").    Weight as of this encounter: 105.5 kg (232 lb 9.6 oz). Body surface area is 2.33 meters squared.  No Pain (0) Comment: Data Unavailable   No LMP for male patient.  Allergies reviewed: Yes  Medications reviewed: Yes    Medications: Medication refills not needed today.  Pharmacy name entered into [employer]: [facility] #[id number] - [place], [place] - [address] AT [facility] OF [place] [address]    Frailty Screening:   Is the patient here for a new oncology consult visit in cancer care? 2. No      Clinical concerns: Pt would like to discuss why they were prescribed the mirabegron (MYRBETRIQ) 25 MG 24 hr tablet        Jeannie Suggs            "

## 2024-10-11 NOTE — LETTER
10/11/2024      Arpit Forrest  [address] [place] [address]      Dear Colleague,    Thank you for referring your patient, Arpit Forrest, to the [facility] BLOOD AND MARROW TRANSPLANT PROGRAM [place]. Please see a copy of my visit note below.    BMT Clinic Note  Oct 11, 2024     Patient ID: Arpit Forrest is a 73 year old y/o male who is day +64 post allogenic stem cell transplant for MDS-EB1     Diagnosis MDS-EB1 BMT type Allogenic  CMV  Donor -  Recipient -    Prep ELIEZER (Flu/Cy/TBI) Donor type  8/8 URD ABO D/R O- (matched)   GVHD ppx PTCy, siro, MMF Graft source PBSCT Toxo IgG Negative   Protocol [id number] CD34/kg 6.12E+06    BMT MD Nena Fowler      Hematological history please see Dr Fowler's 9/25/2024 note for complete details    Post transplant  # A.fib with RVR on 8/21/24 which converted to sinus rhythm spontaneously. 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 Max grade 1 (face, chest and upper back). Skin biopsy (9/1224) showed interface dermatitis suggestive of GVHD. Resolved with topical steroids.         INTERVAL HISTORY   Arpit returns generally feeling well.  He continues to have a rash on his face which is really unchanged and not too bothersome.  It is acne-like and not pruritic.  He has minimal erythema on his chest that r responds to topical triamcinolone.  He is eating and drinking well, has no diarrhea, he is active and busy at home.       EXAM      There were no vitals taken for this visit.  Wt Readings from Last 4 Encounters:   10/08/24 105.9 kg (233 lb 8 oz)   10/03/24 104.3 kg (230 lb)   10/01/24 105.9 kg (233 lb 8 oz)   09/25/24 106.4 kg (234 lb 8 oz)       KPS: 80% Can perform normal activity with effort, some signs of disease    General: Alert, awake; NAD  HEENT: atraumatic, sclera anicteric. Oral mucosa moist, OP nonerythematous  CV: RRR, systolic murmur   Pulm: CTA bilaterally  Abd: soft, non tender, obese, bs+  Skin:  Mild erythema with some acneiform lesions on his face, minimal erythema on his central upper chest  MSK: No LE edema  Access: CVC removed, peripheral access     Acute GVHD          10/11/2024    16:57 9/11/2024    14:35 9/4/2024    14:34   Acute GVHD   New evidence of Acute Vrvhc-Aboqjf-Pase Disease developed since last entry? No Yes No       Blood Counts       Recent Labs   Lab Test 10/11/24  1315 10/08/24  1011 10/01/24  0954   HGB 10.2* 10.1* 10.4*   HCT 31.9* 31.3* 31.1*   WBC 4.5 5.4 5.8   ANEUTAUTO 2.9 3.7 3.6   ALYMPAUTO 1.1 1.0 1.3   AMONOAUTO 0.5 0.5 0.7   AEOSAUTO 0.1 0.1 0.1   ABSBASO 0.0 0.0 0.0   NRBCMAN 0.0 0.0 0.0   PLT 85* 90* 94*           Chemistries     Basic Panel  Recent Labs   Lab Test 10/11/24  1315 10/08/24  1011 10/01/24  0954    140 139   POTASSIUM 4.3 3.5 3.5   CHLORIDE 107 106 106   CO2 23 22 20*   BUN 19.1 16.4 19.2   CR 1.12 1.10 1.22*   * 195* 219*        Calcium, Magnesium, Phosphorus  Recent Labs   Lab Test 10/11/24  1315 10/08/24  1011 10/01/24  0954 09/10/24  1114 09/06/24  1257 08/26/24  1253 08/25/24  0326 08/24/24  0326 08/23/24  0340   MC 9.9 9.7 9.9   < > 9.5   < > 8.9 9.0 8.8   MAG  --   --   --   --  1.6*  --  1.9 2.0 2.1   PHOS  --   --   --   --   --   --  3.7 3.9 3.5    < > = values in this interval not displayed.        LFTs  Recent Labs   Lab Test 10/11/24  1315 10/08/24  1011 10/01/24  0954   BILITOTAL 0.2 0.2 0.2   ALKPHOS 93 91 85   AST 28 22 25   ALT 24 20 28   ALBUMIN 4.1 4.1 4.0       LDH  No lab results found.      ImmuneSuppression     Sirolimus  Recent Labs   Lab Test 10/08/24  1011 10/01/24  0954 09/25/24  1211   RAPAMY 14.5 10.5 11.4                 ASSESSMENT AND PLAN     BMT for high risk MDS: D+64  - Chemo protocol: [id number]; Flu, Cy, TBI  - Peripheral blood stem cell graft from 8/8 donor, ABO matched (O neg)  - Continue allopurinol (hx of gout)    Disease status:   - 8/30/24 (D28): Slightly hypercellular marrow (30-40%). No evidence of  myeloid neoplasm or monoclonal B-lymphocytosis. Normal male karyotype. NGS negative for DNMT3A (other mutations not tested)  - Need to send NGS for all mutations present at diagnosis with future bone marrows    Graft status/chimerism:   - 8/30/24 (D28): Bone marrow 100%. Peripheral blood CD33 100%, CD3 84%  - 9/12/24: Peripheral blood CD3 96%, CD33 100%  - 9/25/24: Peripheral blood CD3 100%, CD33 100%    Maintenance  Not discussed     GVHD prophylaxis  # Current immunosuppression is sirolimus   - Continue sirolimus with goal 8-12. 10/8 added on   - sirolimus 10-20% skin rash SE, I suggested holding off on the topical creams to his face    # The current acute GVHD stage is 1.   Skin: Grade 1. Mild erythema on face. Skin biopsy showed mild interface dermatitis suggestive of GVHD. Controlled very well with topical steroids.   - increase hydrocort to bid on face with moisturizer. TID TMC on chest  GI: GI score 0.  Liver: LFTs normal. Liver score 0.    HEME/ COAG  G-CSF last given on 8/22/4.  ABO matched.  Plt trending down likely from elevated sirolimus level    ID  # Prophylaxis  Fungal: Start voriconazole 200mg BID from 9/26/24. Siro dose adjusted.   PJP/ Toxo IgG negative: Bactrim.  Viral: Acyclovir 800 mg bid     # Monitoring  CMV: Monitor weekly till D100. Negative on 9/25  EBV: Monitor every 2 weeks between D30 to D180. Negative on 9/25  IgG: IgG 467 (9/12/24). Check serum IgG level q3 months, and consider IVIG repletion for IgG levels <400 mg/dL.     GI  # Hepatic steatosis and borderline iron deposition in liver  - VOD prophy: Ursodiol ends ~10/6    # Supportive   - Ulcer prophy: PPI  - Alternating constipation and diarrhea: continue psyllium and imodium PRN    CARDIOVASCULAR  # Asymptomatic moderate aortic stenosis    # A.fib with RVR   - One episode while inpatient on 8/21/24 which converted to normal sinus rhythm spontaneously. Was started on metoprolol 25mg BID. Unclear if this is needed long term.  Cardiology follow up Nov 11 2024 scheduled  - One previous episode of A.fib with RVR prior to transplant in the context of admission with febrile episode on 3/15/24. Noted on his fitbit, not very symptomatic. DCCV on 3/21/24 restored normal sinus rhythm. Amiodarone and anticoagulation was stopped in April 2024 without any recurrence.     # CAD  - Coronary artery calcifications.   - Continue atorvastatin 10mg, ASA 81mg (resumed on 8/23/24 as platelets stable above 50k)  - Will need an ischemia evaluation in the long term    # HTN: Continue amlodipine 10mg (home med), lisinopril 5mg      RENAL/ELECTROLYTES/  # Urinary frequency, without evidence of retention or infection  # BPH  - Prior imaging demonstrates prostatomegaly along with bladder thickening. This is a long standing issue which has been ongoing since pre-transplant.   - Continue tamsulosin. Flow improving, however frequency persists  - Has been started on low dose oxybutynin on 9/17/24. No improvement in symptoms. Defer further management to urology 10/3 visit-added back flomax and oxybutynin  - Please defer any non-urgent invasive testing.    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 Has been seen by endocrine on 9/11/24. Have recommended repeat HbA1c in 1 month.     RESP  # VINCENT : CPAP     OPHTHALMOLOGY:  # Glaucoma- Lutein supplement; brimonidine, latanaprost eye drops     MUSCULOSKELETAL/FRAILTY  # Gout: Continue allopurinol indefinitely     PMH  - Exposure to agent orange     Post-transplant vaccinations  Flu vaccination after D60, then annually   COVID vaccine after D100    Summary: He is doing quite well and has no obvious signs of acute GVHD beyond minimal rash on his upper chest.  His  facial rash appears to be sirolimus induced.  Given the facial rash and trend his platelet count, I recommended we lower his sirolimus dose to 3-8.  I reviewed his medicine list with him today and he is unsure about some medicines for example MMF which he believes he still taking.  I gave him written instructions to return home to review the medicines in his box with his wife.  In addition he has 3 separate anti- spasmodic's for his bladder.  I recommended that he not take any of them and continue Flomax.  He will report if he has spasms.    Plan   Stop ursodiol day 60    Stop oxybutinin IR, ER and mirabegron  Check box for MMF - stop if present  Lower sirolimus level goal to 3-8  Flu shot today  Stop ursodiol    The longitudinal plan of care for the diagnosis(es)/condition(s) as documented were addressed during this visit. Due to the added complexity in care, I will continue to support Arpit in the subsequent management and with ongoing continuity of care.      30 minutes spent on the date of the encounter doing chart review, history and exam, lab review, documentation and coordniating care.    JIMMY RODRIGUEZ MD  October 11, 2024      Again, thank you for allowing me to participate in the care of your patient.        Sincerely,        JIMMY RODRIGUEZ MD

## 2024-10-11 NOTE — NURSING NOTE
Chief Complaint   Patient presents with    Blood Draw     Labs drawn via  by RN in lab.  VS taken       Labs collected from venipuncture by RN. Vitals taken. Checked in for appointment(s).    Lynn Lindsay RN

## 2024-10-11 NOTE — NURSING NOTE
Pt received influenza vaccine per orders from Dr. Hicks. All questions answered prior to vaccine administration. Vaccine administered into right deltoid muscle without incident. Pt instructed to wait 15 minutes before leaving to monitor for reaction. Pt and daughter verbalized understanding.    Immunizations Administered       Name Date Dose VIS Date Route    Influenza (High Dose) Trivalent,PF (Fluzone) 10/11/24  2:29 PM 0.5 mL 08/06/2021, Given Today Intramuscular            Violeta Tenorio RN

## 2024-10-16 ENCOUNTER — OFFICE VISIT (OUTPATIENT)
Dept: TRANSPLANT | Facility: CLINIC | Age: 74
End: 2024-10-16
Attending: STUDENT IN AN ORGANIZED HEALTH CARE EDUCATION/TRAINING PROGRAM
Payer: MEDICARE

## 2024-10-16 ENCOUNTER — LAB (OUTPATIENT)
Dept: LAB | Facility: CLINIC | Age: 74
End: 2024-10-16
Attending: STUDENT IN AN ORGANIZED HEALTH CARE EDUCATION/TRAINING PROGRAM
Payer: MEDICARE

## 2024-10-16 VITALS
SYSTOLIC BLOOD PRESSURE: 118 MMHG | TEMPERATURE: 97.8 F | OXYGEN SATURATION: 98 % | RESPIRATION RATE: 18 BRPM | WEIGHT: 234.8 LBS | BODY MASS INDEX: 30.98 KG/M2 | HEART RATE: 69 BPM | DIASTOLIC BLOOD PRESSURE: 71 MMHG

## 2024-10-16 DIAGNOSIS — Z94.81 STATUS POST BONE MARROW TRANSPLANT (H): ICD-10-CM

## 2024-10-16 DIAGNOSIS — D46.9 MDS (MYELODYSPLASTIC SYNDROME) (H): Primary | ICD-10-CM

## 2024-10-16 LAB
ALBUMIN SERPL BCG-MCNC: 4.1 G/DL (ref 3.5–5.2)
ALP SERPL-CCNC: 98 U/L (ref 40–150)
ALT SERPL W P-5'-P-CCNC: 24 U/L (ref 0–70)
ANION GAP SERPL CALCULATED.3IONS-SCNC: 11 MMOL/L (ref 7–15)
AST SERPL W P-5'-P-CCNC: 25 U/L (ref 0–45)
BASOPHILS # BLD AUTO: 0 10E3/UL (ref 0–0.2)
BASOPHILS NFR BLD AUTO: 0 %
BILIRUB SERPL-MCNC: 0.2 MG/DL
BUN SERPL-MCNC: 18 MG/DL (ref 8–23)
CALCIUM SERPL-MCNC: 9.8 MG/DL (ref 8.8–10.4)
CHLORIDE SERPL-SCNC: 107 MMOL/L (ref 98–107)
CREAT SERPL-MCNC: 1.12 MG/DL (ref 0.67–1.17)
EGFRCR SERPLBLD CKD-EPI 2021: 69 ML/MIN/1.73M2
EOSINOPHIL # BLD AUTO: 0.1 10E3/UL (ref 0–0.7)
EOSINOPHIL NFR BLD AUTO: 2 %
ERYTHROCYTE [DISTWIDTH] IN BLOOD BY AUTOMATED COUNT: 15.6 % (ref 10–15)
GLUCOSE SERPL-MCNC: 107 MG/DL (ref 70–99)
HCO3 SERPL-SCNC: 22 MMOL/L (ref 22–29)
HCT VFR BLD AUTO: 32 % (ref 40–53)
HGB BLD-MCNC: 10.4 G/DL (ref 13.3–17.7)
IMM GRANULOCYTES # BLD: 0 10E3/UL
IMM GRANULOCYTES NFR BLD: 0 %
LYMPHOCYTES # BLD AUTO: 1.5 10E3/UL (ref 0.8–5.3)
LYMPHOCYTES NFR BLD AUTO: 31 %
MCH RBC QN AUTO: 28.3 PG (ref 26.5–33)
MCHC RBC AUTO-ENTMCNC: 32.5 G/DL (ref 31.5–36.5)
MCV RBC AUTO: 87 FL (ref 78–100)
MONOCYTES # BLD AUTO: 0.5 10E3/UL (ref 0–1.3)
MONOCYTES NFR BLD AUTO: 11 %
NEUTROPHILS # BLD AUTO: 2.7 10E3/UL (ref 1.6–8.3)
NEUTROPHILS NFR BLD AUTO: 56 %
NRBC # BLD AUTO: 0 10E3/UL
NRBC BLD AUTO-RTO: 0 /100
PLATELET # BLD AUTO: 102 10E3/UL (ref 150–450)
POTASSIUM SERPL-SCNC: 3.8 MMOL/L (ref 3.4–5.3)
PROT SERPL-MCNC: 6.6 G/DL (ref 6.4–8.3)
RBC # BLD AUTO: 3.67 10E6/UL (ref 4.4–5.9)
SIROLIMUS BLD-MCNC: 5.8 UG/L (ref 5–15)
SODIUM SERPL-SCNC: 140 MMOL/L (ref 135–145)
TME LAST DOSE: NORMAL H
TME LAST DOSE: NORMAL H
WBC # BLD AUTO: 4.8 10E3/UL (ref 4–11)

## 2024-10-16 PROCEDURE — 80195 ASSAY OF SIROLIMUS: CPT

## 2024-10-16 PROCEDURE — 80053 COMPREHEN METABOLIC PANEL: CPT | Performed by: STUDENT IN AN ORGANIZED HEALTH CARE EDUCATION/TRAINING PROGRAM

## 2024-10-16 PROCEDURE — 87798 DETECT AGENT NOS DNA AMP: CPT

## 2024-10-16 PROCEDURE — 85004 AUTOMATED DIFF WBC COUNT: CPT | Performed by: STUDENT IN AN ORGANIZED HEALTH CARE EDUCATION/TRAINING PROGRAM

## 2024-10-16 PROCEDURE — 87533 HHV-6 DNA QUANT: CPT

## 2024-10-16 PROCEDURE — 85014 HEMATOCRIT: CPT | Performed by: STUDENT IN AN ORGANIZED HEALTH CARE EDUCATION/TRAINING PROGRAM

## 2024-10-16 PROCEDURE — 36415 COLL VENOUS BLD VENIPUNCTURE: CPT

## 2024-10-16 PROCEDURE — 87799 DETECT AGENT NOS DNA QUANT: CPT

## 2024-10-16 PROCEDURE — G0463 HOSPITAL OUTPT CLINIC VISIT: HCPCS

## 2024-10-16 PROCEDURE — 99214 OFFICE O/P EST MOD 30 MIN: CPT

## 2024-10-16 ASSESSMENT — PAIN SCALES - GENERAL: PAINLEVEL: NO PAIN (0)

## 2024-10-16 NOTE — LETTER
10/16/2024      Arpit Forrest  [address] [facility]  [place] [address]      Dear Colleague,    Thank you for referring your patient, Arpit Forrest, to the [facility] BLOOD AND MARROW TRANSPLANT PROGRAM [place]. Please see a copy of my visit note below.    BMT Clinic Note  Oct 16, 2024     Patient ID: Arpit Forrest is a 73 year old y/o male who is day +70 post allogenic stem cell transplant for MDS-EB1     Diagnosis MDS-EB1 BMT type Allogenic  CMV  Donor -  Recipient -    Prep ELIEZER (Flu/Cy/TBI) Donor type  8/8 URD ABO D/R O- (matched)   GVHD ppx PTCy, siro, MMF Graft source PBSCT Toxo IgG Negative   Protocol [id number] CD34/kg 6.12E+06    BMT MD Nena Fowler      Hematological history please see Dr Fowler's 9/25/2024 note for complete details    Post transplant  # A.fib with RVR on 8/21/24 which converted to sinus rhythm spontaneously. 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 Max grade 1 (face, chest and upper back). Skin biopsy (9/1224) showed interface dermatitis suggestive of GVHD. Resolved with topical steroids.         INTERVAL HISTORY   Arpit returns feeling great. Rash on chest has resolved. Some residual redness on face. No n/v/d. Good appetite. No new concerns.       EXAM      /71 (BP Location: Right arm, Patient Position: Sitting, Cuff Size: Adult Large)   Pulse 69   Temp 97.8  F (36.6  C) (Oral)   Resp 18   Wt 106.5 kg (234 lb 12.8 oz)   SpO2 98%   BMI 30.98 kg/m    Wt Readings from Last 4 Encounters:   10/16/24 106.5 kg (234 lb 12.8 oz)   10/11/24 105.5 kg (232 lb 9.6 oz)   10/08/24 105.9 kg (233 lb 8 oz)   10/03/24 104.3 kg (230 lb)       KPS: 80% Can perform normal activity with effort, some signs of disease    General: Alert, awake; NAD  HEENT: atraumatic, sclera anicteric. Oral mucosa moist, OP nonerythematous  CV: RRR, systolic murmur   Pulm: CTA bilaterally  Abd: soft, non tender, obese, bs+  Skin: Mild erythema with some  acneiform lesions on his face   MSK: No LE edema  Access: none    Acute GVHD          10/11/2024    16:57 9/11/2024    14:35 9/4/2024    14:34   Acute GVHD   New evidence of Acute Hjdje-Fwycad-Rrew Disease developed since last entry? No Yes No       Blood Counts       Recent Labs   Lab Test 10/16/24  1243 10/11/24  1315 10/08/24  1011   HGB 10.4* 10.2* 10.1*   HCT 32.0* 31.9* 31.3*   WBC 4.8 4.5 5.4   ANEUTAUTO 2.7 2.9 3.7   ALYMPAUTO 1.5 1.1 1.0   AMONOAUTO 0.5 0.5 0.5   AEOSAUTO 0.1 0.1 0.1   ABSBASO 0.0 0.0 0.0   NRBCMAN 0.0 0.0 0.0   * 85* 90*           Chemistries     Basic Panel  Recent Labs   Lab Test 10/16/24  1243 10/11/24  1315 10/08/24  1011    140 140   POTASSIUM 3.8 4.3 3.5   CHLORIDE 107 107 106   CO2 22 23 22   BUN 18.0 19.1 16.4   CR 1.12 1.12 1.10   * 155* 195*        Calcium, Magnesium, Phosphorus  Recent Labs   Lab Test 10/16/24  1243 10/11/24  1315 10/08/24  1011 09/10/24  1114 09/06/24  1257 08/26/24  1253 08/25/24  0326 08/24/24  0326 08/23/24  0340   MC 9.8 9.9 9.7   < > 9.5   < > 8.9 9.0 8.8   MAG  --   --   --   --  1.6*  --  1.9 2.0 2.1   PHOS  --   --   --   --   --   --  3.7 3.9 3.5    < > = values in this interval not displayed.        LFTs  Recent Labs   Lab Test 10/16/24  1243 10/11/24  1315 10/08/24  1011   BILITOTAL 0.2 0.2 0.2   ALKPHOS 98 93 91   AST 25 28 22   ALT 24 24 20   ALBUMIN 4.1 4.1 4.1       LDH  No lab results found.      ImmuneSuppression     Sirolimus  Recent Labs   Lab Test 10/11/24  1315 10/08/24  1011 10/01/24  0954   RAPAMY 9.7 14.5 10.5                 ASSESSMENT AND PLAN     BMT for high risk MDS: D+70  - Chemo protocol: [id number]; Flu, Cy, TBI  - Peripheral blood stem cell graft from 8/8 donor, ABO matched (O neg)  - Continue allopurinol (hx of gout)    Disease status:   - 8/30/24 (D28): Slightly hypercellular marrow (30-40%). No evidence of myeloid neoplasm or monoclonal B-lymphocytosis. Normal male karyotype. NGS negative for DNMT3A (other  mutations not tested)  - Need to send NGS for all mutations present at diagnosis with future bone marrows    Graft status/chimerism:   - 8/30/24 (D28): Bone marrow 100%. Peripheral blood CD33 100%, CD3 84%  - 9/12/24: Peripheral blood CD3 96%, CD33 100%  - 9/25/24: Peripheral blood CD3 100%, CD33 100%    Maintenance  Not discussed     GVHD prophylaxis  # Current immunosuppression is sirolimus   - Continue sirolimus with goal 6-8. 10/8 added on   - sirolimus 10-20% skin rash SE     # The current acute GVHD stage is 1.   Skin: Grade 1. Mild erythema on face. Skin biopsy showed mild interface dermatitis suggestive of GVHD. Controlled very well with topical steroids.   - increase hydrocort to bid on face with moisturizer. TID TMC on chest  GI: GI score 0.  Liver: LFTs normal. Liver score 0.    HEME/ COAG  G-CSF last given on 8/22/4.  ABO matched.  Plt trending down likely from elevated sirolimus level    ID  # Prophylaxis  Fungal: Start voriconazole 200mg BID from 9/26/24. Siro dose adjusted.   PJP/ Toxo IgG negative: Bactrim.  Viral: Acyclovir 800 mg bid     # Monitoring  CMV: Monitor weekly till D100. Negative on 9/25  EBV: Monitor every 2 weeks between D30 to D180. Negative on 9/25  IgG: IgG 467 (9/12/24). Check serum IgG level q3 months, and consider IVIG repletion for IgG levels <400 mg/dL.     GI  # Hepatic steatosis and borderline iron deposition in liver  - VOD prophy: Ursodiol done    # Supportive   - Ulcer prophy: PPI  - Alternating constipation and diarrhea: continue psyllium and imodium PRN    CARDIOVASCULAR  # Asymptomatic moderate aortic stenosis    # A.fib with RVR   - One episode while inpatient on 8/21/24 which converted to normal sinus rhythm spontaneously. Was started on metoprolol 25mg BID. Unclear if this is needed long term. Cardiology follow up Nov 11 2024 scheduled  - One previous episode of A.fib with RVR prior to transplant in the context of admission with febrile episode on 3/15/24. Noted on  his fitbit, not very symptomatic. DCCV on 3/21/24 restored normal sinus rhythm. Amiodarone and anticoagulation was stopped in April 2024 without any recurrence.     # CAD  - Coronary artery calcifications.   - Continue atorvastatin 10mg, ASA 81mg (resumed on 8/23/24 as platelets stable above 50k)  - Will need an ischemia evaluation in the long term    # HTN: Continue amlodipine 10mg (home med), lisinopril 5mg      RENAL/ELECTROLYTES/  # Urinary frequency, without evidence of retention or infection  # BPH  - Prior imaging demonstrates prostatomegaly along with bladder thickening. This is a long standing issue which has been ongoing since pre-transplant.   - Continue tamsulosin. Flow improving, however frequency persists  - Has been started on low dose oxybutynin on 9/17/24. No improvement in symptoms. Defer further management to urology 10/3 visit-added back flomax and oxybutynin  - Please defer any non-urgent invasive testing.    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 Has been seen by endocrine on 9/11/24. Have recommended repeat HbA1c in 1 month.     RESP  # VINCENT : CPAP     OPHTHALMOLOGY:  # Glaucoma- Lutein supplement; brimonidine, latanaprost eye drops     MUSCULOSKELETAL/FRAILTY  # Gout: Continue allopurinol indefinitely     PMH  - Exposure to agent orange     Post-transplant vaccinations  Flu vaccination given at day 60, then annually   COVID vaccine after D100    Plan     The longitudinal plan of care for the diagnosis(es)/condition(s) as documented were addressed during this visit. Due to the added complexity in care, I will continue to support Arpit in the subsequent management and with ongoing continuity of care.    The longitudinal plan of  care for the diagnosis(es)/condition(s) as documented were addressed during this visit. Due to the added complexity in care, I will continue to support Arpit in the subsequent management and with ongoing continuity of care.      30 minutes spent on the date of the encounter doing chart review, history and exam, lab review, documentation and coordniating care.    Mirna Johnston NP      Again, thank you for allowing me to participate in the care of your patient.        Sincerely,        BMT Advanced Practice Provider

## 2024-10-16 NOTE — NURSING NOTE
"Oncology Rooming Note    October 16, 2024 12:59 PM   Arpit Forrest is a 73 year old male who presents for:    Chief Complaint   Patient presents with    Blood Draw     Labs drawn via  by RN. VS taken.    Oncology Clinic Visit     RTN for S/P BMT for MDS     Initial Vitals: /71 (BP Location: Right arm, Patient Position: Sitting, Cuff Size: Adult Large)   Pulse 69   Temp 97.8  F (36.6  C) (Oral)   Resp 18   Wt 106.5 kg (234 lb 12.8 oz)   SpO2 98%   BMI 30.98 kg/m   Estimated body mass index is 30.98 kg/m  as calculated from the following:    Height as of 10/3/24: 1.854 m (6' 1\").    Weight as of this encounter: 106.5 kg (234 lb 12.8 oz). Body surface area is 2.34 meters squared.  No Pain (0) Comment: Data Unavailable   No LMP for male patient.  Allergies reviewed: Yes  Medications reviewed: Yes    Medications: Medication refills not needed today.  Pharmacy name entered into [employer]: [id number] - [place], [place] - [address] AT [facility] [address] [place] [address]    Frailty Screening:   Is the patient here for a new oncology consult visit in cancer care? 2. No      Clinical concerns: none       Lindsey Arriola MA             "

## 2024-10-16 NOTE — NURSING NOTE
Chief Complaint   Patient presents with    Blood Draw     Labs drawn via  by RN. VS taken.     Labs collected from venipuncture by RN. Vitals taken. Checked in for appointment(s).     Lakesha Barron RN

## 2024-10-16 NOTE — PROGRESS NOTES
BMT Clinic Note  Oct 16, 2024     Patient ID: Arpit BETH White is a 73 year old y/o male who is day +70 post allogenic stem cell transplant for MDS-EB1     Diagnosis MDS-EB1 BMT type Allogenic  CMV  Donor -  Recipient -    Prep ELIEZER (Flu/Cy/TBI) Donor type  8/8 URD ABO D/R O- (matched)   GVHD ppx PTCy, siro, MMF Graft source PBSCT Toxo IgG Negative   Protocol [id number] CD34/kg 6.12E+06    BMT MD Nena Fowler      Hematological history please see Dr Fowler's 9/25/2024 note for complete details    Post transplant  # A.fib with RVR on 8/21/24 which converted to sinus rhythm spontaneously. 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 Max grade 1 (face, chest and upper back). Skin biopsy (9/1224) showed interface dermatitis suggestive of GVHD. Resolved with topical steroids.         INTERVAL HISTORY   Arpit returns feeling great. Rash on chest has resolved. Some residual redness on face. No n/v/d. Good appetite. No new concerns.       EXAM      /71 (BP Location: Right arm, Patient Position: Sitting, Cuff Size: Adult Large)   Pulse 69   Temp 97.8  F (36.6  C) (Oral)   Resp 18   Wt 106.5 kg (234 lb 12.8 oz)   SpO2 98%   BMI 30.98 kg/m    Wt Readings from Last 4 Encounters:   10/16/24 106.5 kg (234 lb 12.8 oz)   10/11/24 105.5 kg (232 lb 9.6 oz)   10/08/24 105.9 kg (233 lb 8 oz)   10/03/24 104.3 kg (230 lb)       KPS: 80% Can perform normal activity with effort, some signs of disease    General: Alert, awake; NAD  HEENT: atraumatic, sclera anicteric. Oral mucosa moist, OP nonerythematous  CV: RRR, systolic murmur   Pulm: CTA bilaterally  Abd: soft, non tender, obese, bs+  Skin: Mild erythema with some acneiform lesions on his face   MSK: No LE edema  Access: none    Acute GVHD          10/11/2024    16:57 9/11/2024    14:35 9/4/2024    14:34   Acute GVHD   New evidence of Acute Jtuju-Vwtbuu-Ttpb Disease developed since last entry? No Yes No       Blood Counts        Recent Labs   Lab Test 10/16/24  1243 10/11/24  1315 10/08/24  1011   HGB 10.4* 10.2* 10.1*   HCT 32.0* 31.9* 31.3*   WBC 4.8 4.5 5.4   ANEUTAUTO 2.7 2.9 3.7   ALYMPAUTO 1.5 1.1 1.0   AMONOAUTO 0.5 0.5 0.5   AEOSAUTO 0.1 0.1 0.1   ABSBASO 0.0 0.0 0.0   NRBCMAN 0.0 0.0 0.0   * 85* 90*           Chemistries     Basic Panel  Recent Labs   Lab Test 10/16/24  1243 10/11/24  1315 10/08/24  1011    140 140   POTASSIUM 3.8 4.3 3.5   CHLORIDE 107 107 106   CO2 22 23 22   BUN 18.0 19.1 16.4   CR 1.12 1.12 1.10   * 155* 195*        Calcium, Magnesium, Phosphorus  Recent Labs   Lab Test 10/16/24  1243 10/11/24  1315 10/08/24  1011 09/10/24  1114 09/06/24  1257 08/26/24  1253 08/25/24  0326 08/24/24  0326 08/23/24  0340   MC 9.8 9.9 9.7   < > 9.5   < > 8.9 9.0 8.8   MAG  --   --   --   --  1.6*  --  1.9 2.0 2.1   PHOS  --   --   --   --   --   --  3.7 3.9 3.5    < > = values in this interval not displayed.        LFTs  Recent Labs   Lab Test 10/16/24  1243 10/11/24  1315 10/08/24  1011   BILITOTAL 0.2 0.2 0.2   ALKPHOS 98 93 91   AST 25 28 22   ALT 24 24 20   ALBUMIN 4.1 4.1 4.1       LDH  No lab results found.      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 No evidence of myeloid neoplasm or monoclonal B-lymphocytosis. Normal male karyotype. NGS negative for DNMT3A (other mutations not tested)  - Need to send NGS for all mutations present at diagnosis with future bone marrows    Graft status/chimerism:   - 8/30/24 (D28): Bone marrow 100%. Peripheral blood CD33 100%, CD3 84%  - 9/12/24: Peripheral blood CD3 96%, CD33 100%  -  9/25/24: Peripheral blood CD3 100%, CD33 100%    Maintenance  Not discussed     GVHD prophylaxis  # Current immunosuppression is sirolimus   - Continue sirolimus with goal 6-8. 10/8 added on   - sirolimus 10-20% skin rash SE     # The current acute GVHD stage is 1.   Skin: Grade 1. Mild erythema on face. Skin biopsy showed mild interface dermatitis suggestive of GVHD. Controlled very well with topical steroids.   - increase hydrocort to bid on face with moisturizer. TID TMC on chest  GI: GI score 0.  Liver: LFTs normal. Liver score 0.    HEME/ COAG  G-CSF last given on 8/22/4.  ABO matched.  Plt trending down likely from elevated sirolimus level    ID  # Prophylaxis  Fungal: Start voriconazole 200mg BID from 9/26/24. Siro dose adjusted.   PJP/ Toxo IgG negative: Bactrim.  Viral: Acyclovir 800 mg bid     # Monitoring  CMV: Monitor weekly till D100. Negative on 9/25  EBV: Monitor every 2 weeks between D30 to D180. Negative on 9/25  IgG: IgG 467 (9/12/24). Check serum IgG level q3 months, and consider IVIG repletion for IgG levels <400 mg/dL.     GI  # Hepatic steatosis and borderline iron deposition in liver  - VOD prophy: Ursodiol done    # Supportive   - Ulcer prophy: PPI  - Alternating constipation and diarrhea: continue psyllium and imodium PRN    CARDIOVASCULAR  # Asymptomatic moderate aortic stenosis    # A.fib with RVR   - One episode while inpatient on 8/21/24 which converted to normal sinus rhythm spontaneously. Was started on metoprolol 25mg BID. Unclear if this is needed long term. Cardiology follow up Nov 11 2024 scheduled  - One previous episode of A.fib with RVR prior to transplant in the context of admission with febrile episode on 3/15/24. Noted on his fitbit, not very symptomatic. DCCV on 3/21/24 restored normal sinus rhythm. Amiodarone and anticoagulation was stopped in April 2024 without any recurrence.     # CAD  - Coronary artery calcifications.   - Continue atorvastatin 10mg, ASA 81mg (resumed on  8/23/24 as platelets stable above 50k)  - Will need an ischemia evaluation in the long term    # HTN: Continue amlodipine 10mg (home med), lisinopril 5mg      RENAL/ELECTROLYTES/  # Urinary frequency, without evidence of retention or infection  # BPH  - Prior imaging demonstrates prostatomegaly along with bladder thickening. This is a long standing issue which has been ongoing since pre-transplant.   - Continue tamsulosin. Flow improving, however frequency persists  - Has been started on low dose oxybutynin on 9/17/24. No improvement in symptoms. Defer further management to urology 10/3 visit-added back flomax and oxybutynin  - Please defer any non-urgent invasive testing.    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 Has been seen by endocrine on 9/11/24. Have recommended repeat HbA1c in 1 month.     RESP  # VINCENT : CPAP     OPHTHALMOLOGY:  # Glaucoma- Lutein supplement; brimonidine, latanaprost eye drops     MUSCULOSKELETAL/FRAILTY  # Gout: Continue allopurinol indefinitely     PMH  - Exposure to agent orange     Post-transplant vaccinations  Flu vaccination given at day 60, then annually   COVID vaccine after D100    Plan     The longitudinal plan of care for the diagnosis(es)/condition(s) as documented were addressed during this visit. Due to the added complexity in care, I will continue to support Arpit in the subsequent management and with ongoing continuity of care.    The longitudinal plan of care for the diagnosis(es)/condition(s) as documented were addressed during this visit. Due to the added complexity in care, I will continue to support Arpit in the subsequent management and with ongoing continuity of care.      30 minutes spent on the date of  the encounter doing chart review, history and exam, lab review, documentation and coordniating care.    Mirna Johnston NP

## 2024-10-17 LAB
CMV DNA SPEC NAA+PROBE-ACNC: NOT DETECTED IU/ML
EBV DNA SERPL NAA+PROBE-ACNC: <35 IU/ML
EBV DNA SERPL NAA+PROBE-LOG#: <1.5 {LOG_COPIES}/ML

## 2024-10-18 LAB
HHV6 DNA # SPEC NAA+PROBE: <500 COPIES/ML
HHV6 DNA SPEC NAA+PROBE-LOG#: <2.7 {LOG_COPIES}/ML

## 2024-10-19 LAB — HADV DNA SPEC QL NAA+PROBE: NOT DETECTED

## 2024-10-24 ENCOUNTER — LAB (OUTPATIENT)
Dept: LAB | Facility: CLINIC | Age: 74
End: 2024-10-24
Attending: PHYSICIAN ASSISTANT
Payer: MEDICARE

## 2024-10-24 ENCOUNTER — OFFICE VISIT (OUTPATIENT)
Dept: TRANSPLANT | Facility: CLINIC | Age: 74
End: 2024-10-24
Attending: PHYSICIAN ASSISTANT
Payer: MEDICARE

## 2024-10-24 VITALS
OXYGEN SATURATION: 98 % | RESPIRATION RATE: 16 BRPM | TEMPERATURE: 97.9 F | BODY MASS INDEX: 31.56 KG/M2 | SYSTOLIC BLOOD PRESSURE: 127 MMHG | HEART RATE: 86 BPM | WEIGHT: 239.2 LBS | DIASTOLIC BLOOD PRESSURE: 68 MMHG

## 2024-10-24 DIAGNOSIS — Z94.81 STATUS POST BONE MARROW TRANSPLANT (H): ICD-10-CM

## 2024-10-24 DIAGNOSIS — D46.9 MDS (MYELODYSPLASTIC SYNDROME) (H): Primary | ICD-10-CM

## 2024-10-24 DIAGNOSIS — E11.319 TYPE 2 DIABETES MELLITUS WITH RETINOPATHY, WITHOUT LONG-TERM CURRENT USE OF INSULIN, MACULAR EDEMA PRESENCE UNSPECIFIED, UNSPECIFIED LATERALITY, UNSPECIFIED RETINOPATHY SEVERITY (H): Primary | ICD-10-CM

## 2024-10-24 LAB
ALBUMIN SERPL BCG-MCNC: 4.1 G/DL (ref 3.5–5.2)
ALP SERPL-CCNC: 110 U/L (ref 40–150)
ALT SERPL W P-5'-P-CCNC: 22 U/L (ref 0–70)
ANION GAP SERPL CALCULATED.3IONS-SCNC: 10 MMOL/L (ref 7–15)
AST SERPL W P-5'-P-CCNC: 25 U/L (ref 0–45)
BASOPHILS # BLD AUTO: 0 10E3/UL (ref 0–0.2)
BASOPHILS NFR BLD AUTO: 0 %
BILIRUB SERPL-MCNC: 0.2 MG/DL
BUN SERPL-MCNC: 19.6 MG/DL (ref 8–23)
CALCIUM SERPL-MCNC: 9.8 MG/DL (ref 8.8–10.4)
CHLORIDE SERPL-SCNC: 106 MMOL/L (ref 98–107)
CREAT SERPL-MCNC: 1.14 MG/DL (ref 0.67–1.17)
EGFRCR SERPLBLD CKD-EPI 2021: 67 ML/MIN/1.73M2
EOSINOPHIL # BLD AUTO: 0.1 10E3/UL (ref 0–0.7)
EOSINOPHIL NFR BLD AUTO: 1 %
ERYTHROCYTE [DISTWIDTH] IN BLOOD BY AUTOMATED COUNT: 15.9 % (ref 10–15)
EST. AVERAGE GLUCOSE BLD GHB EST-MCNC: 148 MG/DL
GLUCOSE SERPL-MCNC: 218 MG/DL (ref 70–99)
HBA1C MFR BLD: 6.8 %
HCO3 SERPL-SCNC: 22 MMOL/L (ref 22–29)
HCT VFR BLD AUTO: 30.5 % (ref 40–53)
HGB BLD-MCNC: 10.3 G/DL (ref 13.3–17.7)
IMM GRANULOCYTES # BLD: 0 10E3/UL
IMM GRANULOCYTES NFR BLD: 0 %
LYMPHOCYTES # BLD AUTO: 1.5 10E3/UL (ref 0.8–5.3)
LYMPHOCYTES NFR BLD AUTO: 25 %
MCH RBC QN AUTO: 29.2 PG (ref 26.5–33)
MCHC RBC AUTO-ENTMCNC: 33.8 G/DL (ref 31.5–36.5)
MCV RBC AUTO: 86 FL (ref 78–100)
MONOCYTES # BLD AUTO: 0.7 10E3/UL (ref 0–1.3)
MONOCYTES NFR BLD AUTO: 11 %
NEUTROPHILS # BLD AUTO: 3.8 10E3/UL (ref 1.6–8.3)
NEUTROPHILS NFR BLD AUTO: 63 %
NRBC # BLD AUTO: 0 10E3/UL
NRBC BLD AUTO-RTO: 0 /100
PLATELET # BLD AUTO: 174 10E3/UL (ref 150–450)
POTASSIUM SERPL-SCNC: 3.9 MMOL/L (ref 3.4–5.3)
PROT SERPL-MCNC: 6.6 G/DL (ref 6.4–8.3)
RBC # BLD AUTO: 3.53 10E6/UL (ref 4.4–5.9)
SIROLIMUS BLD-MCNC: 5.6 UG/L (ref 5–15)
SODIUM SERPL-SCNC: 138 MMOL/L (ref 135–145)
TME LAST DOSE: NORMAL H
TME LAST DOSE: NORMAL H
WBC # BLD AUTO: 6 10E3/UL (ref 4–11)

## 2024-10-24 PROCEDURE — 99417 PROLNG OP E/M EACH 15 MIN: CPT

## 2024-10-24 PROCEDURE — G0463 HOSPITAL OUTPT CLINIC VISIT: HCPCS

## 2024-10-24 PROCEDURE — 85025 COMPLETE CBC W/AUTO DIFF WBC: CPT | Performed by: STUDENT IN AN ORGANIZED HEALTH CARE EDUCATION/TRAINING PROGRAM

## 2024-10-24 PROCEDURE — 80195 ASSAY OF SIROLIMUS: CPT

## 2024-10-24 PROCEDURE — 36415 COLL VENOUS BLD VENIPUNCTURE: CPT | Performed by: STUDENT IN AN ORGANIZED HEALTH CARE EDUCATION/TRAINING PROGRAM

## 2024-10-24 PROCEDURE — 99215 OFFICE O/P EST HI 40 MIN: CPT

## 2024-10-24 PROCEDURE — 80053 COMPREHEN METABOLIC PANEL: CPT | Performed by: STUDENT IN AN ORGANIZED HEALTH CARE EDUCATION/TRAINING PROGRAM

## 2024-10-24 PROCEDURE — 83036 HEMOGLOBIN GLYCOSYLATED A1C: CPT

## 2024-10-24 ASSESSMENT — PAIN SCALES - GENERAL: PAINLEVEL_OUTOF10: NO PAIN (0)

## 2024-10-24 NOTE — LETTER
10/24/2024      Arpit Forrest  [address] [place] [address]      Dear Colleague,    Thank you for referring your patient, Arpit Forrest, to the [facility] BLOOD AND MARROW TRANSPLANT PROGRAM [place]. Please see a copy of my visit note below.    BMT Clinic Note  Oct 24, 2024     Patient ID: Arpit Forrest is a 73 year old y/o male who is day +78 post allogenic stem cell transplant for MDS-EB1     Diagnosis MDS-EB1 BMT type Allogenic  CMV  Donor -  Recipient -    Prep ELIEZER (Flu/Cy/TBI) Donor type  8/8 URD ABO D/R O- (matched)   GVHD ppx PTCy, siro, MMF Graft source PBSCT Toxo IgG Negative   Protocol [id number] CD34/kg 6.12E+06    BMT MD Nena Fowler      Hematological history please see Dr Fowler's 9/25/2024 note for complete details    Post transplant  # A.fib with RVR on 8/21/24 which converted to sinus rhythm spontaneously. 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 Max grade 1 (face, chest and upper back). Skin biopsy (9/1224) showed interface dermatitis suggestive of GVHD. Resolved with topical steroids.         INTERVAL HISTORY     Arpit returns feeling great - his rash continues to improve. He still has some residual redness on his face and a faint rash on his mid chest but he continues to see improvement despite stopping topical steroids over a week ago. He is eating well. No n/v/d. He is concerned about his fast sugars at home but likely this is medication induced and he has appropriate follow up with endocrine already scheduled. He continues to have floaters in his right eye but he is also following with a retinal specialist for this. No new concerns.       EXAM      /68   Pulse 86   Temp 97.9  F (36.6  C) (Oral)   Resp 16   Wt 108.5 kg (239 lb 3.2 oz)   SpO2 98%   BMI 31.56 kg/m      Wt Readings from Last 4 Encounters:   10/24/24 108.5 kg (239 lb 3.2 oz)   10/16/24 106.5 kg (234 lb 12.8 oz)   10/11/24 105.5 kg (232 lb 9.6 oz)    10/08/24 105.9 kg (233 lb 8 oz)     KPS: 80% Can perform normal activity with effort, some signs of disease    General: Alert, awake; NAD  HEENT: atraumatic, sclera anicteric. Oral mucosa moist, OP nonerythematous  CV: RRR, systolic murmur   Pulm: CTA bilaterally  Abd: soft, non tender, obese, bs+  Skin: Mild erythema with some acneiform lesions on his face - improving per patient. Faint erythematous maculopapular rash on mid chest.  MSK: 1+ pitting edema in bilateral LE.    Acute GVHD          10/24/2024    13:25 10/11/2024    16:57 9/11/2024    14:35   Acute GVHD   New evidence of Acute Xuyki-Jqyast-Xpod Disease developed since last entry? No No Yes      Laboratory Studies:     Lab Results   Component Value Date    WBC 6.0 10/24/2024    ANEU 2.2 09/12/2024    HGB 10.3 (L) 10/24/2024    HCT 30.5 (L) 10/24/2024     10/24/2024     10/24/2024    POTASSIUM 3.9 10/24/2024    CHLORIDE 106 10/24/2024    CO2 22 10/24/2024     (H) 10/24/2024    BUN 19.6 10/24/2024    CR 1.14 10/24/2024    MAG 1.6 (L) 09/06/2024    INR 1.17 (H) 08/19/2024        ASSESSMENT AND PLAN     BMT for high risk MDS: D+78  - Chemo protocol: [id number]; Flu, Cy, TBI  - Peripheral blood stem cell graft from 8/8 donor, ABO matched (O neg)  - Continue allopurinol (hx of gout)    Disease status:   - 8/30/24 (D28): Slightly hypercellular marrow (30-40%). No evidence of myeloid neoplasm or monoclonal B-lymphocytosis. Normal male karyotype. NGS negative for DNMT3A (other mutations not tested)  - Need to send NGS for all mutations present at diagnosis with future bone marrows    Graft status/chimerism:   - 8/30/24 (D28): Bone marrow 100%. Peripheral blood CD33 100%, CD3 84%  - 9/12/24: Peripheral blood CD3 96%, CD33 100%  - 9/25/24: Peripheral blood CD3 100%, CD33 100%    Maintenance  Not discussed     GVHD prophylaxis  # Current immunosuppression is sirolimus   - Continue sirolimus with goal 6-8. (10/24) Sirolimus level pending  -  sirolimus 10-20% skin rash SE     # The current acute GVHD stage is 1 -- (10/24) rash continues to improve  Skin: Grade 1. Mild erythema on face. Skin biopsy showed mild interface dermatitis suggestive of GVHD. Controlled very well with topical steroids.   - Using topical steroids PRN now since 10/11  GI: GI score 0.  Liver: LFTs normal. Liver score 0.    HEME/ COAG  G-CSF last given on 8/22/4.  ABO matched.  Plt trending down likely from elevated sirolimus level    ID  # Prophylaxis  Fungal: Start voriconazole 200mg BID from 9/26/24. Siro dose adjusted.   PJP/ Toxo IgG negative: Bactrim.  Viral: Acyclovir 800 mg bid     # Monitoring  CMV/EBV pending (10/24)  CMV: Monitor weekly till D100. 10/16 neg  EBV: Monitor every 2 weeks between D30 to D180. 10/16 <35  IgG: IgG 467 (9/12/24). Check serum IgG level q3 months, and consider IVIG repletion for IgG levels <400 mg/dL.     GI  # Hepatic steatosis and borderline iron deposition in liver  - VOD prophy: Ursodiol done    # Supportive   - Ulcer prophy: PPI  - Alternating constipation and diarrhea: continue psyllium and imodium PRN    CARDIOVASCULAR  # Asymptomatic moderate aortic stenosis    # A.fib with RVR   - One episode while inpatient on 8/21/24 which converted to normal sinus rhythm spontaneously. Was started on metoprolol 25mg BID. Unclear if this is needed long term. Cardiology follow up Nov 11 2024 scheduled  - One previous episode of A.fib with RVR prior to transplant in the context of admission with febrile episode on 3/15/24. Noted on his fitbit, not very symptomatic. DCCV on 3/21/24 restored normal sinus rhythm. Amiodarone and anticoagulation was stopped in April 2024 without any recurrence.     # CAD  - Coronary artery calcifications.   - Continue atorvastatin 10mg, ASA 81mg (resumed on 8/23/24 as platelets stable above 50k)  - Will need an ischemia evaluation in the long term    # HTN: Continue amlodipine 10mg (home med), lisinopril 5mg       RENAL/ELECTROLYTES/  # Urinary frequency, without evidence of retention or infection  # BPH  - Prior imaging demonstrates prostatomegaly along with bladder thickening. This is a long standing issue which has been ongoing since pre-transplant.   - Continue tamsulosin. Flow improving, however frequency persists  - Has been started on low dose oxybutynin on 9/17/24. No improvement in symptoms. Defer further management to urology 10/3 visit-added back flomax and oxybutynin  - Please defer any non-urgent invasive testing.    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 Has been seen by endocrine on 9/11/24. Have recommended repeat HbA1c in 1 month -- repeating today 10/14    RESP  # VINCENT : CPAP     OPHTHALMOLOGY:  # Glaucoma - Lutein supplement; brimonidine, latanaprost eye drops.   # Right eye floaters: Following retinal specialist      MUSCULOSKELETAL/FRAILTY  # Gout: Continue allopurinol indefinitely     PMH  - Exposure to agent orange     Post-transplant vaccinations  Flu vaccination given at day 60, then annually   COVID vaccine after D100    Today's Summary:  Prefers all refills be sent to [facility] - he will call them today to get rx's transferred  Added on HbgA1c as it should be done monthly    RTC qweekly or sooner for new concerns    60 minutes spent on the date of the encounter doing chart review, history and exam, lab review, documentation and coordniating care.    Rangel Villalta PA-C       Again, thank you for allowing me to participate in the care of your patient.        Sincerely,        BMT Advanced Practice Provider

## 2024-10-24 NOTE — PROGRESS NOTES
BMT Clinic Note  Oct 24, 2024     Patient ID: Arpit Forrest is a 73 year old y/o male who is day +78 post allogenic stem cell transplant for MDS-EB1     Diagnosis MDS-EB1 BMT type Allogenic  CMV  Donor -  Recipient -    Prep ELIEZER (Flu/Cy/TBI) Donor type  8/8 URD ABO D/R O- (matched)   GVHD ppx PTCy, siro, MMF Graft source PBSCT Toxo IgG Negative   Protocol [id number] CD34/kg 6.12E+06    BMT MD Nena Fowler      Hematological history please see Dr Fowler's 9/25/2024 note for complete details    Post transplant  # A.fib with RVR on 8/21/24 which converted to sinus rhythm spontaneously. 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 Max grade 1 (face, chest and upper back). Skin biopsy (9/1224) showed interface dermatitis suggestive of GVHD. Resolved with topical steroids.         INTERVAL HISTORY     Arpit returns feeling great - his rash continues to improve. He still has some residual redness on his face and a faint rash on his mid chest but he continues to see improvement despite stopping topical steroids over a week ago. He is eating well. No n/v/d. He is concerned about his fast sugars at home but likely this is medication induced and he has appropriate follow up with endocrine already scheduled. He continues to have floaters in his right eye but he is also following with a retinal specialist for this. No new concerns.       EXAM      /68   Pulse 86   Temp 97.9  F (36.6  C) (Oral)   Resp 16   Wt 108.5 kg (239 lb 3.2 oz)   SpO2 98%   BMI 31.56 kg/m      Wt Readings from Last 4 Encounters:   10/24/24 108.5 kg (239 lb 3.2 oz)   10/16/24 106.5 kg (234 lb 12.8 oz)   10/11/24 105.5 kg (232 lb 9.6 oz)   10/08/24 105.9 kg (233 lb 8 oz)     KPS: 80% Can perform normal activity with effort, some signs of disease    General: Alert, awake; NAD  HEENT: atraumatic, sclera anicteric. Oral mucosa moist, OP nonerythematous  CV: RRR, systolic murmur   Pulm: CTA  bilaterally  Abd: soft, non tender, obese, bs+  Skin: Mild erythema with some acneiform lesions on his face - improving per patient. Faint erythematous maculopapular rash on mid chest.  MSK: 1+ pitting edema in bilateral LE.    Acute GVHD          10/24/2024    13:25 10/11/2024    16:57 9/11/2024    14:35   Acute GVHD   New evidence of Acute Pxhpc-Acuold-Nuge Disease developed since last entry? No No Yes      Laboratory Studies:     Lab Results   Component Value Date    WBC 6.0 10/24/2024    ANEU 2.2 09/12/2024    HGB 10.3 (L) 10/24/2024    HCT 30.5 (L) 10/24/2024     10/24/2024     10/24/2024    POTASSIUM 3.9 10/24/2024    CHLORIDE 106 10/24/2024    CO2 22 10/24/2024     (H) 10/24/2024    BUN 19.6 10/24/2024    CR 1.14 10/24/2024    MAG 1.6 (L) 09/06/2024    INR 1.17 (H) 08/19/2024        ASSESSMENT AND PLAN     BMT for high risk MDS: D+78  - Chemo protocol: [id number]; Flu, Cy, TBI  - Peripheral blood stem cell graft from 8/8 donor, ABO matched (O neg)  - Continue allopurinol (hx of gout)    Disease status:   - 8/30/24 (D28): Slightly hypercellular marrow (30-40%). No evidence of myeloid neoplasm or monoclonal B-lymphocytosis. Normal male karyotype. NGS negative for DNMT3A (other mutations not tested)  - Need to send NGS for all mutations present at diagnosis with future bone marrows    Graft status/chimerism:   - 8/30/24 (D28): Bone marrow 100%. Peripheral blood CD33 100%, CD3 84%  - 9/12/24: Peripheral blood CD3 96%, CD33 100%  - 9/25/24: Peripheral blood CD3 100%, CD33 100%    Maintenance  Not discussed     GVHD prophylaxis  # Current immunosuppression is sirolimus   - Continue sirolimus with goal 6-8. (10/24) Sirolimus level pending  - sirolimus 10-20% skin rash SE     # The current acute GVHD stage is 1 -- (10/24) rash continues to improve  Skin: Grade 1. Mild erythema on face. Skin biopsy showed mild interface dermatitis suggestive of GVHD. Controlled very well with topical steroids.   -  Using topical steroids PRN now since 10/11  GI: GI score 0.  Liver: LFTs normal. Liver score 0.    HEME/ COAG  G-CSF last given on 8/22/4.  ABO matched.  Plt trending down likely from elevated sirolimus level    ID  # Prophylaxis  Fungal: Start voriconazole 200mg BID from 9/26/24. Siro dose adjusted.   PJP/ Toxo IgG negative: Bactrim.  Viral: Acyclovir 800 mg bid     # Monitoring  CMV/EBV pending (10/24)  CMV: Monitor weekly till D100. 10/16 neg  EBV: Monitor every 2 weeks between D30 to D180. 10/16 <35  IgG: IgG 467 (9/12/24). Check serum IgG level q3 months, and consider IVIG repletion for IgG levels <400 mg/dL.     GI  # Hepatic steatosis and borderline iron deposition in liver  - VOD prophy: Ursodiol done    # Supportive   - Ulcer prophy: PPI  - Alternating constipation and diarrhea: continue psyllium and imodium PRN    CARDIOVASCULAR  # Asymptomatic moderate aortic stenosis    # A.fib with RVR   - One episode while inpatient on 8/21/24 which converted to normal sinus rhythm spontaneously. Was started on metoprolol 25mg BID. Unclear if this is needed long term. Cardiology follow up Nov 11 2024 scheduled  - One previous episode of A.fib with RVR prior to transplant in the context of admission with febrile episode on 3/15/24. Noted on his fitbit, not very symptomatic. DCCV on 3/21/24 restored normal sinus rhythm. Amiodarone and anticoagulation was stopped in April 2024 without any recurrence.     # CAD  - Coronary artery calcifications.   - Continue atorvastatin 10mg, ASA 81mg (resumed on 8/23/24 as platelets stable above 50k)  - Will need an ischemia evaluation in the long term    # HTN: Continue amlodipine 10mg (home med), lisinopril 5mg      RENAL/ELECTROLYTES/  # Urinary frequency, without evidence of retention or infection  # BPH  - Prior imaging demonstrates prostatomegaly along with bladder thickening. This is a long standing issue which has been ongoing since pre-transplant.   - Continue tamsulosin. Flow  improving, however frequency persists  - Has been started on low dose oxybutynin on 9/17/24. No improvement in symptoms. Defer further management to urology 10/3 visit-added back flomax and oxybutynin  - Please defer any non-urgent invasive testing.    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 Has been seen by endocrine on 9/11/24. Have recommended repeat HbA1c in 1 month -- repeating today 10/14    RESP  # VINCENT : CPAP     OPHTHALMOLOGY:  # Glaucoma - Lutein supplement; brimonidine, latanaprost eye drops.   # Right eye floaters: Following retinal specialist      MUSCULOSKELETAL/FRAILTY  # Gout: Continue allopurinol indefinitely     PMH  - Exposure to agent orange     Post-transplant vaccinations  Flu vaccination given at day 60, then annually   COVID vaccine after D100    Today's Summary:  Prefers all refills be sent to [facility] - he will call them today to get rx's transferred  Added on HbgA1c as it should be done monthly    RTC qweekly or sooner for new concerns    60 minutes spent on the date of the encounter doing chart review, history and exam, lab review, documentation and coordniating care.    Rangel Villalta PA-C

## 2024-10-24 NOTE — NURSING NOTE
"Oncology Rooming Note    October 24, 2024 12:23 PM   Arpit Forrest is a 73 year old male who presents for:    Chief Complaint   Patient presents with    Blood Draw     Labs drawn via  by RN in lab.  VS taken    Oncology Clinic Visit     MDS     Initial Vitals: /68   Pulse 86   Temp 97.9  F (36.6  C) (Oral)   Resp 16   Wt 108.5 kg (239 lb 3.2 oz)   SpO2 98%   BMI 31.56 kg/m   Estimated body mass index is 31.56 kg/m  as calculated from the following:    Height as of 10/3/24: 1.854 m (6' 1\").    Weight as of this encounter: 108.5 kg (239 lb 3.2 oz). Body surface area is 2.36 meters squared.  No Pain (0) Comment: Data Unavailable   No LMP for male patient.  Allergies reviewed: Yes  Medications reviewed: Yes    Medications: Medication refills not needed today.  Pharmacy name entered into [employer]: [id number] - [place], [place] - [address] [place] [address]    Frailty Screening:   Is the patient here for a new oncology consult visit in cancer care? 2. No      Clinical concerns:       Lorna Plata              "

## 2024-10-25 LAB — CMV DNA SPEC NAA+PROBE-ACNC: NOT DETECTED IU/ML

## 2024-10-29 ENCOUNTER — OFFICE VISIT (OUTPATIENT)
Dept: TRANSPLANT | Facility: CLINIC | Age: 74
End: 2024-10-29
Attending: PHYSICIAN ASSISTANT
Payer: MEDICARE

## 2024-10-29 ENCOUNTER — LAB (OUTPATIENT)
Dept: LAB | Facility: CLINIC | Age: 74
End: 2024-10-29
Attending: PHYSICIAN ASSISTANT
Payer: MEDICARE

## 2024-10-29 VITALS
DIASTOLIC BLOOD PRESSURE: 66 MMHG | RESPIRATION RATE: 18 BRPM | OXYGEN SATURATION: 97 % | BODY MASS INDEX: 31.51 KG/M2 | SYSTOLIC BLOOD PRESSURE: 113 MMHG | WEIGHT: 238.8 LBS | HEART RATE: 80 BPM | TEMPERATURE: 97.4 F

## 2024-10-29 DIAGNOSIS — D46.9 MDS (MYELODYSPLASTIC SYNDROME) (H): Primary | ICD-10-CM

## 2024-10-29 DIAGNOSIS — Z94.81 STATUS POST BONE MARROW TRANSPLANT (H): ICD-10-CM

## 2024-10-29 DIAGNOSIS — I10 HYPERTENSION, UNSPECIFIED TYPE: ICD-10-CM

## 2024-10-29 DIAGNOSIS — D46.9 MYELODYSPLASTIC SYNDROME (H): ICD-10-CM

## 2024-10-29 DIAGNOSIS — Z94.81 STATUS POST BONE MARROW TRANSPLANT (H): Primary | ICD-10-CM

## 2024-10-29 DIAGNOSIS — E11.319 TYPE 2 DIABETES MELLITUS WITH RETINOPATHY, WITHOUT LONG-TERM CURRENT USE OF INSULIN, MACULAR EDEMA PRESENCE UNSPECIFIED, UNSPECIFIED LATERALITY, UNSPECIFIED RETINOPATHY SEVERITY (H): ICD-10-CM

## 2024-10-29 LAB
ALBUMIN SERPL BCG-MCNC: 4.1 G/DL (ref 3.5–5.2)
ALP SERPL-CCNC: 103 U/L (ref 40–150)
ALT SERPL W P-5'-P-CCNC: 19 U/L (ref 0–70)
ANION GAP SERPL CALCULATED.3IONS-SCNC: 11 MMOL/L (ref 7–15)
AST SERPL W P-5'-P-CCNC: 22 U/L (ref 0–45)
BASOPHILS # BLD AUTO: 0 10E3/UL (ref 0–0.2)
BASOPHILS NFR BLD AUTO: 0 %
BILIRUB SERPL-MCNC: 0.2 MG/DL
BUN SERPL-MCNC: 17.7 MG/DL (ref 8–23)
CALCIUM SERPL-MCNC: 9.7 MG/DL (ref 8.8–10.4)
CHLORIDE SERPL-SCNC: 107 MMOL/L (ref 98–107)
CREAT SERPL-MCNC: 1.18 MG/DL (ref 0.67–1.17)
EGFRCR SERPLBLD CKD-EPI 2021: 64 ML/MIN/1.73M2
EOSINOPHIL # BLD AUTO: 0.1 10E3/UL (ref 0–0.7)
EOSINOPHIL NFR BLD AUTO: 1 %
ERYTHROCYTE [DISTWIDTH] IN BLOOD BY AUTOMATED COUNT: 16 % (ref 10–15)
GLUCOSE SERPL-MCNC: 138 MG/DL (ref 70–99)
HCO3 SERPL-SCNC: 23 MMOL/L (ref 22–29)
HCT VFR BLD AUTO: 30.9 % (ref 40–53)
HGB BLD-MCNC: 10 G/DL (ref 13.3–17.7)
IMM GRANULOCYTES # BLD: 0 10E3/UL
IMM GRANULOCYTES NFR BLD: 0 %
LYMPHOCYTES # BLD AUTO: 1.7 10E3/UL (ref 0.8–5.3)
LYMPHOCYTES NFR BLD AUTO: 27 %
MCH RBC QN AUTO: 28.5 PG (ref 26.5–33)
MCHC RBC AUTO-ENTMCNC: 32.4 G/DL (ref 31.5–36.5)
MCV RBC AUTO: 88 FL (ref 78–100)
MONOCYTES # BLD AUTO: 0.7 10E3/UL (ref 0–1.3)
MONOCYTES NFR BLD AUTO: 12 %
NEUTROPHILS # BLD AUTO: 3.8 10E3/UL (ref 1.6–8.3)
NEUTROPHILS NFR BLD AUTO: 60 %
NRBC # BLD AUTO: 0 10E3/UL
NRBC BLD AUTO-RTO: 0 /100
PLATELET # BLD AUTO: 180 10E3/UL (ref 150–450)
POTASSIUM SERPL-SCNC: 3.9 MMOL/L (ref 3.4–5.3)
PROT SERPL-MCNC: 6.5 G/DL (ref 6.4–8.3)
RBC # BLD AUTO: 3.51 10E6/UL (ref 4.4–5.9)
SIROLIMUS BLD-MCNC: 5.4 UG/L (ref 5–15)
SODIUM SERPL-SCNC: 141 MMOL/L (ref 135–145)
TME LAST DOSE: NORMAL H
TME LAST DOSE: NORMAL H
URATE SERPL-MCNC: 3.4 MG/DL (ref 3.4–7)
WBC # BLD AUTO: 6.3 10E3/UL (ref 4–11)

## 2024-10-29 PROCEDURE — 36415 COLL VENOUS BLD VENIPUNCTURE: CPT

## 2024-10-29 PROCEDURE — 87799 DETECT AGENT NOS DNA QUANT: CPT

## 2024-10-29 PROCEDURE — 80053 COMPREHEN METABOLIC PANEL: CPT | Performed by: STUDENT IN AN ORGANIZED HEALTH CARE EDUCATION/TRAINING PROGRAM

## 2024-10-29 PROCEDURE — 99417 PROLNG OP E/M EACH 15 MIN: CPT

## 2024-10-29 PROCEDURE — 80195 ASSAY OF SIROLIMUS: CPT

## 2024-10-29 PROCEDURE — 99215 OFFICE O/P EST HI 40 MIN: CPT

## 2024-10-29 PROCEDURE — G0463 HOSPITAL OUTPT CLINIC VISIT: HCPCS

## 2024-10-29 PROCEDURE — 84550 ASSAY OF BLOOD/URIC ACID: CPT

## 2024-10-29 PROCEDURE — 85025 COMPLETE CBC W/AUTO DIFF WBC: CPT | Performed by: STUDENT IN AN ORGANIZED HEALTH CARE EDUCATION/TRAINING PROGRAM

## 2024-10-29 RX ORDER — AMLODIPINE BESYLATE 10 MG/1
5 TABLET ORAL EVERY MORNING
COMMUNITY
Start: 2024-10-29

## 2024-10-29 ASSESSMENT — PAIN SCALES - GENERAL: PAINLEVEL_OUTOF10: NO PAIN (0)

## 2024-10-29 NOTE — PROGRESS NOTES
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 Max grade 1 (face, chest and upper back). Skin biopsy (9/1224) showed interface dermatitis suggestive of GVHD. Resolved with topical steroids.       INTERVAL HISTORY     Arpit is feeling well today. He is eating well. No nausea, vomiting, or diarrhea. His rash on his face is stable -- he is only moisturizing now. His rash on his chest is also continuing to improve - he is using topical maybe 1-2x per day. No new concerns today.     EXAM      /66   Pulse 80   Temp 97.4  F (36.3  C) (Oral)   Resp 18   Wt 108.3 kg (238 lb 12.8 oz)   SpO2 97%   BMI 31.51 kg/m      Wt Readings from Last 4 Encounters:   10/29/24 108.3 kg (238 lb 12.8 oz)   10/24/24 108.5 kg (239 lb 3.2 oz)   10/16/24 106.5 kg (234 lb 12.8 oz)   10/11/24 105.5 kg (232 lb 9.6 oz)     KPS: 80% Can perform normal activity with effort, some signs of disease    General: Alert, awake; NAD  HEENT: atraumatic, sclera anicteric. Oral mucosa moist, OP nonerythematous  CV: RRR, systolic murmur   Pulm: CTA bilaterally  Abd: soft, non tender, obese, bs+  Skin: Mild erythema with some acneiform lesions on his face - improving per patient. Faint erythematous maculopapular rash on mid chest.  MSK: 1+ pitting edema in bilateral LE. Tenderness with deep palpation of the right lateral  ankle.    Acute GVHD          10/24/2024    13:25 10/11/2024    16:57 9/11/2024    14:35   Acute GVHD   New evidence of Acute Kghzb-Hxvgbf-Euxa Disease developed since last entry? 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 No evidence of myeloid neoplasm or monoclonal B-lymphocytosis. Normal male karyotype. NGS negative for DNMT3A (other mutations not tested)  - Need to send NGS for all mutations present at diagnosis with future bone marrows    Graft status/chimerism:   - 8/30/24 (D28): Bone marrow 100%. Peripheral blood CD33 100%, CD3 84%  - 9/12/24: Peripheral blood CD3 96%, CD33 100%  - 9/25/24: Peripheral blood CD3 100%, CD33 100%    Maintenance  Not discussed     GVHD prophylaxis  # Current immunosuppression is sirolimus   - Continue sirolimus with goal 6-8. (10/24) Sirolimus level pending  - sirolimus 10-20% skin rash SE     # The current acute GVHD stage is 1 -- (10/29) rash is stable/to improved  Skin: Grade 1. Mild erythema on face. Skin biopsy showed mild interface dermatitis suggestive of GVHD. Controlled very well with topical steroids.   - Using topical steroids PRN on face now since 10/11. Using topicals 1-2x today on torso.  GI: GI score 0.  Liver: LFTs normal. Liver score 0.    HEME/ COAG  G-CSF last given on 8/22/4.  ABO matched.  # Anemia 2/2 to  chemo/PBSCT    ID  # Prophylaxis  Fungal: Start voriconazole 200mg BID from 9/26/24. Siro dose adjusted.   PJP/ Toxo IgG negative: Bactrim.  Viral: Acyclovir 800 mg bid     # Monitoring  CMV/EBV pending (10/29)  CMV: Monitor weekly till D100. 10/24 neg  EBV: Monitor every 2 weeks between D30 to D180. 10/16 <35  IgG: IgG 467 (9/12/24). Check serum IgG level q3 months, and consider IVIG repletion for IgG levels <400 mg/dL.     GI  # Hepatic steatosis and borderline iron deposition in liver  - VOD prophy: Ursodiol done    # Supportive   - Ulcer prophy: PPI  - Alternating constipation and diarrhea: continue psyllium and imodium PRN    CARDIOVASCULAR  # Asymptomatic moderate aortic stenosis    # A.fib with RVR   - One episode while inpatient on 8/21/24 which converted to normal sinus rhythm spontaneously. Was started on metoprolol 25mg BID. Unclear if this is needed long term. Cardiology follow up Nov 11 2024 scheduled  - One previous episode of A.fib with RVR prior to transplant in the context of admission with febrile episode on 3/15/24. Noted on his fitbit, not very symptomatic. DCCV on 3/21/24 restored normal sinus rhythm. Amiodarone and anticoagulation was stopped in April 2024 without any recurrence.     # CAD  - Coronary artery calcifications.   - Continue atorvastatin 10mg, ASA 81mg (resumed on 8/23/24 as platelets stable above 50k)  - Will need an ischemia evaluation in the long term    # HTN: Continue amlodipine 10mg (home med), lisinopril 5mg   (10/29) Lower extremity edema, more bothersome but not necessary worse edema than patient's baseline, BP is 110s-120s -- will decrease amlodipine dose to 5 mg daily to see if this improves his edema at all.      RENAL/ELECTROLYTES/  # Urinary frequency, without evidence of retention or infection  # BPH  - Prior imaging demonstrates prostatomegaly along with bladder thickening. This is a long standing issue which has been ongoing since pre-transplant.   - Continue  tamsulosin. Flow improving, however frequency persists  - Has been started on low dose oxybutynin on 9/17/24. No improvement in symptoms. Defer further management to urology 10/3 visit-added back flomax and oxybutynin  - Please defer any non-urgent invasive testing.    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 Has been seen by endocrine on 9/11/24. Have recommended repeat HbA1c in 1 month -- 10/14 6.8    RESP  # VINCENT : CPAP     OPHTHALMOLOGY:  # Glaucoma - Lutein supplement; brimonidine, latanaprost eye drops.   # Right eye floaters: Following retinal specialist      MUSCULOSKELETAL/FRAILTY  # Gout: Continue allopurinol indefinitely. (10/29) Hx of gout in ankle, some right ankle pain today, suspicious that this is secondary to ongoing edema but checking uric acid to rule out gout flare. Uric acid is WNL.    PMH  - Exposure to agent orange     Post-transplant vaccinations  Flu vaccination given at day 60, then annually   COVID vaccine after D100    Today's Summary:  Decrease amlodipine to 5 mg/d  Compression stockings    RTC qweekly or sooner for new concerns    60 minutes spent on the date of the encounter doing chart review, history and exam, lab review, documentation and coordniating care.    Rangel Villalta PA-C

## 2024-10-29 NOTE — NURSING NOTE
"Oncology Rooming Note    October 29, 2024 12:26 PM   Arpit Forrest is a 73 year old male who presents for:    Chief Complaint   Patient presents with    Blood Draw     Vitals, blood draw,  by MA. Pt checked into appt.    Oncology Clinic Visit     MDS (myelodysplastic syndrome)        Initial Vitals: /66   Pulse 80   Temp 97.4  F (36.3  C) (Oral)   Resp 18   Wt 108.3 kg (238 lb 12.8 oz)   SpO2 97%   BMI 31.51 kg/m   Estimated body mass index is 31.51 kg/m  as calculated from the following:    Height as of 10/3/24: 1.854 m (6' 1\").    Weight as of this encounter: 108.3 kg (238 lb 12.8 oz). Body surface area is 2.36 meters squared.  No Pain (0) Comment: Data Unavailable   No LMP for male patient.  Allergies reviewed: Yes  Medications reviewed: Yes    Medications: REFILLS NEEDED, PROVIDER NOT NOTIFIED  Pharmacy name entered into [facility]: [id number] - [place], [place] - [address] AT [facility] & [facility] [address]    Frailty Screening:   Is the patient here for a new oncology consult visit in cancer care? 2. No      Clinical concerns:  lisinprol, pantoprazole, and bactrim need refills sent to [facility] in [place] (would like future orders to be sent to this pharmacy, Pt stated there was difficulty transferring when they spoke with them).      Jeannie Suggs              "

## 2024-10-29 NOTE — NURSING NOTE
Chief Complaint   Patient presents with    Blood Draw     Vitals, blood draw,  by MA. Pt checked into appt.     Felipa Lopez MA

## 2024-10-29 NOTE — LETTER
10/29/2024      Arpit Forrest  [id number] [facility] [address]      Dear Colleague,    Thank you for referring your patient, Arpit Forrest, to the [facility]. Please see a copy of my visit note below.    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 Max grade 1 (face, chest and upper back). Skin biopsy (9/1224) showed interface dermatitis suggestive of GVHD. Resolved with topical steroids.       INTERVAL HISTORY     Arpit is feeling well today. He is eating well. No nausea, vomiting, or diarrhea. His rash on his face is stable -- he is only moisturizing now. His rash on his chest is also continuing to improve - he is using topical maybe 1-2x per day. No new concerns today.     EXAM      /66   Pulse 80   Temp 97.4  F (36.3  C) (Oral)   Resp 18   Wt 108.3 kg (238 lb 12.8 oz)   SpO2 97%   BMI 31.51 kg/m      Wt Readings from Last 4 Encounters:   10/29/24 108.3 kg (238 lb 12.8 oz)   10/24/24 108.5 kg (239 lb 3.2 oz)   10/16/24 106.5 kg (234 lb 12.8 oz)   10/11/24 105.5 kg (232 lb 9.6 oz)     KPS: 80% Can perform normal activity with effort, some signs of disease    General: Alert, awake; NAD  HEENT: atraumatic, sclera anicteric. Oral mucosa moist, OP nonerythematous  CV: RRR, systolic murmur   Pulm: CTA bilaterally  Abd:  soft, non tender, obese, bs+  Skin: Mild erythema with some acneiform lesions on his face - improving per patient. Faint erythematous maculopapular rash on mid chest.  MSK: 1+ pitting edema in bilateral LE. Tenderness with deep palpation of the right lateral ankle.    Acute GVHD          10/24/2024    13:25 10/11/2024    16:57 9/11/2024    14:35   Acute GVHD   New evidence of Acute Sjykk-Bksnot-Pkca Disease developed since last entry? 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 No evidence of myeloid neoplasm or monoclonal B-lymphocytosis. Normal male karyotype. NGS negative for DNMT3A (other mutations not tested)  - Need to send NGS for all mutations present at diagnosis with future bone marrows    Graft status/chimerism:   - 8/30/24 (D28): Bone marrow 100%. Peripheral blood CD33 100%, CD3 84%  - 9/12/24: Peripheral blood CD3 96%, CD33 100%  - 9/25/24: Peripheral blood CD3 100%, CD33 100%    Maintenance  Not discussed     GVHD prophylaxis  # Current immunosuppression is sirolimus   - Continue sirolimus with goal 6-8. (10/24) Sirolimus level pending  - sirolimus 10-20% skin rash SE     # The current acute GVHD stage is 1 -- (10/29) rash is stable/to improved  Skin: Grade 1. Mild erythema on face. Skin biopsy showed mild interface dermatitis suggestive of GVHD.  Controlled very well with topical steroids.   - Using topical steroids PRN on face now since 10/11. Using topicals 1-2x today on torso.  GI: GI score 0.  Liver: LFTs normal. Liver score 0.    HEME/ COAG  G-CSF last given on 8/22/4.  ABO matched.  # Anemia 2/2 to chemo/PBSCT    ID  # Prophylaxis  Fungal: Start voriconazole 200mg BID from 9/26/24. Siro dose adjusted.   PJP/ Toxo IgG negative: Bactrim.  Viral: Acyclovir 800 mg bid     # Monitoring  CMV/EBV pending (10/29)  CMV: Monitor weekly till D100. 10/24 neg  EBV: Monitor every 2 weeks between D30 to D180. 10/16 <35  IgG: IgG 467 (9/12/24). Check serum IgG level q3 months, and consider IVIG repletion for IgG levels <400 mg/dL.     GI  # Hepatic steatosis and borderline iron deposition in liver  - VOD prophy: Ursodiol done    # Supportive   - Ulcer prophy: PPI  - Alternating constipation and diarrhea: continue psyllium and imodium PRN    CARDIOVASCULAR  # Asymptomatic moderate aortic stenosis    # A.fib with RVR   - One episode while inpatient on 8/21/24 which converted to normal sinus rhythm spontaneously. Was started on metoprolol 25mg BID. Unclear if this is needed long term. Cardiology follow up Nov 11 2024 scheduled  - One previous episode of A.fib with RVR prior to transplant in the context of admission with febrile episode on 3/15/24. Noted on his fitbit, not very symptomatic. DCCV on 3/21/24 restored normal sinus rhythm. Amiodarone and anticoagulation was stopped in April 2024 without any recurrence.     # CAD  - Coronary artery calcifications.   - Continue atorvastatin 10mg, ASA 81mg (resumed on 8/23/24 as platelets stable above 50k)  - Will need an ischemia evaluation in the long term    # HTN: Continue amlodipine 10mg (home med), lisinopril 5mg   (10/29) Lower extremity edema, more bothersome but not necessary worse edema than patient's baseline, BP is 110s-120s -- will decrease amlodipine dose to 5 mg daily to see if this improves his edema at all.       RENAL/ELECTROLYTES/  # Urinary frequency, without evidence of retention or infection  # BPH  - Prior imaging demonstrates prostatomegaly along with bladder thickening. This is a long standing issue which has been ongoing since pre-transplant.   - Continue tamsulosin. Flow improving, however frequency persists  - Has been started on low dose oxybutynin on 9/17/24. No improvement in symptoms. Defer further management to urology 10/3 visit-added back flomax and oxybutynin  - Please defer any non-urgent invasive testing.    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 Has been seen by endocrine on 9/11/24. Have recommended repeat HbA1c in 1 month -- 10/14 6.8    RESP  # VINCENT : CPAP     OPHTHALMOLOGY:  # Glaucoma - Lutein supplement; brimonidine, latanaprost eye drops.   # Right eye floaters: Following retinal specialist      MUSCULOSKELETAL/FRAILTY  # Gout: Continue allopurinol indefinitely. (10/29) Hx of gout in ankle, some right ankle pain today, suspicious that this is secondary to ongoing edema but checking uric acid to rule out gout flare. Uric acid is WNL.    PMH  - Exposure to agent orange     Post-transplant vaccinations  Flu vaccination given at day 60, then annually   COVID vaccine after D100    Today's Summary:  Decrease amlodipine to 5 mg/d  Compression stockings    RTC qweekly or sooner for new concerns    60 minutes spent on the date of the encounter doing chart review, history and exam, lab review, documentation and coordniating care.    Rangel Villalta PA-C       Again, thank you for allowing me to participate in the care of your patient.        Sincerely,        BMT Advanced Practice Provider

## 2024-10-30 ENCOUNTER — DOCUMENTATION ONLY (OUTPATIENT)
Dept: TRANSPLANT | Facility: CLINIC | Age: 74
End: 2024-10-30
Payer: MEDICARE

## 2024-10-30 LAB
CMV DNA SPEC NAA+PROBE-ACNC: NOT DETECTED IU/ML
EBV DNA SERPL NAA+PROBE-ACNC: NOT DETECTED IU/ML

## 2024-11-06 ENCOUNTER — LAB (OUTPATIENT)
Dept: LAB | Facility: CLINIC | Age: 74
End: 2024-11-06
Attending: STUDENT IN AN ORGANIZED HEALTH CARE EDUCATION/TRAINING PROGRAM
Payer: MEDICARE

## 2024-11-06 ENCOUNTER — OFFICE VISIT (OUTPATIENT)
Dept: TRANSPLANT | Facility: CLINIC | Age: 74
End: 2024-11-06
Attending: STUDENT IN AN ORGANIZED HEALTH CARE EDUCATION/TRAINING PROGRAM
Payer: MEDICARE

## 2024-11-06 VITALS
RESPIRATION RATE: 16 BRPM | WEIGHT: 239 LBS | HEART RATE: 77 BPM | BODY MASS INDEX: 31.53 KG/M2 | OXYGEN SATURATION: 97 % | SYSTOLIC BLOOD PRESSURE: 113 MMHG | DIASTOLIC BLOOD PRESSURE: 66 MMHG | TEMPERATURE: 98.2 F

## 2024-11-06 DIAGNOSIS — D46.9 MDS (MYELODYSPLASTIC SYNDROME) (H): Primary | ICD-10-CM

## 2024-11-06 DIAGNOSIS — D46.9 MYELODYSPLASTIC SYNDROME (H): ICD-10-CM

## 2024-11-06 DIAGNOSIS — I10 HYPERTENSION, UNSPECIFIED TYPE: ICD-10-CM

## 2024-11-06 DIAGNOSIS — I48.91 ATRIAL FIBRILLATION, UNSPECIFIED TYPE (H): ICD-10-CM

## 2024-11-06 LAB
ALBUMIN SERPL BCG-MCNC: 4 G/DL (ref 3.5–5.2)
ALP SERPL-CCNC: 111 U/L (ref 40–150)
ALT SERPL W P-5'-P-CCNC: 15 U/L (ref 0–70)
ANION GAP SERPL CALCULATED.3IONS-SCNC: 10 MMOL/L (ref 7–15)
AST SERPL W P-5'-P-CCNC: 21 U/L (ref 0–45)
BASOPHILS # BLD AUTO: 0 10E3/UL (ref 0–0.2)
BASOPHILS NFR BLD AUTO: 0 %
BILIRUB SERPL-MCNC: 0.2 MG/DL
BUN SERPL-MCNC: 17.8 MG/DL (ref 8–23)
CALCIUM SERPL-MCNC: 9.5 MG/DL (ref 8.8–10.4)
CHLORIDE SERPL-SCNC: 105 MMOL/L (ref 98–107)
CREAT SERPL-MCNC: 1.15 MG/DL (ref 0.67–1.17)
EGFRCR SERPLBLD CKD-EPI 2021: 66 ML/MIN/1.73M2
EOSINOPHIL # BLD AUTO: 0.1 10E3/UL (ref 0–0.7)
EOSINOPHIL NFR BLD AUTO: 2 %
ERYTHROCYTE [DISTWIDTH] IN BLOOD BY AUTOMATED COUNT: 15.9 % (ref 10–15)
GLUCOSE SERPL-MCNC: 181 MG/DL (ref 70–99)
HCO3 SERPL-SCNC: 23 MMOL/L (ref 22–29)
HCT VFR BLD AUTO: 31 % (ref 40–53)
HGB BLD-MCNC: 10.3 G/DL (ref 13.3–17.7)
IMM GRANULOCYTES # BLD: 0 10E3/UL
IMM GRANULOCYTES NFR BLD: 0 %
LYMPHOCYTES # BLD AUTO: 1.5 10E3/UL (ref 0.8–5.3)
LYMPHOCYTES NFR BLD AUTO: 26 %
MCH RBC QN AUTO: 28.9 PG (ref 26.5–33)
MCHC RBC AUTO-ENTMCNC: 33.2 G/DL (ref 31.5–36.5)
MCV RBC AUTO: 87 FL (ref 78–100)
MONOCYTES # BLD AUTO: 0.7 10E3/UL (ref 0–1.3)
MONOCYTES NFR BLD AUTO: 11 %
NEUTROPHILS # BLD AUTO: 3.5 10E3/UL (ref 1.6–8.3)
NEUTROPHILS NFR BLD AUTO: 61 %
NRBC # BLD AUTO: 0 10E3/UL
NRBC BLD AUTO-RTO: 0 /100
PLATELET # BLD AUTO: 194 10E3/UL (ref 150–450)
POTASSIUM SERPL-SCNC: 3.9 MMOL/L (ref 3.4–5.3)
PROT SERPL-MCNC: 6.4 G/DL (ref 6.4–8.3)
RBC # BLD AUTO: 3.56 10E6/UL (ref 4.4–5.9)
SODIUM SERPL-SCNC: 138 MMOL/L (ref 135–145)
WBC # BLD AUTO: 5.8 10E3/UL (ref 4–11)

## 2024-11-06 PROCEDURE — 99213 OFFICE O/P EST LOW 20 MIN: CPT

## 2024-11-06 PROCEDURE — 36415 COLL VENOUS BLD VENIPUNCTURE: CPT | Performed by: STUDENT IN AN ORGANIZED HEALTH CARE EDUCATION/TRAINING PROGRAM

## 2024-11-06 PROCEDURE — 99215 OFFICE O/P EST HI 40 MIN: CPT

## 2024-11-06 PROCEDURE — 85025 COMPLETE CBC W/AUTO DIFF WBC: CPT | Performed by: STUDENT IN AN ORGANIZED HEALTH CARE EDUCATION/TRAINING PROGRAM

## 2024-11-06 PROCEDURE — G0463 HOSPITAL OUTPT CLINIC VISIT: HCPCS

## 2024-11-06 PROCEDURE — 80048 BASIC METABOLIC PNL TOTAL CA: CPT | Performed by: STUDENT IN AN ORGANIZED HEALTH CARE EDUCATION/TRAINING PROGRAM

## 2024-11-06 RX ORDER — METFORMIN HYDROCHLORIDE 500 MG/1
TABLET, EXTENDED RELEASE ORAL
COMMUNITY
Start: 2024-10-30

## 2024-11-06 RX ORDER — ATORVASTATIN CALCIUM 10 MG/1
10 TABLET, FILM COATED ORAL EVERY MORNING
Qty: 60 TABLET | Refills: 1 | Status: SHIPPED | OUTPATIENT
Start: 2024-11-06

## 2024-11-06 RX ORDER — METOPROLOL TARTRATE 25 MG/1
25 TABLET, FILM COATED ORAL 2 TIMES DAILY
Qty: 60 TABLET | Refills: 1 | Status: SHIPPED | OUTPATIENT
Start: 2024-11-06

## 2024-11-06 RX ORDER — LISINOPRIL 5 MG/1
5 TABLET ORAL DAILY
Qty: 60 TABLET | Refills: 1 | Status: SHIPPED | OUTPATIENT
Start: 2024-11-06

## 2024-11-06 RX ORDER — PANTOPRAZOLE SODIUM 40 MG/1
40 TABLET, DELAYED RELEASE ORAL
Qty: 60 TABLET | Refills: 1 | Status: SHIPPED | OUTPATIENT
Start: 2024-11-06

## 2024-11-06 ASSESSMENT — PAIN SCALES - GENERAL: PAINLEVEL_OUTOF10: NO PAIN (0)

## 2024-11-06 NOTE — LETTER
11/6/2024      Arpit Frorest  [id number] [facility], [place] [address]      Dear Colleague,    Thank you for referring your patient, Arpit Forrest, to the [facility] [place]. Please see a copy of my visit note below.    BMT Clinic Note  Nov 6, 2024     Patient ID: Arpit Forrest is a 73 year old male who is day +91 post allogenic stem cell transplant for MDS-EB1     Diagnosis MDS-EB1 BMT type Allogenic  CMV  Donor -  Recipient -    Prep ELIEZER (Flu/Cy/TBI) Donor type  8/8 URD ABO D/R O- (matched)   GVHD ppx PTCy, siro, MMF Graft source PBSCT Toxo IgG Negative   Protocol [id number] CD34/kg 6.12E+06    BMT MD Nena Fowler      Hematological history please see Dr Fowler's 9/25/2024 note for complete details    Post transplant  # A.fib with RVR on 8/21/24 which converted to sinus rhythm spontaneously. 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 Max grade 1 (face, chest and upper back). Skin biopsy (9/1224) showed interface dermatitis suggestive of GVHD. Resolved with topical steroids.       INTERVAL HISTORY     Arpit is doing well. No acute complaints. Lower extremity swelling with some improvement after reducing amlodipine from 10 mg to 5 mg. He will try using his compression stockings again. Declined trial of diuretics. His appetite is good, no N/V/D, reports rash on chest is gone and his face has been stable.      EXAM      /66   Pulse 77   Temp 98.2  F (36.8  C)   Resp 16   Wt 108.4 kg (239 lb)   SpO2 97%   BMI 31.53 kg/m      Wt Readings from Last 4 Encounters:   11/06/24 108.4 kg (239 lb)   10/29/24 108.3 kg (238 lb 12.8 oz)   10/24/24 108.5 kg (239 lb 3.2 oz)   10/16/24 106.5 kg (234 lb 12.8 oz)     KPS: 80% Can perform normal activity with effort, some signs of disease    General: Alert, awake; NAD  HEENT: atraumatic, sclera anicteric. Oral mucosa moist, OP nonerythematous  CV: RRR, systolic murmur   Pulm: CTA  bilaterally  Abd: soft, non tender, obese, bs+  Skin: Mild erythema with some acneiform lesions on his face - improving per patient.   MSK: 1+ pitting edema in bilateral LE. Tenderness with deep palpation of the right lateral ankle.    Acute GVHD          10/24/2024    13:25 10/11/2024    16:57 10/2/2024    11:27   Acute GVHD   New evidence of Acute Yckdb-Dgpeoe-Pfce Disease developed since last entry? No No No   Skin   1   Upper Intestinal Tract   0   Lower Intestinal Tract   0   Liver   0   Overall Grade (Przepiorka)   1      Laboratory Studies:     Lab Results   Component Value Date    WBC 6.3 10/29/2024    ANEU 2.2 09/12/2024    HGB 10.0 (L) 10/29/2024    HCT 30.9 (L) 10/29/2024     10/29/2024     10/29/2024    POTASSIUM 3.9 10/29/2024    CHLORIDE 107 10/29/2024    CO2 23 10/29/2024     (H) 10/29/2024    BUN 17.7 10/29/2024    CR 1.18 (H) 10/29/2024    MAG 1.6 (L) 09/06/2024    INR 1.17 (H) 08/19/2024      ASSESSMENT AND PLAN     BMT for high risk MDS: D+91  - Chemo protocol: [id number]; Flu, Cy, TBI  - Peripheral blood stem cell graft from 8/8 donor, ABO matched (O neg)  - Continue allopurinol (hx of gout)    Disease status:   - 8/30/24 (D28): Slightly hypercellular marrow (30-40%). No evidence of myeloid neoplasm or monoclonal B-lymphocytosis. Normal male karyotype. NGS negative for DNMT3A (other mutations not tested)  - Need to send NGS for all mutations present at diagnosis with future bone marrows    Graft status/chimerism:   - 8/30/24 (D28): Bone marrow 100%. Peripheral blood CD33 100%, CD3 84%  - 9/12/24: Peripheral blood CD3 96%, CD33 100%  - 9/25/24: Peripheral blood CD3 100%, CD33 100%    Maintenance  Not discussed     GVHD prophylaxis  # Current immunosuppression is sirolimus   - Continue sirolimus with goal 6-8. (10/24) Sirolimus level 5.4   - sirolimus 10-20% skin rash SE     # The current acute GVHD stage is 1 -- (10/29) rash is stable/to improved  Skin: Grade 1. Mild erythema on  face. Skin biopsy showed mild interface dermatitis suggestive of GVHD. Controlled very well with topical steroids.   - Using topical steroids PRN on face now since 10/11. Using topicals 1-2x today on torso.  GI: GI score 0.  Liver: LFTs normal. Liver score 0.    HEME/ COAG  G-CSF last given on 8/22/4.  ABO matched.  # Anemia 2/2 to chemo/PBSCT    ID  # Prophylaxis  Fungal: Start voriconazole 200mg BID from 9/26/24. Siro dose adjusted.   PJP/ Toxo IgG negative: Bactrim.  Viral: Acyclovir 800 mg bid     # Monitoring  CMV/EBV ND (10/29)  CMV: Monitor weekly till D100. 10/24 neg  EBV: Monitor every 2 weeks between D30 to D180. 10/16 <35  IgG: IgG 467 (9/12/24). Check serum IgG level q3 months, and consider IVIG repletion for IgG levels <400 mg/dL.     GI  # Hepatic steatosis and borderline iron deposition in liver  - VOD prophy: Ursodiol done    # Supportive   - Ulcer prophy: PPI  - Alternating constipation and diarrhea: continue psyllium and imodium PRN    CARDIOVASCULAR  # Asymptomatic moderate aortic stenosis    # A.fib with RVR   - One episode while inpatient on 8/21/24 which converted to normal sinus rhythm spontaneously. Was started on metoprolol 25mg BID. Unclear if this is needed long term. Cardiology follow up Nov 11 2024 scheduled  - One previous episode of A.fib with RVR prior to transplant in the context of admission with febrile episode on 3/15/24. Noted on his fitbit, not very symptomatic. DCCV on 3/21/24 restored normal sinus rhythm. Amiodarone and anticoagulation was stopped in April 2024 without any recurrence.     # CAD  - Coronary artery calcifications.   - Continue atorvastatin 10mg, ASA 81mg (resumed on 8/23/24 as platelets stable above 50k)  - Will need an ischemia evaluation in the long term    # HTN: Continue amlodipine 5 mg (home med), lisinopril 5mg   (11/6) LE edema improved with reducing amlodipine to 5 mg at last appt, still 1+, he will try using compression stockings again        RENAL/ELECTROLYTES/  # Urinary frequency, without evidence of retention or infection  # BPH  - Prior imaging demonstrates prostatomegaly along with bladder thickening. This is a long standing issue which has been ongoing since pre-transplant.   - Continue tamsulosin. Flow improving, however frequency persists  - Has been started on low dose oxybutynin on 9/17/24. No improvement in symptoms. Defer further management to urology 10/3 visit-added back flomax and oxybutynin  - Please defer any non-urgent invasive testing.    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 Has been seen by endocrine on 9/11/24. Have recommended repeat HbA1c in 1 month -- 10/14 6.8    RESP  # VINCENT : CPAP     OPHTHALMOLOGY:  # Glaucoma - Lutein supplement; brimonidine, latanaprost eye drops.   # Right eye floaters: Following retinal specialist      MUSCULOSKELETAL/FRAILTY  # Gout: Continue allopurinol indefinitely.  Uric acid is WNL.    PMH  - Exposure to agent orange     Post-transplant vaccinations  Flu vaccination given at day 60, then annually   COVID vaccine after D100    RTC 11/11 with Cardiology, 11/13 with Dr. Fowler, 11/15 bone marrow biopsy     40 minutes spent on the date of the encounter doing chart review, history and exam, lab review, documentation and coordniating care.    Jero Colin PA-C       Again, thank you for allowing me to participate in the care of your patient.        Sincerely,        BMT Advanced Practice Provider

## 2024-11-06 NOTE — NURSING NOTE
"Oncology Rooming Note    November 6, 2024 1:04 PM   Arpit Forrest is a 73 year old male who presents for:    Chief Complaint   Patient presents with    Blood Draw     Labs collected from venipuncture by RN. Vitals taken. Checked in for appointment(s).     Oncology Clinic Visit     MDS (myelodysplastic syndrome)     Initial Vitals: /66   Pulse 77   Temp 98.2  F (36.8  C)   Resp 16   Wt 108.4 kg (239 lb)   SpO2 97%   BMI 31.53 kg/m   Estimated body mass index is 31.53 kg/m  as calculated from the following:    Height as of 10/3/24: 1.854 m (6' 1\").    Weight as of this encounter: 108.4 kg (239 lb). Body surface area is 2.36 meters squared.  No Pain (0) Comment: Data Unavailable   No LMP for male patient.  Allergies reviewed: Yes  Medications reviewed: Yes    Medications: Medication refills not needed today.  Pharmacy name entered into [employer]: [facility] #[id number] - [place], [place] - [address] [place] [address]    Frailty Screening:   Is the patient here for a new oncology consult visit in cancer care? 2. No      Clinical concerns: Patient states no new concerns to discuss with provider.        Donald Quintero, EMT            "

## 2024-11-06 NOTE — NURSING NOTE
Chief Complaint   Patient presents with    Blood Draw     Labs collected from venipuncture by RN. Vitals taken. Checked in for appointment(s).      Labs collected from venipuncture by RN. Vitals taken. Checked in for appointment(s).     Areli Phillips RN

## 2024-11-06 NOTE — PROGRESS NOTES
BMT Clinic Note  Nov 6, 2024     Patient ID: Arpit Forrest is a 73 year old male who is day +91 post allogenic stem cell transplant for MDS-EB1     Diagnosis MDS-EB1 BMT type Allogenic  CMV  Donor -  Recipient -    Prep ELIEZER (Flu/Cy/TBI) Donor type  8/8 URD ABO D/R O- (matched)   GVHD ppx PTCy, siro, MMF Graft source PBSCT Toxo IgG Negative   Protocol [id number] CD34/kg 6.12E+06    BMT MD Nena Fowler      Hematological history please see Dr Fowler's 9/25/2024 note for complete details    Post transplant  # A.fib with RVR on 8/21/24 which converted to sinus rhythm spontaneously. 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 Max grade 1 (face, chest and upper back). Skin biopsy (9/1224) showed interface dermatitis suggestive of GVHD. Resolved with topical steroids.       INTERVAL HISTORY     Arpit is doing well. No acute complaints. Lower extremity swelling with some improvement after reducing amlodipine from 10 mg to 5 mg. He will try using his compression stockings again. Declined trial of diuretics. His appetite is good, no N/V/D, reports rash on chest is gone and his face has been stable.      EXAM      /66   Pulse 77   Temp 98.2  F (36.8  C)   Resp 16   Wt 108.4 kg (239 lb)   SpO2 97%   BMI 31.53 kg/m      Wt Readings from Last 4 Encounters:   11/06/24 108.4 kg (239 lb)   10/29/24 108.3 kg (238 lb 12.8 oz)   10/24/24 108.5 kg (239 lb 3.2 oz)   10/16/24 106.5 kg (234 lb 12.8 oz)     KPS: 80% Can perform normal activity with effort, some signs of disease    General: Alert, awake; NAD  HEENT: atraumatic, sclera anicteric. Oral mucosa moist, OP nonerythematous  CV: RRR, systolic murmur   Pulm: CTA bilaterally  Abd: soft, non tender, obese, bs+  Skin: Mild erythema with some acneiform lesions on his face - improving per patient.   MSK: 1+ pitting edema in bilateral LE. Tenderness with deep palpation of the right lateral ankle.    Acute GVHD          10/24/2024     13:25 10/11/2024    16:57 10/2/2024    11:27   Acute GVHD   New evidence of Acute Mqskl-Swzihn-Bobs Disease developed since last entry? No No No   Skin   1   Upper Intestinal Tract   0   Lower Intestinal Tract   0   Liver   0   Overall Grade (Przepiorka)   1      Laboratory Studies:     Lab Results   Component Value Date    WBC 6.3 10/29/2024    ANEU 2.2 09/12/2024    HGB 10.0 (L) 10/29/2024    HCT 30.9 (L) 10/29/2024     10/29/2024     10/29/2024    POTASSIUM 3.9 10/29/2024    CHLORIDE 107 10/29/2024    CO2 23 10/29/2024     (H) 10/29/2024    BUN 17.7 10/29/2024    CR 1.18 (H) 10/29/2024    MAG 1.6 (L) 09/06/2024    INR 1.17 (H) 08/19/2024      ASSESSMENT AND PLAN     BMT for high risk MDS: D+91  - Chemo protocol: [id number]; Flu, Cy, TBI  - Peripheral blood stem cell graft from 8/8 donor, ABO matched (O neg)  - Continue allopurinol (hx of gout)    Disease status:   - 8/30/24 (D28): Slightly hypercellular marrow (30-40%). No evidence of myeloid neoplasm or monoclonal B-lymphocytosis. Normal male karyotype. NGS negative for DNMT3A (other mutations not tested)  - Need to send NGS for all mutations present at diagnosis with future bone marrows    Graft status/chimerism:   - 8/30/24 (D28): Bone marrow 100%. Peripheral blood CD33 100%, CD3 84%  - 9/12/24: Peripheral blood CD3 96%, CD33 100%  - 9/25/24: Peripheral blood CD3 100%, CD33 100%    Maintenance  Not discussed     GVHD prophylaxis  # Current immunosuppression is sirolimus   - Continue sirolimus with goal 6-8. (10/24) Sirolimus level 5.4   - sirolimus 10-20% skin rash SE     # The current acute GVHD stage is 1 -- (10/29) rash is stable/to improved  Skin: Grade 1. Mild erythema on face. Skin biopsy showed mild interface dermatitis suggestive of GVHD. Controlled very well with topical steroids.   - Using topical steroids PRN on face now since 10/11. Using topicals 1-2x today on torso.  GI: GI score 0.  Liver: LFTs normal. Liver score  0.    HEME/ COAG  G-CSF last given on 8/22/4.  ABO matched.  # Anemia 2/2 to chemo/PBSCT    ID  # Prophylaxis  Fungal: Start voriconazole 200mg BID from 9/26/24. Siro dose adjusted.   PJP/ Toxo IgG negative: Bactrim.  Viral: Acyclovir 800 mg bid     # Monitoring  CMV/EBV ND (10/29)  CMV: Monitor weekly till D100. 10/24 neg  EBV: Monitor every 2 weeks between D30 to D180. 10/16 <35  IgG: IgG 467 (9/12/24). Check serum IgG level q3 months, and consider IVIG repletion for IgG levels <400 mg/dL.     GI  # Hepatic steatosis and borderline iron deposition in liver  - VOD prophy: Ursodiol done    # Supportive   - Ulcer prophy: PPI  - Alternating constipation and diarrhea: continue psyllium and imodium PRN    CARDIOVASCULAR  # Asymptomatic moderate aortic stenosis    # A.fib with RVR   - One episode while inpatient on 8/21/24 which converted to normal sinus rhythm spontaneously. Was started on metoprolol 25mg BID. Unclear if this is needed long term. Cardiology follow up Nov 11 2024 scheduled  - One previous episode of A.fib with RVR prior to transplant in the context of admission with febrile episode on 3/15/24. Noted on his fitbit, not very symptomatic. DCCV on 3/21/24 restored normal sinus rhythm. Amiodarone and anticoagulation was stopped in April 2024 without any recurrence.     # CAD  - Coronary artery calcifications.   - Continue atorvastatin 10mg, ASA 81mg (resumed on 8/23/24 as platelets stable above 50k)  - Will need an ischemia evaluation in the long term    # HTN: Continue amlodipine 5 mg (home med), lisinopril 5mg   (11/6) LE edema improved with reducing amlodipine to 5 mg at last appt, still 1+, he will try using compression stockings again       RENAL/ELECTROLYTES/  # Urinary frequency, without evidence of retention or infection  # BPH  - Prior imaging demonstrates prostatomegaly along with bladder thickening. This is a long standing issue which has been ongoing since pre-transplant.   - Continue  tamsulosin. Flow improving, however frequency persists  - Has been started on low dose oxybutynin on 9/17/24. No improvement in symptoms. Defer further management to urology 10/3 visit-added back flomax and oxybutynin  - Please defer any non-urgent invasive testing.    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 Has been seen by endocrine on 9/11/24. Have recommended repeat HbA1c in 1 month -- 10/14 6.8    RESP  # VINCENT : CPAP     OPHTHALMOLOGY:  # Glaucoma - Lutein supplement; brimonidine, latanaprost eye drops.   # Right eye floaters: Following retinal specialist      MUSCULOSKELETAL/FRAILTY  # Gout: Continue allopurinol indefinitely.  Uric acid is WNL.    PMH  - Exposure to agent orange     Post-transplant vaccinations  Flu vaccination given at day 60, then annually   COVID vaccine after D100    RTC 11/11 with Cardiology, 11/13 with Dr. Fowler, 11/15 bone marrow biopsy     40 minutes spent on the date of the encounter doing chart review, history and exam, lab review, documentation and coordniating care.    Jero Colin PA-C

## 2024-11-10 NOTE — PROGRESS NOTES
History:    Arpit Forrest is a very pleasant 73 year old man with MDS who is referred for cardiac evaluation.    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 No major CV issues except for pAF on August 20, 2024. He was first diagnosed wtih atrial fibrillation with RVR in March 2024 during a febrile illness.     His counts are improving and he denies chest pain, dyspnea, PND or orthopnea. BP at times low but now mostly controlled. Amlodipine reduced to 5 mg. Also takes metoprolol tartrate and lisinopril. He takes atorvastatin 10 mg daily for hyperlipidemia and metformin for diabetes.  His diabetes is well-controlled.        Current Outpatient Medications   Medication Sig Dispense Refill    acyclovir (ZOVIRAX) 800 MG tablet Take 1 tablet (800 mg) by mouth 2 times daily. 120 tablet 1    allopurinol (ZYLOPRIM) 300 MG tablet Take 1 tablet (300 mg) by mouth daily. 60 tablet 1    amLODIPine (NORVASC) 10 MG tablet Take 0.5 tablets (5 mg) by mouth every morning.      aspirin 81 MG EC tablet Take 1 tablet (81 mg) by mouth daily. 60 tablet 1    atorvastatin (LIPITOR) 10 MG tablet Take 1 tablet (10 mg) by mouth every morning. 60 tablet 1    brimonidine (ALPHAGAN) 0.2 % ophthalmic solution Place 1 drop into both eyes daily.      calcium carbonate-vitamin D (CALTRATE) 600-10 MG-MCG per tablet Take 1 tablet by mouth daily. 60 tablet 1    fluticasone (FLONASE) 50 MCG/ACT nasal spray Spray 1 spray into both nostrils as needed.      hydrocortisone 2.5 % cream Apply topically 2 times daily. Use on face and scalp (Patient taking differently: Apply topically as needed. Use on face and scalp) 30 g 0     latanoprost (XALATAN) 0.005 % ophthalmic solution INSTILL 2 DROPS IN BOTH EYES AT BEDTIME      lisinopril (ZESTRIL) 5 MG tablet Take 1 tablet (5 mg) by mouth daily. 60 tablet 1    Lutein-Zeaxanthin 25-5 MG CAPS Take 1 capsule by mouth daily      metFORMIN (GLUCOPHAGE XR) 500 MG 24 hr tablet Take by mouth. 500 mg in the morning and 1000 mg in the evening      metFORMIN (GLUCOPHAGE) 500 MG tablet Take 1 tablet (500mg) every morning and 2 tablets (1000mg) every evening. (Patient not taking: Reported on 11/6/2024) 90 tablet 1    metoprolol tartrate (LOPRESSOR) 25 MG tablet Take 1 tablet (25 mg) by mouth 2 times daily. 60 tablet 1    oxyBUTYnin (DITROPAN) 5 MG tablet Take 0.5 tablets (2.5 mg) by mouth 3 times daily. (Patient not taking: Reported on 11/6/2024) 30 tablet 1    oxyBUTYnin ER (DITROPAN XL) 5 MG 24 hr tablet Take 1 tablet (5 mg) by mouth daily. (Patient not taking: Reported on 11/6/2024) 90 tablet 1    pantoprazole (PROTONIX) 40 MG EC tablet Take 1 tablet (40 mg) by mouth every morning (before breakfast). 60 tablet 1    psyllium (METAMUCIL/KONSYL) capsule Take 3 capsules by mouth daily. 270 capsule 3    sirolimus (GENERIC EQUIVALENT) 0.5 MG tablet Take 1 tablet (0.5 mg) by mouth daily. (Patient taking differently: Take 0.5 mg by mouth every other day. As of 10/9) 60 tablet 1    sirolimus (GENERIC EQUIVALENT) 1 MG tablet Take 3 tablets (3 mg) by mouth daily. (Patient not taking: Reported on 11/6/2024) 90 tablet 1    sulfamethoxazole-trimethoprim (BACTRIM DS) 800-160 MG tablet Take 1 tablet by mouth Every Mon, Tues two times daily. Do not begin taking until instructed to do so by [facility] clinic. Do not start before September 9, 2024. 28 tablet 1    tamsulosin (FLOMAX) 0.4 MG capsule Take 1 capsule (0.4 mg) by mouth every evening. 90 capsule 1    triamcinolone (KENALOG) 0.1 % external cream Apply topically 2 times daily. Use on chest and back (Patient taking differently: Apply topically as needed. Use on chest and  back) 454 g 0    voriconazole (VFEND) 200 MG tablet Take 1 tablet (200 mg) by mouth 2 times daily. 120 tablet 0       Allergies - reviewed     Allergies   Allergen Reactions    Gramineae Pollens Other (See Comments)     Seasonal sneezing, runny nose.       Past history -reviewed  As above    Social history - reviewed  Social History     Tobacco Use    Smoking status: Former     Current packs/day: 0.00     Average packs/day: 0.5 packs/day for 4.0 years (2.0 ttl pk-yrs)     Types: Cigarettes     Start date:      Quit date: 1970     Years since quittin.8    Smokeless tobacco: Never   Vaping Use    Vaping status: Never Used   Substance Use Topics    Alcohol use: Not Currently     Comment: 24 Quit 1 year ago    Drug use: Never       Family history -reviewed  No premature CAD or SCD    ROS: non contributory on the 10-point review of system    Exam:     /73 (BP Location: Right arm, Patient Position: Chair, Cuff Size: Adult Large)   Pulse 67   Wt 108.4 kg (239 lb)   SpO2 100%   BMI 31.53 kg/m    In general, the patient is in no apparent distress.      HEENT: Sclerae white, not injected.    Neck: No JVD. No thyromegaly  Heart: RRR. Normal S1, S2. 3/6 systolic murmur radiating to carotids to radiating to carotids  Lungs: Clear bilaterally.  No rhonchi, wheezes, rales.   Extremities: Trace bilateral pretibial edema.  The pulses are 2+at the radial bilaterally.      I have independently reviewed this patient's relevant laboratory and cardiac data :    Recent Labs   Lab Test 24  0957   CHOL 152   HDL 36*   LDL 57   TRIG 293*      Recent Labs   Lab Test 24  0957   AST 19     Recent Labs   Lab Test 24  0957   ALT 14     Recent Labs   Lab Test 24  0957      POTASSIUM 4.1   CHLORIDE 108*   CO2 22   ANIONGAP 12   BUN 22.7   CR 1.27*     Recent Labs   Lab Test 24  0957   WBC 4.3   RBC 3.95*   HGB 11.9*   MCV 97        ECG 24 - sinus rhythm  ECG 24 - atrial  fibrillation    8/21/24 Echo  - normal biventricular function, Moderate aortic stenosis (PV 3.4m/s, MG 29 mmHg).     CT Chest 2/2/2024: Mild to moderate CAC, three-vessel disease.     Assessment and Plan:  Arpit Forrest is a very pleasant 73 year old man with MDS who is referred for cardiac evaluation.    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 His he has known coronary atherosclerosis with type 2 diabetes, hyperlipidemia and hypertension that are being addressed with medical therapy. He does not endorse any angina symptoms at rest or on exertion when he is physically active.     He has paroxysmal atrial fibrillation and is currently in a regular rhythm.  He is not on anticoagulation as these episodes have been very brief and usually in the setting of acute febrile illness and most recently in the setting of his stem cell transplant.  If he has more frequent episodes, we will have to initiate anticoagulation to reduce stroke risk.    He has asymptomatic moderate aortic stenosis which does not need any interventions at this point.  Will continue to monitor this with surveillance transthoracic echocardiograms.    Recommendation  Continue current medications  Reduce lisinopril dose if patient develops hypotension  Follow up with TTE in 6 months    Gem Banegas MD, MS  Professor of Medicine  Cardiovascular Medicine

## 2024-11-11 ENCOUNTER — OFFICE VISIT (OUTPATIENT)
Dept: CARDIOLOGY | Facility: CLINIC | Age: 74
End: 2024-11-11
Attending: INTERNAL MEDICINE
Payer: MEDICARE

## 2024-11-11 VITALS
OXYGEN SATURATION: 100 % | HEART RATE: 67 BPM | SYSTOLIC BLOOD PRESSURE: 122 MMHG | WEIGHT: 239 LBS | DIASTOLIC BLOOD PRESSURE: 73 MMHG | BODY MASS INDEX: 31.53 KG/M2

## 2024-11-11 DIAGNOSIS — Z94.84 STEM CELLS TRANSPLANT STATUS (H): ICD-10-CM

## 2024-11-11 DIAGNOSIS — I35.0 NONRHEUMATIC AORTIC VALVE STENOSIS: ICD-10-CM

## 2024-11-11 DIAGNOSIS — I10 ESSENTIAL HYPERTENSION: Primary | ICD-10-CM

## 2024-11-11 DIAGNOSIS — D46.9 MDS (MYELODYSPLASTIC SYNDROME) (H): ICD-10-CM

## 2024-11-11 PROCEDURE — 99214 OFFICE O/P EST MOD 30 MIN: CPT | Performed by: INTERNAL MEDICINE

## 2024-11-11 PROCEDURE — G0463 HOSPITAL OUTPT CLINIC VISIT: HCPCS | Performed by: INTERNAL MEDICINE

## 2024-11-11 ASSESSMENT — PAIN SCALES - GENERAL: PAINLEVEL_OUTOF10: NO PAIN (0)

## 2024-11-11 NOTE — PATIENT INSTRUCTIONS
You were seen today in the Cardiovascular Clinic at the [facility].   Cardiology Providers you saw during your visit:    Dr. Banegas    Diagnosis:   Essential hypertension     Recommendations:   - If your systolic blood pressures drop below 110, we can cut back on your lisinopril. Please give us a call or [employer] message if you start to notice a drop in your blood pressures    Follow-up:   August follow up with echocardiogram prior    For emergencies call 911.     If you have any questions regarding your visit please contact your care team:     Devi Del Toro RN  [facility]  Cardiology Care Coordinator-General    Appointment scheduling or nurse questions: [phone]    On Call Cardiologist for after hours or on weekends: [phone]    option #4    If you need a medication refill please contact your pharmacy.  Please allow 3 business days for your refill to be completed.    As always, thank you for trusting us with your health care needs!

## 2024-11-11 NOTE — NURSING NOTE
Chief Complaint   Patient presents with    Follow Up     yearly follow up      Vitals were taken and medications reconciled.    Nic Trinidad, EMT  12:27 PM

## 2024-11-11 NOTE — LETTER
11/11/2024      RE: Arpit Forrest  [address]  [place] [address]       Dear Colleague,    Thank you for the opportunity to participate in the care of your patient, Arpit Forrest, at the [facility] [place] at [facility]. Please see a copy of my visit note below.    History:    Arpit Forrest is a very pleasant 73 year old man with MDS who is referred for cardiac evaluation.    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 No major CV issues except for pAF on August 20, 2024. He was first diagnosed wtih atrial fibrillation with RVR in March 2024 during a febrile illness.     His counts are improving and he denies chest pain, dyspnea, PND or orthopnea. BP at times low but now mostly controlled. Amlodipine reduced to 5 mg. Also takes metoprolol tartrate and lisinopril. He takes atorvastatin 10 mg daily for hyperlipidemia and metformin for diabetes.  His diabetes is well-controlled.        Current Outpatient Medications   Medication Sig Dispense Refill     acyclovir (ZOVIRAX) 800 MG tablet Take 1 tablet (800 mg) by mouth 2 times daily. 120 tablet 1     allopurinol (ZYLOPRIM) 300 MG tablet Take 1 tablet (300 mg) by mouth daily. 60 tablet 1     amLODIPine (NORVASC) 10 MG tablet Take 0.5 tablets (5 mg) by mouth every morning.       aspirin 81 MG EC tablet Take 1 tablet (81 mg) by mouth daily. 60 tablet 1     atorvastatin (LIPITOR) 10 MG tablet Take 1 tablet (10 mg) by mouth every morning. 60 tablet 1     brimonidine (ALPHAGAN) 0.2 % ophthalmic solution Place 1 drop into both eyes daily.       calcium carbonate-vitamin  D (CALTRATE) 600-10 MG-MCG per tablet Take 1 tablet by mouth daily. 60 tablet 1     fluticasone (FLONASE) 50 MCG/ACT nasal spray Spray 1 spray into both nostrils as needed.       hydrocortisone 2.5 % cream Apply topically 2 times daily. Use on face and scalp (Patient taking differently: Apply topically as needed. Use on face and scalp) 30 g 0     latanoprost (XALATAN) 0.005 % ophthalmic solution INSTILL 2 DROPS IN BOTH EYES AT BEDTIME       lisinopril (ZESTRIL) 5 MG tablet Take 1 tablet (5 mg) by mouth daily. 60 tablet 1     Lutein-Zeaxanthin 25-5 MG CAPS Take 1 capsule by mouth daily       metFORMIN (GLUCOPHAGE XR) 500 MG 24 hr tablet Take by mouth. 500 mg in the morning and 1000 mg in the evening       metFORMIN (GLUCOPHAGE) 500 MG tablet Take 1 tablet (500mg) every morning and 2 tablets (1000mg) every evening. (Patient not taking: Reported on 11/6/2024) 90 tablet 1     metoprolol tartrate (LOPRESSOR) 25 MG tablet Take 1 tablet (25 mg) by mouth 2 times daily. 60 tablet 1     oxyBUTYnin (DITROPAN) 5 MG tablet Take 0.5 tablets (2.5 mg) by mouth 3 times daily. (Patient not taking: Reported on 11/6/2024) 30 tablet 1     oxyBUTYnin ER (DITROPAN XL) 5 MG 24 hr tablet Take 1 tablet (5 mg) by mouth daily. (Patient not taking: Reported on 11/6/2024) 90 tablet 1     pantoprazole (PROTONIX) 40 MG EC tablet Take 1 tablet (40 mg) by mouth every morning (before breakfast). 60 tablet 1     psyllium (METAMUCIL/KONSYL) capsule Take 3 capsules by mouth daily. 270 capsule 3     sirolimus (GENERIC EQUIVALENT) 0.5 MG tablet Take 1 tablet (0.5 mg) by mouth daily. (Patient taking differently: Take 0.5 mg by mouth every other day. As of 10/9) 60 tablet 1     sirolimus (GENERIC EQUIVALENT) 1 MG tablet Take 3 tablets (3 mg) by mouth daily. (Patient not taking: Reported on 11/6/2024) 90 tablet 1     sulfamethoxazole-trimethoprim (BACTRIM DS) 800-160 MG tablet Take 1 tablet by mouth Every Mon, Tues two times daily. Do not begin taking until  instructed to do so by BMT clinic. Do not start before 2024. 28 tablet 1     tamsulosin (FLOMAX) 0.4 MG capsule Take 1 capsule (0.4 mg) by mouth every evening. 90 capsule 1     triamcinolone (KENALOG) 0.1 % external cream Apply topically 2 times daily. Use on chest and back (Patient taking differently: Apply topically as needed. Use on chest and back) 454 g 0     voriconazole (VFEND) 200 MG tablet Take 1 tablet (200 mg) by mouth 2 times daily. 120 tablet 0       Allergies - reviewed     Allergies   Allergen Reactions     Gramineae Pollens Other (See Comments)     Seasonal sneezing, runny nose.       Past history -reviewed  As above    Social history - reviewed  Social History     Tobacco Use     Smoking status: Former     Current packs/day: 0.00     Average packs/day: 0.5 packs/day for 4.0 years (2.0 ttl pk-yrs)     Types: Cigarettes     Start date:      Quit date:      Years since quittin.8     Smokeless tobacco: Never   Vaping Use     Vaping status: Never Used   Substance Use Topics     Alcohol use: Not Currently     Comment: 24 Quit 1 year ago     Drug use: Never       Family history -reviewed  No premature CAD or SCD    ROS: non contributory on the 10-point review of system    Exam:     /73 (BP Location: Right arm, Patient Position: Chair, Cuff Size: Adult Large)   Pulse 67   Wt 108.4 kg (239 lb)   SpO2 100%   BMI 31.53 kg/m    In general, the patient is in no apparent distress.      HEENT: Sclerae white, not injected.    Neck: No JVD. No thyromegaly  Heart: RRR. Normal S1, S2. 3/6 systolic murmur radiating to carotids to radiating to carotids  Lungs: Clear bilaterally.  No rhonchi, wheezes, rales.   Extremities: Trace bilateral pretibial edema.  The pulses are 2+at the radial bilaterally.      I have independently reviewed this patient's relevant laboratory and cardiac data :    Recent Labs   Lab Test 24  0957   CHOL 152   HDL 36*   LDL 57   TRIG 293*      Recent  Labs   Lab Test 07/22/24  0957   AST 19     Recent Labs   Lab Test 07/22/24  0957   ALT 14     Recent Labs   Lab Test 07/22/24  0957      POTASSIUM 4.1   CHLORIDE 108*   CO2 22   ANIONGAP 12   BUN 22.7   CR 1.27*     Recent Labs   Lab Test 07/22/24  0957   WBC 4.3   RBC 3.95*   HGB 11.9*   MCV 97        ECG 8/21/24 - sinus rhythm  ECG 8/20/24 - atrial fibrillation    8/21/24 Echo  - normal biventricular function, Moderate aortic stenosis (PV 3.4m/s, MG 29 mmHg).     CT Chest 2/2/2024: Mild to moderate CAC, three-vessel disease.     Assessment and Plan:  Arpit Forrest is a very pleasant 73 year old man with MDS who is referred for cardiac evaluation.    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 His he has known coronary atherosclerosis with type 2 diabetes, hyperlipidemia and hypertension that are being addressed with medical therapy. He does not endorse any angina symptoms at rest or on exertion when he is physically active.     He has paroxysmal atrial fibrillation and is currently in a regular rhythm.  He is not on anticoagulation as these episodes have been very brief and usually in the setting of acute febrile illness and most recently in the setting of his stem cell transplant.  If he has more frequent episodes, we will have to initiate anticoagulation to reduce stroke risk.    He has asymptomatic moderate aortic stenosis which does not need any interventions at this point.  Will continue to monitor this with surveillance transthoracic echocardiograms.    Recommendation  Continue current medications  Reduce lisinopril dose if patient develops hypotension  Follow up with TTE in 6 months    Gem Banegas MD, MS  Professor of Medicine  Cardiovascular Medicine          Please do not hesitate to contact me if you have any  questions/concerns.     Sincerely,     Gem Banegas MD

## 2024-11-13 ENCOUNTER — ONCOLOGY VISIT (OUTPATIENT)
Dept: TRANSPLANT | Facility: CLINIC | Age: 74
End: 2024-11-13
Attending: STUDENT IN AN ORGANIZED HEALTH CARE EDUCATION/TRAINING PROGRAM
Payer: MEDICARE

## 2024-11-13 ENCOUNTER — APPOINTMENT (OUTPATIENT)
Dept: LAB | Facility: CLINIC | Age: 74
End: 2024-11-13
Attending: STUDENT IN AN ORGANIZED HEALTH CARE EDUCATION/TRAINING PROGRAM
Payer: MEDICARE

## 2024-11-13 VITALS
DIASTOLIC BLOOD PRESSURE: 78 MMHG | OXYGEN SATURATION: 99 % | HEART RATE: 61 BPM | SYSTOLIC BLOOD PRESSURE: 134 MMHG | WEIGHT: 243 LBS | BODY MASS INDEX: 32.06 KG/M2

## 2024-11-13 DIAGNOSIS — D46.9 MYELODYSPLASTIC SYNDROME (H): ICD-10-CM

## 2024-11-13 DIAGNOSIS — Z94.81 STATUS POST BONE MARROW TRANSPLANT (H): ICD-10-CM

## 2024-11-13 LAB
ALBUMIN SERPL BCG-MCNC: 4.1 G/DL (ref 3.5–5.2)
ALP SERPL-CCNC: 119 U/L (ref 40–150)
ALT SERPL W P-5'-P-CCNC: 18 U/L (ref 0–70)
ANION GAP SERPL CALCULATED.3IONS-SCNC: 10 MMOL/L (ref 7–15)
AST SERPL W P-5'-P-CCNC: 21 U/L (ref 0–45)
BASOPHILS # BLD AUTO: 0 10E3/UL (ref 0–0.2)
BASOPHILS NFR BLD AUTO: 0 %
BILIRUB SERPL-MCNC: <0.2 MG/DL
BUN SERPL-MCNC: 16.9 MG/DL (ref 8–23)
CALCIUM SERPL-MCNC: 9.8 MG/DL (ref 8.8–10.4)
CHLORIDE SERPL-SCNC: 106 MMOL/L (ref 98–107)
CREAT SERPL-MCNC: 1.15 MG/DL (ref 0.67–1.17)
EGFRCR SERPLBLD CKD-EPI 2021: 66 ML/MIN/1.73M2
EOSINOPHIL # BLD AUTO: 0.2 10E3/UL (ref 0–0.7)
EOSINOPHIL NFR BLD AUTO: 3 %
ERYTHROCYTE [DISTWIDTH] IN BLOOD BY AUTOMATED COUNT: 16 % (ref 10–15)
GLUCOSE SERPL-MCNC: 118 MG/DL (ref 70–99)
HCO3 SERPL-SCNC: 24 MMOL/L (ref 22–29)
HCT VFR BLD AUTO: 31.4 % (ref 40–53)
HGB BLD-MCNC: 10.3 G/DL (ref 13.3–17.7)
IMM GRANULOCYTES # BLD: 0 10E3/UL
IMM GRANULOCYTES NFR BLD: 0 %
LYMPHOCYTES # BLD AUTO: 1.9 10E3/UL (ref 0.8–5.3)
LYMPHOCYTES NFR BLD AUTO: 29 %
MCH RBC QN AUTO: 28.8 PG (ref 26.5–33)
MCHC RBC AUTO-ENTMCNC: 32.8 G/DL (ref 31.5–36.5)
MCV RBC AUTO: 88 FL (ref 78–100)
MONOCYTES # BLD AUTO: 0.8 10E3/UL (ref 0–1.3)
MONOCYTES NFR BLD AUTO: 12 %
NEUTROPHILS # BLD AUTO: 3.6 10E3/UL (ref 1.6–8.3)
NEUTROPHILS NFR BLD AUTO: 56 %
NRBC # BLD AUTO: 0 10E3/UL
NRBC BLD AUTO-RTO: 0 /100
PLATELET # BLD AUTO: 180 10E3/UL (ref 150–450)
POTASSIUM SERPL-SCNC: 3.9 MMOL/L (ref 3.4–5.3)
PROT SERPL-MCNC: 6.7 G/DL (ref 6.4–8.3)
RBC # BLD AUTO: 3.58 10E6/UL (ref 4.4–5.9)
SIROLIMUS BLD-MCNC: 5.1 UG/L (ref 5–15)
SODIUM SERPL-SCNC: 140 MMOL/L (ref 135–145)
TME LAST DOSE: NORMAL H
TME LAST DOSE: NORMAL H
WBC # BLD AUTO: 6.4 10E3/UL (ref 4–11)

## 2024-11-13 PROCEDURE — 87799 DETECT AGENT NOS DNA QUANT: CPT | Performed by: STUDENT IN AN ORGANIZED HEALTH CARE EDUCATION/TRAINING PROGRAM

## 2024-11-13 PROCEDURE — G0463 HOSPITAL OUTPT CLINIC VISIT: HCPCS | Performed by: STUDENT IN AN ORGANIZED HEALTH CARE EDUCATION/TRAINING PROGRAM

## 2024-11-13 PROCEDURE — 85004 AUTOMATED DIFF WBC COUNT: CPT | Performed by: STUDENT IN AN ORGANIZED HEALTH CARE EDUCATION/TRAINING PROGRAM

## 2024-11-13 PROCEDURE — 82947 ASSAY GLUCOSE BLOOD QUANT: CPT | Performed by: STUDENT IN AN ORGANIZED HEALTH CARE EDUCATION/TRAINING PROGRAM

## 2024-11-13 PROCEDURE — 36415 COLL VENOUS BLD VENIPUNCTURE: CPT | Performed by: STUDENT IN AN ORGANIZED HEALTH CARE EDUCATION/TRAINING PROGRAM

## 2024-11-13 PROCEDURE — 99215 OFFICE O/P EST HI 40 MIN: CPT | Performed by: STUDENT IN AN ORGANIZED HEALTH CARE EDUCATION/TRAINING PROGRAM

## 2024-11-13 PROCEDURE — 80195 ASSAY OF SIROLIMUS: CPT | Performed by: STUDENT IN AN ORGANIZED HEALTH CARE EDUCATION/TRAINING PROGRAM

## 2024-11-13 RX ORDER — CLINDAMYCIN PHOSPHATE 10 MG/G
GEL TOPICAL 2 TIMES DAILY
Qty: 30 G | Refills: 0 | Status: SHIPPED | OUTPATIENT
Start: 2024-11-13

## 2024-11-13 ASSESSMENT — PAIN SCALES - GENERAL: PAINLEVEL_OUTOF10: NO PAIN (0)

## 2024-11-13 NOTE — LETTER
11/13/2024      Arpit Forrest  [address]  [place] [address]      Dear Colleague,    Thank you for referring your patient, Arpit Forrest, to the [facility]OD [facility]OW [facility]ANSPLANT PRO[facility] [place]. Please see a copy of my visit note below.    BMT Clinic Note  Nov 13, 2024     Patient ID: Arpit Forrest is a 73 year old male who is day +99 post allogenic stem cell transplant for MDS-EB1     Diagnosis MDS-EB1 BMT type Allogenic  CMV  Donor -  Recipient -    Prep ELIEZER (Flu/Cy/TBI) Donor type  8/8 URD ABO D/R O- (matched)   GVHD ppx PTCy, siro, MMF Graft source PBSCT Toxo IgG Negative   Protocol [id number] CD34/kg 6.12E+06    BMT MD Nena Fowler      Pre-transplant disease history  Referring provider:  Dr. Beckwith, [facility]      Diagnosis:   MDS-IB1. IPSS-M: very high risk 1.98   CD5+ Monoclonal B Lymphocytosis (MBL), chronic lymphocytic leukemia type  Recurrent febrile episodes, UBA1 negative, resolved with treatment of MDS  Treatment  3/21/24 to 6/19/24: Decitabine 20mg/m2 for 5 days x 4 cycles    Post transplant  # A.fib with RVR on 8/21/24 which converted to sinus rhythm spontaneously. 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 Max grade 1 (face, chest and upper back). Skin biopsy (9/1224) showed interface dermatitis suggestive of GVHD. Resolved with topical steroids.       INTERVAL HISTORY     Arpit presents for a scheduled BMT visit. Here with his wife. Overall, has been doing well. Gets tired after a full day of activity.     - Has a rash on his face. Has both acneform eruptions as well as erythematous plaques. Upper chest has acneform rash. Has been using hydrocortisone on face, does not help. Triamcinolone helps on chest. Itches sometimes.   - B/L pedal edema with tenderness to palpation over shins. Slight improvement with reducing amlodipine to 5mg daily and compression socks. No joint involvement.   - He would like to get off the  sirolimus as soon as able.     Plan  - Due for D100 staging   - Skin rash is predominantly acneform and likely sirolimus related. Might have a component of GVHD as well.   - Topical clindamycin for face daily, along with topical steroids  - Will request pharmacy to send him a sirolimus taper schedule : taper over 2 weeks  - Leg swelling is likely multifactorial. Check urine studies (UA, Urine protein and urine creatinine).   - Has received flu shot, okay to get COVID vaccine    Current Outpatient Medications   Medication Sig Dispense Refill     clindamycin (CLEOCIN-T) 1 % external gel Apply topically 2 times daily. 30 g 0     acyclovir (ZOVIRAX) 800 MG tablet Take 1 tablet (800 mg) by mouth 2 times daily. 120 tablet 1     allopurinol (ZYLOPRIM) 300 MG tablet Take 1 tablet (300 mg) by mouth daily. 60 tablet 1     amLODIPine (NORVASC) 10 MG tablet Take 0.5 tablets (5 mg) by mouth every morning.       aspirin 81 MG EC tablet Take 1 tablet (81 mg) by mouth daily. 60 tablet 1     atorvastatin (LIPITOR) 10 MG tablet Take 1 tablet (10 mg) by mouth every morning. 60 tablet 1     brimonidine (ALPHAGAN) 0.2 % ophthalmic solution Place 1 drop into both eyes daily.       calcium carbonate-vitamin D (CALTRATE) 600-10 MG-MCG per tablet Take 1 tablet by mouth daily. 60 tablet 1     fluticasone (FLONASE) 50 MCG/ACT nasal spray Spray 1 spray into both nostrils as needed.       hydrocortisone 2.5 % cream Apply topically 2 times daily. Use on face and scalp 30 g 0     latanoprost (XALATAN) 0.005 % ophthalmic solution INSTILL 2 DROPS IN BOTH EYES AT BEDTIME       lisinopril (ZESTRIL) 5 MG tablet Take 1 tablet (5 mg) by mouth daily. 60 tablet 1     Lutein-Zeaxanthin 25-5 MG CAPS Take 1 capsule by mouth daily       metFORMIN (GLUCOPHAGE XR) 500 MG 24 hr tablet Take by mouth. 500 mg in the morning and 1000 mg in the evening       metoprolol tartrate (LOPRESSOR) 25 MG tablet Take 1 tablet (25 mg) by mouth 2 times daily. 60 tablet 1      pantoprazole (PROTONIX) 40 MG EC tablet Take 1 tablet (40 mg) by mouth every morning (before breakfast). 60 tablet 1     psyllium (METAMUCIL/KONSYL) capsule Take 3 capsules by mouth daily. 270 capsule 3     sirolimus (GENERIC EQUIVALENT) 0.5 MG tablet Take 1 tablet (0.5 mg) by mouth daily. (Patient taking differently: Take 0.5 mg by mouth every other day. As of 10/9) 60 tablet 1     sulfamethoxazole-trimethoprim (BACTRIM DS) 800-160 MG tablet Take 1 tablet by mouth Every Mon, Tues two times daily. Do not begin taking until instructed to do so by BMT clinic. Do not start before September 9, 2024. 28 tablet 1     tamsulosin (FLOMAX) 0.4 MG capsule Take 1 capsule (0.4 mg) by mouth every evening. 90 capsule 1     triamcinolone (KENALOG) 0.1 % external cream Apply topically 2 times daily. Use on chest and back 454 g 0     voriconazole (VFEND) 200 MG tablet TAKE 1 TABLET BY MOUTH TWICE DAILY 120 tablet 0        EXAM      /78 (BP Location: Right arm, Patient Position: Right side, Cuff Size: Adult Regular)   Pulse 61   Wt 110.2 kg (243 lb)   SpO2 99%   BMI 32.06 kg/m      Wt Readings from Last 4 Encounters:   11/13/24 110.2 kg (243 lb)   11/11/24 108.4 kg (239 lb)   11/06/24 108.4 kg (239 lb)   10/29/24 108.3 kg (238 lb 12.8 oz)     KPS: 80% Can perform normal activity with effort, some signs of disease    General: Alert, awake; NAD  HEENT: atraumatic, sclera anicteric. Oral mucosa moist, OP nonerythematous  Skin: Erythema over nasolabial folds. Acneform lesions on forehead , chin and chest. Erythematous plaques behind left ear.   MSK: 1+ pitting edema in bilateral LE. Tenderness with palpation over shin (R>L)        Acute GVHD          11/14/2024    22:07 10/24/2024    13:25 10/11/2024    16:57   Acute GVHD   New evidence of Acute Tsewa-Ulhfwd-Qrpo Disease developed since last entry?  No No   Skin 1        Laboratory Studies:     Blood Counts  Recent Labs   Lab Test 11/13/24  8785 11/06/24  1239 10/29/24  1216  09/17/24  0809 09/12/24  0950 09/10/24  1114 09/06/24  1257   WBC 6.4 5.8 6.3   < > 3.3* 2.8* 2.8*   HGB 10.3* 10.3* 10.0*   < > 9.4* 9.6* 9.3*    194 180   < > 123* 96* 89*   NEUTROPHIL 56 61 60   < > 67 71 70   ANEU  --   --   --   --  2.2 2.0 2.0   LYMPH 29 26 27   < > 19 1 10   ALYM  --   --   --   --  0.6* 0.0* 0.3*    < > = values in this interval not displayed.       Basic Panel  Recent Labs   Lab Test 11/13/24  1745 11/06/24  1239 10/29/24  1219    138 141   POTASSIUM 3.9 3.9 3.9   CHLORIDE 106 105 107   CO2 24 23 23   BUN 16.9 17.8 17.7   CR 1.15 1.15 1.18*   * 181* 138*        Recent Labs   Lab Test 11/13/24 1745 11/06/24 1239 10/29/24  1219 09/10/24  1114 09/06/24  1257 08/26/24  1253 08/25/24  0326 08/24/24  0326 08/23/24  0340   MC 9.8 9.5 9.7   < > 9.5   < > 8.9 9.0 8.8   MAG  --   --   --   --  1.6*  --  1.9 2.0 2.1   PHOS  --   --   --   --   --   --  3.7 3.9 3.5    < > = values in this interval not displayed.        LFTs  Recent Labs   Lab Test 11/13/24 1745 11/06/24  1239 10/29/24  1219   ALKPHOS 119 111 103   AST 21 21 22   ALT 18 15 19   BILITOTAL <0.2 0.2 0.2   ALBUMIN 4.1 4.0 4.1       Recent Labs   Lab Test 11/13/24  1745 10/29/24  1219 10/24/24  1212   RAPAMY 5.1 5.4 5.6       No lab results found.    Recent Labs   Lab Test 11/13/24  1745 10/29/24  1219 10/24/24  1212   CMVQNT Not Detected Not Detected Not Detected       Recent Labs   Lab Test 09/25/24  1211 09/12/24  0950   * 467*       Recent Labs   Lab Test 07/22/24  0957   QUINCY 295        ASSESSMENT AND PLAN     BMT for high risk MDS: D+99  - Chemo protocol: [id number]; Flu, Cy, TBI  - Peripheral blood stem cell graft from 8/8 donor, ABO matched (O neg)  - Continue allopurinol (hx of gout)    Disease status:   - 8/30/24 (D28): Slightly hypercellular marrow (30-40%). No evidence of myeloid neoplasm or monoclonal B-lymphocytosis. Normal male karyotype. NGS negative for DNMT3A (other mutations not tested)  - Need  to send NGS for all mutations present at diagnosis with future bone marrows    Graft status/chimerism:   - 8/30/24 (D28): Bone marrow 100%. Peripheral blood CD33 100%, CD3 84%  - 9/12/24: Peripheral blood CD3 96%, CD33 100%  - 9/25/24: Peripheral blood CD3 100%, CD33 100%    Maintenance  None    GVHD prophylaxis  # Current immunosuppression is sirolimus   - Taper over 2-3 weeks    # The current acute GVHD grade is 1  - Skin: Stage 1. Rash is likely sirolimus + GVHD. Skin biopsy showed mild interface dermatitis suggestive of GVHD.   - GI: GI score 0.  - Liver: LFTs normal. Liver score 0.    HEME/ COAG  G-CSF last given on 8/22/4.  ABO matched.    ID  # Prophylaxis  Fungal: Voriconazole 200mg BID started on 9/26/24.   PJP/ Toxo IgG negative: Bactrim.  Viral: Acyclovir 800 mg bid     # Monitoring  CMV: Monitor weekly till D100.   EBV: Monitor every 2 weeks between D30 to D180.   IgG: IgG 467 (9/12/24). Check serum IgG level q3 months, and consider IVIG repletion for IgG levels <400 mg/dL.     GI  # Hepatic steatosis and borderline iron deposition in liver  - VOD prophy: Ursodiol completed    # Supportive   - Ulcer prophy: PPI  - Alternating constipation and diarrhea: continue psyllium and imodium PRN    CARDIOVASCULAR  # Asymptomatic moderate aortic stenosis    # A.fib with RVR   - One episode while inpatient on 8/21/24 which converted to normal sinus rhythm spontaneously. Was started on metoprolol 25mg BID.   - Has been seen by cardiology on 11/11/24. No anticoagulation needed.    - One previous episode of A.fib with RVR prior to transplant in the context of admission with febrile episode on 3/15/24. Noted on his fitbit, not very symptomatic. DCCV on 3/21/24 restored normal sinus rhythm. Amiodarone and anticoagulation was stopped in April 2024 without any recurrence.     # CAD  - Coronary artery calcifications.   - Continue atorvastatin 10mg, ASA 81mg (resumed on 8/23/24 as platelets stable above 50k)  - Will need an  ischemia evaluation in the long term    # HTN: Continue amlodipine 5 mg (home med), lisinopril 5mg       RENAL/ELECTROLYTES/  # Urinary frequency, without evidence of retention or infection  # BPH  - Continue tamsulosin 0.4mg qhs   - Has been seen by urology on 10/3/24. Mybetriq was recommended, unclear if he is taking it. Follow up with urology in 2-3 months.     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   - HbA1c on 10/14 was 6.8    RESP  # VINCENT : CPAP     OPHTHALMOLOGY:  # Glaucoma - Lutein supplement; brimonidine, latanaprost eye drops.   # Right eye floaters: Following retinal specialist      MUSCULOSKELETAL/FRAILTY  # Gout: Continue allopurinol indefinitely.     Post-transplant vaccinations  Flu vaccination done  COVID vaccine after D100    I spent 40 minutes in the care of this patient today, which included time necessary for preparation for the visit, obtaining history, ordering medications/tests/procedures as medically indicated, review of pertinent medical literature, counseling of the patient, communication of recommendations to the care team, and documentation time.     Nena Fowler  Department of Hematology, Oncology and Transplantation  Pager [phone]/Text via [employer]         Again, thank you for allowing me to participate in the care of your patient.        Sincerely,        GASPER Rios

## 2024-11-13 NOTE — PROGRESS NOTES
BMT Clinic Note  Nov 13, 2024     Patient ID: Arpit BETH White is a 73 year old male who is day +99 post allogenic stem cell transplant for MDS-EB1     Diagnosis MDS-EB1 BMT type Allogenic  CMV  Donor -  Recipient -    Prep ELIEZER (Flu/Cy/TBI) Donor type  8/8 URD ABO D/R O- (matched)   GVHD ppx PTCy, siro, MMF Graft source PBSCT Toxo IgG Negative   Protocol VS7711-11 CD34/kg 6.12E+06    BMT MD Nena Fowler      Pre-transplant disease history  Referring provider:  [facility]. Beckwith[facility]      Diagnosis:   MDS-IB1. IPSS-M: very high risk 1.98   CD5+ Monoclonal B Lymphocytosis (MBL), chronic lymphocytic leukemia type  Recurrent febrile episodes, UBA1 negative, resolved with treatment of MDS  Treatment  3/21/24 to 6/19/24: Decitabine 20mg/m2 for 5 days x 4 cycles    Post transplant  # A.fib with RVR on 8/21/24 which converted to sinus rhythm spontaneously. 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 Max grade 1 (face, chest and upper back). Skin biopsy (9/1224) showed interface dermatitis suggestive of GVHD. Resolved with topical steroids.       INTERVAL HISTORY     Arpit presents for a scheduled BMT visit. Here with his wife. Overall, has been doing well. Gets tired after a full day of activity.     - Has a rash on his face. Has both acneform eruptions as well as erythematous plaques. Upper chest has acneform rash. Has been using hydrocortisone on face, does not help. Triamcinolone helps on chest. Itches sometimes.   - B/L pedal edema with tenderness to palpation over shins. Slight improvement with reducing amlodipine to 5mg daily and compression socks. No joint involvement.   - He would like to get off the sirolimus as soon as able.     Plan  - Due for D100 staging   - Skin rash is predominantly acneform and likely sirolimus related. Might have a component of GVHD as well.   - Topical clindamycin for face daily, along with topical steroids  - Will request pharmacy to send  him a sirolimus taper schedule : taper over 2 weeks  - Leg swelling is likely multifactorial. Check urine studies (UA, Urine protein and urine creatinine).   - Has received flu shot, okay to get COVID vaccine    Current Outpatient Medications   Medication Sig Dispense Refill    clindamycin (CLEOCIN-T) 1 % external gel Apply topically 2 times daily. 30 g 0    acyclovir (ZOVIRAX) 800 MG tablet Take 1 tablet (800 mg) by mouth 2 times daily. 120 tablet 1    allopurinol (ZYLOPRIM) 300 MG tablet Take 1 tablet (300 mg) by mouth daily. 60 tablet 1    amLODIPine (NORVASC) 10 MG tablet Take 0.5 tablets (5 mg) by mouth every morning.      aspirin 81 MG EC tablet Take 1 tablet (81 mg) by mouth daily. 60 tablet 1    atorvastatin (LIPITOR) 10 MG tablet Take 1 tablet (10 mg) by mouth every morning. 60 tablet 1    brimonidine (ALPHAGAN) 0.2 % ophthalmic solution Place 1 drop into both eyes daily.      calcium carbonate-vitamin D (CALTRATE) 600-10 MG-MCG per tablet Take 1 tablet by mouth daily. 60 tablet 1    fluticasone (FLONASE) 50 MCG/ACT nasal spray Spray 1 spray into both nostrils as needed.      hydrocortisone 2.5 % cream Apply topically 2 times daily. Use on face and scalp 30 g 0    latanoprost (XALATAN) 0.005 % ophthalmic solution INSTILL 2 DROPS IN BOTH EYES AT BEDTIME      lisinopril (ZESTRIL) 5 MG tablet Take 1 tablet (5 mg) by mouth daily. 60 tablet 1    Lutein-Zeaxanthin 25-5 MG CAPS Take 1 capsule by mouth daily      metFORMIN (GLUCOPHAGE XR) 500 MG 24 hr tablet Take by mouth. 500 mg in the morning and 1000 mg in the evening      metoprolol tartrate (LOPRESSOR) 25 MG tablet Take 1 tablet (25 mg) by mouth 2 times daily. 60 tablet 1    pantoprazole (PROTONIX) 40 MG EC tablet Take 1 tablet (40 mg) by mouth every morning (before breakfast). 60 tablet 1    psyllium (METAMUCIL/KONSYL) capsule Take 3 capsules by mouth daily. 270 capsule 3    sirolimus (GENERIC EQUIVALENT) 0.5 MG tablet Take 1 tablet (0.5 mg) by mouth daily.  (Patient taking differently: Take 0.5 mg by mouth every other day. As of 10/9) 60 tablet 1    sulfamethoxazole-trimethoprim (BACTRIM DS) 800-160 MG tablet Take 1 tablet by mouth Every Mon, Tues two times daily. Do not begin taking until instructed to do so by BMT clinic. Do not start before September 9, 2024. 28 tablet 1    tamsulosin (FLOMAX) 0.4 MG capsule Take 1 capsule (0.4 mg) by mouth every evening. 90 capsule 1    triamcinolone (KENALOG) 0.1 % external cream Apply topically 2 times daily. Use on chest and back 454 g 0    voriconazole (VFEND) 200 MG tablet TAKE 1 TABLET BY MOUTH TWICE DAILY 120 tablet 0        EXAM      /78 (BP Location: Right arm, Patient Position: Right side, Cuff Size: Adult Regular)   Pulse 61   Wt 110.2 kg (243 lb)   SpO2 99%   BMI 32.06 kg/m      Wt Readings from Last 4 Encounters:   11/13/24 110.2 kg (243 lb)   11/11/24 108.4 kg (239 lb)   11/06/24 108.4 kg (239 lb)   10/29/24 108.3 kg (238 lb 12.8 oz)     KPS: 80% Can perform normal activity with effort, some signs of disease    General: Alert, awake; NAD  HEENT: atraumatic, sclera anicteric. Oral mucosa moist, OP nonerythematous  Skin: Erythema over nasolabial folds. Acneform lesions on forehead , chin and chest. Erythematous plaques behind left ear.   MSK: 1+ pitting edema in bilateral LE. Tenderness with palpation over shin (R>L)        Acute GVHD          11/14/2024    22:07 10/24/2024    13:25 10/11/2024    16:57   Acute GVHD   New evidence of Acute Thhse-Ebbqod-Squu Disease developed since last entry?  No No   Skin 1        Laboratory Studies:     Blood Counts  Recent Labs   Lab Test 11/13/24  1745 11/06/24  1239 10/29/24  1219 09/17/24  0809 09/12/24  0950 09/10/24  1114 09/06/24  1257   WBC 6.4 5.8 6.3   < > 3.3* 2.8* 2.8*   HGB 10.3* 10.3* 10.0*   < > 9.4* 9.6* 9.3*    194 180   < > 123* 96* 89*   NEUTROPHIL 56 61 60   < > 67 71 70   ANEU  --   --   --   --  2.2 2.0 2.0   LYMPH 29 26 27   < > 19 1 10   ALYM   --   --   --   --  0.6* 0.0* 0.3*    < > = values in this interval not displayed.       Basic Panel  Recent Labs   Lab Test 11/13/24  1745 11/06/24  1239 10/29/24  1219    138 141   POTASSIUM 3.9 3.9 3.9   CHLORIDE 106 105 107   CO2 24 23 23   BUN 16.9 17.8 17.7   CR 1.15 1.15 1.18*   * 181* 138*        Recent Labs   Lab Test 11/13/24  1745 11/06/24  1239 10/29/24  1219 09/10/24  1114 09/06/24  1257 08/26/24  1253 08/25/24  0326 08/24/24  0326 08/23/24  0340   MC 9.8 9.5 9.7   < > 9.5   < > 8.9 9.0 8.8   MAG  --   --   --   --  1.6*  --  1.9 2.0 2.1   PHOS  --   --   --   --   --   --  3.7 3.9 3.5    < > = values in this interval not displayed.        LFTs  Recent Labs   Lab Test 11/13/24 1745 11/06/24  1239 10/29/24  1219   ALKPHOS 119 111 103   AST 21 21 22   ALT 18 15 19   BILITOTAL <0.2 0.2 0.2   ALBUMIN 4.1 4.0 4.1       Recent Labs   Lab Test 11/13/24  1745 10/29/24  1219 10/24/24  1212   RAPAMY 5.1 5.4 5.6       No lab results found.    Recent Labs   Lab Test 11/13/24  1745 10/29/24  1219 10/24/24  1212   CMVQNT Not Detected Not Detected Not Detected       Recent Labs   Lab Test 09/25/24  1211 09/12/24  0950   * 467*       Recent Labs   Lab Test 07/22/24  0957   QUINCY 295        ASSESSMENT AND PLAN     BMT for high risk MDS: D+99  - Chemo protocol: [id number]; Flu, Cy, TBI  - Peripheral blood stem cell graft from 8/8 donor, ABO matched (O neg)  - Continue allopurinol (hx of gout)    Disease status:   - 8/30/24 (D28): Slightly hypercellular marrow (30-40%). No evidence of myeloid neoplasm or monoclonal B-lymphocytosis. Normal male karyotype. NGS negative for DNMT3A (other mutations not tested)  - Need to send NGS for all mutations present at diagnosis with future bone marrows    Graft status/chimerism:   - 8/30/24 (D28): Bone marrow 100%. Peripheral blood CD33 100%, CD3 84%  - 9/12/24: Peripheral blood CD3 96%, CD33 100%  - 9/25/24: Peripheral blood CD3 100%, CD33  100%    Maintenance  None    GVHD prophylaxis  # Current immunosuppression is sirolimus   - Taper over 2-3 weeks    # The current acute GVHD grade is 1  - Skin: Stage 1. Rash is likely sirolimus + GVHD. Skin biopsy showed mild interface dermatitis suggestive of GVHD.   - GI: GI score 0.  - Liver: LFTs normal. Liver score 0.    HEME/ COAG  G-CSF last given on 8/22/4.  ABO matched.    ID  # Prophylaxis  Fungal: Voriconazole 200mg BID started on 9/26/24.   PJP/ Toxo IgG negative: Bactrim.  Viral: Acyclovir 800 mg bid     # Monitoring  CMV: Monitor weekly till D100.   EBV: Monitor every 2 weeks between D30 to D180.   IgG: IgG 467 (9/12/24). Check serum IgG level q3 months, and consider IVIG repletion for IgG levels <400 mg/dL.     GI  # Hepatic steatosis and borderline iron deposition in liver  - VOD prophy: Ursodiol completed    # Supportive   - Ulcer prophy: PPI  - Alternating constipation and diarrhea: continue psyllium and imodium PRN    CARDIOVASCULAR  # Asymptomatic moderate aortic stenosis    # A.fib with RVR   - One episode while inpatient on 8/21/24 which converted to normal sinus rhythm spontaneously. Was started on metoprolol 25mg BID.   - Has been seen by cardiology on 11/11/24. No anticoagulation needed.    - One previous episode of A.fib with RVR prior to transplant in the context of admission with febrile episode on 3/15/24. Noted on his fitbit, not very symptomatic. DCCV on 3/21/24 restored normal sinus rhythm. Amiodarone and anticoagulation was stopped in April 2024 without any recurrence.     # CAD  - Coronary artery calcifications.   - Continue atorvastatin 10mg, ASA 81mg (resumed on 8/23/24 as platelets stable above 50k)  - Will need an ischemia evaluation in the long term    # HTN: Continue amlodipine 5 mg (home med), lisinopril 5mg       RENAL/ELECTROLYTES/  # Urinary frequency, without evidence of retention or infection  # BPH  - Continue tamsulosin 0.4mg qhs   - Has been seen by urology on  10/3/24. Mybetriq was recommended, unclear if he is taking it. Follow up with urology in 2-3 months.     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   - HbA1c on 10/14 was 6.8    RESP  # VINCENT : CPAP     OPHTHALMOLOGY:  # Glaucoma - Lutein supplement; brimonidine, latanaprost eye drops.   # Right eye floaters: Following retinal specialist      MUSCULOSKELETAL/FRAILTY  # Gout: Continue allopurinol indefinitely.     Post-transplant vaccinations  Flu vaccination done  COVID vaccine after D100    I spent 40 minutes in the care of this patient today, which included time necessary for preparation for the visit, obtaining history, ordering medications/tests/procedures as medically indicated, review of pertinent medical literature, counseling of the patient, communication of recommendations to the care team, and documentation time.     Nena Fowler  Department of Hematology, Oncology and Transplantation  Pager [phone]/Text via James

## 2024-11-14 DIAGNOSIS — Z94.81 STATUS POST BONE MARROW TRANSPLANT (H): ICD-10-CM

## 2024-11-14 LAB
CMV DNA SPEC NAA+PROBE-ACNC: NOT DETECTED IU/ML
EBV DNA SERPL NAA+PROBE-ACNC: NOT DETECTED IU/ML
SPECIMEN TYPE: NORMAL

## 2024-11-14 RX ORDER — VORICONAZOLE 200 MG/1
200 TABLET, FILM COATED ORAL 2 TIMES DAILY
Qty: 120 TABLET | Refills: 0 | Status: SHIPPED | OUTPATIENT
Start: 2024-11-14

## 2024-11-15 ENCOUNTER — OFFICE VISIT (OUTPATIENT)
Dept: TRANSPLANT | Facility: CLINIC | Age: 74
End: 2024-11-15
Attending: STUDENT IN AN ORGANIZED HEALTH CARE EDUCATION/TRAINING PROGRAM
Payer: MEDICARE

## 2024-11-15 ENCOUNTER — APPOINTMENT (OUTPATIENT)
Dept: LAB | Facility: CLINIC | Age: 74
End: 2024-11-15
Attending: STUDENT IN AN ORGANIZED HEALTH CARE EDUCATION/TRAINING PROGRAM
Payer: MEDICARE

## 2024-11-15 ENCOUNTER — DOCUMENTATION ONLY (OUTPATIENT)
Dept: TRANSPLANT | Facility: CLINIC | Age: 74
End: 2024-11-15

## 2024-11-15 VITALS
SYSTOLIC BLOOD PRESSURE: 119 MMHG | TEMPERATURE: 97.4 F | HEART RATE: 61 BPM | DIASTOLIC BLOOD PRESSURE: 66 MMHG | OXYGEN SATURATION: 97 % | RESPIRATION RATE: 20 BRPM

## 2024-11-15 DIAGNOSIS — D46.9 MDS (MYELODYSPLASTIC SYNDROME) (H): Primary | ICD-10-CM

## 2024-11-15 DIAGNOSIS — D46.9 MYELODYSPLASTIC SYNDROME (H): ICD-10-CM

## 2024-11-15 LAB
ALBUMIN MFR UR ELPH: 21.6 MG/DL
ALBUMIN SERPL BCG-MCNC: 4.1 G/DL (ref 3.5–5.2)
ALBUMIN UR-MCNC: 10 MG/DL
ALP SERPL-CCNC: 116 U/L (ref 40–150)
ALT SERPL W P-5'-P-CCNC: 18 U/L (ref 0–70)
ANION GAP SERPL CALCULATED.3IONS-SCNC: 10 MMOL/L (ref 7–15)
APPEARANCE UR: CLEAR
AST SERPL W P-5'-P-CCNC: 22 U/L (ref 0–45)
BASOPHILS # BLD AUTO: 0 10E3/UL (ref 0–0.2)
BASOPHILS NFR BLD AUTO: 0 %
BILIRUB SERPL-MCNC: 0.2 MG/DL
BILIRUB UR QL STRIP: NEGATIVE
BUN SERPL-MCNC: 16.8 MG/DL (ref 8–23)
CALCIUM SERPL-MCNC: 9.6 MG/DL (ref 8.8–10.4)
CHLORIDE SERPL-SCNC: 106 MMOL/L (ref 98–107)
COLOR UR AUTO: ABNORMAL
CREAT SERPL-MCNC: 1.08 MG/DL (ref 0.67–1.17)
CREAT UR-MCNC: 160 MG/DL
CREAT UR-MCNC: 160 MG/DL
EGFRCR SERPLBLD CKD-EPI 2021: 72 ML/MIN/1.73M2
EOSINOPHIL # BLD AUTO: 0.1 10E3/UL (ref 0–0.7)
EOSINOPHIL NFR BLD AUTO: 2 %
ERYTHROCYTE [DISTWIDTH] IN BLOOD BY AUTOMATED COUNT: 16 % (ref 10–15)
GLUCOSE SERPL-MCNC: 168 MG/DL (ref 70–99)
GLUCOSE UR STRIP-MCNC: NEGATIVE MG/DL
HCO3 SERPL-SCNC: 24 MMOL/L (ref 22–29)
HCT VFR BLD AUTO: 33.4 % (ref 40–53)
HGB BLD-MCNC: 10.7 G/DL (ref 13.3–17.7)
HGB UR QL STRIP: NEGATIVE
HYALINE CASTS: 6 /LPF
IMM GRANULOCYTES # BLD: 0 10E3/UL
IMM GRANULOCYTES NFR BLD: 0 %
KETONES UR STRIP-MCNC: NEGATIVE MG/DL
LEUKOCYTE ESTERASE UR QL STRIP: NEGATIVE
LYMPHOCYTES # BLD AUTO: 1.4 10E3/UL (ref 0.8–5.3)
LYMPHOCYTES NFR BLD AUTO: 24 %
MCH RBC QN AUTO: 28.3 PG (ref 26.5–33)
MCHC RBC AUTO-ENTMCNC: 32 G/DL (ref 31.5–36.5)
MCV RBC AUTO: 88 FL (ref 78–100)
MONOCYTES # BLD AUTO: 0.6 10E3/UL (ref 0–1.3)
MONOCYTES NFR BLD AUTO: 9 %
MUCOUS THREADS #/AREA URNS LPF: PRESENT /LPF
NEUTROPHILS # BLD AUTO: 3.9 10E3/UL (ref 1.6–8.3)
NEUTROPHILS NFR BLD AUTO: 65 %
NITRATE UR QL: NEGATIVE
NRBC # BLD AUTO: 0 10E3/UL
NRBC BLD AUTO-RTO: 0 /100
PH UR STRIP: 5 [PH] (ref 5–7)
PLATELET # BLD AUTO: 177 10E3/UL (ref 150–450)
POTASSIUM SERPL-SCNC: 3.8 MMOL/L (ref 3.4–5.3)
PROT SERPL-MCNC: 6.5 G/DL (ref 6.4–8.3)
PROT/CREAT 24H UR: 0.14 MG/MG CR (ref 0–0.2)
RBC # BLD AUTO: 3.78 10E6/UL (ref 4.4–5.9)
RBC URINE: 0 /HPF
SODIUM SERPL-SCNC: 140 MMOL/L (ref 135–145)
SP GR UR STRIP: 1.02 (ref 1–1.03)
UROBILINOGEN UR STRIP-MCNC: NORMAL MG/DL
WBC # BLD AUTO: 6.1 10E3/UL (ref 4–11)
WBC URINE: 1 /HPF

## 2024-11-15 PROCEDURE — 36415 COLL VENOUS BLD VENIPUNCTURE: CPT | Performed by: STUDENT IN AN ORGANIZED HEALTH CARE EDUCATION/TRAINING PROGRAM

## 2024-11-15 PROCEDURE — 38222 DX BONE MARROW BX & ASPIR: CPT | Performed by: PHYSICIAN ASSISTANT

## 2024-11-15 PROCEDURE — 81001 URINALYSIS AUTO W/SCOPE: CPT

## 2024-11-15 PROCEDURE — 82570 ASSAY OF URINE CREATININE: CPT

## 2024-11-15 PROCEDURE — 84156 ASSAY OF PROTEIN URINE: CPT

## 2024-11-15 PROCEDURE — 85025 COMPLETE CBC W/AUTO DIFF WBC: CPT | Performed by: STUDENT IN AN ORGANIZED HEALTH CARE EDUCATION/TRAINING PROGRAM

## 2024-11-15 PROCEDURE — 250N000011 HC RX IP 250 OP 636: Performed by: STUDENT IN AN ORGANIZED HEALTH CARE EDUCATION/TRAINING PROGRAM

## 2024-11-15 PROCEDURE — 82310 ASSAY OF CALCIUM: CPT | Performed by: STUDENT IN AN ORGANIZED HEALTH CARE EDUCATION/TRAINING PROGRAM

## 2024-11-15 RX ADMIN — MIDAZOLAM 1 MG: 1 INJECTION INTRAMUSCULAR; INTRAVENOUS at 10:08

## 2024-11-15 NOTE — PROGRESS NOTES
Sirolimus taper     Jaun Monday Tuesday Wednesday Thursday Friday Saturday   10-Nov-2024 11-Nov-2024 12-Nov-2024 13-Nov-2024 14-Nov-2024 15-Nov-2024 16-Nov-2024        0.5 mg 0   17-Nov-2024 18-Nov-2024 19-Nov-2024 20-Nov-2024 21-Nov-2024 22-Nov-2024 23-Nov-2024   0.5 mg  0 0 0.5 mg 0 0 0.5 mg    24-Nov-2024 25-Nov-2024 26-Nov-2024 27-Nov-2024 28-Nov-2024 29-Nov-2024 30-Nov-2024   0 0 0 0.5 mg 0 0 0   1-Dec-2024 2-Dec-2024 3-Dec-2024 4-Dec-2024 5-Dec-2024 6-Dec-2024 7-Dec-2024   0.5 mg  STOP

## 2024-11-15 NOTE — NURSING NOTE
BMBX Teaching and Assessment       Teaching concerns addressed: Bone marrow biopsy and infection prevention.     Person(s) involved in teaching: Patient  Motivation Level  Asks Questions: Yes  Eager to Learn: Yes  Cooperative: Yes  Receptive (willing/able to accept information): Yes    Patient demonstrates understanding of the following:     Reason for the appointment, diagnosis and treatment plan: Yes  Knowledge of proper use of medications and conditions for which they are ordered (with special attention to potential side effects or drug interactions): Yes  Which situations necessitate calling provider and whom to contact: Yes    Teaching concerns addressed:   Reviewed activity restrictions if received premeds, potential for bleeding and actions to take if develops any of the issues below    Pain management techniques: Yes  Patient instructed on hand hygiene: Yes  How and/when to access community resources: Yes    Infection Control:  Patient demonstrates understanding of the following:   Bone marrow procedure site care taught: Yes  Signs and symptoms of infection taught: Yes       Instructional Materials Used/Given: Pt instructed to keep bmbx site clean and dry for 24hrs. Pt educated to monitor site for signs of infection such as redness, rash, oozing, puss, bleeding, pain, and elevated temp. Pt instructed to go to call the [facility] [place] or go to the ER if any signs of infection should occur. Pt educated to not operate machinery if receiving versed. Pt and wife verbalize understanding.     Pre-procedure labs drawn via piv. Post procedure: Patient vital signs stable, ambulating, site is clean, dry and intact prior to discharge and line removed. Pt discharged with wife as .

## 2024-11-15 NOTE — LETTER
11/15/2024      Arpit Forrest  [address] [place] [address]      Dear Colleague,    Thank you for referring your patient, Arpit Forrest, to the [facility] BLOOD AND MARROW TRANSPLANT PROGRAM [place]. Please see a copy of my visit note below.    BMT ONC Adult Bone Marrow Biopsy Procedure Note  November 15, 2024  /69   Pulse 69   Temp 97.4  F (36.3  C) (Oral)   Resp 20   SpO2 98%      Learning needs assessment complete within 12 months? YES    DIAGNOSIS: MDS     PROCEDURE: Unilateral Bone Marrow Biopsy and Unilateral Aspirate    [place] [facility] [place] [facility] [place]    Patient s identification was positively verified by verbal identification and invasive procedure safety checklist was completed. Informed consent was obtained. Following the administration of 1mg IV Midazolam as pre-medication, patient was placed in the prone position and prepped and draped in a sterile manner. Approximately 10 cc of 1% Lidocaine was used over the left posterior iliac spine. Following this a 3 mm incision was made. Trephine bone marrow core(s) was (were) obtained from the [facility]. Bone marrow aspirates were obtained from the [facility]. Aspirates were sent for morphology, immunophenotyping, cytogenetics, and molecular diagnostics RFLP and NGS. A total of approximately 16 ml of marrow was aspirated. Following this procedure a sterile dressing was applied to the bone marrow biopsy site(s). The patient was placed in the supine position to maintain pressure on the biopsy site. Post-procedure wound care instructions were given.     Complications: NO    Pre-procedural pain: 0 out of 10 on the numeric pain rating scale.     Procedural pain: 4 out of 10 on the numeric pain rating scale.     Post-procedural pain assessment: 0 out of 10 on the numeric pain rating scale.     Interventions: NO    Length of procedure:20 minutes or less      Procedure performed by: Divine Goldberg pa-c  [phone]        Again, thank you for allowing me to  participate in the care of your patient.        Sincerely,        BMT Advanced Practice Provider

## 2024-11-15 NOTE — PROGRESS NOTES
BMT ONC Adult Bone Marrow Biopsy Procedure Note  November 15, 2024  /69   Pulse 69   Temp 97.4  F (36.3  C) (Oral)   Resp 20   SpO2 98%      Learning needs assessment complete within 12 months? YES    DIAGNOSIS: MDS     PROCEDURE: Unilateral Bone Marrow Biopsy and Unilateral Aspirate    [place] [facility] [place]    Patient s identification was positively verified by verbal identification and invasive procedure safety checklist was completed. Informed consent was obtained. Following the administration of 1mg IV Midazolam as pre-medication, patient was placed in the prone position and prepped and draped in a sterile manner. Approximately 10 cc of 1% Lidocaine was used over the left posterior iliac spine. Following this a 3 mm incision was made. Trephine bone marrow core(s) was (were) obtained from the [facility]. Bone marrow aspirates were obtained from the [facility]. Aspirates were sent for morphology, immunophenotyping, cytogenetics, and molecular diagnostics RFLP and NGS. A total of approximately 16 ml of marrow was aspirated. Following this procedure a sterile dressing was applied to the bone marrow biopsy site(s). The patient was placed in the supine position to maintain pressure on the biopsy site. Post-procedure wound care instructions were given.     Complications: NO    Pre-procedural pain: 0 out of 10 on the numeric pain rating scale.     Procedural pain: 4 out of 10 on the numeric pain rating scale.     Post-procedural pain assessment: 0 out of 10 on the numeric pain rating scale.     Interventions: NO    Length of procedure:20 minutes or less      Procedure performed by: Divine Goldberg pa-c  [phone]

## 2024-11-19 LAB

## 2024-11-22 ENCOUNTER — OFFICE VISIT (OUTPATIENT)
Dept: TRANSPLANT | Facility: CLINIC | Age: 74
End: 2024-11-22
Attending: STUDENT IN AN ORGANIZED HEALTH CARE EDUCATION/TRAINING PROGRAM
Payer: MEDICARE

## 2024-11-22 VITALS
HEART RATE: 68 BPM | OXYGEN SATURATION: 98 % | TEMPERATURE: 98.4 F | SYSTOLIC BLOOD PRESSURE: 128 MMHG | DIASTOLIC BLOOD PRESSURE: 73 MMHG | RESPIRATION RATE: 20 BRPM | HEIGHT: 73 IN | BODY MASS INDEX: 31.41 KG/M2 | WEIGHT: 237 LBS

## 2024-11-22 DIAGNOSIS — I10 HYPERTENSION, UNSPECIFIED TYPE: ICD-10-CM

## 2024-11-22 DIAGNOSIS — D46.9 MYELODYSPLASTIC SYNDROME (H): ICD-10-CM

## 2024-11-22 PROCEDURE — 99213 OFFICE O/P EST LOW 20 MIN: CPT | Performed by: STUDENT IN AN ORGANIZED HEALTH CARE EDUCATION/TRAINING PROGRAM

## 2024-11-22 RX ORDER — LISINOPRIL 5 MG/1
10 TABLET ORAL DAILY
Qty: 60 TABLET | Refills: 1 | Status: SHIPPED | OUTPATIENT
Start: 2024-11-22

## 2024-11-22 ASSESSMENT — PAIN SCALES - GENERAL: PAINLEVEL_OUTOF10: NO PAIN (0)

## 2024-11-22 NOTE — NURSING NOTE
"Oncology Rooming Note    November 22, 2024 2:02 PM   Arpit Forrest is a 73 year old male who presents for:    Chief Complaint   Patient presents with    Oncology Clinic Visit     BMT Review     Initial Vitals: /73   Pulse 68   Temp 98.4  F (36.9  C) (Tympanic)   Resp 20   Ht 1.854 m (6' 1\")   Wt 107.5 kg (237 lb)   SpO2 98%   BMI 31.27 kg/m   Estimated body mass index is 31.27 kg/m  as calculated from the following:    Height as of this encounter: 1.854 m (6' 1\").    Weight as of this encounter: 107.5 kg (237 lb). Body surface area is 2.35 meters squared.  No Pain (0) Comment: Data Unavailable   No LMP for male patient.  Allergies reviewed: Yes  Medications reviewed: Yes    Medications: Medication refills not needed today.  Pharmacy name entered into [facility]: [id number] [place] [place] - [address] AT [facility] [address]    Frailty Screening:   Is the patient here for a new oncology consult visit in cancer care? 2. No      Clinical concerns: Patient states that they would like a COVID vaccine.      Cuco Mcqueen LPN              "

## 2024-11-22 NOTE — PROGRESS NOTES
BMT Clinic Note  Nov 22, 2024     Patient ID: Arpit BETH White is a 73 year old male who is day +107 post allogenic stem cell transplant for MDS-EB1     Diagnosis MDS-EB1 BMT type Allogenic  CMV  Donor -  Recipient -    Prep ELIEZER (Flu/Cy/TBI) Donor type  8/8 URD ABO D/R O- (matched)   GVHD ppx PTCy, siro, MMF Graft source PBSCT Toxo IgG Negative   Protocol [id number] CD34/kg 6.12E+06    BMT MD Nena Fowler      Pre-transplant disease history  Referring provider:  Dr. 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 IPSS-M: very high risk 1.98   CD5+ Monoclonal B Lymphocytosis (MBL), chronic lymphocytic leukemia type  Recurrent febrile episodes, UBA1 negative, resolved with treatment of MDS  Treatment  3/21/24 to 6/19/24: Decitabine 20mg/m2 for 5 days x 4 cycles    Post transplant  # A.fib with RVR on 8/21/24 which converted to sinus rhythm spontaneously. 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 Max grade 1 (face, chest and upper back). Skin biopsy (9/1224) showed interface dermatitis suggestive of GVHD. Resolved with topical steroids.       INTERVAL HISTORY     Arpit presents for a scheduled BMT visit. Started siro taper, skin is getting better. Still has acneform rash, clindamycin helps. Pedal edema still present but better.     Plan  - D100 results reviewed  - Mild cytopenias, likely due to sirolimus  - Continue sirolimus taper, will end on 12/1/24  - Continue acyclovir and bactrim. Stop voriconazole when sirolimus completed  - Increase lisinopril to 10mg daily. Stop amlodipine  - Labs every 2 weeks at [facility] in 3 weeks or so      Current Outpatient Medications   Medication Sig Dispense Refill    acyclovir (ZOVIRAX) 800 MG tablet Take 1 tablet (800 mg) by mouth 2 times daily. 120 tablet 1    allopurinol (ZYLOPRIM) 300 MG tablet Take 1 tablet (300 mg) by mouth daily. 60 tablet 1    amLODIPine (NORVASC) 10 MG tablet Take 0.5 tablets (5 mg) by mouth  every morning.      aspirin 81 MG EC tablet Take 1 tablet (81 mg) by mouth daily. 60 tablet 1    atorvastatin (LIPITOR) 10 MG tablet Take 1 tablet (10 mg) by mouth every morning. 60 tablet 1    brimonidine (ALPHAGAN) 0.2 % ophthalmic solution Place 1 drop into both eyes daily.      calcium carbonate-vitamin D (CALTRATE) 600-10 MG-MCG per tablet Take 1 tablet by mouth daily. 60 tablet 1    clindamycin (CLEOCIN-T) 1 % external gel Apply topically 2 times daily. 30 g 0    hydrocortisone 2.5 % cream Apply topically 2 times daily. Use on face and scalp 30 g 0    latanoprost (XALATAN) 0.005 % ophthalmic solution INSTILL 2 DROPS IN BOTH EYES AT BEDTIME      lisinopril (ZESTRIL) 5 MG tablet Take 1 tablet (5 mg) by mouth daily. 60 tablet 1    Lutein-Zeaxanthin 25-5 MG CAPS Take 1 capsule by mouth daily      metFORMIN (GLUCOPHAGE XR) 500 MG 24 hr tablet Take by mouth. 500 mg in the morning and 1000 mg in the evening      metoprolol tartrate (LOPRESSOR) 25 MG tablet Take 1 tablet (25 mg) by mouth 2 times daily. 60 tablet 1    pantoprazole (PROTONIX) 40 MG EC tablet Take 1 tablet (40 mg) by mouth every morning (before breakfast). 60 tablet 1    psyllium (METAMUCIL/KONSYL) capsule Take 3 capsules by mouth daily. 270 capsule 3    sirolimus (GENERIC EQUIVALENT) 0.5 MG tablet Take 1 tablet (0.5 mg) by mouth daily. 60 tablet 1    sulfamethoxazole-trimethoprim (BACTRIM DS) 800-160 MG tablet Take 1 tablet by mouth Every Mon, Tues two times daily. Do not begin taking until instructed to do so by [facility] clinic. Do not start before September 9, 2024. 28 tablet 1    tamsulosin (FLOMAX) 0.4 MG capsule Take 1 capsule (0.4 mg) by mouth every evening. 90 capsule 1    triamcinolone (KENALOG) 0.1 % external cream Apply topically 2 times daily. Use on chest and back 454 g 0    voriconazole (VFEND) 200 MG tablet TAKE 1 TABLET BY MOUTH TWICE DAILY 120 tablet 0    fluticasone (FLONASE) 50 MCG/ACT nasal spray Spray 1 spray into both nostrils as needed.  "(Patient not taking: Reported on 11/22/2024)          EXAM      /73   Pulse 68   Temp 98.4  F (36.9  C) (Tympanic)   Resp 20   Ht 1.854 m (6' 1\")   Wt 107.5 kg (237 lb)   SpO2 98%   BMI 31.27 kg/m      Wt Readings from Last 4 Encounters:   11/22/24 107.5 kg (237 lb)   11/13/24 110.2 kg (243 lb)   11/11/24 108.4 kg (239 lb)   11/06/24 108.4 kg (239 lb)     KPS: 80% Can perform normal activity with effort, some signs of disease    General: Alert, awake; NAD  HEENT: atraumatic, sclera anicteric. Oral mucosa moist, OP nonerythematous  Skin: Acneform rash on forehead and face.    MSK: 1+ pitting edema in bilateral LE.     Acute GVHD          11/14/2024    22:07 11/13/2024    18:50 11/6/2024    18:52   Acute GVHD   New evidence of Acute Vsdlg-Qspgnb-Tgvp Disease developed since last entry?  No No   Skin 1 1 1   Upper Intestinal Tract  0 0   Lower Intestinal Tract  0 0   Liver  0 0   Overall Grade (Przepiorka)  1 1      Laboratory Studies:     Blood Counts  Recent Labs   Lab Test 11/15/24  0955 11/13/24  1745 11/06/24  1239 09/17/24  0809 09/12/24  0950 09/10/24  1114 09/06/24  1257   WBC 6.1 6.4 5.8   < > 3.3* 2.8* 2.8*   HGB 10.7* 10.3* 10.3*   < > 9.4* 9.6* 9.3*    180 194   < > 123* 96* 89*   NEUTROPHIL 65 56 61   < > 67 71 70   ANEU  --   --   --   --  2.2 2.0 2.0   LYMPH 24 29 26   < > 19 1 10   ALYM  --   --   --   --  0.6* 0.0* 0.3*    < > = values in this interval not displayed.       Basic Panel  Recent Labs   Lab Test 11/15/24  0955 11/13/24  1745 11/06/24  1239    140 138   POTASSIUM 3.8 3.9 3.9   CHLORIDE 106 106 105   CO2 24 24 23   BUN 16.8 16.9 17.8   CR 1.08 1.15 1.15   * 118* 181*        Recent Labs   Lab Test 11/15/24  0955 11/13/24  1745 11/06/24  1239 09/10/24  1114 09/06/24  1257 08/26/24  1253 08/25/24  0326 08/24/24  0326 08/23/24  0340   MC 9.6 9.8 9.5   < > 9.5   < > 8.9 9.0 8.8   MAG  --   --   --   --  1.6*  --  1.9 2.0 2.1   PHOS  --   --   --   --   --   --  " 3.7 3.9 3.5    < > = values in this interval not displayed.        LFTs  Recent Labs   Lab Test 11/15/24  0955 11/13/24  1745 11/06/24  1239   ALKPHOS 116 119 111   AST 22 21 21   ALT 18 18 15   BILITOTAL 0.2 <0.2 0.2   ALBUMIN 4.1 4.1 4.0       Recent Labs   Lab Test 11/13/24  1745 10/29/24  1219 10/24/24  1212   RAPAMY 5.1 5.4 5.6       No lab results found.    Recent Labs   Lab Test 11/13/24  1745 10/29/24  1219 10/24/24  1212   CMVQNT Not Detected Not Detected Not Detected       Recent Labs   Lab Test 09/25/24  1211 09/12/24  0950   * 467*       Recent Labs   Lab Test 07/22/24  0957   QUINCY 295        ASSESSMENT AND PLAN     BMT for high risk MDS: D+107  - Chemo protocol: [id number]; Flu, Cy, TBI  - Peripheral blood stem cell graft from 8/8 donor, ABO matched (O neg)  - Continue allopurinol (hx of gout)    Disease status:   - 8/30/24 (D28): Slightly hypercellular marrow (30-40%). No evidence of myeloid neoplasm or monoclonal B-lymphocytosis. Normal male karyotype. NGS negative for DNMT3A (other mutations not tested)  - 11/15/24 (D100): Hypercellular marrow (40%) in remission. NGS negative.     Graft status/chimerism:   - 8/30/24 (D28): Bone marrow 100%. Peripheral blood CD33 100%, CD3 84%  - 9/12/24: Peripheral blood CD3 96%, CD33 100%  - 9/25/24: Peripheral blood CD3 100%, CD33 100%  - 11/15/24 (D100): Bone marrow 100%. Peripheral blood CD33 100%, CD3 100%    Maintenance  None    GVHD prophylaxis  # Current immunosuppression is sirolimus taper  - Taper will end on 12/1/24    # The current acute GVHD grade is 0  - Skin: Stage 0. Rash is likely sirolimus right now. Skin biopsy showed mild interface dermatitis suggestive of GVHD.   - GI: GI score 0.  - Liver: LFTs normal. Liver score 0.    HEME/ COAG  G-CSF last given on 8/22/4.  ABO matched.    ID  # Prophylaxis  Fungal: Voriconazole 200mg BID started on 9/26/24.   PJP/ Toxo IgG negative: Bactrim.  Viral: Acyclovir 800 mg bid     # Monitoring  CMV: Monitor  weekly till D100.   EBV: Monitor every 2 weeks between D30 to D180.   IgG: IgG 467 (9/12/24). Check serum IgG level q3 months, and consider IVIG repletion for IgG levels <400 mg/dL.     GI  # Hepatic steatosis and borderline iron deposition in liver  - VOD prophy: Ursodiol completed    # Supportive   - Ulcer prophy: PPI  - Alternating constipation and diarrhea: continue psyllium and imodium PRN    CARDIOVASCULAR  # Asymptomatic moderate aortic stenosis    # A.fib with RVR   - One episode while inpatient on 8/21/24 which converted to normal sinus rhythm spontaneously. Was started on metoprolol 25mg BID.   - Has been seen by cardiology on 11/11/24. No anticoagulation needed.    - One previous episode of A.fib with RVR prior to transplant in the context of admission with febrile episode on 3/15/24. Noted on his fitbit, not very symptomatic. DCCV on 3/21/24 restored normal sinus rhythm. Amiodarone and anticoagulation was stopped in April 2024 without any recurrence.     # CAD  - Coronary artery calcifications.   - Continue atorvastatin 10mg, ASA 81mg (resumed on 8/23/24 as platelets stable above 50k)  - Will need an ischemia evaluation in the long term    # HTN: Lisinopril 10mg. Stop amlodipine      RENAL/ELECTROLYTES/  # Urinary frequency, without evidence of retention or infection  # BPH  - Continue tamsulosin 0.4mg qhs   - Has been seen by urology on 10/3/24. Mybetriq was recommended, not taking it.  Follow up with urology in 2-3 months.     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   - HbA1c on 10/14 was 6.8    RESP  # VINCENT : CPAP     OPHTHALMOLOGY:  # Glaucoma - Lutein supplement; brimonidine, latanaprost eye drops.   # Right eye  floaters: Following retinal specialist      MUSCULOSKELETAL/FRAILTY  # Gout: Continue allopurinol indefinitely.     Post-transplant vaccinations  Flu and COVID done    I spent 40 minutes in the care of this patient today, which included time necessary for preparation for the visit, obtaining history, ordering medications/tests/procedures as medically indicated, review of pertinent medical literature, counseling of the patient, communication of recommendations to the care team, and documentation time.     Nena Fowler  Department of Hematology, Oncology and Transplantation  Pager 1300/Text via [employer]

## 2024-11-22 NOTE — LETTER
11/22/2024      Arpit Forrest  [address] [address] [address]      Dear Colleague,    Thank you for referring your patient, Arpit Forrest, to the [facility] BLOOD AND MARROW TRANSPLANT PROGRAM [place]. Please see a copy of my visit note below.    BMT Clinic Note  Nov 22, 2024     Patient ID: Arpit Forrest is a 73 year old male who is day +107 post allogenic stem cell transplant for MDS-EB1     Diagnosis MDS-EB1 BMT type Allogenic  CMV  Donor -  Recipient -    Prep ELIEZER (Flu/Cy/TBI) Donor type  8/8 URD ABO D/R O- (matched)   GVHD ppx PTCy, siro, MMF Graft source PBSCT Toxo IgG Negative   Protocol [id number] CD34/kg 6.12E+06    BMT MD Nena Fowler      Pre-transplant disease history  Referring provider:  Dr. Beckwith, [facility]      Diagnosis:   MDS-IB1. IPSS-M: very high risk 1.98   CD5+ Monoclonal B Lymphocytosis (MBL), chronic lymphocytic leukemia type  Recurrent febrile episodes, UBA1 negative, resolved with treatment of MDS  Treatment  3/21/24 to 6/19/24: Decitabine 20mg/m2 for 5 days x 4 cycles    Post transplant  # A.fib with RVR on 8/21/24 which converted to sinus rhythm spontaneously. 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 Max grade 1 (face, chest and upper back). Skin biopsy (9/1224) showed interface dermatitis suggestive of GVHD. Resolved with topical steroids.       INTERVAL HISTORY     Arpit presents for a scheduled BMT visit. Started siro taper, skin is getting better. Still has acneform rash, clindamycin helps. Pedal edema still present but better.     Plan  - D100 results reviewed  - Mild cytopenias, likely due to sirolimus  - Continue sirolimus taper, will end on 12/1/24  - Continue acyclovir and bactrim. Stop voriconazole when sirolimus completed  - Increase lisinopril to 10mg daily. Stop amlodipine  - Labs every 2 weeks at [facility] in 3 weeks or so      Current Outpatient Medications   Medication Sig  Dispense Refill     acyclovir (ZOVIRAX) 800 MG tablet Take 1 tablet (800 mg) by mouth 2 times daily. 120 tablet 1     allopurinol (ZYLOPRIM) 300 MG tablet Take 1 tablet (300 mg) by mouth daily. 60 tablet 1     amLODIPine (NORVASC) 10 MG tablet Take 0.5 tablets (5 mg) by mouth every morning.       aspirin 81 MG EC tablet Take 1 tablet (81 mg) by mouth daily. 60 tablet 1     atorvastatin (LIPITOR) 10 MG tablet Take 1 tablet (10 mg) by mouth every morning. 60 tablet 1     brimonidine (ALPHAGAN) 0.2 % ophthalmic solution Place 1 drop into both eyes daily.       calcium carbonate-vitamin D (CALTRATE) 600-10 MG-MCG per tablet Take 1 tablet by mouth daily. 60 tablet 1     clindamycin (CLEOCIN-T) 1 % external gel Apply topically 2 times daily. 30 g 0     hydrocortisone 2.5 % cream Apply topically 2 times daily. Use on face and scalp 30 g 0     latanoprost (XALATAN) 0.005 % ophthalmic solution INSTILL 2 DROPS IN BOTH EYES AT BEDTIME       lisinopril (ZESTRIL) 5 MG tablet Take 1 tablet (5 mg) by mouth daily. 60 tablet 1     Lutein-Zeaxanthin 25-5 MG CAPS Take 1 capsule by mouth daily       metFORMIN (GLUCOPHAGE XR) 500 MG 24 hr tablet Take by mouth. 500 mg in the morning and 1000 mg in the evening       metoprolol tartrate (LOPRESSOR) 25 MG tablet Take 1 tablet (25 mg) by mouth 2 times daily. 60 tablet 1     pantoprazole (PROTONIX) 40 MG EC tablet Take 1 tablet (40 mg) by mouth every morning (before breakfast). 60 tablet 1     psyllium (METAMUCIL/KONSYL) capsule Take 3 capsules by mouth daily. 270 capsule 3     sirolimus (GENERIC EQUIVALENT) 0.5 MG tablet Take 1 tablet (0.5 mg) by mouth daily. 60 tablet 1     sulfamethoxazole-trimethoprim (BACTRIM DS) 800-160 MG tablet Take 1 tablet by mouth Every Mon, Tues two times daily. Do not begin taking until instructed to do so by [facility] clinic. Do not start before September 9, 2024. 28 tablet 1     tamsulosin (FLOMAX) 0.4 MG capsule Take 1 capsule (0.4 mg) by mouth every evening. 90  "capsule 1     triamcinolone (KENALOG) 0.1 % external cream Apply topically 2 times daily. Use on chest and back 454 g 0     voriconazole (VFEND) 200 MG tablet TAKE 1 TABLET BY MOUTH TWICE DAILY 120 tablet 0     fluticasone (FLONASE) 50 MCG/ACT nasal spray Spray 1 spray into both nostrils as needed. (Patient not taking: Reported on 11/22/2024)          EXAM      /73   Pulse 68   Temp 98.4  F (36.9  C) (Tympanic)   Resp 20   Ht 1.854 m (6' 1\")   Wt 107.5 kg (237 lb)   SpO2 98%   BMI 31.27 kg/m      Wt Readings from Last 4 Encounters:   11/22/24 107.5 kg (237 lb)   11/13/24 110.2 kg (243 lb)   11/11/24 108.4 kg (239 lb)   11/06/24 108.4 kg (239 lb)     KPS: 80% Can perform normal activity with effort, some signs of disease    General: Alert, awake; NAD  HEENT: atraumatic, sclera anicteric. Oral mucosa moist, OP nonerythematous  Skin: Acneform rash on forehead and face.    MSK: 1+ pitting edema in bilateral LE.     Acute GVHD          11/14/2024    22:07 11/13/2024    18:50 11/6/2024    18:52   Acute GVHD   New evidence of Acute Zjsan-Zlkaeu-Evtn Disease developed since last entry?  No No   Skin 1 1 1   Upper Intestinal Tract  0 0   Lower Intestinal Tract  0 0   Liver  0 0   Overall Grade (Przepiorka)  1 1      Laboratory Studies:     Blood Counts  Recent Labs   Lab Test 11/15/24  0955 11/13/24  1745 11/06/24  1239 09/17/24  0809 09/12/24  0950 09/10/24  1114 09/06/24  1257   WBC 6.1 6.4 5.8   < > 3.3* 2.8* 2.8*   HGB 10.7* 10.3* 10.3*   < > 9.4* 9.6* 9.3*    180 194   < > 123* 96* 89*   NEUTROPHIL 65 56 61   < > 67 71 70   ANEU  --   --   --   --  2.2 2.0 2.0   LYMPH 24 29 26   < > 19 1 10   ALYM  --   --   --   --  0.6* 0.0* 0.3*    < > = values in this interval not displayed.       Basic Panel  Recent Labs   Lab Test 11/15/24  0955 11/13/24  1745 11/06/24  1239    140 138   POTASSIUM 3.8 3.9 3.9   CHLORIDE 106 106 105   CO2 24 24 23   BUN 16.8 16.9 17.8   CR 1.08 1.15 1.15   * 118* " 181*        Recent Labs   Lab Test 11/15/24  0955 11/13/24  1745 11/06/24  1239 09/10/24  1114 09/06/24  1257 08/26/24  1253 08/25/24  0326 08/24/24  0326 08/23/24  0340   MC 9.6 9.8 9.5   < > 9.5   < > 8.9 9.0 8.8   MAG  --   --   --   --  1.6*  --  1.9 2.0 2.1   PHOS  --   --   --   --   --   --  3.7 3.9 3.5    < > = values in this interval not displayed.        LFTs  Recent Labs   Lab Test 11/15/24  0955 11/13/24  1745 11/06/24  1239   ALKPHOS 116 119 111   AST 22 21 21   ALT 18 18 15   BILITOTAL 0.2 <0.2 0.2   ALBUMIN 4.1 4.1 4.0       Recent Labs   Lab Test 11/13/24  1745 10/29/24  1219 10/24/24  1212   RAPAMY 5.1 5.4 5.6       No lab results found.    Recent Labs   Lab Test 11/13/24  1745 10/29/24  1219 10/24/24  1212   CMVQNT Not Detected Not Detected Not Detected       Recent Labs   Lab Test 09/25/24  1211 09/12/24  0950   * 467*       Recent Labs   Lab Test 07/22/24  0957   QUINCY 295        ASSESSMENT AND PLAN     BMT for high risk MDS: D+107  - Chemo protocol: [id number]; Flu, Cy, TBI  - Peripheral blood stem cell graft from 8/8 donor, ABO matched (O neg)  - Continue allopurinol (hx of gout)    Disease status:   - 8/30/24 (D28): Slightly hypercellular marrow (30-40%). No evidence of myeloid neoplasm or monoclonal B-lymphocytosis. Normal male karyotype. NGS negative for DNMT3A (other mutations not tested)  - 11/15/24 (D100): Hypercellular marrow (40%) in remission. NGS negative.     Graft status/chimerism:   - 8/30/24 (D28): Bone marrow 100%. Peripheral blood CD33 100%, CD3 84%  - 9/12/24: Peripheral blood CD3 96%, CD33 100%  - 9/25/24: Peripheral blood CD3 100%, CD33 100%  - 11/15/24 (D100): Bone marrow 100%. Peripheral blood CD33 100%, CD3 100%    Maintenance  None    GVHD prophylaxis  # Current immunosuppression is sirolimus taper  - Taper will end on 12/1/24    # The current acute GVHD grade is 0  - Skin: Stage 0. Rash is likely sirolimus right now. Skin biopsy showed mild interface dermatitis  suggestive of GVHD.   - GI: GI score 0.  - Liver: LFTs normal. Liver score 0.    HEME/ COAG  G-CSF last given on 8/22/4.  ABO matched.    ID  # Prophylaxis  Fungal: Voriconazole 200mg BID started on 9/26/24.   PJP/ Toxo IgG negative: Bactrim.  Viral: Acyclovir 800 mg bid     # Monitoring  CMV: Monitor weekly till D100.   EBV: Monitor every 2 weeks between D30 to D180.   IgG: IgG 467 (9/12/24). Check serum IgG level q3 months, and consider IVIG repletion for IgG levels <400 mg/dL.     GI  # Hepatic steatosis and borderline iron deposition in liver  - VOD prophy: Ursodiol completed    # Supportive   - Ulcer prophy: PPI  - Alternating constipation and diarrhea: continue psyllium and imodium PRN    CARDIOVASCULAR  # Asymptomatic moderate aortic stenosis    # A.fib with RVR   - One episode while inpatient on 8/21/24 which converted to normal sinus rhythm spontaneously. Was started on metoprolol 25mg BID.   - Has been seen by cardiology on 11/11/24. No anticoagulation needed.    - One previous episode of A.fib with RVR prior to transplant in the context of admission with febrile episode on 3/15/24. Noted on his fitbit, not very symptomatic. DCCV on 3/21/24 restored normal sinus rhythm. Amiodarone and anticoagulation was stopped in April 2024 without any recurrence.     # CAD  - Coronary artery calcifications.   - Continue atorvastatin 10mg, ASA 81mg (resumed on 8/23/24 as platelets stable above 50k)  - Will need an ischemia evaluation in the long term    # HTN: Lisinopril 10mg. Stop amlodipine      RENAL/ELECTROLYTES/  # Urinary frequency, without evidence of retention or infection  # BPH  - Continue tamsulosin 0.4mg qhs   - Has been seen by urology on 10/3/24. Mybetriq was recommended, not taking it.  Follow up with urology in 2-3 months.     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   - HbA1c on 10/14 was 6.8    RESP  # VINCENT : CPAP     OPHTHALMOLOGY:  # Glaucoma - Lutein supplement; brimonidine, latanaprost eye drops.   # Right eye floaters: Following retinal specialist      MUSCULOSKELETAL/FRAILTY  # Gout: Continue allopurinol indefinitely.     Post-transplant vaccinations  Flu and COVID done    I spent 40 minutes in the care of this patient today, which included time necessary for preparation for the visit, obtaining history, ordering medications/tests/procedures as medically indicated, review of pertinent medical literature, counseling of the patient, communication of recommendations to the care team, and documentation time.     Nena Fowler  Department of Hematology, Oncology and Transplantation  Pager 1300/Text via [employer]         Again, thank you for allowing me to participate in the care of your patient.        Sincerely,        GASPER Rios

## 2024-12-02 ENCOUNTER — IMMUNIZATION (OUTPATIENT)
Dept: FAMILY MEDICINE | Facility: CLINIC | Age: 74
End: 2024-12-02
Payer: MEDICARE

## 2024-12-02 ENCOUNTER — LAB (OUTPATIENT)
Dept: LAB | Facility: CLINIC | Age: 74
End: 2024-12-02
Payer: MEDICARE

## 2024-12-02 VITALS — TEMPERATURE: 97.8 F

## 2024-12-02 DIAGNOSIS — Z94.81 STATUS POST BONE MARROW TRANSPLANT (H): ICD-10-CM

## 2024-12-02 DIAGNOSIS — E11.319 TYPE 2 DIABETES MELLITUS WITH RETINOPATHY, WITHOUT LONG-TERM CURRENT USE OF INSULIN, MACULAR EDEMA PRESENCE UNSPECIFIED, UNSPECIFIED LATERALITY, UNSPECIFIED RETINOPATHY SEVERITY (H): ICD-10-CM

## 2024-12-02 DIAGNOSIS — Z23 ENCOUNTER FOR IMMUNIZATION: Primary | ICD-10-CM

## 2024-12-02 LAB
ALBUMIN SERPL BCG-MCNC: 4.2 G/DL (ref 3.5–5.2)
ALP SERPL-CCNC: 128 U/L (ref 40–150)
ALT SERPL W P-5'-P-CCNC: 27 U/L (ref 0–70)
ANION GAP SERPL CALCULATED.3IONS-SCNC: 11 MMOL/L (ref 7–15)
AST SERPL W P-5'-P-CCNC: 25 U/L (ref 0–45)
BASOPHILS # BLD AUTO: 0 10E3/UL (ref 0–0.2)
BASOPHILS NFR BLD AUTO: 0 %
BILIRUB SERPL-MCNC: 0.2 MG/DL
BUN SERPL-MCNC: 14.9 MG/DL (ref 8–23)
CALCIUM SERPL-MCNC: 9.6 MG/DL (ref 8.8–10.4)
CHLORIDE SERPL-SCNC: 107 MMOL/L (ref 98–107)
CMV DNA SPEC NAA+PROBE-ACNC: NOT DETECTED IU/ML
CREAT SERPL-MCNC: 1.1 MG/DL (ref 0.67–1.17)
EGFRCR SERPLBLD CKD-EPI 2021: 70 ML/MIN/1.73M2
EOSINOPHIL # BLD AUTO: 0.1 10E3/UL (ref 0–0.7)
EOSINOPHIL NFR BLD AUTO: 2 %
ERYTHROCYTE [DISTWIDTH] IN BLOOD BY AUTOMATED COUNT: 15.8 % (ref 10–15)
GLUCOSE SERPL-MCNC: 128 MG/DL (ref 70–99)
HCO3 SERPL-SCNC: 24 MMOL/L (ref 22–29)
HCT VFR BLD AUTO: 34.9 % (ref 40–53)
HGB BLD-MCNC: 11.4 G/DL (ref 13.3–17.7)
IMM GRANULOCYTES # BLD: 0 10E3/UL
IMM GRANULOCYTES NFR BLD: 0 %
LYMPHOCYTES # BLD AUTO: 1.5 10E3/UL (ref 0.8–5.3)
LYMPHOCYTES NFR BLD AUTO: 21 %
MCH RBC QN AUTO: 29.1 PG (ref 26.5–33)
MCHC RBC AUTO-ENTMCNC: 32.7 G/DL (ref 31.5–36.5)
MCV RBC AUTO: 89 FL (ref 78–100)
MONOCYTES # BLD AUTO: 0.7 10E3/UL (ref 0–1.3)
MONOCYTES NFR BLD AUTO: 9 %
NEUTROPHILS # BLD AUTO: 4.8 10E3/UL (ref 1.6–8.3)
NEUTROPHILS NFR BLD AUTO: 68 %
PLATELET # BLD AUTO: 154 10E3/UL (ref 150–450)
POTASSIUM SERPL-SCNC: 3.9 MMOL/L (ref 3.4–5.3)
PROT SERPL-MCNC: 6.6 G/DL (ref 6.4–8.3)
RBC # BLD AUTO: 3.92 10E6/UL (ref 4.4–5.9)
SODIUM SERPL-SCNC: 142 MMOL/L (ref 135–145)
SPECIMEN TYPE: NORMAL
WBC # BLD AUTO: 7.1 10E3/UL (ref 4–11)

## 2024-12-02 PROCEDURE — 91320 SARSCV2 VAC 30MCG TRS-SUC IM: CPT

## 2024-12-02 PROCEDURE — 87799 DETECT AGENT NOS DNA QUANT: CPT

## 2024-12-02 PROCEDURE — 90480 ADMN SARSCOV2 VAC 1/ONLY CMP: CPT

## 2024-12-02 PROCEDURE — 99207 PR NO CHARGE NURSE ONLY: CPT

## 2024-12-02 PROCEDURE — 80053 COMPREHEN METABOLIC PANEL: CPT

## 2024-12-02 PROCEDURE — 85025 COMPLETE CBC W/AUTO DIFF WBC: CPT

## 2024-12-02 PROCEDURE — 36415 COLL VENOUS BLD VENIPUNCTURE: CPT

## 2024-12-02 NOTE — PROGRESS NOTES
Prior to immunization administration, verified patients identity using patient s name and date of birth. Please see Immunization Activity for additional information.     Is the patient's temperature normal (100.5 or less)? Yes     Patient MEETS CRITERIA. PROCEED with vaccine administration.      Patient instructed to remain in clinic for 15 minutes afterwards, and to report any adverse reactions.      Link to Ancillary Visit Immunization Standing Orders SmartSet     Screening performed by Debbie Bynum MA on 12/2/2024 at 8:53 AM.

## 2024-12-03 LAB — EBV DNA SERPL NAA+PROBE-ACNC: NOT DETECTED IU/ML

## 2024-12-06 ENCOUNTER — ONCOLOGY VISIT (OUTPATIENT)
Dept: RADIATION ONCOLOGY | Facility: CLINIC | Age: 74
End: 2024-12-06
Payer: MEDICARE

## 2024-12-06 NOTE — LETTER
12/6/2024      Arpit Forrest  [address]  [place] [address]      Dear Colleague,    Thank you for referring your patient, Arpit Forrest, to the [facility] RADIATION ONCOLOGY. Please see a copy of my visit note below.    No notes on file    Again, thank you for allowing me to participate in the care of your patient.        Sincerely,        Belle Edwards

## 2024-12-12 NOTE — PROCEDURES
Radiotherapy Treatment Summary     Date of Report:  2024     PATIENT: VAMSI SOLANO  MEDICAL RECORD NO: [id number]    : 1950     DIAGNOSIS: D46.9 Myelodysplastic syndrome, unspecified     INTENT OF RADIOTHERAPY:  Part of conditioning regimen for stem cell transplantation        Details of the treatments summarized below are found in records kept in the Department of Radiation Oncology @ [facility].     Treatment Summary:  Radiation Oncology - Course: 1 Protocol: [id number]  Treatment Site  Current Dose  Modality  From  To  Elapsed Days  Fx.  1 TBI     200 cGy  18 X   8/06/2024   2024      1                                FOLLOW UP PLAN: Total Body Irradiation was well tolerated.  After discharge from the hospital   the patient will be followed in the Bone Marrow Transplant Clinic.        Nurse Practitioner: AURELIA Barnes, CNP  Staff Physician: Belle Edwards M.D.     CC:   Destinee Sanches MD                                     Radiation Oncology:  [facility], [address] [place] [phone]

## 2024-12-16 ENCOUNTER — LAB (OUTPATIENT)
Dept: LAB | Facility: CLINIC | Age: 74
End: 2024-12-16
Attending: STUDENT IN AN ORGANIZED HEALTH CARE EDUCATION/TRAINING PROGRAM
Payer: MEDICARE

## 2024-12-16 ENCOUNTER — MYC MEDICAL ADVICE (OUTPATIENT)
Dept: ENDOCRINOLOGY | Facility: CLINIC | Age: 74
End: 2024-12-16

## 2024-12-16 ENCOUNTER — ONCOLOGY VISIT (OUTPATIENT)
Dept: TRANSPLANT | Facility: CLINIC | Age: 74
End: 2024-12-16
Attending: STUDENT IN AN ORGANIZED HEALTH CARE EDUCATION/TRAINING PROGRAM
Payer: MEDICARE

## 2024-12-16 VITALS
RESPIRATION RATE: 18 BRPM | BODY MASS INDEX: 32.05 KG/M2 | OXYGEN SATURATION: 96 % | DIASTOLIC BLOOD PRESSURE: 79 MMHG | HEART RATE: 60 BPM | WEIGHT: 242.9 LBS | TEMPERATURE: 98 F | SYSTOLIC BLOOD PRESSURE: 174 MMHG

## 2024-12-16 DIAGNOSIS — Z94.81 STATUS POST BONE MARROW TRANSPLANT (H): Primary | ICD-10-CM

## 2024-12-16 DIAGNOSIS — E11.9 TYPE 2 DIABETES MELLITUS WITHOUT COMPLICATION, WITHOUT LONG-TERM CURRENT USE OF INSULIN (H): Primary | ICD-10-CM

## 2024-12-16 DIAGNOSIS — Z94.81 STATUS POST BONE MARROW TRANSPLANT (H): ICD-10-CM

## 2024-12-16 LAB
ALBUMIN SERPL BCG-MCNC: 3.9 G/DL (ref 3.5–5.2)
ALP SERPL-CCNC: 113 U/L (ref 40–150)
ALT SERPL W P-5'-P-CCNC: 25 U/L (ref 0–70)
ANION GAP SERPL CALCULATED.3IONS-SCNC: 10 MMOL/L (ref 7–15)
AST SERPL W P-5'-P-CCNC: 19 U/L (ref 0–45)
BASOPHILS # BLD AUTO: 0 10E3/UL (ref 0–0.2)
BASOPHILS NFR BLD AUTO: 0 %
BILIRUB SERPL-MCNC: 0.3 MG/DL
BUN SERPL-MCNC: 12.2 MG/DL (ref 8–23)
CALCIUM SERPL-MCNC: 9.3 MG/DL (ref 8.8–10.4)
CHLORIDE SERPL-SCNC: 107 MMOL/L (ref 98–107)
CREAT SERPL-MCNC: 1.01 MG/DL (ref 0.67–1.17)
EGFRCR SERPLBLD CKD-EPI 2021: 77 ML/MIN/1.73M2
EOSINOPHIL # BLD AUTO: 0.1 10E3/UL (ref 0–0.7)
EOSINOPHIL NFR BLD AUTO: 1 %
ERYTHROCYTE [DISTWIDTH] IN BLOOD BY AUTOMATED COUNT: 16.4 % (ref 10–15)
GLUCOSE SERPL-MCNC: 128 MG/DL (ref 70–99)
HCO3 SERPL-SCNC: 24 MMOL/L (ref 22–29)
HCT VFR BLD AUTO: 35 % (ref 40–53)
HGB BLD-MCNC: 11.3 G/DL (ref 13.3–17.7)
IMM GRANULOCYTES # BLD: 0 10E3/UL
IMM GRANULOCYTES NFR BLD: 0 %
LYMPHOCYTES # BLD AUTO: 1.4 10E3/UL (ref 0.8–5.3)
LYMPHOCYTES NFR BLD AUTO: 14 %
MCH RBC QN AUTO: 28.8 PG (ref 26.5–33)
MCHC RBC AUTO-ENTMCNC: 32.3 G/DL (ref 31.5–36.5)
MCV RBC AUTO: 89 FL (ref 78–100)
MONOCYTES # BLD AUTO: 0.9 10E3/UL (ref 0–1.3)
MONOCYTES NFR BLD AUTO: 8 %
NEUTROPHILS # BLD AUTO: 8 10E3/UL (ref 1.6–8.3)
NEUTROPHILS NFR BLD AUTO: 76 %
NRBC # BLD AUTO: 0 10E3/UL
NRBC BLD AUTO-RTO: 0 /100
PLATELET # BLD AUTO: 150 10E3/UL (ref 150–450)
POTASSIUM SERPL-SCNC: 3.8 MMOL/L (ref 3.4–5.3)
PROT SERPL-MCNC: 6.4 G/DL (ref 6.4–8.3)
RBC # BLD AUTO: 3.92 10E6/UL (ref 4.4–5.9)
SODIUM SERPL-SCNC: 141 MMOL/L (ref 135–145)
WBC # BLD AUTO: 10.5 10E3/UL (ref 4–11)

## 2024-12-16 PROCEDURE — 87799 DETECT AGENT NOS DNA QUANT: CPT

## 2024-12-16 PROCEDURE — 99215 OFFICE O/P EST HI 40 MIN: CPT | Mod: GC

## 2024-12-16 PROCEDURE — 82247 BILIRUBIN TOTAL: CPT

## 2024-12-16 PROCEDURE — 80053 COMPREHEN METABOLIC PANEL: CPT

## 2024-12-16 PROCEDURE — 36415 COLL VENOUS BLD VENIPUNCTURE: CPT

## 2024-12-16 PROCEDURE — 82310 ASSAY OF CALCIUM: CPT

## 2024-12-16 PROCEDURE — G0463 HOSPITAL OUTPT CLINIC VISIT: HCPCS

## 2024-12-16 PROCEDURE — 85041 AUTOMATED RBC COUNT: CPT

## 2024-12-16 PROCEDURE — 85004 AUTOMATED DIFF WBC COUNT: CPT

## 2024-12-16 RX ORDER — OXYBUTYNIN CHLORIDE 5 MG/1
5 TABLET, EXTENDED RELEASE ORAL DAILY
COMMUNITY
Start: 2024-10-04 | End: 2024-12-18

## 2024-12-16 RX ORDER — AMMONIUM LACTATE 12 G/100G
LOTION TOPICAL DAILY
COMMUNITY
Start: 2024-12-12

## 2024-12-16 RX ORDER — ACYCLOVIR 400 MG/1
800 TABLET ORAL 2 TIMES DAILY
COMMUNITY
Start: 2024-11-14

## 2024-12-16 ASSESSMENT — PAIN SCALES - GENERAL: PAINLEVEL_OUTOF10: NO PAIN (0)

## 2024-12-16 NOTE — NURSING NOTE
"Oncology Rooming Note    December 16, 2024 2:28 PM   Arpit Forrest is a 74 year old male who presents for:    Chief Complaint   Patient presents with    Blood Draw     Labs obtained via venipuncture 23 gauge butterfly needle, VS taken, checked into next appt    Oncology Clinic Visit     Status post bone marrow transplant; MDS (myelodysplastic syndrome)     Initial Vitals: BP (!) 174/79 (BP Location: Left arm, Patient Position: Sitting, Cuff Size: Adult Regular)   Pulse 60   Temp 98  F (36.7  C) (Oral)   Resp 18   Wt 110.2 kg (242 lb 14.4 oz)   SpO2 96%   BMI 32.05 kg/m   Estimated body mass index is 32.05 kg/m  as calculated from the following:    Height as of 11/22/24: 1.854 m (6' 1\").    Weight as of this encounter: 110.2 kg (242 lb 14.4 oz). Body surface area is 2.38 meters squared.  No Pain (0) Comment: Data Unavailable   No LMP for male patient.  Allergies reviewed: Yes  Medications reviewed: Yes    Medications: MEDICATION REFILLS NEEDED TODAY. Provider was notified.  Pharmacy name entered into [employer]: [id number] [place] [place] - [address] AT [facility] [address]    Frailty Screening:   Is the patient here for a new oncology consult visit in cancer care? 2. No      Clinical concerns: Patient states no new concerns to discuss with provider.       Donald Quintero, EMT            "

## 2024-12-16 NOTE — Clinical Note
12/16/2024      Arpit Forrest  [address] [facility] [facility] [address]      Dear Colleague,    Thank you for referring your patient, Arpit Forrest, to the [facility] BLOOD AND MARROW TRANSPLANT PROGRAM [place]. Please see a copy of my visit note below.    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      Diagnosis:   MDS-IB1. IPSS-M: very high risk 1.98   CD5+ Monoclonal B Lymphocytosis (MBL), chronic lymphocytic leukemia type  Recurrent febrile episodes, UBA1 negative, resolved with treatment of MDS  Treatment  3/21/24 to 6/19/24: Decitabine 20mg/m2 for 5 days x 4 cycles    Post transplant  # A.fib with RVR on 8/21/24 which converted to sinus rhythm spontaneously. 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 Max grade 1 (face, chest and upper back). Skin biopsy (9/1224) showed interface dermatitis suggestive of GVHD. Intermittently improves topical steroids.       INTERVAL HISTORY     Arpit presents for a scheduled BMT visit. Completed siro taper, skin is getting better. Still has acneform rash, clindamycin helps. Pedal edema still present but better.     Plan  - D100 results reviewed  - Mild cytopenias, likely due to sirolimus  - Continue acyclovir and bactrim. Stop voriconazole.   - Increase lisinopril to 10mg daily. Stop amlodipine  - Labs every 2 weeks at [facility]  - [email or web address] in 3 weeks or so      Current Outpatient Medications   Medication Sig Dispense Refill    acyclovir (ZOVIRAX) 800 MG tablet Take 1  tablet (800 mg) by mouth 2 times daily. 120 tablet 1    allopurinol (ZYLOPRIM) 300 MG tablet Take 1 tablet (300 mg) by mouth daily. 60 tablet 1    amLODIPine (NORVASC) 10 MG tablet Take 0.5 tablets (5 mg) by mouth every morning.      aspirin 81 MG EC tablet Take 1 tablet (81 mg) by mouth daily. 60 tablet 1    atorvastatin (LIPITOR) 10 MG tablet Take 1 tablet (10 mg) by mouth every morning. 60 tablet 1    brimonidine (ALPHAGAN) 0.2 % ophthalmic solution Place 1 drop into both eyes daily.      calcium carbonate-vitamin D (CALTRATE) 600-10 MG-MCG per tablet Take 1 tablet by mouth daily. 60 tablet 1    clindamycin (CLEOCIN-T) 1 % external gel Apply topically 2 times daily. 30 g 0    fluticasone (FLONASE) 50 MCG/ACT nasal spray Spray 1 spray into both nostrils as needed. (Patient not taking: Reported on 11/22/2024)      hydrocortisone 2.5 % cream Apply topically 2 times daily. Use on face and scalp 30 g 0    latanoprost (XALATAN) 0.005 % ophthalmic solution INSTILL 2 DROPS IN BOTH EYES AT BEDTIME      lisinopril (ZESTRIL) 5 MG tablet Take 2 tablets (10 mg) by mouth daily. 60 tablet 1    Lutein-Zeaxanthin 25-5 MG CAPS Take 1 capsule by mouth daily      metFORMIN (GLUCOPHAGE XR) 500 MG 24 hr tablet Take by mouth. 500 mg in the morning and 1000 mg in the evening      metoprolol tartrate (LOPRESSOR) 25 MG tablet Take 1 tablet (25 mg) by mouth 2 times daily. 60 tablet 1    pantoprazole (PROTONIX) 40 MG EC tablet Take 1 tablet (40 mg) by mouth every morning (before breakfast). 60 tablet 1    psyllium (METAMUCIL/KONSYL) capsule Take 3 capsules by mouth daily. 270 capsule 3    sirolimus (GENERIC EQUIVALENT) 0.5 MG tablet Take 1 tablet (0.5 mg) by mouth daily. 60 tablet 1    sulfamethoxazole-trimethoprim (BACTRIM DS) 800-160 MG tablet Take 1 tablet by mouth Every Mon, Tues two times daily. Do not begin taking until instructed to do so by [facility] clinic. Do not start before September 9, 2024. 28 tablet 1    tamsulosin (FLOMAX) 0.4 MG  capsule Take 1 capsule (0.4 mg) by mouth every evening. 90 capsule 1    triamcinolone (KENALOG) 0.1 % external cream Apply topically 2 times daily. Use on chest and back 454 g 0    voriconazole (VFEND) 200 MG tablet TAKE 1 TABLET BY MOUTH TWICE DAILY 120 tablet 0        EXAM      There were no vitals taken for this visit.    Wt Readings from Last 4 Encounters:   11/22/24 107.5 kg (237 lb)   11/13/24 110.2 kg (243 lb)   11/11/24 108.4 kg (239 lb)   11/06/24 108.4 kg (239 lb)     KPS: 80% Can perform normal activity with effort, some signs of disease    General: Alert, awake; NAD  HEENT: atraumatic, sclera anicteric. Oral mucosa moist, OP nonerythematous  Skin: Acneform rash on forehead and face.    MSK: No pitting edema in bilateral LE.     Acute GVHD          11/14/2024    22:07 11/13/2024    18:50 11/6/2024    18:52   Acute GVHD   New evidence of Acute Iazaw-Aeqimq-Sfoe Disease developed since last entry?  No No   Skin 1 1 1   Upper Intestinal Tract  0 0   Lower Intestinal Tract  0 0   Liver  0 0   Overall Grade (Przepiorka)  1 1      Laboratory Studies:     Blood Counts  Recent Labs   Lab Test 12/02/24  0926 11/15/24  0955 11/13/24  1745 09/17/24  0809 09/12/24  0950 09/10/24  1114 09/06/24  1257   WBC 7.1 6.1 6.4   < > 3.3* 2.8* 2.8*   HGB 11.4* 10.7* 10.3*   < > 9.4* 9.6* 9.3*    177 180   < > 123* 96* 89*   NEUTROPHIL 68 65 56   < > 67 71 70   ANEU  --   --   --   --  2.2 2.0 2.0   LYMPH 21 24 29   < > 19 1 10   ALYM  --   --   --   --  0.6* 0.0* 0.3*    < > = values in this interval not displayed.       Basic Panel  Recent Labs   Lab Test 12/02/24  0926 11/15/24  0955 11/13/24  1745    140 140   POTASSIUM 3.9 3.8 3.9   CHLORIDE 107 106 106   CO2 24 24 24   BUN 14.9 16.8 16.9   CR 1.10 1.08 1.15   * 168* 118*        Recent Labs   Lab Test 12/02/24  0926 11/15/24  0955 11/13/24  1745 09/10/24  1114 09/06/24  1257 08/26/24  1253 08/25/24  0326 08/24/24  0326 08/23/24  0340   MC 9.6 9.6 9.8    < > 9.5   < > 8.9 9.0 8.8   MAG  --   --   --   --  1.6*  --  1.9 2.0 2.1   PHOS  --   --   --   --   --   --  3.7 3.9 3.5    < > = values in this interval not displayed.        LFTs  Recent Labs   Lab Test 12/02/24  0926 11/15/24  0955 11/13/24  1745   ALKPHOS 128 116 119   AST 25 22 21   ALT 27 18 18   BILITOTAL 0.2 0.2 <0.2   ALBUMIN 4.2 4.1 4.1       Recent Labs   Lab Test 11/13/24  1745 10/29/24  1219 10/24/24  1212   RAPAMY 5.1 5.4 5.6       No lab results found.    Recent Labs   Lab Test 12/02/24 0926 11/13/24  1745 10/29/24  1219   CMVQNT Not Detected Not Detected Not Detected       Recent Labs   Lab Test 09/25/24  1211 09/12/24  0950   * 467*       Recent Labs   Lab Test 07/22/24  0957   QUINCY 295        ASSESSMENT AND PLAN     BMT for high risk MDS: D+131  - Chemo protocol: [id number]; Flu, Cy, TBI  - Peripheral blood stem cell graft from 8/8 donor, ABO matched (O neg)  - Continue allopurinol (hx of gout)    Disease status:   - 8/30/24 (D28): Slightly hypercellular marrow (30-40%). No evidence of myeloid neoplasm or monoclonal B-lymphocytosis. Normal male karyotype. NGS negative for DNMT3A (other mutations not tested)  - 11/15/24 (D100): Hypercellular marrow (40%) in remission. NGS negative.     Graft status/chimerism:   - 8/30/24 (D28): Bone marrow 100%. Peripheral blood CD33 100%, CD3 84%  - 9/12/24: Peripheral blood CD3 96%, CD33 100%  - 9/25/24: Peripheral blood CD3 100%, CD33 100%  - 11/15/24 (D100): Bone marrow 100%. Peripheral blood CD33 100%, CD3 100%    Maintenance  None    GVHD prophylaxis  # Current immunosuppression is sirolimus taper  - Completed Siro taper on 12/01    # The current acute GVHD grade is 0  - Skin: Stage 0.   Rash is likely sirolimus right now. Skin biopsy showed mild interface dermatitis suggestive of GVHD.   - GI: GI score 0.  - Liver: LFTs normal. Liver score 0.    HEME/ COAG  G-CSF last given on 8/22/4.  ABO matched.    ID  # Prophylaxis  Fungal: Voriconazole 200mg BID  started on 9/26/24.   PJP/ Toxo IgG negative: Bactrim.  Viral: Acyclovir 800 mg bid     # Monitoring  CMV: Monitor weekly till D100.   EBV: Monitor every 2 weeks between D30 to D180.   IgG: IgG 467 (9/12/24). Check serum IgG level q3 months, and consider IVIG repletion for IgG levels <400 mg/dL.     GI  # Hepatic steatosis and borderline iron deposition in liver  - VOD prophy: Ursodiol completed    # Supportive   - Ulcer prophy: PPI  - Alternating constipation and diarrhea: continue psyllium and imodium PRN    CARDIOVASCULAR  # Asymptomatic moderate aortic stenosis    # A.fib with RVR   - One episode while inpatient on 8/21/24 which converted to normal sinus rhythm spontaneously. Was started on metoprolol 25mg BID.   - Has been seen by cardiology on 11/11/24. No anticoagulation needed.    - One previous episode of A.fib with RVR prior to transplant in the context of admission with febrile episode on 3/15/24. Noted on his fitbit, not very symptomatic. DCCV on 3/21/24 restored normal sinus rhythm. Amiodarone and anticoagulation was stopped in April 2024 without any recurrence.     # CAD  - Coronary artery calcifications.   - Continue atorvastatin 10mg, ASA 81mg (resumed on 8/23/24 as platelets stable above 50k)  - Will need an ischemia evaluation in the long term    # HTN: Will increase to Lisinopril 10mg BID.       RENAL/ELECTROLYTES/  # Urinary frequency, without evidence of retention or infection  # BPH  - Continue tamsulosin 0.4mg qhs   - Has been seen by urology on 10/3/24. Mybetriq was recommended, not taking it.  Follow up with urology in 2-3 months.     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   - HbA1c on 10/14 was 6.8    RESP  #  VINCENT : CPAP     OPHTHALMOLOGY:  # Glaucoma - Lutein supplement; brimonidine, latanaprost eye drops.   # Right eye floaters: Following retinal specialist      MUSCULOSKELETAL/FRAILTY  # Gout: Continue allopurinol indefinitely.     Post-transplant vaccinations  Flu and COVID done      Patient was seen and evaluated with Dr. Fowler. Attestation to follow.    Griffin Fletcher MD  Hematology/Oncology/BMT Fellow PGY4  Pager: [phone]    I spent *** minutes in the care of this patient today, which included time necessary for preparation for the visit, obtaining history, ordering medications/tests/procedures as medically indicated, review of pertinent medical literature, counseling of the patient, communication of recommendations to the care team, and documentation time.     Nena Fowler  Department of Hematology, Oncology and Transplantation  Pager [phone]/Text via [employer]           Again, thank you for allowing me to participate in the care of your patient.        Sincerely,         BMT [facility]

## 2024-12-16 NOTE — PROGRESS NOTES
BMT Clinic Note  Dec 16, 2024     Patient ID: Arpit BETH White is a 74 year old male who is day +131 post allogenic stem cell transplant for MDS-EB1     Diagnosis MDS-EB1 BMT type Allogenic  CMV  Donor -  Recipient -    Prep ELIEZER (Flu/Cy/TBI) Donor type  8/8 URD ABO D/R O- (matched)   GVHD ppx PTCy, siro, MMF Graft source PBSCT Toxo IgG Negative   Protocol [id number] CD34/kg 6.12E+06    BMT MD Nena Fowler      Pre-transplant disease history  Referring provider:  Dr. Beckwith, [facility]      Diagnosis:   MDS-IB1. IPSS-M: very high risk 1.98   CD5+ Monoclonal B Lymphocytosis (MBL), chronic lymphocytic leukemia type  Recurrent febrile episodes, UBA1 negative, resolved with treatment of MDS  Treatment  3/21/24 to 6/19/24: Decitabine 20mg/m2 for 5 days x 4 cycles    Post transplant  # A.fib with RVR on 8/21/24 which converted to sinus rhythm spontaneously. 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 Max grade 1 (face, chest and upper back). Skin biopsy (9/1224) showed interface dermatitis suggestive of GVHD. Intermittently improves topical steroids.       INTERVAL HISTORY     Arpit presents for a scheduled BMT visit. Completed siro taper, skin is getting better. Still has acneform rash, clindamycin helps. Pedal edema still present but better.     Plan  - D100 results reviewed  - Mild cytopenias, likely due to sirolimus  - Continue acyclovir and bactrim. Stop voriconazole.   - Increase lisinopril to 10mg daily. Stop amlodipine  - Labs every 2 weeks at [facility] in 3 weeks or so      Current Outpatient Medications   Medication Sig Dispense Refill    acyclovir (ZOVIRAX) 800 MG tablet Take 1 tablet (800 mg) by mouth 2 times daily. 120 tablet 1    allopurinol (ZYLOPRIM) 300 MG tablet Take 1 tablet (300 mg) by mouth daily. 60 tablet 1    amLODIPine (NORVASC) 10 MG tablet Take 0.5 tablets (5 mg) by mouth every morning.      aspirin 81 MG EC tablet Take 1 tablet  (81 mg) by mouth daily. 60 tablet 1    atorvastatin (LIPITOR) 10 MG tablet Take 1 tablet (10 mg) by mouth every morning. 60 tablet 1    brimonidine (ALPHAGAN) 0.2 % ophthalmic solution Place 1 drop into both eyes daily.      calcium carbonate-vitamin D (CALTRATE) 600-10 MG-MCG per tablet Take 1 tablet by mouth daily. 60 tablet 1    clindamycin (CLEOCIN-T) 1 % external gel Apply topically 2 times daily. 30 g 0    fluticasone (FLONASE) 50 MCG/ACT nasal spray Spray 1 spray into both nostrils as needed. (Patient not taking: Reported on 11/22/2024)      hydrocortisone 2.5 % cream Apply topically 2 times daily. Use on face and scalp 30 g 0    latanoprost (XALATAN) 0.005 % ophthalmic solution INSTILL 2 DROPS IN BOTH EYES AT BEDTIME      lisinopril (ZESTRIL) 5 MG tablet Take 2 tablets (10 mg) by mouth daily. 60 tablet 1    Lutein-Zeaxanthin 25-5 MG CAPS Take 1 capsule by mouth daily      metFORMIN (GLUCOPHAGE XR) 500 MG 24 hr tablet Take by mouth. 500 mg in the morning and 1000 mg in the evening      metoprolol tartrate (LOPRESSOR) 25 MG tablet Take 1 tablet (25 mg) by mouth 2 times daily. 60 tablet 1    pantoprazole (PROTONIX) 40 MG EC tablet Take 1 tablet (40 mg) by mouth every morning (before breakfast). 60 tablet 1    psyllium (METAMUCIL/KONSYL) capsule Take 3 capsules by mouth daily. 270 capsule 3    sirolimus (GENERIC EQUIVALENT) 0.5 MG tablet Take 1 tablet (0.5 mg) by mouth daily. 60 tablet 1    sulfamethoxazole-trimethoprim (BACTRIM DS) 800-160 MG tablet Take 1 tablet by mouth Every Mon, Tues two times daily. Do not begin taking until instructed to do so by [facility] clinic. Do not start before September 9, 2024. 28 tablet 1    tamsulosin (FLOMAX) 0.4 MG capsule Take 1 capsule (0.4 mg) by mouth every evening. 90 capsule 1    triamcinolone (KENALOG) 0.1 % external cream Apply topically 2 times daily. Use on chest and back 454 g 0    voriconazole (VFEND) 200 MG tablet TAKE 1 TABLET BY MOUTH TWICE DAILY 120 tablet 0         EXAM      There were no vitals taken for this visit.    Wt Readings from Last 4 Encounters:   11/22/24 107.5 kg (237 lb)   11/13/24 110.2 kg (243 lb)   11/11/24 108.4 kg (239 lb)   11/06/24 108.4 kg (239 lb)     KPS: 80% Can perform normal activity with effort, some signs of disease    General: Alert, awake; NAD  HEENT: atraumatic, sclera anicteric. Oral mucosa moist, OP nonerythematous  Skin: Acneform rash on forehead and face.    MSK: No pitting edema in bilateral LE.     Acute GVHD          11/14/2024    22:07 11/13/2024    18:50 11/6/2024    18:52   Acute GVHD   New evidence of Acute Ejcil-Elodbn-Xihv Disease developed since last entry?  No No   Skin 1 1 1   Upper Intestinal Tract  0 0   Lower Intestinal Tract  0 0   Liver  0 0   Overall Grade (Przepiorka)  1 1      Laboratory Studies:     Blood Counts  Recent Labs   Lab Test 12/02/24  0926 11/15/24  0955 11/13/24  1745 09/17/24  0809 09/12/24  0950 09/10/24  1114 09/06/24  1257   WBC 7.1 6.1 6.4   < > 3.3* 2.8* 2.8*   HGB 11.4* 10.7* 10.3*   < > 9.4* 9.6* 9.3*    177 180   < > 123* 96* 89*   NEUTROPHIL 68 65 56   < > 67 71 70   ANEU  --   --   --   --  2.2 2.0 2.0   LYMPH 21 24 29   < > 19 1 10   ALYM  --   --   --   --  0.6* 0.0* 0.3*    < > = values in this interval not displayed.       Basic Panel  Recent Labs   Lab Test 12/02/24  0926 11/15/24  0955 11/13/24  1745    140 140   POTASSIUM 3.9 3.8 3.9   CHLORIDE 107 106 106   CO2 24 24 24   BUN 14.9 16.8 16.9   CR 1.10 1.08 1.15   * 168* 118*        Recent Labs   Lab Test 12/02/24  0926 11/15/24  0955 11/13/24  1745 09/10/24  1114 09/06/24  1257 08/26/24  1253 08/25/24  0326 08/24/24  0326 08/23/24  0340   MC 9.6 9.6 9.8   < > 9.5   < > 8.9 9.0 8.8   MAG  --   --   --   --  1.6*  --  1.9 2.0 2.1   PHOS  --   --   --   --   --   --  3.7 3.9 3.5    < > = values in this interval not displayed.        LFTs  Recent Labs   Lab Test 12/02/24  0926 11/15/24  0955 11/13/24  1745   ALKPHOS 128 116  119   AST 25 22 21   ALT 27 18 18   BILITOTAL 0.2 0.2 <0.2   ALBUMIN 4.2 4.1 4.1       Recent Labs   Lab Test 11/13/24  1745 10/29/24  1219 10/24/24  1212   RAPAMY 5.1 5.4 5.6       No lab results found.    Recent Labs   Lab Test 12/02/24  0926 11/13/24  1745 10/29/24  1219   CMVQNT Not Detected Not Detected Not Detected       Recent Labs   Lab Test 09/25/24  1211 09/12/24  0950   * 467*       Recent Labs   Lab Test 07/22/24  0957   QUINCY 295        ASSESSMENT AND PLAN     BMT for high risk MDS: D+131  - Chemo protocol: [id number]; Flu, Cy, TBI  - Peripheral blood stem cell graft from 8/8 donor, ABO matched (O neg)  - Continue allopurinol (hx of gout)    Disease status:   - 8/30/24 (D28): Slightly hypercellular marrow (30-40%). No evidence of myeloid neoplasm or monoclonal B-lymphocytosis. Normal male karyotype. NGS negative for DNMT3A (other mutations not tested)  - 11/15/24 (D100): Hypercellular marrow (40%) in remission. NGS negative.     Graft status/chimerism:   - 8/30/24 (D28): Bone marrow 100%. Peripheral blood CD33 100%, CD3 84%  - 9/12/24: Peripheral blood CD3 96%, CD33 100%  - 9/25/24: Peripheral blood CD3 100%, CD33 100%  - 11/15/24 (D100): Bone marrow 100%. Peripheral blood CD33 100%, CD3 100%    Maintenance  None    GVHD prophylaxis  # Current immunosuppression is sirolimus taper  - Completed Siro taper on 12/01    # The current acute GVHD grade is 0  - Skin: Stage 0.   Rash is likely sirolimus right now. Skin biopsy showed mild interface dermatitis suggestive of GVHD.   - GI: GI score 0.  - Liver: LFTs normal. Liver score 0.    HEME/ COAG  G-CSF last given on 8/22/4.  ABO matched.    ID  # Prophylaxis  Fungal: Voriconazole 200mg BID started on 9/26/24.   PJP/ Toxo IgG negative: Bactrim.  Viral: Acyclovir 800 mg bid     # Monitoring  CMV: Monitor weekly till D100.   EBV: Monitor every 2 weeks between D30 to D180.   IgG: IgG 467 (9/12/24). Check serum IgG level q3 months, and consider IVIG repletion  for IgG levels <400 mg/dL.     GI  # Hepatic steatosis and borderline iron deposition in liver  - VOD prophy: Ursodiol completed    # Supportive   - Ulcer prophy: PPI  - Alternating constipation and diarrhea: continue psyllium and imodium PRN    CARDIOVASCULAR  # Asymptomatic moderate aortic stenosis    # A.fib with RVR   - One episode while inpatient on 8/21/24 which converted to normal sinus rhythm spontaneously. Was started on metoprolol 25mg BID.   - Has been seen by cardiology on 11/11/24. No anticoagulation needed.    - One previous episode of A.fib with RVR prior to transplant in the context of admission with febrile episode on 3/15/24. Noted on his fitbit, not very symptomatic. DCCV on 3/21/24 restored normal sinus rhythm. Amiodarone and anticoagulation was stopped in April 2024 without any recurrence.     # CAD  - Coronary artery calcifications.   - Continue atorvastatin 10mg, ASA 81mg (resumed on 8/23/24 as platelets stable above 50k)  - Will need an ischemia evaluation in the long term    # HTN: Will increase to Lisinopril 10mg BID.       RENAL/ELECTROLYTES/  # Urinary frequency, without evidence of retention or infection  # BPH  - Continue tamsulosin 0.4mg qhs   - Has been seen by urology on 10/3/24. Mybetriq was recommended, not taking it.  Follow up with urology in 2-3 months.     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   - HbA1c on 10/14 was 6.8    RESP  # VINCENT : CPAP     OPHTHALMOLOGY:  # Glaucoma - Lutein supplement; brimonidine, latanaprost eye drops.   # Right eye floaters: Following retinal specialist      MUSCULOSKELETAL/FRAILTY  # Gout: Continue allopurinol indefinitely.     Post-transplant vaccinations  Flu and  COVID done      Patient was seen and evaluated with Dr. Fowler. Attestation to follow.    Griffin Fletcher MD  Hematology/Oncology/BMT Fellow PGY4  Pager: [phone]    I spent *** minutes in the care of this patient today, which included time necessary for preparation for the visit, obtaining history, ordering medications/tests/procedures as medically indicated, review of pertinent medical literature, counseling of the patient, communication of recommendations to the care team, and documentation time.     Nena Fowler  Department of Hematology, Oncology and Transplantation  Pager [phone]/Text via James

## 2024-12-16 NOTE — PROGRESS NOTES
Outcome for 12/16/24 12:36 PM: [employer]im[employer] message sent  Charlene Tarango CMA  Adult Endocrinology   [facility]

## 2024-12-17 NOTE — PROGRESS NOTES
Outcome for 12/17/24 8:00 AM:  upload meter in clinic  Ya Tenorio CMA  Adult Endocrinology  [facility] [place]

## 2024-12-18 ENCOUNTER — OFFICE VISIT (OUTPATIENT)
Dept: ENDOCRINOLOGY | Facility: CLINIC | Age: 74
End: 2024-12-18
Payer: MEDICARE

## 2024-12-18 VITALS
DIASTOLIC BLOOD PRESSURE: 76 MMHG | BODY MASS INDEX: 32.03 KG/M2 | HEART RATE: 68 BPM | WEIGHT: 242.8 LBS | OXYGEN SATURATION: 96 % | SYSTOLIC BLOOD PRESSURE: 170 MMHG

## 2024-12-18 DIAGNOSIS — E11.9 TYPE 2 DIABETES MELLITUS WITHOUT COMPLICATION, WITHOUT LONG-TERM CURRENT USE OF INSULIN (H): Primary | ICD-10-CM

## 2024-12-18 RX ORDER — LISINOPRIL 10 MG/1
10 TABLET ORAL 2 TIMES DAILY
COMMUNITY

## 2024-12-18 NOTE — LETTER
12/18/2024      Arpit Forrest  [address] [place] [address]      Dear Colleague,    Thank you for referring your patient, Arpit Forrest, to the [facility]. Please see a copy of my visit note below.    Outcome for 12/16/24 12:36 PM: Moodswiingt message sent  Charlene Tarango CMA  Adult Endocrinology   [facility]        Outcome for 12/17/24 8:00 AM:  upload meter in clinic  [facility]  Adult Endocrinology  [facility] [place]      Endocrinology Clinic Visit     NAME:  Arpit Forrest  PCP:  Tanvir Max  MRN:  [id number]  Reason for Consult:  DM2  Requesting Provider:  Destinee Sanches    Chief Complaint     Chief Complaint   Patient presents with     Follow Up     Diabetes       History of Present Illness     Arpit Forrest is a 73 year old male who is seen in video visit for DM2 follow-up.       The patient was diagnosed with type 2 diabetes a year ago. Per chart review, dx with prediabetes in May 2023 with A1C 6.2. A1C 6.5 noted in August 2023  (T2DM), shortly after starting prednisone for gout and has remained on prednisone daily up until 1 week before his BMT admission.  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 He had mild steroid induced hyperglycemia while inpatient, managed with insulin, was seen by IDS. Discharged back on metformin only.    Interval hx:  He said in the past month he has been following with retina specialist , outside provider and was told he has DR.   He is not happy with BG control and would like to loose more wt.     Previous A1C in records reviewed.     Lab Results   Component Value Date    A1C 6.8 (H) 10/24/2024    A1C 6.9 (H) 09/12/2024    A1C 5.0 07/31/2024           Weight is down 30 lbs since the BMT.     Wt Readings from Last 4 Encounters:   12/18/24 110.1 kg (242 lb 12.8 oz)   12/16/24 110.2 kg (242 lb 14.4 oz)   11/22/24 107.5 kg (237 lb)   11/13/24 110.2 kg (243 lb)         Diabetes Care/Complications:   Eyes: DR per patient  report  Kidneys: normal Cr, last checked 9/2024  Nerves: chronic peripheral neuropathy for 30 years.  Smoking: no  Blood Pressure: acceptable  Lipids: on lipitor 10 mg , ldl 57 on 7/24  Macrovascular: no  Hypoglycemia: no    Previous DM related Hospitalization:no    Current treatment strategy:   Metformin 1500 mg total per day    Blood Glucose Monitoring:   Checks it fasting.  30 days  Avg 126, 73% in range. Has few BG in the 140s.                Problem List     Patient Active Problem List   Diagnosis     Ankylosing spondylitis lumbar region (H)     Autoinflammatory syndrome (H)     Macdonald's esophagus without dysplasia     Bilateral sacroiliitis (H)     Essential hypertension     H/O: gout     History of agent Orange exposure     History of kidney donation     Hyperlipidemia     Myelodysplastic syndrome (H)     Obstructive sleep apnea syndrome     Obesity (BMI 30-39.9)     Type 2 diabetes mellitus without complication, without long-term current use of insulin (H)     Glaucoma     MDS (myelodysplastic syndrome) (H)     Stem cells transplant status (H)        Medications     Current Outpatient Medications   Medication Sig Dispense Refill     acyclovir (ZOVIRAX) 400 MG tablet Take 800 mg by mouth 2 times daily.       allopurinol (ZYLOPRIM) 300 MG tablet Take 1 tablet (300 mg) by mouth daily. 60 tablet 1     aspirin 81 MG EC tablet Take 1 tablet (81 mg) by mouth daily. 60 tablet 1     atorvastatin (LIPITOR) 10 MG tablet Take 1 tablet (10 mg) by mouth every morning. 60 tablet 1     brimonidine (ALPHAGAN) 0.2 % ophthalmic solution Place 1 drop into both eyes 2 times daily.       calcium carbonate-vitamin D (CALTRATE) 600-10 MG-MCG per tablet Take 1 tablet by mouth daily. 60 tablet 1     clindamycin (CLEOCIN-T) 1 % external gel Apply topically 2 times daily. (Patient taking differently: Apply topically daily.) 30 g 0     fluticasone (FLONASE) 50 MCG/ACT nasal spray Spray 1 spray into both nostrils as needed.        hydrocortisone 2.5 % cream Apply topically 2 times daily. Use on face and scalp (Patient taking differently: Apply topically daily. Use on face and scalp) 30 g 0     latanoprost (XALATAN) 0.005 % ophthalmic solution INSTILL 2 DROPS IN BOTH EYES AT BEDTIME       lisinopril (ZESTRIL) 10 MG tablet Take 10 mg by mouth 2 times daily.       Lutein-Zeaxanthin 25-5 MG CAPS Take 1 capsule by mouth daily       metFORMIN (GLUCOPHAGE XR) 500 MG 24 hr tablet Take by mouth. 500 mg in the morning and 1000 mg in the evening       metoprolol tartrate (LOPRESSOR) 25 MG tablet Take 1 tablet (25 mg) by mouth 2 times daily. 60 tablet 1     pantoprazole (PROTONIX) 40 MG EC tablet Take 1 tablet (40 mg) by mouth every morning (before breakfast). 60 tablet 1     psyllium (METAMUCIL/KONSYL) capsule Take 3 capsules by mouth daily. 270 capsule 3     sulfamethoxazole-trimethoprim (BACTRIM DS) 800-160 MG tablet Take 1 tablet by mouth Every Mon, Tues two times daily. Do not begin taking until instructed to do so by [facility] clinic. Do not start before September 9, 2024. 28 tablet 1     tamsulosin (FLOMAX) 0.4 MG capsule Take 1 capsule (0.4 mg) by mouth every evening. 90 capsule 1     triamcinolone (KENALOG) 0.1 % external cream Apply topically 2 times daily. Use on chest and back (Patient taking differently: Apply topically at bedtime. Use on chest and back) 454 g 0     Vitamin D-Vitamin K (D3 + K2 PO) Take 1 tablet by mouth daily. 3000 international unit(s)/115 mcg       ammonium lactate (LAC-HYDRIN) 12 % external lotion Apply topically daily. (Patient not taking: Reported on 12/18/2024)       sirolimus (GENERIC EQUIVALENT) 0.5 MG tablet Take 1 tablet (0.5 mg) by mouth daily. 60 tablet 1     voriconazole (VFEND) 200 MG tablet TAKE 1 TABLET BY MOUTH TWICE DAILY 120 tablet 0     No current facility-administered medications for this visit.     Facility-Administered Medications Ordered in Other Visits   Medication Dose Route Frequency Provider Last Rate  Last Admin     COVID-19 mRNA vaccine 12+y (COMIRNATY (PFIZER_) injection 30 mcg  30 mcg Intramuscular Once Nena Fowler MBBS            Allergies     Allergies   Allergen Reactions     Gramineae Pollens Other (See Comments)     Seasonal sneezing, runny nose.       Medical / Surgical History     No past medical history on file.  Past Surgical History:   Procedure Laterality Date     ARTHROSCOPY KNEE RT/LT Left      IR CVC TUNNEL PLACEMENT > 5 YRS OF AGE  2024     IR CVC TUNNEL REMOVAL RIGHT  2024     IR LUMBAR PUNCTURE  2024     NEPHRECTOMY      donated a kidney       Social History     Social History     Socioeconomic History     Marital status:      Spouse name: Meghna     Number of children: 2     Years of education: Not on file     Highest education level: Not on file   Occupational History     Not on file   Tobacco Use     Smoking status: Former     Current packs/day: 0.00     Average packs/day: 0.5 packs/day for 4.0 years (2.0 ttl pk-yrs)     Types: Cigarettes     Start date:      Quit date: 1970     Years since quittin.0     Smokeless tobacco: Never   Vaping Use     Vaping status: Never Used   Substance and Sexual Activity     Alcohol use: Not Currently     Comment: 24 Quit 1 year ago     Drug use: Never     Sexual activity: Not on file   Other Topics Concern     Not on file   Social History Narrative     Not on file     Social Drivers of Health     Financial Resource Strain: Unknown (2024)    Financial Resource Strain      Within the past 12 months, have you or your family members you live with been unable to get utilities (heat, electricity) when it was really needed?: Patient declined   Food Insecurity: Unknown (2024)    Food Insecurity      Within the past 12 months, did you worry that your food would run out before you got money to buy more?: Patient declined      Within the past 12 months, did the food you bought just not last and you didn t have money to  get more?: Patient declined   Transportation Needs: Unknown (8/23/2024)    Transportation Needs      Within the past 12 months, has lack of transportation kept you from medical appointments, getting your medicines, non-medical meetings or appointments, work, or from getting things that you need?: Patient declined   Physical Activity: Insufficiently Active (4/3/2024)    Received from [facility]    Exercise Vital Sign      Days of Exercise per Week: 3 days      Minutes of Exercise per Session: 20 min   Stress: Not on file   Social Connections: Not on file   Interpersonal Safety: Low Risk  (8/23/2024)    Interpersonal Safety      Do you feel physically and emotionally safe where you currently live?: Yes      Within the past 12 months, have you been hit, slapped, kicked or otherwise physically hurt by someone?: No      Within the past 12 months, have you been humiliated or emotionally abused in other ways by your partner or ex-partner?: No   Housing Stability: Unknown (8/23/2024)    Housing Stability      Do you have housing? : Patient declined      Are you worried about losing your housing?: Patient declined       Family History     No family history on file.    ROS     12 ROS completed, pertinent positive and negative in HPI    Physical Exam   BP (!) 170/76 (BP Location: Left arm, Patient Position: Sitting, Cuff Size: Adult Large)   Pulse 68   Wt 110.1 kg (242 lb 12.8 oz)   SpO2 96%   BMI 32.03 kg/m     GENERAL: alert and no distress  EYES: Eyes grossly normal to inspection.  No discharge or erythema, or obvious scleral/conjunctival abnormalities.  RESP: No audible wheeze, cough, or visible cyanosis.    SKIN: Visible skin clear. No significant rash, abnormal pigmentation or lesions.  NEURO: Cranial nerves grossly intact.  Mentation and speech appropriate for age.  PSYCH: Appropriate affect, tone, and pace of words     Labs/Imaging     Pertinent Labs were reviewed and updated in EPIC and discussed  "briefly.  Radiology Results were  reviewed and updated in EPIC and discussed briefly.    Summary of recent findings:   Lab Results   Component Value Date    A1C 5.0 07/31/2024       No results found for: \"TSH\", \"T4\"    Creatinine   Date Value Ref Range Status   12/16/2024 1.01 0.67 - 1.17 mg/dL Final       Recent Labs   Lab Test 07/22/24  0957   CHOL 152   HDL 36*   LDL 57   TRIG 293*       No results found for: \"[id number]\", \"[id number]\", \"[id number]\"    I personally reviewed the patient's outside records from [facility] EMR and Care Everywhere. Summary of pertinent findings in HPI.    Impression / Plan     1. Diabetes Mellitus: Type 2  2. Hx of steroid induced hyperglycemia -- resolved.   3. Reported diabetic retinopathy  4. Obesity class 1  Not on steroids anymore. Managed with metformin only. We reviewed his SMBG, doing well overall. A1c trended slightly up but still at goal. He is not happy with BG control. He would like to be on ozempic. We reviewed data about ozempic and progression in DR. He has an apt with his retina specialist tomorrow and will discuss with him, if ok, he would like to be on glp1a . Given his concern about wt, mounjaro would be better for wt loss. We reviewed safety profile and side effects.      4. Diabetes Complications: refer to hpi. No urine alb.     5. Blood Pressure Management: Blood pressure is acceptable based on chart review.     6.Lipid Management: Per the new ACC/SOWMYA/NHLBI guidelines, statins are recommended for individuals with diabetes aged 40-75 with LDL  without ASCVD, and for any individual with ASCVD. Currently the patient is on a statin.      7. Smoking Status: Patient Pt is smoke free..     Review of the result(s) of each unique test - A1c and diabetes related labs.  45 minutes spent on the date of the encounter doing chart review, history and exam, documentation and further activities per the note           Test and/or medications prescribed today:  No orders of the " defined types were placed in this encounter.        Follow up: 6 m    The longitudinal plan of care for the diagnosis(es)/condition(s) as documented were addressed during this visit. Due to the added complexity in care, I will continue to support Arpit in the subsequent management and with ongoing continuity of care.        Markus Griffin MD  Endocrinology, Diabetes and Metabolism  [facility]      Again, thank you for allowing me to participate in the care of your patient.        Sincerely,        Markus Griffin MD

## 2024-12-18 NOTE — PROGRESS NOTES
Endocrinology Clinic Visit     NAME:  Arpit Forrest  PCP:  Tanvir Max  MRN:  [id number]  Reason for Consult:  DM2  Requesting Provider:  Destinee Sanches    Chief Complaint     Chief Complaint   Patient presents with    Follow Up    Diabetes       History of Present Illness     Arpit Forrest is a 73 year old male who is seen in video visit for DM2 follow-up.       The patient was diagnosed with type 2 diabetes a year ago. Per chart review, dx with prediabetes in May 2023 with A1C 6.2. A1C 6.5 noted in August 2023  (T2DM), shortly after starting prednisone for gout and has remained on prednisone daily up until 1 week before his BMT admission.  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 He had mild steroid induced hyperglycemia while inpatient, managed with insulin, was seen by IDS. Discharged back on metformin only.    Interval hx:  He said in the past month he has been following with retina specialist , outside provider and was told he has DR.   He is not happy with BG control and would like to loose more wt.     Previous A1C in records reviewed.     Lab Results   Component Value Date    A1C 6.8 (H) 10/24/2024    A1C 6.9 (H) 09/12/2024    A1C 5.0 07/31/2024           Weight is down 30 lbs since the BMT.     Wt Readings from Last 4 Encounters:   12/18/24 110.1 kg (242 lb 12.8 oz)   12/16/24 110.2 kg (242 lb 14.4 oz)   11/22/24 107.5 kg (237 lb)   11/13/24 110.2 kg (243 lb)         Diabetes Care/Complications:   Eyes: DR per patient report  Kidneys: normal Cr, last checked 9/2024  Nerves: chronic peripheral neuropathy for 30 years.  Smoking: no  Blood Pressure: acceptable  Lipids: on lipitor 10 mg , ldl 57 on 7/24  Macrovascular: no  Hypoglycemia: no    Previous DM related Hospitalization:no    Current treatment strategy:   Metformin 1500 mg total per day    Blood Glucose Monitoring:   Checks it fasting.  30 days  Avg 126, 73% in range. Has few BG in the 140s.                Problem List     Patient Active Problem List    Diagnosis    Ankylosing spondylitis lumbar region (H)    Autoinflammatory syndrome (H)    Macdonald's esophagus without dysplasia    Bilateral sacroiliitis (H)    Essential hypertension    H/O: gout    History of agent Orange exposure    History of kidney donation    Hyperlipidemia    Myelodysplastic syndrome (H)    Obstructive sleep apnea syndrome    Obesity (BMI 30-39.9)    Type 2 diabetes mellitus without complication, without long-term current use of insulin (H)    Glaucoma    MDS (myelodysplastic syndrome) (H)    Stem cells transplant status (H)        Medications     Current Outpatient Medications   Medication Sig Dispense Refill    acyclovir (ZOVIRAX) 400 MG tablet Take 800 mg by mouth 2 times daily.      allopurinol (ZYLOPRIM) 300 MG tablet Take 1 tablet (300 mg) by mouth daily. 60 tablet 1    aspirin 81 MG EC tablet Take 1 tablet (81 mg) by mouth daily. 60 tablet 1    atorvastatin (LIPITOR) 10 MG tablet Take 1 tablet (10 mg) by mouth every morning. 60 tablet 1    brimonidine (ALPHAGAN) 0.2 % ophthalmic solution Place 1 drop into both eyes 2 times daily.      calcium carbonate-vitamin D (CALTRATE) 600-10 MG-MCG per tablet Take 1 tablet by mouth daily. 60 tablet 1    clindamycin (CLEOCIN-T) 1 % external gel Apply topically 2 times daily. (Patient taking differently: Apply topically daily.) 30 g 0    fluticasone (FLONASE) 50 MCG/ACT nasal spray Spray 1 spray into both nostrils as needed.      hydrocortisone 2.5 % cream Apply topically 2 times daily. Use on face and scalp (Patient taking differently: Apply topically daily. Use on face and scalp) 30 g 0    latanoprost (XALATAN) 0.005 % ophthalmic solution INSTILL 2 DROPS IN BOTH EYES AT BEDTIME      lisinopril (ZESTRIL) 10 MG tablet Take 10 mg by mouth 2 times daily.      Lutein-Zeaxanthin 25-5 MG CAPS Take 1 capsule by mouth daily      metFORMIN (GLUCOPHAGE XR) 500 MG 24 hr tablet Take by mouth. 500 mg in the morning and 1000 mg in the evening      metoprolol  tartrate (LOPRESSOR) 25 MG tablet Take 1 tablet (25 mg) by mouth 2 times daily. 60 tablet 1    pantoprazole (PROTONIX) 40 MG EC tablet Take 1 tablet (40 mg) by mouth every morning (before breakfast). 60 tablet 1    psyllium (METAMUCIL/KONSYL) capsule Take 3 capsules by mouth daily. 270 capsule 3    sulfamethoxazole-trimethoprim (BACTRIM DS) 800-160 MG tablet Take 1 tablet by mouth Every Mon, Tues two times daily. Do not begin taking until instructed to do so by BMT clinic. Do not start before September 9, 2024. 28 tablet 1    tamsulosin (FLOMAX) 0.4 MG capsule Take 1 capsule (0.4 mg) by mouth every evening. 90 capsule 1    triamcinolone (KENALOG) 0.1 % external cream Apply topically 2 times daily. Use on chest and back (Patient taking differently: Apply topically at bedtime. Use on chest and back) 454 g 0    Vitamin D-Vitamin K (D3 + K2 PO) Take 1 tablet by mouth daily. 3000 international unit(s)/115 mcg      ammonium lactate (LAC-HYDRIN) 12 % external lotion Apply topically daily. (Patient not taking: Reported on 12/18/2024)      sirolimus (GENERIC EQUIVALENT) 0.5 MG tablet Take 1 tablet (0.5 mg) by mouth daily. 60 tablet 1    voriconazole (VFEND) 200 MG tablet TAKE 1 TABLET BY MOUTH TWICE DAILY 120 tablet 0     No current facility-administered medications for this visit.     Facility-Administered Medications Ordered in Other Visits   Medication Dose Route Frequency Provider Last Rate Last Admin    COVID-19 mRNA vaccine 12+y (COMIRNATY (PFIZER_) injection 30 mcg  30 mcg Intramuscular Once Nena Fowler MBBS            Allergies     Allergies   Allergen Reactions    Gramineae Pollens Other (See Comments)     Seasonal sneezing, runny nose.       Medical / Surgical History     No past medical history on file.  Past Surgical History:   Procedure Laterality Date    ARTHROSCOPY KNEE RT/LT Left     IR CVC TUNNEL PLACEMENT > 5 YRS OF AGE  7/31/2024    IR CVC TUNNEL REMOVAL RIGHT  9/25/2024    IR LUMBAR PUNCTURE   2024    NEPHRECTOMY      donated a kidney       Social History     Social History     Socioeconomic History    Marital status:      Spouse name: Meghna    Number of children: 2    Years of education: Not on file    Highest education level: Not on file   Occupational History    Not on file   Tobacco Use    Smoking status: Former     Current packs/day: 0.00     Average packs/day: 0.5 packs/day for 4.0 years (2.0 ttl pk-yrs)     Types: Cigarettes     Start date:      Quit date: 1970     Years since quittin.0    Smokeless tobacco: Never   Vaping Use    Vaping status: Never Used   Substance and Sexual Activity    Alcohol use: Not Currently     Comment: 24 Quit 1 year ago    Drug use: Never    Sexual activity: Not on file   Other Topics Concern    Not on file   Social History Narrative    Not on file     Social Drivers of Health     Financial Resource Strain: Unknown (2024)    Financial Resource Strain     Within the past 12 months, have you or your family members you live with been unable to get utilities (heat, electricity) when it was really needed?: Patient declined   Food Insecurity: Unknown (2024)    Food Insecurity     Within the past 12 months, did you worry that your food would run out before you got money to buy more?: Patient declined     Within the past 12 months, did the food you bought just not last and you didn t have money to get more?: Patient declined   Transportation Needs: Unknown (2024)    Transportation Needs     Within the past 12 months, has lack of transportation kept you from medical appointments, getting your medicines, non-medical meetings or appointments, work, or from getting things that you need?: Patient declined   Physical Activity: Insufficiently Active (4/3/2024)    Received from [facility]    Exercise Vital Sign     Days of Exercise per Week: 3 days     Minutes of Exercise per Session: 20 min   Stress: Not on file   Social Connections: Not on  "file   Interpersonal Safety: Low Risk  (8/23/2024)    Interpersonal Safety     Do you feel physically and emotionally safe where you currently live?: Yes     Within the past 12 months, have you been hit, slapped, kicked or otherwise physically hurt by someone?: No     Within the past 12 months, have you been humiliated or emotionally abused in other ways by your partner or ex-partner?: No   Housing Stability: Unknown (8/23/2024)    Housing Stability     Do you have housing? : Patient declined     Are you worried about losing your housing?: Patient declined       Family History     No family history on file.    ROS     12 ROS completed, pertinent positive and negative in HPI    Physical Exam   BP (!) 170/76 (BP Location: Left arm, Patient Position: Sitting, Cuff Size: Adult Large)   Pulse 68   Wt 110.1 kg (242 lb 12.8 oz)   SpO2 96%   BMI 32.03 kg/m     GENERAL: alert and no distress  EYES: Eyes grossly normal to inspection.  No discharge or erythema, or obvious scleral/conjunctival abnormalities.  RESP: No audible wheeze, cough, or visible cyanosis.    SKIN: Visible skin clear. No significant rash, abnormal pigmentation or lesions.  NEURO: Cranial nerves grossly intact.  Mentation and speech appropriate for age.  PSYCH: Appropriate affect, tone, and pace of words     Labs/Imaging     Pertinent Labs were reviewed and updated in EPIC and discussed briefly.  Radiology Results were  reviewed and updated in EPIC and discussed briefly.    Summary of recent findings:   Lab Results   Component Value Date    A1C 5.0 07/31/2024       No results found for: \"TSH\", \"T4\"    Creatinine   Date Value Ref Range Status   12/16/2024 1.01 0.67 - 1.17 mg/dL Final       Recent Labs   Lab Test 07/22/24  0957   CHOL 152   HDL 36*   LDL 57   TRIG 293*       No results found for: \"[id number]\", \"[id number]\", \"[id number]\"    I personally reviewed the patient's outside records from [employer] EMR and Care Everywhere. Summary of pertinent findings " in HPI.    Impression / Plan     1. Diabetes Mellitus: Type 2  2. Hx of steroid induced hyperglycemia -- resolved.   3. Reported diabetic retinopathy  4. Obesity class 1  Not on steroids anymore. Managed with metformin only. We reviewed his SMBG, doing well overall. A1c trended slightly up but still at goal. He is not happy with BG control. He would like to be on ozempic. We reviewed data about ozempic and progression in DR. He has an apt with his retina specialist tomorrow and will discuss with him, if ok, he would like to be on glp1a . Given his concern about wt, mounjaro would be better for wt loss. We reviewed safety profile and side effects.      4. Diabetes Complications: refer to hpi. No urine alb.     5. Blood Pressure Management: Blood pressure is acceptable based on chart review.     6.Lipid Management: Per the new ACC/SOWMYA/NHLBI guidelines, statins are recommended for individuals with diabetes aged 40-75 with LDL  without ASCVD, and for any individual with ASCVD. Currently the patient is on a statin.      7. Smoking Status: Patient Pt is smoke free..     Review of the result(s) of each unique test - A1c and diabetes related labs.  45 minutes spent on the date of the encounter doing chart review, history and exam, documentation and further activities per the note           Test and/or medications prescribed today:  No orders of the defined types were placed in this encounter.        Follow up: 6 m    The longitudinal plan of care for the diagnosis(es)/condition(s) as documented were addressed during this visit. Due to the added complexity in care, I will continue to support Arpit in the subsequent management and with ongoing continuity of care.        Markus Griffin MD  Endocrinology, Diabetes and Metabolism  [facility]

## 2024-12-19 ENCOUNTER — TELEPHONE (OUTPATIENT)
Dept: TRANSPLANT | Facility: CLINIC | Age: 74
End: 2024-12-19
Payer: MEDICARE

## 2024-12-19 NOTE — TELEPHONE ENCOUNTER
BMT Nurse Triage - Generic/Other Symptoms     Treating Provider:   Malcolm    Date of last office visit: 12/16    Onset of symptoms: Saturday 12/14     Duration of symptoms: 5 day    Brief description of symptoms: Arpit endorses a Neck pain that radiates to his shoulders. Thishas become more intense up to a 7/10. He says the pain is constant describes it as a dull aching, He also says when he moves his head certain ways it gets worst. It improves when he is sitting up vs. Laying in bed. He denies any neurological nor visual changes. Arpit is also concerned with his elevated blood pressure when he has been in clinic. He denies shortness of breath, chest pain, or any other symptoms. He says his rash is stable since he was in clinic on 12/16. Dr. Fowler updated    Plan for Arpit to trial  tylenol, heat, topical lidocaine, and rest and see how it goes. He is agreeable to this and does not have further questions

## 2024-12-30 ENCOUNTER — LAB (OUTPATIENT)
Dept: LAB | Facility: CLINIC | Age: 74
End: 2024-12-30
Payer: COMMERCIAL

## 2024-12-30 DIAGNOSIS — D46.9 MDS (MYELODYSPLASTIC SYNDROME) (H): Primary | ICD-10-CM

## 2024-12-30 DIAGNOSIS — D46.9 MDS (MYELODYSPLASTIC SYNDROME) (H): ICD-10-CM

## 2024-12-30 DIAGNOSIS — Z94.81 STATUS POST BONE MARROW TRANSPLANT (H): ICD-10-CM

## 2024-12-30 LAB
ALBUMIN SERPL BCG-MCNC: 4.2 G/DL (ref 3.5–5.2)
ALP SERPL-CCNC: 109 U/L (ref 40–150)
ALT SERPL W P-5'-P-CCNC: 17 U/L (ref 0–70)
ANION GAP SERPL CALCULATED.3IONS-SCNC: 12 MMOL/L (ref 7–15)
AST SERPL W P-5'-P-CCNC: 18 U/L (ref 0–45)
BASOPHILS # BLD AUTO: 0 10E3/UL (ref 0–0.2)
BASOPHILS NFR BLD AUTO: 0 %
BILIRUB SERPL-MCNC: 0.4 MG/DL
BUN SERPL-MCNC: 15.2 MG/DL (ref 8–23)
CALCIUM SERPL-MCNC: 10.1 MG/DL (ref 8.8–10.4)
CHLORIDE SERPL-SCNC: 103 MMOL/L (ref 98–107)
CREAT SERPL-MCNC: 1 MG/DL (ref 0.67–1.17)
EGFRCR SERPLBLD CKD-EPI 2021: 78 ML/MIN/1.73M2
EOSINOPHIL # BLD AUTO: 0.1 10E3/UL (ref 0–0.7)
EOSINOPHIL NFR BLD AUTO: 2 %
ERYTHROCYTE [DISTWIDTH] IN BLOOD BY AUTOMATED COUNT: 16.2 % (ref 10–15)
GLUCOSE SERPL-MCNC: 87 MG/DL (ref 70–99)
HCO3 SERPL-SCNC: 24 MMOL/L (ref 22–29)
HCT VFR BLD AUTO: 38.3 % (ref 40–53)
HGB BLD-MCNC: 12.4 G/DL (ref 13.3–17.7)
IMM GRANULOCYTES # BLD: 0 10E3/UL
IMM GRANULOCYTES NFR BLD: 0 %
LYMPHOCYTES # BLD AUTO: 1.4 10E3/UL (ref 0.8–5.3)
LYMPHOCYTES NFR BLD AUTO: 20 %
MCH RBC QN AUTO: 28.8 PG (ref 26.5–33)
MCHC RBC AUTO-ENTMCNC: 32.4 G/DL (ref 31.5–36.5)
MCV RBC AUTO: 89 FL (ref 78–100)
MONOCYTES # BLD AUTO: 0.6 10E3/UL (ref 0–1.3)
MONOCYTES NFR BLD AUTO: 10 %
NEUTROPHILS # BLD AUTO: 4.6 10E3/UL (ref 1.6–8.3)
NEUTROPHILS NFR BLD AUTO: 68 %
PLATELET # BLD AUTO: 161 10E3/UL (ref 150–450)
POTASSIUM SERPL-SCNC: 3.9 MMOL/L (ref 3.4–5.3)
PROT SERPL-MCNC: 6.6 G/DL (ref 6.4–8.3)
RBC # BLD AUTO: 4.3 10E6/UL (ref 4.4–5.9)
SODIUM SERPL-SCNC: 139 MMOL/L (ref 135–145)
WBC # BLD AUTO: 6.8 10E3/UL (ref 4–11)

## 2024-12-30 PROCEDURE — 87799 DETECT AGENT NOS DNA QUANT: CPT

## 2024-12-30 PROCEDURE — 80053 COMPREHEN METABOLIC PANEL: CPT

## 2024-12-30 PROCEDURE — 36415 COLL VENOUS BLD VENIPUNCTURE: CPT

## 2024-12-30 PROCEDURE — 85025 COMPLETE CBC W/AUTO DIFF WBC: CPT

## 2024-12-30 RX ORDER — ACYCLOVIR 400 MG/1
800 TABLET ORAL 2 TIMES DAILY
Qty: 360 TABLET | Refills: 0 | Status: SHIPPED | OUTPATIENT
Start: 2024-12-30 | End: 2025-03-30

## 2024-12-30 RX ORDER — LISINOPRIL 10 MG/1
20 TABLET ORAL DAILY
Qty: 180 TABLET | OUTPATIENT
Start: 2024-12-30

## 2024-12-30 RX ORDER — LISINOPRIL 10 MG/1
20 TABLET ORAL DAILY
Qty: 60 TABLET | Refills: 3 | Status: SHIPPED | OUTPATIENT
Start: 2024-12-30 | End: 2025-04-29

## 2025-01-06 RX ORDER — SIROLIMUS 1 MG/1
TABLET, FILM COATED ORAL
Qty: 90 TABLET | Refills: 1 | OUTPATIENT
Start: 2025-01-06

## 2025-01-06 NOTE — CONFIDENTIAL NOTE
Patient will continue with provider's treatment plan.       Charlene Tarango, SOREN  Adult Endocrinology   [facility]

## 2025-01-13 ENCOUNTER — LAB (OUTPATIENT)
Dept: LAB | Facility: CLINIC | Age: 75
End: 2025-01-13
Payer: COMMERCIAL

## 2025-01-13 DIAGNOSIS — Z94.81 STATUS POST BONE MARROW TRANSPLANT (H): ICD-10-CM

## 2025-01-13 DIAGNOSIS — D46.9 MDS (MYELODYSPLASTIC SYNDROME) (H): ICD-10-CM

## 2025-01-13 DIAGNOSIS — E11.9 TYPE 2 DIABETES MELLITUS WITHOUT COMPLICATION, WITHOUT LONG-TERM CURRENT USE OF INSULIN (H): ICD-10-CM

## 2025-01-13 LAB
ALBUMIN SERPL BCG-MCNC: 4.4 G/DL (ref 3.5–5.2)
ALP SERPL-CCNC: 125 U/L (ref 40–150)
ALT SERPL W P-5'-P-CCNC: 20 U/L (ref 0–70)
ANION GAP SERPL CALCULATED.3IONS-SCNC: 10 MMOL/L (ref 7–15)
AST SERPL W P-5'-P-CCNC: 22 U/L (ref 0–45)
BASOPHILS # BLD AUTO: 0 10E3/UL (ref 0–0.2)
BASOPHILS NFR BLD AUTO: 0 %
BILIRUB SERPL-MCNC: 0.4 MG/DL
BUN SERPL-MCNC: 18.9 MG/DL (ref 8–23)
CALCIUM SERPL-MCNC: 10.2 MG/DL (ref 8.8–10.4)
CHLORIDE SERPL-SCNC: 105 MMOL/L (ref 98–107)
CREAT SERPL-MCNC: 1.13 MG/DL (ref 0.67–1.17)
EGFRCR SERPLBLD CKD-EPI 2021: 67 ML/MIN/1.73M2
EOSINOPHIL # BLD AUTO: 0.1 10E3/UL (ref 0–0.7)
EOSINOPHIL NFR BLD AUTO: 2 %
ERYTHROCYTE [DISTWIDTH] IN BLOOD BY AUTOMATED COUNT: 16.1 % (ref 10–15)
EST. AVERAGE GLUCOSE BLD GHB EST-MCNC: 120 MG/DL
GLUCOSE SERPL-MCNC: 101 MG/DL (ref 70–99)
HBA1C MFR BLD: 5.8 % (ref 0–5.6)
HCO3 SERPL-SCNC: 26 MMOL/L (ref 22–29)
HCT VFR BLD AUTO: 41.8 % (ref 40–53)
HGB BLD-MCNC: 13.7 G/DL (ref 13.3–17.7)
IMM GRANULOCYTES # BLD: 0 10E3/UL
IMM GRANULOCYTES NFR BLD: 0 %
LYMPHOCYTES # BLD AUTO: 1.6 10E3/UL (ref 0.8–5.3)
LYMPHOCYTES NFR BLD AUTO: 23 %
MCH RBC QN AUTO: 29.1 PG (ref 26.5–33)
MCHC RBC AUTO-ENTMCNC: 32.8 G/DL (ref 31.5–36.5)
MCV RBC AUTO: 89 FL (ref 78–100)
MONOCYTES # BLD AUTO: 0.5 10E3/UL (ref 0–1.3)
MONOCYTES NFR BLD AUTO: 8 %
NEUTROPHILS # BLD AUTO: 4.6 10E3/UL (ref 1.6–8.3)
NEUTROPHILS NFR BLD AUTO: 67 %
PLATELET # BLD AUTO: 182 10E3/UL (ref 150–450)
POTASSIUM SERPL-SCNC: 4.2 MMOL/L (ref 3.4–5.3)
PROT SERPL-MCNC: 7.1 G/DL (ref 6.4–8.3)
RBC # BLD AUTO: 4.71 10E6/UL (ref 4.4–5.9)
SODIUM SERPL-SCNC: 141 MMOL/L (ref 135–145)
WBC # BLD AUTO: 6.8 10E3/UL (ref 4–11)

## 2025-01-13 PROCEDURE — 80053 COMPREHEN METABOLIC PANEL: CPT

## 2025-01-13 PROCEDURE — 85025 COMPLETE CBC W/AUTO DIFF WBC: CPT

## 2025-01-13 PROCEDURE — 36415 COLL VENOUS BLD VENIPUNCTURE: CPT

## 2025-01-13 PROCEDURE — 87799 DETECT AGENT NOS DNA QUANT: CPT

## 2025-01-13 PROCEDURE — 83036 HEMOGLOBIN GLYCOSYLATED A1C: CPT

## 2025-01-13 PROCEDURE — 82784 ASSAY IGA/IGD/IGG/IGM EACH: CPT

## 2025-01-13 RX ORDER — SULFAMETHOXAZOLE AND TRIMETHOPRIM 800; 160 MG/1; MG/1
1 TABLET ORAL
Qty: 28 TABLET | Refills: 1 | Status: SHIPPED | OUTPATIENT
Start: 2025-01-13

## 2025-01-14 LAB
CMV DNA SPEC NAA+PROBE-ACNC: NOT DETECTED IU/ML
EBV DNA SERPL NAA+PROBE-ACNC: NOT DETECTED IU/ML
IGG SERPL-MCNC: 836 MG/DL (ref 610–1616)
SPECIMEN TYPE: NORMAL

## 2025-01-21 NOTE — PROGRESS NOTES
"Virtual Visit Details    Type of service:  Video Visit   Video Start Time: 12:45 PM  Video End Time:12:55 PM    Originating Location (pt. Location): Home    Distant Location (provider location):  Off-site  Platform used for Video Visit: [facility]    Visit conducted via real-time audio/video technology by Frandy Daniels PA-C to the patient in their home.    Subjective      REASON FOR VISIT  Overactive bladder follow-up     HISTORY OF PRESENT ILLNESS  Mr. Forrest is a 74 year old male whom speaking with today in regards to his bothersome urinary symptoms currently controlled with tamsulosin and oxybutynin.  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    Last seen on 10/3/2024 at which time he endorsed urinating every 2 hours including at night, though he could improve his nighttime sleep with the CPAP and extend his sleep to 3 hours at a time.  This frequency has not been new but tolerated for much of his life.  After reassuring postvoid residual testing as well as multiple urine tests not showing any evidence of hematuria, plan was to stop his oxybutynin and trial Myrbetriq, though unfortunately insurance coverage did not allow the patient to take this medication so he was switched to oxybutynin extended release.    Of note, the oxybutynin does not appear to be on his med list through our system but does appear to be active through his [employer] med list.  Dosages listed at 5 mg.    Today:  Overall, symptoms that were brought up in October are mostly resolved just on the tamsulosin  Symptoms have improved as his \"transplant has taken hold\"    Objective      PHYSICAL EXAMINATION  Deferred given virtual visit.    Assessment & Plan    Overactive bladder  BPH  Solitary kidney    It was my pleasure to speak with Arpit in follow-up for his previously worsening urinary symptoms following his " bone marrow transplant.  After reviewing his clinical history, I am so happy to hear that his symptoms seem to be improving along the same timeline as he is recovering and benefiting from his bone marrow transplant.  With this in mind, I do not necessarily believe any further intervention is needed from a urologic perspective at this time given his complete lack of symptoms.  We did discuss the possibility of staying on tamsulosin given his solitary kidney and the potential benefits he is getting from it at the age of 74.  After reviewing this he will plan to stay on tamsulosin and will reach out to urology should he need further help in the future.    Mr. Forrest expressed understanding and agreement to the above discussion and plan and all of his questions were answered to his satisfaction.     PLAN  Continue on tamsulosin  Follow-up with urology as needed    SIGNED    Frandy Daniels PA-C      I spent a total of 15 minutes spent on the date of the encounter doing chart review, history and exam, documentation, and further activities as noted above.

## 2025-01-22 ENCOUNTER — ONCOLOGY VISIT (OUTPATIENT)
Dept: TRANSPLANT | Facility: CLINIC | Age: 75
End: 2025-01-22
Attending: STUDENT IN AN ORGANIZED HEALTH CARE EDUCATION/TRAINING PROGRAM
Payer: MEDICARE

## 2025-01-22 ENCOUNTER — APPOINTMENT (OUTPATIENT)
Dept: LAB | Facility: CLINIC | Age: 75
End: 2025-01-22
Attending: STUDENT IN AN ORGANIZED HEALTH CARE EDUCATION/TRAINING PROGRAM
Payer: MEDICARE

## 2025-01-22 VITALS
RESPIRATION RATE: 16 BRPM | SYSTOLIC BLOOD PRESSURE: 148 MMHG | TEMPERATURE: 97.6 F | WEIGHT: 231.3 LBS | HEART RATE: 65 BPM | DIASTOLIC BLOOD PRESSURE: 78 MMHG | OXYGEN SATURATION: 100 % | BODY MASS INDEX: 30.52 KG/M2

## 2025-01-22 DIAGNOSIS — Z94.81 STATUS POST BONE MARROW TRANSPLANT (H): ICD-10-CM

## 2025-01-22 LAB
ALBUMIN SERPL BCG-MCNC: 4.4 G/DL (ref 3.5–5.2)
ALP SERPL-CCNC: 121 U/L (ref 40–150)
ALT SERPL W P-5'-P-CCNC: 22 U/L (ref 0–70)
ANION GAP SERPL CALCULATED.3IONS-SCNC: 9 MMOL/L (ref 7–15)
AST SERPL W P-5'-P-CCNC: 21 U/L (ref 0–45)
BASOPHILS # BLD AUTO: 0 10E3/UL (ref 0–0.2)
BASOPHILS NFR BLD AUTO: 1 %
BILIRUB SERPL-MCNC: 0.6 MG/DL
BUN SERPL-MCNC: 20.9 MG/DL (ref 8–23)
CALCIUM SERPL-MCNC: 10.1 MG/DL (ref 8.8–10.4)
CHLORIDE SERPL-SCNC: 104 MMOL/L (ref 98–107)
CREAT SERPL-MCNC: 1.2 MG/DL (ref 0.67–1.17)
EGFRCR SERPLBLD CKD-EPI 2021: 63 ML/MIN/1.73M2
EOSINOPHIL # BLD AUTO: 0.1 10E3/UL (ref 0–0.7)
EOSINOPHIL NFR BLD AUTO: 2 %
ERYTHROCYTE [DISTWIDTH] IN BLOOD BY AUTOMATED COUNT: 16.6 % (ref 10–15)
GLUCOSE SERPL-MCNC: 96 MG/DL (ref 70–99)
HCO3 SERPL-SCNC: 25 MMOL/L (ref 22–29)
HCT VFR BLD AUTO: 43.2 % (ref 40–53)
HGB BLD-MCNC: 13.9 G/DL (ref 13.3–17.7)
IMM GRANULOCYTES # BLD: 0 10E3/UL
IMM GRANULOCYTES NFR BLD: 0 %
LYMPHOCYTES # BLD AUTO: 1.4 10E3/UL (ref 0.8–5.3)
LYMPHOCYTES NFR BLD AUTO: 18 %
MCH RBC QN AUTO: 28.1 PG (ref 26.5–33)
MCHC RBC AUTO-ENTMCNC: 32.2 G/DL (ref 31.5–36.5)
MCV RBC AUTO: 87 FL (ref 78–100)
MONOCYTES # BLD AUTO: 0.6 10E3/UL (ref 0–1.3)
MONOCYTES NFR BLD AUTO: 7 %
NEUTROPHILS # BLD AUTO: 5.8 10E3/UL (ref 1.6–8.3)
NEUTROPHILS NFR BLD AUTO: 73 %
NRBC # BLD AUTO: 0 10E3/UL
NRBC BLD AUTO-RTO: 0 /100
PLATELET # BLD AUTO: 180 10E3/UL (ref 150–450)
POTASSIUM SERPL-SCNC: 4.1 MMOL/L (ref 3.4–5.3)
PROT SERPL-MCNC: 6.9 G/DL (ref 6.4–8.3)
RBC # BLD AUTO: 4.95 10E6/UL (ref 4.4–5.9)
SODIUM SERPL-SCNC: 138 MMOL/L (ref 135–145)
WBC # BLD AUTO: 8 10E3/UL (ref 4–11)

## 2025-01-22 PROCEDURE — 85049 AUTOMATED PLATELET COUNT: CPT | Performed by: STUDENT IN AN ORGANIZED HEALTH CARE EDUCATION/TRAINING PROGRAM

## 2025-01-22 PROCEDURE — 99215 OFFICE O/P EST HI 40 MIN: CPT | Performed by: STUDENT IN AN ORGANIZED HEALTH CARE EDUCATION/TRAINING PROGRAM

## 2025-01-22 PROCEDURE — 85004 AUTOMATED DIFF WBC COUNT: CPT | Performed by: STUDENT IN AN ORGANIZED HEALTH CARE EDUCATION/TRAINING PROGRAM

## 2025-01-22 PROCEDURE — G0463 HOSPITAL OUTPT CLINIC VISIT: HCPCS | Performed by: STUDENT IN AN ORGANIZED HEALTH CARE EDUCATION/TRAINING PROGRAM

## 2025-01-22 PROCEDURE — 80053 COMPREHEN METABOLIC PANEL: CPT | Performed by: STUDENT IN AN ORGANIZED HEALTH CARE EDUCATION/TRAINING PROGRAM

## 2025-01-22 PROCEDURE — 36415 COLL VENOUS BLD VENIPUNCTURE: CPT | Performed by: STUDENT IN AN ORGANIZED HEALTH CARE EDUCATION/TRAINING PROGRAM

## 2025-01-22 PROCEDURE — 85014 HEMATOCRIT: CPT | Performed by: STUDENT IN AN ORGANIZED HEALTH CARE EDUCATION/TRAINING PROGRAM

## 2025-01-22 ASSESSMENT — PAIN SCALES - GENERAL: PAINLEVEL_OUTOF10: NO PAIN (0)

## 2025-01-22 NOTE — NURSING NOTE
"Oncology Rooming Note    January 22, 2025 5:03 PM   Arpit Forrest is a 74 year old male who presents for:    Chief Complaint   Patient presents with    Blood Draw     Labs drawn via  by RN in lab. VS taken.     Oncology Clinic Visit     BMT Return - MDS     Initial Vitals: BP (!) 148/78 (BP Location: Right arm, Patient Position: Sitting, Cuff Size: Adult Large)   Pulse 65   Temp 97.6  F (36.4  C) (Oral)   Resp 16   Wt 104.9 kg (231 lb 4.8 oz)   SpO2 100%   BMI 30.52 kg/m   Estimated body mass index is 30.52 kg/m  as calculated from the following:    Height as of 11/22/24: 1.854 m (6' 1\").    Weight as of this encounter: 104.9 kg (231 lb 4.8 oz). Body surface area is 2.32 meters squared.  No Pain (0) Comment: Data Unavailable   No LMP for male patient.  Allergies reviewed: Yes  Medications reviewed: Yes    Medications: Medication refills not needed today.  Pharmacy name entered into [employer]: [id number] - [place], [place] - [address] AT [facility] [address] [place] [address]    Frailty Screening:   Is the patient here for a new oncology consult visit in cancer care? 2. No      Clinical concerns: none      Cuco Mcqueen LPN              "
Chief Complaint   Patient presents with    Blood Draw     Labs drawn via  by RN in lab. VS taken.      Labs collected from venipuncture by RN. Vitals taken. Checked in for appointment(s).    Chantelle Buchanan RN    
100

## 2025-01-22 NOTE — PROGRESS NOTES
BMT Clinic Note  Jan 22, 2025     Patient ID: Arpit BETH White is a 74 year old male who is day +168 post allogenic stem cell transplant for MDS-EB1     Diagnosis MDS-EB1 BMT type Allogenic  CMV  Donor -  Recipient -    Prep ELIEZER (Flu/Cy/TBI) Donor type  8/8 URD ABO D/R O- (matched)   GVHD ppx PTCy, siro, MMF Graft source PBSCT Toxo IgG Negative   Protocol [id number] CD34/kg 6.12E+06    BMT MD Nena Fowler      Pre-transplant disease history  Referring provider:  Dr. Beckwith, [facility]      Diagnosis:   MDS-IB1. IPSS-M: very high risk 1.98   CD5+ Monoclonal B Lymphocytosis (MBL), chronic lymphocytic leukemia type  Recurrent febrile episodes, UBA1 negative, resolved with treatment of MDS  Treatment  3/21/24 to 6/19/24: Decitabine 20mg/m2 for 5 days x 4 cycles    Post transplant  # A.fib with RVR on 8/21/24 which converted to sinus rhythm spontaneously. 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 Max grade 1 (face, chest and upper back). Skin biopsy (9/1224) showed interface dermatitis suggestive of GVHD. Improved with topical steroids.       INTERVAL HISTORY     Arpit presents for a scheduled BMT visit.       - derm appoitment for folliculits like rash on the face  - has established with dr. Beckwith  - has been working with PCP for BP.   - has been off aspirin      Overall has been doing well. Completed sirolimus taper on 12/1/24. Rash on face is objectively better. Still has rash/ acenform lesions on face and upper chest. Uses topicals intermittently.    Plan  - Rash is either due to residual sirolimus effect, mild GVHD or other cause. Advised him to use topical hydrocortisone and clindamycin consistently twice daily for the coming few weeks  - Okay to take pantoprazole as needed for acid reflux  - BP is fairly high in clinic today, increase lisinopril to 20mg daily  - He has a follow up with primary oncologist, Dr. Beckwith  - RTC in 1 month, sooner if needed       Current  Outpatient Medications   Medication Sig Dispense Refill    Tirzepatide 2.5 MG/0.5ML SOAJ Inject 0.5 mLs (2.5 mg) subcutaneously every 7 days. (Patient taking differently: Inject 2.5 mg subcutaneously every 7 days. (Daysi)) 2 mL 1    acyclovir (ZOVIRAX) 400 MG tablet Take 2 tablets (800 mg) by mouth 2 times daily. 360 tablet 0    allopurinol (ZYLOPRIM) 300 MG tablet Take 1 tablet (300 mg) by mouth daily. 60 tablet 1    aspirin 81 MG EC tablet Take 1 tablet (81 mg) by mouth daily. 60 tablet 1    atorvastatin (LIPITOR) 10 MG tablet Take 1 tablet (10 mg) by mouth every morning. 60 tablet 1    brimonidine (ALPHAGAN) 0.2 % ophthalmic solution Place 1 drop into both eyes 2 times daily.      calcium carbonate-vitamin D (CALTRATE) 600-10 MG-MCG per tablet Take 1 tablet by mouth daily. 60 tablet 1    clindamycin (CLEOCIN-T) 1 % external gel Apply topically 2 times daily. (Patient taking differently: Apply topically daily.) 30 g 0    fluticasone (FLONASE) 50 MCG/ACT nasal spray Spray 1 spray into both nostrils as needed.      hydrocortisone 2.5 % cream Apply topically 2 times daily. Use on face and scalp (Patient taking differently: Apply topically daily. Use on face and scalp) 30 g 0    latanoprost (XALATAN) 0.005 % ophthalmic solution INSTILL 2 DROPS IN BOTH EYES AT BEDTIME      lisinopril (ZESTRIL) 10 MG tablet Take 2 tablets (20 mg) by mouth daily. 60 tablet 3    Lutein-Zeaxanthin 25-5 MG CAPS Take 1 capsule by mouth daily      metFORMIN (GLUCOPHAGE XR) 500 MG 24 hr tablet Take by mouth. 500 mg in the morning and 1000 mg in the evening      metoprolol tartrate (LOPRESSOR) 25 MG tablet Take 1 tablet (25 mg) by mouth 2 times daily. 60 tablet 1    pantoprazole (PROTONIX) 40 MG EC tablet Take 1 tablet (40 mg) by mouth every morning (before breakfast). 60 tablet 1    psyllium (METAMUCIL/KONSYL) capsule Take 3 capsules by mouth daily. 270 capsule 3    sulfamethoxazole-trimethoprim (BACTRIM DS) 800-160 MG tablet Take 1 tablet  by mouth Every Mon, Tues two times daily. Do not begin taking until instructed to do so by BMT clinic. 28 tablet 1    tamsulosin (FLOMAX) 0.4 MG capsule Take 1 capsule (0.4 mg) by mouth every evening. 90 capsule 1    triamcinolone (KENALOG) 0.1 % external cream Apply topically 2 times daily. Use on chest and back (Patient taking differently: Apply topically at bedtime. Use on chest and back) 454 g 0    Vitamin D-Vitamin K (D3 + K2 PO) Take 1 tablet by mouth daily. 3000 international unit(s)/115 mcg          EXAM      BP (!) 148/78 (BP Location: Right arm, Patient Position: Sitting, Cuff Size: Adult Large)   Pulse 65   Temp 97.6  F (36.4  C) (Oral)   Resp 16   Wt 104.9 kg (231 lb 4.8 oz)   SpO2 100%   BMI 30.52 kg/m      Wt Readings from Last 4 Encounters:   01/22/25 104.9 kg (231 lb 4.8 oz)   12/18/24 110.1 kg (242 lb 12.8 oz)   12/16/24 110.2 kg (242 lb 14.4 oz)   11/22/24 107.5 kg (237 lb)     KPS: 80% Can perform normal activity with effort, some signs of disease    General: Alert, awake; NAD  HEENT: atraumatic, sclera anicteric. Oral mucosa moist, OP nonerythematous  Skin: Acneform rash on forehead, face, upper chest .    MSK: No pitting edema in bilateral LE.                Laboratory Studies:     Blood Counts  Recent Labs   Lab Test 01/22/25  1553 01/13/25  1315 12/30/24  1309 09/17/24  0809 09/12/24  0950 09/10/24  1114 09/06/24  1257   WBC 8.0 6.8 6.8   < > 3.3* 2.8* 2.8*   HGB 13.9 13.7 12.4*   < > 9.4* 9.6* 9.3*    182 161   < > 123* 96* 89*   NEUTROPHIL 73 67 68   < > 67 71 70   ANEU  --   --   --   --  2.2 2.0 2.0   LYMPH 18 23 20   < > 19 1 10   ALYM  --   --   --   --  0.6* 0.0* 0.3*    < > = values in this interval not displayed.       Basic Panel  Recent Labs   Lab Test 01/22/25  1553 01/13/25  1315 12/30/24  1309    141 139   POTASSIUM 4.1 4.2 3.9   CHLORIDE 104 105 103   CO2 25 26 24   BUN 20.9 18.9 15.2   CR 1.20* 1.13 1.00   GLC 96 101* 87      LFTs  Recent Labs   Lab Test  01/22/25  1553 01/13/25  1315 12/30/24  1309   ALKPHOS 121 125 109   AST 21 22 18   ALT 22 20 17   BILITOTAL 0.6 0.4 0.4   ALBUMIN 4.4 4.4 4.2       Recent Labs   Lab Test 01/13/25  1315 12/30/24  1309 12/16/24  1404   CMVQNT Not Detected Not Detected Not Detected       Recent Labs   Lab Test 01/13/25  1315 09/25/24  1211 09/12/24  0950    534* 467*       Recent Labs   Lab Test 07/22/24  0957   QUINCY 295        ASSESSMENT AND PLAN     BMT for high risk MDS: D+168  - Chemo protocol: [id number]; Flu, Cy, TBI  - Peripheral blood stem cell graft from 8/8 donor, ABO matched (O neg)  - Continue allopurinol (hx of gout)    Disease status:   - 8/30/24 (D28): Slightly hypercellular marrow (30-40%). No evidence of myeloid neoplasm or monoclonal B-lymphocytosis. Normal male karyotype. NGS negative for DNMT3A (other mutations not tested)  - 11/15/24 (D100): Hypercellular marrow (40%) in remission. NGS negative.     Graft status/chimerism:   - 8/30/24 (D28): Bone marrow 100%. Peripheral blood CD33 100%, CD3 84%  - 9/12/24: Peripheral blood CD3 96%, CD33 100%  - 9/25/24: Peripheral blood CD3 100%, CD33 100%  - 11/15/24 (D100): Bone marrow 100%. Peripheral blood CD33 100%, CD3 100%    Maintenance  None    GVHD prophylaxis  # Current immunosuppression is none  - Completed Siro taper on 12/01/2024    # The current acute GVHD grade is 1  Skin: Stage 1.   - Rash is likely a combination of GVHD vs sirolimus vs other cause.   - Continue topicals, consider referral to dermatology if no improvement.   GI: GI score 0.  Liver: LFTs normal. Liver score 0.    HEME/ COAG  G-CSF last given on 8/22/4.  ABO matched.    ID  # Prophylaxis  PJP/ Toxo IgG negative: Bactrim.  Viral: Acyclovir 800 mg bid     # Monitoring  CMV: Monitor till D180   EBV: Monitor every 2 weeks between D30 to D180.   IgG: IgG 467 (9/12/24). Check serum IgG level q3 months, and consider IVIG repletion for IgG levels <400 mg/dL.     GI  # Hepatic steatosis and borderline  iron deposition in liver  - VOD prophy: Ursodiol completed    # Supportive   - GERD: PPI  - Alternating constipation and diarrhea: continue psyllium and imodium PRN    CARDIOVASCULAR  # Asymptomatic moderate aortic stenosis    # A.fib with RVR   - One episode while inpatient on 8/21/24 which converted to normal sinus rhythm spontaneously. Was started on metoprolol 25mg BID.   - Has been seen by cardiology on 11/11/24. No anticoagulation needed.    - One previous episode of A.fib with RVR prior to transplant in the context of admission with febrile episode on 3/15/24. Noted on his fitbit, not very symptomatic. DCCV on 3/21/24 restored normal sinus rhythm. Amiodarone and anticoagulation was stopped in April 2024 without any recurrence.     # CAD  - Coronary artery calcifications.   - Continue atorvastatin 10mg, ASA 81mg (resumed on 8/23/24 as platelets stable above 50k)  - Will need an ischemia evaluation in the long term. He is to follow up with cardiology in May 2025 with a repeat ECHO    # HTN:       RENAL/ELECTROLYTES/  # Urinary frequency, without evidence of retention or infection  # BPH  - Continue tamsulosin 0.4mg qhs   - Has been seen by urology on 10/3/24. Mybetriq was recommended, not taking it.  Follow up with urology in 2-3 months.     # Solitary kidney due to hx of kidney donation to sister  - b/l creat is around 1.1 - 1.2  - 24 hour creat clearance is normal    # Electrolyte management: replace per sliding scale  - Cont Ca w/vit D     DIABETES/ENDOCRINE  # DMII: hx steroid-induced hyperglycemia, microalbuminuria  - HbA1c on 10/14 was 6.8  - Okay to start  GLP-1 agonists or other medications as needed for weight management and diabetes. Follows with endocrine, currently on metformin.    RESP  # VINCENT : CPAP     OPHTHALMOLOGY:  # Glaucoma - Lutein supplement; brimonidine, latanaprost eye drops.   # Right eye floaters: Following retinal specialist      MUSCULOSKELETAL/FRAILTY  # Gout: Continue allopurinol  indefinitely.     Post-transplant vaccinations  Flu and COVID done

## 2025-01-23 ENCOUNTER — TELEPHONE (OUTPATIENT)
Dept: DERMATOLOGY | Facility: CLINIC | Age: 75
End: 2025-01-23

## 2025-01-23 ENCOUNTER — VIRTUAL VISIT (OUTPATIENT)
Dept: UROLOGY | Facility: CLINIC | Age: 75
End: 2025-01-23
Payer: MEDICARE

## 2025-01-23 ENCOUNTER — CARE COORDINATION (OUTPATIENT)
Dept: TRANSPLANT | Facility: CLINIC | Age: 75
End: 2025-01-23

## 2025-01-23 DIAGNOSIS — N32.81 OVERACTIVE BLADDER: Primary | ICD-10-CM

## 2025-01-23 LAB
CMV DNA SPEC NAA+PROBE-ACNC: NOT DETECTED IU/ML
SPECIMEN TYPE: NORMAL

## 2025-01-23 NOTE — NURSING NOTE
Current patient location: [address] [place] [id number]    Is the patient currently in the state of [place]? YES    Visit mode: VIDEO    If the visit is dropped, the patient can be reconnected by:VIDEO VISIT: Text to cell phone:   Telephone Information:   Mobile [phone]    and VIDEO VISIT: Send to e-mail at: [email or web address]    Will anyone else be joining the visit? NO  (If patient encounters technical issues they should call [phone])    Are changes needed to the allergy or medication list? Pt stated no med changes    Are refills needed on medications prescribed by this physician? Discuss with provider    Rooming Documentation:  Not applicable    Reason for visit: SHANNAN BOBBY

## 2025-01-23 NOTE — TELEPHONE ENCOUNTER
Left message on answering machine for patient to call back- offer Dr. Daniels Tuesday 945 or 10:15    Thank you,    Millie ELDERRN BSN  [facility]- [phone]

## 2025-01-23 NOTE — PROGRESS NOTES
Patient name: Arpit Forrest    Diagnosis: MDS  Date/ Type of Transplant: Allogeneic Transplant Date(s): 8/7/2024  BMT Physician: Malcolm  Graft vs. Host?: Yes: Grade one skin GVH as of 1/22 visit-derm referral placed  Recommended Follow Up: per Dr. Beckwith's discretion  Recall date to BMT: February 2025    Infusions: No    Central Line: No Line in Place  Vaccines: No    Please fax all labs/oncology progress notes Attn: Moreno Perrin RN RNCC at [phone]

## 2025-01-23 NOTE — TELEPHONE ENCOUNTER
This encounter is being sent to inform the clinic that this patient has a referral from Nena Fowler MBBS in  BMT for the diagnoses of Autoimmune Disorder; Status post bone marrow transplant (H); 5 months post BMT. Initially had a rash that resembeled GVHD but then it turned more acneform (was on siro). He has now been off siro for 2 months and still has scattered acneform lesions on face and neck.; Myelodysplastic syndrome (H); Rash assessment. and has requested that this patient be seen within Priority: 1-2 Weeks and/or with Priority: 1-2 Weeks. Based on the availability of our provider(s), we are unable to accommodate this request.    Were all sites offered this patient?  Yes  1st choice [facility] in [place]  2nd choice [facility] in [place]  3rd choice [facility] in [place]  Does scheduling algorithm request to schedule next available?  Patient appointment has not been scheduled.  Please review the referral request for accommodation and contact the patient.  If unable to accommodate, please resubmit a referral and indicate a preferred partner or affiliate location using Provider Finder or Scheduling Instructions field.       Referral Order in Epic  Records in Epic  No outside Records     I wasn't sure if this was General or Transplant or Auto Immune- so sending TE    Please review and call Pt to schedule     Pt and his wife prefer start times for Appts to be at 9:30 am or later if possible please     Pt has had a Bone Marrow Transplant     Thank you!

## 2025-01-23 NOTE — LETTER
1/23/2025       RE: Arpit Forrest  [address] [place] [address]     Dear Colleague,    Thank you for referring your patient, Arpit Forrest, to the [facility] [place] [facility]. Please see a copy of my visit note below.    Virtual Visit Details    Type of service:  Video Visit   Video Start Time: 12:45 PM  Video End Time:12:55 PM    Originating Location (pt. Location): Home    Distant Location (provider location):  Off-site  Platform used for Video Visit: [facility]    Visit conducted via real-time audio/video technology by Frandy Daniels PA-C to the patient in their home.    Subjective     REASON FOR VISIT  Overactive bladder follow-up     HISTORY OF PRESENT ILLNESS  Mr. Forrest is a 74 year old male whom speaking with today in regards to his bothersome urinary symptoms currently controlled with tamsulosin and oxybutynin.  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    Last seen on 10/3/2024 at which time he endorsed urinating every 2 hours including at night, though he could improve his nighttime sleep with the CPAP and extend his sleep to 3 hours at a time.  This frequency has not been new but tolerated for much of his life.  After reassuring postvoid residual testing as well as multiple urine tests not showing any evidence of hematuria, plan was to stop his oxybutynin and trial Myrbetriq, though unfortunately insurance coverage did not allow the patient to take this medication so he was switched to oxybutynin extended release.    Of note, the oxybutynin does not appear to be on his med list through our system but does appear to be active through his [facility] med list.  Dosages listed at 5 mg.    Today:  Overall, symptoms that were brought up in October are mostly resolved just on the  "tamsulosin  Symptoms have improved as his \"transplant has taken hold\"    Objective     PHYSICAL EXAMINATION  Deferred given virtual visit.    Assessment & Plan   Overactive bladder  BPH  Solitary kidney    It was my pleasure to speak with Arpit in follow-up for his previously worsening urinary symptoms following his bone marrow transplant.  After reviewing his clinical history, I am so happy to hear that his symptoms seem to be improving along the same timeline as he is recovering and benefiting from his bone marrow transplant.  With this in mind, I do not necessarily believe any further intervention is needed from a urologic perspective at this time given his complete lack of symptoms.  We did discuss the possibility of staying on tamsulosin given his solitary kidney and the potential benefits he is getting from it at the age of 74.  After reviewing this he will plan to stay on tamsulosin and will reach out to urology should he need further help in the future.    Mr. Forrest expressed understanding and agreement to the above discussion and plan and all of his questions were answered to his satisfaction.     PLAN  Continue on tamsulosin  Follow-up with urology as needed    SIGNED    Frandy Daniels PA-C      I spent a total of 15 minutes spent on the date of the encounter doing chart review, history and exam, documentation, and further activities as noted above.      Again, thank you for allowing me to participate in the care of your patient.      Sincerely,    Frandy Daniels PA-C    "

## 2025-01-28 ENCOUNTER — TELEPHONE (OUTPATIENT)
Dept: DERMATOLOGY | Facility: CLINIC | Age: 75
End: 2025-01-28

## 2025-01-28 ENCOUNTER — OFFICE VISIT (OUTPATIENT)
Dept: DERMATOLOGY | Facility: CLINIC | Age: 75
End: 2025-01-28
Payer: COMMERCIAL

## 2025-01-28 DIAGNOSIS — L70.8 DEMODEX ACNE: Primary | ICD-10-CM

## 2025-01-28 DIAGNOSIS — L30.9 DERMATITIS: ICD-10-CM

## 2025-01-28 PROCEDURE — 99204 OFFICE O/P NEW MOD 45 MIN: CPT | Performed by: STUDENT IN AN ORGANIZED HEALTH CARE EDUCATION/TRAINING PROGRAM

## 2025-01-28 RX ORDER — IVERMECTIN 10 MG/G
CREAM TOPICAL
Qty: 45 G | Refills: 3 | Status: SHIPPED | OUTPATIENT
Start: 2025-01-28

## 2025-01-28 NOTE — PATIENT INSTRUCTIONS
Start using sulfur soap in the shower or whenever you want.           Start using the cream that can help reduce demodex mites that live on you skin. You body can sense these and react to form a rash.

## 2025-01-28 NOTE — LETTER
1/28/2025      Arpit Forrest  [address] [place] [address]      Dear Colleague,    Thank you for referring your patient, Arpit Forrest, to the [facility]. Please see a copy of my visit note below.    [facility] Dermatology Note  Encounter Date: Jan 28, 2025  Office Visit     Dermatology Problem List:  1. BMT, s/p allogenic stem cell transplant MBS-EB1  2. 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   Is using triamcinolone this does not seem to be improving the rash.  Rash is most improved on hydrocortisone cream.  It flares on face, eyebrows.  He has tried clindamycin, triamcinolone, hydrocortisone.  Unclear if this is related to Demodex infestation or Demodex folliculitis.  May also be related to pitted are storeroom.  This was biopsied in the past which demonstrate an interface dermatitis, although subtle.  GVHD should improvement topical steroids, however this has not been the case.  We will treat as Demodex folliculitis and fibrous forearm.  - Start ivermectin cream daily.,  Rx provided.  Apply to affected areas and a thin layer.  - Start sulfur soap in the shower to reduce yeast and Franklin Borum also anti-inflammatory.  - Information provided on Demodex.      Procedures Performed:   None      Follow-up: 2-3 month(s) in-person, or earlier for new or changing lesions or worsening rash.    Staff:     Jia Daniels MD PhD  Dermatology, Dermatopathology    ____________________________________________    CC: Derm Problem (Rash on face, chest and shoulders-post transplant-has tried triamcinolone cream, hydrocortisone 2.5%, clindamycin which help but do not resolve the condition)    HPI:  Arpit Forrest is a(n) 74 year old female who presents today as a new patient  for rash.    Transplanted in August 4th. Rash on chest, back, and scattered on face and neck.  It comes and goes.  He has tried TAC ointment. Hydrocortisone 2.5%, clindamycin lotion.  The most effective was the hydrocortisone.     He has some scaly spots on his forehead.    Patient is otherwise feeling well, without additional skin concerns.       Labs Reviewed:  Dermatopathology: 9/12/2024    Final Diagnosis   Right upper back:  - Subtle interface dermatitis - (see comment)   Electronically signed by Juan Vaca MD on 9/18/2024 at  5:06 PM   Comment  [facility]   Chart history was reviewed with this case.  These microscopic changes are subtle, but compatible with the clinical impression of cutaneous sklnz-scadsf-wivf disease (histologic grade II).  The histopathologic differential diagnosis also includes a drug eruption or viral exanthem, though the absence of significant tissue eosinophilia makes these somewhat less likely.  Ongoing clinical correlation and follow-up are recommended.     Physical Exam:  Vitals: There were no vitals taken for this visit.  SKIN: Focused examination of chest, back, face was performed.  -Left frontal scalp and left temple with few scattered scaly pink papules.-Cryotherapy  Upper chest, seborrheic distribution and around the base of the neck there are some pink, dermal papules, somewhat consistent with folliculitis other if features most consistent with eczema with overlying fine scale.  Face with few pink papules    - No other lesions of concern on areas examined.       Medications:  Current Outpatient Medications   Medication Sig Dispense Refill     acyclovir (ZOVIRAX) 400 MG tablet Take 2 tablets (800 mg) by mouth 2 times daily. 360 tablet 0     allopurinol (ZYLOPRIM) 300 MG tablet Take 1 tablet (300 mg) by mouth daily. 60 tablet 1     aspirin 81 MG EC tablet Take 1 tablet (81 mg) by mouth daily. 60 tablet 1     atorvastatin (LIPITOR) 10 MG tablet Take 1 tablet (10 mg) by mouth  every morning. 60 tablet 1     brimonidine (ALPHAGAN) 0.2 % ophthalmic solution Place 1 drop into both eyes 2 times daily.       calcium carbonate-vitamin D (CALTRATE) 600-10 MG-MCG per tablet Take 1 tablet by mouth daily. 60 tablet 1     fluticasone (FLONASE) 50 MCG/ACT nasal spray Spray 1 spray into both nostrils as needed.       hydrocortisone 2.5 % cream Apply topically 2 times daily. Use on face and scalp (Patient taking differently: Apply topically daily. Use on face and scalp) 30 g 0     latanoprost (XALATAN) 0.005 % ophthalmic solution INSTILL 2 DROPS IN BOTH EYES AT BEDTIME       lisinopril (ZESTRIL) 10 MG tablet Take 2 tablets (20 mg) by mouth daily. 60 tablet 3     Lutein-Zeaxanthin 25-5 MG CAPS Take 1 capsule by mouth daily       metFORMIN (GLUCOPHAGE XR) 500 MG 24 hr tablet Take by mouth. 500 mg in the morning and 1000 mg in the evening       metoprolol tartrate (LOPRESSOR) 25 MG tablet Take 1 tablet (25 mg) by mouth 2 times daily. 60 tablet 1     pantoprazole (PROTONIX) 40 MG EC tablet Take 1 tablet (40 mg) by mouth every morning (before breakfast). 60 tablet 1     psyllium (METAMUCIL/KONSYL) capsule Take 3 capsules by mouth daily. 270 capsule 3     sulfamethoxazole-trimethoprim (BACTRIM DS) 800-160 MG tablet Take 1 tablet by mouth Every Mon, Tues two times daily. Do not begin taking until instructed to do so by [facility]. 28 tablet 1     tamsulosin (FLOMAX) 0.4 MG capsule Take 1 capsule (0.4 mg) by mouth every evening. 90 capsule 1     Tirzepatide 2.5 MG/0.5ML SOAJ Inject 0.5 mLs (2.5 mg) subcutaneously every 7 days. (Patient taking differently: Inject 2.5 mg subcutaneously every 7 days. (Daysi)) 2 mL 1     triamcinolone (KENALOG) 0.1 % external cream Apply topically 2 times daily. Use on chest and back (Patient taking differently: Apply topically at bedtime. Use on chest and back) 454 g 0     Vitamin D-Vitamin K (D3 + K2 PO) Take 1 tablet by mouth daily. 3000 international unit(s)/115 mcg        clindamycin (CLEOCIN-T) 1 % external gel Apply topically 2 times daily. (Patient not taking: Reported on 1/28/2025) 30 g 0     No current facility-administered medications for this visit.      Past Medical History:   Patient Active Problem List   Diagnosis     Ankylosing spondylitis lumbar region (H)     Autoinflammatory syndrome (H)     Macdonald's esophagus without dysplasia     Bilateral sacroiliitis     Essential hypertension     H/O: gout     History of agent Orange exposure     History of kidney donation     Hyperlipidemia     Myelodysplastic syndrome (H)     Obstructive sleep apnea syndrome     Obesity (BMI 30-39.9)     Type 2 diabetes mellitus without complication, without long-term current use of insulin (H)     Glaucoma     MDS (myelodysplastic syndrome) (H)     Stem cells transplant status (H)       History reviewed. No pertinent past medical history.    CC: Primary Care Provider: Tanvir Max or Referring Provider: Referred Self      Again, thank you for allowing me to participate in the care of your patient.        Sincerely,        Jia Daniels MD    Electronically signed

## 2025-01-28 NOTE — TELEPHONE ENCOUNTER
Retail Pharmacy Prior Authorization Team   Phone: [phone]    PRIOR AUTHORIZATION DENIED    Medication: IVERMECTIN 1 % EX CREA  Insurance Company: [employer] - Phone [phone] Fax [phone]  Denial Date: 1/28/2025  Denial Reason(s): MUST BE USED FOR TREATMENT OF ROSACEA      Appeal Information: IF THE PROVIDER WOULD LIKE TO APPEAL THIS DECISION PLEASE PROVIDE THE PA TEAM WITH A LETTER OF MEDICAL NECESSITY      Patient Notified: NO

## 2025-01-28 NOTE — PROGRESS NOTES
Former[facility] Dermatology Note  Encounter Date: Jan 28, 2025  Office Visit     Dermatology Problem List:  1. BMT, s/p allogenic stem cell transplant MBS-EB1  2. 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   Is using triamcinolone this does not seem to be improving the rash.  Rash is most improved on hydrocortisone cream.  It flares on face, eyebrows.  He has tried clindamycin, triamcinolone, hydrocortisone.  Unclear if this is related to Demodex infestation or Demodex folliculitis.  May also be related to pitted are storeroom.  This was biopsied in the past which demonstrate an interface dermatitis, although subtle.  GVHD should improvement topical steroids, however this has not been the case.  We will treat as Demodex folliculitis and fibrous forearm.  - Start ivermectin cream daily.,  Rx provided.  Apply to affected areas and a thin layer.  - Start sulfur soap in the shower to reduce yeast and Franklin Borum also anti-inflammatory.  - Information provided on Demodex.      Procedures Performed:   None      Follow-up: 2-3 month(s) in-person, or earlier for new or changing lesions or worsening rash.    Staff:     Jia Daniels MD PhD  Dermatology, Dermatopathology    ____________________________________________    CC: Derm Problem (Rash on face, chest and shoulders-post transplant-has tried triamcinolone cream, hydrocortisone 2.5%, clindamycin which help but do not resolve the condition)    HPI:  Arpit Forrest is a(n) 74 year old female who presents today as a new patient for rash.    Transplanted in August 4th. Rash on chest, back, and scattered on face and neck.  It comes and goes.  He has tried TAC ointment. Hydrocortisone 2.5%, clindamycin lotion.  The most effective was the hydrocortisone.     He has some  scaly spots on his forehead.    Patient is otherwise feeling well, without additional skin concerns.       Labs Reviewed:  Dermatopathology: 9/12/2024    Final Diagnosis   Right upper back:  - Subtle interface dermatitis - (see comment)   Electronically signed by uJan Vaca MD on 9/18/2024 at  5:06 PM   Comment  [facility]   Chart history was reviewed with this case.  These microscopic changes are subtle, but compatible with the clinical impression of cutaneous zfcio-qytkvj-mpft disease (histologic grade II).  The histopathologic differential diagnosis also includes a drug eruption or viral exanthem, though the absence of significant tissue eosinophilia makes these somewhat less likely.  Ongoing clinical correlation and follow-up are recommended.     Physical Exam:  Vitals: There were no vitals taken for this visit.  SKIN: Focused examination of chest, back, face was performed.  -Left frontal scalp and left temple with few scattered scaly pink papules.-Cryotherapy  Upper chest, seborrheic distribution and around the base of the neck there are some pink, dermal papules, somewhat consistent with folliculitis other if features most consistent with eczema with overlying fine scale.  Face with few pink papules    - No other lesions of concern on areas examined.       Medications:  Current Outpatient Medications   Medication Sig Dispense Refill    acyclovir (ZOVIRAX) 400 MG tablet Take 2 tablets (800 mg) by mouth 2 times daily. 360 tablet 0    allopurinol (ZYLOPRIM) 300 MG tablet Take 1 tablet (300 mg) by mouth daily. 60 tablet 1    aspirin 81 MG EC tablet Take 1 tablet (81 mg) by mouth daily. 60 tablet 1    atorvastatin (LIPITOR) 10 MG tablet Take 1 tablet (10 mg) by mouth every morning. 60 tablet 1    brimonidine (ALPHAGAN) 0.2 % ophthalmic solution Place 1 drop into both eyes 2 times daily.      calcium carbonate-vitamin D (CALTRATE) 600-10 MG-MCG per tablet Take 1 tablet by mouth daily. 60 tablet 1    fluticasone  (FLONASE) 50 MCG/ACT nasal spray Spray 1 spray into both nostrils as needed.      hydrocortisone 2.5 % cream Apply topically 2 times daily. Use on face and scalp (Patient taking differently: Apply topically daily. Use on face and scalp) 30 g 0    latanoprost (XALATAN) 0.005 % ophthalmic solution INSTILL 2 DROPS IN BOTH EYES AT BEDTIME      lisinopril (ZESTRIL) 10 MG tablet Take 2 tablets (20 mg) by mouth daily. 60 tablet 3    Lutein-Zeaxanthin 25-5 MG CAPS Take 1 capsule by mouth daily      metFORMIN (GLUCOPHAGE XR) 500 MG 24 hr tablet Take by mouth. 500 mg in the morning and 1000 mg in the evening      metoprolol tartrate (LOPRESSOR) 25 MG tablet Take 1 tablet (25 mg) by mouth 2 times daily. 60 tablet 1    pantoprazole (PROTONIX) 40 MG EC tablet Take 1 tablet (40 mg) by mouth every morning (before breakfast). 60 tablet 1    psyllium (METAMUCIL/KONSYL) capsule Take 3 capsules by mouth daily. 270 capsule 3    sulfamethoxazole-trimethoprim (BACTRIM DS) 800-160 MG tablet Take 1 tablet by mouth Every Mon, Tues two times daily. Do not begin taking until instructed to do so by [facility] clinic. 28 tablet 1    tamsulosin (FLOMAX) 0.4 MG capsule Take 1 capsule (0.4 mg) by mouth every evening. 90 capsule 1    Tirzepatide 2.5 MG/0.5ML SOAJ Inject 0.5 mLs (2.5 mg) subcutaneously every 7 days. (Patient taking differently: Inject 2.5 mg subcutaneously every 7 days. (Daysi)) 2 mL 1    triamcinolone (KENALOG) 0.1 % external cream Apply topically 2 times daily. Use on chest and back (Patient taking differently: Apply topically at bedtime. Use on chest and back) 454 g 0    Vitamin D-Vitamin K (D3 + K2 PO) Take 1 tablet by mouth daily. 3000 international unit(s)/115 mcg      clindamycin (CLEOCIN-T) 1 % external gel Apply topically 2 times daily. (Patient not taking: Reported on 1/28/2025) 30 g 0     No current facility-administered medications for this visit.      Past Medical History:   Patient Active Problem List   Diagnosis     Ankylosing spondylitis lumbar region (H)    Autoinflammatory syndrome (H)    Macdonald's esophagus without dysplasia    Bilateral sacroiliitis    Essential hypertension    H/O: gout    History of agent Orange exposure    History of kidney donation    Hyperlipidemia    Myelodysplastic syndrome (H)    Obstructive sleep apnea syndrome    Obesity (BMI 30-39.9)    Type 2 diabetes mellitus without complication, without long-term current use of insulin (H)    Glaucoma    MDS (myelodysplastic syndrome) (H)    Stem cells transplant status (H)       History reviewed. No pertinent past medical history.    CC: Primary Care Provider: Tanvir Max or Referring Provider: Referred Self

## 2025-01-29 ENCOUNTER — TELEPHONE (OUTPATIENT)
Dept: DERMATOLOGY | Facility: CLINIC | Age: 75
End: 2025-01-29
Payer: MEDICARE

## 2025-01-29 RX ORDER — METRONIDAZOLE 7.5 MG/G
GEL TOPICAL 2 TIMES DAILY
Qty: 45 G | Refills: 3 | Status: SHIPPED | OUTPATIENT
Start: 2025-01-29

## 2025-01-29 NOTE — TELEPHONE ENCOUNTER
[facility] Call Center    Phone Message    May a detailed message be left on voicemail: yes     Reason for Call: Other: Patient is upset that his ointment was not approved. Patient would like a nurse or  to call him back. Thanks.     Action Taken: te sent    Travel Screening: Not Applicable     Date of Service:

## 2025-01-29 NOTE — TELEPHONE ENCOUNTER
Spoke with pt and advised insurance did not cover Ivermectin but we would send Metronidazole for him instead. Pt voiced understanding.    Thank you,  Cecy GUALLPA RN  Dermatology   [phone]

## 2025-01-30 NOTE — LETTER
01/30/25                            Pili WINNIE Falcon  [address] [address]    To Whom It May Concern:    This is to certify Pili WINNIE Falcon was evaluated with EVA Juarez on 01/30/25 and can return to regular work when feeling improvement of symptoms and fever free for 24 hours.                 Electronically signed by:  EVA Juarez  [facility] Visit  [address] [facility] [address]  Dept Phone: [phone]        1/22/2025      Arpit Forrest  [address]  [place] [address]      Dear Colleague,    Thank you for referring your patient, Arpit Forrest, to the [facility] BLOOD AND MARROW TRANSPLANT PROGRAM [place]. Please see a copy of my visit note below.    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 Beckwith, [facility]      Diagnosis:   MDS-IB1. IPSS-M: very high risk 1.98   CD5+ Monoclonal B Lymphocytosis (MBL), chronic lymphocytic leukemia type  Recurrent febrile episodes, UBA1 negative, resolved with treatment of MDS  Treatment  3/21/24 to 6/19/24: Decitabine 20mg/m2 for 5 days x 4 cycles    Post transplant  # A.fib with RVR on 8/21/24 which converted to sinus rhythm spontaneously. 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 Max grade 1 (face, chest and upper back). Skin biopsy (9/1224) showed interface dermatitis suggestive of GVHD. Improved with topical steroids.       INTERVAL HISTORY     Arpit presents for a scheduled BMT visit. Overall doing well.   He still has a folliculitis like rash on the face despite being off sirolimus for a long time. Agreeable to seeing dermatology, does not seem consistent with GVHD. Has used both topical steroids and clinamycin without much help.   Has established with PCP and is working on his BP, now improved with increase in medications. He plans to start on GLP-1 antagonist soon, ok from BMT standpoint.     Plan  - Planned for 6 month bone marrow  - Derm referral  - RTC in 1 month      Current  Outpatient Medications   Medication Sig Dispense Refill     Tirzepatide 2.5 MG/0.5ML SOAJ Inject 0.5 mLs (2.5 mg) subcutaneously every 7 days. (Patient taking differently: Inject 2.5 mg subcutaneously every 7 days. (Daysi)) 2 mL 1     acyclovir (ZOVIRAX) 400 MG tablet Take 2 tablets (800 mg) by mouth 2 times daily. 360 tablet 0     allopurinol (ZYLOPRIM) 300 MG tablet Take 1 tablet (300 mg) by mouth daily. 60 tablet 1     aspirin 81 MG EC tablet Take 1 tablet (81 mg) by mouth daily. 60 tablet 1     atorvastatin (LIPITOR) 10 MG tablet Take 1 tablet (10 mg) by mouth every morning. 60 tablet 1     brimonidine (ALPHAGAN) 0.2 % ophthalmic solution Place 1 drop into both eyes 2 times daily.       calcium carbonate-vitamin D (CALTRATE) 600-10 MG-MCG per tablet Take 1 tablet by mouth daily. 60 tablet 1     clindamycin (CLEOCIN-T) 1 % external gel Apply topically 2 times daily. (Patient taking differently: Apply topically daily.) 30 g 0     fluticasone (FLONASE) 50 MCG/ACT nasal spray Spray 1 spray into both nostrils as needed.       hydrocortisone 2.5 % cream Apply topically 2 times daily. Use on face and scalp (Patient taking differently: Apply topically daily. Use on face and scalp) 30 g 0     latanoprost (XALATAN) 0.005 % ophthalmic solution INSTILL 2 DROPS IN BOTH EYES AT BEDTIME       lisinopril (ZESTRIL) 10 MG tablet Take 2 tablets (20 mg) by mouth daily. 60 tablet 3     Lutein-Zeaxanthin 25-5 MG CAPS Take 1 capsule by mouth daily       metFORMIN (GLUCOPHAGE XR) 500 MG 24 hr tablet Take by mouth. 500 mg in the morning and 1000 mg in the evening       metoprolol tartrate (LOPRESSOR) 25 MG tablet Take 1 tablet (25 mg) by mouth 2 times daily. 60 tablet 1     pantoprazole (PROTONIX) 40 MG EC tablet Take 1 tablet (40 mg) by mouth every morning (before breakfast). 60 tablet 1     psyllium (METAMUCIL/KONSYL) capsule Take 3 capsules by mouth daily. 270 capsule 3     sulfamethoxazole-trimethoprim (BACTRIM DS) 800-160 MG  tablet Take 1 tablet by mouth Every Mon, Tues two times daily. Do not begin taking until instructed to do so by BMT clinic. 28 tablet 1     tamsulosin (FLOMAX) 0.4 MG capsule Take 1 capsule (0.4 mg) by mouth every evening. 90 capsule 1     triamcinolone (KENALOG) 0.1 % external cream Apply topically 2 times daily. Use on chest and back (Patient taking differently: Apply topically at bedtime. Use on chest and back) 454 g 0     Vitamin D-Vitamin K (D3 + K2 PO) Take 1 tablet by mouth daily. 3000 international unit(s)/115 mcg          EXAM      BP (!) 148/78 (BP Location: Right arm, Patient Position: Sitting, Cuff Size: Adult Large)   Pulse 65   Temp 97.6  F (36.4  C) (Oral)   Resp 16   Wt 104.9 kg (231 lb 4.8 oz)   SpO2 100%   BMI 30.52 kg/m      Wt Readings from Last 4 Encounters:   01/22/25 104.9 kg (231 lb 4.8 oz)   12/18/24 110.1 kg (242 lb 12.8 oz)   12/16/24 110.2 kg (242 lb 14.4 oz)   11/22/24 107.5 kg (237 lb)     KPS: 80% Can perform normal activity with effort, some signs of disease    General: Alert, awake; NAD  HEENT: atraumatic, sclera anicteric. Oral mucosa moist, OP nonerythematous  Skin: Acneform rash on forehead, face, upper chest .    MSK: No pitting edema in bilateral LE.      Laboratory Studies:     Blood Counts  Recent Labs   Lab Test 01/22/25  1553 01/13/25  1315 12/30/24  1309 09/17/24  0809 09/12/24  0950 09/10/24  1114 09/06/24  1257   WBC 8.0 6.8 6.8   < > 3.3* 2.8* 2.8*   HGB 13.9 13.7 12.4*   < > 9.4* 9.6* 9.3*    182 161   < > 123* 96* 89*   NEUTROPHIL 73 67 68   < > 67 71 70   ANEU  --   --   --   --  2.2 2.0 2.0   LYMPH 18 23 20   < > 19 1 10   ALYM  --   --   --   --  0.6* 0.0* 0.3*    < > = values in this interval not displayed.       Basic Panel  Recent Labs   Lab Test 01/22/25  1553 01/13/25  1315 12/30/24  1309    141 139   POTASSIUM 4.1 4.2 3.9   CHLORIDE 104 105 103   CO2 25 26 24   BUN 20.9 18.9 15.2   CR 1.20* 1.13 1.00   GLC 96 101* 87      LFTs  Recent Labs    Lab Test 01/22/25  1553 01/13/25  1315 12/30/24  1309   ALKPHOS 121 125 109   AST 21 22 18   ALT 22 20 17   BILITOTAL 0.6 0.4 0.4   ALBUMIN 4.4 4.4 4.2       Recent Labs   Lab Test 01/13/25  1315 12/30/24  1309 12/16/24  1404   CMVQNT Not Detected Not Detected Not Detected       Recent Labs   Lab Test 01/13/25  1315 09/25/24  1211 09/12/24  0950    534* 467*       Recent Labs   Lab Test 07/22/24  0957   QUINCY 295        ASSESSMENT AND PLAN     BMT for high risk MDS: D+168  - Chemo protocol: [id number]; Flu, Cy, TBI  - Peripheral blood stem cell graft from 8/8 donor, ABO matched (O neg)  - Continue allopurinol (hx of gout)    Disease status:   - 8/30/24 (D28): Slightly hypercellular marrow (30-40%). No evidence of myeloid neoplasm or monoclonal B-lymphocytosis. Normal male karyotype. NGS negative for DNMT3A (other mutations not tested)  - 11/15/24 (D100): Hypercellular marrow (40%) in remission. NGS negative.     Graft status/chimerism:   - 8/30/24 (D28): Bone marrow 100%. Peripheral blood CD33 100%, CD3 84%  - 9/12/24: Peripheral blood CD3 96%, CD33 100%  - 9/25/24: Peripheral blood CD3 100%, CD33 100%  - 11/15/24 (D100): Bone marrow 100%. Peripheral blood CD33 100%, CD3 100%    Maintenance  None    GVHD prophylaxis  # Current immunosuppression is none  - Completed Siro taper on 12/01/2024    # The current acute GVHD grade is 0  Skin: Current 0, max stage 1.   - Current rash does not seem to be related to GVHD  GI: GI score 0.  Liver: LFTs normal. Liver score 0.    HEME/ COAG  G-CSF last given on 8/22/4.  ABO matched.    ID  # Prophylaxis  PJP/ Toxo IgG negative: Bactrim.  Viral: Acyclovir 800 mg bid     # Monitoring  CMV: Monitor till D180   EBV: Monitor every 2 weeks between D30 to D180.   IgG: IgG 836 (1/13/25).     GI  # Hepatic steatosis and borderline iron deposition in liver  - VOD prophy: Ursodiol completed    # Supportive   - GERD: PPI  - Alternating constipation and diarrhea: continue psyllium and  imodium PRN    CARDIOVASCULAR  # Asymptomatic moderate aortic stenosis    # A.fib with RVR   - One episode while inpatient on 8/21/24 which converted to normal sinus rhythm spontaneously. Was started on metoprolol 25mg BID.   - Has been seen by cardiology on 11/11/24. No anticoagulation needed.    - One previous episode of A.fib with RVR prior to transplant in the context of admission with febrile episode on 3/15/24. Noted on his fitbit, not very symptomatic. DCCV on 3/21/24 restored normal sinus rhythm. Amiodarone and anticoagulation was stopped in April 2024 without any recurrence.     # CAD  - Coronary artery calcifications.   - Continue atorvastatin 10mg, ASA 81mg (resumed on 8/23/24 as platelets stable above 50k)  - Will need an ischemia evaluation in the long term. He is to follow up with cardiology in May 2025 with a repeat ECHO.     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 Follows with endocrine    RESP  # VINCENT : CPAP     OPHTHALMOLOGY:  # Glaucoma - Lutein supplement; brimonidine, latanaprost eye drops.   # Right eye floaters: Following retinal specialist      MUSCULOSKELETAL/FRAILTY  # Gout: Continue allopurinol indefinitely.     Post-transplant vaccinations  Flu and COVID done  Has received RSV    I spent 45 minutes in the care of this patient today, which included time necessary for preparation for the visit, obtaining history, ordering  medications/tests/procedures as medically indicated, review of pertinent medical literature, counseling of the patient, communication of recommendations to the care team, and documentation time.     Nena Fowler  Department of Hematology, Oncology and Transplantation  Text via [employer]         Again, thank you for allowing me to participate in the care of your patient.        Sincerely,        GASPER Rios    Electronically signed

## 2025-02-12 ENCOUNTER — OFFICE VISIT (OUTPATIENT)
Dept: TRANSPLANT | Facility: CLINIC | Age: 75
End: 2025-02-12
Attending: STUDENT IN AN ORGANIZED HEALTH CARE EDUCATION/TRAINING PROGRAM
Payer: MEDICARE

## 2025-02-12 VITALS
WEIGHT: 228.3 LBS | RESPIRATION RATE: 18 BRPM | DIASTOLIC BLOOD PRESSURE: 80 MMHG | HEART RATE: 66 BPM | SYSTOLIC BLOOD PRESSURE: 112 MMHG | BODY MASS INDEX: 30.12 KG/M2 | TEMPERATURE: 97.4 F | OXYGEN SATURATION: 97 %

## 2025-02-12 DIAGNOSIS — D64.9 ANEMIA, UNSPECIFIED TYPE: ICD-10-CM

## 2025-02-12 DIAGNOSIS — Z94.81 STATUS POST BONE MARROW TRANSPLANT (H): Primary | ICD-10-CM

## 2025-02-12 DIAGNOSIS — Z94.81 STATUS POST BONE MARROW TRANSPLANT (H): ICD-10-CM

## 2025-02-12 DIAGNOSIS — D46.9 MYELODYSPLASTIC SYNDROME (H): ICD-10-CM

## 2025-02-12 DIAGNOSIS — D46.9 MDS (MYELODYSPLASTIC SYNDROME) (H): Primary | ICD-10-CM

## 2025-02-12 LAB
ALBUMIN SERPL BCG-MCNC: 4.3 G/DL (ref 3.5–5.2)
ALP SERPL-CCNC: 123 U/L (ref 40–150)
ALT SERPL W P-5'-P-CCNC: 27 U/L (ref 0–70)
ANION GAP SERPL CALCULATED.3IONS-SCNC: 9 MMOL/L (ref 7–15)
AST SERPL W P-5'-P-CCNC: 24 U/L (ref 0–45)
BASOPHILS # BLD AUTO: 0 10E3/UL (ref 0–0.2)
BASOPHILS NFR BLD AUTO: 0 %
BILIRUB SERPL-MCNC: 0.3 MG/DL
BUN SERPL-MCNC: 20.3 MG/DL (ref 8–23)
CALCIUM SERPL-MCNC: 10.2 MG/DL (ref 8.8–10.4)
CHLORIDE SERPL-SCNC: 108 MMOL/L (ref 98–107)
CREAT SERPL-MCNC: 1.4 MG/DL (ref 0.67–1.17)
EBV DNA SERPL NAA+PROBE-ACNC: NOT DETECTED IU/ML
EGFRCR SERPLBLD CKD-EPI 2021: 52 ML/MIN/1.73M2
EOSINOPHIL # BLD AUTO: 0.2 10E3/UL (ref 0–0.7)
EOSINOPHIL NFR BLD AUTO: 2 %
ERYTHROCYTE [DISTWIDTH] IN BLOOD BY AUTOMATED COUNT: 16.6 % (ref 10–15)
FERRITIN SERPL-MCNC: 202 NG/ML (ref 31–409)
GLUCOSE SERPL-MCNC: 98 MG/DL (ref 70–99)
HCO3 SERPL-SCNC: 22 MMOL/L (ref 22–29)
HCT VFR BLD AUTO: 43.2 % (ref 40–53)
HGB BLD-MCNC: 14.1 G/DL (ref 13.3–17.7)
IMM GRANULOCYTES # BLD: 0 10E3/UL
IMM GRANULOCYTES NFR BLD: 0 %
IRON BINDING CAPACITY (ROCHE): 257 UG/DL (ref 240–430)
IRON SATN MFR SERPL: 28 % (ref 15–46)
IRON SERPL-MCNC: 71 UG/DL (ref 61–157)
LYMPHOCYTES # BLD AUTO: 1.6 10E3/UL (ref 0.8–5.3)
LYMPHOCYTES NFR BLD AUTO: 23 %
MCH RBC QN AUTO: 28.6 PG (ref 26.5–33)
MCHC RBC AUTO-ENTMCNC: 32.6 G/DL (ref 31.5–36.5)
MCV RBC AUTO: 88 FL (ref 78–100)
MONOCYTES # BLD AUTO: 0.6 10E3/UL (ref 0–1.3)
MONOCYTES NFR BLD AUTO: 8 %
NEUTROPHILS # BLD AUTO: 4.6 10E3/UL (ref 1.6–8.3)
NEUTROPHILS NFR BLD AUTO: 66 %
NRBC # BLD AUTO: 0 10E3/UL
NRBC BLD AUTO-RTO: 0 /100
PLATELET # BLD AUTO: 166 10E3/UL (ref 150–450)
POTASSIUM SERPL-SCNC: 4.8 MMOL/L (ref 3.4–5.3)
PROT SERPL-MCNC: 6.8 G/DL (ref 6.4–8.3)
RBC # BLD AUTO: 4.93 10E6/UL (ref 4.4–5.9)
SODIUM SERPL-SCNC: 139 MMOL/L (ref 135–145)
VIT B12 SERPL-MCNC: 439 PG/ML (ref 232–1245)
WBC # BLD AUTO: 7 10E3/UL (ref 4–11)

## 2025-02-12 PROCEDURE — 250N000011 HC RX IP 250 OP 636

## 2025-02-12 PROCEDURE — 82607 VITAMIN B-12: CPT

## 2025-02-12 PROCEDURE — 87799 DETECT AGENT NOS DNA QUANT: CPT

## 2025-02-12 PROCEDURE — 88342 IMHCHEM/IMCYTCHM 1ST ANTB: CPT | Mod: TC | Performed by: STUDENT IN AN ORGANIZED HEALTH CARE EDUCATION/TRAINING PROGRAM

## 2025-02-12 PROCEDURE — G0452 MOLECULAR PATHOLOGY INTERPR: HCPCS | Mod: 26 | Performed by: STUDENT IN AN ORGANIZED HEALTH CARE EDUCATION/TRAINING PROGRAM

## 2025-02-12 PROCEDURE — 80053 COMPREHEN METABOLIC PANEL: CPT | Performed by: STUDENT IN AN ORGANIZED HEALTH CARE EDUCATION/TRAINING PROGRAM

## 2025-02-12 PROCEDURE — 81450 HL NEO GSAP 5-50DNA/DNA&RNA: CPT

## 2025-02-12 PROCEDURE — 88185 FLOWCYTOMETRY/TC ADD-ON: CPT | Performed by: STUDENT IN AN ORGANIZED HEALTH CARE EDUCATION/TRAINING PROGRAM

## 2025-02-12 PROCEDURE — 88311 DECALCIFY TISSUE: CPT | Mod: TC | Performed by: STUDENT IN AN ORGANIZED HEALTH CARE EDUCATION/TRAINING PROGRAM

## 2025-02-12 PROCEDURE — 83550 IRON BINDING TEST: CPT

## 2025-02-12 PROCEDURE — 82728 ASSAY OF FERRITIN: CPT

## 2025-02-12 PROCEDURE — 85004 AUTOMATED DIFF WBC COUNT: CPT | Performed by: STUDENT IN AN ORGANIZED HEALTH CARE EDUCATION/TRAINING PROGRAM

## 2025-02-12 PROCEDURE — 36415 COLL VENOUS BLD VENIPUNCTURE: CPT

## 2025-02-12 PROCEDURE — 88237 TISSUE CULTURE BONE MARROW: CPT | Performed by: STUDENT IN AN ORGANIZED HEALTH CARE EDUCATION/TRAINING PROGRAM

## 2025-02-12 PROCEDURE — 82040 ASSAY OF SERUM ALBUMIN: CPT | Performed by: STUDENT IN AN ORGANIZED HEALTH CARE EDUCATION/TRAINING PROGRAM

## 2025-02-12 PROCEDURE — 88275 CYTOGENETICS 100-300: CPT | Performed by: STUDENT IN AN ORGANIZED HEALTH CARE EDUCATION/TRAINING PROGRAM

## 2025-02-12 PROCEDURE — 85041 AUTOMATED RBC COUNT: CPT | Performed by: STUDENT IN AN ORGANIZED HEALTH CARE EDUCATION/TRAINING PROGRAM

## 2025-02-12 PROCEDURE — 88184 FLOWCYTOMETRY/ TC 1 MARKER: CPT | Performed by: STUDENT IN AN ORGANIZED HEALTH CARE EDUCATION/TRAINING PROGRAM

## 2025-02-12 PROCEDURE — 88271 CYTOGENETICS DNA PROBE: CPT | Performed by: STUDENT IN AN ORGANIZED HEALTH CARE EDUCATION/TRAINING PROGRAM

## 2025-02-12 PROCEDURE — 81267 CHIMERISM ANAL NO CELL SELEC: CPT | Performed by: STUDENT IN AN ORGANIZED HEALTH CARE EDUCATION/TRAINING PROGRAM

## 2025-02-12 PROCEDURE — 85014 HEMATOCRIT: CPT | Performed by: STUDENT IN AN ORGANIZED HEALTH CARE EDUCATION/TRAINING PROGRAM

## 2025-02-12 PROCEDURE — 38221 DX BONE MARROW BIOPSIES: CPT

## 2025-02-12 PROCEDURE — 88161 CYTOPATH SMEAR OTHER SOURCE: CPT | Mod: TC | Performed by: STUDENT IN AN ORGANIZED HEALTH CARE EDUCATION/TRAINING PROGRAM

## 2025-02-12 RX ADMIN — MIDAZOLAM 1 MG: 1 INJECTION INTRAMUSCULAR; INTRAVENOUS at 10:54

## 2025-02-12 ASSESSMENT — PAIN SCALES - GENERAL: PAINLEVEL_OUTOF10: NO PAIN (0)

## 2025-02-12 NOTE — PROGRESS NOTES
BMT ONC Adult Bone Marrow Biopsy Procedure Note  February 12, 2025  /70   Pulse 66   Temp 97.4  F (36.3  C) (Oral)   Resp 18   Wt 103.6 kg (228 lb 4.8 oz)   SpO2 97%   BMI 30.12 kg/m       Learning needs assessment complete within 12 months? YES    DIAGNOSIS: MDS-EB1 s/p allo BMT day +189     PROCEDURE: Unilateral Bone Marrow Biopsy and Unilateral Aspirate    LOCATION: [facility] 2nd Floor    Patient s identification was positively verified by verbal identification and invasive procedure safety checklist was completed. Informed consent was obtained. Following the administration of Midazolam as pre-medication, patient was placed in the prone position and prepped and draped in a sterile manner. Approximately 10 cc of 1% Lidocaine was used over the left posterior iliac spine. Following this a 3 mm incision was made. Trephine bone marrow core(s) was (were) obtained from the [facility]. Bone marrow aspirates were obtained from the [facility]. Aspirates were sent for morphology, immunophenotyping, cytogenetics, and molecular diagnostics RFLP. A total of approximately 25 ml of marrow was aspirated. Following this procedure a sterile dressing was applied to the bone marrow biopsy site(s). The patient was placed in the supine position to maintain pressure on the biopsy site. Post-procedure wound care instructions were given.     Complications: NO    Pre-procedural pain: 0 out of 10 on the numeric pain rating scale.     Procedural pain: 2 out of 10 on the numeric pain rating scale.     Post-procedural pain assessment: 0 out of 10 on the numeric pain rating scale.     Interventions: NO    Length of procedure:20 minutes or less      Procedure performed by: Yesi Vaca CNP

## 2025-02-12 NOTE — NURSING NOTE
BMBX Teaching and Assessment       Teaching concerns addressed: Bone marrow biopsy and infection prevention.     Person(s) involved in teaching: Patient  Motivation Level  Asks Questions: Yes  Eager to Learn: Yes  Cooperative: Yes  Receptive (willing/able to accept information): Yes    Patient demonstrates understanding of the following:     Reason for the appointment, diagnosis and treatment plan: Yes  Knowledge of proper use of medications and conditions for which they are ordered (with special attention to potential side effects or drug interactions): Yes  Which situations necessitate calling provider and whom to contact: Yes    Teaching concerns addressed:   Reviewed activity restrictions if received premeds, potential for bleeding and actions to take if develops any of the issues below    Pain management techniques: Yes  Patient instructed on hand hygiene: Yes  How and/when to access community resources: Yes    Infection Control:  Patient demonstrates understanding of the following:   Bone marrow procedure site care taught: Yes  Signs and symptoms of infection taught: Yes       Instructional Materials Used/Given: Pt instructed to keep bmbx site clean and dry for 24hrs. Pt educated to monitor site for signs of infection such as redness, rash, oozing, puss, bleeding, pain, and elevated temp. Pt instructed to go to call the [facility] triage line or go to the ER if any signs of infection should occur. Pt educated to not operate machinery if receiving versed. Pt and wife verbalize understanding.     Pre-procedure labs drawn via PIV. Post procedure: Patient vital signs stable, ambulating, site is clean, dry and intact prior to discharge and line removed. Pt discharged with wife as .

## 2025-02-12 NOTE — Clinical Note
2/12/2025      Arpit Forrest  [address] [place] [address]      Dear Colleague,    Thank you for referring your patient, Arpit Forrest, to the [facility] BLOOD AND MARROW TRANSPLANT PROGRAM [place]. Please see a copy of my visit note below.    BMT ONC Adult Bone Marrow Biopsy Procedure Note  February 12, 2025  /70   Pulse 66   Temp 97.4  F (36.3  C) (Oral)   Resp 18   Wt 103.6 kg (228 lb 4.8 oz)   SpO2 97%   BMI 30.12 kg/m       Learning needs assessment complete within 12 months? YES    DIAGNOSIS: MDS-EB1 s/p allo BMT day +189     PROCEDURE: Unilateral Bone Marrow Biopsy and Unilateral Aspirate    LOCATION: [facility] 2nd [facility]    Patient s identification was positively verified by verbal identification and invasive procedure safety checklist was completed. Informed consent was obtained. Following the administration of Midazolam as pre-medication, patient was placed in the {position:2811288} position and prepped and draped in a sterile manner. Approximately *** cc of 1% Lidocaine was used over the {r/l/b:6613388} posterior iliac spine. Following this a 3 mm incision was made. Trephine bone marrow core(s) was (were) obtained from the {Site:4772040}. Bone marrow aspirates were obtained from the {Site:3112760}. Aspirates were sent for {sent:6287511}. A total of approximately *** ml of marrow was aspirated. Following this procedure a sterile dressing was applied to the bone marrow biopsy site(s). The patient was placed in the supine position to maintain pressure on the biopsy site. Post-procedure wound care instructions were given.     Complications: {YES COMPLICATIONS/NO:959481}    Pre-procedural pain: {NUMBERS 1-10:32850} out of 10 on the numeric pain rating scale.     Procedural pain: {NUMBERS 1-10:47981} out of 10 on the numeric pain rating scale.     Post-procedural pain assessment: {NUMBERS 1-10:79188} out of 10 on the numeric pain rating scale.     Interventions: {YES  COMPLICATIONS/NO:489615}    Length of procedure:{Length of procedure:048049}    Procedure performed by: ***        Again, thank you for allowing me to participate in the care of your patient.        Sincerely,        BMT Advanced Practice Provider    Electronically signed

## 2025-02-13 LAB

## 2025-02-17 LAB
CULTURE HARVEST COMPLETE DATE: NORMAL
INTERPRETATION: NORMAL

## 2025-02-19 ENCOUNTER — OFFICE VISIT (OUTPATIENT)
Dept: TRANSPLANT | Facility: CLINIC | Age: 75
End: 2025-02-19
Attending: STUDENT IN AN ORGANIZED HEALTH CARE EDUCATION/TRAINING PROGRAM
Payer: MEDICARE

## 2025-02-19 VITALS
HEART RATE: 66 BPM | TEMPERATURE: 98 F | OXYGEN SATURATION: 98 % | RESPIRATION RATE: 14 BRPM | DIASTOLIC BLOOD PRESSURE: 71 MMHG | SYSTOLIC BLOOD PRESSURE: 115 MMHG | WEIGHT: 229.2 LBS | BODY MASS INDEX: 30.24 KG/M2

## 2025-02-19 DIAGNOSIS — D46.9 MDS (MYELODYSPLASTIC SYNDROME) (H): Primary | ICD-10-CM

## 2025-02-19 DIAGNOSIS — Z94.81 STATUS POST BONE MARROW TRANSPLANT (H): ICD-10-CM

## 2025-02-19 PROCEDURE — 1126F AMNT PAIN NOTED NONE PRSNT: CPT | Performed by: STUDENT IN AN ORGANIZED HEALTH CARE EDUCATION/TRAINING PROGRAM

## 2025-02-19 PROCEDURE — G0463 HOSPITAL OUTPT CLINIC VISIT: HCPCS | Performed by: STUDENT IN AN ORGANIZED HEALTH CARE EDUCATION/TRAINING PROGRAM

## 2025-02-19 PROCEDURE — 3078F DIAST BP <80 MM HG: CPT | Performed by: STUDENT IN AN ORGANIZED HEALTH CARE EDUCATION/TRAINING PROGRAM

## 2025-02-19 PROCEDURE — 3074F SYST BP LT 130 MM HG: CPT | Performed by: STUDENT IN AN ORGANIZED HEALTH CARE EDUCATION/TRAINING PROGRAM

## 2025-02-19 PROCEDURE — 99215 OFFICE O/P EST HI 40 MIN: CPT | Mod: 25 | Performed by: STUDENT IN AN ORGANIZED HEALTH CARE EDUCATION/TRAINING PROGRAM

## 2025-02-19 RX ORDER — LISINOPRIL 20 MG/1
20 TABLET ORAL 2 TIMES DAILY
COMMUNITY
Start: 2025-01-07

## 2025-02-19 ASSESSMENT — PAIN SCALES - GENERAL: PAINLEVEL_OUTOF10: NO PAIN (0)

## 2025-02-19 NOTE — LETTER
2/19/2025      Arpit Forrest  [id number] [facility] [address]      Dear Colleague,    Thank you for referring your patient, Arpit Forrest, to the [facility] BLOOD AND MARROW TRANSPLANT PROGRAM [place]. Please see a copy of my visit note below.    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 Beckwith, [facility]      Diagnosis:   MDS-IB1. IPSS-M: very high risk 1.98. Normal cytogenetics. NGS peripheral blood (4/18/24) showed mutations in BCOR (56%), BCORL1 (11%), DNMT3A (34%), IDH2 (9%), SRSF2 (33%), STAG2 (9%), TET2 (2%), two RUNX1 mutations.   CD5+ Monoclonal B Lymphocytosis (MBL), chronic lymphocytic leukemia type  Recurrent febrile episodes, UBA1 negative, resolved with treatment of MDS    Treatment  3/21/24 to 6/19/24: Decitabine 20mg/m2 for 5 days x 4 cycles    Post transplant  # A.fib with RVR on 8/21/24 which converted to sinus rhythm spontaneously. 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 Max grade 1 (face, chest and upper back). Skin biopsy (9/1224) showed interface dermatitis suggestive of GVHD. Improved with topical steroids.       INTERVAL HISTORY     Arpit presents for a scheduled BMT visit. Overall doing well.   Was seen by dermatology and diagnosed with folliculitis - demodex or pityrosporum. Was started on topical ivermectin and face rash is much improved today.     Plan  - Labs every month at [facility], CMP at [facility] in 6 weeks    Current Outpatient Medications   Medication Sig Dispense Refill      acyclovir (ZOVIRAX) 400 MG tablet Take 2 tablets (800 mg) by mouth 2 times daily. 360 tablet 0     allopurinol (ZYLOPRIM) 300 MG tablet Take 1 tablet (300 mg) by mouth daily. 60 tablet 1     aspirin 81 MG EC tablet Take 1 tablet (81 mg) by mouth daily. 60 tablet 1     atorvastatin (LIPITOR) 10 MG tablet Take 1 tablet (10 mg) by mouth every morning. 60 tablet 1     brimonidine (ALPHAGAN) 0.2 % ophthalmic solution Place 1 drop into both eyes 2 times daily.       calcium carbonate-vitamin D (CALTRATE) 600-10 MG-MCG per tablet Take 1 tablet by mouth daily. 60 tablet 1     fluticasone (FLONASE) 50 MCG/ACT nasal spray Spray 1 spray into both nostrils as needed.       hydrocortisone 2.5 % cream Apply topically 2 times daily. Use on face and scalp (Patient taking differently: Apply topically daily. Use on face and scalp) 30 g 0     ivermectin (SOOLANTRA) 1 % cream Apply to the affected areas of the face and chest once daily. Use a small amount, spread as a thin layer, avoiding the eyes and lips. 45 g 3     latanoprost (XALATAN) 0.005 % ophthalmic solution INSTILL 2 DROPS IN BOTH EYES AT BEDTIME       lisinopril (ZESTRIL) 10 MG tablet Take 2 tablets (20 mg) by mouth daily. 60 tablet 3     Lutein-Zeaxanthin 25-5 MG CAPS Take 1 capsule by mouth daily       metFORMIN (GLUCOPHAGE XR) 500 MG 24 hr tablet Take by mouth. 500 mg in the morning and 1000 mg in the evening       metoprolol tartrate (LOPRESSOR) 25 MG tablet Take 1 tablet (25 mg) by mouth 2 times daily. (Patient taking differently: Take 50 mg by mouth 2 times daily.) 60 tablet 1     metroNIDAZOLE (METROGEL) 0.75 % external gel Apply topically 2 times daily. 45 g 3     pantoprazole (PROTONIX) 40 MG EC tablet Take 1 tablet (40 mg) by mouth every morning (before breakfast). 60 tablet 1     psyllium (METAMUCIL/KONSYL) capsule Take 3 capsules by mouth daily. 270 capsule 3     sulfamethoxazole-trimethoprim (BACTRIM DS) 800-160 MG tablet Take 1 tablet by mouth Every Mon, Tues  two times daily. Do not begin taking until instructed to do so by BMT clinic. 28 tablet 1     tamsulosin (FLOMAX) 0.4 MG capsule Take 1 capsule (0.4 mg) by mouth every evening. 90 capsule 1     Tirzepatide (MOUNJARO) 7.5 MG/0.5ML SOAJ Inject 0.5 mLs (7.5 mg) subcutaneously every 7 days. 2 mL 1     Tirzepatide 2.5 MG/0.5ML SOAJ Inject 0.5 mLs (2.5 mg) subcutaneously every 7 days. (Patient taking differently: Inject 2.5 mg subcutaneously every 7 days. (Monjaro)) 2 mL 1     triamcinolone (KENALOG) 0.1 % external cream Apply topically 2 times daily. Use on chest and back (Patient taking differently: Apply topically at bedtime. Use on chest and back) 454 g 0     Vitamin D-Vitamin K (D3 + K2 PO) Take 1 tablet by mouth daily. 3000 international unit(s)/115 mcg          EXAM      /71 (BP Location: Right arm, Patient Position: Sitting, Cuff Size: Adult Large)   Pulse 66   Temp 98  F (36.7  C) (Oral)   Resp 14   Wt 104 kg (229 lb 3.2 oz)   SpO2 98%   BMI 30.24 kg/m      Wt Readings from Last 4 Encounters:   02/12/25 103.6 kg (228 lb 4.8 oz)   01/22/25 104.9 kg (231 lb 4.8 oz)   12/18/24 110.1 kg (242 lb 12.8 oz)   12/16/24 110.2 kg (242 lb 14.4 oz)     KPS: 80% Can perform normal activity with effort, some signs of disease    General: Alert, awake; NAD  HEENT: atraumatic, sclera anicteric. Oral mucosa moist, OP nonerythematous  Skin: Scattered acneform rash over neck - much improved from prior   MSK: No pitting edema in bilateral LE.      Laboratory Studies:     Blood Counts  Recent Labs   Lab Test 02/12/25  1019 01/22/25  1553 01/13/25  1315 09/17/24  0809 09/12/24  0950 09/10/24  1114 09/06/24  1257   WBC 7.0 8.0 6.8   < > 3.3* 2.8* 2.8*   HGB 14.1 13.9 13.7   < > 9.4* 9.6* 9.3*    180 182   < > 123* 96* 89*   NEUTROPHIL 66 73 67   < > 67 71 70   ANEU  --   --   --   --  2.2 2.0 2.0   LYMPH 23 18 23   < > 19 1 10   ALYM  --   --   --   --  0.6* 0.0* 0.3*    < > = values in this interval not displayed.        Basic Panel  Recent Labs   Lab Test 02/12/25  1019 01/22/25  1553 01/13/25  1315    138 141   POTASSIUM 4.8 4.1 4.2   CHLORIDE 108* 104 105   CO2 22 25 26   BUN 20.3 20.9 18.9   CR 1.40* 1.20* 1.13   GLC 98 96 101*      LFTs  Recent Labs   Lab Test 02/12/25  1019 01/22/25  1553 01/13/25  1315   ALKPHOS 123 121 125   AST 24 21 22   ALT 27 22 20   BILITOTAL 0.3 0.6 0.4   ALBUMIN 4.3 4.4 4.4       Recent Labs   Lab Test 01/22/25  1553 01/13/25  1315 12/30/24  1309   CMVQNT Not Detected Not Detected Not Detected       Recent Labs   Lab Test 01/13/25  1315 09/25/24  1211 09/12/24  0950    534* 467*       Recent Labs   Lab Test 02/12/25  1022 07/22/24  0957   QUINCY 202 295        ASSESSMENT AND PLAN     BMT for high risk MDS: D+196  - Chemo protocol: [id number]; Flu, Cy, TBI  - Peripheral blood stem cell graft from 8/8 donor, ABO matched (O neg)  - Continue allopurinol (hx of gout)    Disease status:   - 8/30/24 (D28): Slightly hypercellular marrow (30-40%). No evidence of myeloid neoplasm or monoclonal B-lymphocytosis. Normal male karyotype. NGS negative for DNMT3A (other mutations not tested)  - 11/15/24 (D100): Hypercellular marrow (40%) in remission. NGS negative.   - 2/12/25 (D180): Hypercellular marrow (50%) in remission. NGS pending    Graft status/chimerism:   - 8/30/24 (D28): Bone marrow 100%. Peripheral blood CD33 100%, CD3 84%  - 9/12/24: Peripheral blood CD3 96%, CD33 100%  - 9/25/24: Peripheral blood CD3 100%, CD33 100%  - 11/15/24 (D100): Bone marrow 100%. Peripheral blood CD33 100%, CD3 100%  - 2/12/25 (D180): Bone marrow 100%. Peripheral blood CD33 100%, CD3 100%    Maintenance  None    GVHD  # Current immunosuppression is none  - Completed Siro taper on 12/01/2024    # GVHD  Skin: No changes. Skin score 0.  Mouth: No symptoms. Mouth score 0.  Eyes: No symptoms. Eye score 0.  GI: No symptoms. GI score 0.  Liver: LFTs normal. Liver score 0.  Pulm: No symptoms. Lung score 0.  Joints: No  symptoms. Joint score 0.    HEME/ COAG  G-CSF last given on 8/22/4.  ABO matched.    ID  # Prophylaxis  PJP/ Toxo IgG negative: Bactrim.  Viral: Acyclovir 800 mg bid     # Monitoring  CMV: Monitor till D180. Negative.   EBV: Monitor every 2 weeks between D30 to D180. Negative.   IgG: IgG 836 (1/13/25).     GI  # Hepatic steatosis and borderline iron deposition in liver  - VOD prophy: Ursodiol completed    # Supportive   - GERD: PPI  - Alternating constipation and diarrhea: continue psyllium and imodium PRN    CARDIOVASCULAR  # Asymptomatic moderate aortic stenosis    # A.fib with RVR   - First episode of A.fib with RVR prior to transplant in the context of admission with febrile episode on 3/15/24. Noted on his fitbit, not very symptomatic. DCCV on 3/21/24 restored normal sinus rhythm. Amiodarone and anticoagulation was stopped in April 2024 without any recurrence.   -Second episode while inpatient on 8/21/24 which converted to normal sinus rhythm spontaneously. Was started on metoprolol 25mg BID.   - Has been seen by cardiology on 11/11/24. No anticoagulation needed.      # CAD  - Coronary artery calcifications.   - Continue atorvastatin 10mg, ASA 81mg (resumed on 8/23/24 as platelets stable above 50k)  - Will need an ischemia evaluation in the long term. He is to follow up with cardiology in May 2025 with a repeat ECHO.     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     RESP  # VINCENT : CPAP     OPHTHALMOLOGY:  # Glaucoma - Lutein supplement; brimonidine, latanaprost eye drops.   # Right eye floaters: Following retinal specialist      MUSCULOSKELETAL/FRAILTY  # Gout: Continue allopurinol  indefinitely.     Health Maintenance   DEXA and Vitamin D: At 1-year post transplant   PFT: At 1 year post transplant. Repeat if symptomatic.   Thyroid: Check TSH and free T4 at 1 year post-transplant, then yearly, through primary physician.   Gonadal: Check testosterone at 1 year post-transplant, then yearly, through primary physician.   Lipids: Check lipid panel q6-12 months through primary physician.  Skin: Annual skin checks through Dermatology; d/w patient.  Dental visit at 1 year   Iron overload: Check Ferritin at 1 year    Post-transplant vaccinations  Flu and COVID done  Has received RSV    I spent 45 minutes in the care of this patient today, which included time necessary for preparation for the visit, obtaining history, ordering medications/tests/procedures as medically indicated, review of pertinent medical literature, counseling of the patient, communication of recommendations to the care team, and documentation time.     Nena Fowler  Department of Hematology, Oncology and Transplantation  Text via eMar         Again, thank you for allowing me to participate in the care of your patient.        Sincerely,        GASPER Rios    Electronically signed

## 2025-02-19 NOTE — PROGRESS NOTES
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 Beckwith, [facility]      Diagnosis:   MDS-IB1. IPSS-M: very high risk 1.98. Normal cytogenetics. NGS peripheral blood (4/18/24) showed mutations in BCOR (56%), BCORL1 (11%), DNMT3A (34%), IDH2 (9%), SRSF2 (33%), STAG2 (9%), TET2 (2%), two RUNX1 mutations.   CD5+ Monoclonal B Lymphocytosis (MBL), chronic lymphocytic leukemia type  Recurrent febrile episodes, UBA1 negative, resolved with treatment of MDS    Treatment  3/21/24 to 6/19/24: Decitabine 20mg/m2 for 5 days x 4 cycles    Post transplant  # A.fib with RVR on 8/21/24 which converted to sinus rhythm spontaneously. 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 Max grade 1 (face, chest and upper back). Skin biopsy (9/1224) showed interface dermatitis suggestive of GVHD. Improved with topical steroids.       INTERVAL HISTORY     Arpit presents for a scheduled BMT visit. Overall doing well.   Was seen by dermatology and diagnosed with folliculitis - demodex or pityrosporum. Was started on topical ivermectin and face rash is much improved today.     Plan  - Labs every month at [facility], CMP at [facility] in 6 weeks    Current Outpatient Medications   Medication Sig Dispense Refill    acyclovir (ZOVIRAX) 400 MG tablet Take 2 tablets (800 mg) by mouth 2 times daily. 360 tablet 0    allopurinol (ZYLOPRIM) 300 MG tablet Take 1 tablet (300 mg) by mouth daily. 60 tablet 1    aspirin 81 MG EC tablet Take 1 tablet (81 mg) by mouth daily. 60 tablet 1     atorvastatin (LIPITOR) 10 MG tablet Take 1 tablet (10 mg) by mouth every morning. 60 tablet 1    brimonidine (ALPHAGAN) 0.2 % ophthalmic solution Place 1 drop into both eyes 2 times daily.      calcium carbonate-vitamin D (CALTRATE) 600-10 MG-MCG per tablet Take 1 tablet by mouth daily. 60 tablet 1    fluticasone (FLONASE) 50 MCG/ACT nasal spray Spray 1 spray into both nostrils as needed.      hydrocortisone 2.5 % cream Apply topically 2 times daily. Use on face and scalp (Patient taking differently: Apply topically daily. Use on face and scalp) 30 g 0    ivermectin (SOOLANTRA) 1 % cream Apply to the affected areas of the face and chest once daily. Use a small amount, spread as a thin layer, avoiding the eyes and lips. 45 g 3    latanoprost (XALATAN) 0.005 % ophthalmic solution INSTILL 2 DROPS IN BOTH EYES AT BEDTIME      lisinopril (ZESTRIL) 10 MG tablet Take 2 tablets (20 mg) by mouth daily. 60 tablet 3    Lutein-Zeaxanthin 25-5 MG CAPS Take 1 capsule by mouth daily      metFORMIN (GLUCOPHAGE XR) 500 MG 24 hr tablet Take by mouth. 500 mg in the morning and 1000 mg in the evening      metoprolol tartrate (LOPRESSOR) 25 MG tablet Take 1 tablet (25 mg) by mouth 2 times daily. (Patient taking differently: Take 50 mg by mouth 2 times daily.) 60 tablet 1    metroNIDAZOLE (METROGEL) 0.75 % external gel Apply topically 2 times daily. 45 g 3    pantoprazole (PROTONIX) 40 MG EC tablet Take 1 tablet (40 mg) by mouth every morning (before breakfast). 60 tablet 1    psyllium (METAMUCIL/KONSYL) capsule Take 3 capsules by mouth daily. 270 capsule 3    sulfamethoxazole-trimethoprim (BACTRIM DS) 800-160 MG tablet Take 1 tablet by mouth Every Mon, Tues two times daily. Do not begin taking until instructed to do so by [facility] clinic. 28 tablet 1    tamsulosin (FLOMAX) 0.4 MG capsule Take 1 capsule (0.4 mg) by mouth every evening. 90 capsule 1    Tirzepatide (MOUNJARO) 7.5 MG/0.5ML SOAJ Inject 0.5 mLs (7.5 mg) subcutaneously every 7  days. 2 mL 1    Tirzepatide 2.5 MG/0.5ML SOAJ Inject 0.5 mLs (2.5 mg) subcutaneously every 7 days. (Patient taking differently: Inject 2.5 mg subcutaneously every 7 days. (Daysi)) 2 mL 1    triamcinolone (KENALOG) 0.1 % external cream Apply topically 2 times daily. Use on chest and back (Patient taking differently: Apply topically at bedtime. Use on chest and back) 454 g 0    Vitamin D-Vitamin K (D3 + K2 PO) Take 1 tablet by mouth daily. 3000 international unit(s)/115 mcg          EXAM      /71 (BP Location: Right arm, Patient Position: Sitting, Cuff Size: Adult Large)   Pulse 66   Temp 98  F (36.7  C) (Oral)   Resp 14   Wt 104 kg (229 lb 3.2 oz)   SpO2 98%   BMI 30.24 kg/m      Wt Readings from Last 4 Encounters:   02/12/25 103.6 kg (228 lb 4.8 oz)   01/22/25 104.9 kg (231 lb 4.8 oz)   12/18/24 110.1 kg (242 lb 12.8 oz)   12/16/24 110.2 kg (242 lb 14.4 oz)     KPS: 80% Can perform normal activity with effort, some signs of disease    General: Alert, awake; NAD  HEENT: atraumatic, sclera anicteric. Oral mucosa moist, OP nonerythematous  Skin: Scattered acneform rash over neck - much improved from prior   MSK: No pitting edema in bilateral LE.      Laboratory Studies:     Blood Counts  Recent Labs   Lab Test 02/12/25  1019 01/22/25  1553 01/13/25  1315 09/17/24  0809 09/12/24  0950 09/10/24  1114 09/06/24  1257   WBC 7.0 8.0 6.8   < > 3.3* 2.8* 2.8*   HGB 14.1 13.9 13.7   < > 9.4* 9.6* 9.3*    180 182   < > 123* 96* 89*   NEUTROPHIL 66 73 67   < > 67 71 70   ANEU  --   --   --   --  2.2 2.0 2.0   LYMPH 23 18 23   < > 19 1 10   ALYM  --   --   --   --  0.6* 0.0* 0.3*    < > = values in this interval not displayed.       Basic Panel  Recent Labs   Lab Test 02/12/25  1019 01/22/25  1553 01/13/25  1315    138 141   POTASSIUM 4.8 4.1 4.2   CHLORIDE 108* 104 105   CO2 22 25 26   BUN 20.3 20.9 18.9   CR 1.40* 1.20* 1.13   GLC 98 96 101*      LFTs  Recent Labs   Lab Test 02/12/25  1019  01/22/25  1553 01/13/25  1315   ALKPHOS 123 121 125   AST 24 21 22   ALT 27 22 20   BILITOTAL 0.3 0.6 0.4   ALBUMIN 4.3 4.4 4.4       Recent Labs   Lab Test 01/22/25  1553 01/13/25  1315 12/30/24  1309   CMVQNT Not Detected Not Detected Not Detected       Recent Labs   Lab Test 01/13/25  1315 09/25/24  1211 09/12/24  0950    534* 467*       Recent Labs   Lab Test 02/12/25  1022 07/22/24  0957   QUINCY 202 295        ASSESSMENT AND PLAN     BMT for high risk MDS: D+196  - Chemo protocol: [id number]; Flu, Cy, TBI  - Peripheral blood stem cell graft from 8/8 donor, ABO matched (O neg)  - Continue allopurinol (hx of gout)    Disease status:   - 8/30/24 (D28): Slightly hypercellular marrow (30-40%). No evidence of myeloid neoplasm or monoclonal B-lymphocytosis. Normal male karyotype. NGS negative for DNMT3A (other mutations not tested)  - 11/15/24 (D100): Hypercellular marrow (40%) in remission. NGS negative.   - 2/12/25 (D180): Hypercellular marrow (50%) in remission. NGS pending    Graft status/chimerism:   - 8/30/24 (D28): Bone marrow 100%. Peripheral blood CD33 100%, CD3 84%  - 9/12/24: Peripheral blood CD3 96%, CD33 100%  - 9/25/24: Peripheral blood CD3 100%, CD33 100%  - 11/15/24 (D100): Bone marrow 100%. Peripheral blood CD33 100%, CD3 100%  - 2/12/25 (D180): Bone marrow 100%. Peripheral blood CD33 100%, CD3 100%    Maintenance  None    GVHD  # Current immunosuppression is none  - Completed Siro taper on 12/01/2024    # GVHD  Skin: No changes. Skin score 0.  Mouth: No symptoms. Mouth score 0.  Eyes: No symptoms. Eye score 0.  GI: No symptoms. GI score 0.  Liver: LFTs normal. Liver score 0.  Pulm: No symptoms. Lung score 0.  Joints: No symptoms. Joint score 0.    HEME/ COAG  G-CSF last given on 8/22/4.  ABO matched.    ID  # Prophylaxis  PJP/ Toxo IgG negative: Bactrim.  Viral: Acyclovir 800 mg bid     # Monitoring  CMV: Monitor till D180. Negative.   EBV: Monitor every 2 weeks between D30 to D180. Negative.    IgG: IgG 836 (1/13/25).     GI  # Hepatic steatosis and borderline iron deposition in liver  - VOD prophy: Ursodiol completed    # Supportive   - GERD: PPI  - Alternating constipation and diarrhea: continue psyllium and imodium PRN    CARDIOVASCULAR  # Asymptomatic moderate aortic stenosis    # A.fib with RVR   - First episode of A.fib with RVR prior to transplant in the context of admission with febrile episode on 3/15/24. Noted on his fitbit, not very symptomatic. DCCV on 3/21/24 restored normal sinus rhythm. Amiodarone and anticoagulation was stopped in April 2024 without any recurrence.   -Second episode while inpatient on 8/21/24 which converted to normal sinus rhythm spontaneously. Was started on metoprolol 25mg BID.   - Has been seen by cardiology on 11/11/24. No anticoagulation needed.      # CAD  - Coronary artery calcifications.   - Continue atorvastatin 10mg, ASA 81mg (resumed on 8/23/24 as platelets stable above 50k)  - Will need an ischemia evaluation in the long term. He is to follow up with cardiology in May 2025 with a repeat ECHO.     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     RESP  # VINCENT : CPAP     OPHTHALMOLOGY:  # Glaucoma - Lutein supplement; brimonidine, latanaprost eye drops.   # Right eye floaters: Following retinal specialist      MUSCULOSKELETAL/FRAILTY  # Gout: Continue allopurinol indefinitely.     Health Maintenance   DEXA and Vitamin D: At 1-year post transplant   PFT: At 1 year post transplant. Repeat if symptomatic.   Thyroid: Check TSH and free T4 at 1 year post-transplant, then yearly, through primary physician.   Gonadal: Check testosterone at 1  year post-transplant, then yearly, through primary physician.   Lipids: Check lipid panel q6-12 months through primary physician.  Skin: Annual skin checks through Dermatology; d/w patient.  Dental visit at 1 year   Iron overload: Check Ferritin at 1 year    Post-transplant vaccinations  Flu and COVID done  Has received RSV    I spent 45 minutes in the care of this patient today, which included time necessary for preparation for the visit, obtaining history, ordering medications/tests/procedures as medically indicated, review of pertinent medical literature, counseling of the patient, communication of recommendations to the care team, and documentation time.     Nena Fowler  Department of Hematology, Oncology and Transplantation  Text via URX

## 2025-02-19 NOTE — NURSING NOTE
"Oncology Rooming Note    February 19, 2025 3:56 PM   Arpit Forrest is a 74 year old male who presents for:    Chief Complaint   Patient presents with    Oncology Clinic Visit     MDS (myelodysplastic syndrome)     Initial Vitals: /71 (BP Location: Right arm, Patient Position: Sitting, Cuff Size: Adult Large)   Pulse 66   Temp 98  F (36.7  C) (Oral)   Resp 14   Wt 104 kg (229 lb 3.2 oz)   SpO2 98%   BMI 30.24 kg/m   Estimated body mass index is 30.24 kg/m  as calculated from the following:    Height as of 11/22/24: 1.854 m (6' 1\").    Weight as of this encounter: 104 kg (229 lb 3.2 oz). Body surface area is 2.31 meters squared.  No Pain (0) Comment: Data Unavailable   No LMP for male patient.  Allergies reviewed: Yes  Medications reviewed: Yes    Medications: Medication refills not needed today.  Pharmacy name entered into [facility]: [id number] - [place], [place] - [address] AT [facility] [address] [place] [address]    Frailty Screening:   Is the patient here for a new oncology consult visit in cancer care? 2. No    PHQ9:  Did this patient require a PHQ9?: No      Clinical concerns: Patient states no new concerns to discuss with provider.        Donald Quintero, EMT            "

## 2025-03-04 NOTE — TELEPHONE ENCOUNTER
Medication Appeal Initiation    Medication: IVERMECTIN 1 % EX CREA  Appeal Start Date:  3/4/2025  Insurance Company: [employer]  Insurance Phone: [phone]  Insurance Fax: [phone]  Comments:       FAXED LETTER OF MEDICAL NECESSITY AND CHART NOTES TO INSURANCE

## 2025-03-06 ENCOUNTER — TELEPHONE (OUTPATIENT)
Dept: DERMATOLOGY | Facility: CLINIC | Age: 75
End: 2025-03-06
Payer: MEDICARE

## 2025-03-06 NOTE — TELEPHONE ENCOUNTER
Prior authorization needed for medication Ivermectin 1% cream 45GM.     Plan does not cover this medication. Please call plan at [phone] to initiate prior authorization or call/fax pharmacy to change medication. Patient ID # is [id number].

## 2025-03-06 NOTE — TELEPHONE ENCOUNTER
MEDICATION APPEAL APPROVED    Medication: IVERMECTIN 1 % EX CREA  Authorization Effective Date: 12/5/2024  Authorization Expiration Date: 3/5/2026  Appeal Insurance Company: [employer]  Filling Pharmacy:    Patient Notified: YES (faxed approval letter to pharmacy and notified patient via mychart message)  Comments:

## 2025-03-12 ENCOUNTER — LAB (OUTPATIENT)
Dept: LAB | Facility: CLINIC | Age: 75
End: 2025-03-12
Payer: COMMERCIAL

## 2025-03-12 DIAGNOSIS — Z94.81 STATUS POST BONE MARROW TRANSPLANT (H): ICD-10-CM

## 2025-03-12 DIAGNOSIS — E11.9 TYPE 2 DIABETES MELLITUS WITHOUT COMPLICATION, WITHOUT LONG-TERM CURRENT USE OF INSULIN (H): ICD-10-CM

## 2025-03-12 LAB
BASOPHILS # BLD AUTO: 0 10E3/UL (ref 0–0.2)
BASOPHILS NFR BLD AUTO: 0 %
EOSINOPHIL # BLD AUTO: 0.1 10E3/UL (ref 0–0.7)
EOSINOPHIL NFR BLD AUTO: 2 %
ERYTHROCYTE [DISTWIDTH] IN BLOOD BY AUTOMATED COUNT: 16.6 % (ref 10–15)
EST. AVERAGE GLUCOSE BLD GHB EST-MCNC: 111 MG/DL
HBA1C MFR BLD: 5.5 % (ref 0–5.6)
HCT VFR BLD AUTO: 41 % (ref 40–53)
HGB BLD-MCNC: 14.1 G/DL (ref 13.3–17.7)
IMM GRANULOCYTES # BLD: 0 10E3/UL
IMM GRANULOCYTES NFR BLD: 0 %
LYMPHOCYTES # BLD AUTO: 1.5 10E3/UL (ref 0.8–5.3)
LYMPHOCYTES NFR BLD AUTO: 24 %
MCH RBC QN AUTO: 29.9 PG (ref 26.5–33)
MCHC RBC AUTO-ENTMCNC: 34.4 G/DL (ref 31.5–36.5)
MCV RBC AUTO: 87 FL (ref 78–100)
MONOCYTES # BLD AUTO: 0.7 10E3/UL (ref 0–1.3)
MONOCYTES NFR BLD AUTO: 11 %
NEUTROPHILS # BLD AUTO: 3.9 10E3/UL (ref 1.6–8.3)
NEUTROPHILS NFR BLD AUTO: 63 %
PLATELET # BLD AUTO: 155 10E3/UL (ref 150–450)
RBC # BLD AUTO: 4.72 10E6/UL (ref 4.4–5.9)
WBC # BLD AUTO: 6.2 10E3/UL (ref 4–11)

## 2025-03-12 PROCEDURE — 82784 ASSAY IGA/IGD/IGG/IGM EACH: CPT

## 2025-03-12 PROCEDURE — 36415 COLL VENOUS BLD VENIPUNCTURE: CPT

## 2025-03-12 PROCEDURE — 80053 COMPREHEN METABOLIC PANEL: CPT

## 2025-03-12 PROCEDURE — 85025 COMPLETE CBC W/AUTO DIFF WBC: CPT

## 2025-03-12 PROCEDURE — 83036 HEMOGLOBIN GLYCOSYLATED A1C: CPT

## 2025-03-13 LAB
ALBUMIN SERPL BCG-MCNC: 4.5 G/DL (ref 3.5–5.2)
ALP SERPL-CCNC: 129 U/L (ref 40–150)
ALT SERPL W P-5'-P-CCNC: 36 U/L (ref 0–70)
ANION GAP SERPL CALCULATED.3IONS-SCNC: 9 MMOL/L (ref 7–15)
AST SERPL W P-5'-P-CCNC: 26 U/L (ref 0–45)
BILIRUB SERPL-MCNC: 0.4 MG/DL
BUN SERPL-MCNC: 22.7 MG/DL (ref 8–23)
CALCIUM SERPL-MCNC: 10.4 MG/DL (ref 8.8–10.4)
CHLORIDE SERPL-SCNC: 106 MMOL/L (ref 98–107)
CREAT SERPL-MCNC: 1.34 MG/DL (ref 0.67–1.17)
EGFRCR SERPLBLD CKD-EPI 2021: 55 ML/MIN/1.73M2
GLUCOSE SERPL-MCNC: 90 MG/DL (ref 70–99)
HCO3 SERPL-SCNC: 24 MMOL/L (ref 22–29)
IGG SERPL-MCNC: 824 MG/DL (ref 610–1616)
POTASSIUM SERPL-SCNC: 4.4 MMOL/L (ref 3.4–5.3)
PROT SERPL-MCNC: 6.9 G/DL (ref 6.4–8.3)
SODIUM SERPL-SCNC: 139 MMOL/L (ref 135–145)

## 2025-03-20 DIAGNOSIS — N40.1 BENIGN PROSTATIC HYPERPLASIA WITH URINARY FREQUENCY: ICD-10-CM

## 2025-03-20 DIAGNOSIS — R35.0 BENIGN PROSTATIC HYPERPLASIA WITH URINARY FREQUENCY: ICD-10-CM

## 2025-03-20 RX ORDER — TAMSULOSIN HYDROCHLORIDE 0.4 MG/1
0.4 CAPSULE ORAL EVERY EVENING
Qty: 90 CAPSULE | Refills: 1 | Status: SHIPPED | OUTPATIENT
Start: 2025-03-20

## 2025-03-30 DIAGNOSIS — N32.81 OVERACTIVE BLADDER: ICD-10-CM

## 2025-04-01 ENCOUNTER — OFFICE VISIT (OUTPATIENT)
Dept: DERMATOLOGY | Facility: CLINIC | Age: 75
End: 2025-04-01
Payer: MEDICARE

## 2025-04-01 DIAGNOSIS — L30.9 DERMATITIS: ICD-10-CM

## 2025-04-01 DIAGNOSIS — Z12.83 SKIN CANCER SCREENING: Primary | ICD-10-CM

## 2025-04-01 PROCEDURE — 1126F AMNT PAIN NOTED NONE PRSNT: CPT | Performed by: STUDENT IN AN ORGANIZED HEALTH CARE EDUCATION/TRAINING PROGRAM

## 2025-04-01 PROCEDURE — 99213 OFFICE O/P EST LOW 20 MIN: CPT | Performed by: STUDENT IN AN ORGANIZED HEALTH CARE EDUCATION/TRAINING PROGRAM

## 2025-04-01 ASSESSMENT — PAIN SCALES - GENERAL: PAINLEVEL_OUTOF10: NO PAIN (0)

## 2025-04-01 NOTE — LETTER
4/1/2025      Arpit Forrest  [address] [place] [address]      Dear Colleague,    Thank you for referring your patient, Arpit Forrest, to the [facility]. Please see a copy of my visit note below.    [facility] Note  Encounter Date: Apr 1, 2025  Office Visit     Dermatology Problem List:  1. BMT, s/p allogenic stem cell transplant MBS-EB1  2. 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   # Rosacea, papulopustular (Favored)  This is a chronic, relapsing and evolving condition which can flare.    Was previously using triamcinolone this does not seem to be improving the rash. It flares on face, eyebrows. Previously tried clindamycin, triamcinolone, hydrocortisone.  Unclear if this is related to Demodex infestation or Demodex folliculitis.  May also be related to pityrosporum.  This was biopsied in the past which demonstrate an interface dermatitis, although subtle.  GVHD should show improvement topical steroids, however this has not been the case.     - After prior authorization, he was able to start ivermectin cream daily, and had improvement.  Continue daily application, with reduction to prn. Directed to apply to affected areas and a thin layer.  - Continue sulfur soap in the shower to reduce yeast for facial rash and erythema.  - Information provided on Demodex.    This is a chronic condition. There is evidence that this could be related to underlying inflammation status post stem-cell transplant. Few studies demonstrate that a facial rash that mimics GVHD (occurring after SCT secondary to MDS) may be related to worsening papulopustular rosacea and Demodex infestation.  Mechanism is unknown, but one could assume that the immune system is no longer  tolerating normal Demodex commensal.      References:    Sheron Fajardo. Demodex folliculitis mimicking acute iaazy-zj-jfxz disease. [facility] D[facility]atol. 2013 Dec;149(12):1407-9. doi: 10.1001/jamadermatol.2013.5891. PMID: 30876529.    Juan P, Maryanne M, Gisele Ç, Radha NE, Funmilayo N, Anoop I, Hussein CRENSHAW, Allison CISNEROS. Demodicidosis Accompanying Acute Cutaneous Mxcrt-Yrcxrg-Whfi Disease after Allogeneic Stem Cell Transplantation. [facility] J Haematol. 2018 Nov 13;35(4):313-314. doi: 10.4274/tjh.2018.0057. Epub 2018 Jul 2. PMID: 27043515; PMCID: [id number].        Procedures Performed:   None      Follow-up: 6-12 month(s) in-person, or earlier for new or changing lesions or worsening rash.    Staff:     Jia Daniels MD PhD  Dermatology, Dermatopathology    ____________________________________________    CC: Derm Problem (Acne to face, agrees ivermectin was most effective)    HPI:  Arpit Forrest is a(n) 74 year old female who presents today as a new patient for rash.    Transplanted in August 4th.   Rash on chest, back, and scattered on face and neck.  It comes and goes.  He has tried TAC ointment. Hydrocortisone 2.5%, clindamycin lotion.  In the past the most effective was the hydrocortisone.     He has some scaly spots on his forehead.      Since last visit he was able to recently get ivermectin approved which seem to provide the most relief and improvement.  He started using this 1-2 times daily and is now using more sporadically for flares.  He noticed an improvement on his face and neck with fewer and more rare pink papules    Patient is otherwise feeling well, without additional skin concerns.       Labs Reviewed:  Dermatopathology: 9/12/2024    Final Diagnosis   Right upper back:  - Subtle interface dermatitis - (see comment)   Electronically signed by Juan Vaca MD on 9/18/2024 at  5:06 PM   Comment  [facility]   Chart history was reviewed with this case.  These microscopic changes are subtle, but  compatible with the clinical impression of cutaneous shffd-ooduvj-vnop disease (histologic grade II).  The histopathologic differential diagnosis also includes a drug eruption or viral exanthem, though the absence of significant tissue eosinophilia makes these somewhat less likely.  Ongoing clinical correlation and follow-up are recommended.     Physical Exam:  Vitals: There were no vitals taken for this visit.  SKIN: Focused examination of chest, back, face was performed.    Upper chest, seborrheic distribution and around the base of the neck there are rare pink, dermal papules, somewhat consistent with folliculitis other if features most consistent with eczema with overlying fine scale.  Face generally clear.    Photos taken for clinical monitoring.    - No other lesions of concern on areas examined.       Medications:  Current Outpatient Medications   Medication Sig Dispense Refill     allopurinol (ZYLOPRIM) 300 MG tablet Take 1 tablet (300 mg) by mouth daily. 60 tablet 1     aspirin 81 MG EC tablet Take 1 tablet (81 mg) by mouth daily. 60 tablet 1     atorvastatin (LIPITOR) 10 MG tablet Take 1 tablet (10 mg) by mouth every morning. 60 tablet 1     brimonidine (ALPHAGAN) 0.2 % ophthalmic solution Place 1 drop into both eyes 2 times daily.       calcium carbonate-vitamin D (CALTRATE) 600-10 MG-MCG per tablet Take 1 tablet by mouth daily. 60 tablet 1     fluticasone (FLONASE) 50 MCG/ACT nasal spray Spray 1 spray into both nostrils as needed.       hydrocortisone 2.5 % cream Apply topically 2 times daily. Use on face and scalp 30 g 0     ivermectin (SOOLANTRA) 1 % cream Apply to the affected areas of the face and chest once daily. Use a small amount, spread as a thin layer, avoiding the eyes and lips. 45 g 3     latanoprost (XALATAN) 0.005 % ophthalmic solution INSTILL 2 DROPS IN BOTH EYES AT BEDTIME       lisinopril (ZESTRIL) 20 MG tablet Take 20 mg by mouth 2 times daily.       Lutein-Zeaxanthin 25-5 MG CAPS Take  1 capsule by mouth daily       metFORMIN (GLUCOPHAGE XR) 500 MG 24 hr tablet Take by mouth. 500 mg in the morning and 1000 mg in the evening       metoprolol tartrate (LOPRESSOR) 25 MG tablet Take 1 tablet (25 mg) by mouth 2 times daily. (Patient taking differently: Take 50 mg by mouth 2 times daily.) 60 tablet 1     metroNIDAZOLE (METROGEL) 0.75 % external gel Apply topically 2 times daily. 45 g 3     MOUNJARO 10 MG/0.5ML SOAJ Inject 0.5 mLs (10 mg) subcutaneously every 7 days. 2 mL 1     pantoprazole (PROTONIX) 40 MG EC tablet Take 1 tablet (40 mg) by mouth every morning (before breakfast). 60 tablet 1     psyllium (METAMUCIL/KONSYL) capsule Take 3 capsules by mouth daily. (Patient taking differently: Take 1-2 capsules by mouth as needed.) 270 capsule 3     sulfamethoxazole-trimethoprim (BACTRIM DS) 800-160 MG tablet Take 1 tablet by mouth Every Mon, Tues two times daily. Do not begin taking until instructed to do so by [facility] clinic. 28 tablet 1     tamsulosin (FLOMAX) 0.4 MG capsule Take 1 capsule (0.4 mg) by mouth every evening. 90 capsule 1     Tirzepatide (MOUNJARO) 7.5 MG/0.5ML SOAJ Inject 0.5 mLs (7.5 mg) subcutaneously every 7 days. 2 mL 1     Tirzepatide 2.5 MG/0.5ML SOAJ Inject 0.5 mLs (2.5 mg) subcutaneously every 7 days. 2 mL 1     triamcinolone (KENALOG) 0.1 % external cream Apply topically 2 times daily. Use on chest and back 454 g 0     Vitamin D-Vitamin K (D3 + K2 PO) Take 1 tablet by mouth daily. 3000 international unit(s)/115 mcg       acyclovir (ZOVIRAX) 400 MG tablet Take 2 tablets (800 mg) by mouth 2 times daily. 360 tablet 0     No current facility-administered medications for this visit.      Past Medical History:   Patient Active Problem List   Diagnosis     Ankylosing spondylitis lumbar region (H)     Autoinflammatory syndrome (H)     Macdonald's esophagus without dysplasia     Bilateral sacroiliitis     Essential hypertension     H/O: gout     History of agent Orange exposure     History of  kidney donation     Hyperlipidemia     Myelodysplastic syndrome (H)     Obstructive sleep apnea syndrome     Obesity (BMI 30-39.9)     Type 2 diabetes mellitus without complication, without long-term current use of insulin (H)     Glaucoma     MDS (myelodysplastic syndrome) (H)     Stem cells transplant status (H)       History reviewed. No pertinent past medical history.    CC: Primary Care Provider: Tanvir Max or Referring Provider: Referred Self      Again, thank you for allowing me to participate in the care of your patient.        Sincerely,        Jia Daniels MD    Electronically signed

## 2025-04-01 NOTE — PATIENT INSTRUCTIONS
Start using sulfur soap in the shower or whenever you want.         Continue using the cream that can help reduce demodex mites that live on you skin. You body can sense these and react to form a rash.    _______________________________________________________          Proper skin care from [facility]:    -Eliminate harsh soaps as they strip the natural oils from the skin, often resulting in dry itchy skin ( i.e. Dial, Zest, Naya Spring)  -Use mild soaps such as Cetaphil or Dove Sensitive Skin in the shower. You do not need to use soap on arms, legs, and trunk every time you shower unless visibly soiled.   -Avoid hot or cold showers.  -After showering, lightly dry off and apply moisturizing within 2-3 minutes. This will help trap moisture in the skin.   -Aggressive use of a moisturizer at least 1-2 times a day to the entire body (including -Vanicream, Cetaphil, Aquaphor or Cerave) and moisturize hands after every washing.  -We recommend using moisturizers that come in a tub that needs to be scooped out, not a pump. This has more of an oil base. It will hold moisture in your skin much better than a water base moisturizer. The above recommended are non-pore clogging.      Wear a sunscreen with at least SPF 30 on your face, ears, neck and V of the chest daily. Wear sunscreen on other areas of the body if those areas are exposed to the sun throughout the day. Sunscreens can contain physical and/or chemical blockers. Physical blockers are less likely to clog pores, these include zinc oxide and titanium dioxide. Reapply every two hour and after swimming.     Sunscreen examples: https://www.ewg.org/sunscreen/    UV radiation  UVA radiation remains constant throughout the day and throughout the year. It is a longer wavelength than UVB and therefore penetrates deeper into the skin leading to immediate and delayed tanning, photoaging, and skin cancer. 70-80% of UVA and UVB radiation occurs between the hours of  10am-2pm.  UVB radiation  UVB radiation causes the most harmful effects and is more significant during the summer months. However, snow and ice can reflect UVB radiation leading to skin damage during the winter months as well. UVB radiation is responsible for tanning, burning, inflammation, delayed erythema (pinkness), pigmentation (brown spots), and skin cancer.     I recommend self monthly full body exams and yearly full body exams with a dermatology provider. If you develop a new or changing lesion please follow up for examination. Most skin cancers are pink and scaly or pink and pearly. However, we do see blue/brown/black skin cancers.  Consider the ABCDEs of melanoma when giving yourself your monthly full body exam ( don't forget the groin, buttocks, feet, toes, etc). A-asymmetry, B-borders, C-color, D-diameter, E-elevation or evolving. If you see any of these changes please follow up in clinic. If you cannot see your back I recommend purchasing a hand held mirror to use with a larger wall mirror.       Checking for Skin Cancer  You can find cancer early by checking your skin each month. There are 3 kinds of skin cancer. They are melanoma, basal cell carcinoma, and squamous cell carcinoma. Doing monthly skin checks is the best way to find new marks or skin changes. Follow the instructions below for checking your skin.   The ABCDEs of checking moles for melanoma   Check your moles or growths for signs of melanoma using ABCDE:   Asymmetry: the sides of the mole or growth don t match  Border: the edges are ragged, notched, or blurred  Color: the color within the mole or growth varies  Diameter: the mole or growth is larger than 6 mm (size of a pencil eraser)  Evolving: the size, shape, or color of the mole or growth is changing (evolving is not shown in the images below)    Checking for other types of skin cancer  Basal cell carcinoma or squamous cell carcinoma have symptoms such as:     A spot or mole that looks  different from all other marks on your skin  Changes in how an area feels, such as itching, tenderness, or pain  Changes in the skin's surface, such as oozing, bleeding, or scaliness  A sore that does not heal  New swelling or redness beyond the border of a mole    Who s at risk?  Anyone can get skin cancer. But you are at greater risk if you have:   Fair skin, light-colored hair, or light-colored eyes  Many moles or abnormal moles on your skin  A history of sunburns from sunlight or tanning beds  A family history of skin cancer  A history of exposure to radiation or chemicals  A weakened immune system  If you have had skin cancer in the past, you are at risk for recurring skin cancer.   How to check your skin  Do your monthly skin checkups in front of a full-length mirror. Check all parts of your body, including your:   Head (ears, face, neck, and scalp)  Torso (front, back, and sides)  Arms (tops, undersides, upper, and lower armpits)  Hands (palms, backs, and fingers, including under the nails)  Buttocks and genitals  Legs (front, back, and sides)  Feet (tops, soles, toes, including under the nails, and between toes)  If you have a lot of moles, take digital photos of them each month. Make sure to take photos both up close and from a distance. These can help you see if any moles change over time.   Most skin changes are not cancer. But if you see any changes in your skin, call your doctor right away. Only he or she can diagnose a problem. If you have skin cancer, seeing your doctor can be the first step toward getting the treatment that could save your life.   Evrent last reviewed this educational content on 4/1/2019 2000-2020 [employer]. [address] [place] [address], [place] [address]. All rights reserved. This information is not intended as a substitute for professional medical care. Always follow your healthcare professional's instructions.       When should I call my doctor?  If you are  worsening or not improving, please, contact us or seek urgent care as noted below.     Who should I call with questions (adults)?    [facility] [phone]  For urgent needs outside of business hours call the [facility] at [phone] and ask for the dermatology resident on call to be paged  If this is a medical emergency and you are unable to reach an ER, Call [phone]      If you need a prescription refill, please contact your pharmacy. Refills are approved or denied by our Physicians during normal business hours, Monday through Friday.  Per office policy, refills will not be granted if you have not been seen within the past year (or sooner depending on the condition).

## 2025-04-01 NOTE — PROGRESS NOTES
[facility] Dermatology Note  Encounter Date: Apr 1, 2025  Office Visit     Dermatology Problem List:  1. BMT, s/p allogenic stem cell transplant MBS-EB1  2. 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   # Rosacea, papulopustular (Favored)  This is a chronic, relapsing and evolving condition which can flare.    Was previously using triamcinolone this does not seem to be improving the rash. It flares on face, eyebrows. Previously tried clindamycin, triamcinolone, hydrocortisone.  Unclear if this is related to Demodex infestation or Demodex folliculitis.  May also be related to pityrosporum.  This was biopsied in the past which demonstrate an interface dermatitis, although subtle.  GVHD should show improvement topical steroids, however this has not been the case.     - After prior authorization, he was able to start ivermectin cream daily, and had improvement.  Continue daily application, with reduction to prn. Directed to apply to affected areas and a thin layer.  - Continue sulfur soap in the shower to reduce yeast for facial rash and erythema.  - Information provided on Demodex.    This is a chronic condition. There is evidence that this could be related to underlying inflammation status post stem-cell transplant. Few studies demonstrate that a facial rash that mimics GVHD (occurring after SCT secondary to MDS) may be related to worsening papulopustular rosacea and Demodex infestation.  Mechanism is unknown, but one could assume that the immune system is no longer tolerating normal Demodex commensal.      References:    Sheron Fajardo. Demodex folliculitis mimicking acute vayef-by-jjcw disease. [facility] D[facility]ol. 2013 Dec;149(12):1407-9. doi: 10.1001/jamadermatol.2013.5891. PMID: 25058145.    Juan MACK,  Maryanne M, Gerzeelio?rosalinda Ç, Hermanmiroslava NE, Funmilayo N, Tracyno?rosalinda ?, Özdo?u H, Nigel?a C. Demodicidosis Accompanying Acute Cutaneous Eeagl-Oihugk-Julk Disease after Allogeneic Stem Cell Transplantation. [facility] J Haematol. 2018 Nov 13;35(4):313-314. doi: 10.4274/tj.2018.0057. Epub 2018 Jul 2. PMID: 31038971; PMCID: [id number].        Procedures Performed:   None      Follow-up: 6-12 month(s) in-person, or earlier for new or changing lesions or worsening rash.    Staff:     Jia Daniels MD PhD  Dermatology, Dermatopathology    ____________________________________________    CC: Derm Problem (Acne to face, agrees ivermectin was most effective)    HPI:  Arpit Forrest is a(n) 74 year old female who presents today as a new patient for rash.    Transplanted in August 4th.   Rash on chest, back, and scattered on face and neck.  It comes and goes.  He has tried TAC ointment. Hydrocortisone 2.5%, clindamycin lotion.  In the past the most effective was the hydrocortisone.     He has some scaly spots on his forehead.      Since last visit he was able to recently get ivermectin approved which seem to provide the most relief and improvement.  He started using this 1-2 times daily and is now using more sporadically for flares.  He noticed an improvement on his face and neck with fewer and more rare pink papules    Patient is otherwise feeling well, without additional skin concerns.       Labs Reviewed:  Dermatopathology: 9/12/2024    Final Diagnosis   Right upper back:  - Subtle interface dermatitis - (see comment)   Electronically signed by Juan Vaca MD on 9/18/2024 at  5:06 PM   Comment  [facility]   Chart history was reviewed with this case.  These microscopic changes are subtle, but compatible with the clinical impression of cutaneous hcctt-kveosp-tuda disease (histologic grade II).  The histopathologic differential diagnosis also includes a drug eruption or viral exanthem, though the absence of significant tissue eosinophilia  makes these somewhat less likely.  Ongoing clinical correlation and follow-up are recommended.     Physical Exam:  Vitals: There were no vitals taken for this visit.  SKIN: Focused examination of chest, back, face was performed.    Upper chest, seborrheic distribution and around the base of the neck there are rare pink, dermal papules, somewhat consistent with folliculitis other if features most consistent with eczema with overlying fine scale.  Face generally clear.    Photos taken for clinical monitoring.    - No other lesions of concern on areas examined.       Medications:  Current Outpatient Medications   Medication Sig Dispense Refill    allopurinol (ZYLOPRIM) 300 MG tablet Take 1 tablet (300 mg) by mouth daily. 60 tablet 1    aspirin 81 MG EC tablet Take 1 tablet (81 mg) by mouth daily. 60 tablet 1    atorvastatin (LIPITOR) 10 MG tablet Take 1 tablet (10 mg) by mouth every morning. 60 tablet 1    brimonidine (ALPHAGAN) 0.2 % ophthalmic solution Place 1 drop into both eyes 2 times daily.      calcium carbonate-vitamin D (CALTRATE) 600-10 MG-MCG per tablet Take 1 tablet by mouth daily. 60 tablet 1    fluticasone (FLONASE) 50 MCG/ACT nasal spray Spray 1 spray into both nostrils as needed.      hydrocortisone 2.5 % cream Apply topically 2 times daily. Use on face and scalp 30 g 0    ivermectin (SOOLANTRA) 1 % cream Apply to the affected areas of the face and chest once daily. Use a small amount, spread as a thin layer, avoiding the eyes and lips. 45 g 3    latanoprost (XALATAN) 0.005 % ophthalmic solution INSTILL 2 DROPS IN BOTH EYES AT BEDTIME      lisinopril (ZESTRIL) 20 MG tablet Take 20 mg by mouth 2 times daily.      Lutein-Zeaxanthin 25-5 MG CAPS Take 1 capsule by mouth daily      metFORMIN (GLUCOPHAGE XR) 500 MG 24 hr tablet Take by mouth. 500 mg in the morning and 1000 mg in the evening      metoprolol tartrate (LOPRESSOR) 25 MG tablet Take 1 tablet (25 mg) by mouth 2 times daily. (Patient taking  differently: Take 50 mg by mouth 2 times daily.) 60 tablet 1    metroNIDAZOLE (METROGEL) 0.75 % external gel Apply topically 2 times daily. 45 g 3    MOUNJARO 10 MG/0.5ML SOAJ Inject 0.5 mLs (10 mg) subcutaneously every 7 days. 2 mL 1    pantoprazole (PROTONIX) 40 MG EC tablet Take 1 tablet (40 mg) by mouth every morning (before breakfast). 60 tablet 1    psyllium (METAMUCIL/KONSYL) capsule Take 3 capsules by mouth daily. (Patient taking differently: Take 1-2 capsules by mouth as needed.) 270 capsule 3    sulfamethoxazole-trimethoprim (BACTRIM DS) 800-160 MG tablet Take 1 tablet by mouth Every Mon, Tues two times daily. Do not begin taking until instructed to do so by BMT clinic. 28 tablet 1    tamsulosin (FLOMAX) 0.4 MG capsule Take 1 capsule (0.4 mg) by mouth every evening. 90 capsule 1    Tirzepatide (MOUNJARO) 7.5 MG/0.5ML SOAJ Inject 0.5 mLs (7.5 mg) subcutaneously every 7 days. 2 mL 1    Tirzepatide 2.5 MG/0.5ML SOAJ Inject 0.5 mLs (2.5 mg) subcutaneously every 7 days. 2 mL 1    triamcinolone (KENALOG) 0.1 % external cream Apply topically 2 times daily. Use on chest and back 454 g 0    Vitamin D-Vitamin K (D3 + K2 PO) Take 1 tablet by mouth daily. 3000 international unit(s)/115 mcg      acyclovir (ZOVIRAX) 400 MG tablet Take 2 tablets (800 mg) by mouth 2 times daily. 360 tablet 0     No current facility-administered medications for this visit.      Past Medical History:   Patient Active Problem List   Diagnosis    Ankylosing spondylitis lumbar region (H)    Autoinflammatory syndrome (H)    Macdonald's esophagus without dysplasia    Bilateral sacroiliitis    Essential hypertension    H/O: gout    History of agent Orange exposure    History of kidney donation    Hyperlipidemia    Myelodysplastic syndrome (H)    Obstructive sleep apnea syndrome    Obesity (BMI 30-39.9)    Type 2 diabetes mellitus without complication, without long-term current use of insulin (H)    Glaucoma    MDS (myelodysplastic syndrome) (H)     Stem cells transplant status (H)       History reviewed. No pertinent past medical history.    CC: Primary Care Provider: Tanvir Max or Referring Provider: Referred Self

## 2025-04-05 RX ORDER — OXYBUTYNIN CHLORIDE 5 MG/1
5 TABLET, EXTENDED RELEASE ORAL DAILY
Qty: 90 TABLET | Refills: 1 | OUTPATIENT
Start: 2025-04-05

## 2025-04-09 ENCOUNTER — LAB (OUTPATIENT)
Dept: LAB | Facility: CLINIC | Age: 75
End: 2025-04-09
Payer: MEDICARE

## 2025-04-09 DIAGNOSIS — Z94.81 STATUS POST BONE MARROW TRANSPLANT (H): ICD-10-CM

## 2025-04-09 LAB
ALBUMIN SERPL BCG-MCNC: 4.4 G/DL (ref 3.5–5.2)
ALP SERPL-CCNC: 116 U/L (ref 40–150)
ALT SERPL W P-5'-P-CCNC: 28 U/L (ref 0–70)
ANION GAP SERPL CALCULATED.3IONS-SCNC: 12 MMOL/L (ref 7–15)
AST SERPL W P-5'-P-CCNC: 21 U/L (ref 0–45)
BILIRUB SERPL-MCNC: 0.4 MG/DL
BUN SERPL-MCNC: 23.4 MG/DL (ref 8–23)
CALCIUM SERPL-MCNC: 10.2 MG/DL (ref 8.8–10.4)
CHLORIDE SERPL-SCNC: 102 MMOL/L (ref 98–107)
CREAT SERPL-MCNC: 1.29 MG/DL (ref 0.67–1.17)
EGFRCR SERPLBLD CKD-EPI 2021: 57 ML/MIN/1.73M2
GLUCOSE SERPL-MCNC: 85 MG/DL (ref 70–99)
HCO3 SERPL-SCNC: 23 MMOL/L (ref 22–29)
POTASSIUM SERPL-SCNC: 4.1 MMOL/L (ref 3.4–5.3)
PROT SERPL-MCNC: 6.6 G/DL (ref 6.4–8.3)
SODIUM SERPL-SCNC: 137 MMOL/L (ref 135–145)

## 2025-04-09 PROCEDURE — 80053 COMPREHEN METABOLIC PANEL: CPT

## 2025-04-09 PROCEDURE — 36415 COLL VENOUS BLD VENIPUNCTURE: CPT

## 2025-04-10 DIAGNOSIS — D46.9 MDS (MYELODYSPLASTIC SYNDROME) (H): Primary | ICD-10-CM

## 2025-04-14 ENCOUNTER — TELEPHONE (OUTPATIENT)
Dept: CARDIOLOGY | Facility: CLINIC | Age: 75
End: 2025-04-14
Payer: MEDICARE

## 2025-04-14 NOTE — TELEPHONE ENCOUNTER
Left Voicemail (1st Attempt) and Sent Mychart (1st Attempt) for the patient to call back and schedule the following:    Appointment type: RTN CARDIO  Provider: AYAN  Return date: AUGUST 2025 OR AFTER  Specialty phone number: [phone] OPT 1  Additional appointment(s) needed: echo prior  Additonal Notes: 9 MONTH FOLLOW-UP. OKAY TO OFFER VIRTUAL OR VINNY IF AYAN IN PERSON IS FULL/PT UNABLE TO SCHEDULE. OKAY TO ADD TO WAITLIST.

## 2025-04-16 ENCOUNTER — ONCOLOGY VISIT (OUTPATIENT)
Dept: TRANSPLANT | Facility: CLINIC | Age: 75
End: 2025-04-16
Attending: STUDENT IN AN ORGANIZED HEALTH CARE EDUCATION/TRAINING PROGRAM
Payer: MEDICARE

## 2025-04-16 VITALS
HEART RATE: 69 BPM | BODY MASS INDEX: 29.41 KG/M2 | TEMPERATURE: 97.8 F | OXYGEN SATURATION: 97 % | SYSTOLIC BLOOD PRESSURE: 121 MMHG | DIASTOLIC BLOOD PRESSURE: 68 MMHG | WEIGHT: 222.9 LBS

## 2025-04-16 DIAGNOSIS — D46.9 MDS (MYELODYSPLASTIC SYNDROME) (H): ICD-10-CM

## 2025-04-16 DIAGNOSIS — I48.91 ATRIAL FIBRILLATION, UNSPECIFIED TYPE (H): ICD-10-CM

## 2025-04-16 DIAGNOSIS — D46.9 MYELODYSPLASTIC SYNDROME (H): ICD-10-CM

## 2025-04-16 PROCEDURE — G0463 HOSPITAL OUTPT CLINIC VISIT: HCPCS | Performed by: STUDENT IN AN ORGANIZED HEALTH CARE EDUCATION/TRAINING PROGRAM

## 2025-04-16 PROCEDURE — 99215 OFFICE O/P EST HI 40 MIN: CPT | Performed by: STUDENT IN AN ORGANIZED HEALTH CARE EDUCATION/TRAINING PROGRAM

## 2025-04-16 RX ORDER — AMLODIPINE BESYLATE 10 MG/1
10 TABLET ORAL DAILY
COMMUNITY
Start: 2025-04-07 | End: 2025-04-16

## 2025-04-16 RX ORDER — SULFAMETHOXAZOLE AND TRIMETHOPRIM 800; 160 MG/1; MG/1
1 TABLET ORAL
Qty: 28 TABLET | Refills: 3 | Status: SHIPPED | OUTPATIENT
Start: 2025-04-16

## 2025-04-16 ASSESSMENT — PAIN SCALES - GENERAL: PAINLEVEL_OUTOF10: NO PAIN (0)

## 2025-04-16 NOTE — LETTER
4/16/2025      Arpit Forrest  [id number] [facility] [address]      Dear Colleague,    Thank you for referring your patient, Arpit Forrest, to the [facility] BLOOD AND MARROW TRANSPLANT PROGRAM [place]. Please see a copy of my visit note below.    BMT Clinic Note  Apr 16, 2025     Patient ID: Arpit Forrest is a 74 year old male who is day +252 post allogenic stem cell transplant for MDS-EB1     Diagnosis MDS-EB1 BMT type Allogenic  CMV  Donor -  Recipient -    Prep ELIEZER (Flu/Cy/TBI) Donor type  8/8 URD ABO D/R O- (matched)   GVHD ppx PTCy, siro, MMF Graft source PBSCT Toxo IgG Negative   Protocol [id number] CD34/kg 6.12E+06    BMT MD Nena Fowler      Pre-transplant disease history  Referring provider:  Dr. Beckwith, [facility]      Diagnosis:   MDS-IB1. IPSS-M: very high risk 1.98. Normal cytogenetics. NGS peripheral blood (4/18/24) showed mutations in BCOR (56%), BCORL1 (11%), DNMT3A (34%), IDH2 (9%), SRSF2 (33%), STAG2 (9%), TET2 (2%), two RUNX1 mutations.   CD5+ Monoclonal B Lymphocytosis (MBL), chronic lymphocytic leukemia type  Recurrent febrile episodes, UBA1 negative, resolved with treatment of MDS    Treatment  3/21/24 to 6/19/24: Decitabine 20mg/m2 for 5 days x 4 cycles    Post transplant  # A.fib with RVR on 8/21/24 which converted to sinus rhythm spontaneously. 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 Max grade 1 (face, chest and upper back). Skin biopsy (9/1224) showed interface dermatitis suggestive of GVHD. Improved with topical steroids.       INTERVAL HISTORY     Arpit presents for a scheduled BMT visit. Overall doing well.   - Ongoing chronic fatigue persisting since MDS diagnosis, stable  - Was seen by dermatology for follow up and diagnosed with papulopustular rosacea and demodex folliculitis. Intermittent papules on face and neck, cleared on chest and back, crusty lesions in hair, comes and goes. Uses sulfa soap and  ivermectin, which helps manage but does not completely resolve it.  - Blood pressure recently managed with medication adjustments; metoprolol reduced, amlodipine discontinued. Has lost weight intentionally.     Plan  - NGS at 6 month marrow detected a TET2 mutation at low VAF previously present before the transplant. The significance is unclear, could be a CHIP mutation. No abnormal cells or dysplasia seen in pathology. Donor cells in blood remain at 100%. Schedule another bone marrow biopsy around May 2025 to monitor the mutation and assess potential disease relapse. Too early post transplant to make a determination that MDS has been 'cured'.   - Chronic fatigue is likely due to MDS and transplant, usually persistent and in some cases resolves over time.Can consider checking AM cortisol.   - Continue taking acyclovir 800 mg twice a day and Bactrim (sulfamethoxazole) Mondays and Tuesdays twice a day. Refill Bactrim prescription at [facility].  - Consult cardiologist regarding aspirin use and potential echocardiogram.   - RTC after BMBx    Current Outpatient Medications   Medication Sig Dispense Refill     acyclovir (ZOVIRAX) 400 MG tablet Take 2 tablets (800 mg) by mouth 2 times daily. 360 tablet 0     allopurinol (ZYLOPRIM) 300 MG tablet Take 1 tablet (300 mg) by mouth daily. 60 tablet 1     aspirin 81 MG EC tablet Take 1 tablet (81 mg) by mouth daily. 60 tablet 1     atorvastatin (LIPITOR) 10 MG tablet Take 1 tablet (10 mg) by mouth every morning. 60 tablet 1     brimonidine (ALPHAGAN) 0.2 % ophthalmic solution Place 1 drop into both eyes 2 times daily.       calcium carbonate-vitamin D (CALTRATE) 600-10 MG-MCG per tablet Take 1 tablet by mouth daily. 60 tablet 1     fluticasone (FLONASE) 50 MCG/ACT nasal spray Spray 1 spray into both nostrils as needed.       hydrocortisone 2.5 % cream Apply topically 2 times daily. Use on face and scalp 30 g 0     ivermectin (SOOLANTRA) 1 % cream Apply to the affected areas of  the face and chest once daily. Use a small amount, spread as a thin layer, avoiding the eyes and lips. 45 g 3     latanoprost (XALATAN) 0.005 % ophthalmic solution INSTILL 2 DROPS IN BOTH EYES AT BEDTIME       lisinopril (ZESTRIL) 20 MG tablet Take 20 mg by mouth 2 times daily.       Lutein-Zeaxanthin 25-5 MG CAPS Take 1 capsule by mouth daily       metFORMIN (GLUCOPHAGE XR) 500 MG 24 hr tablet Take by mouth. 500 mg in the morning and 1000 mg in the evening       metoprolol tartrate (LOPRESSOR) 25 MG tablet Take 1 tablet (25 mg) by mouth 2 times daily. (Patient taking differently: Take 50 mg by mouth 2 times daily.) 60 tablet 1     metroNIDAZOLE (METROGEL) 0.75 % external gel Apply topically 2 times daily. 45 g 3     MOUNJARO 10 MG/0.5ML SOAJ Inject 0.5 mLs (10 mg) subcutaneously every 7 days. 2 mL 1     pantoprazole (PROTONIX) 40 MG EC tablet Take 1 tablet (40 mg) by mouth every morning (before breakfast). 60 tablet 1     psyllium (METAMUCIL/KONSYL) capsule Take 3 capsules by mouth daily. (Patient taking differently: Take 1-2 capsules by mouth as needed.) 270 capsule 3     sulfamethoxazole-trimethoprim (BACTRIM DS) 800-160 MG tablet Take 1 tablet by mouth Every Mon, Tues two times daily. Do not begin taking until instructed to do so by BMT clinic. 28 tablet 1     tamsulosin (FLOMAX) 0.4 MG capsule Take 1 capsule (0.4 mg) by mouth every evening. 90 capsule 1     Tirzepatide (MOUNJARO) 7.5 MG/0.5ML SOAJ Inject 0.5 mLs (7.5 mg) subcutaneously every 7 days. 2 mL 1     Tirzepatide 2.5 MG/0.5ML SOAJ Inject 0.5 mLs (2.5 mg) subcutaneously every 7 days. 2 mL 1     triamcinolone (KENALOG) 0.1 % external cream Apply topically 2 times daily. Use on chest and back 454 g 0     Vitamin D-Vitamin K (D3 + K2 PO) Take 1 tablet by mouth daily. 3000 international unit(s)/115 mcg          EXAM      There were no vitals taken for this visit.    Wt Readings from Last 4 Encounters:   02/19/25 104 kg (229 lb 3.2 oz)   02/12/25 103.6 kg  (228 lb 4.8 oz)   01/22/25 104.9 kg (231 lb 4.8 oz)   12/18/24 110.1 kg (242 lb 12.8 oz)     KPS: 80% Can perform normal activity with effort, some signs of disease    General: Alert, awake; NAD  HEENT: atraumatic, sclera anicteric.   Skin: Scattered rash over face and neck  MSK: No pitting edema in bilateral LE.      Laboratory Studies:     Blood Counts  Recent Labs   Lab Test 03/12/25  1314 02/12/25  1019 01/22/25  1553 09/17/24  0809 09/12/24  0950 09/10/24  1114 09/06/24  1257   WBC 6.2 7.0 8.0   < > 3.3* 2.8* 2.8*   HGB 14.1 14.1 13.9   < > 9.4* 9.6* 9.3*    166 180   < > 123* 96* 89*   NEUTROPHIL 63 66 73   < > 67 71 70   ANEU  --   --   --   --  2.2 2.0 2.0   LYMPH 24 23 18   < > 19 1 10   ALYM  --   --   --   --  0.6* 0.0* 0.3*    < > = values in this interval not displayed.       Basic Panel  Recent Labs   Lab Test 04/09/25  1345 03/12/25  1314 02/12/25  1019    139 139   POTASSIUM 4.1 4.4 4.8   CHLORIDE 102 106 108*   CO2 23 24 22   BUN 23.4* 22.7 20.3   CR 1.29* 1.34* 1.40*   GLC 85 90 98      LFTs  Recent Labs   Lab Test 04/09/25  1345 03/12/25  1314 02/12/25  1019   ALKPHOS 116 129 123   AST 21 26 24   ALT 28 36 27   BILITOTAL 0.4 0.4 0.3   ALBUMIN 4.4 4.5 4.3       Recent Labs   Lab Test 01/22/25  1553 01/13/25  1315 12/30/24  1309   CMVQNT Not Detected Not Detected Not Detected       Recent Labs   Lab Test 03/12/25  1314 01/13/25  1315 09/25/24  1211 09/12/24  0950    836 534* 467*       Recent Labs   Lab Test 02/12/25  1022 07/22/24  0957   QUINCY 202 295        ASSESSMENT AND PLAN     BMT for high risk MDS: D+252  - Chemo protocol: AR2162-84; Flu, Cy, TBI  - Peripheral blood stem cell graft from 8/8 donor, ABO matched (O neg)  - Continue allopurinol (hx of gout)    Disease status:   - 8/30/24 (D28): Slightly hypercellular marrow (30-40%). No evidence of myeloid neoplasm or monoclonal B-lymphocytosis. Normal male karyotype. NGS negative for DNMT3A (other mutations not tested)  -  11/15/24 (D100): Hypercellular marrow (40%) in remission. NGS negative.   - 2/12/25 (D180): Hypercellular marrow (50%) in remission. NGS TET2  at 6%    Graft status/chimerism:   - 8/30/24 (D28): Bone marrow 100%. Peripheral blood CD33 100%, CD3 84%  - 9/12/24: Peripheral blood CD3 96%, CD33 100%  - 9/25/24: Peripheral blood CD3 100%, CD33 100%  - 11/15/24 (D100): Bone marrow 100%. Peripheral blood CD33 100%, CD3 100%  - 2/12/25 (D180): Bone marrow 100%. Peripheral blood CD33 100%, CD3 100%  - 4/9/25: Peripheral blood CD33 100%, CD3 100%    Maintenance  None    GVHD  # Current immunosuppression is none  - Completed Siro taper on 12/01/2024    # GVHD  Skin: Skin score 0. Skin rash is likely due to rosacea and demodex folliculitis.   Mouth: No symptoms. Mouth score 0.  Eyes: No symptoms. Eye score 0.  GI: No symptoms. GI score 0.  Liver: LFTs normal. Liver score 0.  Pulm: No symptoms. Lung score 0.  Joints: No symptoms. Joint score 0.    HEME/ COAG  G-CSF last given on 8/22/4.  ABO matched.    ID  # Prophylaxis  PJP/ Toxo IgG negative: Bactrim.  Viral: Acyclovir 800 mg bid     # Monitoring  CMV: Monitor till D180. Negative.   EBV: Monitor every 2 weeks between D30 to D180. Negative.   IgG: IgG 836 (1/13/25).     DERM  Was seen by dermatology for follow up and diagnosed with papulopustular rosacea and demodex folliculitis.  Per derm notes - facial rash that mimics GVHD may be related to worsening papulopustular rosacea and Demodex infestation.  Mechanism is unknown, but one could assume that the immune system is no longer tolerating normal Demodex commensal.   - Managed with topical ivermectin and sulfa soap    GI  # Hepatic steatosis and borderline iron deposition in liver  - VOD prophy: Ursodiol completed    # Supportive   - GERD: PPI  - Alternating constipation and diarrhea: continue psyllium and imodium PRN    CARDIOVASCULAR  # Asymptomatic moderate aortic stenosis    # A.fib with RVR   - First episode of A.fib  with RVR prior to transplant in the context of admission with febrile episode on 3/15/24. Noted on his fitbit, not very symptomatic. DCCV on 3/21/24 restored normal sinus rhythm. Amiodarone and anticoagulation was stopped in April 2024 without any recurrence.   -Second episode while inpatient on 8/21/24 which converted to normal sinus rhythm spontaneously. Was started on metoprolol.   - Has been seen by cardiology on 11/11/24. No anticoagulation needed.      # CAD  - Coronary artery calcifications.   - Continue atorvastatin 10mg,   - He is not taking ASA 81mg, have advised him to discuss with his cardiologist  - Will need an ischemia evaluation in the long term. He is to follow up with cardiology in May 2025 with a repeat ECHO.     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     RESP  # VINCENT : CPAP     OPHTHALMOLOGY:  # Glaucoma - Lutein supplement; brimonidine, latanaprost eye drops.   # Right eye floaters: Following retinal specialist      MUSCULOSKELETAL/FRAILTY  # Gout: Continue allopurinol indefinitely.     Health Maintenance   DEXA and Vitamin D: At 1-year post transplant   PFT: At 1 year post transplant. Repeat if symptomatic.   Thyroid: Check TSH and free T4 at 1 year post-transplant, then yearly, through primary physician.   Gonadal: Check testosterone at 1 year post-transplant, then yearly, through primary physician.   Lipids: Check lipid panel q6-12 months through primary physician.  Skin: Annual skin checks through Dermatology; d/w patient.  Dental visit at 1 year   Iron overload: Check Ferritin at 1 year    Post-transplant vaccinations  Flu and COVID done  Has received RSV    I  spent 45 minutes in the care of this patient today, which included time necessary for preparation for the visit, obtaining history, ordering medications/tests/procedures as medically indicated, review of pertinent medical literature, counseling of the patient, communication of recommendations to the care team, and documentation time.     Nena Fowler  Department of Hematology, Oncology and Transplantation  Text via [employer]         Again, thank you for allowing me to participate in the care of your patient.        Sincerely,        GASPER Rios    Electronically signed

## 2025-04-16 NOTE — NURSING NOTE
"Oncology Rooming Note    April 16, 2025 3:28 PM   Arpti Forrest is a 74 year old male who presents for:    Chief Complaint   Patient presents with    Oncology Clinic Visit     Myelodysplastic syndrome     Initial Vitals: /68 (BP Location: Right arm, Patient Position: Sitting, Cuff Size: Adult Regular)   Pulse 69   Temp 97.8  F (36.6  C) (Oral)   Wt 101.1 kg (222 lb 14.4 oz)   SpO2 97%   BMI 29.41 kg/m   Estimated body mass index is 29.41 kg/m  as calculated from the following:    Height as of 11/22/24: 1.854 m (6' 1\").    Weight as of this encounter: 101.1 kg (222 lb 14.4 oz). Body surface area is 2.28 meters squared.  No Pain (0) Comment: Data Unavailable   No LMP for male patient.  Allergies reviewed: Yes  Medications reviewed: Yes    Medications: MEDICATION REFILLS NEEDED TODAY. Provider was notified.  Pharmacy name entered into [employer]: [facility] #[id number] - [facility] [address] AT [facility] [place] [address]    Frailty Screening:   Is the patient here for a new oncology consult visit in cancer care? 2. No    PHQ9:  Did this patient require a PHQ9?: No      Clinical concerns: pt needs bactrim refilled, if this is a medication that he will continue taking. Pt is also concerned about the abnormality found on his last bmbx - unsure what's abnormal or to be expected. Pt also has questions about vaccination      Joliet Kristen              "

## 2025-04-21 DIAGNOSIS — E11.9 TYPE 2 DIABETES MELLITUS WITHOUT COMPLICATION, WITHOUT LONG-TERM CURRENT USE OF INSULIN (H): ICD-10-CM

## 2025-04-21 RX ORDER — METOPROLOL TARTRATE 25 MG/1
25 TABLET, FILM COATED ORAL DAILY
Status: SHIPPED
Start: 2025-04-21

## 2025-04-24 RX ORDER — TIRZEPATIDE 10 MG/.5ML
10 INJECTION, SOLUTION SUBCUTANEOUS
Qty: 2 ML | Refills: 1 | Status: SHIPPED | OUTPATIENT
Start: 2025-04-24

## 2025-04-24 NOTE — TELEPHONE ENCOUNTER
MOUNJARO 10 MG/0.5ML SOAJ auto-injector pen   Last Written Prescription:  1/29/25  ----------------------  Last Visit Date: 12/18/24  Future Visit Date: 6/18/25  ----------------------    Refill decision: Medication unable to be refilled by RN due to: Other: Last order was dose increase, please review    Request from pharmacy:  Requested Prescriptions   Pending Prescriptions Disp Refills    MOUNJARO 10 MG/0.5ML SOAJ auto-injector pen 2 mL 1     Sig: Inject 0.5 mLs (10 mg) subcutaneously every 7 days.       GLP-1 Agonists Protocol Failed - 4/24/2025 11:54 AM        Failed - Has GFR on file in past 12 months and most recent value is normal        Passed - HgbA1C in past 3 or 6 months     If HgbA1C is 8 or greater, it needs to be on file within the past 3 months.  If less than 8, must be on file within the past 6 months.     Recent Labs   Lab Test 03/12/25  1314   A1C 5.5                          Passed - Recent (6 mo) or future (90 days) visit within the authorizing provider's specialty     The patient must have completed an in-person or virtual visit within the past 6 months or has a future visit scheduled within the next 90 days with the authorizing provider s specialty.  Urgent care and e-visits do not quality as an office visit for this protocol.          Passed - Medication indicated for associated diagnosis     Medication is associated with one or more of the following diagnoses:     Type 2 diabetes mellitus           Passed - Patient is age 18 or older

## 2025-04-29 DIAGNOSIS — Z94.81 STATUS POST BONE MARROW TRANSPLANT (H): ICD-10-CM

## 2025-04-29 DIAGNOSIS — D46.9 MDS (MYELODYSPLASTIC SYNDROME) (H): Primary | ICD-10-CM

## 2025-05-07 ENCOUNTER — LAB (OUTPATIENT)
Dept: LAB | Facility: CLINIC | Age: 75
End: 2025-05-07
Payer: MEDICARE

## 2025-05-07 DIAGNOSIS — Z94.81 STATUS POST BONE MARROW TRANSPLANT (H): ICD-10-CM

## 2025-05-07 DIAGNOSIS — D46.9 MDS (MYELODYSPLASTIC SYNDROME) (H): ICD-10-CM

## 2025-05-07 DIAGNOSIS — E11.9 TYPE 2 DIABETES MELLITUS WITHOUT COMPLICATION, WITHOUT LONG-TERM CURRENT USE OF INSULIN (H): ICD-10-CM

## 2025-05-07 LAB
ALBUMIN SERPL BCG-MCNC: 4.3 G/DL (ref 3.5–5.2)
ALP SERPL-CCNC: 118 U/L (ref 40–150)
ALT SERPL W P-5'-P-CCNC: 31 U/L (ref 0–70)
ANION GAP SERPL CALCULATED.3IONS-SCNC: 12 MMOL/L (ref 7–15)
AST SERPL W P-5'-P-CCNC: 28 U/L (ref 0–45)
BASOPHILS # BLD AUTO: 0 10E3/UL (ref 0–0.2)
BASOPHILS NFR BLD AUTO: 0 %
BILIRUB SERPL-MCNC: 0.4 MG/DL
BUN SERPL-MCNC: 20.5 MG/DL (ref 8–23)
CALCIUM SERPL-MCNC: 10.2 MG/DL (ref 8.8–10.4)
CHLORIDE SERPL-SCNC: 106 MMOL/L (ref 98–107)
CREAT SERPL-MCNC: 1.16 MG/DL (ref 0.67–1.17)
EGFRCR SERPLBLD CKD-EPI 2021: 65 ML/MIN/1.73M2
EOSINOPHIL # BLD AUTO: 0.2 10E3/UL (ref 0–0.7)
EOSINOPHIL NFR BLD AUTO: 3 %
ERYTHROCYTE [DISTWIDTH] IN BLOOD BY AUTOMATED COUNT: 15.9 % (ref 10–15)
EST. AVERAGE GLUCOSE BLD GHB EST-MCNC: 105 MG/DL
GLUCOSE SERPL-MCNC: 90 MG/DL (ref 70–99)
HBA1C MFR BLD: 5.3 % (ref 0–5.6)
HCO3 SERPL-SCNC: 23 MMOL/L (ref 22–29)
HCT VFR BLD AUTO: 39.6 % (ref 40–53)
HGB BLD-MCNC: 13.7 G/DL (ref 13.3–17.7)
IMM GRANULOCYTES # BLD: 0 10E3/UL
IMM GRANULOCYTES NFR BLD: 0 %
LYMPHOCYTES # BLD AUTO: 1.6 10E3/UL (ref 0.8–5.3)
LYMPHOCYTES NFR BLD AUTO: 23 %
MCH RBC QN AUTO: 31.6 PG (ref 26.5–33)
MCHC RBC AUTO-ENTMCNC: 34.6 G/DL (ref 31.5–36.5)
MCV RBC AUTO: 91 FL (ref 78–100)
MONOCYTES # BLD AUTO: 0.6 10E3/UL (ref 0–1.3)
MONOCYTES NFR BLD AUTO: 9 %
NEUTROPHILS # BLD AUTO: 4.3 10E3/UL (ref 1.6–8.3)
NEUTROPHILS NFR BLD AUTO: 64 %
PLATELET # BLD AUTO: 155 10E3/UL (ref 150–450)
POTASSIUM SERPL-SCNC: 4.4 MMOL/L (ref 3.4–5.3)
PROT SERPL-MCNC: 6.7 G/DL (ref 6.4–8.3)
RBC # BLD AUTO: 4.34 10E6/UL (ref 4.4–5.9)
SODIUM SERPL-SCNC: 141 MMOL/L (ref 135–145)
WBC # BLD AUTO: 6.6 10E3/UL (ref 4–11)

## 2025-05-07 PROCEDURE — 83036 HEMOGLOBIN GLYCOSYLATED A1C: CPT

## 2025-05-07 PROCEDURE — 36415 COLL VENOUS BLD VENIPUNCTURE: CPT

## 2025-05-07 PROCEDURE — 85025 COMPLETE CBC W/AUTO DIFF WBC: CPT

## 2025-05-07 PROCEDURE — 82784 ASSAY IGA/IGD/IGG/IGM EACH: CPT

## 2025-05-07 PROCEDURE — 80053 COMPREHEN METABOLIC PANEL: CPT

## 2025-05-08 LAB — IGG SERPL-MCNC: 763 MG/DL (ref 610–1616)

## 2025-05-17 ENCOUNTER — MYC REFILL (OUTPATIENT)
Dept: TRANSPLANT | Facility: CLINIC | Age: 75
End: 2025-05-17
Payer: MEDICARE

## 2025-05-17 DIAGNOSIS — D46.9 MYELODYSPLASTIC SYNDROME (H): ICD-10-CM

## 2025-05-19 DIAGNOSIS — E11.9 TYPE 2 DIABETES MELLITUS WITHOUT COMPLICATION, WITHOUT LONG-TERM CURRENT USE OF INSULIN (H): ICD-10-CM

## 2025-05-19 RX ORDER — PANTOPRAZOLE SODIUM 40 MG/1
40 TABLET, DELAYED RELEASE ORAL
Qty: 60 TABLET | Refills: 1 | OUTPATIENT
Start: 2025-05-19

## 2025-05-19 RX ORDER — TIRZEPATIDE 10 MG/.5ML
10 INJECTION, SOLUTION SUBCUTANEOUS
Qty: 2 ML | Refills: 0 | Status: SHIPPED | OUTPATIENT
Start: 2025-05-19

## 2025-05-19 NOTE — TELEPHONE ENCOUNTER
[facility] Call Center    Phone Message    May a detailed message be left on voicemail: yes     Reason for Call: Medication Refill Request    Has the patient contacted the pharmacy for the refill? Yes   Name of medication being requested: MOUNJARO 10 MG/0.5ML SOAJ auto-injector pen   Provider who prescribed the medication: Markus Griffin MD  Pharmacy: [facility] #[id number]  [place], [place] - [address] AT [facility] OF [address] [address]   Date medication is needed: 5/24/2025    Action Taken: Other: Endo    Travel Screening: Not Applicable     Date of Service:

## 2025-05-23 ENCOUNTER — APPOINTMENT (OUTPATIENT)
Dept: LAB | Facility: CLINIC | Age: 75
End: 2025-05-23
Attending: STUDENT IN AN ORGANIZED HEALTH CARE EDUCATION/TRAINING PROGRAM
Payer: MEDICARE

## 2025-05-23 PROCEDURE — 81450 HL NEO GSAP 5-50DNA/DNA&RNA: CPT | Performed by: STUDENT IN AN ORGANIZED HEALTH CARE EDUCATION/TRAINING PROGRAM

## 2025-05-23 PROCEDURE — G0452 MOLECULAR PATHOLOGY INTERPR: HCPCS | Mod: 26 | Performed by: PATHOLOGY

## 2025-05-30 ENCOUNTER — OFFICE VISIT (OUTPATIENT)
Dept: TRANSPLANT | Facility: CLINIC | Age: 75
End: 2025-05-30
Attending: STUDENT IN AN ORGANIZED HEALTH CARE EDUCATION/TRAINING PROGRAM
Payer: MEDICARE

## 2025-05-30 VITALS
DIASTOLIC BLOOD PRESSURE: 75 MMHG | OXYGEN SATURATION: 99 % | BODY MASS INDEX: 28.52 KG/M2 | SYSTOLIC BLOOD PRESSURE: 130 MMHG | HEART RATE: 69 BPM | RESPIRATION RATE: 12 BRPM | WEIGHT: 216.2 LBS | TEMPERATURE: 97.6 F

## 2025-05-30 DIAGNOSIS — D46.9 MYELODYSPLASTIC SYNDROME (H): ICD-10-CM

## 2025-05-30 DIAGNOSIS — Z94.81 STATUS POST BONE MARROW TRANSPLANT (H): Primary | ICD-10-CM

## 2025-05-30 PROCEDURE — 3075F SYST BP GE 130 - 139MM HG: CPT | Performed by: STUDENT IN AN ORGANIZED HEALTH CARE EDUCATION/TRAINING PROGRAM

## 2025-05-30 PROCEDURE — G0463 HOSPITAL OUTPT CLINIC VISIT: HCPCS | Performed by: STUDENT IN AN ORGANIZED HEALTH CARE EDUCATION/TRAINING PROGRAM

## 2025-05-30 PROCEDURE — 1126F AMNT PAIN NOTED NONE PRSNT: CPT | Performed by: STUDENT IN AN ORGANIZED HEALTH CARE EDUCATION/TRAINING PROGRAM

## 2025-05-30 PROCEDURE — 3078F DIAST BP <80 MM HG: CPT | Performed by: STUDENT IN AN ORGANIZED HEALTH CARE EDUCATION/TRAINING PROGRAM

## 2025-05-30 PROCEDURE — 99214 OFFICE O/P EST MOD 30 MIN: CPT | Performed by: STUDENT IN AN ORGANIZED HEALTH CARE EDUCATION/TRAINING PROGRAM

## 2025-05-30 RX ORDER — LISINOPRIL 10 MG/1
20 TABLET ORAL 2 TIMES DAILY
Qty: 360 TABLET | Refills: 0 | Status: SHIPPED | OUTPATIENT
Start: 2025-05-30 | End: 2025-08-28

## 2025-05-30 ASSESSMENT — PAIN SCALES - GENERAL: PAINLEVEL_OUTOF10: NO PAIN (0)

## 2025-05-30 NOTE — NURSING NOTE
"Oncology Rooming Note    May 30, 2025 12:02 PM   Arpit Forrest is a 74 year old male who presents for:    Chief Complaint   Patient presents with    Oncology Clinic Visit     Myelodysplastic syndrome      Initial Vitals: /75 (BP Location: Right arm, Patient Position: Sitting, Cuff Size: Adult Large)   Pulse 69   Temp 97.6  F (36.4  C) (Oral)   Resp 12   Wt 98.1 kg (216 lb 3.2 oz)   SpO2 99%   BMI 28.52 kg/m   Estimated body mass index is 28.52 kg/m  as calculated from the following:    Height as of 11/22/24: 1.854 m (6' 1\").    Weight as of this encounter: 98.1 kg (216 lb 3.2 oz). Body surface area is 2.25 meters squared.  No Pain (0) Comment: Data Unavailable   No LMP for male patient.  Allergies reviewed: Yes  Medications reviewed: Yes    Medications: Medication refills not needed today.  Pharmacy name entered into [facility]: [facility] #[id number] - [place], [place] - [address] AT [facility] [address] [place] [address]    Frailty Screening:   Is the patient here for a new oncology consult visit in cancer care? 2. No    PHQ9:  Did this patient require a PHQ9?: No      Clinical concerns: Wondering if he can lower his lisinopril to the 10mg ; would like to discuss his upcoming appt with cardiologist      Maggy Franklin              "

## 2025-05-30 NOTE — LETTER
5/30/2025      Arpit Forrest  [id number] [place] [address]      Dear Colleague,    Thank you for referring your patient, Arpit Forrest, to the [facility] [place]. Please see a copy of my visit note below.    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 Beckwith, [facility]      Diagnosis:   MDS-IB1. IPSS-M: very high risk 1.98. Normal cytogenetics. NGS peripheral blood (4/18/24) showed mutations in BCOR (56%), BCORL1 (11%), DNMT3A (34%), IDH2 (9%), SRSF2 (33%), STAG2 (9%), TET2 (2%), two RUNX1 mutations.   CD5+ Monoclonal B Lymphocytosis (MBL), chronic lymphocytic leukemia type  Recurrent febrile episodes, UBA1 negative, resolved with treatment of MDS    Treatment  3/21/24 to 6/19/24: Decitabine 20mg/m2 for 5 days x 4 cycles    Post transplant  # A.fib with RVR on 8/21/24 which converted to sinus rhythm spontaneously. 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 Max grade 1 (face, chest and upper back). Skin biopsy (9/1224) showed interface dermatitis suggestive of GVHD. Improved with topical steroids.       INTERVAL HISTORY     Arpit presents for a scheduled BMT visit. Overall doing well.     He has intentionally lost 60lbs since diagnosis. HTN is managed by PCP, currently only on lisinopril 10mg BID. No new skin rashes, face rash has almost cleared with using ivermectin.     Plan  - 5/23/25: BMBx shows hypercellular marrow (50%) with ongoing remission. 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 Bone marrow 100% donor, Peripheral blood CD3 and CD33 100%.  - Labs monthly  - 1 year survivorship at next visit   - Has seen dental, eye and has a follow up with cardiology.       History of Present Illness-  Arpit A White, 74 years old, male    - Previously discussed bone marrow biopsy showed no abnormal cells, but a CHIP mutation reappeared post-transplant.  - Blood counts have been stable, with 100% donor cells in bone marrow and blood.  - Blood pressure was previously elevated at 220/110, managed with lisinopril, metoprolol, and amlodipine; currently stable at 130/60 with lisinopril 10 mg twice daily.  - Experienced dizziness with previous blood pressure medications.  - Lost approximately 60 lbs since starting treatment.  - No new skin rashes; facial condition improving with reduced ivermectin use.  - Occasional Mounjaro-induced nausea, but appetite remains present.  - No breathing difficulties, joint issues, or palpitations reported.  - Undergoing regular follow-ups with a retina specialist and cardiologist.     Physical Exam  - HEENT: No dryness or grittiness in eyes, no irritation or redness. Mouth without sores or sensitivity.  - CARDIOVASCULAR: Blood pressure 130/60 mmHg, no palpitations.  - MUSCULOSKELETAL: Overall joint situation improved, no difficulty in movement, no edema in fingers.  - SKIN: No new skin rashes, facial condition improving.     Results  - Bone marrow biopsy: No abnormal cells observed; 100% donor cells in bone marrow and blood.  - Hemoglobin: 14.1  - Molecular test: Pending results.     Assessment & Plan  Status post bone marrow transplant (H):  - Bone marrow biopsy showed no abnormal cells, but CHIP mutation reappeared. Mutation likely acquired with aging and may not increase relapse risk. Blood counts are good, donor cells at 100%, hemoglobin at 14.1. Awaiting molecular test results to determine if monitoring frequency needs adjustment.  - Monitor mutation  closely if present at low level. Schedule bone marrow biopsy in August 2025. Blood work once a month; cancel June 4, 2025, appointment. Lung function tests at one-year visit. DEXA scan due in 2026. Check thyroid and cholesterol levels annually. Continue seeing dermatologist annually due to increased skin cancer risk post-transplant. Follow-up with cardiologist on November 3, 2025, or earlier if needed.     Based on our discussion, I have outlined the following instructions for you:      - Keep an eye on the mutation if it shows up at a low level.  - Plan to have a bone marrow test in August 2025.  - Get your blood tested once a month, but skip the appointment on June 4, 2025.  - Have your lung function tested at your one-year check-up.  - Plan for a bone density scan in 2026.  - Get your thyroid and cholesterol levels checked once a year.  - Visit a skin doctor every year because there's a higher chance of skin cancer after your transplant.  - See your heart doctor on November 3, 2025, or sooner if you feel it's necessary.    Thank you again for your visit, and we look forward to supporting you in your journey to better health.           Current Outpatient Medications   Medication Sig Dispense Refill     allopurinol (ZYLOPRIM) 300 MG tablet Take 1 tablet (300 mg) by mouth daily. 60 tablet 1     atorvastatin (LIPITOR) 10 MG tablet Take 1 tablet (10 mg) by mouth every morning. 60 tablet 1     brimonidine (ALPHAGAN) 0.2 % ophthalmic solution Place 1 drop into both eyes 2 times daily.       calcium carbonate-vitamin D (CALTRATE) 600-10 MG-MCG per tablet Take 1 tablet by mouth daily. 60 tablet 1     fluticasone (FLONASE) 50 MCG/ACT nasal spray Spray 1 spray into both nostrils as needed.       ivermectin (SOOLANTRA) 1 % cream Apply to the affected areas of the face and chest once daily. Use a small amount, spread as a thin layer, avoiding the eyes and lips. 45 g 3     latanoprost (XALATAN) 0.005 % ophthalmic solution  INSTILL 2 DROPS IN BOTH EYES AT BEDTIME       lisinopril (ZESTRIL) 10 MG tablet Take 2 tablets (20 mg) by mouth 2 times daily. 360 tablet 0     Lutein-Zeaxanthin 25-5 MG CAPS Take 1 capsule by mouth daily       metFORMIN (GLUCOPHAGE XR) 500 MG 24 hr tablet Take by mouth. 500 mg in the morning and 1000 mg in the evening       MOUNJARO 10 MG/0.5ML SOAJ auto-injector pen Inject 0.5 mLs (10 mg) subcutaneously every 7 days. 2 mL 0     pantoprazole (PROTONIX) 40 MG EC tablet Take 1 tablet (40 mg) by mouth every morning (before breakfast). 60 tablet 1     sulfamethoxazole-trimethoprim (BACTRIM DS) 800-160 MG tablet Take 1 tablet by mouth Every Mon, Tues two times daily. Do not begin taking until instructed to do so by BMT clinic. 28 tablet 3     tamsulosin (FLOMAX) 0.4 MG capsule Take 1 capsule (0.4 mg) by mouth every evening. 90 capsule 1     Vitamin D-Vitamin K (D3 + K2 PO) Take 1 tablet by mouth daily. 3000 international unit(s)/115 mcg       acyclovir (ZOVIRAX) 400 MG tablet Take 2 tablets (800 mg) by mouth 2 times daily. 120 tablet 3     aspirin 81 MG EC tablet Take 1 tablet (81 mg) by mouth daily. (Patient not taking: Reported on 4/16/2025) 60 tablet 1        EXAM      /75 (BP Location: Right arm, Patient Position: Sitting, Cuff Size: Adult Large)   Pulse 69   Temp 97.6  F (36.4  C) (Oral)   Resp 12   Wt 98.1 kg (216 lb 3.2 oz)   SpO2 99%   BMI 28.52 kg/m      Wt Readings from Last 4 Encounters:   05/30/25 98.1 kg (216 lb 3.2 oz)   05/23/25 98 kg (216 lb)   04/16/25 101.1 kg (222 lb 14.4 oz)   02/19/25 104 kg (229 lb 3.2 oz)     KPS: 80% Can perform normal activity with effort, some signs of disease    General: Alert, awake; NAD  HEENT: No mouth sores   Skin: No rashes  MSK: No pitting edema in bilateral LE.      Laboratory Studies:     Blood Counts  Recent Labs   Lab Test 05/23/25  1156 05/07/25  1311 03/12/25  1314   WBC 10.2 6.6 6.2   HGB 14.1 13.7 14.1    155 155   NEUTROPHIL 79 64 63   ANEU  8.0 4.3 3.9   LYMPH 11 23 24   ALYM 1.1 1.6 1.5       Basic Panel  Recent Labs   Lab Test 05/23/25  1156 05/07/25  1311 04/09/25  1345    141 137   POTASSIUM 4.4 4.4 4.1   CHLORIDE 108* 106 102   CO2 19* 23 23   BUN 24.1* 20.5 23.4*   CR 1.11 1.16 1.29*   * 90 85      LFTs  Recent Labs   Lab Test 05/23/25  1156 05/07/25  1311 04/09/25  1345   ALKPHOS 118 118 116   AST 61* 28 21   ALT 57 31 28   BILITOTAL 0.7 0.4 0.4   ALBUMIN 4.4 4.3 4.4       Recent Labs   Lab Test 01/22/25  1553 01/13/25  1315 12/30/24  1309   CMVQNT Not Detected Not Detected Not Detected       Recent Labs   Lab Test 05/07/25  1311 03/12/25  1314 01/13/25  1315 09/25/24  1211 09/12/24  0950    824 836 534* 467*       Recent Labs   Lab Test 02/12/25  1022 07/22/24  0957   QUINCY 202 295        ASSESSMENT AND PLAN     BMT for high risk MDS: D+296  - Chemo protocol: [id number]; Flu, Cy, TBI  - Peripheral blood stem cell graft from 8/8 donor, ABO matched (O neg)  - Continue allopurinol (hx of gout)    Disease status:   - 8/30/24 (D28): Slightly hypercellular marrow (30-40%). No evidence of myeloid neoplasm or monoclonal B-lymphocytosis. Normal male karyotype. NGS negative for DNMT3A (other mutations not tested)  - 11/15/24 (D100): Hypercellular marrow (40%) in remission. NGS negative.   - 2/12/25 (D180): Hypercellular marrow (50%) in remission. NGS TET2  at 6%  - 5/23/25 (9 months): Hypercellular marrow (50%), in remission. NGS TET2 p.  5%     Graft status/chimerism:   - 8/30/24 (D28): Bone marrow 100%. Peripheral blood CD33 100%, CD3 84%  - 9/12/24: Peripheral blood CD3 96%, CD33 100%  - 9/25/24: Peripheral blood CD3 100%, CD33 100%  - 11/15/24 (D100): Bone marrow 100%. Peripheral blood CD33 100%, CD3 100%  - 2/12/25 (D180): Bone marrow 100%. Peripheral blood CD33 100%, CD3 100%  - 4/9/25: Peripheral blood CD33 100%, CD3 100%  - 5/23/25: Bone marrow 100%. Peripheral blood CD33 100%, CD3 100%    Maintenance  None    GVHD  #  Current immunosuppression is none  - Completed Siro taper on 12/01/2024    # GVHD  Skin: Skin score 0. Skin rash is likely due to rosacea and demodex folliculitis resolved today. Uses ivermectin once in a while. Follows with dermatology  Mouth: No symptoms. Mouth score 0.  Eyes: No symptoms. Eye score 0. Visits with opthal  GI: No symptoms. GI score 0. Has munajaro related nausea and low appetite.   Liver: LFTs normal. Liver score 0.  Pulm: No symptoms. Lung score 0.  Joints: No symptoms. Joint score 0.    HEME/ COAG  G-CSF last given on 8/22/4.  ABO matched.    ID  # Prophylaxis  PJP/ Toxo IgG negative: Bactrim.  Viral: Acyclovir 800 mg bid     # Monitoring  CMV: Monitor till D180. Negative.   EBV: Monitor every 2 weeks between D30 to D180. Negative.   IgG: IgG 836 (1/13/25).     DERM  Was seen by dermatology for follow up and diagnosed with papulopustular rosacea and demodex folliculitis.  Per derm notes - facial rash that mimics GVHD may be related to worsening papulopustular rosacea and Demodex infestation.  Mechanism is unknown, but one could assume that the immune system is no longer tolerating normal Demodex commensal.   - Managed with topical ivermectin and sulfa soap    GI  # Hepatic steatosis and borderline iron deposition in liver    # Supportive   - GERD: PPI PRN    CARDIOVASCULAR  # Asymptomatic moderate aortic stenosis.    # A.fib with RVR   - First episode of A.fib with RVR prior to transplant in the context of admission with febrile episode on 3/15/24. Noted on his fitbit, not very symptomatic. DCCV on 3/21/24 restored normal sinus rhythm. Amiodarone and anticoagulation was stopped in April 2024 without any recurrence.   -Second episode while inpatient on 8/21/24 which converted to normal sinus rhythm spontaneously. Was started on metoprolol.   - Has been seen by cardiology on 11/11/24. No anticoagulation needed.      # CAD  - Coronary artery calcifications.   - Continue atorvastatin 10mg,   - He is  not taking ASA 81mg, have advised him to discuss with his cardiologist  - Will need an ischemia evaluation in the long term. He is to follow up with cardiology in Nov 2025 with a repeat ECHO.     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     RESP  # VINCENT : CPAP     OPHTHALMOLOGY:  # Glaucoma - Lutein supplement; brimonidine, latanaprost eye drops.   # Right eye floaters: Following retinal specialist      MUSCULOSKELETAL/FRAILTY  # Gout: Continue allopurinol indefinitely.     Health Maintenance   DEXA and Vitamin D: DXA scan in 12/2024 was normal, due in 12/2026  PFT: At 1 year post transplant.   Thyroid: Check TSH and free T4 at 1 year post-transplant, then yearly, through primary physician.   Gonadal: Check testosterone at 1 year post-transplant, then yearly, through primary physician.   Lipids: Check lipid panel q6-12 months through primary physician.  Skin: Annual skin checks through Dermatology; d/w patient.  Dental visit done  Iron overload: Check Ferritin at 1 year    Post-transplant vaccinations  Flu and COVID done  Has received RSV    I spent 30 minutes in the care of this patient today, which included time necessary for preparation for the visit, obtaining history, ordering medications/tests/procedures as medically indicated, review of pertinent medical literature, counseling of the patient, communication of recommendations to the care team, and documentation time.     Nena Fowler  Department of Hematology, Oncology and Transplantation  Text via [employer]         Again, thank you for allowing me to participate in the care of your patient.        Sincerely,        Nena Fowler,  MBBS    Electronically signed

## 2025-05-30 NOTE — PROGRESS NOTES
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 Beckwith, [facility]      Diagnosis:   MDS-IB1. IPSS-M: very high risk 1.98. Normal cytogenetics. NGS peripheral blood (4/18/24) showed mutations in BCOR (56%), BCORL1 (11%), DNMT3A (34%), IDH2 (9%), SRSF2 (33%), STAG2 (9%), TET2 (2%), two RUNX1 mutations.   CD5+ Monoclonal B Lymphocytosis (MBL), chronic lymphocytic leukemia type  Recurrent febrile episodes, UBA1 negative, resolved with treatment of MDS    Treatment  3/21/24 to 6/19/24: Decitabine 20mg/m2 for 5 days x 4 cycles    Post transplant  # A.fib with RVR on 8/21/24 which converted to sinus rhythm spontaneously. 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 Max grade 1 (face, chest and upper back). Skin biopsy (9/1224) showed interface dermatitis suggestive of GVHD. Improved with topical steroids.       INTERVAL HISTORY     Arpit presents for a scheduled BMT visit. Overall doing well.     HTN: managed by Pcp, taking lisinopril 10mg BID. Has lost 60lbs since diagnosis.           Plan  - 5/23/25: BMBx shows hypercellular marrow (50%) with ongoing remission. NGS pending. Bone marrow 100% donor, Peripheral blood CD3 and CD33 100%.  - 1 year survivoship   - has seen dentist and eye       - Ongoing chronic fatigue persisting since MDS diagnosis, stable  - Was seen by dermatology for follow up and diagnosed with papulopustular rosacea and demodex folliculitis. Intermittent papules on face and neck, cleared on chest and back, crusty lesions in hair, comes and  goes. Uses sulfa soap and ivermectin, which helps manage but does not completely resolve it.  - Blood pressure recently managed with medication adjustments; metoprolol reduced, amlodipine discontinued. Has lost weight intentionally.     Plan    - Chronic fatigue is likely due to MDS and transplant, usually persistent and in some cases resolves over time.Can consider checking AM cortisol.   - Continue taking acyclovir 800 mg twice a day and Bactrim (sulfamethoxazole) Mondays and Tuesdays twice a day. Refill Bactrim prescription at [facility].  - Consult cardiologist regarding aspirin use and potential echocardiogram.   - RTC after BMBx    Current Outpatient Medications   Medication Sig Dispense Refill    allopurinol (ZYLOPRIM) 300 MG tablet Take 1 tablet (300 mg) by mouth daily. 60 tablet 1    atorvastatin (LIPITOR) 10 MG tablet Take 1 tablet (10 mg) by mouth every morning. 60 tablet 1    brimonidine (ALPHAGAN) 0.2 % ophthalmic solution Place 1 drop into both eyes 2 times daily.      calcium carbonate-vitamin D (CALTRATE) 600-10 MG-MCG per tablet Take 1 tablet by mouth daily. 60 tablet 1    fluticasone (FLONASE) 50 MCG/ACT nasal spray Spray 1 spray into both nostrils as needed.      ivermectin (SOOLANTRA) 1 % cream Apply to the affected areas of the face and chest once daily. Use a small amount, spread as a thin layer, avoiding the eyes and lips. 45 g 3    latanoprost (XALATAN) 0.005 % ophthalmic solution INSTILL 2 DROPS IN BOTH EYES AT BEDTIME      lisinopril (ZESTRIL) 20 MG tablet Take 20 mg by mouth 2 times daily.      Lutein-Zeaxanthin 25-5 MG CAPS Take 1 capsule by mouth daily      metFORMIN (GLUCOPHAGE XR) 500 MG 24 hr tablet Take by mouth. 500 mg in the morning and 1000 mg in the evening      metoprolol tartrate (LOPRESSOR) 25 MG tablet Take 1 tablet (25 mg) by mouth daily.      MOUNJARO 10 MG/0.5ML SOAJ auto-injector pen Inject 0.5 mLs (10 mg) subcutaneously every 7 days. 2 mL 0    pantoprazole (PROTONIX) 40  MG EC tablet Take 1 tablet (40 mg) by mouth every morning (before breakfast). 60 tablet 1    psyllium (METAMUCIL/KONSYL) capsule Take 3 capsules by mouth daily. 270 capsule 3    sulfamethoxazole-trimethoprim (BACTRIM DS) 800-160 MG tablet Take 1 tablet by mouth Every Mon, Tues two times daily. Do not begin taking until instructed to do so by BMT clinic. 28 tablet 3    tamsulosin (FLOMAX) 0.4 MG capsule Take 1 capsule (0.4 mg) by mouth every evening. 90 capsule 1    Vitamin D-Vitamin K (D3 + K2 PO) Take 1 tablet by mouth daily. 3000 international unit(s)/115 mcg      acyclovir (ZOVIRAX) 400 MG tablet Take 2 tablets (800 mg) by mouth 2 times daily. 360 tablet 0    aspirin 81 MG EC tablet Take 1 tablet (81 mg) by mouth daily. (Patient not taking: Reported on 4/16/2025) 60 tablet 1        EXAM      /75 (BP Location: Right arm, Patient Position: Sitting, Cuff Size: Adult Large)   Pulse 69   Temp 97.6  F (36.4  C) (Oral)   Resp 12   Wt 98.1 kg (216 lb 3.2 oz)   SpO2 99%   BMI 28.52 kg/m      Wt Readings from Last 4 Encounters:   05/30/25 98.1 kg (216 lb 3.2 oz)   05/23/25 98 kg (216 lb)   04/16/25 101.1 kg (222 lb 14.4 oz)   02/19/25 104 kg (229 lb 3.2 oz)     KPS: 80% Can perform normal activity with effort, some signs of disease    General: Alert, awake; NAD  HEENT: atraumatic, sclera anicteric.   Skin: Scattered rash over face and neck  MSK: No pitting edema in bilateral LE.      Laboratory Studies:     Blood Counts  Recent Labs   Lab Test 05/23/25  1156 05/07/25  1311 03/12/25  1314   WBC 10.2 6.6 6.2   HGB 14.1 13.7 14.1    155 155   NEUTROPHIL 79 64 63   ANEU 8.0 4.3 3.9   LYMPH 11 23 24   ALYM 1.1 1.6 1.5       Basic Panel  Recent Labs   Lab Test 05/23/25  1156 05/07/25  1311 04/09/25  1345    141 137   POTASSIUM 4.4 4.4 4.1   CHLORIDE 108* 106 102   CO2 19* 23 23   BUN 24.1* 20.5 23.4*   CR 1.11 1.16 1.29*   * 90 85      LFTs  Recent Labs   Lab Test 05/23/25  1156 05/07/25  1311  04/09/25  1345   ALKPHOS 118 118 116   AST 61* 28 21   ALT 57 31 28   BILITOTAL 0.7 0.4 0.4   ALBUMIN 4.4 4.3 4.4       Recent Labs   Lab Test 01/22/25  1553 01/13/25  1315 12/30/24  1309   CMVQNT Not Detected Not Detected Not Detected       Recent Labs   Lab Test 05/07/25  1311 03/12/25  1314 01/13/25  1315 09/25/24  1211 09/12/24  0950    824 836 534* 467*       Recent Labs   Lab Test 02/12/25  1022 07/22/24  0957   QUINCY 202 295        ASSESSMENT AND PLAN     BMT for high risk MDS: D+296  - Chemo protocol: [id number]; Flu, Cy, TBI  - Peripheral blood stem cell graft from 8/8 donor, ABO matched (O neg)  - Continue allopurinol (hx of gout)    Disease status:   - 8/30/24 (D28): Slightly hypercellular marrow (30-40%). No evidence of myeloid neoplasm or monoclonal B-lymphocytosis. Normal male karyotype. NGS negative for DNMT3A (other mutations not tested)  - 11/15/24 (D100): Hypercellular marrow (40%) in remission. NGS negative.   - 2/12/25 (D180): Hypercellular marrow (50%) in remission. NGS TET2  at 6%    Graft status/chimerism:   - 8/30/24 (D28): Bone marrow 100%. Peripheral blood CD33 100%, CD3 84%  - 9/12/24: Peripheral blood CD3 96%, CD33 100%  - 9/25/24: Peripheral blood CD3 100%, CD33 100%  - 11/15/24 (D100): Bone marrow 100%. Peripheral blood CD33 100%, CD3 100%  - 2/12/25 (D180): Bone marrow 100%. Peripheral blood CD33 100%, CD3 100%  - 4/9/25: Peripheral blood CD33 100%, CD3 100%    Maintenance  None    GVHD  # Current immunosuppression is none  - Completed Siro taper on 12/01/2024    # GVHD  Skin: Skin score 0. Skin rash is likely due to rosacea and demodex folliculitis resolved today. Uses ivermectin once in a while. Follows with dermatology  Mouth: No symptoms. Mouth score 0.  Eyes: No symptoms. Eye score 0. Visits with opthal  GI: No symptoms. GI score 0. Has munajaro nausea once in while. Appetite is low, but still has has an appetite.   Liver: LFTs normal. Liver score 0.  Pulm: No symptoms.  Lung score 0.  Joints: No symptoms. Joint score 0.    HEME/ COAG  G-CSF last given on 8/22/4.  ABO matched.    ID  # Prophylaxis  PJP/ Toxo IgG negative: Bactrim.  Viral: Acyclovir 800 mg bid     # Monitoring  CMV: Monitor till D180. Negative.   EBV: Monitor every 2 weeks between D30 to D180. Negative.   IgG: IgG 836 (1/13/25).     DERM  Was seen by dermatology for follow up and diagnosed with papulopustular rosacea and demodex folliculitis.  Per derm notes - facial rash that mimics GVHD may be related to worsening papulopustular rosacea and Demodex infestation.  Mechanism is unknown, but one could assume that the immune system is no longer tolerating normal Demodex commensal.   - Managed with topical ivermectin and sulfa soap    GI  # Hepatic steatosis and borderline iron deposition in liver  - VOD prophy: Ursodiol completed    # Supportive   - GERD: PPI  - Alternating constipation and diarrhea: continue psyllium and imodium PRN    CARDIOVASCULAR  # Asymptomatic moderate aortic stenosis.    # A.fib with RVR   - First episode of A.fib with RVR prior to transplant in the context of admission with febrile episode on 3/15/24. Noted on his fitbit, not very symptomatic. DCCV on 3/21/24 restored normal sinus rhythm. Amiodarone and anticoagulation was stopped in April 2024 without any recurrence.   -Second episode while inpatient on 8/21/24 which converted to normal sinus rhythm spontaneously. Was started on metoprolol.   - Has been seen by cardiology on 11/11/24. No anticoagulation needed.      # CAD  - Coronary artery calcifications.   - Continue atorvastatin 10mg,   - He is not taking ASA 81mg, have advised him to discuss with his cardiologist  - Will need an ischemia evaluation in the long term. He is to follow up with cardiology in Nov 2025 with a repeat ECHO.     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     RESP  # VINCENT : CPAP     OPHTHALMOLOGY:  # Glaucoma - Lutein supplement; brimonidine, latanaprost eye drops.   # Right eye floaters: Following retinal specialist      MUSCULOSKELETAL/FRAILTY  # Gout: Continue allopurinol indefinitely.     Health Maintenance   DEXA and Vitamin D: DXA scan in 12/2024 was normal, due in 12/2026  PFT: At 1 year post transplant. Repeat if symptomatic.   Thyroid: Check TSH and free T4 at 1 year post-transplant, then yearly, through primary physician.   Gonadal: Check testosterone at 1 year post-transplant, then yearly, through primary physician.   Lipids: Check lipid panel q6-12 months through primary physician.  Skin: Annual skin checks through Dermatology; d/w patient.  Dental visit at 1 year   Iron overload: Check Ferritin at 1 year    Post-transplant vaccinations  Flu and COVID done  Has received RSV    I spent 45 minutes in the care of this patient today, which included time necessary for preparation for the visit, obtaining history, ordering medications/tests/procedures as medically indicated, review of pertinent medical literature, counseling of the patient, communication of recommendations to the care team, and documentation time.     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     RESP  # VINCENT : CPAP     OPHTHALMOLOGY:  # Glaucoma - Lutein supplement; brimonidine, latanaprost eye drops.   # Right eye floaters: Following retinal specialist      MUSCULOSKELETAL/FRAILTY  # Gout: Continue allopurinol indefinitely.     Health Maintenance   DEXA and Vitamin D: DXA scan in 12/2024 was normal, due in 12/2026  PFT: At 1 year post transplant.   Thyroid: Check TSH and free T4 at 1 year post-transplant, then yearly, through primary physician.   Gonadal: Check testosterone at 1 year post-transplant, then yearly, through primary physician.   Lipids: Check lipid panel q6-12 months through primary physician.  Skin: Annual skin checks through Dermatology; d/w patient.  Dental visit done  Iron overload: Check Ferritin at 1 year    Post-transplant vaccinations  Flu and COVID done  Has received RSV    I spent 30 minutes in the care of this patient today, which included time necessary for preparation for the visit, obtaining history, ordering medications/tests/procedures as medically indicated, review of pertinent medical literature, counseling of the patient, communication of recommendations to the care team, and documentation time.     Nena Fowler  Department of Hematology, Oncology and Transplantation  Text via [employer]

## 2025-06-08 ENCOUNTER — HEALTH MAINTENANCE LETTER (OUTPATIENT)
Age: 75
End: 2025-06-08

## 2025-06-09 DIAGNOSIS — D46.9 MDS (MYELODYSPLASTIC SYNDROME) (H): ICD-10-CM

## 2025-06-09 RX ORDER — ACYCLOVIR 400 MG/1
800 TABLET ORAL 2 TIMES DAILY
Qty: 120 TABLET | Refills: 3 | Status: SHIPPED | OUTPATIENT
Start: 2025-06-09

## 2025-06-11 NOTE — PROGRESS NOTES
Outcome for 06/11/25 2:24 PM: upload meter in clinic  Ya Tenorio CMA  Adult Endocrinology  [facility], [place]

## 2025-06-18 ENCOUNTER — OFFICE VISIT (OUTPATIENT)
Dept: ENDOCRINOLOGY | Facility: CLINIC | Age: 75
End: 2025-06-18
Payer: MEDICARE

## 2025-06-18 VITALS
RESPIRATION RATE: 16 BRPM | BODY MASS INDEX: 28.89 KG/M2 | DIASTOLIC BLOOD PRESSURE: 69 MMHG | OXYGEN SATURATION: 97 % | WEIGHT: 219 LBS | SYSTOLIC BLOOD PRESSURE: 126 MMHG | HEART RATE: 65 BPM

## 2025-06-18 DIAGNOSIS — E11.9 TYPE 2 DIABETES MELLITUS WITHOUT COMPLICATION, WITHOUT LONG-TERM CURRENT USE OF INSULIN (H): Primary | ICD-10-CM

## 2025-06-18 RX ORDER — AMLODIPINE BESYLATE 10 MG/1
10 TABLET ORAL DAILY
COMMUNITY

## 2025-06-18 RX ORDER — METOPROLOL SUCCINATE 100 MG/1
100 TABLET, EXTENDED RELEASE ORAL DAILY
COMMUNITY

## 2025-06-18 NOTE — PROGRESS NOTES
Endocrinology Clinic Visit     NAME:  Arpit Forrest  PCP:  Tanvir Max  MRN:  [id number]  Reason for Consult:  DM2  Requesting Provider:  Destinee Sanches    Chief Complaint     Chief Complaint   Patient presents with    Follow Up    Diabetes       History of Present Illness     Arpit Forrest is a 73 year old male who is seen in video visit for DM2 follow-up.       The patient was diagnosed with type 2 diabetes a year ago. Per chart review, dx with prediabetes in May 2023 with A1C 6.2. A1C 6.5 noted in August 2023  (T2DM), shortly after starting prednisone for gout and has remained on prednisone daily up until 1 week before his BMT admission. 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 He had mild steroid induced hyperglycemia while inpatient, managed with insulin, was seen by IDS. Discharged back on metformin only.    Last visit he reported he is not happy with BG control and would like to loose more wt. He reported a hx of DR following with outside provider, no records. We reviewed the data on ozempic and worsening DR. He wanted to proceed with mounjaro.     Interval hx:  He is currently on Mounjaro at 10 mg weekly since 2/2025. He reported wt loss of 25 lbs since starting mounjaro. He reported wt loss plateau over the past month.     Previous A1C in records reviewed.     Lab Results   Component Value Date    A1C 5.3 05/07/2025    A1C 5.5 03/12/2025    A1C 5.8 (H) 01/13/2025    A1C 6.8 (H) 10/24/2024    A1C 6.9 (H) 09/12/2024     Weight is down 30 lbs since the BMT.   Wt was 242 lbs before Mounjaro on 12/2024.    Wt Readings from Last 4 Encounters:   06/18/25 99.3 kg (219 lb)   05/30/25 98.1 kg (216 lb 3.2 oz)   05/23/25 98 kg (216 lb)   04/16/25 101.1 kg (222 lb 14.4 oz)         Diabetes Care/Complications:   Eyes: DR follows every 6 month with retina consultant of [place].   Kidneys: normal Cr, urine alb positive last checked 9/2024, lisinopril 40 mg per day  Nerves: chronic peripheral neuropathy for 30 years.  Smoking:  no  Blood Pressure: acceptable,  on amlodipine 10 mg daily, lisinopril 40 mg daiily, metoprolol 100 mg daily.   Lipids: on lipitor 10 mg , ldl 57 on 7/24  Macrovascular: no  Hypoglycemia: no    Previous DM related Hospitalization:no    Current treatment strategy:   Metformin 1500 mg total per day  Mounjaro 12.5 mg weekly    Blood Glucose Monitoring:   Checks it fasting.  Range . Avg 101.                Problem List     Patient Active Problem List   Diagnosis    Ankylosing spondylitis lumbar region (H)    Autoinflammatory syndrome (H)    Macdonald's esophagus without dysplasia    Bilateral sacroiliitis    Essential hypertension    H/O: gout    History of agent Orange exposure    History of kidney donation    Hyperlipidemia    Myelodysplastic syndrome (H)    Obstructive sleep apnea syndrome    Obesity (BMI 30-39.9)    Type 2 diabetes mellitus without complication, without long-term current use of insulin (H)    Glaucoma    MDS (myelodysplastic syndrome) (H)    Stem cells transplant status (H)        Medications     Current Outpatient Medications   Medication Sig Dispense Refill    acyclovir (ZOVIRAX) 400 MG tablet Take 2 tablets (800 mg) by mouth 2 times daily. 120 tablet 3    allopurinol (ZYLOPRIM) 300 MG tablet Take 1 tablet (300 mg) by mouth daily. 60 tablet 1    amLODIPine (NORVASC) 10 MG tablet Take 10 mg by mouth daily.      atorvastatin (LIPITOR) 10 MG tablet Take 1 tablet (10 mg) by mouth every morning. 60 tablet 1    brimonidine (ALPHAGAN) 0.2 % ophthalmic solution Place 1 drop into both eyes 2 times daily.      calcium carbonate-vitamin D (CALTRATE) 600-10 MG-MCG per tablet Take 1 tablet by mouth daily. 60 tablet 1    fluticasone (FLONASE) 50 MCG/ACT nasal spray Spray 1 spray into both nostrils as needed.      ivermectin (SOOLANTRA) 1 % cream Apply to the affected areas of the face and chest once daily. Use a small amount, spread as a thin layer, avoiding the eyes and lips. 45 g 3    latanoprost  (XALATAN) 0.005 % ophthalmic solution INSTILL 2 DROPS IN BOTH EYES AT BEDTIME      lisinopril (ZESTRIL) 10 MG tablet Take 2 tablets (20 mg) by mouth 2 times daily. 360 tablet 0    Lutein-Zeaxanthin 25-5 MG CAPS Take 1 capsule by mouth daily      metoprolol succinate ER (TOPROL XL) 100 MG 24 hr tablet Take 100 mg by mouth daily.      pantoprazole (PROTONIX) 40 MG EC tablet Take 1 tablet (40 mg) by mouth every morning (before breakfast). 60 tablet 1    sulfamethoxazole-trimethoprim (BACTRIM DS) 800-160 MG tablet Take 1 tablet by mouth Every Mon, Tues two times daily. Do not begin taking until instructed to do so by BMT clinic. 28 tablet 3    tamsulosin (FLOMAX) 0.4 MG capsule Take 1 capsule (0.4 mg) by mouth every evening. 90 capsule 1    tirzepatide (MOUNJARO) 12.5 MG/0.5ML SOAJ auto-injector pen Inject 0.5 mLs (12.5 mg) subcutaneously every 7 days. 2 mL 2    Vitamin D-Vitamin K (D3 + K2 PO) Take 1 tablet by mouth daily. 3000 international unit(s)/115 mcg      aspirin 81 MG EC tablet Take 1 tablet (81 mg) by mouth daily. (Patient not taking: Reported on 6/18/2025) 60 tablet 1     No current facility-administered medications for this visit.        Allergies     Allergies   Allergen Reactions    Gramineae Pollens Other (See Comments)     Seasonal sneezing, runny nose.       Medical / Surgical History     No past medical history on file.  Past Surgical History:   Procedure Laterality Date    ARTHROSCOPY KNEE RT/LT Left     IR CVC TUNNEL PLACEMENT > 5 YRS OF AGE  7/31/2024    IR CVC TUNNEL REMOVAL RIGHT  9/25/2024    IR LUMBAR PUNCTURE  01/02/2024    NEPHRECTOMY      donated a kidney       Social History     Social History     Socioeconomic History    Marital status:      Spouse name: Meghna    Number of children: 2    Years of education: Not on file    Highest education level: Not on file   Occupational History    Not on file   Tobacco Use    Smoking status: Former     Current packs/day: 0.00     Average  packs/day: 0.5 packs/day for 4.0 years (2.0 ttl pk-yrs)     Types: Cigarettes     Start date:      Quit date: 1970     Years since quittin.4     Passive exposure: Past    Smokeless tobacco: Never   Vaping Use    Vaping status: Never Used   Substance and Sexual Activity    Alcohol use: Not Currently     Comment: 22 Quit 1 year ago    Drug use: Never    Sexual activity: Not on file   Other Topics Concern    Not on file   Social History Narrative    Not on file     Social Drivers of Health     Financial Resource Strain: Unknown (2024)    Financial Resource Strain     Within the past 12 months, have you or your family members you live with been unable to get utilities (heat, electricity) when it was really needed?: Patient declined   Food Insecurity: Unknown (2024)    Food Insecurity     Within the past 12 months, did you worry that your food would run out before you got money to buy more?: Patient declined     Within the past 12 months, did the food you bought just not last and you didn t have money to get more?: Patient declined   Transportation Needs: Unknown (2024)    Transportation Needs     Within the past 12 months, has lack of transportation kept you from medical appointments, getting your medicines, non-medical meetings or appointments, work, or from getting things that you need?: Patient declined   Physical Activity: Insufficiently Active (4/3/2024)    Received from [facility]    Exercise Vital Sign     Days of Exercise per Week: 3 days     Minutes of Exercise per Session: 20 min   Stress: Not on file   Social Connections: Not on file   Interpersonal Safety: Low Risk  (2024)    Interpersonal Safety     Do you feel physically and emotionally safe where you currently live?: Yes     Within the past 12 months, have you been hit, slapped, kicked or otherwise physically hurt by someone?: No     Within the past 12 months, have you been humiliated or emotionally abused in other ways  "by your partner or ex-partner?: No   Housing Stability: Unknown (8/23/2024)    Housing Stability     Do you have housing? : Patient declined     Are you worried about losing your housing?: Patient declined       Family History     Family History   Problem Relation Age of Onset    Skin Cancer No family hx of        ROS     12 ROS completed, pertinent positive and negative in HPI    Physical Exam   /69   Pulse 65   Resp 16   Wt 99.3 kg (219 lb)   SpO2 97%   BMI 28.89 kg/m     GENERAL: alert and no distress  EYES: Eyes grossly normal to inspection.  No discharge or erythema, or obvious scleral/conjunctival abnormalities.  RESP: No audible wheeze, cough, or visible cyanosis.    SKIN: Visible skin clear. No significant rash, abnormal pigmentation or lesions.  NEURO: Cranial nerves grossly intact.  Mentation and speech appropriate for age.  PSYCH: Appropriate affect, tone, and pace of words     Labs/Imaging     Pertinent Labs were reviewed and updated in EPIC and discussed briefly.  Radiology Results were  reviewed and updated in EPIC and discussed briefly.    Summary of recent findings:   Lab Results   Component Value Date    A1C 5.0 07/31/2024       No results found for: \"TSH\", \"T4\"    Creatinine   Date Value Ref Range Status   05/23/2025 1.11 0.67 - 1.17 mg/dL Final       Recent Labs   Lab Test 07/22/24  0957   CHOL 152   HDL 36*   LDL 57   TRIG 293*       No results found for: \"GKEM45EMCVH\", \"[id number]\", \"[id number]\"    I personally reviewed the patient's outside records from [facility] EMR and Care Everywhere. Summary of pertinent findings in HPI.    Impression / Plan     1. Diabetes Mellitus: Type 2  2. Hx of steroid induced hyperglycemia -- resolved.   3. Reported diabetic retinopathy  4. Obesity class 1  Not on steroids anymore. DM2 was managed with metformin only. Mounjaro was added 12/2024, he had significant wt loss on it, BMI down to 28. He reported wt loss plateau. He has been on 10 mg weekly for the " past 3 month. We will plan to increase it to 12.5 mg weekly for more wt loss effect.   He would like to decrease or stop metformin.  Given drug interaction with bactrim and having A1c down to 5.3, we will stop metformin.     4. Diabetes Complications: refer to hpi. No urine alb.     5. Blood Pressure Management: Blood pressure is acceptable based on chart review.     6.Lipid Management: Per the new ACC/SOWMYA/NHLBI guidelines, statins are recommended for individuals with diabetes aged 40-75 with LDL  without ASCVD, and for any individual with ASCVD. Currently the patient is on a statin.      7. Smoking Status: Patient Pt is smoke free..     Review of the result(s) of each unique test - A1c and diabetes related labs.  34 minutes spent on the date of the encounter doing chart review, history and exam, documentation and further activities per the note           Test and/or medications prescribed today:  No orders of the defined types were placed in this encounter.        Follow up: 3 m    The longitudinal plan of care for the diagnosis(es)/condition(s) as documented were addressed during this visit. Due to the added complexity in care, I will continue to support Arpit in the subsequent management and with ongoing continuity of care.        Markus Griffin MD  Endocrinology, Diabetes and Metabolism  [facility]

## 2025-06-18 NOTE — PATIENT INSTRUCTIONS
Welcome to the [facility] Endocrinology and Diabetes Clinics     It was nice seeing you:  - increase Mounjaro to 12.5 mg weekly.  - stop metformin        Our Endocrinology Clinics are here to provide you with a team-based, collaborative approach in the diagnosis and treatment of patients with diabetes and endocrine disorders. The team is made up of Physicians, Physician Assistants, Certified Diabetes Educators, Registered Nurses, Medical Assistants, Emergency Medical Technicians, and many others, all of whom have the unified goal of providing our patients with high quality care.     Please see below for some helpful tips to best navigate and use the [facility] Endocrinology clinic:     [place] Respect: At [facility], we are committed to a respectful and safe space for all patients, visitors, and staff.  We believe that mutual respect between patients and their care team is the foundation of quality care.  It is our expectation that you will be treated with respect by your care team.  In turn, we ask that all communication with the care team (written and verbal) be respectful and free from profanity, threatening, or abusive language.  Disrespectful communication undermines our therapeutic relationship with you and may result in us being unable to continue to provide your care.    Refills: A provider must see you at least annually to prescribe and refill medications. This is to ensure your safety as well as meet insurance and compliance regulations.    Scheduling: Many of our Providers offer both in-person or video visits. Please call to schedule any needed follow ups as soon as possible because our provider schedules fill up very quickly. Our care team has the right to require an in-person visit when they believe that it is medically necessary. Please remember that for any virtual visits, you must be in the [facility] at the time of the visit, otherwise we are unable to see you and you  will need to be rescheduled.    Missed Appointments: If you need to cancel or miss your scheduled appointment, please call the clinic at [phone] to reschedule.  Please note if you repeatedly miss appointments or repeatedly miss appointments without calling to inform us ahead of time (no-show), the clinic may elect to not allow you to reschedule without speaking to a manager, may require a Partnership In Care Agreement prior to rescheduling, or could result in you no longer being able to receive care from the clinic. Providing the clinic with timely notification if you have to miss an appointment, allows us to better serve the needs of all of our patients.    Primary Care Provider: Our Endocrinologists are Specialists in their field. We expect you to have a Primary Care Provider established to handle any needs outside of your diabetes and endocrine care.  We would be happy to assist you find a Primary Care Provider, if you do not have one.    I Read Books: [employer] is a wonderful resource that allows you access to your Care Team via online or the yoselin. Please ask a member of the team if you would like help creating an account. Please note that it may take up to 2 business days for a response. [employer] messages are not reviewed on weekends or after business hours.  Emergent or urgent care needs should never be communicated via [employer].  If you experience a medical emergency call 911 or go to the nearest emergency room.    Labs: It is recommended that you stay within the [facility] for labs but you are welcome to obtain ordered labs (with some exceptions) from any location of your choice as long as they are able to complete and process the needed labs. If you need us to fax orders to your preferred lab, please provide us the name and fax number of the lab you would like to go to so we can fax the orders. If your labs are drawn outside of the [facility], please have them fax the results to  [phone] ([place]) or [phone] ([place]) or via "[employer]. It is your responsibility to ensure that outside lab results are sent to us.    We look forward to working with you. Please do not hesitate to reach out with any questions.    Thank you,    The Endocrine Team    [facility] Address:   [place] Address:     [address] [place] [address]    Phone: [phone]  Fax: [phone] [address] [place] [address]    Phone: [phone]  Fax: [phone]     [place] Cost Estimate Phone Number: [phone]    General Lab and Imaging Scheduling Phone Number: [phone]      If you are interested in exploring research opportunities in the Division of Diabetes, Endocrinology and Metabolism and within the [facility] you are welcome to view the following QR codes by scanning them using your phone's camera to access a link to more information.  Participating in a research study is voluntary. Your decision whether or not to participate will not affect your current or future relations with the [facility] or [facility]. Current available studies are:     Type 1 Diabetes  Adults     Pediatrics     Type 2 Diabetes  Weight Loss - People Treated with Diet or Metformin Only     Pediatrics and Young Adults

## 2025-06-18 NOTE — LETTER
6/18/2025      Arpit Forrest  [address] [place] [address]      Dear Colleague,    Thank you for referring your patient, Arpit Forrest, to the [facility]. Please see a copy of my visit note below.    Outcome for 06/11/25 2:24 PM: upload meter in clinic  Ya Tenorio CMA  Adult Endocrinology  [facility], [place]      Endocrinology Clinic Visit     NAME:  Arpit Forrest  PCP:  Tanvir Max  MRN:  [id number]  Reason for Consult:  DM2  Requesting Provider:  Destinee Sanches    Chief Complaint     Chief Complaint   Patient presents with    Follow Up    Diabetes       History of Present Illness     Arpit Forrest is a 73 year old male who is seen in video visit for DM2 follow-up.     The patient was diagnosed with type 2 diabetes a year ago. Per chart review, dx with prediabetes in May 2023 with A1C 6.2. A1C 6.5 noted in August 2023  (T2DM), shortly after starting prednisone for gout and has remained on prednisone daily up until 1 week before his BMT admission. 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 He had mild steroid induced hyperglycemia while inpatient, managed with insulin, was seen by IDS. Discharged back on metformin only.    Last visit he reported he is not happy with BG control and would like to loose more wt. He reported a hx of DR following with outside provider, no records. We reviewed the data on ozempic and worsening DR. He wanted to proceed with mounjaro.     Interval hx:  He is currently on Mounjaro at 10 mg weekly since 2/2025. He reported wt loss of 25 lbs since starting mounjaro. He reported wt loss plateau over the past month.     Previous A1C in records reviewed.   Lab Results   Component Value Date    A1C 5.3 05/07/2025    A1C 5.5 03/12/2025    A1C 5.8 (H) 01/13/2025    A1C 6.8 (H) 10/24/2024    A1C 6.9 (H) 09/12/2024     Weight is down 30 lbs since the BMT.   Wt was 242 lbs before Mounjaro on 12/2024.    Wt Readings from Last 4 Encounters:   06/18/25 99.3 kg  (219 lb)   05/30/25 98.1 kg (216 lb 3.2 oz)   05/23/25 98 kg (216 lb)   04/16/25 101.1 kg (222 lb 14.4 oz)       Diabetes Care/Complications:   Eyes:  follows every 6 month with retina consultant of [place].   Kidneys: normal Cr, urine alb positive last checked 9/2024, lisinopril 40 mg per day  Nerves: chronic peripheral neuropathy for 30 years.  Smoking: no  Blood Pressure: acceptable,  on amlodipine 10 mg daily, lisinopril 40 mg daiily, metoprolol 100 mg daily.   Lipids: on lipitor 10 mg , ldl 57 on 7/24  Macrovascular: no  Hypoglycemia: no    Previous DM related Hospitalization:no    Current treatment strategy:   Metformin 1500 mg total per day  Mounjaro 12.5 mg weekly    Blood Glucose Monitoring:   Checks it fasting.  Range . Avg 101.          Problem List     Patient Active Problem List   Diagnosis    Ankylosing spondylitis lumbar region (H)    Autoinflammatory syndrome (H)    Macdonald's esophagus without dysplasia    Bilateral sacroiliitis    Essential hypertension    H/O: gout    History of agent Orange exposure    History of kidney donation    Hyperlipidemia    Myelodysplastic syndrome (H)    Obstructive sleep apnea syndrome    Obesity (BMI 30-39.9)    Type 2 diabetes mellitus without complication, without long-term current use of insulin (H)    Glaucoma    MDS (myelodysplastic syndrome) (H)    Stem cells transplant status (H)        Medications     Current Outpatient Medications   Medication Sig Dispense Refill    acyclovir (ZOVIRAX) 400 MG tablet Take 2 tablets (800 mg) by mouth 2 times daily. 120 tablet 3    allopurinol (ZYLOPRIM) 300 MG tablet Take 1 tablet (300 mg) by mouth daily. 60 tablet 1    amLODIPine (NORVASC) 10 MG tablet Take 10 mg by mouth daily.      atorvastatin (LIPITOR) 10 MG tablet Take 1 tablet (10 mg) by mouth every morning. 60 tablet 1    brimonidine (ALPHAGAN) 0.2 % ophthalmic solution Place 1 drop into both eyes 2 times daily.      calcium carbonate-vitamin D (CALTRATE) 600-10  MG-MCG per tablet Take 1 tablet by mouth daily. 60 tablet 1    fluticasone (FLONASE) 50 MCG/ACT nasal spray Spray 1 spray into both nostrils as needed.      ivermectin (SOOLANTRA) 1 % cream Apply to the affected areas of the face and chest once daily. Use a small amount, spread as a thin layer, avoiding the eyes and lips. 45 g 3    latanoprost (XALATAN) 0.005 % ophthalmic solution INSTILL 2 DROPS IN BOTH EYES AT BEDTIME      lisinopril (ZESTRIL) 10 MG tablet Take 2 tablets (20 mg) by mouth 2 times daily. 360 tablet 0    Lutein-Zeaxanthin 25-5 MG CAPS Take 1 capsule by mouth daily      metoprolol succinate ER (TOPROL XL) 100 MG 24 hr tablet Take 100 mg by mouth daily.      pantoprazole (PROTONIX) 40 MG EC tablet Take 1 tablet (40 mg) by mouth every morning (before breakfast). 60 tablet 1    sulfamethoxazole-trimethoprim (BACTRIM DS) 800-160 MG tablet Take 1 tablet by mouth Every Mon, Tues two times daily. Do not begin taking until instructed to do so by BMT clinic. 28 tablet 3    tamsulosin (FLOMAX) 0.4 MG capsule Take 1 capsule (0.4 mg) by mouth every evening. 90 capsule 1    tirzepatide (MOUNJARO) 12.5 MG/0.5ML SOAJ auto-injector pen Inject 0.5 mLs (12.5 mg) subcutaneously every 7 days. 2 mL 2    Vitamin D-Vitamin K (D3 + K2 PO) Take 1 tablet by mouth daily. 3000 international unit(s)/115 mcg      aspirin 81 MG EC tablet Take 1 tablet (81 mg) by mouth daily. (Patient not taking: Reported on 6/18/2025) 60 tablet 1     No current facility-administered medications for this visit.        Allergies     Allergies   Allergen Reactions    Gramineae Pollens Other (See Comments)     Seasonal sneezing, runny nose.       Medical / Surgical History     No past medical history on file.  Past Surgical History:   Procedure Laterality Date    ARTHROSCOPY KNEE RT/LT Left     IR CVC TUNNEL PLACEMENT > 5 YRS OF AGE  7/31/2024    IR CVC TUNNEL REMOVAL RIGHT  9/25/2024    IR LUMBAR PUNCTURE  01/02/2024    NEPHRECTOMY      donated a  kidney       Social History     Social History     Socioeconomic History    Marital status:      Spouse name: Meghna    Number of children: 2    Years of education: Not on file    Highest education level: Not on file   Occupational History    Not on file   Tobacco Use    Smoking status: Former     Current packs/day: 0.00     Average packs/day: 0.5 packs/day for 4.0 years (2.0 ttl pk-yrs)     Types: Cigarettes     Start date:      Quit date: 1970     Years since quittin.4     Passive exposure: Past    Smokeless tobacco: Never   Vaping Use    Vaping status: Never Used   Substance and Sexual Activity    Alcohol use: Not Currently     Comment: 22 Quit 1 year ago    Drug use: Never    Sexual activity: Not on file   Other Topics Concern    Not on file   Social History Narrative    Not on file     Social Drivers of Health     Financial Resource Strain: Unknown (2024)    Financial Resource Strain     Within the past 12 months, have you or your family members you live with been unable to get utilities (heat, electricity) when it was really needed?: Patient declined   Food Insecurity: Unknown (2024)    Food Insecurity     Within the past 12 months, did you worry that your food would run out before you got money to buy more?: Patient declined     Within the past 12 months, did the food you bought just not last and you didn t have money to get more?: Patient declined   Transportation Needs: Unknown (2024)    Transportation Needs     Within the past 12 months, has lack of transportation kept you from medical appointments, getting your medicines, non-medical meetings or appointments, work, or from getting things that you need?: Patient declined   Physical Activity: Insufficiently Active (4/3/2024)    Received from [facility]    Exercise Vital Sign     Days of Exercise per Week: 3 days     Minutes of Exercise per Session: 20 min   Stress: Not on file   Social Connections: Not on file  "  Interpersonal Safety: Low Risk  (8/23/2024)    Interpersonal Safety     Do you feel physically and emotionally safe where you currently live?: Yes     Within the past 12 months, have you been hit, slapped, kicked or otherwise physically hurt by someone?: No     Within the past 12 months, have you been humiliated or emotionally abused in other ways by your partner or ex-partner?: No   Housing Stability: Unknown (8/23/2024)    Housing Stability     Do you have housing? : Patient declined     Are you worried about losing your housing?: Patient declined       Family History     Family History   Problem Relation Age of Onset    Skin Cancer No family hx of        ROS     12 ROS completed, pertinent positive and negative in HPI    Physical Exam   /69   Pulse 65   Resp 16   Wt 99.3 kg (219 lb)   SpO2 97%   BMI 28.89 kg/m     GENERAL: alert and no distress  EYES: Eyes grossly normal to inspection.  No discharge or erythema, or obvious scleral/conjunctival abnormalities.  RESP: No audible wheeze, cough, or visible cyanosis.    SKIN: Visible skin clear. No significant rash, abnormal pigmentation or lesions.  NEURO: Cranial nerves grossly intact.  Mentation and speech appropriate for age.  PSYCH: Appropriate affect, tone, and pace of words     Labs/Imaging     Pertinent Labs were reviewed and updated in EPIC and discussed briefly.  Radiology Results were  reviewed and updated in EPIC and discussed briefly.    Summary of recent findings:   Lab Results   Component Value Date    A1C 5.0 07/31/2024       No results found for: \"TSH\", \"T4\"    Creatinine   Date Value Ref Range Status   05/23/2025 1.11 0.67 - 1.17 mg/dL Final       Recent Labs   Lab Test 07/22/24  0957   CHOL 152   HDL 36*   LDL 57   TRIG 293*       No results found for: \"MPDN38BVOUJ\", \"[id number]\", \"[id number]\"    I personally reviewed the patient's outside records from [facility] EMR and Care Everywhere. Summary of pertinent findings in [facility].    Impression / " Plan     1. Diabetes Mellitus: Type 2  2. Hx of steroid induced hyperglycemia -- resolved.   3. Reported diabetic retinopathy  4. Obesity class 1  Not on steroids anymore. DM2 was managed with metformin only. Mounjaro was added 12/2024, he had significant wt loss on it, BMI down to 28. He reported wt loss plateau. He has been on 10 mg weekly for the past 3 month. We will plan to increase it to 12.5 mg weekly for more wt loss effect.   He would like to decrease or stop metformin.  Given drug interaction with bactrim and having A1c down to 5.3, we will stop metformin.     4. Diabetes Complications: refer to hpi. No urine alb.     5. Blood Pressure Management: Blood pressure is acceptable based on chart review.     6.Lipid Management: Per the new ACC/SOWMYA/NHLBI guidelines, statins are recommended for individuals with diabetes aged 40-75 with LDL  without ASCVD, and for any individual with ASCVD. Currently the patient is on a statin.      7. Smoking Status: Patient Pt is smoke free..     Review of the result(s) of each unique test - A1c and diabetes related labs.  34 minutes spent on the date of the encounter doing chart review, history and exam, documentation and further activities per the note           Test and/or medications prescribed today:  No orders of the defined types were placed in this encounter.        Follow up: 3 m    The longitudinal plan of care for the diagnosis(es)/condition(s) as documented were addressed during this visit. Due to the added complexity in care, I will continue to support Arpit in the subsequent management and with ongoing continuity of care.        Markus Griffin MD  Endocrinology, Diabetes and Metabolism  [facility]      Again, thank you for allowing me to participate in the care of your patient.        Sincerely,        Markus Griffin MD    Electronically signed

## 2025-06-18 NOTE — NURSING NOTE
Arpit Forrest's goals for this visit include:   Chief Complaint   Patient presents with    Follow Up    Diabetes       He requests these members of his care team be copied on today's visit information: yes    PCP: Tanvir Max    Referring Provider:  Referred Self, MD  No address on file    /69   Pulse 65   Resp 16   Wt 99.3 kg (219 lb)   SpO2 97%   BMI 28.89 kg/m      Do you need any medication refills at today's visit? Unsure    Toro Stafford, EMT

## 2025-07-30 ENCOUNTER — LAB (OUTPATIENT)
Dept: LAB | Facility: CLINIC | Age: 75
End: 2025-07-30
Payer: MEDICARE

## 2025-07-30 DIAGNOSIS — Z94.81 STATUS POST BONE MARROW TRANSPLANT (H): ICD-10-CM

## 2025-07-30 DIAGNOSIS — D46.9 MDS (MYELODYSPLASTIC SYNDROME) (H): ICD-10-CM

## 2025-07-30 LAB
BASOPHILS # BLD AUTO: 0 10E3/UL (ref 0–0.2)
BASOPHILS NFR BLD AUTO: 0 %
EOSINOPHIL # BLD AUTO: 0.1 10E3/UL (ref 0–0.7)
EOSINOPHIL NFR BLD AUTO: 1 %
ERYTHROCYTE [DISTWIDTH] IN BLOOD BY AUTOMATED COUNT: 13.7 % (ref 10–15)
HCT VFR BLD AUTO: 40 % (ref 40–53)
HGB BLD-MCNC: 13.8 G/DL (ref 13.3–17.7)
IMM GRANULOCYTES # BLD: 0 10E3/UL
IMM GRANULOCYTES NFR BLD: 0 %
LYMPHOCYTES # BLD AUTO: 1.7 10E3/UL (ref 0.8–5.3)
LYMPHOCYTES NFR BLD AUTO: 22 %
MCH RBC QN AUTO: 32.4 PG (ref 26.5–33)
MCHC RBC AUTO-ENTMCNC: 34.5 G/DL (ref 31.5–36.5)
MCV RBC AUTO: 94 FL (ref 78–100)
MONOCYTES # BLD AUTO: 0.8 10E3/UL (ref 0–1.3)
MONOCYTES NFR BLD AUTO: 10 %
NEUTROPHILS # BLD AUTO: 5.2 10E3/UL (ref 1.6–8.3)
NEUTROPHILS NFR BLD AUTO: 67 %
PLATELET # BLD AUTO: 154 10E3/UL (ref 150–450)
RBC # BLD AUTO: 4.26 10E6/UL (ref 4.4–5.9)
WBC # BLD AUTO: 7.8 10E3/UL (ref 4–11)

## 2025-07-30 PROCEDURE — 80053 COMPREHEN METABOLIC PANEL: CPT

## 2025-07-30 PROCEDURE — 36415 COLL VENOUS BLD VENIPUNCTURE: CPT

## 2025-07-30 PROCEDURE — 82784 ASSAY IGA/IGD/IGG/IGM EACH: CPT

## 2025-07-30 PROCEDURE — 85025 COMPLETE CBC W/AUTO DIFF WBC: CPT

## 2025-07-31 LAB
ALBUMIN SERPL BCG-MCNC: 4.2 G/DL (ref 3.5–5.2)
ALP SERPL-CCNC: 108 U/L (ref 40–150)
ALT SERPL W P-5'-P-CCNC: 32 U/L (ref 0–70)
ANION GAP SERPL CALCULATED.3IONS-SCNC: 10 MMOL/L (ref 7–15)
AST SERPL W P-5'-P-CCNC: 23 U/L (ref 0–45)
BILIRUB SERPL-MCNC: 0.4 MG/DL
BUN SERPL-MCNC: 25.7 MG/DL (ref 8–23)
CALCIUM SERPL-MCNC: 10 MG/DL (ref 8.8–10.4)
CHLORIDE SERPL-SCNC: 106 MMOL/L (ref 98–107)
CREAT SERPL-MCNC: 1.42 MG/DL (ref 0.67–1.17)
EGFRCR SERPLBLD CKD-EPI 2021: 51 ML/MIN/1.73M2
GLUCOSE SERPL-MCNC: 75 MG/DL (ref 70–99)
HCO3 SERPL-SCNC: 23 MMOL/L (ref 22–29)
IGG SERPL-MCNC: 743 MG/DL (ref 610–1616)
POTASSIUM SERPL-SCNC: 4.9 MMOL/L (ref 3.4–5.3)
PROT SERPL-MCNC: 6.4 G/DL (ref 6.4–8.3)
SODIUM SERPL-SCNC: 139 MMOL/L (ref 135–145)

## 2025-08-06 ENCOUNTER — LAB (OUTPATIENT)
Dept: LAB | Facility: CLINIC | Age: 75
End: 2025-08-06
Attending: FAMILY MEDICINE
Payer: MEDICARE

## 2025-08-06 ENCOUNTER — OFFICE VISIT (OUTPATIENT)
Dept: TRANSPLANT | Facility: CLINIC | Age: 75
End: 2025-08-06
Attending: PHYSICIAN ASSISTANT
Payer: MEDICARE

## 2025-08-06 VITALS
OXYGEN SATURATION: 98 % | BODY MASS INDEX: 28.93 KG/M2 | SYSTOLIC BLOOD PRESSURE: 104 MMHG | DIASTOLIC BLOOD PRESSURE: 58 MMHG | TEMPERATURE: 97.7 F | HEART RATE: 62 BPM | RESPIRATION RATE: 14 BRPM | WEIGHT: 219.3 LBS

## 2025-08-06 DIAGNOSIS — D46.9 MYELODYSPLASTIC SYNDROME (H): Primary | ICD-10-CM

## 2025-08-06 DIAGNOSIS — D46.9 MDS (MYELODYSPLASTIC SYNDROME) (H): ICD-10-CM

## 2025-08-06 LAB
ALBUMIN SERPL BCG-MCNC: 4.2 G/DL (ref 3.5–5.2)
ALP SERPL-CCNC: 113 U/L (ref 40–150)
ALT SERPL W P-5'-P-CCNC: 37 U/L (ref 0–70)
ANION GAP SERPL CALCULATED.3IONS-SCNC: 11 MMOL/L (ref 7–15)
AST SERPL W P-5'-P-CCNC: 25 U/L (ref 0–45)
BASOPHILS # BLD AUTO: 0 10E3/UL (ref 0–0.2)
BASOPHILS NFR BLD AUTO: 1 %
BILIRUB SERPL-MCNC: 0.5 MG/DL
BUN SERPL-MCNC: 23.5 MG/DL (ref 8–23)
CALCIUM SERPL-MCNC: 10 MG/DL (ref 8.8–10.4)
CD3 CELLS # BLD: 1050 CELLS/UL (ref 603–2990)
CD3 CELLS NFR BLD: 66 % (ref 49–84)
CD3+CD4+ CELLS # BLD: 398 CELLS/UL (ref 441–2156)
CD3+CD4+ CELLS NFR BLD: 25 % (ref 28–63)
CD3+CD4+ CELLS/CD3+CD8+ CLL BLD: 0.63 % (ref 1.4–2.6)
CD3+CD8+ CELLS # BLD: 631 CELLS/UL (ref 125–1312)
CD3+CD8+ CELLS NFR BLD: 40 % (ref 10–40)
CHLORIDE SERPL-SCNC: 105 MMOL/L (ref 98–107)
CHOLEST SERPL-MCNC: 116 MG/DL
CREAT SERPL-MCNC: 1.42 MG/DL (ref 0.67–1.17)
EGFRCR SERPLBLD CKD-EPI 2021: 51 ML/MIN/1.73M2
EOSINOPHIL # BLD AUTO: 0.1 10E3/UL (ref 0–0.7)
EOSINOPHIL NFR BLD AUTO: 1 %
ERYTHROCYTE [DISTWIDTH] IN BLOOD BY AUTOMATED COUNT: 13.9 % (ref 10–15)
FASTING STATUS PATIENT QL REPORTED: NO
FASTING STATUS PATIENT QL REPORTED: NO
FERRITIN SERPL-MCNC: 220 NG/ML (ref 31–409)
GLUCOSE SERPL-MCNC: 94 MG/DL (ref 70–99)
HCO3 SERPL-SCNC: 22 MMOL/L (ref 22–29)
HCT VFR BLD AUTO: 40.1 % (ref 40–53)
HDLC SERPL-MCNC: 35 MG/DL
HGB BLD-MCNC: 13.6 G/DL (ref 13.3–17.7)
IGG SERPL-MCNC: 764 MG/DL (ref 610–1616)
IMM GRANULOCYTES # BLD: 0 10E3/UL
IMM GRANULOCYTES NFR BLD: 0 %
LAB DIRECTOR DISCLAIMER: NORMAL
LAB DIRECTOR DISCLAIMER: NORMAL
LAB DIRECTOR INTERPRETATION: NORMAL
LAB DIRECTOR INTERPRETATION: NORMAL
LAB DIRECTOR METHODOLOGY: NORMAL
LAB DIRECTOR METHODOLOGY: NORMAL
LAB DIRECTOR RESULTS: NORMAL
LAB DIRECTOR RESULTS: NORMAL
LDLC SERPL CALC-MCNC: 49 MG/DL
LYMPHOCYTES # BLD AUTO: 1.4 10E3/UL (ref 0.8–5.3)
LYMPHOCYTES NFR BLD AUTO: 23 %
MCH RBC QN AUTO: 31.6 PG (ref 26.5–33)
MCHC RBC AUTO-ENTMCNC: 33.9 G/DL (ref 31.5–36.5)
MCV RBC AUTO: 93 FL (ref 78–100)
MONOCYTES # BLD AUTO: 0.5 10E3/UL (ref 0–1.3)
MONOCYTES NFR BLD AUTO: 8 %
NEUTROPHILS # BLD AUTO: 4.2 10E3/UL (ref 1.6–8.3)
NEUTROPHILS NFR BLD AUTO: 67 %
NONHDLC SERPL-MCNC: 81 MG/DL
NRBC # BLD AUTO: 0 10E3/UL
NRBC BLD AUTO-RTO: 0 /100
PLATELET # BLD AUTO: 141 10E3/UL (ref 150–450)
POTASSIUM SERPL-SCNC: 4.1 MMOL/L (ref 3.4–5.3)
PROT SERPL-MCNC: 6.6 G/DL (ref 6.4–8.3)
RBC # BLD AUTO: 4.3 10E6/UL (ref 4.4–5.9)
SODIUM SERPL-SCNC: 138 MMOL/L (ref 135–145)
SPECIMEN TYPE: NORMAL
SPECIMEN TYPE: NORMAL
T CELL COMMENT: ABNORMAL
TRIGL SERPL-MCNC: 160 MG/DL
TSH SERPL DL<=0.005 MIU/L-ACNC: 0.93 UIU/ML (ref 0.3–4.2)
VIT D+METAB SERPL-MCNC: 95 NG/ML (ref 20–50)
WBC # BLD AUTO: 6.3 10E3/UL (ref 4–11)

## 2025-08-06 PROCEDURE — G0452 MOLECULAR PATHOLOGY INTERPR: HCPCS | Mod: 26 | Performed by: STUDENT IN AN ORGANIZED HEALTH CARE EDUCATION/TRAINING PROGRAM

## 2025-08-06 PROCEDURE — 80061 LIPID PANEL: CPT | Performed by: STUDENT IN AN ORGANIZED HEALTH CARE EDUCATION/TRAINING PROGRAM

## 2025-08-06 PROCEDURE — 88341 IMHCHEM/IMCYTCHM EA ADD ANTB: CPT | Mod: TC | Performed by: STUDENT IN AN ORGANIZED HEALTH CARE EDUCATION/TRAINING PROGRAM

## 2025-08-06 PROCEDURE — 86360 T CELL ABSOLUTE COUNT/RATIO: CPT | Performed by: STUDENT IN AN ORGANIZED HEALTH CARE EDUCATION/TRAINING PROGRAM

## 2025-08-06 PROCEDURE — 88237 TISSUE CULTURE BONE MARROW: CPT | Performed by: STUDENT IN AN ORGANIZED HEALTH CARE EDUCATION/TRAINING PROGRAM

## 2025-08-06 PROCEDURE — 88185 FLOWCYTOMETRY/TC ADD-ON: CPT | Performed by: STUDENT IN AN ORGANIZED HEALTH CARE EDUCATION/TRAINING PROGRAM

## 2025-08-06 PROCEDURE — 82247 BILIRUBIN TOTAL: CPT | Performed by: STUDENT IN AN ORGANIZED HEALTH CARE EDUCATION/TRAINING PROGRAM

## 2025-08-06 PROCEDURE — 82728 ASSAY OF FERRITIN: CPT | Performed by: STUDENT IN AN ORGANIZED HEALTH CARE EDUCATION/TRAINING PROGRAM

## 2025-08-06 PROCEDURE — 38222 DX BONE MARROW BX & ASPIR: CPT | Performed by: PHYSICIAN ASSISTANT

## 2025-08-06 PROCEDURE — G0452 MOLECULAR PATHOLOGY INTERPR: HCPCS | Mod: 26 | Performed by: PATHOLOGY

## 2025-08-06 PROCEDURE — 82784 ASSAY IGA/IGD/IGG/IGM EACH: CPT | Performed by: STUDENT IN AN ORGANIZED HEALTH CARE EDUCATION/TRAINING PROGRAM

## 2025-08-06 PROCEDURE — 81268 CHIMERISM ANAL W/CELL SELECT: CPT | Performed by: STUDENT IN AN ORGANIZED HEALTH CARE EDUCATION/TRAINING PROGRAM

## 2025-08-06 PROCEDURE — 88271 CYTOGENETICS DNA PROBE: CPT | Performed by: STUDENT IN AN ORGANIZED HEALTH CARE EDUCATION/TRAINING PROGRAM

## 2025-08-06 PROCEDURE — 36415 COLL VENOUS BLD VENIPUNCTURE: CPT | Performed by: STUDENT IN AN ORGANIZED HEALTH CARE EDUCATION/TRAINING PROGRAM

## 2025-08-06 PROCEDURE — 250N000011 HC RX IP 250 OP 636: Performed by: PHYSICIAN ASSISTANT

## 2025-08-06 PROCEDURE — 84443 ASSAY THYROID STIM HORMONE: CPT | Performed by: STUDENT IN AN ORGANIZED HEALTH CARE EDUCATION/TRAINING PROGRAM

## 2025-08-06 PROCEDURE — 82306 VITAMIN D 25 HYDROXY: CPT | Performed by: STUDENT IN AN ORGANIZED HEALTH CARE EDUCATION/TRAINING PROGRAM

## 2025-08-06 PROCEDURE — 81267 CHIMERISM ANAL NO CELL SELEC: CPT | Performed by: STUDENT IN AN ORGANIZED HEALTH CARE EDUCATION/TRAINING PROGRAM

## 2025-08-06 PROCEDURE — 85018 HEMOGLOBIN: CPT | Performed by: STUDENT IN AN ORGANIZED HEALTH CARE EDUCATION/TRAINING PROGRAM

## 2025-08-06 RX ADMIN — MIDAZOLAM 1 MG: 1 INJECTION INTRAMUSCULAR; INTRAVENOUS at 10:12

## 2025-08-06 ASSESSMENT — PAIN SCALES - GENERAL: PAINLEVEL_OUTOF10: NO PAIN (0)

## 2025-08-07 LAB
PATH REPORT.COMMENTS IMP SPEC: NORMAL
PATH REPORT.FINAL DX SPEC: NORMAL
PATH REPORT.FINAL DX SPEC: NORMAL
PATH REPORT.GROSS SPEC: NORMAL
PATH REPORT.MICROSCOPIC SPEC OTHER STN: NORMAL
PATH REPORT.RELEVANT HX SPEC: NORMAL
PATH REPORT.RELEVANT HX SPEC: NORMAL

## 2025-08-10 ENCOUNTER — HEALTH MAINTENANCE LETTER (OUTPATIENT)
Age: 75
End: 2025-08-10

## 2025-08-13 ENCOUNTER — OFFICE VISIT (OUTPATIENT)
Dept: TRANSPLANT | Facility: CLINIC | Age: 75
End: 2025-08-13
Payer: MEDICARE

## 2025-08-13 VITALS
WEIGHT: 219.2 LBS | RESPIRATION RATE: 18 BRPM | SYSTOLIC BLOOD PRESSURE: 109 MMHG | OXYGEN SATURATION: 98 % | HEART RATE: 65 BPM | BODY MASS INDEX: 28.92 KG/M2 | DIASTOLIC BLOOD PRESSURE: 68 MMHG | TEMPERATURE: 97.4 F

## 2025-08-13 DIAGNOSIS — D46.9 MDS (MYELODYSPLASTIC SYNDROME) (H): Primary | ICD-10-CM

## 2025-08-13 LAB
ADDITIONAL COMMENTS: NORMAL
CULTURE HARVEST COMPLETE DATE: NORMAL
CULTURE HARVEST COMPLETE DATE: NORMAL
INTERPRETATION: NORMAL
ISCN: NORMAL
METHODS: NORMAL

## 2025-08-13 PROCEDURE — 250N000011 HC RX IP 250 OP 636: Performed by: STUDENT IN AN ORGANIZED HEALTH CARE EDUCATION/TRAINING PROGRAM

## 2025-08-13 PROCEDURE — 90697 DTAP-IPV-HIB-HEPB VACCINE IM: CPT | Performed by: STUDENT IN AN ORGANIZED HEALTH CARE EDUCATION/TRAINING PROGRAM

## 2025-08-13 PROCEDURE — 90472 IMMUNIZATION ADMIN EACH ADD: CPT | Performed by: STUDENT IN AN ORGANIZED HEALTH CARE EDUCATION/TRAINING PROGRAM

## 2025-08-13 PROCEDURE — G0009 ADMIN PNEUMOCOCCAL VACCINE: HCPCS | Performed by: STUDENT IN AN ORGANIZED HEALTH CARE EDUCATION/TRAINING PROGRAM

## 2025-08-13 PROCEDURE — 250N000021 HC RX MED A9270 GY (STAT IND- M) 250: Performed by: STUDENT IN AN ORGANIZED HEALTH CARE EDUCATION/TRAINING PROGRAM

## 2025-08-13 PROCEDURE — 90750 HZV VACC RECOMBINANT IM: CPT | Performed by: STUDENT IN AN ORGANIZED HEALTH CARE EDUCATION/TRAINING PROGRAM

## 2025-08-13 PROCEDURE — 90677 PCV20 VACCINE IM: CPT | Performed by: STUDENT IN AN ORGANIZED HEALTH CARE EDUCATION/TRAINING PROGRAM

## 2025-08-13 RX ORDER — NAPHAZOLINE HCL/GLYCERIN 0.012-0.2%
2 DROPS OPHTHALMIC (EYE)
COMMUNITY

## 2025-08-13 RX ORDER — METOPROLOL TARTRATE 25 MG/1
TABLET, FILM COATED ORAL
COMMUNITY
Start: 2025-08-07

## 2025-08-13 RX ADMIN — ZOSTER VACCINE RECOMBINANT, ADJUVANTED 0.5 ML: KIT at 16:45

## 2025-08-13 RX ADMIN — DIPHTHERIA AND TETANUS TOXOIDS AND ACELLULAR PERTUSSIS, INACTIVATED POLIOVIRUS, HAEMOPHILUS B CONJUGATE AND HEPATITIS B VACCINE 0.5 ML: 15; 5; 20; 20; 3; 5; 29; 7; 26; 10; 3 INJECTION, SUSPENSION INTRAMUSCULAR at 16:44

## 2025-08-13 RX ADMIN — PNEUMOCOCCAL 20-VALENT CONJUGATE VACCINE 0.5 ML
2.2; 2.2; 2.2; 2.2; 2.2; 2.2; 2.2; 2.2; 2.2; 2.2; 2.2; 2.2; 2.2; 2.2; 2.2; 2.2; 4.4; 2.2; 2.2; 2.2 INJECTION, SUSPENSION INTRAMUSCULAR at 16:42

## 2025-08-13 ASSESSMENT — PAIN SCALES - GENERAL: PAINLEVEL_OUTOF10: NO PAIN (0)

## 2025-08-19 LAB — CULTURE HARVEST COMPLETE DATE: NORMAL

## 2025-09-02 ENCOUNTER — MYC MEDICAL ADVICE (OUTPATIENT)
Dept: ENDOCRINOLOGY | Facility: CLINIC | Age: 75
End: 2025-09-02
Payer: MEDICARE

## 2025-09-02 DIAGNOSIS — E11.9 TYPE 2 DIABETES MELLITUS WITHOUT COMPLICATION, WITHOUT LONG-TERM CURRENT USE OF INSULIN (H): Primary | ICD-10-CM

## (undated) RX ORDER — LIDOCAINE HYDROCHLORIDE 10 MG/ML
INJECTION, SOLUTION EPIDURAL; INFILTRATION; INTRACAUDAL; PERINEURAL
Status: DISPENSED
Start: 2024-07-31

## (undated) RX ORDER — LIDOCAINE HYDROCHLORIDE 10 MG/ML
INJECTION, SOLUTION EPIDURAL; INFILTRATION; INTRACAUDAL; PERINEURAL
Status: DISPENSED
Start: 2024-09-25

## (undated) RX ORDER — FENTANYL CITRATE 50 UG/ML
INJECTION, SOLUTION INTRAMUSCULAR; INTRAVENOUS
Status: DISPENSED
Start: 2024-07-31

## (undated) RX ORDER — CEFAZOLIN SODIUM 2 G/100ML
INJECTION, SOLUTION INTRAVENOUS
Status: DISPENSED
Start: 2024-07-31

## (undated) RX ORDER — HEPARIN SODIUM,PORCINE 10 UNIT/ML
VIAL (ML) INTRAVENOUS
Status: DISPENSED
Start: 2024-07-31

## (undated) RX ORDER — HEPARIN SODIUM (PORCINE) LOCK FLUSH IV SOLN 100 UNIT/ML 100 UNIT/ML
SOLUTION INTRAVENOUS
Status: DISPENSED
Start: 2024-07-31

## (undated) RX ORDER — SODIUM CHLORIDE 9 MG/ML
INJECTION, SOLUTION INTRAVENOUS
Status: DISPENSED
Start: 2024-07-31